# Patient Record
Sex: FEMALE | Race: WHITE | NOT HISPANIC OR LATINO | Employment: OTHER | URBAN - METROPOLITAN AREA
[De-identification: names, ages, dates, MRNs, and addresses within clinical notes are randomized per-mention and may not be internally consistent; named-entity substitution may affect disease eponyms.]

---

## 2017-01-19 ENCOUNTER — OFFICE VISIT (OUTPATIENT)
Dept: CARDIOLOGY | Facility: CLINIC | Age: 67
End: 2017-01-19

## 2017-01-19 VITALS
BODY MASS INDEX: 39.38 KG/M2 | WEIGHT: 214 LBS | DIASTOLIC BLOOD PRESSURE: 80 MMHG | SYSTOLIC BLOOD PRESSURE: 130 MMHG | HEART RATE: 75 BPM | HEIGHT: 62 IN

## 2017-01-19 DIAGNOSIS — I25.10 CORONARY ARTERY DISEASE INVOLVING NATIVE CORONARY ARTERY OF NATIVE HEART WITHOUT ANGINA PECTORIS: Primary | ICD-10-CM

## 2017-01-19 DIAGNOSIS — E78.2 MIXED HYPERLIPIDEMIA: ICD-10-CM

## 2017-01-19 DIAGNOSIS — I21.4 NON-ST ELEVATION (NSTEMI) MYOCARDIAL INFARCTION (HCC): ICD-10-CM

## 2017-01-19 DIAGNOSIS — I10 ESSENTIAL HYPERTENSION: ICD-10-CM

## 2017-01-19 DIAGNOSIS — Z95.1 S/P CABG (CORONARY ARTERY BYPASS GRAFT): Chronic | ICD-10-CM

## 2017-01-19 PROCEDURE — 93000 ELECTROCARDIOGRAM COMPLETE: CPT | Performed by: INTERNAL MEDICINE

## 2017-01-19 PROCEDURE — 99213 OFFICE O/P EST LOW 20 MIN: CPT | Performed by: INTERNAL MEDICINE

## 2017-01-19 NOTE — LETTER
January 19, 2017     Rambo Castillo MD  [address] [address]    Patient: Valentine Arora   YOB: 1950   Date of Visit: 1/19/2017       Dear Dr. Anna MD:    Thank you for referring Valentine Arora to me for evaluation. Below are the relevant portions of my assessment and plan of care.    If you have questions, please do not hesitate to call me. I look forward to following Valentine along with you.         Sincerely,        Kumar Frye III, MD        CC: No Recipients  Kumar Frye III, MD  1/19/2017 10:13 AM  Signed      Subjective:     Encounter Date:01/19/2017      Patient ID: Valentine Arora is a 67 y.o. female.    Chief Complaint:  History of Present Illness    Deear Dr. Castillo,    This patient comes in for routine follow-up of their cardiac status. She has a history of CAD and had a myocardial infarction approximately 20 years ago. At that point she had a failed attempted angioplasty and then underwent a two-vessel CABG in 1995. In August 2012 she had a stress Cardiolite test performed that showed an ejection fraction 75% and no ischemia. No wall motion abnormalities were seen. She was hospitalized at [facility] in September 2015. 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 This was felt to be due to trauma years ago. She had an echocardiogram that showed an ejection fraction is 60-65% with normal valvular structure and function. She also had a Holter monitor that showed no abnormality.    Since her last visit she has been doing well. This patient denies any cardiac complaints.  This patient denies any chest pain, pressure, tightness, squeezing, or heartburn.  This patient has not experienced any feeling of palpitations, tachycardia or heart racing and no presyncope or syncope.  There has not been any problems with dizziness or lightheadedness.  There has not been any  orthopnea or PND, and no problems with lower extremity edema.  This patient denies any shortness of breath at rest or with activity and has not had any wheezing.  This patient has not had any problems with unexplained nausea or vomiting. The patient has continued to perform daily activities of living without any specific problem or change in the level of activity.  This patient has not been recently hospitalized for any reason.    The following portions of the patient's history were reviewed and updated as appropriate: allergies, current medications, past family history, past medical history, past social history, past surgical history and problem list.    Past Medical History   Diagnosis Date   • Chronic back pain    • Hyperlipidemia    • Hypertension    • Myocardial infarction        Past Surgical History   Procedure Laterality Date   • Lumbar fusion       L4/L5   • Wrist fusion Left    • Coronary artery bypass graft  1995     x 2 vessels   • Inner ear surgery     • Tonsillectomy     • Leg surgery       x 2 s/p fall   • Skin graft       to left lower leg x 2       Social History     Social History   • Marital status:      Spouse name: N/A   • Number of children: N/A   • Years of education: N/A     Occupational History   • Not on file.     Social History Main Topics   • Smoking status: Former Smoker     Quit date: 1995   • Smokeless tobacco: Not on file   • Alcohol use Yes      Comment: occasional   • Drug use: No   • Sexual activity: Defer     Other Topics Concern   • Not on file     Social History Narrative       Review of Systems   Constitution: Negative for chills, decreased appetite, fever and night sweats.   HENT: Negative for ear discharge, ear pain, hearing loss, nosebleeds and sore throat.    Eyes: Negative for blurred vision, double vision and pain.   Cardiovascular: Negative for cyanosis.   Respiratory: Negative for hemoptysis and sputum production.    Endocrine: Negative for cold intolerance and  "heat intolerance.   Hematologic/Lymphatic: Negative for adenopathy.   Skin: Negative for dry skin, itching, nail changes, rash and suspicious lesions.   Musculoskeletal: Negative for arthritis, gout, muscle cramps, muscle weakness, myalgias and neck pain.   Gastrointestinal: Negative for anorexia, bowel incontinence, constipation, diarrhea, dysphagia, hematemesis and jaundice.   Genitourinary: Negative for bladder incontinence, dysuria, flank pain, frequency, hematuria and nocturia.   Neurological: Negative for focal weakness, numbness, paresthesias and seizures.   Psychiatric/Behavioral: Negative for altered mental status, hallucinations, hypervigilance, suicidal ideas and thoughts of violence.   Allergic/Immunologic: Negative for persistent infections.         ECG 12 Lead  Date/Time: 1/19/2017 10:11 AM  Performed by: CHICA ROBLERO III.  Authorized by: CHICA ROBLERO III   Comparison: compared with previous ECG   Similar to previous ECG  Rhythm: sinus rhythm  Rate: normal  Conduction: conduction normal  ST Segments: ST segments normal  T Waves: T waves normal  QRS axis: normal  Other: no other findings  Clinical impression: normal ECG               Objective:     Vitals:    01/19/17 0919   BP: 130/80   Pulse: 75   Weight: 214 lb (97.1 kg)   Height: 62\" (157.5 cm)         Physical Exam   Constitutional: She is oriented to person, place, and time. She appears well-developed and well-nourished. No distress.   HENT:   Head: Normocephalic and atraumatic.   Nose: Nose normal.   Mouth/Throat: Oropharynx is clear and moist.   Eyes: Conjunctivae and EOM are normal. Pupils are equal, round, and reactive to light. Right eye exhibits no discharge. Left eye exhibits no discharge.   Neck: Normal range of motion. Neck supple. No tracheal deviation present. No thyromegaly present.   Cardiovascular: Normal rate, regular rhythm, S1 normal, S2 normal, normal heart sounds and normal pulses.  Exam reveals no S3.    Pulmonary/Chest: " Effort normal and breath sounds normal. No stridor. No respiratory distress. She exhibits no tenderness.   Abdominal: Soft. Bowel sounds are normal. She exhibits no distension and no mass. There is no tenderness. There is no rebound and no guarding.   Musculoskeletal: Normal range of motion. She exhibits no tenderness or deformity.   Lymphadenopathy:     She has no cervical adenopathy.   Neurological: She is alert and oriented to person, place, and time. She has normal reflexes.   Skin: Skin is warm and dry. No rash noted. She is not diaphoretic. No erythema.   Psychiatric: She has a normal mood and affect. Thought content normal.       Lab Review:             Performed        Assessment:          Diagnosis Plan   1. Coronary artery disease involving native coronary artery of native heart without angina pectoris  ECG 12 Lead   2. S/P CABG (coronary artery bypass graft)  ECG 12 Lead   3. Non-ST elevation (NSTEMI) myocardial infarction  ECG 12 Lead   4. Essential hypertension     5. Mixed hyperlipidemia            Plan:       At this point the patient is doing well.    Thank you very much for allowing us to participate in the care of this pleasant patient.  Please don't hesitate to call if I can be of assistance in any way.      Current Outpatient Prescriptions:   •  aspirin 325 MG tablet, Take 325 mg by mouth Daily., Disp: , Rfl:   •  carvedilol CR (COREG CR) 10 MG 24 hr capsule, Take 10 mg by mouth Daily., Disp: , Rfl:   •  celecoxib (CELEBREX) 200 MG capsule, Take 200 mg by mouth Daily., Disp: , Rfl:   •  DULoxetine (CYMBALTA) 60 MG capsule, Take 60 mg by mouth Daily., Disp: , Rfl:   •  Olmesartan-Amlodipine-HCTZ (TRIBENZOR) 40-5-12.5 MG tablet, Take 1 tablet by mouth Daily., Disp: , Rfl:   •  rosuvastatin (CRESTOR) 20 MG tablet, Take 20 mg by mouth Daily., Disp: , Rfl:      Coronary Artery Disease  Assessment  • The patient has no angina    Plan  • Lifestyle modifications discussed include adhering to a heart  healthy diet, avoidance of tobacco products, maintenance of a healthy weight, medication compliance, regular exercise and regular monitoring of cholesterol and blood pressure    Subjective - Objective  • There is a history of past MI  • There is a history of previous coronary artery bypass graft  • Current antiplatelet therapy includes aspirin 81 mg           EMR Dragon/Transcription disclaimer:    Much of this encounter note is an electronic transcription/translation of spoken language to printed text. The electronic translation of spoken language may permit erroneous, or at times, nonsensical words or phrases to be inadvertently transcribed; Although I have reviewed the note for such errors, some may still exist.

## 2017-01-19 NOTE — PROGRESS NOTES
Subjective:     Encounter Date:01/19/2017      Patient ID: Valentine Arora is a 67 y.o. female.    Chief Complaint:  History of Present Illness    Degunnar Castillo,    This patient comes in for routine follow-up of their cardiac status. She has a history of CAD and had a myocardial infarction approximately 20 years ago. At that point she had a failed attempted angioplasty and then underwent a two-vessel CABG in 1995. In August 2012 she had a stress Cardiolite test performed that showed an ejection fraction 75% and no ischemia. No wall motion abnormalities were seen. She was hospitalized at [facility] in September 2015. 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 This was felt to be due to trauma years ago. She had an echocardiogram that showed an ejection fraction is 60-65% with normal valvular structure and function. She also had a Holter monitor that showed no abnormality.    Since her last visit she has been doing well. This patient denies any cardiac complaints.  This patient denies any chest pain, pressure, tightness, squeezing, or heartburn.  This patient has not experienced any feeling of palpitations, tachycardia or heart racing and no presyncope or syncope.  There has not been any problems with dizziness or lightheadedness.  There has not been any orthopnea or PND, and no problems with lower extremity edema.  This patient denies any shortness of breath at rest or with activity and has not had any wheezing.  This patient has not had any problems with unexplained nausea or vomiting. The patient has continued to perform daily activities of living without any specific problem or change in the level of activity.  This patient has not been recently hospitalized for any reason.    The following portions of the patient's history were reviewed and updated as appropriate: allergies, current medications, past family history, past medical  history, past social history, past surgical history and problem list.    Past Medical History   Diagnosis Date   • Chronic back pain    • Hyperlipidemia    • Hypertension    • Myocardial infarction        Past Surgical History   Procedure Laterality Date   • Lumbar fusion       L4/L5   • Wrist fusion Left    • Coronary artery bypass graft  1995     x 2 vessels   • Inner ear surgery     • Tonsillectomy     • Leg surgery       x 2 s/p fall   • Skin graft       to left lower leg x 2       Social History     Social History   • Marital status:      Spouse name: N/A   • Number of children: N/A   • Years of education: N/A     Occupational History   • Not on file.     Social History Main Topics   • Smoking status: Former Smoker     Quit date: 1995   • Smokeless tobacco: Not on file   • Alcohol use Yes      Comment: occasional   • Drug use: No   • Sexual activity: Defer     Other Topics Concern   • Not on file     Social History Narrative       Review of Systems   Constitution: Negative for chills, decreased appetite, fever and night sweats.   HENT: Negative for ear discharge, ear pain, hearing loss, nosebleeds and sore throat.    Eyes: Negative for blurred vision, double vision and pain.   Cardiovascular: Negative for cyanosis.   Respiratory: Negative for hemoptysis and sputum production.    Endocrine: Negative for cold intolerance and heat intolerance.   Hematologic/Lymphatic: Negative for adenopathy.   Skin: Negative for dry skin, itching, nail changes, rash and suspicious lesions.   Musculoskeletal: Negative for arthritis, gout, muscle cramps, muscle weakness, myalgias and neck pain.   Gastrointestinal: Negative for anorexia, bowel incontinence, constipation, diarrhea, dysphagia, hematemesis and jaundice.   Genitourinary: Negative for bladder incontinence, dysuria, flank pain, frequency, hematuria and nocturia.   Neurological: Negative for focal weakness, numbness, paresthesias and seizures.  "  Psychiatric/Behavioral: Negative for altered mental status, hallucinations, hypervigilance, suicidal ideas and thoughts of violence.   Allergic/Immunologic: Negative for persistent infections.         ECG 12 Lead  Date/Time: 1/19/2017 10:11 AM  Performed by: CHICA ROBLERO III.  Authorized by: CHICA ROBLERO III   Comparison: compared with previous ECG   Similar to previous ECG  Rhythm: sinus rhythm  Rate: normal  Conduction: conduction normal  ST Segments: ST segments normal  T Waves: T waves normal  QRS axis: normal  Other: no other findings  Clinical impression: normal ECG               Objective:     Vitals:    01/19/17 0919   BP: 130/80   Pulse: 75   Weight: 214 lb (97.1 kg)   Height: 62\" (157.5 cm)         Physical Exam   Constitutional: She is oriented to person, place, and time. She appears well-developed and well-nourished. No distress.   HENT:   Head: Normocephalic and atraumatic.   Nose: Nose normal.   Mouth/Throat: Oropharynx is clear and moist.   Eyes: Conjunctivae and EOM are normal. Pupils are equal, round, and reactive to light. Right eye exhibits no discharge. Left eye exhibits no discharge.   Neck: Normal range of motion. Neck supple. No tracheal deviation present. No thyromegaly present.   Cardiovascular: Normal rate, regular rhythm, S1 normal, S2 normal, normal heart sounds and normal pulses.  Exam reveals no S3.    Pulmonary/Chest: Effort normal and breath sounds normal. No stridor. No respiratory distress. She exhibits no tenderness.   Abdominal: Soft. Bowel sounds are normal. She exhibits no distension and no mass. There is no tenderness. There is no rebound and no guarding.   Musculoskeletal: Normal range of motion. She exhibits no tenderness or deformity.   Lymphadenopathy:     She has no cervical adenopathy.   Neurological: She is alert and oriented to person, place, and time. She has normal reflexes.   Skin: Skin is warm and dry. No rash noted. She is not diaphoretic. No erythema. "   Psychiatric: She has a normal mood and affect. Thought content normal.       Lab Review:             Performed        Assessment:          Diagnosis Plan   1. Coronary artery disease involving native coronary artery of native heart without angina pectoris  ECG 12 Lead   2. S/P CABG (coronary artery bypass graft)  ECG 12 Lead   3. Non-ST elevation (NSTEMI) myocardial infarction  ECG 12 Lead   4. Essential hypertension     5. Mixed hyperlipidemia            Plan:       At this point the patient is doing well.    Thank you very much for allowing us to participate in the care of this pleasant patient.  Please don't hesitate to call if I can be of assistance in any way.      Current Outpatient Prescriptions:   •  aspirin 325 MG tablet, Take 325 mg by mouth Daily., Disp: , Rfl:   •  carvedilol CR (COREG CR) 10 MG 24 hr capsule, Take 10 mg by mouth Daily., Disp: , Rfl:   •  celecoxib (CELEBREX) 200 MG capsule, Take 200 mg by mouth Daily., Disp: , Rfl:   •  DULoxetine (CYMBALTA) 60 MG capsule, Take 60 mg by mouth Daily., Disp: , Rfl:   •  Olmesartan-Amlodipine-HCTZ (TRIBENZOR) 40-5-12.5 MG tablet, Take 1 tablet by mouth Daily., Disp: , Rfl:   •  rosuvastatin (CRESTOR) 20 MG tablet, Take 20 mg by mouth Daily., Disp: , Rfl:      Coronary Artery Disease  Assessment  • The patient has no angina    Plan  • Lifestyle modifications discussed include adhering to a heart healthy diet, avoidance of tobacco products, maintenance of a healthy weight, medication compliance, regular exercise and regular monitoring of cholesterol and blood pressure    Subjective - Objective  • There is a history of past MI  • There is a history of previous coronary artery bypass graft  • Current antiplatelet therapy includes aspirin 81 mg           EMR Dragon/Transcription disclaimer:    Much of this encounter note is an electronic transcription/translation of spoken language to printed text. The electronic translation of spoken language may permit  erroneous, or at times, nonsensical words or phrases to be inadvertently transcribed; Although I have reviewed the note for such errors, some may still exist.

## 2017-01-19 NOTE — MR AVS SNAPSHOT
Valentine Arora   1/19/2017 9:00 AM   Office Visit    Dept Phone:  [phone]   Encounter #:  [id number]    Provider:  Kumar Frye III, MD   Department:  [facility]                Your Full Care Plan              Today's Medication Changes          These changes are accurate as of: 1/19/17  9:50 AM.  If you have any questions, ask your nurse or doctor.               Stop taking medication(s)listed here:     cephalexin 500 MG capsule   Commonly known as:  KEFLEX   Stopped by:  Kumar Frye III, MD                      Your Updated Medication List          This list is accurate as of: 1/19/17  9:50 AM.  Always use your most recent med list.                aspirin 325 MG tablet       CELEBREX 200 MG capsule   Generic drug:  celecoxib       COREG CR 10 MG 24 hr capsule   Generic drug:  carvedilol CR       CRESTOR 20 MG tablet   Generic drug:  rosuvastatin       CYMBALTA 60 MG capsule   Generic drug:  DULoxetine       TRIBENZOR 40-5-12.5 MG tablet   Generic drug:  Olmesartan-Amlodipine-HCTZ               Instructions    Heart Disease Prevention  Heart disease is a leading cause of death. There are many things you can do to help prevent heart disease.  BE PHYSICALLY ACTIVE  Physical activity is good for your heart. It helps control your blood pressure, cholesterol levels, and weight. Try to be physically active every day. Ask your health care provider what activities are best for you.   BE A HEALTHY WEIGHT  Extra weight can strain your heart and affect your blood pressure and cholesterol levels. Lose weight with diet and exercise if recommended by your health care provider.  EAT HEART-HEALTHY FOODS  Follow a healthy eating plan as recommended by your health care provider or dietitian. Heart-healthy foods include:   · High-fiber foods. These include oat bran, oatmeal, and whole-grain breads and cereals.  · Fruits and  vegetables.  Avoid:  · Alcohol.  · Fried foods.  · Foods high in saturated fat. These include meats, butter, whole dairy products, shortening, and coconut or palm oil.  · Salty foods. These include canned food, luncheon meat, salty snacks, and fast food.  KEEP YOUR CHOLESTEROL LEVELS UNDER CONTROL  Cholesterol is a substance that is used for many important functions. When your cholesterol levels are high, cholesterol can stick to the insides of your blood vessels, making them narrow or clog. This can lead to chest pain (angina) and a heart attack.   Keep your cholesterol levels under control as recommended by your health care provider. Have your cholesterol checked at least once a year. Target cholesterol levels (in mg/dL) for most people are:   · Total cholesterol below 200.  · LDL cholesterol below 100.  · HDL cholesterol above 40 in men and above 50 in women.  · Triglycerides below 150.  KEEP YOUR BLOOD PRESSURE UNDER CONTROL  Having high blood pressure (hypertension) puts you at risk for stroke and other forms of heart disease. Keep your blood pressure under control as recommended by your health care provider. Ask your health care provider if you need treatment to lower your blood pressure. If you are 18-39 years of age, have your blood pressure checked every 3-5 years. If you are 40 years of age or older, have your blood pressure checked every year.  DO NOT USE TOBACCO PRODUCTS  Tobacco smoke can damage your heart and blood vessels. Do not use any tobacco products including cigarettes, chewing tobacco, or electronic cigarettes. If you need help quitting, ask your health care provider.  TAKE MEDICINES AS DIRECTED  Take medicines only as directed by your health care provider. Ask your health care provider whether you should take an aspirin every day. Taking aspirin can help reduce your risk of heart disease and stroke.   FOR MORE INFORMATION   To find out more about heart disease, visit the American Heart  "Association's website at www.americanheart.org     This information is not intended to replace advice given to you by your health care provider. Make sure you discuss any questions you have with your health care provider.     Document Released: 2005 Document Revised: 2016 Document Reviewed: 2015  Elsevier Interactive Patient Education © [employer]       Patient Instructions History      Upcoming Appointments     Visit Type Date Time Department    FOLLOW UP 2017  9:00 AM CARMEN MULLER [facility]      [employer] Signup     [facility] allows you to send messages to your doctor, view your test results, renew your prescriptions, schedule appointments, and more. To sign up, go to [employer] and click on the Sign Up Now link in the New User? box. Enter your [facility] Activation Code exactly as it appears below along with the last four digits of your Social Security Number and your Date of Birth () to complete the sign-up process. If you do not sign up before the expiration date, you must request a new code.    [facility] Activation Code: [email or web address]  Expires: 2017  9:50 AM    If you have questions, you can email [email or web address] or call [phone] to talk to our [facility] staff. Remember, [facility] is NOT to be used for urgent needs. For medical emergencies, dial 911.               Other Info from Your Visit           Your Appointments     2018 10:30 AM EST   Follow Up with Kumar Frye III, MD   [facility] [place] [facility] (--)    [address] [place] [address] [phone]           Arrive 15 minutes prior to appointment.              Allergies     Codeine  Itching      Vital Signs     Blood Pressure Pulse Height Weight Body Mass Index Smoking Status    130/80 75 62\" (157.5 cm) 214 lb (97.1 kg) 39.14 kg/m2 Former Smoker        "

## 2017-05-19 ENCOUNTER — TELEPHONE (OUTPATIENT)
Dept: CARDIOLOGY | Facility: CLINIC | Age: 67
End: 2017-05-19

## 2017-06-23 ENCOUNTER — TELEPHONE (OUTPATIENT)
Dept: CARDIOLOGY | Facility: CLINIC | Age: 67
End: 2017-06-23

## 2017-06-23 NOTE — TELEPHONE ENCOUNTER
Pt was last seen on 1/19/17. PT is wanting surgery clearance for arthroscopic diagnosis exam right hip and burial reception with dr.Matt pedro on 7/5/17. You clear her to have wrist surgery in may. Please advise. PT #:[phone]      Thanks Josefina

## 2018-01-17 ENCOUNTER — TELEPHONE (OUTPATIENT)
Dept: CARDIOLOGY | Facility: CLINIC | Age: 68
End: 2018-01-17

## 2018-01-17 ENCOUNTER — OFFICE VISIT (OUTPATIENT)
Dept: CARDIOLOGY | Facility: CLINIC | Age: 68
End: 2018-01-17

## 2018-01-17 VITALS
HEART RATE: 66 BPM | WEIGHT: 215 LBS | HEIGHT: 62 IN | DIASTOLIC BLOOD PRESSURE: 78 MMHG | SYSTOLIC BLOOD PRESSURE: 148 MMHG | BODY MASS INDEX: 39.56 KG/M2

## 2018-01-17 DIAGNOSIS — I25.10 CORONARY ARTERY DISEASE INVOLVING NATIVE CORONARY ARTERY OF NATIVE HEART WITHOUT ANGINA PECTORIS: Primary | Chronic | ICD-10-CM

## 2018-01-17 DIAGNOSIS — I10 ESSENTIAL HYPERTENSION: Chronic | ICD-10-CM

## 2018-01-17 DIAGNOSIS — I21.4 NON-ST ELEVATION (NSTEMI) MYOCARDIAL INFARCTION (HCC): Chronic | ICD-10-CM

## 2018-01-17 DIAGNOSIS — Z95.1 S/P CABG (CORONARY ARTERY BYPASS GRAFT): Chronic | ICD-10-CM

## 2018-01-17 DIAGNOSIS — E78.2 MIXED HYPERLIPIDEMIA: Chronic | ICD-10-CM

## 2018-01-17 PROCEDURE — 93000 ELECTROCARDIOGRAM COMPLETE: CPT | Performed by: INTERNAL MEDICINE

## 2018-01-17 PROCEDURE — 99214 OFFICE O/P EST MOD 30 MIN: CPT | Performed by: INTERNAL MEDICINE

## 2018-01-17 RX ORDER — ASPIRIN 81 MG/1
81 TABLET ORAL DAILY
COMMUNITY
End: 2020-09-10

## 2018-01-17 NOTE — PATIENT INSTRUCTIONS
Heart Disease Prevention  Heart disease is a leading cause of death. There are many things you can do to help prevent heart disease.  Be physically active  Physical activity is good for your heart. It helps control your blood pressure, cholesterol levels, and weight. Try to be physically active every day. Ask your health care provider what activities are best for you.  Be a healthy weight  Extra weight can strain your heart and affect your blood pressure and cholesterol levels. Lose weight with diet and exercise if recommended by your health care provider.  Eat heart-healthy foods  Follow a healthy eating plan as recommended by your health care provider or dietitian. Heart-healthy foods include:  · High-fiber foods. These include oat bran, oatmeal, and whole-grain breads and cereals.  · Fruits and vegetables.  Avoid:  · Alcohol.  · Fried foods.  · Foods high in saturated fat. These include meats, butter, whole dairy products, shortening, and coconut or palm oil.  · Salty foods. These include canned food, luncheon meat, salty snacks, and fast food.  Keep your cholesterol levels under control  Cholesterol is a substance that is used for many important functions. When your cholesterol levels are high, cholesterol can stick to the insides of your blood vessels, making them narrow or clog. This can lead to chest pain (angina) and a heart attack.  Keep your cholesterol levels under control as recommended by your health care provider. Have your cholesterol checked at least once a year. Target cholesterol levels (in mg/dL) for most people are:  · Total cholesterol below 200.  · LDL cholesterol below 100.  · HDL cholesterol above 40 in men and above 50 in women.  · Triglycerides below 150.  Keep your blood pressure under control  Having high blood pressure (hypertension) puts you at risk for stroke and other forms of heart disease. Keep your blood pressure under control as recommended by your health care provider. Ask your  health care provider if you need treatment to lower your blood pressure. If you are 18-39 years of age, have your blood pressure checked every 3-5 years. If you are 40 years of age or older, have your blood pressure checked every year.  Do not use tobacco products  Tobacco smoke can damage your heart and blood vessels. Do not use any tobacco products including cigarettes, chewing tobacco, or electronic cigarettes. If you need help quitting, ask your health care provider.  Take medicines as directed  Take medicines only as directed by your health care provider. Ask your health care provider whether you should take an aspirin every day. Taking aspirin can help reduce your risk of heart disease and stroke.  Where to find more information:  To find out more about heart disease, visit the American Heart Association's website at www.americanheart.org  This information is not intended to replace advice given to you by your health care provider. Make sure you discuss any questions you have with your health care provider.  Document Released: 08/01/2005 Document Revised: 05/17/2017 Document Reviewed: 02/11/2015  Elsevier Interactive Patient Education © 2017 [employer]

## 2018-01-17 NOTE — PROGRESS NOTES
Subjective:     Encounter Date:01/17/2018      Patient ID: Valentine Arora is a 68 y.o. female.    Chief Complaint:  History of Present Illness    Degunnar Alejandro,    This patient comes in for routine follow-up of their cardiac status. It has been one year since her last visit.  She has a history of CAD and had a myocardial infarction approximately 20 years ago. At that point she had a failed attempted angioplasty and then underwent a two-vessel CABG in 1995. In August 2012 she had a stress Cardiolite test performed that showed an ejection fraction 75% and no ischemia. No wall motion abnormalities were seen. She was hospitalized at [facility] in September 2015. 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 This was felt to be due to trauma years ago. She had an echocardiogram that showed an ejection fraction is 60-65% with normal valvular structure and function. She also had a Holter monitor that showed no abnormality.    She states that she's been doing well since her visit a year ago.  Only complaint she had is related to arthritis.  She also had right hip surgery in July 2017 as well as surgery on her wrist April 2017.  She's had no cardiac complaints.This patient denies any chest pain, pressure, tightness, squeezing, or heartburn.  This patient has not experienced any feeling of palpitations, tachycardia or heart racing and no presyncope or syncope.  There has not been any problems with dizziness or lightheadedness.  There has not been any orthopnea or PND, and no problems with lower extremity edema.  This patient denies any shortness of breath at rest or with activity and has not had any wheezing.  This patient has not had any problems with unexplained nausea or vomiting. The patient has continued to perform daily activities of living without any specific problem or change in the level of activity.  This patient has not been  recently hospitalized for any reason.    The following portions of the patient's history were reviewed and updated as appropriate: allergies, current medications, past family history, past medical history, past social history, past surgical history and problem list.    Past Medical History:   Diagnosis Date   • CAD (coronary artery disease)    • Chronic back pain    • Hyperlipidemia    • Hypertension    • Myocardial infarction        Past Surgical History:   Procedure Laterality Date   • CORONARY ARTERY BYPASS GRAFT  1995    x 2 vessels   • INNER EAR SURGERY     • LEG SURGERY      x 2 s/p fall   • LUMBAR FUSION      L4/L5   • SKIN GRAFT      to left lower leg x 2   • TONSILLECTOMY     • WRIST FUSION Left        Social History     Social History   • Marital status:      Spouse name: N/A   • Number of children: N/A   • Years of education: N/A     Occupational History   • Not on file.     Social History Main Topics   • Smoking status: Former Smoker     Quit date: 1995   • Smokeless tobacco: Not on file   • Alcohol use Yes      Comment: occasional   • Drug use: No   • Sexual activity: Defer     Other Topics Concern   • Not on file     Social History Narrative       Review of Systems   Constitution: Negative for chills, decreased appetite, fever and night sweats.   HENT: Negative for ear discharge, ear pain, hearing loss, nosebleeds and sore throat.    Eyes: Negative for blurred vision, double vision and pain.   Cardiovascular: Negative for cyanosis.   Respiratory: Negative for hemoptysis and sputum production.    Endocrine: Negative for cold intolerance and heat intolerance.   Hematologic/Lymphatic: Negative for adenopathy.   Skin: Negative for dry skin, itching, nail changes, rash and suspicious lesions.   Musculoskeletal: Negative for arthritis, gout, muscle cramps, muscle weakness, myalgias and neck pain.   Gastrointestinal: Negative for anorexia, bowel incontinence, constipation, diarrhea, dysphagia,  "hematemesis and jaundice.   Genitourinary: Negative for bladder incontinence, dysuria, flank pain, frequency, hematuria and nocturia.   Neurological: Negative for focal weakness, numbness, paresthesias and seizures.   Psychiatric/Behavioral: Negative for altered mental status, hallucinations, hypervigilance, suicidal ideas and thoughts of violence.   Allergic/Immunologic: Negative for persistent infections.         ECG 12 Lead  Date/Time: 1/17/2018 11:47 AM  Performed by: CHICA ROBLERO III  Authorized by: CHICA ROBLERO III   Comparison: compared with previous ECG   Similar to previous ECG  Rhythm: sinus rhythm  Rate: normal  Conduction: conduction normal  ST Segments: ST segments normal  T Waves: T waves normal  QRS axis: normal  Other: no other findings  Clinical impression: normal ECG               Objective:     Vitals:    01/17/18 1019   BP: 148/78   Pulse: 66   Weight: 97.5 kg (215 lb)   Height: 157.5 cm (62\")         Physical Exam   Constitutional: She is oriented to person, place, and time. She appears well-developed and well-nourished. No distress.   HENT:   Head: Normocephalic and atraumatic.   Nose: Nose normal.   Mouth/Throat: Oropharynx is clear and moist.   Eyes: Conjunctivae and EOM are normal. Pupils are equal, round, and reactive to light. Right eye exhibits no discharge. Left eye exhibits no discharge.   Neck: Normal range of motion. Neck supple. No tracheal deviation present. No thyromegaly present.   Cardiovascular: Normal rate, regular rhythm, S1 normal, S2 normal, normal heart sounds and normal pulses.  Exam reveals no S3.    Pulmonary/Chest: Effort normal and breath sounds normal. No stridor. No respiratory distress. She exhibits no tenderness.   Abdominal: Soft. Bowel sounds are normal. She exhibits no distension and no mass. There is no tenderness. There is no rebound and no guarding.   Musculoskeletal: Normal range of motion. She exhibits no tenderness or deformity.   Lymphadenopathy:    "  She has no cervical adenopathy.   Neurological: She is alert and oriented to person, place, and time. She has normal reflexes.   Skin: Skin is warm and dry. No rash noted. She is not diaphoretic. No erythema.   Psychiatric: She has a normal mood and affect. Thought content normal.       Lab Review:             Performed        Assessment:          Diagnosis Plan   1. Coronary artery disease involving native coronary artery of native heart without angina pectoris  ECG 12 Lead   2. S/P CABG (coronary artery bypass graft)  ECG 12 Lead   3. Essential hypertension     4. Non-ST elevation (NSTEMI) myocardial infarction     5. Mixed hyperlipidemia            Plan:       1. Coronary Artery Disease  Assessment  • The patient has no angina    Plan  • Lifestyle modifications discussed include adhering to a heart healthy diet, avoidance of tobacco products, maintenance of a healthy weight, medication compliance, regular exercise and regular monitoring of cholesterol and blood pressure    Subjective - Objective  • There is a history of past MI  • There is a history of previous coronary artery bypass graft  • Current antiplatelet therapy includes aspirin 81 mg    2.  Prior CABG  3.  History of MI  4.  Hypertension-continue current medical regimen  5.  Hyperlipidemia-on lipid-lowering therapy, patient states that she had recent blood work in your office and it was well-controlled.      Current Outpatient Prescriptions:   •  aspirin 81 MG EC tablet, Take 81 mg by mouth Daily., Disp: , Rfl:   •  carvedilol CR (COREG CR) 10 MG 24 hr capsule, Take 10 mg by mouth Daily., Disp: , Rfl:   •  celecoxib (CELEBREX) 200 MG capsule, Take 200 mg by mouth Daily., Disp: , Rfl:   •  DULoxetine (CYMBALTA) 60 MG capsule, Take 60 mg by mouth Daily., Disp: , Rfl:   •  Olmesartan-Amlodipine-HCTZ (TRIBENZOR) 40-5-12.5 MG tablet, Take 1 tablet by mouth Daily., Disp: , Rfl:   •  rosuvastatin (CRESTOR) 20 MG tablet, Take 10 mg by mouth Daily., Disp: ,  Rfl:

## 2018-08-09 ENCOUNTER — OFFICE VISIT (OUTPATIENT)
Dept: CARDIOLOGY | Facility: CLINIC | Age: 68
End: 2018-08-09

## 2018-08-09 VITALS
HEIGHT: 62 IN | SYSTOLIC BLOOD PRESSURE: 136 MMHG | DIASTOLIC BLOOD PRESSURE: 60 MMHG | WEIGHT: 200 LBS | BODY MASS INDEX: 36.8 KG/M2 | HEART RATE: 72 BPM

## 2018-08-09 DIAGNOSIS — I10 ESSENTIAL HYPERTENSION: Primary | Chronic | ICD-10-CM

## 2018-08-09 DIAGNOSIS — Z95.1 S/P CABG (CORONARY ARTERY BYPASS GRAFT): Chronic | ICD-10-CM

## 2018-08-09 DIAGNOSIS — E78.2 MIXED HYPERLIPIDEMIA: Chronic | ICD-10-CM

## 2018-08-09 DIAGNOSIS — I25.10 CORONARY ARTERY DISEASE INVOLVING NATIVE CORONARY ARTERY OF NATIVE HEART WITHOUT ANGINA PECTORIS: Chronic | ICD-10-CM

## 2018-08-09 DIAGNOSIS — I21.4 NON-ST ELEVATION (NSTEMI) MYOCARDIAL INFARCTION (HCC): Chronic | ICD-10-CM

## 2018-08-09 PROCEDURE — 99214 OFFICE O/P EST MOD 30 MIN: CPT | Performed by: INTERNAL MEDICINE

## 2018-08-09 NOTE — PROGRESS NOTES
Subjective:     Encounter Date:08/9/2018      Patient ID: Valentine Arora is a 68 y.o. female.    Chief Complaint: HTN, CAD  History of Present Illness    Sindy Alejandro,    This patient comes in for routine follow-up of their cardiac status. It has been one year since her last visit.  She has a history of CAD and had a myocardial infarction approximately 20 years ago. At that point she had a failed attempted angioplasty and then underwent a two-vessel CABG in 1995. In August 2012 she had a stress Cardiolite test performed that showed an ejection fraction 75% and no ischemia. No wall motion abnormalities were seen. She was hospitalized at [facility] in September 2015. 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 This was felt to be due to trauma years ago. She had an echocardiogram that showed an ejection fraction is 60-65% with normal valvular structure and function. She also had a Holter monitor that showed no abnormality.    She has had problems with shingles and also had knee surgery.  Blood pressures been less consistently control.  She just had her carvedilol adjusted in your office to take it one twice daily in the last couple of days her blood pressures been doing better.  She's only been on the new regimen for 5-1/2 days.  She's not having chest pain or chest discomfort.  No palpitations.  No shortness of breath.  She feels like she's getting better.    The following portions of the patient's history were reviewed and updated as appropriate: allergies, current medications, past family history, past medical history, past social history, past surgical history and problem list.    Past Medical History:   Diagnosis Date   • CAD (coronary artery disease)    • Chronic back pain    • Hyperlipidemia    • Hypertension    • Myocardial infarction    • Shingles        Past Surgical History:   Procedure Laterality Date   • CORONARY  ARTERY BYPASS GRAFT  1995    x 2 vessels   • INNER EAR SURGERY     • LEG SURGERY      x 2 s/p fall   • LUMBAR FUSION      L4/L5   • SKIN GRAFT      to left lower leg x 2   • TONSILLECTOMY     • WRIST FUSION Left        Social History     Social History   • Marital status:      Spouse name: N/A   • Number of children: N/A   • Years of education: N/A     Occupational History   • Not on file.     Social History Main Topics   • Smoking status: Former Smoker     Quit date: 1995   • Smokeless tobacco: Not on file   • Alcohol use Yes      Comment: occasional   • Drug use: No   • Sexual activity: Defer     Other Topics Concern   • Not on file     Social History Narrative   • No narrative on file       Review of Systems   Constitution: Negative for chills, decreased appetite, fever and night sweats.   HENT: Negative for ear discharge, ear pain, hearing loss, nosebleeds and sore throat.    Eyes: Negative for blurred vision, double vision and pain.   Cardiovascular: Negative for cyanosis.   Respiratory: Negative for hemoptysis and sputum production.    Endocrine: Negative for cold intolerance and heat intolerance.   Hematologic/Lymphatic: Negative for adenopathy.   Skin: Negative for dry skin, itching, nail changes, rash and suspicious lesions.   Musculoskeletal: Negative for arthritis, gout, muscle cramps, muscle weakness, myalgias and neck pain.   Gastrointestinal: Negative for anorexia, bowel incontinence, constipation, diarrhea, dysphagia, hematemesis and jaundice.   Genitourinary: Negative for bladder incontinence, dysuria, flank pain, frequency, hematuria and nocturia.   Neurological: Negative for focal weakness, numbness, paresthesias and seizures.   Psychiatric/Behavioral: Negative for altered mental status, hallucinations, hypervigilance, suicidal ideas and thoughts of violence.   Allergic/Immunologic: Negative for persistent infections.       Procedures       Objective:     Vitals:    08/09/18 1353   BP:  "136/60   Pulse: 72   Weight: 90.7 kg (200 lb)   Height: 157.5 cm (62\")         Physical Exam   Constitutional: She is oriented to person, place, and time. She appears well-developed and well-nourished. No distress.   HENT:   Head: Normocephalic and atraumatic.   Nose: Nose normal.   Mouth/Throat: Oropharynx is clear and moist.   Eyes: Pupils are equal, round, and reactive to light. Conjunctivae and EOM are normal. Right eye exhibits no discharge. Left eye exhibits no discharge.   Neck: Normal range of motion. Neck supple. No tracheal deviation present. No thyromegaly present.   Cardiovascular: Normal rate, regular rhythm, S1 normal, S2 normal, normal heart sounds and normal pulses.  Exam reveals no S3.    Pulmonary/Chest: Effort normal and breath sounds normal. No stridor. No respiratory distress. She exhibits no tenderness.   Abdominal: Soft. Bowel sounds are normal. She exhibits no distension and no mass. There is no tenderness. There is no rebound and no guarding.   Musculoskeletal: Normal range of motion. She exhibits no tenderness or deformity.   Lymphadenopathy:     She has no cervical adenopathy.   Neurological: She is alert and oriented to person, place, and time. She has normal reflexes.   Skin: Skin is warm and dry. No rash noted. She is not diaphoretic. No erythema.   Psychiatric: She has a normal mood and affect. Thought content normal.       Lab Review:             Performed        Assessment:          Diagnosis Plan   1. Essential hypertension     2. Coronary artery disease involving native coronary artery of native heart without angina pectoris     3. Mixed hyperlipidemia     4. Non-ST elevation (NSTEMI) myocardial infarction (CMS/[facility])     5. S/P CABG (coronary artery bypass graft)            Plan:       1. Coronary Artery Disease  Assessment  • The patient has no angina    Plan  • Lifestyle modifications discussed include adhering to a heart healthy diet, avoidance of tobacco products, maintenance of " a healthy weight, medication compliance, regular exercise and regular monitoring of cholesterol and blood pressure    Subjective - Objective  • There is a history of past MI  • There is a history of previous coronary artery bypass graft  • Current antiplatelet therapy includes aspirin 81 mg    2.  Prior CABG  3.  History of MI  4.  Hypertension-continue current medical regimen; I told her that she needs to follow this for a longer period of time.  She is scheduled to be seen back in your office 4 weeks after her last visit.  5.  Hyperlipidemia-on lipid-lowering therapy, patient states that she had recent blood work in your office and it was well-controlled.      Current Outpatient Prescriptions:   •  aspirin 81 MG EC tablet, Take 81 mg by mouth Daily., Disp: , Rfl:   •  carvedilol CR (COREG CR) 10 MG 24 hr capsule, Take 10 mg by mouth 2 (Two) Times a Day., Disp: , Rfl:   •  DULoxetine (CYMBALTA) 60 MG capsule, Take 60 mg by mouth Daily., Disp: , Rfl:   •  Olmesartan-Amlodipine-HCTZ (TRIBENZOR) 40-5-12.5 MG tablet, Take 1 tablet by mouth Daily., Disp: , Rfl:   •  rosuvastatin (CRESTOR) 10 MG tablet, Take 10 mg by mouth Daily., Disp: , Rfl:   •  celecoxib (CELEBREX) 200 MG capsule, Take 200 mg by mouth Daily., Disp: , Rfl:

## 2018-09-13 ENCOUNTER — TELEPHONE (OUTPATIENT)
Dept: CARDIOLOGY | Facility: CLINIC | Age: 68
End: 2018-09-13

## 2018-09-13 NOTE — TELEPHONE ENCOUNTER
Pt called and said that she is needing surgery clearance on getting a wound cleaned out in her knee. Pt said that she will be under anesthesia similar to having a colonoscopy done.    Pt can be reached at [phone]

## 2018-09-13 NOTE — TELEPHONE ENCOUNTER
Received a form from Dr. Jorgensen's office in regards to this.  I have placed the form in your ion box for a sig.

## 2018-12-11 ENCOUNTER — TELEPHONE (OUTPATIENT)
Dept: CARDIOLOGY | Facility: CLINIC | Age: 68
End: 2018-12-11

## 2018-12-11 ENCOUNTER — LAB REQUISITION (OUTPATIENT)
Dept: LAB | Facility: HOSPITAL | Age: 68
End: 2018-12-11

## 2018-12-11 DIAGNOSIS — T84.53XA INFECTION AND INFLAMMATORY REACTION DUE TO INTERNAL RIGHT KNEE PROSTHESIS, INITIAL ENCOUNTER (HCC): ICD-10-CM

## 2018-12-11 LAB
ALBUMIN SERPL-MCNC: 4 G/DL (ref 3.5–5.2)
ALBUMIN/GLOB SERPL: 1.6 G/DL
ALP SERPL-CCNC: 83 U/L (ref 40–129)
ALT SERPL W P-5'-P-CCNC: 19 U/L (ref 5–33)
ANION GAP SERPL CALCULATED.3IONS-SCNC: 15.2 MMOL/L
AST SERPL-CCNC: 16 U/L (ref 5–32)
BASOPHILS # BLD AUTO: 0.06 10*3/MM3 (ref 0–0.2)
BASOPHILS NFR BLD AUTO: 0.5 % (ref 0–2)
BILIRUB SERPL-MCNC: 0.3 MG/DL (ref 0.2–1.2)
BUN BLD-MCNC: 9 MG/DL (ref 8–23)
BUN/CREAT SERPL: 13.4 (ref 7–25)
CALCIUM SPEC-SCNC: 9.1 MG/DL (ref 8.8–10.5)
CHLORIDE SERPL-SCNC: 95 MMOL/L (ref 98–107)
CO2 SERPL-SCNC: 24.8 MMOL/L (ref 22–29)
CREAT BLD-MCNC: 0.67 MG/DL (ref 0.57–1)
DEPRECATED RDW RBC AUTO: 46.1 FL (ref 37–54)
EOSINOPHIL # BLD AUTO: 0.27 10*3/MM3 (ref 0.1–0.3)
EOSINOPHIL NFR BLD AUTO: 2.3 % (ref 0–4)
ERYTHROCYTE [DISTWIDTH] IN BLOOD BY AUTOMATED COUNT: 15.4 % (ref 11.5–14.5)
ERYTHROCYTE [SEDIMENTATION RATE] IN BLOOD: 52 MM/HR (ref 0–20)
GFR SERPL CREATININE-BSD FRML MDRD: 88 ML/MIN/1.73
GLOBULIN UR ELPH-MCNC: 2.5 GM/DL
GLUCOSE BLD-MCNC: 107 MG/DL (ref 65–99)
HCT VFR BLD AUTO: 31.4 % (ref 37–47)
HGB BLD-MCNC: 9.6 G/DL (ref 12–16)
IMM GRANULOCYTES # BLD: 0.14 10*3/MM3 (ref 0–0.03)
IMM GRANULOCYTES NFR BLD: 1.2 % (ref 0–0.5)
LYMPHOCYTES # BLD AUTO: 2.01 10*3/MM3 (ref 0.6–4.8)
LYMPHOCYTES NFR BLD AUTO: 17.2 % (ref 20–45)
MCH RBC QN AUTO: 25.8 PG (ref 27–31)
MCHC RBC AUTO-ENTMCNC: 30.6 G/DL (ref 31–37)
MCV RBC AUTO: 84.4 FL (ref 81–99)
MONOCYTES # BLD AUTO: 0.76 10*3/MM3 (ref 0–1)
MONOCYTES NFR BLD AUTO: 6.5 % (ref 3–8)
NEUTROPHILS # BLD AUTO: 8.43 10*3/MM3 (ref 1.5–8.3)
NEUTROPHILS NFR BLD AUTO: 72.3 % (ref 45–70)
NRBC BLD MANUAL-RTO: 0.2 /100 WBC (ref 0–0)
PLATELET # BLD AUTO: 536 10*3/MM3 (ref 140–500)
PMV BLD AUTO: 8.7 FL (ref 7.4–10.4)
POTASSIUM BLD-SCNC: 4.2 MMOL/L (ref 3.5–5.2)
PROT SERPL-MCNC: 6.5 G/DL (ref 6–8.5)
RBC # BLD AUTO: 3.72 10*6/MM3 (ref 4.2–5.4)
SODIUM BLD-SCNC: 135 MMOL/L (ref 136–145)
VANCOMYCIN TROUGH SERPL-MCNC: 7.9 MCG/ML (ref 5–20)
WBC NRBC COR # BLD: 11.67 10*3/MM3 (ref 4.8–10.8)

## 2018-12-11 PROCEDURE — 80202 ASSAY OF VANCOMYCIN: CPT | Performed by: ORTHOPAEDIC SURGERY

## 2018-12-11 PROCEDURE — 86140 C-REACTIVE PROTEIN: CPT | Performed by: ORTHOPAEDIC SURGERY

## 2018-12-11 PROCEDURE — 85025 COMPLETE CBC W/AUTO DIFF WBC: CPT | Performed by: ORTHOPAEDIC SURGERY

## 2018-12-11 PROCEDURE — 80053 COMPREHEN METABOLIC PANEL: CPT | Performed by: ORTHOPAEDIC SURGERY

## 2018-12-11 PROCEDURE — 85652 RBC SED RATE AUTOMATED: CPT | Performed by: ORTHOPAEDIC SURGERY

## 2018-12-11 NOTE — TELEPHONE ENCOUNTER
Pt is schedule on 1/17/19 for a yearly appointment. Pt was last seen on 8/9/18.     Would you like to see her a year from 8/2018?    Thanks Josefina

## 2018-12-12 LAB — CRP SERPL-MCNC: 1.35 MG/DL (ref 0–0.5)

## 2019-03-25 ENCOUNTER — TRANSCRIBE ORDERS (OUTPATIENT)
Dept: ADMINISTRATIVE | Facility: HOSPITAL | Age: 69
End: 2019-03-25

## 2019-03-25 DIAGNOSIS — Z78.0 MENOPAUSE: Primary | ICD-10-CM

## 2019-03-29 ENCOUNTER — APPOINTMENT (OUTPATIENT)
Dept: BONE DENSITY | Facility: HOSPITAL | Age: 69
End: 2019-03-29

## 2019-03-29 DIAGNOSIS — Z78.0 MENOPAUSE: ICD-10-CM

## 2019-03-29 PROCEDURE — 77080 DXA BONE DENSITY AXIAL: CPT

## 2019-09-11 ENCOUNTER — OFFICE VISIT (OUTPATIENT)
Dept: CARDIOLOGY | Facility: CLINIC | Age: 69
End: 2019-09-11

## 2019-09-11 VITALS
DIASTOLIC BLOOD PRESSURE: 60 MMHG | HEIGHT: 62 IN | SYSTOLIC BLOOD PRESSURE: 124 MMHG | HEART RATE: 70 BPM | WEIGHT: 213 LBS | BODY MASS INDEX: 39.2 KG/M2

## 2019-09-11 DIAGNOSIS — E78.2 MIXED HYPERLIPIDEMIA: Chronic | ICD-10-CM

## 2019-09-11 DIAGNOSIS — R06.02 SOB (SHORTNESS OF BREATH): ICD-10-CM

## 2019-09-11 DIAGNOSIS — I10 ESSENTIAL HYPERTENSION: Chronic | ICD-10-CM

## 2019-09-11 DIAGNOSIS — Z95.1 S/P CABG (CORONARY ARTERY BYPASS GRAFT): Chronic | ICD-10-CM

## 2019-09-11 DIAGNOSIS — E66.01 MORBIDLY OBESE (HCC): ICD-10-CM

## 2019-09-11 DIAGNOSIS — I73.9 PAD (PERIPHERAL ARTERY DISEASE) (HCC): Chronic | ICD-10-CM

## 2019-09-11 DIAGNOSIS — I25.119 CORONARY ARTERY DISEASE INVOLVING NATIVE CORONARY ARTERY OF NATIVE HEART WITH ANGINA PECTORIS (HCC): Primary | ICD-10-CM

## 2019-09-11 DIAGNOSIS — I21.4 NON-ST ELEVATION (NSTEMI) MYOCARDIAL INFARCTION (HCC): Chronic | ICD-10-CM

## 2019-09-11 DIAGNOSIS — R26.2 INABILITY TO AMBULATE DUE TO KNEE: ICD-10-CM

## 2019-09-11 PROCEDURE — 99214 OFFICE O/P EST MOD 30 MIN: CPT | Performed by: INTERNAL MEDICINE

## 2019-09-11 PROCEDURE — 93000 ELECTROCARDIOGRAM COMPLETE: CPT | Performed by: INTERNAL MEDICINE

## 2019-09-11 RX ORDER — ALLOPURINOL 100 MG/1
100 TABLET ORAL 2 TIMES DAILY
Status: ON HOLD | COMMUNITY
End: 2021-06-15

## 2019-09-11 RX ORDER — CLONIDINE HYDROCHLORIDE 0.1 MG/1
0.1 TABLET ORAL 2 TIMES DAILY
Status: ON HOLD | COMMUNITY
End: 2020-06-24

## 2019-09-11 NOTE — PROGRESS NOTES
Subjective:     Encounter Date:09/11/2019      Patient ID: Valentine Arora is a 69 y.o. female.    Chief Complaint: HTN, CAD  History of Present Illness    Degunnar Alejandro,    This patient comes in for routine follow-up of their cardiac status. It has been one year since her last visit.  She has a history of CAD and had a myocardial infarction approximately 20 years ago. At that point she had a failed attempted angioplasty and then underwent a two-vessel CABG in 1995. In August 2012 she had a stress Cardiolite test performed that showed an ejection fraction 75% and no ischemia. No wall motion abnormalities were seen. She was hospitalized at [facility] in September 2015. 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 This was felt to be due to trauma years ago. She had an echocardiogram that showed an ejection fraction is 60-65% with normal valvular structure and function. She also had a Holter monitor that showed no abnormality.    She was noted to have a new carotid bruit and was found to have bilateral cerebrovascular disease.  She also had hip and leg pain and was found to have significant peripheral arterial disease.  She is being followed with Dr. Ace.    Fortunately she has not had any cardiac complaints.  She is very limited as to how much she can walk because of the claudication symptoms.  She does sometimes have a little bit of shortness of breath.  No chest pain or chest discomfort.  Is been about 3 and half years since her last stress test which at that time showed normal function and no ischemia.    The following portions of the patient's history were reviewed and updated as appropriate: allergies, current medications, past family history, past medical history, past social history, past surgical history and problem list.    Past Medical History:   Diagnosis Date   • CAD (coronary artery disease)    • Chronic back pain    •  Hyperlipidemia    • Hypertension    • Myocardial infarction (CMS/HCC)    • Shingles        Past Surgical History:   Procedure Laterality Date   • CORONARY ARTERY BYPASS GRAFT  1995    x 2 vessels   • INNER EAR SURGERY     • LEG SURGERY      x 2 s/p fall   • LUMBAR FUSION      L4/L5   • SKIN GRAFT      to left lower leg x 2   • TONSILLECTOMY     • WRIST FUSION Left        Social History     Socioeconomic History   • Marital status:      Spouse name: Not on file   • Number of children: Not on file   • Years of education: Not on file   • Highest education level: Not on file   Tobacco Use   • Smoking status: Former Smoker     Last attempt to quit:      Years since quittin.7   Substance and Sexual Activity   • Alcohol use: Yes     Comment: occasional   • Drug use: No   • Sexual activity: Defer       Review of Systems   Constitution: Negative for chills, decreased appetite, fever and night sweats.   HENT: Negative for ear discharge, ear pain, hearing loss, nosebleeds and sore throat.    Eyes: Negative for blurred vision, double vision and pain.   Cardiovascular: Negative for cyanosis.   Respiratory: Negative for hemoptysis and sputum production.    Endocrine: Negative for cold intolerance and heat intolerance.   Hematologic/Lymphatic: Negative for adenopathy.   Skin: Negative for dry skin, itching, nail changes, rash and suspicious lesions.   Musculoskeletal: Negative for arthritis, gout, muscle cramps, muscle weakness, myalgias and neck pain.   Gastrointestinal: Negative for anorexia, bowel incontinence, constipation, diarrhea, dysphagia, hematemesis and jaundice.   Genitourinary: Negative for bladder incontinence, dysuria, flank pain, frequency, hematuria and nocturia.   Neurological: Negative for focal weakness, numbness, paresthesias and seizures.   Psychiatric/Behavioral: Negative for altered mental status, hallucinations, hypervigilance, suicidal ideas and thoughts of violence.  "  Allergic/Immunologic: Negative for persistent infections.         ECG 12 Lead  Date/Time: 9/11/2019 3:59 PM  Performed by: Kumar Frye III, MD  Authorized by: Kumar Frye III, MD   Comparison: compared with previous ECG   Similar to previous ECG  Rhythm: sinus rhythm  Rate: normal  Conduction: conduction normal  ST Segments: ST segments normal  T Waves: T waves normal  QRS axis: normal  Other: no other findings    Clinical impression: normal ECG               Objective:     Vitals:    09/11/19 1358   BP: 124/60   Pulse: 70   Weight: 96.6 kg (213 lb)   Height: 157.5 cm (62\")         Physical Exam   Constitutional: She is oriented to person, place, and time. She appears well-developed and well-nourished. No distress.   HENT:   Head: Normocephalic and atraumatic.   Nose: Nose normal.   Mouth/Throat: Oropharynx is clear and moist.   Eyes: Conjunctivae and EOM are normal. Pupils are equal, round, and reactive to light. Right eye exhibits no discharge. Left eye exhibits no discharge.   Neck: Normal range of motion. Neck supple. No tracheal deviation present. No thyromegaly present.   Left carotid bruit present   Cardiovascular: Normal rate, regular rhythm, S1 normal, S2 normal, normal heart sounds and normal pulses. Exam reveals no S3.   Pulmonary/Chest: Effort normal and breath sounds normal. No stridor. No respiratory distress. She exhibits no tenderness.   Abdominal: Soft. Bowel sounds are normal. She exhibits no distension and no mass. There is no tenderness. There is no rebound and no guarding.   Musculoskeletal: Normal range of motion. She exhibits no tenderness or deformity.   Lymphadenopathy:     She has no cervical adenopathy.   Neurological: She is alert and oriented to person, place, and time. She has normal reflexes.   Skin: Skin is warm and dry. No rash noted. She is not diaphoretic. No erythema.   Psychiatric: She has a normal mood and affect. Thought content normal.       Lab Review:         "     Performed        Assessment:          Diagnosis Plan   1. Coronary artery disease involving native coronary artery of native heart with angina pectoris (CMS/[facility])  Stress Test With Myocardial Perfusion One Day    ECG 12 Lead   2. Non-ST elevation (NSTEMI) myocardial infarction (CMS/[facility])  Stress Test With Myocardial Perfusion One Day    ECG 12 Lead   3. Essential hypertension  Stress Test With Myocardial Perfusion One Day    ECG 12 Lead   4. Mixed hyperlipidemia  Stress Test With Myocardial Perfusion One Day    ECG 12 Lead   5. Morbidly obese (CMS/[facility])  Stress Test With Myocardial Perfusion One Day    ECG 12 Lead   6. S/P CABG (coronary artery bypass graft)  Stress Test With Myocardial Perfusion One Day    ECG 12 Lead   7. SOB (shortness of breath)  Stress Test With Myocardial Perfusion One Day    ECG 12 Lead   8. PAD (peripheral artery disease) (CMS/[facility])  Stress Test With Myocardial Perfusion One Day    ECG 12 Lead   9. Inability to ambulate due to knee  Stress Test With Myocardial Perfusion One Day    ECG 12 Lead          Plan:       1. Coronary Artery Disease  Plan  • Lifestyle modifications discussed include adhering to a heart healthy diet, avoidance of tobacco products, maintenance of a healthy weight, medication compliance, regular exercise and regular monitoring of cholesterol and blood pressure  • Patient has poor fatigability and inability to ambulate now because of her severe claudication.  Is been over 3-1/2 years since her prior evaluation, we will arrange for a Lexiscan Cardiolite    Subjective - Objective  • There is a history of past MI  • There is a history of previous coronary artery bypass graft  • Current antiplatelet therapy includes aspirin 81 mg    2.  Prior CABG  3.  History of MI  4.  Hypertension-  5.  Hyperlipidemia-on lipid-lowering therapy, patient states that she had recent blood work in your office and it was well-controlled.  6.  Peripheral arterial disease- patient has bruit on  today's exam on her left carotid that was not present a year ago.  Severe claudication with severe PAD.  Followed by Dr. Ace      Current Outpatient Medications:   •  allopurinol (ZYLOPRIM) 100 MG tablet, Take 100 mg by mouth Daily., Disp: , Rfl:   •  aspirin 81 MG EC tablet, Take 81 mg by mouth Daily., Disp: , Rfl:   •  celecoxib (CELEBREX) 200 MG capsule, Take 200 mg by mouth Daily., Disp: , Rfl:   •  CloNIDine (CATAPRES) 0.1 MG tablet, Take 0.1 mg by mouth 2 (Two) Times a Day., Disp: , Rfl:   •  DULoxetine (CYMBALTA) 60 MG capsule, Take 60 mg by mouth Daily., Disp: , Rfl:   •  metFORMIN ER (GLUCOPHAGE-XR) 750 MG 24 hr tablet, Take 750 mg by mouth Daily With Breakfast., Disp: , Rfl:   •  Olmesartan-Amlodipine-HCTZ (TRIBENZOR) 40-5-12.5 MG tablet, Take 1 tablet by mouth Daily., Disp: , Rfl:   •  rosuvastatin (CRESTOR) 10 MG tablet, Take 10 mg by mouth Daily., Disp: , Rfl:

## 2019-09-16 ENCOUNTER — HOSPITAL ENCOUNTER (OUTPATIENT)
Dept: NUCLEAR MEDICINE | Facility: HOSPITAL | Age: 69
Discharge: HOME | End: 2019-09-16

## 2019-09-16 ENCOUNTER — HOSPITAL ENCOUNTER (OUTPATIENT)
Dept: CARDIOLOGY | Facility: HOSPITAL | Age: 69
Discharge: HOME | End: 2019-09-16

## 2019-09-16 DIAGNOSIS — E66.01 MORBIDLY OBESE (HCC): ICD-10-CM

## 2019-09-16 DIAGNOSIS — I10 ESSENTIAL HYPERTENSION: ICD-10-CM

## 2019-09-16 DIAGNOSIS — R06.02 SOB (SHORTNESS OF BREATH): ICD-10-CM

## 2019-09-16 DIAGNOSIS — I73.9 PAD (PERIPHERAL ARTERY DISEASE) (HCC): ICD-10-CM

## 2019-09-16 DIAGNOSIS — Z95.1 S/P CABG (CORONARY ARTERY BYPASS GRAFT): ICD-10-CM

## 2019-09-16 DIAGNOSIS — I21.4 NON-ST ELEVATION (NSTEMI) MYOCARDIAL INFARCTION (HCC): ICD-10-CM

## 2019-09-16 DIAGNOSIS — I25.119 CORONARY ARTERY DISEASE INVOLVING NATIVE CORONARY ARTERY OF NATIVE HEART WITH ANGINA PECTORIS (HCC): ICD-10-CM

## 2019-09-16 DIAGNOSIS — E78.2 MIXED HYPERLIPIDEMIA: ICD-10-CM

## 2019-09-16 DIAGNOSIS — R26.2 INABILITY TO AMBULATE DUE TO KNEE: ICD-10-CM

## 2019-09-16 LAB
BH CV NUCLEAR PRIOR STUDY: 3
BH CV STRESS BP STAGE 1: NORMAL
BH CV STRESS COMMENTS STAGE 1: NORMAL
BH CV STRESS DOSE REGADENOSON STAGE 1: 0.4
BH CV STRESS DURATION MIN STAGE 1: 0
BH CV STRESS DURATION SEC STAGE 1: 30
BH CV STRESS HR STAGE 1: 68
BH CV STRESS O2 STAGE 1: 96
BH CV STRESS PROTOCOL 1: NORMAL
BH CV STRESS RECOVERY BP: NORMAL MMHG
BH CV STRESS RECOVERY HR: 79 BPM
BH CV STRESS RECOVERY O2: 97 %
BH CV STRESS STAGE 1: 1
LV EF NUC BP: 70 %
MAXIMAL PREDICTED HEART RATE: 152 BPM
PERCENT MAX PREDICTED HR: 63.16 %
STRESS BASELINE BP: NORMAL MMHG
STRESS BASELINE HR: 68 BPM
STRESS O2 SAT REST: 97 %
STRESS PERCENT HR: 74 %
STRESS POST ESTIMATED WORKLOAD: 1 METS
STRESS POST EXERCISE DUR SEC: 30 SEC
STRESS POST O2 SAT PEAK: 98 %
STRESS POST PEAK BP: NORMAL MMHG
STRESS POST PEAK HR: 96 BPM
STRESS TARGET HR: 129 BPM

## 2019-09-16 PROCEDURE — 78452 HT MUSCLE IMAGE SPECT MULT: CPT

## 2019-09-16 PROCEDURE — 25010000002 REGADENOSON 0.4 MG/5ML SOLUTION: Performed by: INTERNAL MEDICINE

## 2019-09-16 PROCEDURE — 93016 CV STRESS TEST SUPVJ ONLY: CPT | Performed by: INTERNAL MEDICINE

## 2019-09-16 PROCEDURE — 78452 HT MUSCLE IMAGE SPECT MULT: CPT | Performed by: INTERNAL MEDICINE

## 2019-09-16 PROCEDURE — 0 TECHNETIUM SESTAMIBI: Performed by: INTERNAL MEDICINE

## 2019-09-16 PROCEDURE — 93018 CV STRESS TEST I&R ONLY: CPT | Performed by: INTERNAL MEDICINE

## 2019-09-16 PROCEDURE — 93017 CV STRESS TEST TRACING ONLY: CPT

## 2019-09-16 PROCEDURE — A9500 TC99M SESTAMIBI: HCPCS | Performed by: INTERNAL MEDICINE

## 2019-09-16 RX ADMIN — REGADENOSON 0.4 MG: 0.08 INJECTION, SOLUTION INTRAVENOUS at 10:16

## 2019-09-16 RX ADMIN — TECHNETIUM TC 99M SESTAMIBI 1 DOSE: 1 INJECTION INTRAVENOUS at 08:35

## 2019-09-16 RX ADMIN — TECHNETIUM TC 99M SESTAMIBI 1 DOSE: 1 INJECTION INTRAVENOUS at 10:16

## 2020-03-16 ENCOUNTER — HOSPITAL ENCOUNTER (OUTPATIENT)
Dept: GENERAL RADIOLOGY | Facility: HOSPITAL | Age: 70
Discharge: HOME | End: 2020-03-16
Admitting: FAMILY MEDICINE

## 2020-03-16 ENCOUNTER — TRANSCRIBE ORDERS (OUTPATIENT)
Dept: ADMINISTRATIVE | Facility: HOSPITAL | Age: 70
End: 2020-03-16

## 2020-03-16 DIAGNOSIS — J40 BRONCHITIS: ICD-10-CM

## 2020-03-16 DIAGNOSIS — J40 BRONCHITIS: Primary | ICD-10-CM

## 2020-03-16 PROCEDURE — 71046 X-RAY EXAM CHEST 2 VIEWS: CPT

## 2020-05-19 ENCOUNTER — TELEMEDICINE (OUTPATIENT)
Dept: GASTROENTEROLOGY | Facility: CLINIC | Age: 70
End: 2020-05-19

## 2020-05-19 DIAGNOSIS — Z12.11 ENCOUNTER FOR SCREENING FOR MALIGNANT NEOPLASM OF COLON: ICD-10-CM

## 2020-05-19 DIAGNOSIS — K21.9 GASTROESOPHAGEAL REFLUX DISEASE, ESOPHAGITIS PRESENCE NOT SPECIFIED: ICD-10-CM

## 2020-05-19 DIAGNOSIS — R13.19 ESOPHAGEAL DYSPHAGIA: Primary | ICD-10-CM

## 2020-05-19 PROCEDURE — 99213 OFFICE O/P EST LOW 20 MIN: CPT | Performed by: INTERNAL MEDICINE

## 2020-05-19 RX ORDER — ATENOLOL 25 MG/1
25 TABLET ORAL
COMMUNITY
Start: 2020-05-08

## 2020-05-19 RX ORDER — OMEPRAZOLE 40 MG/1
40 CAPSULE, DELAYED RELEASE ORAL DAILY
Qty: 90 CAPSULE | Refills: 3 | Status: ON HOLD | OUTPATIENT
Start: 2020-05-19 | End: 2020-06-24 | Stop reason: SDUPTHER

## 2020-05-19 RX ORDER — HYDROCHLOROTHIAZIDE 12.5 MG/1
12.5 TABLET ORAL DAILY
COMMUNITY
Start: 2020-05-08

## 2020-05-19 RX ORDER — OLMESARTAN MEDOXOMIL 40 MG/1
TABLET ORAL
Status: ON HOLD | COMMUNITY
Start: 2020-03-16 | End: 2020-06-24

## 2020-05-19 RX ORDER — BENZONATATE 200 MG/1
CAPSULE ORAL
COMMUNITY
Start: 2020-03-12 | End: 2020-09-10

## 2020-05-19 RX ORDER — HYDRALAZINE HYDROCHLORIDE 50 MG/1
75 TABLET, FILM COATED ORAL 3 TIMES DAILY
COMMUNITY
Start: 2020-05-08

## 2020-05-19 RX ORDER — AMLODIPINE BESYLATE 10 MG/1
TABLET ORAL
COMMUNITY
Start: 2020-03-09 | End: 2020-09-10

## 2020-05-19 NOTE — PROGRESS NOTES
Video visit:  You have chosen to receive care through a telehealth visit.  Do you consent to use a video/audio connection for your medical care today? Yes    PATIENT INFORMATION  Valentine Arora       - 1950    CHIEF COMPLAINT  Chief Complaint   Patient presents with   • Difficulty Swallowing       HISTORY OF PRESENT ILLNESS  2-3 months has painful swallowing and was l;imke a heartattack  And painfull  Buiscut and sausage  [place] but went down after a banana and hurts to swallow a big gulp hurts and has not had to vomit and does reguirgitate and does take omeprazole OTC  And not even now taking it every day.    Did have Bronchitis in January and did have steroids and abx x 2 but that was prior to her symptoms    Has had 2 colonosocpy the last one was 8 years ago and the pt thought it was normal as was the first one but actually a HP was reomved and she has diverticulosis and internal hemorroids- that she does not recall.          REVIEW OF SYSTEMS  Review of Systems      ACTIVE PROBLEMS  Patient Active Problem List    Diagnosis   • Morbidly obese (CMS/[facility]) [E66.01]   • PAD (peripheral artery disease) (CMS/[facility]) [I73.9]   • S/P CABG (coronary artery bypass graft) [Z95.1]   • CAD (coronary artery disease) [I25.10]   • Myocardial infarction (CMS/[facility]) [I21.9]   • Hypertension [I10]   • Hyperlipidemia [E78.5]         PAST MEDICAL HISTORY  Past Medical History:   Diagnosis Date   • CAD (coronary artery disease)    • Chronic back pain    • Hyperlipidemia    • Hypertension    • Myocardial infarction (CMS/[facility])    • PAD (peripheral artery disease) (CMS/[facility]) 2019   • Shingles          SURGICAL HISTORY  Past Surgical History:   Procedure Laterality Date   • COLONOSCOPY     • CORONARY ARTERY BYPASS GRAFT  1995    x 2 vessels   • INNER EAR SURGERY     • LEG SURGERY      x 2 s/p fall   • LUMBAR FUSION      L4/L5   • SKIN GRAFT      to left lower leg x 2   • TONSILLECTOMY     • WRIST FUSION Left          FAMILY  HISTORY  Family History   Problem Relation Age of Onset   • Colon cancer Neg Hx    • Colon polyps Neg Hx          SOCIAL HISTORY  Social History     Occupational History   • Not on file   Tobacco Use   • Smoking status: Former Smoker     Last attempt to quit:      Years since quittin.3   Substance and Sexual Activity   • Alcohol use: Yes     Comment: occasional   • Drug use: No   • Sexual activity: Defer         CURRENT MEDICATIONS    Current Outpatient Medications:   •  allopurinol (ZYLOPRIM) 100 MG tablet, Take 100 mg by mouth Daily., Disp: , Rfl:   •  amLODIPine (NORVASC) 10 MG tablet, , Disp: , Rfl:   •  aspirin 81 MG EC tablet, Take 81 mg by mouth Daily., Disp: , Rfl:   •  atenolol (TENORMIN) 25 MG tablet, , Disp: , Rfl:   •  benzonatate (TESSALON) 200 MG capsule, , Disp: , Rfl:   •  celecoxib (CELEBREX) 200 MG capsule, Take 200 mg by mouth Daily., Disp: , Rfl:   •  CloNIDine (CATAPRES) 0.1 MG tablet, Take 0.1 mg by mouth 2 (Two) Times a Day., Disp: , Rfl:   •  DULoxetine (CYMBALTA) 60 MG capsule, Take 60 mg by mouth Daily., Disp: , Rfl:   •  hydrALAZINE (APRESOLINE) 50 MG tablet, , Disp: , Rfl:   •  hydroCHLOROthiazide (HYDRODIURIL) 12.5 MG tablet, , Disp: , Rfl:   •  metFORMIN ER (GLUCOPHAGE-XR) 750 MG 24 hr tablet, Take 750 mg by mouth Daily With Breakfast., Disp: , Rfl:   •  olmesartan (BENICAR) 40 MG tablet, , Disp: , Rfl:   •  Olmesartan-Amlodipine-HCTZ (TRIBENZOR) 40-5-12.5 MG tablet, Take 1 tablet by mouth Daily., Disp: , Rfl:   •  omeprazole (priLOSEC) 40 MG capsule, Take 1 capsule by mouth Daily., Disp: 90 capsule, Rfl: 3  •  rosuvastatin (CRESTOR) 10 MG tablet, Take 10 mg by mouth Daily., Disp: , Rfl:     ALLERGIES  Codeine and Other    VITALS  There were no vitals filed for this visit.    LAST RESULTS   Hospital Outpatient Visit on 2019   Component Date Value Ref Range Status   •  CV STRESS PROTOCOL 1 2019 Pharmacologic   Final   • Stage 1 2019 1   Final   • HR Stage 1  09/16/2019 68   Final   • BP Stage 1 09/16/2019 130/42   Final   • O2 Stage 1 09/16/2019 96   Final   • Duration Min Stage 1 09/16/2019 0   Final   • Duration Sec Stage 1 09/16/2019 30   Final   • Stress Dose Regadenoson Stage 1 09/16/2019 0.4   Final   • Stress Comments Stage 1 09/16/2019 30 sec bolus injection   Final   • Baseline HR 09/16/2019 68  bpm Final   • Baseline BP 09/16/2019 124/43  mmHg Final   • O2 sat rest 09/16/2019 97  % Final   • Peak HR 09/16/2019 96  bpm Final   • Percent Max Pred HR 09/16/2019 63.16  % Final   • Percent Target HR 09/16/2019 74  % Final   • Peak BP 09/16/2019 150/48  mmHg Final   • O2 sat peak 09/16/2019 98  % Final   • Recovery HR 09/16/2019 79  bpm Final   • Recovery BP 09/16/2019 146/48  mmHg Final   • Recovery O2 09/16/2019 97  % Final   • Target HR (85%) 09/16/2019 129  bpm Final   • Max. Pred. HR (100%) 09/16/2019 152  bpm Final   • Exercise duration (sec) 09/16/2019 30  sec Final   • Estimated workload 09/16/2019 1.0  METS Final   • Nuclear Prior Study 09/16/2019 3   Final   • Nuc Stress EF 09/16/2019 70  % Final     No results found.    PHYSICAL EXAM  Debilities/Disabilities Identified: None  Emotional Behavior: Appropriate  Physical Exam    ASSESSMENT  Diagnoses and all orders for this visit:    Esophageal dysphagia  -     Case Request; Standing  -     Obtain Informed Consent; Standing  -     Case Request    Gastroesophageal reflux disease, esophagitis presence not specified    Encounter for screening for malignant neoplasm of colon  -     Case Request; Standing  -     Obtain Informed Consent; Standing  -     Case Request    Other orders  -     amLODIPine (NORVASC) 10 MG tablet  -     atenolol (TENORMIN) 25 MG tablet  -     benzonatate (TESSALON) 200 MG capsule  -     hydrALAZINE (APRESOLINE) 50 MG tablet  -     hydroCHLOROthiazide (HYDRODIURIL) 12.5 MG tablet  -     olmesartan (BENICAR) 40 MG tablet  -     omeprazole (priLOSEC) 40 MG capsule; Take 1 capsule by mouth  Daily.          PLAN  Increase her OTC to RX PPI daily and schedule Double for her dysphagia and screening    Return if symptoms worsen or fail to improve.    I have discussed the above plan with the patient.  They verbalize understanding and are in agreement with the plan.  They have been advised to contact the office for any questions, concerns, or changes related to their health.

## 2020-05-22 PROBLEM — R13.19 ESOPHAGEAL DYSPHAGIA: Status: ACTIVE | Noted: 2020-05-22

## 2020-05-22 PROBLEM — Z12.11 ENCOUNTER FOR SCREENING FOR MALIGNANT NEOPLASM OF COLON: Status: ACTIVE | Noted: 2020-05-22

## 2020-06-16 ENCOUNTER — TRANSCRIBE ORDERS (OUTPATIENT)
Dept: ADMINISTRATIVE | Facility: HOSPITAL | Age: 70
End: 2020-06-16

## 2020-06-16 DIAGNOSIS — Z01.818 OTHER SPECIFIED PRE-OPERATIVE EXAMINATION: Primary | ICD-10-CM

## 2020-06-22 ENCOUNTER — TELEPHONE (OUTPATIENT)
Dept: GASTROENTEROLOGY | Facility: CLINIC | Age: 70
End: 2020-06-22

## 2020-06-22 ENCOUNTER — LAB (OUTPATIENT)
Dept: LAB | Facility: HOSPITAL | Age: 70
End: 2020-06-22

## 2020-06-22 ENCOUNTER — TRANSCRIBE ORDERS (OUTPATIENT)
Dept: ADMINISTRATIVE | Facility: HOSPITAL | Age: 70
End: 2020-06-22

## 2020-06-22 DIAGNOSIS — R01.1 SYSTOLIC MURMUR: Primary | ICD-10-CM

## 2020-06-22 DIAGNOSIS — Z01.818 OTHER SPECIFIED PRE-OPERATIVE EXAMINATION: ICD-10-CM

## 2020-06-22 PROCEDURE — U0004 COV-19 TEST NON-CDC HGH THRU: HCPCS

## 2020-06-22 PROCEDURE — U0002 COVID-19 LAB TEST NON-CDC: HCPCS

## 2020-06-22 NOTE — TELEPHONE ENCOUNTER
CALL FROM PATIENT STATING SHE DID NOT RECEIVED HER PREP INSTRUCTIONS.  ASKED HER ABOUT HER ASPIRIN & CELEBREX HAS SHE STOPPED THEM.  SHE DID TODAY.  SHE DOES NOT WANT TO RESCHEDULE BECAUSE SHE WAITED LONG ENOUGH.  E-MAIL INSTRUCTIONS TO HER [email or web address] [employer] TODAY.

## 2020-06-23 ENCOUNTER — ANESTHESIA EVENT (OUTPATIENT)
Dept: PERIOP | Facility: HOSPITAL | Age: 70
End: 2020-06-23

## 2020-06-23 LAB
REF LAB TEST METHOD: NORMAL
SARS-COV-2 RNA RESP QL NAA+PROBE: NOT DETECTED

## 2020-06-24 ENCOUNTER — HOSPITAL ENCOUNTER (OUTPATIENT)
Facility: HOSPITAL | Age: 70
Setting detail: HOSPITAL OUTPATIENT SURGERY
Discharge: HOME | End: 2020-06-24
Attending: INTERNAL MEDICINE | Admitting: INTERNAL MEDICINE

## 2020-06-24 ENCOUNTER — ANESTHESIA (OUTPATIENT)
Dept: PERIOP | Facility: HOSPITAL | Age: 70
End: 2020-06-24

## 2020-06-24 VITALS
DIASTOLIC BLOOD PRESSURE: 63 MMHG | BODY MASS INDEX: 39.07 KG/M2 | RESPIRATION RATE: 16 BRPM | TEMPERATURE: 98.1 F | HEART RATE: 56 BPM | SYSTOLIC BLOOD PRESSURE: 175 MMHG | OXYGEN SATURATION: 94 % | WEIGHT: 213.6 LBS

## 2020-06-24 DIAGNOSIS — Z12.11 ENCOUNTER FOR SCREENING FOR MALIGNANT NEOPLASM OF COLON: ICD-10-CM

## 2020-06-24 DIAGNOSIS — R13.19 ESOPHAGEAL DYSPHAGIA: ICD-10-CM

## 2020-06-24 PROCEDURE — 88312 SPECIAL STAINS GROUP 1: CPT | Performed by: INTERNAL MEDICINE

## 2020-06-24 PROCEDURE — 88342 IMHCHEM/IMCYTCHM 1ST ANTB: CPT | Performed by: INTERNAL MEDICINE

## 2020-06-24 PROCEDURE — 25010000002 PROPOFOL 10 MG/ML EMULSION: Performed by: NURSE ANESTHETIST, CERTIFIED REGISTERED

## 2020-06-24 PROCEDURE — 45380 COLONOSCOPY AND BIOPSY: CPT | Performed by: INTERNAL MEDICINE

## 2020-06-24 PROCEDURE — 88305 TISSUE EXAM BY PATHOLOGIST: CPT | Performed by: INTERNAL MEDICINE

## 2020-06-24 PROCEDURE — 43239 EGD BIOPSY SINGLE/MULTIPLE: CPT | Performed by: INTERNAL MEDICINE

## 2020-06-24 RX ORDER — SODIUM CHLORIDE 9 MG/ML
40 INJECTION, SOLUTION INTRAVENOUS AS NEEDED
Status: DISCONTINUED | OUTPATIENT
Start: 2020-06-24 | End: 2020-06-24 | Stop reason: HOSPADM

## 2020-06-24 RX ORDER — SODIUM CHLORIDE, SODIUM LACTATE, POTASSIUM CHLORIDE, CALCIUM CHLORIDE 600; 310; 30; 20 MG/100ML; MG/100ML; MG/100ML; MG/100ML
9 INJECTION, SOLUTION INTRAVENOUS CONTINUOUS PRN
Status: DISCONTINUED | OUTPATIENT
Start: 2020-06-24 | End: 2020-06-24 | Stop reason: HOSPADM

## 2020-06-24 RX ORDER — SODIUM CHLORIDE 0.9 % (FLUSH) 0.9 %
10 SYRINGE (ML) INJECTION AS NEEDED
Status: DISCONTINUED | OUTPATIENT
Start: 2020-06-24 | End: 2020-06-24 | Stop reason: HOSPADM

## 2020-06-24 RX ORDER — PROPOFOL 10 MG/ML
VIAL (ML) INTRAVENOUS AS NEEDED
Status: DISCONTINUED | OUTPATIENT
Start: 2020-06-24 | End: 2020-06-24 | Stop reason: SURG

## 2020-06-24 RX ORDER — LIDOCAINE HYDROCHLORIDE 20 MG/ML
INJECTION, SOLUTION INFILTRATION; PERINEURAL AS NEEDED
Status: DISCONTINUED | OUTPATIENT
Start: 2020-06-24 | End: 2020-06-24 | Stop reason: SURG

## 2020-06-24 RX ORDER — OMEPRAZOLE 40 MG/1
40 CAPSULE, DELAYED RELEASE ORAL
Qty: 180 CAPSULE | Refills: 3 | Status: SHIPPED | OUTPATIENT
Start: 2020-06-24 | End: 2020-11-30 | Stop reason: ALTCHOICE

## 2020-06-24 RX ORDER — SODIUM CHLORIDE, SODIUM LACTATE, POTASSIUM CHLORIDE, CALCIUM CHLORIDE 600; 310; 30; 20 MG/100ML; MG/100ML; MG/100ML; MG/100ML
100 INJECTION, SOLUTION INTRAVENOUS CONTINUOUS
Status: DISCONTINUED | OUTPATIENT
Start: 2020-06-24 | End: 2020-06-24 | Stop reason: HOSPADM

## 2020-06-24 RX ORDER — LIDOCAINE HYDROCHLORIDE 10 MG/ML
0.5 INJECTION, SOLUTION EPIDURAL; INFILTRATION; INTRACAUDAL; PERINEURAL ONCE AS NEEDED
Status: DISCONTINUED | OUTPATIENT
Start: 2020-06-24 | End: 2020-06-24 | Stop reason: HOSPADM

## 2020-06-24 RX ORDER — MAGNESIUM HYDROXIDE 1200 MG/15ML
LIQUID ORAL AS NEEDED
Status: DISCONTINUED | OUTPATIENT
Start: 2020-06-24 | End: 2020-06-24 | Stop reason: HOSPADM

## 2020-06-24 RX ORDER — SODIUM CHLORIDE 0.9 % (FLUSH) 0.9 %
10 SYRINGE (ML) INJECTION EVERY 12 HOURS SCHEDULED
Status: DISCONTINUED | OUTPATIENT
Start: 2020-06-24 | End: 2020-06-24 | Stop reason: HOSPADM

## 2020-06-24 RX ORDER — ONDANSETRON 2 MG/ML
4 INJECTION INTRAMUSCULAR; INTRAVENOUS ONCE AS NEEDED
Status: DISCONTINUED | OUTPATIENT
Start: 2020-06-24 | End: 2020-06-24 | Stop reason: HOSPADM

## 2020-06-24 RX ADMIN — PROPOFOL 50 MG: 10 INJECTION, EMULSION INTRAVENOUS at 14:24

## 2020-06-24 RX ADMIN — LIDOCAINE HYDROCHLORIDE 100 MG: 20 INJECTION, SOLUTION INFILTRATION; PERINEURAL at 13:59

## 2020-06-24 RX ADMIN — SODIUM CHLORIDE, POTASSIUM CHLORIDE, SODIUM LACTATE AND CALCIUM CHLORIDE 9 ML/HR: 600; 310; 30; 20 INJECTION, SOLUTION INTRAVENOUS at 13:25

## 2020-06-24 RX ADMIN — PROPOFOL 50 MG: 10 INJECTION, EMULSION INTRAVENOUS at 14:16

## 2020-06-24 RX ADMIN — LIDOCAINE HYDROCHLORIDE 20 MG: 20 INJECTION, SOLUTION INFILTRATION; PERINEURAL at 14:21

## 2020-06-24 RX ADMIN — PROPOFOL 50 MG: 10 INJECTION, EMULSION INTRAVENOUS at 14:07

## 2020-06-24 RX ADMIN — PROPOFOL 50 MG: 10 INJECTION, EMULSION INTRAVENOUS at 14:11

## 2020-06-24 RX ADMIN — PROPOFOL 50 MG: 10 INJECTION, EMULSION INTRAVENOUS at 14:27

## 2020-06-24 RX ADMIN — PROPOFOL 50 MG: 10 INJECTION, EMULSION INTRAVENOUS at 14:21

## 2020-06-24 RX ADMIN — PROPOFOL 25 MG: 10 INJECTION, EMULSION INTRAVENOUS at 14:29

## 2020-06-24 RX ADMIN — PROPOFOL 100 MG: 10 INJECTION, EMULSION INTRAVENOUS at 14:04

## 2020-06-24 NOTE — ANESTHESIA POSTPROCEDURE EVALUATION
Patient: Valentine Arora    Procedure Summary     Date:  06/24/20 Room / Location:  [facility] ENDOSCOPY 1 /  [facility]    Anesthesia Start:  1356 Anesthesia Stop:  1448    Procedures:       ESOPHAGOGASTRODUODENOSCOPY (N/A Esophagus)      COLONOSCOPY (N/A ) Diagnosis:       Esophageal dysphagia      Encounter for screening for malignant neoplasm of colon      Ulcerative esophagitis      Gastritis      Duodenitis      Colon polyp      (Esophageal dysphagia [R13.10])      (Encounter for screening for malignant neoplasm of colon [Z12.11])    Surgeon:  Mathew Roberts MD Provider:  Nubia Paris CRNA    Anesthesia Type:  MAC ASA Status:  3          Anesthesia Type: MAC    Vitals  Vitals Value Taken Time   /47 6/24/2020  2:48 PM   Temp 98.1 °F (36.7 °C) 6/24/2020  2:48 PM   Pulse 56 6/24/2020  2:48 PM   Resp 16 6/24/2020  2:48 PM   SpO2 95 % 6/24/2020  2:48 PM           Post Anesthesia Care and Evaluation    Patient location during evaluation: PHASE II  Patient participation: complete - patient participated  Level of consciousness: awake  Pain management: adequate  Airway patency: patent  Anesthetic complications: No anesthetic complications  PONV Status: none  Cardiovascular status: acceptable  Respiratory status: acceptable  Hydration status: acceptable

## 2020-06-24 NOTE — BRIEF OP NOTE
ESOPHAGOGASTRODUODENOSCOPY, COLONOSCOPY  Progress Note    Valentine Arora  6/24/2020    Pre-op Diagnosis:   Esophageal dysphagia [R13.10]  Encounter for screening for malignant neoplasm of colon [Z12.11]       Post-Op Diagnosis Codes:     * Esophageal dysphagia [R13.10]     * Encounter for screening for malignant neoplasm of colon [Z12.11]     * Ulcerative esophagitis [K22.10]     * Gastritis [K29.70]     * Duodenitis [535.6]     * Colon polyp [K63.5]    Procedure/CPT® Codes:      Procedure(s):  ESOPHAGOGASTRODUODENOSCOPY  COLONOSCOPY    Surgeon(s):  Mathew Roberts MD    Anesthesia: Monitored Anesthesia Care    Staff:   Circulator: Lakia Timmons RN; Melodie Silverman RN  Scrub Person: Juanita Rebolledo; Sofi Marlow  Assistant: Martha Aleman APRN    Estimated Blood Loss: none    Urine Voided: * No values recorded between 6/24/2020  1:56 PM and 6/24/2020  2:47 PM *    Specimens:                Specimens     ID Source Type Tests Collected By Collected At Frozen?      A Small Intestine, Duodenum Tissue · TISSUE PATHOLOGY EXAM   Mathew Roberts MD 6/24/20 1410      Description: Duodenal biopsy    This specimen was not marked as sent.    B Gastric, Body Tissue · TISSUE PATHOLOGY EXAM   Mathew Roberts MD 6/24/20 1412 No     Description: gastric biopsy    This specimen was not marked as sent.    C Esophagus, Distal Tissue · TISSUE PATHOLOGY EXAM   Mathew Roberts MD 6/24/20 1413 No     This specimen was not marked as sent.    D Large Intestine, Left / Descending Colon Polyp · TISSUE PATHOLOGY EXAM   Mathew Roberts MD 6/24/20 1424 No     Description: Descending colon polyp x 2    This specimen was not marked as sent.    E Large Intestine, Right / Ascending Colon Polyp · TISSUE PATHOLOGY EXAM   Mathew Roberts MD 6/24/20 1431 No     This specimen was not marked as sent.    F Large Intestine, Sigmoid Colon Polyp · TISSUE PATHOLOGY EXAM   Alejandra  Mathew Duffy MD 6/24/20 1437 No     This specimen was not marked as sent.    G Large Intestine, Rectum Polyp · TISSUE PATHOLOGY EXAM   Mathew Roberts MD 6/24/20 1442 No     This specimen was not marked as sent.                Drains: * No LDAs found *    Findings: Mild Duodenitis-Biopsy  Mild gastritis-Biopsy  Ulcerative Esophagitis(C) -Biopsy    Colon to TI good Prep  Polyps (5)Biopsy      Complications: None      Mathew Roberts MD     Date: 6/24/2020  Time: 14:48

## 2020-06-24 NOTE — ANESTHESIA PREPROCEDURE EVALUATION
Anesthesia Evaluation     Patient summary reviewed and Nursing notes reviewed   no history of anesthetic complications:  NPO Solid Status: > 8 hours  NPO Liquid Status: > 8 hours           Airway   Mallampati: III  TM distance: <3 FB  Neck ROM: full  No difficulty expected  Dental      Pulmonary - negative pulmonary ROS and normal exam    breath sounds clear to auscultation  Cardiovascular - normal exam  Exercise tolerance: good (4-7 METS)    ECG reviewed  Rhythm: regular  Rate: normal    (+) hypertension well controlled, past MI  >12 months, CAD, CABG >6 Months, PVD, hyperlipidemia,       Neuro/Psych- negative ROS  GI/Hepatic/Renal/Endo    (+) obesity, morbid obesity,  diabetes mellitus well controlled,     Musculoskeletal     (+) back pain (l4/5 fusion),   Abdominal   (+) obese,    Substance History      OB/GYN negative ob/gyn ROS         Other   arthritis (left wrist right knee),                      Anesthesia Plan    ASA 3     MAC     intravenous induction     Anesthetic plan, all risks, benefits, and alternatives have been provided, discussed and informed consent has been obtained with: patient.  Use of blood products discussed with patient  Consented to blood products.

## 2020-06-24 NOTE — OP NOTE
ESOPHAGOGASTRODUODENOSCOPY, COLONOSCOPY  Procedure Report    Patient Name:  Valentine Arora  YOB: 1950    Date of Surgery:  6/24/2020     Indications:  Esophageal dysphagia [R13.10]  Encounter for screening for malignant neoplasm of colon [Z12.11]      Pre-op Diagnosis:   Esophageal dysphagia [R13.10]  Encounter for screening for malignant neoplasm of colon [Z12.11]    Post-Op Diagnosis Codes:     * Esophageal dysphagia [R13.10]     * Encounter for screening for malignant neoplasm of colon [Z12.11]     * Ulcerative esophagitis [K22.10]     * Gastritis [K29.70]     * Duodenitis [535.6]     * Colon polyp [K63.5]         Procedure/CPT® Codes:      Procedure(s):  ESOPHAGOGASTRODUODENOSCOPY  COLONOSCOPY    Staff:  Surgeon(s):  Mathew Roberts MD    Assistant: Martha Aleman APRN    Anesthesia: Monitored Anesthesia Care    Estimated Blood Loss: none    Specimens:   ID Type Source Tests Collected by Time   A (Not marked as sent) : Duodenal biopsy Tissue Small Intestine, Duodenum TISSUE PATHOLOGY EXAM Mathew Roberts MD 6/24/2020 1410   B (Not marked as sent) : gastric biopsy Tissue Gastric, Body TISSUE PATHOLOGY EXAM Mathew Roberts MD 6/24/2020 1412   C (Not marked as sent) :  Tissue Esophagus, Distal TISSUE PATHOLOGY EXAM Mathew Roberts MD 6/24/2020 1413   D (Not marked as sent) : Descending colon polyp x 2 Polyp Large Intestine, Left / Descending Colon TISSUE PATHOLOGY EXAM Mathew Roberts MD 6/24/2020 1424   E (Not marked as sent) :  Polyp Large Intestine, Right / Ascending Colon TISSUE PATHOLOGY EXAM Mathew Roberts MD 6/24/2020 1431   F (Not marked as sent) :  Polyp Large Intestine, Sigmoid Colon TISSUE PATHOLOGY EXAM Mathew Roberts MD 6/24/2020 1437   G (Not marked as sent) :  Polyp Large Intestine, Rectum TISSUE PATHOLOGY EXAM Mathew Roberts MD 6/24/2020 1442       Implants:    Nothing was implanted  during the procedure      Description of Procedure: After having signed informed consent, she was brought to the endoscopy suite, placed in left lateral decubitus position and given her IV sedation. A bite block was placed between her incisors. The scope was introduced in the oropharynx; advanced under direct visualization in the esophagus, through the distal esophagus. There was evidence of 2-3 cm of ulcerative esophagitis, a grade C, just not confluent. The scope was advanced into a hiatal hernia, through to the stomach, retroflexed revealing an otherwise normal fundus and body. The scope was deretroflexed and advanced in the antrum. There was mild streaky erythema in the antrum leading to the pylorus, but no erosion or ulcer. The scope was advanced through the pylorus, to the duodenal bulb. There was patchy erythema of the duodenal bulb as well. As the scope was advanced around the angle up to the 2nd and 3rd portions of the duodenum, there were rare mild erythematous areas there as well and biopsies were taken of the duodenum. The scope was withdrawn back in the antrum and biopsies were taken there as well. The scope was withdrawn in the distal esophagus and biopsies were taken of the irregular Z line suggestive of short segment Sarabia esophagus as well as at least 1 of the esophageal ulcers. As the scope was withdrawn, the remainder of the mid and proximal esophagus was otherwise normal-appearing. The scope was taken from the patient. She tolerated the procedure well.     The patient was kept in left lateral decubitus position and repositioned in the room. Rectal exam revealed a prominent anal skin tag that was normal-appearing. The scope was advanced into the rectum and advanced under direct visualization through the rectum, sigmoid colon, descending colon, to and around the hepatic flexure; reduced; advanced through the transverse colon with some looping, to and around the hepatic flexure; reduced in the  ascending colon and then advanced into the cecum. The cecum was identified by the appendiceal orifice and the ileocecal valve. It was well prepped and normal-appearing. The ileocecal valve was intubated. The terminal ileum was normal-appearing. The scope was withdrawn back in the ascending colon; withdrawn slowly; examined the colon a circumferential fashion. In the ascending colon there was a sessile 5-6 mm polyp that was biopsied, felt to be completely removed. The scope was then withdrawn around the hepatic flexure, through the transverse colon which was otherwise normal-appearing, into the descending colon. Within the descending colon there were 2 sessile similar-sized 5-6 mm polyps that were both biopsied, sent together as descending colon polyps x2. The scope was then withdrawn back in the sigmoid colon. There were no significant diverticular openings noted. There was a 6 mm sessile hyperplastic-appearing sigmoid colon polyp that was biopsied, felt to be completely removed. The scope was then withdrawn back in the rectum and there was a single rectal polyp noted that was 4-5 mm in size, biopsied, sent separately as rectal polyp. The scope was then withdrawn to the distal rectum and retroflexed revealing intact dentate line and no further mucosal lesion. The scope was deretroflexed and withdrawn. The patient tolerated both procedures very well.             Findings: Mild Duodenitis-Biopsy  Mild gastritis-Biopsy  Ulcerative Esophagitis(C) -Biopsy    Colon to TI good Prep  Polyps (5)Biopsy     Complications: None    Recommendations: Results to be called. and Repeat in 3 years      Mathew Roberts MD     Date: 6/24/2020  Time: 14:52

## 2020-06-29 LAB
CYTO UR: NORMAL
LAB AP CASE REPORT: NORMAL
LAB AP DIAGNOSIS COMMENT: NORMAL
PATH REPORT.ADDENDUM SPEC: NORMAL
PATH REPORT.FINAL DX SPEC: NORMAL
PATH REPORT.GROSS SPEC: NORMAL

## 2020-07-06 ENCOUNTER — HOSPITAL ENCOUNTER (OUTPATIENT)
Dept: CARDIOLOGY | Facility: HOSPITAL | Age: 70
Discharge: HOME | End: 2020-07-06
Admitting: FAMILY MEDICINE

## 2020-07-06 DIAGNOSIS — R01.1 SYSTOLIC MURMUR: ICD-10-CM

## 2020-07-06 LAB
AORTIC DIMENSIONLESS INDEX: 0.8 (DI)
BH CV ECHO MEAS - AO MAX PG: 8 MMHG
BH CV ECHO MEAS - AO MEAN PG (FULL): 2 MMHG
BH CV ECHO MEAS - AO MEAN PG: 4 MMHG
BH CV ECHO MEAS - AO ROOT AREA (BSA CORRECTED): 1.3
BH CV ECHO MEAS - AO ROOT AREA: 5.3 CM^2
BH CV ECHO MEAS - AO ROOT DIAM: 2.6 CM
BH CV ECHO MEAS - AO V2 MAX: 138 CM/SEC
BH CV ECHO MEAS - AO V2 MEAN: 97.5 CM/SEC
BH CV ECHO MEAS - AO V2 VTI: 32.7 CM
BH CV ECHO MEAS - AVA(I,A): 2.2 CM^2
BH CV ECHO MEAS - AVA(I,D): 2.2 CM^2
BH CV ECHO MEAS - BSA(HAYCOCK): 2.1 M^2
BH CV ECHO MEAS - BSA: 2 M^2
BH CV ECHO MEAS - BZI_BMI: 39 KILOGRAMS/M^2
BH CV ECHO MEAS - BZI_METRIC_HEIGHT: 157.5 CM
BH CV ECHO MEAS - BZI_METRIC_WEIGHT: 96.6 KG
BH CV ECHO MEAS - EDV(CUBED): 38.6 ML
BH CV ECHO MEAS - EDV(MOD-SP2): 78.9 ML
BH CV ECHO MEAS - EDV(MOD-SP4): 130 ML
BH CV ECHO MEAS - EDV(TEICH): 46.8 ML
BH CV ECHO MEAS - EF(CUBED): 78.3 %
BH CV ECHO MEAS - EF(MOD-BP): 53.1 %
BH CV ECHO MEAS - EF(MOD-SP2): 50.2 %
BH CV ECHO MEAS - EF(MOD-SP4): 53.2 %
BH CV ECHO MEAS - EF(TEICH): 71.7 %
BH CV ECHO MEAS - ESV(CUBED): 8.4 ML
BH CV ECHO MEAS - ESV(MOD-SP2): 39.3 ML
BH CV ECHO MEAS - ESV(MOD-SP4): 60.8 ML
BH CV ECHO MEAS - ESV(TEICH): 13.2 ML
BH CV ECHO MEAS - FS: 39.9 %
BH CV ECHO MEAS - IVS/LVPW: 1
BH CV ECHO MEAS - IVSD: 0.9 CM
BH CV ECHO MEAS - LAT PEAK E' VEL: 12.7 CM/SEC
BH CV ECHO MEAS - LV DIASTOLIC VOL/BSA (35-75): 66.2 ML/M^2
BH CV ECHO MEAS - LV MASS(C)D: 81.9 GRAMS
BH CV ECHO MEAS - LV MASS(C)DI: 41.7 GRAMS/M^2
BH CV ECHO MEAS - LV MAX PG: 5 MMHG
BH CV ECHO MEAS - LV MEAN PG: 2 MMHG
BH CV ECHO MEAS - LV SYSTOLIC VOL/BSA (12-30): 31 ML/M^2
BH CV ECHO MEAS - LV V1 MAX: 108 CM/SEC
BH CV ECHO MEAS - LV V1 MEAN: 72.4 CM/SEC
BH CV ECHO MEAS - LV V1 VTI: 25.6 CM
BH CV ECHO MEAS - LVIDD: 3.4 CM
BH CV ECHO MEAS - LVIDS: 2 CM
BH CV ECHO MEAS - LVLD AP2: 7.5 CM
BH CV ECHO MEAS - LVLD AP4: 8 CM
BH CV ECHO MEAS - LVLS AP2: 6.6 CM
BH CV ECHO MEAS - LVLS AP4: 6.5 CM
BH CV ECHO MEAS - LVOT AREA (M): 2.8 CM^2
BH CV ECHO MEAS - LVOT AREA: 2.8 CM^2
BH CV ECHO MEAS - LVOT DIAM: 1.9 CM
BH CV ECHO MEAS - LVPWD: 0.86 CM
BH CV ECHO MEAS - MED PEAK E' VEL: 9.7 CM/SEC
BH CV ECHO MEAS - MV A MAX VEL: 82.9 CM/SEC
BH CV ECHO MEAS - MV DEC SLOPE: 420 CM/SEC^2
BH CV ECHO MEAS - MV DEC TIME: 180 SEC
BH CV ECHO MEAS - MV E MAX VEL: 92.6 CM/SEC
BH CV ECHO MEAS - MV E/A: 1.1
BH CV ECHO MEAS - MV MEAN PG: 2 MMHG
BH CV ECHO MEAS - MV P1/2T MAX VEL: 91.7 CM/SEC
BH CV ECHO MEAS - MV P1/2T: 63.9 MSEC
BH CV ECHO MEAS - MV V2 MEAN: 66.4 CM/SEC
BH CV ECHO MEAS - MV V2 VTI: 25.6 CM
BH CV ECHO MEAS - MVA P1/2T LCG: 2.4 CM^2
BH CV ECHO MEAS - MVA(P1/2T): 3.4 CM^2
BH CV ECHO MEAS - MVA(VTI): 2.8 CM^2
BH CV ECHO MEAS - RAP SYSTOLE: 3 MMHG
BH CV ECHO MEAS - RV BASE (<4.1) - OBSOLETE: 3.6 CM
BH CV ECHO MEAS - RVSP: 24 MMHG
BH CV ECHO MEAS - SI(AO): 88.4 ML/M^2
BH CV ECHO MEAS - SI(CUBED): 15.4 ML/M^2
BH CV ECHO MEAS - SI(LVOT): 37 ML/M^2
BH CV ECHO MEAS - SI(MOD-SP2): 20.2 ML/M^2
BH CV ECHO MEAS - SI(MOD-SP4): 35.2 ML/M^2
BH CV ECHO MEAS - SI(TEICH): 17.1 ML/M^2
BH CV ECHO MEAS - SV(AO): 173.6 ML
BH CV ECHO MEAS - SV(CUBED): 30.2 ML
BH CV ECHO MEAS - SV(LVOT): 72.6 ML
BH CV ECHO MEAS - SV(MOD-SP2): 39.6 ML
BH CV ECHO MEAS - SV(MOD-SP4): 69.2 ML
BH CV ECHO MEAS - SV(TEICH): 33.5 ML
BH CV ECHO MEAS - TAPSE (>1.6): 1.9 CM
BH CV ECHO MEAS - TR MAX VEL: 226 CM/SEC
BH CV ECHO MEASUREMENTS AVERAGE E/E' RATIO: 8.27
BH CV XLRA - RV BASE: 3.6 CM
BH CV XLRA - TDI S': 9.3 CM/SEC
LEFT ATRIUM VOLUME INDEX: 18 ML/M2

## 2020-07-06 PROCEDURE — 93306 TTE W/DOPPLER COMPLETE: CPT

## 2020-07-06 PROCEDURE — 93306 TTE W/DOPPLER COMPLETE: CPT | Performed by: INTERNAL MEDICINE

## 2020-07-07 ENCOUNTER — TELEPHONE (OUTPATIENT)
Dept: GASTROENTEROLOGY | Facility: CLINIC | Age: 70
End: 2020-07-07

## 2020-07-07 NOTE — TELEPHONE ENCOUNTER
Patient is returning your call.  She has dentist appointment at 1pm.  Please call before 12pm noon or after 3pm.

## 2020-07-30 ENCOUNTER — TELEMEDICINE (OUTPATIENT)
Dept: GASTROENTEROLOGY | Facility: CLINIC | Age: 70
End: 2020-07-30

## 2020-07-30 DIAGNOSIS — K22.10 ULCER OF ESOPHAGUS WITHOUT BLEEDING: ICD-10-CM

## 2020-07-30 DIAGNOSIS — K22.719 BARRETT'S ESOPHAGUS WITH DYSPLASIA: Primary | ICD-10-CM

## 2020-07-30 PROCEDURE — 99443 PR PHYS/QHP TELEPHONE EVALUATION 21-30 MIN: CPT | Performed by: NURSE PRACTITIONER

## 2020-07-30 NOTE — PROGRESS NOTES
Tele visit:  Unable to complete visit using a video connection to the patient. A phone visit was used to complete this visits. You have chosen to receive care through a telephone visit. Do you consent to use a telephone visit for your medical care today? Yes    PATIENT INFORMATION  Valentine Arora       - 1950    CHIEF COMPLAINT  Chief Complaint   Patient presents with   • Follow-up     FOLLOW UP ON EGD & C/S -- NEEDS REPEAT EGD TO CHECK ULCER       HISTORY OF PRESENT ILLNESS  EGD and  colonoscopy 21 was for Dysphagia,Screen, biopsy was Positive for Duodenitis, Reactive Gastritis,Ulcerative Esophagitis w Sarabia's  Negative for Celiac, HP,  Dysplasia    So avoid NSAIDS continue present meds and Follows up in 8 weeks, to schedule repeat EGD for Healing  The Pt had 3/5 adenomas so recall in 3 years    She reports her swallowing problems are resolved and she is feeling better.  Was not having heartburn so discussed that with the damage in her esophagus she will not be able to tell when there is damage and needs to stay on her omeprazole 40mg bid for life.  She verb understanding and we will get her scheduled for  A repeat EGD and then she will need a 3year recall for both EGD and colonoscopy.  She verb understanding.           REVIEW OF SYSTEMS  Review of Systems   All other systems reviewed and are negative.        ACTIVE PROBLEMS  Patient Active Problem List    Diagnosis   • Esophageal dysphagia [R13.10]   • Encounter for screening for malignant neoplasm of colon [Z12.11]   • Morbidly obese (CMS/[facility]) [E66.01]   • PAD (peripheral artery disease) (CMS/[facility]) [I73.9]   • S/P CABG (coronary artery bypass graft) [Z95.1]   • CAD (coronary artery disease) [I25.10]   • Myocardial infarction (CMS/[facility]) [I21.9]   • Hypertension [I10]   • Hyperlipidemia [E78.5]         PAST MEDICAL HISTORY  Past Medical History:   Diagnosis Date   • Arthritis    • Sarabia esophagus    • CAD (coronary artery disease)    • Chronic back pain     • Colon polyp    • Diabetes mellitus (CMS/HCC)    • GERD (gastroesophageal reflux disease)    • Hyperlipidemia    • Hypertension    • Myocardial infarction (CMS/HCC)        • PAD (peripheral artery disease) (CMS/HCC) 2019   • Shingles          SURGICAL HISTORY  Past Surgical History:   Procedure Laterality Date   • COLONOSCOPY     • COLONOSCOPY N/A 2020    Procedure: COLONOSCOPY;  Surgeon: Mathew Roberts MD;  Location: [facility] OR;  Service: Gastroenterology;  Laterality: N/A;  Descending colon polyp x 2, Ascending colon polyp, Sigmoid colon polyp, Rectal polyp   • CORONARY ARTERY BYPASS GRAFT  1995    x 2 vessels   • ENDOSCOPY N/A 2020    Procedure: ESOPHAGOGASTRODUODENOSCOPY;  Surgeon: Mathew Roberts MD;  Location: [facility] OR;  Service: Gastroenterology;  Laterality: N/A;  Ulcerative esophagitis, Gastritis, Duodenitis  Duodenal biopsy, gastric biopsy, distal esophageal biopsy   • INNER EAR SURGERY     • JOINT REPLACEMENT      right knee   • LEG SURGERY      x 2 s/p fall   • LUMBAR FUSION      L4/L5   • SKIN GRAFT      to left lower leg x 2   • TONSILLECTOMY     • WRIST FUSION Left          FAMILY HISTORY  Family History   Problem Relation Age of Onset   • Colon cancer Neg Hx    • Colon polyps Neg Hx          SOCIAL HISTORY  Social History     Occupational History   • Not on file   Tobacco Use   • Smoking status: Former Smoker     Last attempt to quit:      Years since quittin.5   • Smokeless tobacco: Never Used   Substance and Sexual Activity   • Alcohol use: Yes     Comment: occasional   • Drug use: No   • Sexual activity: Defer         CURRENT MEDICATIONS    Current Outpatient Medications:   •  allopurinol (ZYLOPRIM) 100 MG tablet, Take 100 mg by mouth Daily., Disp: , Rfl:   •  amLODIPine (NORVASC) 10 MG tablet, , Disp: , Rfl:   •  aspirin 81 MG EC tablet, Take 81 mg by mouth Daily., Disp: , Rfl:   •  atenolol (TENORMIN) 25 MG tablet, , Disp: , Rfl:   •   benzonatate (TESSALON) 200 MG capsule, , Disp: , Rfl:   •  celecoxib (CELEBREX) 200 MG capsule, Take 200 mg by mouth Daily., Disp: , Rfl:   •  DULoxetine (CYMBALTA) 60 MG capsule, Take 60 mg by mouth Daily., Disp: , Rfl:   •  hydrALAZINE (APRESOLINE) 50 MG tablet, , Disp: , Rfl:   •  hydroCHLOROthiazide (HYDRODIURIL) 12.5 MG tablet, , Disp: , Rfl:   •  metFORMIN ER (GLUCOPHAGE-XR) 750 MG 24 hr tablet, Take 750 mg by mouth Daily With Breakfast., Disp: , Rfl:   •  omeprazole (priLOSEC) 40 MG capsule, Take 1 capsule by mouth 2 (Two) Times a Day Before Meals., Disp: 180 capsule, Rfl: 3  •  rosuvastatin (CRESTOR) 10 MG tablet, Take 10 mg by mouth Daily., Disp: , Rfl:     ALLERGIES  Codeine and Other    VITALS  There were no vitals filed for this visit.    LAST RESULTS   Hospital Outpatient Visit on 07/06/2020   Component Date Value Ref Range Status   • BSA 07/06/2020 2.0  m^2 Final   • IVSd 07/06/2020 0.9  cm Final   • LVIDd 07/06/2020 3.4  cm Final   • LVIDs 07/06/2020 2.0  cm Final   • LVPWd 07/06/2020 0.86  cm Final   • IVS/LVPW 07/06/2020 1.0   Final   • FS 07/06/2020 39.9  % Final   • EDV(Teich) 07/06/2020 46.8  ml Final   • ESV(Teich) 07/06/2020 13.2  ml Final   • EF(Teich) 07/06/2020 71.7  % Final   • EDV(cubed) 07/06/2020 38.6  ml Final   • ESV(cubed) 07/06/2020 8.4  ml Final   • EF(cubed) 07/06/2020 78.3  % Final   • LV mass(C)d 07/06/2020 81.9  grams Final   • LV mass(C)dI 07/06/2020 41.7  grams/m^2 Final   • SV(Teich) 07/06/2020 33.5  ml Final   • SI(Teich) 07/06/2020 17.1  ml/m^2 Final   • SV(cubed) 07/06/2020 30.2  ml Final   • SI(cubed) 07/06/2020 15.4  ml/m^2 Final   • Ao root diam 07/06/2020 2.6  cm Final   • Ao root area 07/06/2020 5.3  cm^2 Final   • LVOT diam 07/06/2020 1.9  cm Final   • LVOT area 07/06/2020 2.8  cm^2 Final   • LVOT area(traced) 07/06/2020 2.8  cm^2 Final   • LVLd ap4 07/06/2020 8.0  cm Final   • EDV(MOD-sp4) 07/06/2020 130.0  ml Final   • LVLs ap4 07/06/2020 6.5  cm Final   •  ESV(MOD-sp4) 07/06/2020 60.8  ml Final   • EF(MOD-sp4) 07/06/2020 53.2  % Final   • LVLd ap2 07/06/2020 7.5  cm Final   • EDV(MOD-sp2) 07/06/2020 78.9  ml Final   • LVLs ap2 07/06/2020 6.6  cm Final   • ESV(MOD-sp2) 07/06/2020 39.3  ml Final   • EF(MOD-sp2) 07/06/2020 50.2  % Final   • SV(MOD-sp4) 07/06/2020 69.2  ml Final   • SI(MOD-sp4) 07/06/2020 35.2  ml/m^2 Final   • SV(MOD-sp2) 07/06/2020 39.6  ml Final   • SI(MOD-sp2) 07/06/2020 20.2  ml/m^2 Final   • Ao root area (BSA corrected) 07/06/2020 1.3   Final   • LV Bean Vol (BSA corrected) 07/06/2020 66.2  ml/m^2 Final   • LV Sys Vol (BSA corrected) 07/06/2020 31.0  ml/m^2 Final   • TAPSE (>1.6) 07/06/2020 1.9  cm Final   • EF(MOD-bp) 07/06/2020 53.1  % Final   • MV E max tucker 07/06/2020 92.6  cm/sec Final   • MV A max tucker 07/06/2020 82.9  cm/sec Final   • MV E/A 07/06/2020 1.1   Final   • MV V2 mean 07/06/2020 66.4  cm/sec Final   • MV mean PG 07/06/2020 2.0  mmHg Final   • MV V2 VTI 07/06/2020 25.6  cm Final   • MVA(VTI) 07/06/2020 2.8  cm^2 Final   • MV P1/2t max tucker 07/06/2020 91.7  cm/sec Final   • MV P1/2t 07/06/2020 63.9  msec Final   • MVA(P1/2t) 07/06/2020 3.4  cm^2 Final   • MV dec slope 07/06/2020 420.0  cm/sec^2 Final   • MV dec time 07/06/2020 180  sec Final   • Ao V2 mean 07/06/2020 97.5  cm/sec Final   • Ao mean PG 07/06/2020 4.0  mmHg Final   • Ao mean PG (full) 07/06/2020 2.0  mmHg Final   • Ao V2 VTI 07/06/2020 32.7  cm Final   • ANN(I,A) 07/06/2020 2.2  cm^2 Final   • ANN(I,D) 07/06/2020 2.2  cm^2 Final   • LV V1 mean PG 07/06/2020 2.0  mmHg Final   • LV V1 mean 07/06/2020 72.4  cm/sec Final   • LV V1 VTI 07/06/2020 25.6  cm Final   • SV(Ao) 07/06/2020 173.6  ml Final   • SI(Ao) 07/06/2020 88.4  ml/m^2 Final   • SV(LVOT) 07/06/2020 72.6  ml Final   • SI(LVOT) 07/06/2020 37.0  ml/m^2 Final   • TR max tucker 07/06/2020 226.0  cm/sec Final   • MVA P1/2T LCG 07/06/2020 2.4  cm^2 Final   • RV BASE (<4.1) - obsolete 07/06/2020 3.6  cm Final   • Lat Peak E'  "Jose Raul 07/06/2020 12.7  cm/sec Final   • Med Peak E' Jose Raul 07/06/2020 9.7  cm/sec Final   •  CV ECHO JASON - BZI_BMI 07/06/2020 39.0  kilograms/m^2 Final   •  CV ECHO JASON - BSA([place]) 07/06/2020 2.1  m^2 Final   •  CV ECHO JASON - BZI_METRIC_WEIGHT 07/06/2020 96.6  kg Final   •  CV ECHO JASON - BZI_METRIC_HEIGHT 07/06/2020 157.5  cm Final   • Avg E/e' ratio 07/06/2020 8.27   Final   • RV S' 07/06/2020 9.30  cm/sec Final   • RV Base 07/06/2020 3.60  cm Final   • Dimensionless Index 07/06/2020 0.8  (DI) Final   • LA Volume Index 07/06/2020 18.0  mL/m2 Final   • Ao pk jose raul 07/06/2020 138.0  cm/sec Final   • LV V1 max PG 07/06/2020 5.0  mmHg Final   • LV V1 max 07/06/2020 108.0  cm/sec Final   • RAP systole 07/06/2020 3  mmHg Final   • RVSP(TR) 07/06/2020 24  mmHg Final   • Ao max PG 07/06/2020 8.0  mmHg Final     No results found.    PHYSICAL EXAM  Debilities/Disabilities Identified: None  Emotional Behavior: Appropriate  Wt Readings from Last 3 Encounters:   06/24/20 96.9 kg (213 lb 9.6 oz)   09/11/19 96.6 kg (213 lb)   08/09/18 90.7 kg (200 lb)     Ht Readings from Last 1 Encounters:   09/11/19 157.5 cm (62\")     There is no height or weight on file to calculate BMI.  Physical Exam   Constitutional: She is oriented to person, place, and time. She appears well-developed and well-nourished.   HENT:   Head: Normocephalic and atraumatic.   Eyes: Pupils are equal, round, and reactive to light. Conjunctivae are normal.   Neck: Normal range of motion. Neck supple.   Cardiovascular: Normal rate and regular rhythm.   Pulmonary/Chest: Effort normal and breath sounds normal.   Abdominal: Soft. Bowel sounds are normal. She exhibits no distension.   Musculoskeletal: Normal range of motion.   Lymphadenopathy:     She has no cervical adenopathy.   Neurological: She is alert and oriented to person, place, and time.   Skin: Skin is warm and dry.   Psychiatric: She has a normal mood and affect. Her behavior is normal.   Nursing note " and vitals reviewed.    As per pt over phone  CLINICAL DATA REVIEWED   reviewed previous lab results and integrated with today's visit, reviewed notes from other physicians and/or last GI encounter, reviewed previous endoscopy results and available photos    ASSESSMENT  Diagnoses and all orders for this visit:    Sarabia's esophagus with dysplasia  -     Case Request; Standing  -     Obtain Informed Consent; Standing  -     Case Request    Ulcer of esophagus without bleeding  -     Case Request; Standing  -     Obtain Informed Consent; Standing  -     Case Request          PLAN      She reports her swallowing problems are resolved and she is feeling better.  Was not having heartburn so discussed that with the damage in her esophagus she will not be able to tell when there is damage and needs to stay on her omeprazole 40mg bid for life.  She verb understanding and we will get her scheduled for a     Repeat EGD and then she will need a 3year recall for both EGD and colonoscopy.  She verb understanding.     30 min spent with the patient today on the phone >50 % spent counseling.       I have discussed the above plan with the patient.  They verbalize understanding and are in agreement with the plan.  They have been advised to contact the office for any questions, concerns, or changes related to their health.

## 2020-08-05 PROBLEM — K22.10 ULCER OF ESOPHAGUS WITHOUT BLEEDING: Status: ACTIVE | Noted: 2020-08-05

## 2020-08-05 PROBLEM — K22.719 BARRETT'S ESOPHAGUS WITH DYSPLASIA: Status: ACTIVE | Noted: 2020-08-05

## 2020-09-10 ENCOUNTER — TELEMEDICINE (OUTPATIENT)
Dept: CARDIOLOGY | Facility: CLINIC | Age: 70
End: 2020-09-10

## 2020-09-10 VITALS — HEIGHT: 62 IN | BODY MASS INDEX: 34.96 KG/M2 | WEIGHT: 190 LBS

## 2020-09-10 DIAGNOSIS — E66.01 MORBIDLY OBESE (HCC): Chronic | ICD-10-CM

## 2020-09-10 DIAGNOSIS — I73.9 PAD (PERIPHERAL ARTERY DISEASE) (HCC): Chronic | ICD-10-CM

## 2020-09-10 DIAGNOSIS — I21.4 NON-ST ELEVATION MYOCARDIAL INFARCTION (NSTEMI) (HCC): Chronic | ICD-10-CM

## 2020-09-10 DIAGNOSIS — K22.10 ULCER OF ESOPHAGUS WITHOUT BLEEDING: ICD-10-CM

## 2020-09-10 DIAGNOSIS — I25.119 CORONARY ARTERY DISEASE INVOLVING NATIVE CORONARY ARTERY OF NATIVE HEART WITH ANGINA PECTORIS (HCC): Primary | Chronic | ICD-10-CM

## 2020-09-10 DIAGNOSIS — Z95.1 S/P CABG (CORONARY ARTERY BYPASS GRAFT): Chronic | ICD-10-CM

## 2020-09-10 DIAGNOSIS — I10 ESSENTIAL HYPERTENSION: Chronic | ICD-10-CM

## 2020-09-10 PROCEDURE — 99441 PR PHYS/QHP TELEPHONE EVALUATION 5-10 MIN: CPT | Performed by: INTERNAL MEDICINE

## 2020-09-10 RX ORDER — OLMESARTAN MEDOXOMIL 40 MG/1
40 TABLET ORAL DAILY
COMMUNITY

## 2020-09-10 RX ORDER — CLONIDINE HYDROCHLORIDE 0.1 MG/1
0.2 TABLET ORAL 3 TIMES DAILY
COMMUNITY

## 2020-09-10 RX ORDER — CLOPIDOGREL BISULFATE 75 MG/1
75 TABLET ORAL DAILY
Qty: 90 TABLET | Refills: 3 | Status: SHIPPED | OUTPATIENT
Start: 2020-09-10 | End: 2021-10-15

## 2020-09-10 NOTE — PROGRESS NOTES
Subjective:     Encounter Date: 09/10/20        Patient ID: Valentine Arora is a 70 y.o. female.    Chief Complaint: HTN, CAD  History of Present Illness    Sindy Alejandro,    This patient has consented to a telehealth visit via video/audio. The visit was scheduled as a audio/video visit to comply with patient safety concerns in accordance with [facility] recommendations.  All vitals recorded within this visit are reported by the patient.  I spent  15 minutes in total including but not limited to the 8 minutes spent in direct conversation with this patient.     Generally she has been doing well.  She informs me that she had an endoscopy by Dr. Roberts and was found to have esophageal ulcer and gastritis.  She has been on aspirin daily for history of CAD and PAD.  He is scheduled for repeat endoscopy soon.    She has been doing well with any cardiac complaints.  This patient denies any chest pain, pressure, tightness, squeezing, or heartburn.  This patient has not experienced any feeling of palpitations, tachycardia or heart racing and no presyncope or syncope.  There has not been any problems with dizziness or lightheadedness.  There has not been any orthopnea or PND, and no problems with lower extremity edema.  This patient denies any shortness of breath at rest or with activity and has not had any wheezing.  This patient has not had any problems with unexplained nausea or vomiting. The patient has continued to perform daily activities of living without any specific problem or change in the level of activity.  This patient has not been recently hospitalized for any reason.     It has been one year since her last visit.  She has a history of CAD and had a myocardial infarction approximately 20 years ago. At that point she had a failed attempted angioplasty and then underwent a two-vessel CABG in 1995. In August 2012 she had a stress Cardiolite test performed that showed an ejection fraction 75% and no ischemia.  No wall motion abnormalities were seen. She was hospitalized at [facility] in September 2015. 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 This was felt to be due to trauma years ago. She had an echocardiogram that showed an ejection fraction is 60-65% with normal valvular structure and function. She also had a Holter monitor that showed no abnormality.    She was noted to have a new carotid bruit and was found to have bilateral cerebrovascular disease.  She also had hip and leg pain and was found to have significant peripheral arterial disease.  She is being followed with Dr. Ace. She had an intervention last Sept- bilateral common iliac artery kissing stent placement on 9/23/2019    Stress test September 2019:  Interpretation Summary     · Diaphragmatic attenuation artifact is present.  · Left ventricular ejection fraction is normal (Calculated EF = 70%).  · Myocardial perfusion imaging indicates a normal myocardial perfusion study with no evidence of ischemia.     Echocardiogram July 2020:  Interpretation Summary     · Left ventricular systolic function is normal. Calculated EF = 53.1%. Estimated EF was in disagreement with the calculated EF. Estimated EF appears to be in the range of 56 - 60%. Normal left ventricular cavity size and wall thickness noted. All left ventricular wall segments contract normally. Left ventricular diastolic function is normal.         The following portions of the patient's history were reviewed and updated as appropriate: allergies, current medications, past family history, past medical history, past social history, past surgical history and problem list.    Past Medical History:   Diagnosis Date   • Arthritis    • Sarabia esophagus    • CAD (coronary artery disease)    • Chronic back pain    • Colon polyp    • Diabetes mellitus (CMS/HCC)    • GERD (gastroesophageal reflux disease)    • Hyperlipidemia    •  Hypertension    • Myocardial infarction (CMS/[facility])        • PAD (peripheral artery disease) (CMS/[facility]) 2019   • Shingles        Past Surgical History:   Procedure Laterality Date   • COLONOSCOPY     • COLONOSCOPY N/A 2020    Procedure: COLONOSCOPY;  Surgeon: Mathew Roberts MD;  Location: [facility];  Service: Gastroenterology;  Laterality: N/A;  Descending colon polyp x 2, Ascending colon polyp, Sigmoid colon polyp, Rectal polyp   • CORONARY ARTERY BYPASS GRAFT  1995    x 2 vessels   • ENDOSCOPY N/A 2020    Procedure: ESOPHAGOGASTRODUODENOSCOPY;  Surgeon: Mathew Roberts MD;  Location: [facility] OR;  Service: Gastroenterology;  Laterality: N/A;  Ulcerative esophagitis, Gastritis, Duodenitis  Duodenal biopsy, gastric biopsy, distal esophageal biopsy   • INNER EAR SURGERY     • JOINT REPLACEMENT      right knee   • LEG SURGERY      x 2 s/p fall   • LUMBAR FUSION      L4/L5   • SKIN GRAFT      to left lower leg x 2   • TONSILLECTOMY     • WRIST FUSION Left        Social History     Socioeconomic History   • Marital status:      Spouse name: Not on file   • Number of children: Not on file   • Years of education: Not on file   • Highest education level: Not on file   Tobacco Use   • Smoking status: Former Smoker     Last attempt to quit:      Years since quittin.7   • Smokeless tobacco: Never Used   Substance and Sexual Activity   • Alcohol use: Yes     Comment: occasional   • Drug use: No   • Sexual activity: Defer       Review of Systems   Constitution: Negative for chills, decreased appetite, fever and night sweats.   HENT: Negative for ear discharge, ear pain, hearing loss, nosebleeds and sore throat.    Eyes: Negative for blurred vision, double vision and pain.   Cardiovascular: Negative for cyanosis.   Respiratory: Negative for hemoptysis and sputum production.    Endocrine: Negative for cold intolerance and heat intolerance.   Hematologic/Lymphatic: Negative  "for adenopathy.   Skin: Negative for dry skin, itching, nail changes, rash and suspicious lesions.   Musculoskeletal: Negative for arthritis, gout, muscle cramps, muscle weakness, myalgias and neck pain.   Gastrointestinal: Negative for anorexia, bowel incontinence, constipation, diarrhea, dysphagia, hematemesis and jaundice.   Genitourinary: Negative for bladder incontinence, dysuria, flank pain, frequency, hematuria and nocturia.   Neurological: Negative for focal weakness, numbness, paresthesias and seizures.   Psychiatric/Behavioral: Negative for altered mental status, hallucinations, hypervigilance, suicidal ideas and thoughts of violence.   Allergic/Immunologic: Negative for persistent infections.       Procedures       Objective:     Vitals:    09/10/20 1001   Weight: 86.2 kg (190 lb)   Height: 157.5 cm (62\")        Alert and oriented x3, face, eyebrows, eyelids, and mouth are symmetric.  Sclerae is nonicteric.  Oral mucosa is pink and moist.  Neck is supple.  No rashes or lesions are seen.  Ears are normal to external exam.  Shoulders are symmetric.  Both upper extremities move nicely.  Patient is speaking in full sentences without wheezing or respiratory distress.  No stridor. Hearing is appropriate without difficulty.  Lab Review:             Performed        Assessment:          Diagnosis Plan   1. Coronary artery disease involving native coronary artery of native heart with angina pectoris (CMS/[facility])  clopidogrel (PLAVIX) 75 MG tablet   2. Non-ST elevation myocardial infarction (NSTEMI) (CMS/[facility])  clopidogrel (PLAVIX) 75 MG tablet   3. Essential hypertension  clopidogrel (PLAVIX) 75 MG tablet   4. PAD (peripheral artery disease) (CMS/[facility])  clopidogrel (PLAVIX) 75 MG tablet   5. Morbidly obese (CMS/[facility])  clopidogrel (PLAVIX) 75 MG tablet   6. S/P CABG (coronary artery bypass graft)  clopidogrel (PLAVIX) 75 MG tablet   7. Ulcer of esophagus without bleeding  clopidogrel (PLAVIX) 75 MG tablet          Plan: "       1. Coronary Artery Disease  Plan  • Lifestyle modifications discussed include adhering to a heart healthy diet, avoidance of tobacco products, maintenance of a healthy weight, medication compliance, regular exercise and regular monitoring of cholesterol and blood pressure    Subjective - Objective  • There is a history of past MI  • There is a history of previous coronary artery bypass graft  • Current antiplatelet therapy includes aspirin 81 mg    2.  Prior CABG  3.  History of MI  4.  Hypertension-  5.  Hyperlipidemia-on lipid-lowering therapy, patient states that she had recent blood work in your office and it was well-controlled.  6.  Peripheral arterial disease- patient has bruit on today's exam on her left carotid that was not present a year ago.  Bilateral common iliac artery kissing stent placement on 9/23/2019. Followed by Dr. Ace  7.  Patient states she was found by Dr. Roberts to have esophagitis and gastritis.  She has been on chronic aspirin therapy.  We will discontinue her aspirin, replaced with clopidogrel once daily.      Current Outpatient Medications:   •  allopurinol (ZYLOPRIM) 100 MG tablet, Take 100 mg by mouth 2 (Two) Times a Day., Disp: , Rfl:   •  aspirin 81 MG EC tablet, Take 81 mg by mouth Daily., Disp: , Rfl:   •  atenolol (TENORMIN) 25 MG tablet, , Disp: , Rfl:   •  celecoxib (CELEBREX) 200 MG capsule, Take 200 mg by mouth Daily., Disp: , Rfl:   •  cloNIDine (CATAPRES) 0.1 MG tablet, Take 0.2 mg by mouth 3 (Three) Times a Day., Disp: , Rfl:   •  DULoxetine (CYMBALTA) 60 MG capsule, Take 60 mg by mouth Daily., Disp: , Rfl:   •  hydrALAZINE (APRESOLINE) 50 MG tablet, Take 75 mg by mouth 3 (Three) Times a Day., Disp: , Rfl:   •  hydroCHLOROthiazide (HYDRODIURIL) 12.5 MG tablet, Take 12.5 mg by mouth Daily., Disp: , Rfl:   •  metFORMIN HCl  MG/5ML Suspension Reconstituted ER, Take 500 mg by mouth 2 (two) times a day., Disp: , Rfl:   •  olmesartan (BENICAR) 40 MG tablet, Take  40 mg by mouth Daily., Disp: , Rfl:   •  omeprazole (priLOSEC) 40 MG capsule, Take 1 capsule by mouth 2 (Two) Times a Day Before Meals., Disp: 180 capsule, Rfl: 3  •  rosuvastatin (CRESTOR) 10 MG tablet, Take 10 mg by mouth Daily., Disp: , Rfl:

## 2020-09-12 ENCOUNTER — TRANSCRIBE ORDERS (OUTPATIENT)
Dept: ADMINISTRATIVE | Facility: HOSPITAL | Age: 70
End: 2020-09-12

## 2020-09-12 DIAGNOSIS — Z01.818 PREOP EXAMINATION: Primary | ICD-10-CM

## 2020-09-15 ENCOUNTER — LAB (OUTPATIENT)
Dept: LAB | Facility: HOSPITAL | Age: 70
End: 2020-09-15

## 2020-09-15 DIAGNOSIS — Z01.818 PREOP EXAMINATION: ICD-10-CM

## 2020-09-15 PROCEDURE — C9803 HOPD COVID-19 SPEC COLLECT: HCPCS

## 2020-09-15 PROCEDURE — U0004 COV-19 TEST NON-CDC HGH THRU: HCPCS

## 2020-09-16 ENCOUNTER — ANESTHESIA EVENT (OUTPATIENT)
Dept: PERIOP | Facility: HOSPITAL | Age: 70
End: 2020-09-16

## 2020-09-16 LAB — SARS-COV-2 RNA NOSE QL NAA+PROBE: NOT DETECTED

## 2020-09-17 ENCOUNTER — HOSPITAL ENCOUNTER (OUTPATIENT)
Facility: HOSPITAL | Age: 70
Setting detail: HOSPITAL OUTPATIENT SURGERY
Discharge: HOME | End: 2020-09-17
Attending: INTERNAL MEDICINE | Admitting: INTERNAL MEDICINE

## 2020-09-17 ENCOUNTER — ANESTHESIA (OUTPATIENT)
Dept: PERIOP | Facility: HOSPITAL | Age: 70
End: 2020-09-17

## 2020-09-17 VITALS
BODY MASS INDEX: 39.82 KG/M2 | DIASTOLIC BLOOD PRESSURE: 64 MMHG | WEIGHT: 216.4 LBS | HEIGHT: 62 IN | RESPIRATION RATE: 15 BRPM | SYSTOLIC BLOOD PRESSURE: 184 MMHG | TEMPERATURE: 98 F | HEART RATE: 61 BPM | OXYGEN SATURATION: 98 %

## 2020-09-17 DIAGNOSIS — K22.10 ULCER OF ESOPHAGUS WITHOUT BLEEDING: ICD-10-CM

## 2020-09-17 DIAGNOSIS — K22.719 BARRETT'S ESOPHAGUS WITH DYSPLASIA: ICD-10-CM

## 2020-09-17 LAB
INR PPP: 0.84 (ref 0.9–1.1)
POTASSIUM SERPL-SCNC: 4.8 MMOL/L (ref 3.5–5.2)
PROTHROMBIN TIME: 11.3 SECONDS (ref 12.1–15)

## 2020-09-17 PROCEDURE — 25010000002 PROPOFOL 10 MG/ML EMULSION: Performed by: NURSE ANESTHETIST, CERTIFIED REGISTERED

## 2020-09-17 PROCEDURE — 93005 ELECTROCARDIOGRAM TRACING: CPT | Performed by: NURSE ANESTHETIST, CERTIFIED REGISTERED

## 2020-09-17 PROCEDURE — 88342 IMHCHEM/IMCYTCHM 1ST ANTB: CPT | Performed by: INTERNAL MEDICINE

## 2020-09-17 PROCEDURE — 93010 ELECTROCARDIOGRAM REPORT: CPT | Performed by: INTERNAL MEDICINE

## 2020-09-17 PROCEDURE — 88305 TISSUE EXAM BY PATHOLOGIST: CPT | Performed by: INTERNAL MEDICINE

## 2020-09-17 PROCEDURE — 88312 SPECIAL STAINS GROUP 1: CPT | Performed by: INTERNAL MEDICINE

## 2020-09-17 PROCEDURE — 43239 EGD BIOPSY SINGLE/MULTIPLE: CPT | Performed by: INTERNAL MEDICINE

## 2020-09-17 PROCEDURE — 85610 PROTHROMBIN TIME: CPT | Performed by: NURSE ANESTHETIST, CERTIFIED REGISTERED

## 2020-09-17 PROCEDURE — 84132 ASSAY OF SERUM POTASSIUM: CPT | Performed by: NURSE ANESTHETIST, CERTIFIED REGISTERED

## 2020-09-17 RX ORDER — SODIUM CHLORIDE 0.9 % (FLUSH) 0.9 %
10 SYRINGE (ML) INJECTION AS NEEDED
Status: DISCONTINUED | OUTPATIENT
Start: 2020-09-17 | End: 2020-09-17 | Stop reason: HOSPADM

## 2020-09-17 RX ORDER — GLYCOPYRROLATE 0.2 MG/ML
INJECTION INTRAMUSCULAR; INTRAVENOUS AS NEEDED
Status: DISCONTINUED | OUTPATIENT
Start: 2020-09-17 | End: 2020-09-17 | Stop reason: SURG

## 2020-09-17 RX ORDER — ASPIRIN 81 MG/1
81 TABLET, CHEWABLE ORAL DAILY
Status: ON HOLD | COMMUNITY
End: 2021-06-15

## 2020-09-17 RX ORDER — SODIUM CHLORIDE, SODIUM LACTATE, POTASSIUM CHLORIDE, CALCIUM CHLORIDE 600; 310; 30; 20 MG/100ML; MG/100ML; MG/100ML; MG/100ML
9 INJECTION, SOLUTION INTRAVENOUS CONTINUOUS
Status: DISCONTINUED | OUTPATIENT
Start: 2020-09-17 | End: 2020-09-17 | Stop reason: HOSPADM

## 2020-09-17 RX ORDER — SODIUM CHLORIDE 0.9 % (FLUSH) 0.9 %
10 SYRINGE (ML) INJECTION EVERY 12 HOURS SCHEDULED
Status: DISCONTINUED | OUTPATIENT
Start: 2020-09-17 | End: 2020-09-17 | Stop reason: HOSPADM

## 2020-09-17 RX ORDER — LIDOCAINE HYDROCHLORIDE 20 MG/ML
INJECTION, SOLUTION INFILTRATION; PERINEURAL AS NEEDED
Status: DISCONTINUED | OUTPATIENT
Start: 2020-09-17 | End: 2020-09-17 | Stop reason: SURG

## 2020-09-17 RX ORDER — SODIUM CHLORIDE 9 MG/ML
40 INJECTION, SOLUTION INTRAVENOUS AS NEEDED
Status: DISCONTINUED | OUTPATIENT
Start: 2020-09-17 | End: 2020-09-17 | Stop reason: HOSPADM

## 2020-09-17 RX ORDER — ONDANSETRON 2 MG/ML
4 INJECTION INTRAMUSCULAR; INTRAVENOUS ONCE AS NEEDED
Status: DISCONTINUED | OUTPATIENT
Start: 2020-09-17 | End: 2020-09-17 | Stop reason: HOSPADM

## 2020-09-17 RX ORDER — LIDOCAINE HYDROCHLORIDE 10 MG/ML
0.5 INJECTION, SOLUTION EPIDURAL; INFILTRATION; INTRACAUDAL; PERINEURAL ONCE AS NEEDED
Status: COMPLETED | OUTPATIENT
Start: 2020-09-17 | End: 2020-09-17

## 2020-09-17 RX ORDER — MAGNESIUM HYDROXIDE 1200 MG/15ML
LIQUID ORAL AS NEEDED
Status: DISCONTINUED | OUTPATIENT
Start: 2020-09-17 | End: 2020-09-17 | Stop reason: HOSPADM

## 2020-09-17 RX ORDER — PROPOFOL 10 MG/ML
VIAL (ML) INTRAVENOUS AS NEEDED
Status: DISCONTINUED | OUTPATIENT
Start: 2020-09-17 | End: 2020-09-17 | Stop reason: SURG

## 2020-09-17 RX ADMIN — GLYCOPYRROLATE 0.2 MG: 0.2 INJECTION INTRAMUSCULAR; INTRAVENOUS at 14:29

## 2020-09-17 RX ADMIN — PROPOFOL 50 MG: 10 INJECTION, EMULSION INTRAVENOUS at 14:31

## 2020-09-17 RX ADMIN — LIDOCAINE HYDROCHLORIDE 0.1 ML: 10 INJECTION, SOLUTION EPIDURAL; INFILTRATION; INTRACAUDAL; PERINEURAL at 13:57

## 2020-09-17 RX ADMIN — LIDOCAINE HYDROCHLORIDE 100 MG: 20 INJECTION, SOLUTION INFILTRATION; PERINEURAL at 14:29

## 2020-09-17 RX ADMIN — SODIUM CHLORIDE, POTASSIUM CHLORIDE, SODIUM LACTATE AND CALCIUM CHLORIDE 9 ML/HR: 600; 310; 30; 20 INJECTION, SOLUTION INTRAVENOUS at 13:57

## 2020-09-17 RX ADMIN — PROPOFOL 50 MG: 10 INJECTION, EMULSION INTRAVENOUS at 14:29

## 2020-09-17 RX ADMIN — PROPOFOL 50 MG: 10 INJECTION, EMULSION INTRAVENOUS at 14:35

## 2020-09-17 RX ADMIN — PROPOFOL 50 MG: 10 INJECTION, EMULSION INTRAVENOUS at 14:39

## 2020-09-17 RX ADMIN — PROPOFOL 50 MG: 10 INJECTION, EMULSION INTRAVENOUS at 14:37

## 2020-09-17 NOTE — ANESTHESIA POSTPROCEDURE EVALUATION
Patient: Valentine Arora    Procedure Summary     Date: 09/17/20 Room / Location: [facility] ENDOSCOPY 1 /  [facility] OR    Anesthesia Start: 1424 Anesthesia Stop: 1443    Procedure: ESOPHAGOGASTRODUODENOSCOPY with biopsies (N/A Esophagus) Diagnosis:       Sarabia's esophagus with dysplasia      Ulcer of esophagus without bleeding      Gastritis      Hiatal hernia      (Sarabia's esophagus with dysplasia [K22.719])      (Ulcer of esophagus without bleeding [K22.10])    Surgeon: Mathew Roberts MD Provider: Basil Vora CRNA    Anesthesia Type: MAC ASA Status: 3          Anesthesia Type: MAC    Vitals  Vitals Value Taken Time   /96 09/17/20 1455   Temp     Pulse 78 09/17/20 1455   Resp 16 09/17/20 1455   SpO2 98 % 09/17/20 1455           Post Anesthesia Care and Evaluation    Patient location during evaluation: PHASE II  Patient participation: complete - patient participated  Level of consciousness: awake  Pain score: 0  Pain management: adequate  Airway patency: patent  Anesthetic complications: No anesthetic complications  PONV Status: none  Cardiovascular status: acceptable  Respiratory status: acceptable  Hydration status: acceptable

## 2020-09-17 NOTE — BRIEF OP NOTE
ESOPHAGOGASTRODUODENOSCOPY  Progress Note    Valentine Arora  9/17/2020    Pre-op Diagnosis:   Sarabia's esophagus with dysplasia [K22.719]  Ulcer of esophagus without bleeding [K22.10]       Post-Op Diagnosis Codes:     * Sarabia's esophagus with dysplasia [K22.719]     * Ulcer of esophagus without bleeding [K22.10]     * Gastritis [K29.70]     * Hiatal hernia [K44.9]    Procedure/CPT® Codes:        Procedure(s):  ESOPHAGOGASTRODUODENOSCOPY with biopsies    Surgeon(s):  Mathew Roberts MD    Anesthesia: Monitored Anesthesia Care    Staff:   Circulator: Melodie Silverman RN  Scrub Person: Juanita Rebolledo         Estimated Blood Loss: none    Urine Voided: * No values recorded between 9/17/2020  2:23 PM and 9/17/2020  2:41 PM *    Specimens:                Specimens     ID Source Type Tests Collected By Collected At Frozen?      A Gastric, Body Tissue · TISSUE PATHOLOGY EXAM   Mathew Roberts MD 9/17/20 1437      This specimen was not marked as sent.    B Esophagus, Distal Tissue · TISSUE PATHOLOGY EXAM   Mathew Roberts MD 9/17/20 1439      This specimen was not marked as sent.                Drains: * No LDAs found *    Findings: Normal Duodenum  Erosive Gastritis-Biopsy  Hiatal Hernia 6cm  SSBE-Biopsy  Healed ulcerative Esopahgitis    Complications: none          Mathew Roberts MD     Date: 9/17/2020  Time: 14:44 EDT

## 2020-09-17 NOTE — ANESTHESIA PREPROCEDURE EVALUATION
Anesthesia Evaluation     Patient summary reviewed and Nursing notes reviewed   no history of anesthetic complications:  NPO Solid Status: > 8 hours  NPO Liquid Status: > 8 hours           Airway   Mallampati: III  TM distance: <3 FB  Neck ROM: full  No difficulty expected  Dental      Pulmonary - normal exam    breath sounds clear to auscultation  (+) sleep apnea ( possible),   Cardiovascular - normal exam  Exercise tolerance: good (4-7 METS)    ECG reviewed  PT is on anticoagulation therapy  Patient on routine beta blocker and Beta blocker given within 24 hours of surgery  Rhythm: regular  Rate: normal    (+) hypertension well controlled, past MI ( hx MI 1995, CABG)  >12 months, CAD, CABG ( no chest pain/sob since.) >6 Months, PVD, hyperlipidemia,       Neuro/Psych- negative ROS  GI/Hepatic/Renal/Endo    (+) obesity, morbid obesity, GERD poorly controlled,  diabetes mellitus well controlled,     Musculoskeletal     (+) back pain (l4/5 fusion),   Abdominal   (+) obese,    Substance History - negative use     OB/GYN negative ob/gyn ROS         Other   arthritis,      ROS/Med Hx Other: ECG 12 Lead  Order: [id number]  Status:  Preliminary result   Visible to patient:  No (not released) Next appt:  None    Narrative & Impression      HEART RATE= 61  bpm  RR Interval= 984  ms  MI Interval= 226  ms  P Horizontal Axis= -17  deg  P Front Axis= 51  deg  QRSD Interval= 90  ms  QT Interval= 412  ms  QRS Axis= 5  deg  T Wave Axis= 12  deg  - ABNORMAL ECG -  Sinus arrhythmia  Prolonged MI interval  LVH by voltage  Electronically Signed By:   Date and Time of Study: 2020-09-17 13:23:52    Specimen Collected: 09/17/20 13:23                            Anesthesia Plan    ASA 3     MAC     intravenous induction     Anesthetic plan, all risks, benefits, and alternatives have been provided, discussed and informed consent has been obtained with: patient.  Use of blood products discussed with patient  Consented to blood products.   Plan  discussed with CRNA.

## 2020-09-17 NOTE — OP NOTE
ESOPHAGOGASTRODUODENOSCOPY  Procedure Report    Patient Name:  Valentine Arora  YOB: 1950    Date of Surgery:  9/17/2020     Indications:  Sarabia's esophagus with dysplasia [K22.719]  Ulcer of esophagus without bleeding [K22.10]      Pre-op Diagnosis:   Sarabia's esophagus with dysplasia [K22.719]  Ulcer of esophagus without bleeding [K22.10]    Post-Op Diagnosis Codes:     * Sarabia's esophagus with dysplasia [K22.719]     * Ulcer of esophagus without bleeding [K22.10]     * Gastritis [K29.70]     * Hiatal hernia [K44.9]         Procedure/CPT® Codes:      Procedure(s):  ESOPHAGOGASTRODUODENOSCOPY with biopsies    Staff:  Surgeon(s):  Mathew Roberts MD         Anesthesia: Monitored Anesthesia Care    Estimated Blood Loss: none    Specimens:   ID Type Source Tests Collected by Time   A (Not marked as sent) :  Tissue Gastric, Body TISSUE PATHOLOGY EXAM Mathew Roberts MD 9/17/2020 1437   B (Not marked as sent) :  Tissue Esophagus, Distal TISSUE PATHOLOGY EXAM Mathew Roberts MD 9/17/2020 1439       Implants:    Nothing was implanted during the procedure      Description of Procedure: After having signed informed consent, she was brought to the endoscopy suite, placed in left lateral decubitus position and given her IV sedation. A bite block was placed between her incisors. The scope was introduced in the oropharynx, advanced under direct visualization in the esophagus, through the distal esophagus. The previous ulcerative esophagitis had healed but short segment Sarabia esophagus was still suggested visually as well as being documented on her last exam. There was a hiatal hernia that was sliding type that ranged from 3 to 6 cm. The scope was advanced in the stomach and retroflexed revealing an otherwise normal fundus and body. The scope was deretroflexed and advanced in the antrum. There were multiple small erosions noted in the prepyloric mucosa suggestive of an  erosive gastritis. The scope was advanced through the pylorus, through the duodenal bulb which was examined and normal, around the angle up to the 2nd and 3rd portions of the duodenum which were normal as well. The scope was withdrawn back in the antrum. Biopsies were taken to rule out H. pylori. The scope was withdrawn in the distal esophagus and biopsies were taken in a targeted fashion of the gastric tongues to rule out dysplasia within the Sarabia esophagus. As the scope was withdrawn, the remainder of the esophagus was normal-appearing. The scope was taken from the patient. She tolerated the procedure well.           Findings: Normal Duodenum  Erosive Gastritis-Biopsy  Hiatal Hernia 6cm  SSBE-Biopsy  Healed ulcerative Esopahgitis     Complications: None    Recommendations: Results to be called. Avoid NSAIDS, Recall her Sarabia's in 3 years      Mathew Roberts MD     Date: 9/17/2020  Time: 14:45 EDT

## 2020-09-17 NOTE — H&P
Patient Care Team:  Aliya Alejandro MD as PCP - General (Family Medicine)    CHIEF COMPLAINT: Ulcerative esophagitis    HISTORY OF PRESENT ILLNESS:  Last exam was >8 weeks and no resultant dysphagia    Past Medical History:   Diagnosis Date   • Arthritis    • Sarabia esophagus    • CAD (coronary artery disease)    • Chronic back pain    • Colon polyp    • Diabetes mellitus (CMS/[facility])    • GERD (gastroesophageal reflux disease)    • Hyperlipidemia    • Hypertension    • Myocardial infarction (CMS/[facility])        • PAD (peripheral artery disease) (CMS/[facility]) 2019   • Shingles      Past Surgical History:   Procedure Laterality Date   • COLONOSCOPY     • COLONOSCOPY N/A 2020    Procedure: COLONOSCOPY;  Surgeon: Mathew Roberts MD;  Location: [facility] OR;  Service: Gastroenterology;  Laterality: N/A;  Descending colon polyp x 2, Ascending colon polyp, Sigmoid colon polyp, Rectal polyp   • CORONARY ARTERY BYPASS GRAFT  1995    x 2 vessels   • ENDOSCOPY N/A 2020    Procedure: ESOPHAGOGASTRODUODENOSCOPY;  Surgeon: Mathew Roberts MD;  Location: [facility] OR;  Service: Gastroenterology;  Laterality: N/A;  Ulcerative esophagitis, Gastritis, Duodenitis  Duodenal biopsy, gastric biopsy, distal esophageal biopsy   • INNER EAR SURGERY     • JOINT REPLACEMENT      right knee   • LEG SURGERY      x 2 s/p fall   • LUMBAR FUSION      L4/L5   • SKIN GRAFT      to left lower leg x 2   • TONSILLECTOMY     • WRIST FUSION Left      Family History   Problem Relation Age of Onset   • Colon cancer Neg Hx    • Colon polyps Neg Hx      Social History     Tobacco Use   • Smoking status: Former Smoker     Quit date:      Years since quittin.7   • Smokeless tobacco: Never Used   • Tobacco comment: QUIT    Substance Use Topics   • Alcohol use: Yes     Comment: occasional   • Drug use: No     Medications Prior to Admission   Medication Sig Dispense Refill Last Dose   • allopurinol (ZYLOPRIM)  "100 MG tablet Take 100 mg by mouth 2 (Two) Times a Day.   9/17/2020 at Unknown time   • aspirin 81 MG chewable tablet Chew 81 mg Daily.   9/15/2020   • atenolol (TENORMIN) 25 MG tablet Daily.   9/17/2020 at Unknown time   • celecoxib (CELEBREX) 200 MG capsule Take 200 mg by mouth Daily.   9/17/2020 at Unknown time   • cloNIDine (CATAPRES) 0.1 MG tablet Take 0.2 mg by mouth 3 (Three) Times a Day.   9/17/2020 at Unknown time   • omeprazole (priLOSEC) 40 MG capsule Take 1 capsule by mouth 2 (Two) Times a Day Before Meals. 180 capsule 3 9/17/2020 at Unknown time   • clopidogrel (PLAVIX) 75 MG tablet Take 1 tablet by mouth Daily. 90 tablet 3    • DULoxetine (CYMBALTA) 60 MG capsule Take 60 mg by mouth Daily.      • hydrALAZINE (APRESOLINE) 50 MG tablet Take 75 mg by mouth 3 (Three) Times a Day.      • hydroCHLOROthiazide (HYDRODIURIL) 12.5 MG tablet Take 12.5 mg by mouth Daily.      • metFORMIN HCl  MG/5ML Suspension Reconstituted ER Take 500 mg by mouth 2 (two) times a day.      • olmesartan (BENICAR) 40 MG tablet Take 40 mg by mouth Daily.      • rosuvastatin (CRESTOR) 10 MG tablet Take 10 mg by mouth Daily.        Allergies:  Codeine and Other    REVIEW OF SYSTEMS:  Please see the above history of present illness for pertinent positives and negatives.  The remainder of the patient's systems have been reviewed and are negative.     Vital Signs  Temp:  [98.3 °F (36.8 °C)] 98.3 °F (36.8 °C)  Heart Rate:  [65] 65  Resp:  [20] 20  BP: (185)/(78) 185/78    Flowsheet Rows      First Filed Value   Admission Height  157.5 cm (62\") Documented at 09/16/2020 1423   Admission Weight  86.2 kg (190 lb) Documented at 09/16/2020 1423           Physical Exam:  Physical Exam   Constitutional: Patient appears well-developed and well-nourished and in no acute distress   HEENT:   Head: Normocephalic and atraumatic.   Eyes:  Pupils are equal, round, and reactive to light. EOM are intact. Sclerae are anicteric and non-injected.  Mouth " and Throat: Patient has moist mucous membranes. Oropharynx is clear of any erythema or exudate.     Neck: Neck supple. No JVD present. No thyromegaly present. No lymphadenopathy present.  Cardiovascular: Regular rate, regular rhythm, S1 normal and S2 normal.  Exam reveals no gallop and no friction rub.  No murmur heard.  Pulmonary/Chest: Lungs are clear to auscultation bilaterally. No respiratory distress. No wheezes. No rhonchi. No rales.   Abdominal: Soft. Bowel sounds are normal. No distension and no mass. There is no hepatosplenomegaly. There is no tenderness.   Musculoskeletal: Normal Muscle tone  Extremities: No edema. Pulses are palpable in all 4 extremities.  Neurological: Patient is alert and oriented to person, place, and time. Cranial nerves II-XII are grossly intact with no focal deficits.  Skin: Skin is warm. No rash noted. Nails show no clubbing.  No cyanosis or erythema.    Debilities/Disabilities Identified: None  Emotional Behavior: Appropriate     Results Review:    I reviewed the patient's new clinical results.  Lab Results (most recent)     None          Imaging Results (Most Recent)     None        reviewed    ECG/EMG Results (most recent)     None        reviewed    Assessment/Plan   Barretts Esophagus and Ulcerative esophagitis/  EGD      I discussed the patients findings and my recommendations with patient.     Mathew Roberts MD  09/17/20  13:19 EDT    Time: 10 min prior to procedure.

## 2020-09-23 LAB
CYTO UR: NORMAL
LAB AP CASE REPORT: NORMAL
PATH REPORT.ADDENDUM SPEC: NORMAL
PATH REPORT.FINAL DX SPEC: NORMAL
PATH REPORT.GROSS SPEC: NORMAL

## 2020-11-30 ENCOUNTER — OFFICE VISIT (OUTPATIENT)
Dept: GASTROENTEROLOGY | Facility: CLINIC | Age: 70
End: 2020-11-30

## 2020-11-30 VITALS — BODY MASS INDEX: 40.08 KG/M2 | TEMPERATURE: 98.1 F | HEIGHT: 62 IN | WEIGHT: 217.8 LBS

## 2020-11-30 DIAGNOSIS — K22.719 BARRETT'S ESOPHAGUS WITH DYSPLASIA: Primary | ICD-10-CM

## 2020-11-30 DIAGNOSIS — M15.8 OTHER OSTEOARTHRITIS INVOLVING MULTIPLE JOINTS: ICD-10-CM

## 2020-11-30 DIAGNOSIS — K22.10 ULCER OF ESOPHAGUS WITHOUT BLEEDING: ICD-10-CM

## 2020-11-30 DIAGNOSIS — Z86.010 PERSONAL HISTORY OF COLONIC POLYPS: ICD-10-CM

## 2020-11-30 DIAGNOSIS — R13.19 ESOPHAGEAL DYSPHAGIA: ICD-10-CM

## 2020-11-30 PROCEDURE — 99214 OFFICE O/P EST MOD 30 MIN: CPT | Performed by: NURSE PRACTITIONER

## 2020-11-30 RX ORDER — TRAMADOL HYDROCHLORIDE 50 MG/1
TABLET ORAL
Qty: 36 TABLET | Refills: 0 | Status: ON HOLD | OUTPATIENT
Start: 2020-11-30 | End: 2021-06-15

## 2020-11-30 RX ORDER — PANTOPRAZOLE SODIUM 40 MG/1
40 TABLET, DELAYED RELEASE ORAL 2 TIMES DAILY
Qty: 180 TABLET | Refills: 3 | Status: ON HOLD | OUTPATIENT
Start: 2020-11-30 | End: 2021-06-15

## 2020-11-30 NOTE — PATIENT INSTRUCTIONS
She does not like narcotic but will give some Tramadol for the really bad days to avoid celebrex. [facility] blue ice (menthol gel) every night for your knee.     Will switch from omeprazole to protonix since she is off her asa and taking plavix.  She is heading for a knee replacement but her wrists and feet are really sore also.  Recommend 8hr Tylenol three times a day on a regular schedule.     Don't eat late, don't eat fried and don't eat too much at one time. Avoid tomato based products like pizza, lasagna, spagetti.  If you want to have these take an over the counter Pepcid immediately before you eat them.  Do not eat within 3-4 hrs of bedtime. Limit caffeine and carbonated beverages, if you must have them do not have later in the day.  If reflux is severe elevate the head of the bed. You may also use TUMS, maalox, or mylanta for breakthrough heartburn.    Take a daily probiotic (something with a billion and more different strains is better like probiotic 10 or probiotic gummies) for your gut as well.  AVOID taking NSAIDS (like ibuprofen, Aleve, Motrin, naproxen, meloxicam, etc) as much as possible and use acetaminophen (Tylenol) instead.    Drink lots of water, eat a high fiber diet with 25-30gm a day(check the fiber content in the foods you eat.  Protein bars with 15gm of fiber in each bar are a great supplement daily), and get regular exercise. Use over the counter simethicone xtra strength gas x (125-180mg) 2 twice a day as needed for gas.     High Fiber Food suggestions:    Juan Protein bars from [employer] = 15gm of fiber in each bar (also gluten free)  Quest Protein bars from grocery stores= 15gm of fiber each (also gluten free)  Fiber One bars from grocery stores = 5-6gm of fiber each  Osiel Bran Buds cereal 1/2 cup = 17gm of fiber

## 2020-11-30 NOTE — PROGRESS NOTES
PATIENT INFORMATION  Valentine Arora       - 1950    CHIEF COMPLAINT  Chief Complaint   Patient presents with   • Follow-up     2 mo follow up on EGD W/Sarabia's       HISTORY OF PRESENT ILLNESS  She had EGD for dysphagia and EGD showed Barretts and repeat healed esoph ulcers but needed to go off the celebrex.  She is no longer having dysphagia but her arthritis pain is bad in her left hand bilat feet.  So discussed 8hr tylenol tid.      20  EGD was for Ulcerative esophagitis,second look, biopsy was Positive for Erosive gastritis,Reflux WITH Sarabia's   Negative for HP, Dysplasia   So avoid NSAIDS continue present meds and Follows up in 8 weeks, recall in two-2- years          REVIEWED PERTINENT RESULTS/ LABS  Lab Results   Component Value Date    CASEREPORT  2020     Surgical Pathology Report                         Case: [id number]                                  Authorizing Provider:  Mathew Roberts        Collected:           2020 02:37 PM                                 MD Clive                                                                   Ordering Location:     [facility]   Received:            2020 04:00 PM                                 OR                                                                           Pathologist:           Sathish Marlow MD                                                         Specimens:   1) - Gastric, Body                                                                                  2) - Esophagus, Distal                                                                     FINALDX  2020     1. Stomach, Biopsy: Benign gastric mucosa with    A. Mild chronic inflammation.   B. Reactive changes of the epithelium.  C. .No intestinal metaplasia.   D. No Helicobacter pylori.    E. Atypical repair.   F. Area suggestive of focal erosion.    2. Esophagus, Distal, Biopsy: Benign squamous and gastric mucosa with    A.  Features of esophagitis.   B. Extensive acute and chronic inflammation of the gastric mucosa.   C. Intestinal metaplasia consistent with Sarabia's esophagus with reactive changes.   D. No definitive viral organisms.   E. No definitive fungal organisms.   F. Acute inflammation.   G. Poorly formed granuloma.   H. Areas of extensive acute inflammation.               I. No H. Pylori.    Comment: There is rather extensive acute inflammation and chronic inflammation in the gastric type mucosa. There was Sarabia's esophagus but no dysplasia. The squamous mucosa has numerous lymphocytes and eosinophils and has extensive reactive changes. The features are those of esophagitis likely reflux. Given the granuloma that is present AFB and GMS stains will be performed and the results reported in an addendum. An immunostain for Helicobacter pylori was performed and the controls stain appropriately. No H. Pylori is identified.      kristi/pkm        Lab Results   Component Value Date    HGB 11.2 (L) 10/24/2019    MCV 86.0 10/24/2019     10/24/2019    ALT 23 10/24/2019    AST 37 10/24/2019    HGBA1C 6.7 (H) 11/29/2018    INR 0.84 (L) 09/17/2020    IRON 22 (L) 12/30/2018      No results found.    REVIEW OF SYSTEMS  Review of Systems   All other systems reviewed and are negative.        ACTIVE PROBLEMS  Patient Active Problem List    Diagnosis   • Sarabia's esophagus with dysplasia [K22.719]   • Ulcer of esophagus without bleeding [K22.10]   • Esophageal dysphagia [R13.10]   • Encounter for screening for malignant neoplasm of colon [Z12.11]   • Morbidly obese (CMS/HCC) [E66.01]   • PAD (peripheral artery disease) (CMS/HCC) [I73.9]   • S/P CABG (coronary artery bypass graft) [Z95.1]   • CAD (coronary artery disease) [I25.10]   • Myocardial infarction (CMS/[facility]) [I21.9]   • Hypertension [I10]   • Hyperlipidemia [E78.5]         PAST MEDICAL HISTORY  Past Medical History:   Diagnosis Date   • Arthritis    • Sarabia esophagus    • CAD  (coronary artery disease)    • Chronic back pain    • Colon polyp    • Diabetes mellitus (CMS/[facility])    • GERD (gastroesophageal reflux disease)    • Hyperlipidemia    • Hypertension    • Myocardial infarction (CMS/[facility])        • PAD (peripheral artery disease) (CMS/[facility]) 2019   • Shingles          SURGICAL HISTORY  Past Surgical History:   Procedure Laterality Date   • COLONOSCOPY     • COLONOSCOPY N/A 2020    Procedure: COLONOSCOPY;  Surgeon: Mathew Roberts MD;  Location: [facility];  Service: Gastroenterology;  Laterality: N/A;  Descending colon polyp x 2, Ascending colon polyp, Sigmoid colon polyp, Rectal polyp   • CORONARY ARTERY BYPASS GRAFT  1995    x 2 vessels   • ENDOSCOPY N/A 2020    Procedure: ESOPHAGOGASTRODUODENOSCOPY;  Surgeon: Mathew Roberts MD;  Location: [facility];  Service: Gastroenterology;  Laterality: N/A;  Ulcerative esophagitis, Gastritis, Duodenitis  Duodenal biopsy, gastric biopsy, distal esophageal biopsy   • ENDOSCOPY N/A 2020    Procedure: ESOPHAGOGASTRODUODENOSCOPY with biopsies;  Surgeon: Mathew Roberts MD;  Location: [facility] located [facility] OR;  Service: Gastroenterology;  Laterality: N/A;  Esophagitis  Sarabia's Esophagus  Hiatal hernia  Gastritis  Gastric biopsy  Distal esophagus biopsy   • INNER EAR SURGERY     • JOINT REPLACEMENT      right knee   • LEG SURGERY      x 2 s/p fall   • LUMBAR FUSION      L4/L5   • SKIN GRAFT      to left lower leg x 2   • TONSILLECTOMY     • WRIST FUSION Left          FAMILY HISTORY  Family History   Problem Relation Age of Onset   • Colon cancer Neg Hx    • Colon polyps Neg Hx          SOCIAL HISTORY  Social History     Occupational History   • Not on file   Tobacco Use   • Smoking status: Former Smoker     Quit date:      Years since quittin.9   • Smokeless tobacco: Never Used   • Tobacco comment: QUIT    Substance and Sexual Activity   • Alcohol use: Yes     Comment: occasional   • Drug use: No  "  • Sexual activity: Defer         CURRENT MEDICATIONS    Current Outpatient Medications:   •  allopurinol (ZYLOPRIM) 100 MG tablet, Take 100 mg by mouth 2 (Two) Times a Day., Disp: , Rfl:   •  aspirin 81 MG chewable tablet, Chew 81 mg Daily., Disp: , Rfl:   •  atenolol (TENORMIN) 25 MG tablet, Daily., Disp: , Rfl:   •  cloNIDine (CATAPRES) 0.1 MG tablet, Take 0.2 mg by mouth 3 (Three) Times a Day., Disp: , Rfl:   •  clopidogrel (PLAVIX) 75 MG tablet, Take 1 tablet by mouth Daily., Disp: 90 tablet, Rfl: 3  •  DULoxetine (CYMBALTA) 60 MG capsule, Take 60 mg by mouth Daily., Disp: , Rfl:   •  hydrALAZINE (APRESOLINE) 50 MG tablet, Take 75 mg by mouth 3 (Three) Times a Day., Disp: , Rfl:   •  hydroCHLOROthiazide (HYDRODIURIL) 12.5 MG tablet, Take 12.5 mg by mouth Daily., Disp: , Rfl:   •  metFORMIN HCl  MG/5ML Suspension Reconstituted ER, Take 500 mg by mouth 2 (two) times a day., Disp: , Rfl:   •  olmesartan (BENICAR) 40 MG tablet, Take 40 mg by mouth Daily., Disp: , Rfl:   •  pantoprazole (PROTONIX) 40 MG EC tablet, Take 1 tablet by mouth 2 (Two) Times a Day., Disp: 180 tablet, Rfl: 3  •  rosuvastatin (CRESTOR) 10 MG tablet, Take 10 mg by mouth Daily., Disp: , Rfl:   •  traMADol (ULTRAM) 50 MG tablet, 1-2 Oral Q4H PRN severe pain, Disp: 36 tablet, Rfl: 0    ALLERGIES  Codeine and Other    VITALS  Vitals:    11/30/20 0959   Temp: 98.1 °F (36.7 °C)   TempSrc: Temporal   Weight: 98.8 kg (217 lb 12.8 oz)   Height: 157.5 cm (62.01\")       PHYSICAL EXAM  Debilities/Disabilities Identified: None  Emotional Behavior: Appropriate  Wt Readings from Last 3 Encounters:   11/30/20 98.8 kg (217 lb 12.8 oz)   09/17/20 98.2 kg (216 lb 6.4 oz)   09/10/20 86.2 kg (190 lb)     Ht Readings from Last 1 Encounters:   11/30/20 157.5 cm (62.01\")     Body mass index is 39.83 kg/m².  Physical Exam  Vitals signs and nursing note reviewed.   Constitutional:       Appearance: She is well-developed.   HENT:      Head: Normocephalic and " atraumatic.   Eyes:      Conjunctiva/sclera: Conjunctivae normal.      Pupils: Pupils are equal, round, and reactive to light.   Neck:      Musculoskeletal: Normal range of motion and neck supple.   Cardiovascular:      Rate and Rhythm: Normal rate and regular rhythm.   Pulmonary:      Effort: Pulmonary effort is normal.      Breath sounds: Normal breath sounds.   Abdominal:      General: Bowel sounds are normal. There is no distension.      Palpations: Abdomen is soft.      Tenderness: There is no abdominal tenderness.   Musculoskeletal: Normal range of motion.   Lymphadenopathy:      Cervical: No cervical adenopathy.   Skin:     General: Skin is warm and dry.   Neurological:      Mental Status: She is alert and oriented to person, place, and time.   Psychiatric:         Behavior: Behavior normal.         CLINICAL DATA REVIEWED   reviewed previous lab results and integrated with today's visit, reviewed notes from other physicians and/or last GI encounter, reviewed previous endoscopy results and available photos, reviewed surgical pathology results from previous biopsies    ASSESSMENT  Diagnoses and all orders for this visit:    Sarabia's esophagus with dysplasia  -     pantoprazole (PROTONIX) 40 MG EC tablet; Take 1 tablet by mouth 2 (Two) Times a Day.    Other osteoarthritis involving multiple joints  -     traMADol (ULTRAM) 50 MG tablet; 1-2 Oral Q4H PRN severe pain    Ulcer of esophagus without bleeding    Esophageal dysphagia    Personal history of colonic polyps          PLAN  Return in about 3 months (around 2/28/2021).     She does not like narcotic but will give some Tramadol for the really bad days to avoid celebrex. [facility] blue ice (menthol gel) every night for your knee.     Will switch from omeprazole to protonix since she is off her asa and taking plavix.  She is heading for a knee replacement but her wrists and feet are really sore also.  Recommend 8hr Tylenol three times a day on a regular  schedule.     Don't eat late, don't eat fried and don't eat too much at one time. Avoid tomato based products like pizza, lasagna, spagetti.  If you want to have these take an over the counter Pepcid immediately before you eat them.  Do not eat within 3-4 hrs of bedtime. Limit caffeine and carbonated beverages, if you must have them do not have later in the day.  If reflux is severe elevate the head of the bed. You may also use TUMS, maalox, or mylanta for breakthrough heartburn.    Take a daily probiotic (something with a billion and more different strains is better like probiotic 10 or probiotic gummies) for your gut as well.  AVOID taking NSAIDS (like ibuprofen, Aleve, Motrin, naproxen, meloxicam, etc) as much as possible and use acetaminophen (Tylenol) instead.    Drink lots of water, eat a high fiber diet with 25-30gm a day(check the fiber content in the foods you eat.  Protein bars with 15gm of fiber in each bar are a great supplement daily), and get regular exercise. Use over the counter simethicone xtra strength gas x (125-180mg) 2 twice a day as needed for gas.     High Fiber Food suggestions:    Martinez Protein bars from [employer] = 15gm of fiber in each bar (also gluten free)  Quest Protein bars from grocery stores= 15gm of fiber each (also gluten free)  Fiber One bars from grocery stores = 5-6gm of fiber each  [employer] Bran Buds cereal 1/2 cup = 17gm of fiber    I have discussed the above plan with the patient.  They verbalize understanding and are in agreement with the plan.  They have been advised to contact the office for any questions, concerns, or changes related to their health.

## 2020-12-03 ENCOUNTER — DOCUMENTATION (OUTPATIENT)
Dept: GASTROENTEROLOGY | Facility: CLINIC | Age: 70
End: 2020-12-03

## 2021-03-03 ENCOUNTER — OFFICE VISIT (OUTPATIENT)
Dept: GASTROENTEROLOGY | Facility: CLINIC | Age: 71
End: 2021-03-03

## 2021-03-03 VITALS — BODY MASS INDEX: 39.56 KG/M2 | HEIGHT: 62 IN | WEIGHT: 215 LBS | TEMPERATURE: 98.3 F

## 2021-03-03 DIAGNOSIS — K59.04 CHRONIC IDIOPATHIC CONSTIPATION: ICD-10-CM

## 2021-03-03 DIAGNOSIS — K22.10 ULCER OF ESOPHAGUS WITHOUT BLEEDING: ICD-10-CM

## 2021-03-03 DIAGNOSIS — Z86.010 PERSONAL HISTORY OF COLONIC POLYPS: ICD-10-CM

## 2021-03-03 DIAGNOSIS — R13.19 ESOPHAGEAL DYSPHAGIA: ICD-10-CM

## 2021-03-03 DIAGNOSIS — K22.719 BARRETT'S ESOPHAGUS WITH DYSPLASIA: Primary | ICD-10-CM

## 2021-03-03 DIAGNOSIS — M15.8 OTHER OSTEOARTHRITIS INVOLVING MULTIPLE JOINTS: ICD-10-CM

## 2021-03-03 PROCEDURE — 99213 OFFICE O/P EST LOW 20 MIN: CPT | Performed by: NURSE PRACTITIONER

## 2021-03-03 NOTE — PATIENT INSTRUCTIONS
"Continue with Protonix 40mg twice a day.  Add a Quest protein bar for breakfast.     Don't eat late, don't eat fried and don't eat too much at one time. Avoid tomato based products like pizza, lasagna, spagetti.  If you want to have these take an over the counter Pepcid immediately before you eat them.  Do not eat within 3-4 hrs of bedtime. Limit caffeine and carbonated beverages, if you must have them do not have later in the day.  If reflux is severe elevate the head of the bed. You may also use TUMS, maalox, or mylanta for breakthrough heartburn.    Take a daily probiotic (something with a billion cultures and more different strains is better like \"Probiotic 10\" or probiotic gummies) for your gut as well.  AVOID taking NSAIDS (like ibuprofen, Aleve, Motrin, naproxen, meloxicam, etc) as much as possible and use acetaminophen (Tylenol) instead.    Drink lots of water, eat a high fiber diet with 25-30gm a day(check the fiber content in the foods you eat.  Protein bars with 15gm of fiber in each bar are a great supplement daily), and get regular exercise. Use over the counter simethicone xtra strength gas x (125-180mg) 2 twice a day as needed for gas.     High Fiber Food suggestions:    Beans, nuts, vegetables, whole grains, flax seed and sheri seeds (which can be stirred into other food) are high in fiber.    Martinez Protein bars from [employer] = 10-15gm of fiber in each bar (also gluten free)  Quest Protein bars from grocery stores [employer], [employer], [employer]= 15gm of fiber each (also gluten free)  Fiber One bars from grocery stores = 5-6gm of fiber each  Kelloggs Bran Buds cereal 1/2 cup = 17gm of fiber  [employer]ger brand low sugar Craisins = 13gm of fiber per serving    "

## 2021-03-03 NOTE — PROGRESS NOTES
PATIENT INFORMATION  Valentine Arora       - 1950    CHIEF COMPLAINT  Chief Complaint   Patient presents with   • Follow-up     3 mo follow up on Sarabia's       HISTORY OF PRESENT ILLNESS  She had EGD for dysphagia and EGD showed Barretts and repeat healed esoph ulcers but needed to go off the celebrex.  She is no longer having dysphagia but her arthritis pain is bad in her left hand bilat feet.  So discussed 8hr tylenol tid.       20  EGD was for Ulcerative esophagitis,second look, biopsy was Positive for Erosive gastritis,Reflux WITH Sarabia's   Negative for HP, Dysplasia   So avoid NSAIDS continue present meds and Follows up in 8 weeks, recall in two-2- years       Today is doing much better 2x in the last 3 months felt indigestion but didn't last.  No swallowing problems, has cut back on soft drinks, stopped celebrex and has gotten relief with hemp capsules and doesn't snack at night.      bms are about every 2 days and uses miralax in the morning every 1-2 days.          REVIEWED PERTINENT RESULTS/ LABS  Lab Results   Component Value Date    CASEREPORT  2020     Surgical Pathology Report                         Case: [id number]                                  Authorizing Provider:  Mathew Roberts        Collected:           2020 02:37 PM                                 MD Clive                                                                   Ordering Location:     [facility]   Received:            2020 04:00 PM                                 OR                                                                           Pathologist:           Sathish Marlow MD                                                         Specimens:   1) - Gastric, Body                                                                                  2) - Esophagus, Distal                                                                     FINALDX  2020     1. Stomach,  Biopsy: Benign gastric mucosa with    A. Mild chronic inflammation.   B. Reactive changes of the epithelium.  C. .No intestinal metaplasia.   D. No Helicobacter pylori.    E. Atypical repair.   F. Area suggestive of focal erosion.    2. Esophagus, Distal, Biopsy: Benign squamous and gastric mucosa with    A. Features of esophagitis.   B. Extensive acute and chronic inflammation of the gastric mucosa.   C. Intestinal metaplasia consistent with Sarabia's esophagus with reactive changes.   D. No definitive viral organisms.   E. No definitive fungal organisms.   F. Acute inflammation.   G. Poorly formed granuloma.   H. Areas of extensive acute inflammation.               I. No H. Pylori.    Comment: There is rather extensive acute inflammation and chronic inflammation in the gastric type mucosa. There was Sarabia's esophagus but no dysplasia. The squamous mucosa has numerous lymphocytes and eosinophils and has extensive reactive changes. The features are those of esophagitis likely reflux. Given the granuloma that is present AFB and GMS stains will be performed and the results reported in an addendum. An immunostain for Helicobacter pylori was performed and the controls stain appropriately. No H. Pylori is identified.      kristi/pkm        Lab Results   Component Value Date    HGB 11.2 (L) 10/24/2019    MCV 86.0 10/24/2019     10/24/2019    ALT 23 10/24/2019    AST 37 10/24/2019    HGBA1C 6.7 (H) 11/29/2018    INR 0.84 (L) 09/17/2020    IRON 22 (L) 12/30/2018      No results found.    REVIEW OF SYSTEMS  Review of Systems   All other systems reviewed and are negative.        ACTIVE PROBLEMS  Patient Active Problem List    Diagnosis   • Sarabia's esophagus with dysplasia [K22.719]   • Ulcer of esophagus without bleeding [K22.10]   • Esophageal dysphagia [R13.10]   • Encounter for screening for malignant neoplasm of colon [Z12.11]   • Morbidly obese (CMS/HCC) [E66.01]   • PAD (peripheral artery disease) (CMS/HCC)  [I73.9]   • S/P CABG (coronary artery bypass graft) [Z95.1]   • CAD (coronary artery disease) [I25.10]   • Myocardial infarction (CMS/HCC) [I21.9]   • Hypertension [I10]   • Hyperlipidemia [E78.5]         PAST MEDICAL HISTORY  Past Medical History:   Diagnosis Date   • Arthritis    • Sarabia esophagus    • CAD (coronary artery disease)    • Chronic back pain    • Colon polyp    • Diabetes mellitus (CMS/[facility])    • GERD (gastroesophageal reflux disease)    • Hyperlipidemia    • Hypertension    • Myocardial infarction (CMS/[facility])     1995   • PAD (peripheral artery disease) (CMS/[facility]) 9/11/2019   • Shingles          SURGICAL HISTORY  Past Surgical History:   Procedure Laterality Date   • COLONOSCOPY     • COLONOSCOPY N/A 6/24/2020    Procedure: COLONOSCOPY;  Surgeon: Mathew Roberts MD;  Location: [facility] OR;  Service: Gastroenterology;  Laterality: N/A;  Descending colon polyp x 2, Ascending colon polyp, Sigmoid colon polyp, Rectal polyp   • CORONARY ARTERY BYPASS GRAFT  1995    x 2 vessels   • ENDOSCOPY N/A 6/24/2020    Procedure: ESOPHAGOGASTRODUODENOSCOPY;  Surgeon: Mathew Roberts MD;  Location: [facility] OR;  Service: Gastroenterology;  Laterality: N/A;  Ulcerative esophagitis, Gastritis, Duodenitis  Duodenal biopsy, gastric biopsy, distal esophageal biopsy   • ENDOSCOPY N/A 9/17/2020    Procedure: ESOPHAGOGASTRODUODENOSCOPY with biopsies;  Surgeon: Mathew Roberts MD;  Location: [facility] OR;  Service: Gastroenterology;  Laterality: N/A;  Esophagitis  Sarabia's Esophagus  Hiatal hernia  Gastritis  Gastric biopsy  Distal esophagus biopsy   • INNER EAR SURGERY     • JOINT REPLACEMENT      right knee   • LEG SURGERY      x 2 s/p fall   • LUMBAR FUSION      L4/L5   • SKIN GRAFT      to left lower leg x 2   • TONSILLECTOMY     • WRIST FUSION Left          FAMILY HISTORY  Family History   Problem Relation Age of Onset   • Colon cancer Neg Hx    • Colon polyps Neg Hx          SOCIAL  "HISTORY  Social History     Occupational History   • Not on file   Tobacco Use   • Smoking status: Former Smoker     Quit date:      Years since quittin.1   • Smokeless tobacco: Never Used   • Tobacco comment: QUIT    Substance and Sexual Activity   • Alcohol use: Yes     Comment: occasional   • Drug use: No   • Sexual activity: Defer         CURRENT MEDICATIONS    Current Outpatient Medications:   •  allopurinol (ZYLOPRIM) 100 MG tablet, Take 100 mg by mouth 2 (Two) Times a Day., Disp: , Rfl:   •  aspirin 81 MG chewable tablet, Chew 81 mg Daily., Disp: , Rfl:   •  atenolol (TENORMIN) 25 MG tablet, Daily., Disp: , Rfl:   •  cloNIDine (CATAPRES) 0.1 MG tablet, Take 0.2 mg by mouth 3 (Three) Times a Day., Disp: , Rfl:   •  clopidogrel (PLAVIX) 75 MG tablet, Take 1 tablet by mouth Daily., Disp: 90 tablet, Rfl: 3  •  DULoxetine (CYMBALTA) 60 MG capsule, Take 60 mg by mouth Daily., Disp: , Rfl:   •  hydrALAZINE (APRESOLINE) 50 MG tablet, Take 75 mg by mouth 3 (Three) Times a Day., Disp: , Rfl:   •  hydroCHLOROthiazide (HYDRODIURIL) 12.5 MG tablet, Take 12.5 mg by mouth Daily., Disp: , Rfl:   •  metFORMIN HCl  MG/5ML Suspension Reconstituted ER, Take 500 mg by mouth 2 (two) times a day., Disp: , Rfl:   •  olmesartan (BENICAR) 40 MG tablet, Take 40 mg by mouth Daily., Disp: , Rfl:   •  pantoprazole (PROTONIX) 40 MG EC tablet, Take 1 tablet by mouth 2 (Two) Times a Day., Disp: 180 tablet, Rfl: 3  •  rosuvastatin (CRESTOR) 10 MG tablet, Take 10 mg by mouth Daily., Disp: , Rfl:   •  traMADol (ULTRAM) 50 MG tablet, 1-2 Oral Q4H PRN severe pain, Disp: 36 tablet, Rfl: 0    ALLERGIES  Codeine and Other    VITALS  Vitals:    21 1123   Temp: 98.3 °F (36.8 °C)   TempSrc: Temporal   Weight: 97.5 kg (215 lb)   Height: 157.5 cm (62.01\")       PHYSICAL EXAM  Debilities/Disabilities Identified: None  Emotional Behavior: Appropriate  Wt Readings from Last 3 Encounters:   21 97.5 kg (215 lb)   20 98.8 " "kg (217 lb 12.8 oz)   09/17/20 98.2 kg (216 lb 6.4 oz)     Ht Readings from Last 1 Encounters:   03/03/21 157.5 cm (62.01\")     Body mass index is 39.31 kg/m².  Physical Exam  Vitals signs and nursing note reviewed.   Constitutional:       Appearance: She is well-developed.   HENT:      Head: Normocephalic and atraumatic.   Eyes:      Conjunctiva/sclera: Conjunctivae normal.      Pupils: Pupils are equal, round, and reactive to light.   Neck:      Musculoskeletal: Normal range of motion and neck supple.   Cardiovascular:      Rate and Rhythm: Normal rate and regular rhythm.   Pulmonary:      Effort: Pulmonary effort is normal.      Breath sounds: Normal breath sounds.   Abdominal:      General: Bowel sounds are normal. There is no distension.      Palpations: Abdomen is soft.      Tenderness: There is abdominal tenderness in the right lower quadrant.   Musculoskeletal: Normal range of motion.   Lymphadenopathy:      Cervical: No cervical adenopathy.   Skin:     General: Skin is warm and dry.   Neurological:      Mental Status: She is alert and oriented to person, place, and time.   Psychiatric:         Behavior: Behavior normal.         CLINICAL DATA REVIEWED   reviewed previous lab results and integrated with today's visit, reviewed notes from other physicians and/or last GI encounter, reviewed previous endoscopy results and available photos, reviewed surgical pathology results from previous biopsies    ASSESSMENT  Diagnoses and all orders for this visit:    Sarabia's esophagus with dysplasia    Other osteoarthritis involving multiple joints    Ulcer of esophagus without bleeding    Esophageal dysphagia    Personal history of colonic polyps    Chronic idiopathic constipation          PLAN  Return in about 6 months (around 9/3/2021).     Continue with Protonix 40mg twice a day.  Add a Quest protein bar for breakfast.     Don't eat late, don't eat fried and don't eat too much at one time. Avoid tomato based products " "like pizza, lasagna, spagetti.  If you want to have these take an over the counter Pepcid immediately before you eat them.  Do not eat within 3-4 hrs of bedtime. Limit caffeine and carbonated beverages, if you must have them do not have later in the day.  If reflux is severe elevate the head of the bed. You may also use TUMS, maalox, or mylanta for breakthrough heartburn.    Take a daily probiotic (something with a billion cultures and more different strains is better like \"Probiotic 10\" or probiotic gummies) for your gut as well.  AVOID taking NSAIDS (like ibuprofen, Aleve, Motrin, naproxen, meloxicam, etc) as much as possible and use acetaminophen (Tylenol) instead.    Drink lots of water, eat a high fiber diet with 25-30gm a day(check the fiber content in the foods you eat.  Protein bars with 15gm of fiber in each bar are a great supplement daily), and get regular exercise. Use over the counter simethicone xtra strength gas x (125-180mg) 2 twice a day as needed for gas.     High Fiber Food suggestions:    Beans, nuts, vegetables, whole grains, flax seed and sheri seeds (which can be stirred into other food) are high in fiber.    Martinez Protein bars from [employer] = 10-15gm of fiber in each bar (also gluten free)  Quest Protein bars from grocery stores [employer], [employer], [employer]= 15gm of fiber each (also gluten free)  Fiber One bars from grocery stores = 5-6gm of fiber each  Kelloggs Bran Buds cereal 1/2 cup = 17gm of fiber  [employer] brand low sugar Craisins = 13gm of fiber per serving      I have discussed the above plan with the patient.  They verbalize understanding and are in agreement with the plan.  They have been advised to contact the office for any questions, concerns, or changes related to their health.                      "

## 2021-05-07 ENCOUNTER — TELEPHONE (OUTPATIENT)
Dept: CARDIOLOGY | Facility: CLINIC | Age: 71
End: 2021-05-07

## 2021-05-10 NOTE — TELEPHONE ENCOUNTER
Surgery clearance note sent to:     [facility]  Fax #[phone]    Will scan into media

## 2021-06-14 ENCOUNTER — HOSPITAL ENCOUNTER (OUTPATIENT)
Facility: HOSPITAL | Age: 71
Setting detail: OBSERVATION
Discharge: HOME | End: 2021-06-16
Attending: EMERGENCY MEDICINE | Admitting: FAMILY MEDICINE

## 2021-06-14 DIAGNOSIS — E86.0 DEHYDRATION WITH HYPONATREMIA: Primary | ICD-10-CM

## 2021-06-14 DIAGNOSIS — D64.9 ANEMIA, UNSPECIFIED TYPE: ICD-10-CM

## 2021-06-14 DIAGNOSIS — R11.2 NAUSEA VOMITING AND DIARRHEA: ICD-10-CM

## 2021-06-14 DIAGNOSIS — E87.1 DEHYDRATION WITH HYPONATREMIA: Primary | ICD-10-CM

## 2021-06-14 DIAGNOSIS — R19.7 NAUSEA VOMITING AND DIARRHEA: ICD-10-CM

## 2021-06-14 LAB
ANION GAP SERPL CALCULATED.3IONS-SCNC: 15.4 MMOL/L (ref 5–15)
BASOPHILS # BLD AUTO: 0.04 10*3/MM3 (ref 0–0.2)
BASOPHILS NFR BLD AUTO: 0.5 % (ref 0–1.5)
BILIRUB UR QL STRIP: NEGATIVE
BUN SERPL-MCNC: 21 MG/DL (ref 8–23)
BUN/CREAT SERPL: 24.1 (ref 7–25)
CALCIUM SPEC-SCNC: 9 MG/DL (ref 8.6–10.5)
CHLORIDE SERPL-SCNC: 89 MMOL/L (ref 98–107)
CLARITY UR: CLEAR
CO2 SERPL-SCNC: 16.6 MMOL/L (ref 22–29)
COLOR UR: ABNORMAL
CREAT SERPL-MCNC: 0.87 MG/DL (ref 0.57–1)
DEPRECATED RDW RBC AUTO: 43.6 FL (ref 37–54)
EOSINOPHIL # BLD AUTO: 0.26 10*3/MM3 (ref 0–0.4)
EOSINOPHIL NFR BLD AUTO: 3.4 % (ref 0.3–6.2)
ERYTHROCYTE [DISTWIDTH] IN BLOOD BY AUTOMATED COUNT: 13.9 % (ref 12.3–15.4)
FLUAV RNA RESP QL NAA+PROBE: NOT DETECTED
FLUBV RNA RESP QL NAA+PROBE: NOT DETECTED
GFR SERPL CREATININE-BSD FRML MDRD: 64 ML/MIN/1.73
GLUCOSE SERPL-MCNC: 97 MG/DL (ref 65–99)
GLUCOSE UR STRIP-MCNC: NEGATIVE MG/DL
HCT VFR BLD AUTO: 29 % (ref 34–46.6)
HGB BLD-MCNC: 9.3 G/DL (ref 12–15.9)
HGB UR QL STRIP.AUTO: NEGATIVE
IMM GRANULOCYTES # BLD AUTO: 0.04 10*3/MM3 (ref 0–0.05)
IMM GRANULOCYTES NFR BLD AUTO: 0.5 % (ref 0–0.5)
KETONES UR QL STRIP: ABNORMAL
LEUKOCYTE ESTERASE UR QL STRIP.AUTO: NEGATIVE
LYMPHOCYTES # BLD AUTO: 1.79 10*3/MM3 (ref 0.7–3.1)
LYMPHOCYTES NFR BLD AUTO: 23.3 % (ref 19.6–45.3)
MCH RBC QN AUTO: 27.8 PG (ref 26.6–33)
MCHC RBC AUTO-ENTMCNC: 32.1 G/DL (ref 31.5–35.7)
MCV RBC AUTO: 86.8 FL (ref 79–97)
MONOCYTES # BLD AUTO: 0.56 10*3/MM3 (ref 0.1–0.9)
MONOCYTES NFR BLD AUTO: 7.3 % (ref 5–12)
NEUTROPHILS NFR BLD AUTO: 4.98 10*3/MM3 (ref 1.7–7)
NEUTROPHILS NFR BLD AUTO: 65 % (ref 42.7–76)
NITRITE UR QL STRIP: NEGATIVE
NRBC BLD AUTO-RTO: 0 /100 WBC (ref 0–0.2)
PH UR STRIP.AUTO: <=5 [PH] (ref 4.5–8)
PLATELET # BLD AUTO: 365 10*3/MM3 (ref 140–450)
PMV BLD AUTO: 8.3 FL (ref 6–12)
POTASSIUM SERPL-SCNC: 4.2 MMOL/L (ref 3.5–5.2)
PROT UR QL STRIP: NEGATIVE
RBC # BLD AUTO: 3.34 10*6/MM3 (ref 3.77–5.28)
SARS-COV-2 RNA RESP QL NAA+PROBE: NOT DETECTED
SODIUM SERPL-SCNC: 121 MMOL/L (ref 136–145)
SP GR UR STRIP: <=1.005 (ref 1–1.03)
UROBILINOGEN UR QL STRIP: ABNORMAL
WBC # BLD AUTO: 7.67 10*3/MM3 (ref 3.4–10.8)

## 2021-06-14 PROCEDURE — 83550 IRON BINDING TEST: CPT | Performed by: FAMILY MEDICINE

## 2021-06-14 PROCEDURE — 83540 ASSAY OF IRON: CPT | Performed by: FAMILY MEDICINE

## 2021-06-14 PROCEDURE — 85025 COMPLETE CBC W/AUTO DIFF WBC: CPT | Performed by: EMERGENCY MEDICINE

## 2021-06-14 PROCEDURE — 25010000002 ONDANSETRON PER 1 MG: Performed by: EMERGENCY MEDICINE

## 2021-06-14 PROCEDURE — 80048 BASIC METABOLIC PNL TOTAL CA: CPT | Performed by: EMERGENCY MEDICINE

## 2021-06-14 PROCEDURE — 99283 EMERGENCY DEPT VISIT LOW MDM: CPT

## 2021-06-14 PROCEDURE — 96374 THER/PROPH/DIAG INJ IV PUSH: CPT

## 2021-06-14 PROCEDURE — C9803 HOPD COVID-19 SPEC COLLECT: HCPCS

## 2021-06-14 PROCEDURE — 99285 EMERGENCY DEPT VISIT HI MDM: CPT | Performed by: EMERGENCY MEDICINE

## 2021-06-14 PROCEDURE — 87636 SARSCOV2 & INF A&B AMP PRB: CPT | Performed by: EMERGENCY MEDICINE

## 2021-06-14 PROCEDURE — 81003 URINALYSIS AUTO W/O SCOPE: CPT | Performed by: EMERGENCY MEDICINE

## 2021-06-14 RX ORDER — ONDANSETRON 2 MG/ML
8 INJECTION INTRAMUSCULAR; INTRAVENOUS ONCE
Status: COMPLETED | OUTPATIENT
Start: 2021-06-14 | End: 2021-06-14

## 2021-06-14 RX ORDER — SODIUM CHLORIDE 0.9 % (FLUSH) 0.9 %
10 SYRINGE (ML) INJECTION AS NEEDED
Status: DISCONTINUED | OUTPATIENT
Start: 2021-06-14 | End: 2021-06-16 | Stop reason: HOSPADM

## 2021-06-14 RX ORDER — AMLODIPINE BESYLATE 10 MG/1
10 TABLET ORAL DAILY
COMMUNITY
End: 2022-07-06 | Stop reason: ALTCHOICE

## 2021-06-14 RX ORDER — SODIUM CHLORIDE 9 MG/ML
125 INJECTION, SOLUTION INTRAVENOUS CONTINUOUS
Status: DISCONTINUED | OUTPATIENT
Start: 2021-06-14 | End: 2021-06-15 | Stop reason: SDUPTHER

## 2021-06-14 RX ORDER — OMEPRAZOLE 40 MG/1
40 CAPSULE, DELAYED RELEASE ORAL 2 TIMES DAILY
COMMUNITY
End: 2021-12-13 | Stop reason: ALTCHOICE

## 2021-06-14 RX ADMIN — SODIUM CHLORIDE, POTASSIUM CHLORIDE, SODIUM LACTATE AND CALCIUM CHLORIDE 1000 ML: 600; 310; 30; 20 INJECTION, SOLUTION INTRAVENOUS at 21:34

## 2021-06-14 RX ADMIN — ONDANSETRON 8 MG: 2 INJECTION INTRAMUSCULAR; INTRAVENOUS at 21:34

## 2021-06-15 ENCOUNTER — APPOINTMENT (OUTPATIENT)
Dept: CT IMAGING | Facility: HOSPITAL | Age: 71
End: 2021-06-15

## 2021-06-15 PROBLEM — E87.1 DEHYDRATION WITH HYPONATREMIA: Status: ACTIVE | Noted: 2021-06-15

## 2021-06-15 PROBLEM — E86.0 DEHYDRATION WITH HYPONATREMIA: Status: ACTIVE | Noted: 2021-06-15

## 2021-06-15 LAB
IRON 24H UR-MRATE: 36 MCG/DL (ref 37–145)
IRON SATN MFR SERPL: 11 % (ref 20–50)
TIBC SERPL-MCNC: 321 MCG/DL (ref 298–536)
UIBC SERPL-MCNC: 285 MCG/DL (ref 112–346)

## 2021-06-15 PROCEDURE — 74177 CT ABD & PELVIS W/CONTRAST: CPT

## 2021-06-15 PROCEDURE — 25010000002 ONDANSETRON PER 1 MG: Performed by: FAMILY MEDICINE

## 2021-06-15 PROCEDURE — 25010000002 ENOXAPARIN PER 10 MG: Performed by: FAMILY MEDICINE

## 2021-06-15 PROCEDURE — 96372 THER/PROPH/DIAG INJ SC/IM: CPT

## 2021-06-15 PROCEDURE — 96375 TX/PRO/DX INJ NEW DRUG ADDON: CPT

## 2021-06-15 PROCEDURE — G0378 HOSPITAL OBSERVATION PER HR: HCPCS

## 2021-06-15 PROCEDURE — 96361 HYDRATE IV INFUSION ADD-ON: CPT

## 2021-06-15 PROCEDURE — 96376 TX/PRO/DX INJ SAME DRUG ADON: CPT

## 2021-06-15 PROCEDURE — 0 IOPAMIDOL PER 1 ML: Performed by: FAMILY MEDICINE

## 2021-06-15 RX ORDER — ACETAMINOPHEN 325 MG/1
650 TABLET ORAL EVERY 4 HOURS PRN
Status: DISCONTINUED | OUTPATIENT
Start: 2021-06-15 | End: 2021-06-16 | Stop reason: HOSPADM

## 2021-06-15 RX ORDER — ACETAMINOPHEN 500 MG
1000 TABLET ORAL EVERY 6 HOURS PRN
Status: DISCONTINUED | OUTPATIENT
Start: 2021-06-15 | End: 2021-06-16 | Stop reason: HOSPADM

## 2021-06-15 RX ORDER — CHOLECALCIFEROL (VITAMIN D3) 125 MCG
10 CAPSULE ORAL NIGHTLY
Status: DISCONTINUED | OUTPATIENT
Start: 2021-06-15 | End: 2021-06-16 | Stop reason: HOSPADM

## 2021-06-15 RX ORDER — CLOPIDOGREL BISULFATE 75 MG/1
75 TABLET ORAL NIGHTLY
Status: DISCONTINUED | OUTPATIENT
Start: 2021-06-15 | End: 2021-06-16 | Stop reason: HOSPADM

## 2021-06-15 RX ORDER — ONDANSETRON 2 MG/ML
4 INJECTION INTRAMUSCULAR; INTRAVENOUS EVERY 6 HOURS PRN
Status: DISCONTINUED | OUTPATIENT
Start: 2021-06-15 | End: 2021-06-16 | Stop reason: HOSPADM

## 2021-06-15 RX ORDER — ONDANSETRON 2 MG/ML
4 INJECTION INTRAMUSCULAR; INTRAVENOUS EVERY 4 HOURS PRN
Status: DISCONTINUED | OUTPATIENT
Start: 2021-06-15 | End: 2021-06-16 | Stop reason: HOSPADM

## 2021-06-15 RX ORDER — ALUMINA, MAGNESIA, AND SIMETHICONE 2400; 2400; 240 MG/30ML; MG/30ML; MG/30ML
15 SUSPENSION ORAL EVERY 6 HOURS PRN
Status: DISCONTINUED | OUTPATIENT
Start: 2021-06-15 | End: 2021-06-16 | Stop reason: HOSPADM

## 2021-06-15 RX ORDER — AMLODIPINE BESYLATE 5 MG/1
10 TABLET ORAL DAILY
Status: DISCONTINUED | OUTPATIENT
Start: 2021-06-15 | End: 2021-06-16 | Stop reason: HOSPADM

## 2021-06-15 RX ORDER — SODIUM CHLORIDE 0.9 % (FLUSH) 0.9 %
10 SYRINGE (ML) INJECTION EVERY 12 HOURS SCHEDULED
Status: DISCONTINUED | OUTPATIENT
Start: 2021-06-15 | End: 2021-06-16 | Stop reason: HOSPADM

## 2021-06-15 RX ORDER — ONDANSETRON 4 MG/1
4 TABLET, FILM COATED ORAL EVERY 6 HOURS PRN
Status: DISCONTINUED | OUTPATIENT
Start: 2021-06-15 | End: 2021-06-16 | Stop reason: HOSPADM

## 2021-06-15 RX ORDER — SODIUM CHLORIDE 9 MG/ML
40 INJECTION, SOLUTION INTRAVENOUS AS NEEDED
Status: DISCONTINUED | OUTPATIENT
Start: 2021-06-15 | End: 2021-06-16 | Stop reason: HOSPADM

## 2021-06-15 RX ORDER — SODIUM CHLORIDE 9 MG/ML
75 INJECTION, SOLUTION INTRAVENOUS CONTINUOUS
Status: DISCONTINUED | OUTPATIENT
Start: 2021-06-15 | End: 2021-06-16 | Stop reason: HOSPADM

## 2021-06-15 RX ORDER — SODIUM CHLORIDE 9 MG/ML
125 INJECTION, SOLUTION INTRAVENOUS CONTINUOUS
Status: DISCONTINUED | OUTPATIENT
Start: 2021-06-15 | End: 2021-06-15 | Stop reason: SDUPTHER

## 2021-06-15 RX ORDER — HYDRALAZINE HYDROCHLORIDE 25 MG/1
12.5 TABLET, FILM COATED ORAL DAILY
Status: DISCONTINUED | OUTPATIENT
Start: 2021-06-15 | End: 2021-06-16 | Stop reason: HOSPADM

## 2021-06-15 RX ORDER — CALCIUM CARBONATE 200(500)MG
1 TABLET,CHEWABLE ORAL 2 TIMES DAILY PRN
Status: DISCONTINUED | OUTPATIENT
Start: 2021-06-15 | End: 2021-06-16 | Stop reason: HOSPADM

## 2021-06-15 RX ORDER — ATENOLOL 25 MG/1
25 TABLET ORAL
Status: DISCONTINUED | OUTPATIENT
Start: 2021-06-15 | End: 2021-06-16 | Stop reason: HOSPADM

## 2021-06-15 RX ORDER — CLONIDINE HYDROCHLORIDE 0.2 MG/1
0.2 TABLET ORAL EVERY 8 HOURS SCHEDULED
Status: DISCONTINUED | OUTPATIENT
Start: 2021-06-15 | End: 2021-06-16 | Stop reason: HOSPADM

## 2021-06-15 RX ORDER — FAMOTIDINE 10 MG/ML
20 INJECTION, SOLUTION INTRAVENOUS EVERY 12 HOURS SCHEDULED
Status: DISCONTINUED | OUTPATIENT
Start: 2021-06-15 | End: 2021-06-16

## 2021-06-15 RX ORDER — SODIUM CHLORIDE 0.9 % (FLUSH) 0.9 %
1-10 SYRINGE (ML) INJECTION AS NEEDED
Status: DISCONTINUED | OUTPATIENT
Start: 2021-06-15 | End: 2021-06-16 | Stop reason: HOSPADM

## 2021-06-15 RX ORDER — ACETAMINOPHEN 650 MG/1
650 SUPPOSITORY RECTAL EVERY 4 HOURS PRN
Status: DISCONTINUED | OUTPATIENT
Start: 2021-06-15 | End: 2021-06-16 | Stop reason: HOSPADM

## 2021-06-15 RX ORDER — ACETAMINOPHEN 160 MG/5ML
650 SOLUTION ORAL EVERY 4 HOURS PRN
Status: DISCONTINUED | OUTPATIENT
Start: 2021-06-15 | End: 2021-06-16 | Stop reason: HOSPADM

## 2021-06-15 RX ADMIN — SODIUM CHLORIDE 125 ML/HR: 9 INJECTION, SOLUTION INTRAVENOUS at 15:24

## 2021-06-15 RX ADMIN — CLONIDINE HYDROCHLORIDE 0.2 MG: 0.2 TABLET ORAL at 16:46

## 2021-06-15 RX ADMIN — ENOXAPARIN SODIUM 40 MG: 40 INJECTION SUBCUTANEOUS at 10:27

## 2021-06-15 RX ADMIN — IOPAMIDOL 100 ML: 755 INJECTION, SOLUTION INTRAVENOUS at 09:00

## 2021-06-15 RX ADMIN — FAMOTIDINE 20 MG: 10 INJECTION, SOLUTION INTRAVENOUS at 21:00

## 2021-06-15 RX ADMIN — ONDANSETRON 4 MG: 2 INJECTION INTRAMUSCULAR; INTRAVENOUS at 01:26

## 2021-06-15 RX ADMIN — CLONIDINE HYDROCHLORIDE 0.2 MG: 0.2 TABLET ORAL at 21:00

## 2021-06-15 RX ADMIN — SODIUM CHLORIDE 125 ML/HR: 9 INJECTION, SOLUTION INTRAVENOUS at 00:00

## 2021-06-15 RX ADMIN — ACETAMINOPHEN 650 MG: 325 TABLET, FILM COATED ORAL at 20:12

## 2021-06-15 RX ADMIN — ACETAMINOPHEN 1000 MG: 500 TABLET, FILM COATED ORAL at 01:26

## 2021-06-15 RX ADMIN — AMLODIPINE BESYLATE 10 MG: 5 TABLET ORAL at 10:27

## 2021-06-15 RX ADMIN — FAMOTIDINE 20 MG: 10 INJECTION, SOLUTION INTRAVENOUS at 08:37

## 2021-06-15 RX ADMIN — SODIUM CHLORIDE 125 ML/HR: 9 INJECTION, SOLUTION INTRAVENOUS at 01:27

## 2021-06-15 RX ADMIN — ATENOLOL 25 MG: 25 TABLET ORAL at 10:26

## 2021-06-15 RX ADMIN — ACETAMINOPHEN 1000 MG: 500 TABLET, FILM COATED ORAL at 11:53

## 2021-06-15 RX ADMIN — ACETAMINOPHEN 1000 MG: 500 TABLET, FILM COATED ORAL at 07:35

## 2021-06-15 RX ADMIN — MELATONIN TAB 5 MG 10 MG: 5 TAB at 21:00

## 2021-06-15 RX ADMIN — HYDRALAZINE HYDROCHLORIDE 12.5 MG: 25 TABLET, FILM COATED ORAL at 16:46

## 2021-06-15 RX ADMIN — SODIUM CHLORIDE 125 ML/HR: 9 INJECTION, SOLUTION INTRAVENOUS at 07:37

## 2021-06-15 RX ADMIN — CLOPIDOGREL BISULFATE 75 MG: 75 TABLET ORAL at 20:11

## 2021-06-15 RX ADMIN — ONDANSETRON 4 MG: 2 INJECTION INTRAMUSCULAR; INTRAVENOUS at 06:01

## 2021-06-15 NOTE — PROGRESS NOTES
Adult Nutrition  Assessment/PES    Patient Name:  Valentine Arora  YOB: 1950  MRN: [id number]  Admit Date:  6/14/2021    Assessment Date:  6/15/2021    Comments:  Adv diet as indicated   Will cont to follow and monitor.     Reason for Assessment     Row Name 06/15/21 1414          Reason for Assessment    Reason For Assessment  identified at risk by screening criteria     Diagnosis  fluid status dehydration, hyponatremia, N/V/D ( from ER note)  hx CAD     Identified At Risk by Screening Criteria  MST SCORE 2+         Nutrition/Diet History     Row Name 06/15/21 1414          Nutrition/Diet History    Typical Food/Fluid Intake  Pt receiving care. Per rounds CT of abd today.         Anthropometrics     Row Name 06/15/21 1415          Anthropometrics    Weight  -- 202.3#        Usual Body Weight (UBW)    Weight Loss Time Frame  3/2021 215# no method noted in EMR , possible loss of 12.7# in past 3 months 5.9% BW        Body Mass Index (BMI)    BMI Assessment  BMI 35-39.9: obesity grade II         Labs/Tests/Procedures/Meds     Row Name 06/15/21 1416          Labs/Procedures/Meds    Lab Results Reviewed  reviewed     Lab Results Comments  Na 121 L        Diagnostic Tests/Procedures    Diagnostic Test/Procedure Reviewed  reviewed        Medications    Pertinent Medications Reviewed  reviewed           Estimated/Assessed Needs     Row Name 06/15/21 1418          Estimated/Assessed Needs    Additional Documentation  Calorie Requirements (Group);Fluid Requirements (Group);Protein Requirements (Group)        Calorie Requirements    Estimated Calorie Need Method  [place]     Estimated Calorie Requirement Comment  1667 ( mifflin 1.2 )        Protein Requirements    Est Protein Requirement Amount (gms/kg)  0.8 gm protein 73-92 gm pro ( .8-1 gm /kg pro )        Fluid Requirements    Estimated Fluid Requirement Method  RDA Method 1667 ml         Nutrition Prescription Ordered     Row Name 06/15/21 1426           Nutrition Prescription PO    Current PO Diet  Clear Liquid     Common Modifiers  Fluid Restriction     Fluid Restriction mL per Day  1500 mL         Evaluation of Received Nutrient/Fluid Intake     Row Name 06/15/21 1422          Fluid Intake Evaluation    IV Fluid (mL)  592 insufficient data        PO Evaluation    Number of Days PO Intake Evaluated  Insufficient Data               Problem/Interventions:  Problem 1     Row Name 06/15/21 1423          Nutrition Diagnoses Problem 1    Problem 1  Inadequate Intake/Infusion     Inadequate Intake Type  Oral     Etiology (related to)  Medical Diagnosis;Factors Affecting Nutrition     Signs/Symptoms (evidenced by)  Report/Observation               Intervention Goal     Row Name 06/15/21 1424          Intervention Goal    General  Meet nutritional needs for age/condition     PO  Establish PO;PO intake (%)     PO Intake %  50 % or greater         Nutrition Intervention     Row Name 06/15/21 1426          Nutrition Intervention    RD/Tech Action  Follow Tx progress           Education/Evaluation     Row Name 06/15/21 1426          Education    Education  Education not appropriate at this time        Monitor/Evaluation    Monitor  Per protocol;I&O;PO intake;Pertinent labs;Weight;Symptoms           Electronically signed by:  Della Moyer RD  06/15/21 14:27 EDT

## 2021-06-15 NOTE — PLAN OF CARE
Goal Outcome Evaluation:           Progress: improving  Outcome Summary: BP elevated meds restarted. CT scan abd negative. No diarrhea.Tolerating clear liquids

## 2021-06-15 NOTE — H&P
HISTORY AND PHYSICAL      Patient Care Team:  Aliya Alejandro MD as PCP - General (Family Medicine)    CHIEF COMPLAINT: Nausea, vomiting, diarrhea, weakness    HISTORY OF PRESENT ILLNESS:    71-year-old white female history of hypertension hyperlipidemia osteoarthritis underwent a left total knee replacement on 5/18/2021 at [facility].  Initial postop course was uneventful with after patient finished doxycycline patient started having episodes of diarrhea 5-6 times a day with nausea and vomiting.  Patient treated with clear liquids nausea vomiting improved but then recurred about 4 to 5 days later to been persistent gotten worse the last 48 hours.  Patient says she is able to keep any liquids down she has felt progressively weak and tired and states she was hyponatremic prior to her previous knee replacement and she feels the same way.  Patient denies any blood or mucus in the diarrhea she denies any fever she has had chills and rigors.      Past Medical History:   Diagnosis Date   • Arthritis    • Sarabia esophagus    • CAD (coronary artery disease)    • Chronic back pain    • Colon polyp    • Diabetes mellitus (CMS/Formerly [facility])    • GERD (gastroesophageal reflux disease)    • Hyperlipidemia    • Hypertension    • Myocardial infarction (CMS/Formerly [facility])     1995   • PAD (peripheral artery disease) (CMS/Formerly [facility]) 9/11/2019   • Shingles      Past Surgical History:   Procedure Laterality Date   • COLONOSCOPY     • COLONOSCOPY N/A 6/24/2020    Procedure: COLONOSCOPY;  Surgeon: Mathew Roberts MD;  Location: [facility];  Service: Gastroenterology;  Laterality: N/A;  Descending colon polyp x 2, Ascending colon polyp, Sigmoid colon polyp, Rectal polyp   • CORONARY ARTERY BYPASS GRAFT  1995    x 2 vessels   • ENDOSCOPY N/A 6/24/2020    Procedure: ESOPHAGOGASTRODUODENOSCOPY;  Surgeon: Mathew Roberts MD;  Location: [facility] OR;  Service: Gastroenterology;  Laterality: N/A;  Ulcerative esophagitis,  Gastritis, Duodenitis  Duodenal biopsy, gastric biopsy, distal esophageal biopsy   • ENDOSCOPY N/A 2020    Procedure: ESOPHAGOGASTRODUODENOSCOPY with biopsies;  Surgeon: Mathew Roberts MD;  Location: [facility];  Service: Gastroenterology;  Laterality: N/A;  Esophagitis  Sarabia's Esophagus  Hiatal hernia  Gastritis  Gastric biopsy  Distal esophagus biopsy   • INNER EAR SURGERY     • JOINT REPLACEMENT      right knee   • LEG SURGERY      x 2 s/p fall   • LUMBAR FUSION      L4/L5   • SKIN GRAFT      to left lower leg x 2   • TONSILLECTOMY     • WRIST FUSION Left      Family History   Problem Relation Age of Onset   • Colon cancer Neg Hx    • Colon polyps Neg Hx      Social History     Tobacco Use   • Smoking status: Former Smoker     Quit date:      Years since quittin.4   • Smokeless tobacco: Never Used   • Tobacco comment: QUIT    Vaping Use   • Vaping Use: Never used   Substance Use Topics   • Alcohol use: Not Currently     Comment: occasional   • Drug use: No     Medications Prior to Admission   Medication Sig Dispense Refill Last Dose   • amLODIPine (NORVASC) 10 MG tablet Take 10 mg by mouth Daily.   6/15/2021 at 0900   • atenolol (TENORMIN) 25 MG tablet Take 25 mg by mouth.   6/15/2021 at 0900   • cloNIDine (CATAPRES) 0.1 MG tablet Take 0.2 mg by mouth 3 (Three) Times a Day.   6/15/2021 at 1400   • clopidogrel (PLAVIX) 75 MG tablet Take 1 tablet by mouth Daily. 90 tablet 3 2021 at 2100   • hydrALAZINE (APRESOLINE) 50 MG tablet Take 75 mg by mouth 3 (Three) Times a Day.   6/15/2021 at 1400   • hydroCHLOROthiazide (HYDRODIURIL) 12.5 MG tablet Take 12.5 mg by mouth Daily.   6/15/2021 at 0900   • metFORMIN HCl  MG/5ML Suspension Reconstituted ER Take 500 mg by mouth 2 (two) times a day.   6/15/2021 at 0900   • olmesartan (BENICAR) 40 MG tablet Take 40 mg by mouth Daily.   2021 at 2100   • omeprazole (priLOSEC) 40 MG capsule Take 40 mg by mouth 2 (Two) Times a Day. 8 am  "and 8 pm   6/15/2021 at 0800   • rosuvastatin (CRESTOR) 10 MG tablet Take 10 mg by mouth Daily.   6/14/2021 at 2100     Allergies:  Codeine and Other     Review of Systems   Constitutional: Positive for activity change, appetite change, chills and fatigue. Negative for fever.   HENT: Negative for congestion.    Respiratory: Negative for cough, chest tightness, shortness of breath and wheezing.    Cardiovascular: Negative for chest pain.   Gastrointestinal: Positive for diarrhea, nausea and vomiting. Negative for abdominal distention and abdominal pain.   Endocrine: Negative for polyphagia and polyuria.   Genitourinary: Negative for frequency.   Musculoskeletal: Positive for arthralgias.   Skin: Negative for rash.   Neurological: Positive for weakness. Negative for light-headedness.   Hematological: Does not bruise/bleed easily.   Psychiatric/Behavioral: Negative for agitation and behavioral problems.       Vital Signs  Temp:  [97.4 °F (36.3 °C)-97.6 °F (36.4 °C)] 97.6 °F (36.4 °C)  Heart Rate:  [59-62] 59  Resp:  [18] 18  BP: (157-199)/(44-77) 182/77  Oxygen Therapy  SpO2: 98 %  Pulse Oximetry Type: Intermittent  Device (Oxygen Therapy): room air}  Body mass index is 37.13 kg/m².  Flowsheet Rows      First Filed Value   Admission Height  157.5 cm (62\") Documented at 06/14/2021 2101   Admission Weight  90.7 kg (200 lb) Documented at 06/14/2021 2101                 Physical Exam  Vitals and nursing note reviewed.   Constitutional:       Appearance: She is well-developed. She is morbidly obese. She is ill-appearing. She is not diaphoretic.   HENT:      Head: Normocephalic.      Mouth/Throat:      Mouth: Mucous membranes are dry.   Eyes:      Conjunctiva/sclera: Conjunctivae normal.   Neck:      Thyroid: No thyromegaly.      Vascular: No JVD.   Cardiovascular:      Rate and Rhythm: Normal rate and regular rhythm.      Heart sounds: Normal heart sounds. No murmur heard.     Pulmonary:      Effort: Pulmonary effort is " normal. No respiratory distress.      Breath sounds: Normal breath sounds. No wheezing or rales.   Abdominal:      General: Bowel sounds are normal. There is no distension.      Palpations: Abdomen is soft.      Tenderness: There is no abdominal tenderness. There is no guarding.   Musculoskeletal:      Cervical back: Normal range of motion.      Comments: Left knee incision without evidence of infection   Skin:     General: Skin is warm and dry.      Findings: No rash.   Neurological:      Mental Status: She is alert.          Debilities/Disabilities Identified: None    Emotional Behavior: Appropriate    Results Review:    I reviewed the patient's new clinical results.  Lab Results (most recent)     Procedure Component Value Units Date/Time    Urinalysis With Microscopic If Indicated (No Culture) - Urine, Clean Catch [[id number]]  (Abnormal) Collected: 06/14/21 2332    Specimen: Urine, Clean Catch Updated: 06/14/21 2337     Color, UA Straw     Appearance, UA Clear     pH, UA <=5.0     Specific Gravity, UA <=1.005     Glucose, UA Negative     Ketones, UA Trace     Bilirubin, UA Negative     Blood, UA Negative     Protein, UA Negative     Leuk Esterase, UA Negative     Nitrite, UA Negative     Urobilinogen, UA 0.2 E.U./dL    Narrative:      Urine microscopic not indicated.    Basic Metabolic Panel [[id number]]  (Abnormal) Collected: 06/14/21 2135    Specimen: Blood Updated: 06/14/21 2208     Glucose 97 mg/dL      BUN 21 mg/dL      Creatinine 0.87 mg/dL      Sodium 121 mmol/L      Potassium 4.2 mmol/L      Chloride 89 mmol/L      CO2 16.6 mmol/L      Calcium 9.0 mg/dL      eGFR Non African Amer 64 mL/min/1.73      BUN/Creatinine Ratio 24.1     Anion Gap 15.4 mmol/L     Narrative:      GFR Normal >60  Chronic Kidney Disease <60  Kidney Failure <15      COVID-19 and FLU A/B PCR - Swab, Nasopharynx [[id number]]  (Normal) Collected: 06/14/21 2135    Specimen: Swab from Nasopharynx Updated: 06/14/21 2204     COVID19 Not  Detected     Influenza A PCR Not Detected     Influenza B PCR Not Detected    Narrative:      Fact sheet for providers: https://www.fda.gov/media/393431/download    Fact sheet for patients: https://www.fda.gov/media/010609/download    Test performed by PCR.    CBC & Differential [[id number]]  (Abnormal) Collected: 06/14/21 2135    Specimen: Blood Updated: 06/14/21 2144    Narrative:      The following orders were created for panel order CBC & Differential.  Procedure                               Abnormality         Status                     ---------                               -----------         ------                     CBC Auto Differential[[id number]]        Abnormal            Final result                 Please view results for these tests on the individual orders.    CBC Auto Differential [[id number]]  (Abnormal) Collected: 06/14/21 2135    Specimen: Blood Updated: 06/14/21 2144     WBC 7.67 10*3/mm3      RBC 3.34 10*6/mm3      Hemoglobin 9.3 g/dL      Hematocrit 29.0 %      MCV 86.8 fL      MCH 27.8 pg      MCHC 32.1 g/dL      RDW 13.9 %      RDW-SD 43.6 fl      MPV 8.3 fL      Platelets 365 10*3/mm3      Neutrophil % 65.0 %      Lymphocyte % 23.3 %      Monocyte % 7.3 %      Eosinophil % 3.4 %      Basophil % 0.5 %      Immature Grans % 0.5 %      Neutrophils, Absolute 4.98 10*3/mm3      Lymphocytes, Absolute 1.79 10*3/mm3      Monocytes, Absolute 0.56 10*3/mm3      Eosinophils, Absolute 0.26 10*3/mm3      Basophils, Absolute 0.04 10*3/mm3      Immature Grans, Absolute 0.04 10*3/mm3      nRBC 0.0 /100 WBC           Imaging Results (Most Recent)     None        not reviewed    ECG/EMG Results (most recent)     None        not reviewed    Assessment/Plan       1.  Nausea vomiting and diarrhea likely a viral gastroenteritis versus infectious colitis patient will be started on clear liquid diet antiemetics cultures been ordered.  IV fluids supportive care stool for C. difficile and a GI panel be ordered, her  abdominal exam is benign but we will going order CT rule out colitis    2.  Hyponatremia secondary to nausea and vomiting supportive care fluid restriction IV normal saline    3.  Anemia patient was hemoglobin is 11.1 about a year ago and today 9.3 likely from recent surgery we will check iron studies    4.  Hypertension stable we will resume home medications      5.  Hyperlipidemia nothing acute continue home statin    6.  GERD with history esophagitis continue with PPI    7.  Coronary disease status post remote MI on Plavix we will continue    8.  Peripheral vascular disease nothing acute    9.  Morbid obesity complicating all aspects of care    10.  DVT prophylaxis Lovenox      I discussed the patients findings and my recommendations with patient and nursing    Aliya Alejandro MD  06/15/21  07:56 EDT    Much of this encounter note is an electronic transcription/translation of spoken language to printed text using Dragon Software

## 2021-06-15 NOTE — CASE MANAGEMENT/SOCIAL WORK
Discharge Planning Assessment  JULIO Resendez     Patient Name: Valentine Arora  MRN: [id number]  Today's Date: 6/15/2021    Admit Date: 6/14/2021    Discharge Needs Assessment     Row Name 06/15/21 1427       Living Environment    Lives With  spouse    Name(s) of Who Lives With Patient  Fran Arora,     Current Living Arrangements  home/apartment/condo three story house with two steps to gain entry    Duration at Residence  21 years    Potentially Unsafe Housing Conditions  -- none    Primary Care Provided by  self    Provides Primary Care For  no one    Caregiving Concerns  no care giving needs voiced by patient at this time.    Family Caregiver if Needed  spouse    Family Caregiver Names  Fran,     Quality of Family Relationships  unable to assess    Able to Return to Prior Arrangements  yes    Living Arrangement Comments  Pt states she lives with her  in a three story house with two steps to gain entry       Resource/Environmental Concerns    Resource/Environmental Concerns  none    Transportation Concerns  -- none       Transition Planning    Patient/Family Anticipates Transition to  home with family    Patient/Family Anticipated Services at Transition  none    Transportation Anticipated  family or friend will provide pt states that her  will be able to provide ride home at discharge       Discharge Needs Assessment    Readmission Within the Last 30 Days  no previous admission in last 30 days    Current Outpatient/Agency/Support Group  -- none    Equipment Currently Used at Home  cane, straight    Concerns to be Addressed  no discharge needs identified;denies needs/concerns at this time    Concerns Comments  no discharge needs voiced by patient at this time.    Anticipated Changes Related to Illness  none    Equipment Needed After Discharge  none    Outpatient/Agency/Support Group Needs  -- pt does not think she will need these services at discharge    Discharge Facility/Level of Care  Needs  -- pt states she does not think she will need these services at this time.    Provided Post Acute Provider List?  Refused    Refused Provider List Comment  Offered community resources but patient declines the need for them at this time.    Patient's Choice of Community Agency(s)  pt states she has just been discharged from [facility] but would use them again if needed    Current Discharge Risk  -- none    Discharge Coordination/Progress  Pt states she plans on returning home at discharge with her family to help as needed, no discharge needs voiced by patient at this time.        Discharge Plan     Row Name 06/15/21 8616       Plan    Plan  Home with     Patient/Family in Agreement with Plan  yes    Plan Comments  Into room and introduced self and role of CM. Discussed discharge disposition with patient at bedside. Patient is currently resting in bed with no complaints. Patient confirms that the info on her face sheet is correct and that she see's Dr. Alejandro at PCP. She states that she uses [facility] in [place] and has no problem picking up or paying for her meds. She also states that she does have a living will but it is not on file here and CM encouraged to bring in a copy at her convenience and she verbalized understanding. Patient states she lives with her  in a three story house with two steps to gain entry and states she has no problem maneuvering the steps or within the home. She states she is independent wit her ADL's and drives, however, her  will be able to provide ride home at discharge. She states that she currently uses a straight cane at home and does not anticipate needing any other equipment at discharge. Patient states she has been currently with [facility] from her recent knee surgery but and would use them again if needed. CM offered community resources but patient declines the need for them at this time. Patient states she plans on returning home at  discharge with her  to help if needed, no discharge needs voiced by patient at this time. Patient had no other questions or concerns regarding discharge plans. Name and number placed on white board in room. CM will continue to follow for needs.        Continued Care and Services - Admitted Since 6/14/2021    Coordination has not been started for this encounter.         Demographic Summary     Row Name 06/15/21 1283       General Information    Admission Type  observation    Arrived From  home    Referral Source  admission list    Reason for Consult  discharge planning    Preferred Language  English     Used During This Interaction  no       Contact Information    Permission Granted to Share Info With          Functional Status    No documentation.       Psychosocial    No documentation.       Abuse/Neglect    No documentation.       Legal    No documentation.       Substance Abuse    No documentation.       Patient Forms    No documentation.           Neena Cadet RN

## 2021-06-15 NOTE — ED PROVIDER NOTES
Subjective   Valentine Arora is a 71-year-old white female who presents secondary to nausea, vomiting and diarrhea.  Onset of diarrhea 2 weeks ago.  Nausea and vomiting began last night.  Patient is vomited 5 times today.  Ms. Arora underwent knee replacement surgery 3 weeks ago.  She was on antibiotics for 1 week.  Vomiting began after completion of antibiotics.  Patient is concerned that she is severely dehydrated.  She had a staph infection status post right knee arthroplasty with subsequent dehydration and hyponatremia.  Patient is concerned that the dehydration and hyponatremia have occurred once again.  No fever.  Patient has experienced chills since last night.  Patient presents for evaluation.          History provided by:  Patient      Review of Systems   Constitutional: Positive for chills. Negative for fever.   HENT: Negative.  Negative for rhinorrhea.    Eyes: Negative.  Negative for redness.   Respiratory: Negative for cough.    Cardiovascular: Negative for chest pain.   Gastrointestinal: Positive for diarrhea, nausea and vomiting. Negative for abdominal pain.   Endocrine: Negative.    Genitourinary: Negative.  Negative for difficulty urinating.   Musculoskeletal: Negative.  Negative for back pain.   Skin: Negative.  Negative for color change.   Neurological: Negative.  Negative for syncope.   Hematological: Negative.    Psychiatric/Behavioral: Negative.    All other systems reviewed and are negative.      Past Medical History:   Diagnosis Date   • Arthritis    • Sarabia esophagus    • CAD (coronary artery disease)    • Chronic back pain    • Colon polyp    • Diabetes mellitus (CMS/[facility])    • GERD (gastroesophageal reflux disease)    • Hyperlipidemia    • Hypertension    • Myocardial infarction (CMS/[facility])     1995   • PAD (peripheral artery disease) (CMS/[facility]) 9/11/2019   • Shingles        Allergies   Allergen Reactions   • Codeine Itching   • Other Unknown (See Comments)     Vicryl: doesn't heal       Past  Surgical History:   Procedure Laterality Date   • COLONOSCOPY     • COLONOSCOPY N/A 2020    Procedure: COLONOSCOPY;  Surgeon: Mathew Roberts MD;  Location: [facility] OR;  Service: Gastroenterology;  Laterality: N/A;  Descending colon polyp x 2, Ascending colon polyp, Sigmoid colon polyp, Rectal polyp   • CORONARY ARTERY BYPASS GRAFT  1995    x 2 vessels   • ENDOSCOPY N/A 2020    Procedure: ESOPHAGOGASTRODUODENOSCOPY;  Surgeon: Mathew Roberts MD;  Location: [facility] OR;  Service: Gastroenterology;  Laterality: N/A;  Ulcerative esophagitis, Gastritis, Duodenitis  Duodenal biopsy, gastric biopsy, distal esophageal biopsy   • ENDOSCOPY N/A 2020    Procedure: ESOPHAGOGASTRODUODENOSCOPY with biopsies;  Surgeon: Mathew Roberts MD;  Location: [facility] OR;  Service: Gastroenterology;  Laterality: N/A;  Esophagitis  Sarabia's Esophagus  Hiatal hernia  Gastritis  Gastric biopsy  Distal esophagus biopsy   • INNER EAR SURGERY     • JOINT REPLACEMENT      right knee   • LEG SURGERY      x 2 s/p fall   • LUMBAR FUSION      L4/L5   • SKIN GRAFT      to left lower leg x 2   • TONSILLECTOMY     • WRIST FUSION Left        Family History   Problem Relation Age of Onset   • Colon cancer Neg Hx    • Colon polyps Neg Hx        Social History     Socioeconomic History   • Marital status:      Spouse name: Not on file   • Number of children: Not on file   • Years of education: Not on file   • Highest education level: Not on file   Tobacco Use   • Smoking status: Former Smoker     Quit date:      Years since quittin.4   • Smokeless tobacco: Never Used   • Tobacco comment: QUIT    Vaping Use   • Vaping Use: Never used   Substance and Sexual Activity   • Alcohol use: Yes     Comment: occasional   • Drug use: No   • Sexual activity: Defer           Objective   Physical Exam  Vitals and nursing note reviewed.   Constitutional:       General: She is in acute distress (Due to  nausea).      Appearance: Normal appearance. She is well-developed. She is obese. She is not toxic-appearing or diaphoretic.      Comments: 71-year-old white female laying in bed.  Empty emesis bag in hand.  Patient appears in fair overall health.  Vital signs notable for BP of 199/69.  Otherwise unremarkable.   HENT:      Head: Normocephalic and atraumatic.      Right Ear: Tympanic membrane, ear canal and external ear normal.      Left Ear: Tympanic membrane, ear canal and external ear normal.      Nose: Nose normal.      Mouth/Throat:      Mouth: Mucous membranes are dry.      Pharynx: Oropharynx is clear.   Eyes:      Extraocular Movements: Extraocular movements intact.      Conjunctiva/sclera: Conjunctivae normal.      Pupils: Pupils are equal, round, and reactive to light.   Cardiovascular:      Rate and Rhythm: Normal rate and regular rhythm.      Pulses: Normal pulses.      Heart sounds: Normal heart sounds. No murmur heard.   No friction rub. No gallop.    Pulmonary:      Effort: Pulmonary effort is normal.      Breath sounds: Normal breath sounds.   Abdominal:      General: Bowel sounds are normal. There is no distension.      Palpations: Abdomen is soft.      Tenderness: There is no abdominal tenderness.   Musculoskeletal:         General: Normal range of motion.      Cervical back: Normal range of motion and neck supple.      Right knee: Normal.      Left knee: Swelling present.        Legs:    Skin:     General: Skin is warm and dry.      Capillary Refill: Capillary refill takes less than 2 seconds.   Neurological:      General: No focal deficit present.      Mental Status: She is alert and oriented to person, place, and time.      Deep Tendon Reflexes: Reflexes are normal and symmetric.   Psychiatric:         Mood and Affect: Mood normal.         Behavior: Behavior normal.         Procedures           ED Course  ED Course as of Jayjay 15 0008   Mon Jun 14, 2021   2112 Obtaining baseline labs, UA, flu and  Covid swab and C. difficile toxin.  Giving IV fluids and Zofran.    [SS]   2323 Flu and Covid swabs negative.  CBC shows anemia with H&H 9.3 and 29.0.  Patient's hemoglobin has decreased over the past year.  CMP shows hyponatremia with sodium of 121, hypochloremia with chloride 89 bicarb 16.6.  Awaiting UA and C. difficile toxin.    [SS]   2349 UA shows trace ketones.  Otherwise unremarkable.  Patient does not have a history of anemia.  History of of esophageal varices.  However last scope 4 months ago showed healing.  Normal color stool.  Hemoccult negative.    [SS]   Tue Jayjay 15, 2021   0003 Discussed with Dr. Alejandro.  He will place patient in observation.    [SS]      ED Course User Index  [SS] Walt Gastelum MD      Labs Reviewed   BASIC METABOLIC PANEL - Abnormal; Notable for the following components:       Result Value    Sodium 121 (*)     Chloride 89 (*)     CO2 16.6 (*)     Anion Gap 15.4 (*)     All other components within normal limits    Narrative:     GFR Normal >60  Chronic Kidney Disease <60  Kidney Failure <15     URINALYSIS W/ MICROSCOPIC IF INDICATED (NO CULTURE) - Abnormal; Notable for the following components:    Ketones, UA Trace (*)     All other components within normal limits    Narrative:     Urine microscopic not indicated.   CBC WITH AUTO DIFFERENTIAL - Abnormal; Notable for the following components:    RBC 3.34 (*)     Hemoglobin 9.3 (*)     Hematocrit 29.0 (*)     All other components within normal limits   COVID-19 AND FLU A/B, NP SWAB IN TRANSPORT MEDIA 8-12 HR TAT - Normal    Narrative:     Fact sheet for providers: https://www.fda.gov/media/115112/download    Fact sheet for patients: https://www.fda.gov/media/764195/download    Test performed by PCR.   CLOSTRIDIUM DIFFICILE TOXIN    Narrative:     The following orders were created for panel order Clostridium Difficile Toxin - Stool, Per Rectum.  Procedure                               Abnormality         Status                      ---------                               -----------         ------                     Clostridium Difficile EI...[[id number]]                                                   Please view results for these tests on the individual orders.   CLOSTRIDIUM DIFFICILE EIA   CBC AND DIFFERENTIAL    Narrative:     The following orders were created for panel order CBC & Differential.  Procedure                               Abnormality         Status                     ---------                               -----------         ------                     CBC Auto Differential[[id number]]        Abnormal            Final result                 Please view results for these tests on the individual orders.     No results found.       My differential diagnosis for vomiting includes but is not limited to viral illness, gastroenteritis, pregnancy related vomiting, cyclic vomiting, food poisoning, alcohol poisoning, medication side effects, overdose, chemical ingestion, chemical exposures, gallbladder disease, appendicitis, bowel obstruction, DKA, AKA and panic attacks.    My differential diagnosis for diarrhea includes but is not limited to viral gastroenteritis, bacterial gastroenteritis, food poisoning, C. Difficile, bacterial infections, viral infections, fungal infections, parasitic infections, toxins and laxative abuse    My differential diagnosis for anemia includes but is not limited to acute blood loss, chronic blood loss, occult GI bleed, iron deficient anemia, anemia of chronic disease, chronic renal disease, erythropoietin insufficiency, bone marrow disorders                                  MDM    Final diagnoses:   Dehydration with hyponatremia   Anemia, unspecified type   Nausea vomiting and diarrhea       ED Disposition  ED Disposition     ED Disposition Condition Comment    Decision to Admit  Level of Care: Med/Surg [1]   Admitting Physician: LAWANDA JAQUEZ [[id number]]   Attending Physician: LEONID  LAWANDA [[id number]]   Patient Class: Observation [104]            No follow-up provider specified.       Medication List      No changes were made to your prescriptions during this visit.          Walt Gastelum MD  06/15/21 0008

## 2021-06-15 NOTE — PLAN OF CARE
Goal Outcome Evaluation:  Plan of Care Reviewed With: patient        Progress: improving  Outcome Summary: VSS, PRN  zofran effective, Tylenol administered for headache, safety measures in place, no diarrhea this shift, awaiting stool for c-diff to send to lab

## 2021-06-16 VITALS
DIASTOLIC BLOOD PRESSURE: 70 MMHG | WEIGHT: 203.3 LBS | SYSTOLIC BLOOD PRESSURE: 158 MMHG | HEART RATE: 74 BPM | TEMPERATURE: 97.9 F | HEIGHT: 62 IN | OXYGEN SATURATION: 99 % | RESPIRATION RATE: 16 BRPM | BODY MASS INDEX: 37.41 KG/M2

## 2021-06-16 LAB
ADV 40+41 DNA STL QL NAA+NON-PROBE: NOT DETECTED
ALBUMIN SERPL-MCNC: 3.2 G/DL (ref 3.5–5.2)
ALBUMIN/GLOB SERPL: 1.5 G/DL
ALP SERPL-CCNC: 42 U/L (ref 39–117)
ALT SERPL W P-5'-P-CCNC: 11 U/L (ref 1–33)
ANION GAP SERPL CALCULATED.3IONS-SCNC: 11.4 MMOL/L (ref 5–15)
AST SERPL-CCNC: 19 U/L (ref 1–32)
ASTRO TYP 1-8 RNA STL QL NAA+NON-PROBE: NOT DETECTED
BILIRUB SERPL-MCNC: 0.2 MG/DL (ref 0–1.2)
BUN SERPL-MCNC: 7 MG/DL (ref 8–23)
BUN/CREAT SERPL: 10.8 (ref 7–25)
C CAYETANENSIS DNA STL QL NAA+NON-PROBE: NOT DETECTED
C COLI+JEJ+UPSA DNA STL QL NAA+NON-PROBE: NOT DETECTED
C DIFF GDH STL QL: NEGATIVE
CALCIUM SPEC-SCNC: 8.5 MG/DL (ref 8.6–10.5)
CHLORIDE SERPL-SCNC: 105 MMOL/L (ref 98–107)
CO2 SERPL-SCNC: 17.6 MMOL/L (ref 22–29)
CREAT SERPL-MCNC: 0.65 MG/DL (ref 0.57–1)
CRYPTOSP DNA STL QL NAA+NON-PROBE: NOT DETECTED
DEPRECATED RDW RBC AUTO: 48.9 FL (ref 37–54)
E HISTOLYT DNA STL QL NAA+NON-PROBE: NOT DETECTED
EAEC PAA PLAS AGGR+AATA ST NAA+NON-PRB: NOT DETECTED
EC STX1+STX2 GENES STL QL NAA+NON-PROBE: NOT DETECTED
EPEC EAE GENE STL QL NAA+NON-PROBE: NOT DETECTED
ERYTHROCYTE [DISTWIDTH] IN BLOOD BY AUTOMATED COUNT: 14.6 % (ref 12.3–15.4)
ETEC LTA+ST1A+ST1B TOX ST NAA+NON-PROBE: NOT DETECTED
G LAMBLIA DNA STL QL NAA+NON-PROBE: NOT DETECTED
GFR SERPL CREATININE-BSD FRML MDRD: 90 ML/MIN/1.73
GLOBULIN UR ELPH-MCNC: 2.1 GM/DL
GLUCOSE SERPL-MCNC: 85 MG/DL (ref 65–99)
HCT VFR BLD AUTO: 28.7 % (ref 34–46.6)
HGB BLD-MCNC: 8.6 G/DL (ref 12–15.9)
MCH RBC QN AUTO: 27.9 PG (ref 26.6–33)
MCHC RBC AUTO-ENTMCNC: 30 G/DL (ref 31.5–35.7)
MCV RBC AUTO: 93.2 FL (ref 79–97)
NOROVIRUS GI+II RNA STL QL NAA+NON-PROBE: NOT DETECTED
P SHIGELLOIDES DNA STL QL NAA+NON-PROBE: NOT DETECTED
PLATELET # BLD AUTO: 292 10*3/MM3 (ref 140–450)
PMV BLD AUTO: 8.4 FL (ref 6–12)
POTASSIUM SERPL-SCNC: 4.7 MMOL/L (ref 3.5–5.2)
PROT SERPL-MCNC: 5.3 G/DL (ref 6–8.5)
RBC # BLD AUTO: 3.08 10*6/MM3 (ref 3.77–5.28)
RVA RNA STL QL NAA+NON-PROBE: NOT DETECTED
S ENT+BONG DNA STL QL NAA+NON-PROBE: NOT DETECTED
SAPO I+II+IV+V RNA STL QL NAA+NON-PROBE: NOT DETECTED
SHIGELLA SP+EIEC IPAH ST NAA+NON-PROBE: NOT DETECTED
SODIUM SERPL-SCNC: 134 MMOL/L (ref 136–145)
V CHOL+PARA+VUL DNA STL QL NAA+NON-PROBE: NOT DETECTED
V CHOLERAE DNA STL QL NAA+NON-PROBE: NOT DETECTED
WBC # BLD AUTO: 5.03 10*3/MM3 (ref 3.4–10.8)
Y ENTEROCOL DNA STL QL NAA+NON-PROBE: NOT DETECTED

## 2021-06-16 PROCEDURE — 87324 CLOSTRIDIUM AG IA: CPT | Performed by: EMERGENCY MEDICINE

## 2021-06-16 PROCEDURE — 25010000002 ENOXAPARIN PER 10 MG: Performed by: FAMILY MEDICINE

## 2021-06-16 PROCEDURE — 96372 THER/PROPH/DIAG INJ SC/IM: CPT

## 2021-06-16 PROCEDURE — G0378 HOSPITAL OBSERVATION PER HR: HCPCS

## 2021-06-16 PROCEDURE — 0097U HC BIOFIRE FILMARRAY GI PANEL: CPT | Performed by: FAMILY MEDICINE

## 2021-06-16 PROCEDURE — 80053 COMPREHEN METABOLIC PANEL: CPT | Performed by: FAMILY MEDICINE

## 2021-06-16 PROCEDURE — 87449 NOS EACH ORGANISM AG IA: CPT | Performed by: EMERGENCY MEDICINE

## 2021-06-16 PROCEDURE — 85027 COMPLETE CBC AUTOMATED: CPT | Performed by: FAMILY MEDICINE

## 2021-06-16 RX ORDER — IRON POLYSACCHARIDE COMPLEX 150 MG
150 CAPSULE ORAL DAILY
Qty: 90 CAPSULE | Refills: 0 | Status: SHIPPED | OUTPATIENT
Start: 2021-06-16

## 2021-06-16 RX ORDER — PANTOPRAZOLE SODIUM 40 MG/1
40 TABLET, DELAYED RELEASE ORAL
Status: DISCONTINUED | OUTPATIENT
Start: 2021-06-16 | End: 2021-06-16 | Stop reason: HOSPADM

## 2021-06-16 RX ADMIN — ATENOLOL 25 MG: 25 TABLET ORAL at 09:33

## 2021-06-16 RX ADMIN — ENOXAPARIN SODIUM 40 MG: 40 INJECTION SUBCUTANEOUS at 09:32

## 2021-06-16 RX ADMIN — CLONIDINE HYDROCHLORIDE 0.2 MG: 0.2 TABLET ORAL at 06:05

## 2021-06-16 RX ADMIN — CLONIDINE HYDROCHLORIDE 0.2 MG: 0.2 TABLET ORAL at 15:43

## 2021-06-16 RX ADMIN — AMLODIPINE BESYLATE 10 MG: 5 TABLET ORAL at 09:33

## 2021-06-16 RX ADMIN — PANTOPRAZOLE SODIUM 40 MG: 40 TABLET, DELAYED RELEASE ORAL at 09:32

## 2021-06-16 RX ADMIN — HYDRALAZINE HYDROCHLORIDE 12.5 MG: 25 TABLET, FILM COATED ORAL at 09:33

## 2021-06-16 RX ADMIN — Medication 1 CAPSULE: at 09:33

## 2021-06-16 NOTE — PROGRESS NOTES
Adult Nutrition  Assessment/PES    Patient Name:  Valentine Arora  YOB: 1950  MRN: [id number]  Admit Date:  6/14/2021    Assessment Date:  6/16/2021    Comments:  Called MD office b/c demanding to have salt, RN got order from MD.  Good po intake.     Reason for Assessment     Row Name 06/16/21 1140          Reason for Assessment    Reason For Assessment  follow-up protocol     Diagnosis  gastrointestinal disease N/V/D resolved, hyponatremia resolving         Nutrition/Diet History     Row Name 06/16/21 1141          Nutrition/Diet History    Typical Food/Fluid Intake  Spoke w pt at bedside. Reports she couldn't eat eggs or potatoes with no salt. She has to have salt for lunch baked potato or can't go home. She states NA is low and she needs some.Spoke w Rn and called MD office. Pt didn't seem intersted in listneing to how Na eat doens't always affect leve in body         Anthropometrics     Row Name 06/16/21 1148 06/16/21 0500       Anthropometrics    Weight  -- 203.3#  92.2 kg (203 lb 4.8 oz)       Body Mass Index (BMI)    BMI Assessment  BMI 35-39.9: obesity grade II  --        Labs/Tests/Procedures/Meds     Row Name 06/16/21 1149          Labs/Procedures/Meds    Lab Results Reviewed  reviewed     Lab Results Comments  glu 121-134        Diagnostic Tests/Procedures    Diagnostic Test/Procedure Reviewed  reviewed        Medications    Pertinent Medications Reviewed  reviewed     Pertinent Medications Comments  melatonin             Nutrition Prescription Ordered     Row Name 06/16/21 1149          Nutrition Prescription PO    Current PO Diet  Regular     Fluid Restriction mL per Day  -- 1500 ml fluid restriction on door         Evaluation of Received Nutrient/Fluid Intake     Row Name 06/16/21 1150          Fluid Intake Evaluation    Oral Fluid (mL)  600 35% estimted fluid        PO Evaluation    Number of Meals  2     % PO Intake  100               Problem/Interventions:  Problem 1     Row Name  06/16/21 1151          Nutrition Diagnoses Problem 1    Problem 1  Inadequate Intake/Infusion     Etiology (related to)  Medical Diagnosis     Signs/Symptoms (evidenced by)  Report/Observation     Resolved?  Yes               Intervention Goal     Row Name 06/16/21 1151          Intervention Goal    General  Meet nutritional needs for age/condition     PO  PO intake (%);Maintain intake     PO Intake %  75 % or greater         Nutrition Intervention     Row Name 06/16/21 1151          Nutrition Intervention    RD/Tech Action  Follow Tx progress           Education/Evaluation     Row Name 06/16/21 1151          Education    Education  Education offered and refused;Other (comment) Edu pt that Na you eat doesn't translate directly to level in blood. Pt not interested in hearing, wants salt.        Monitor/Evaluation    Monitor  Per protocol;I&O;PO intake;Pertinent labs;Weight;Symptoms           Electronically signed by:  Della Moyer RD  06/16/21 11:52 EDT

## 2021-06-16 NOTE — NURSING NOTE
Patient tolerated regular diet.  She denied pain and nausea after eating. Stated she is ready and eager to go home.

## 2021-06-16 NOTE — PLAN OF CARE
Goal Outcome Evaluation:  Plan of Care Reviewed With: patient           Outcome Summary: Patient VSS. Tylenol administered for complaints of a headache. No diarrhea or vomiting this shift. Patient tolerating clear liquid diet. Resting in bed quietly at this time.

## 2021-06-16 NOTE — PROGRESS NOTES
".  Daily Progress Note:      Chief complaint: Nausea vomiting diarrhea hypertension coronary disease    Subjective: Nausea vomiting diarrhea resolved she has had no episodes since admission.  She feels much better generalized weakness improved already clear liquids     LOS: 0 days     Vital Signs  Temp:  [98.6 °F (37 °C)-98.8 °F (37.1 °C)] 98.6 °F (37 °C)  Heart Rate:  [62-75] 75  Resp:  [17-18] 18  BP: (157-185)/(50-73) 157/61  Oxygen Therapy  SpO2: 97 %  Pulse Oximetry Type: Intermittent  Device (Oxygen Therapy): room air}  Body mass index is 37.32 kg/m².  Flowsheet Rows      First Filed Value   Admission Height  157.5 cm (62\") Documented at 06/14/2021 2101   Admission Weight  90.7 kg (200 lb) Documented at 06/14/2021 2101                   Documented weights    06/14/21 2101 06/15/21 0036 06/15/21 0500 06/16/21 0500   Weight: 90.7 kg (200 lb) 92.4 kg (203 lb 12.8 oz) 91.8 kg (202 lb 4.8 oz) 92.2 kg (203 lb 4.8 oz)           Patient Vitals for the past 24 hrs:   BP Temp Temp src Pulse Resp SpO2 Weight   06/16/21 0500 157/61 98.6 °F (37 °C) Oral 75 18 97 % 92.2 kg (203 lb 4.8 oz)   06/15/21 2007 158/73 98.8 °F (37.1 °C) Oral 66 18 96 % --   06/15/21 1524 170/50 -- -- -- -- -- --   06/15/21 1458 (!) 185/67 98.8 °F (37.1 °C) Oral 64 17 96 % --   06/15/21 1059 177/63 98.8 °F (37.1 °C) Oral 62 17 97 % --   06/15/21 1025 162/50 -- -- -- -- -- --       92.2 kg (203 lb 4.8 oz)      Intake/Output Summary (Last 24 hours) at 6/16/2021 0649  Last data filed at 6/15/2021 2007  Gross per 24 hour   Intake 795.83 ml   Output 3350 ml   Net -2554.17 ml       Review of Systems   Constitutional: Negative for activity change, appetite change and fatigue.   HENT: Negative for congestion.    Respiratory: Negative for cough, chest tightness, shortness of breath and wheezing.    Cardiovascular: Negative for chest pain.   Gastrointestinal: Negative for abdominal distention, abdominal pain, diarrhea, nausea and vomiting.   Endocrine: " Negative for polyphagia and polyuria.   Genitourinary: Negative for frequency.   Skin: Negative for rash.   Neurological: Negative for light-headedness.   Hematological: Does not bruise/bleed easily.   Psychiatric/Behavioral: Negative for agitation and behavioral problems.       Physical Exam  Vitals and nursing note reviewed.   Constitutional:       Appearance: She is well-developed. She is morbidly obese.   HENT:      Head: Normocephalic.   Eyes:      Conjunctiva/sclera: Conjunctivae normal.   Neck:      Thyroid: No thyromegaly.      Vascular: No JVD.   Cardiovascular:      Rate and Rhythm: Normal rate and regular rhythm.      Heart sounds: Normal heart sounds. No murmur heard.     Pulmonary:      Effort: Pulmonary effort is normal. No respiratory distress.      Breath sounds: Normal breath sounds. No wheezing or rales.   Abdominal:      General: Bowel sounds are normal. There is no distension.      Palpations: Abdomen is soft.      Tenderness: There is no abdominal tenderness. There is no guarding.   Musculoskeletal:      Cervical back: Normal range of motion.   Skin:     General: Skin is warm and dry.      Findings: No rash.   Neurological:      Mental Status: She is alert.         Medication Review:   I have reviewed the patient's current medication list  Scheduled Meds:amLODIPine, 10 mg, Oral, Daily  atenolol, 25 mg, Oral, Q24H  cloNIDine, 0.2 mg, Oral, Q8H  clopidogrel, 75 mg, Oral, Nightly  enoxaparin, 40 mg, Subcutaneous, Q24H  famotidine, 20 mg, Intravenous, Q12H  hydrALAZINE, 12.5 mg, Oral, Daily  melatonin, 10 mg, Oral, Nightly  sodium chloride, 10 mL, Intravenous, Q12H      Continuous Infusions:sodium chloride, 125 mL/hr, Last Rate: 125 mL/hr (06/16/21 0548)      PRN Meds:.•  acetaminophen **OR** acetaminophen **OR** acetaminophen  •  acetaminophen  •  aluminum-magnesium hydroxide-simethicone  •  calcium carbonate  •  ondansetron  •  ondansetron **OR** ondansetron  •  sodium chloride  •  [COMPLETED]  Insert peripheral IV **AND** sodium chloride  •  sodium chloride      Labs:  Results from last 7 days   Lab Units 06/16/21  0417 06/14/21 2135   WBC 10*3/mm3 5.03 7.67   HEMOGLOBIN g/dL 8.6* 9.3*   HEMATOCRIT % 28.7* 29.0*   PLATELETS 10*3/mm3 292 365     Results from last 7 days   Lab Units 06/16/21 0417 06/14/21 2135   SODIUM mmol/L 134* 121*   POTASSIUM mmol/L 4.7 4.2   CHLORIDE mmol/L 105 89*   CO2 mmol/L 17.6* 16.6*   BUN mg/dL 7* 21   CREATININE mg/dL 0.65 0.87   CALCIUM mg/dL 8.5* 9.0   BILIRUBIN mg/dL 0.2  --    ALK PHOS U/L 42  --    ALT (SGPT) U/L 11  --    AST (SGOT) U/L 19  --    GLUCOSE mg/dL 85 97           Results from last 7 days   Lab Units 06/16/21 0417 06/14/21 2135   AST (SGOT) U/L 19  --    ALT (SGPT) U/L 11  --    PLATELETS 10*3/mm3 292 365         Lab Results (last 24 hours)     Procedure Component Value Units Date/Time    Comprehensive Metabolic Panel [[id number]]  (Abnormal) Collected: 06/16/21 0417    Specimen: Blood Updated: 06/16/21 0559     Glucose 85 mg/dL      BUN 7 mg/dL      Creatinine 0.65 mg/dL      Sodium 134 mmol/L      Potassium 4.7 mmol/L      Comment: Slight hemolysis detected by analyzer. Results may be affected.        Chloride 105 mmol/L      CO2 17.6 mmol/L      Calcium 8.5 mg/dL      Total Protein 5.3 g/dL      Albumin 3.20 g/dL      ALT (SGPT) 11 U/L      AST (SGOT) 19 U/L      Comment: Slight hemolysis detected by analyzer. Results may be affected.        Alkaline Phosphatase 42 U/L      Total Bilirubin 0.2 mg/dL      eGFR Non African Amer 90 mL/min/1.73      Globulin 2.1 gm/dL      A/G Ratio 1.5 g/dL      BUN/Creatinine Ratio 10.8     Anion Gap 11.4 mmol/L     Narrative:      GFR Normal >60  Chronic Kidney Disease <60  Kidney Failure <15      CBC (No Diff) [[id number]]  (Abnormal) Collected: 06/16/21 0417    Specimen: Blood Updated: 06/16/21 0517     WBC 5.03 10*3/mm3      RBC 3.08 10*6/mm3      Hemoglobin 8.6 g/dL      Hematocrit 28.7 %      MCV 93.2 fL      MCH  27.9 pg      MCHC 30.0 g/dL      RDW 14.6 %      RDW-SD 48.9 fl      MPV 8.4 fL      Platelets 292 10*3/mm3                                         No results found for: POCGLU          Results from last 7 days   Lab Units 06/14/21  2332   NITRITE UA  Negative     Results from last 7 days   Lab Units 06/14/21  2135   INFLUENZA B PCR  Not Detected   INFLUENZA A PCR  Not Detected         Radiology:  Imaging Results (Last 24 Hours)     ** No results found for the last 24 hours. **          Cardiology:  ECG/EMG Results (last 24 hours)     ** No results found for the last 24 hours. **          I have reviewed recent labs results and consult notes.  Parts of this note may have been copied and pasted but patient was examined and interviewed by me today    Assessment and Plan:    1.  Nausea vomiting and diarrhea resolved CT normal we will advance diet    2.  Hyponatremia improved sodium was 134 this morning decrease IV fluids advance diet     3.  Anemia done 8.6 likely due to hydration she is iron deficient we will start oral iron     4.  Hypertension stable we will resume home medications        5.  Hyperlipidemia nothing acute continue home statin     6.  GERD with history esophagitis continue with PPI     7.  Coronary disease status post remote MI on Plavix we will continue     8.  Peripheral vascular disease nothing acute     9.  Morbid obesity complicating all aspects of care     10.  DVT prophylaxis Lovenox  Much of this encounter note is an electronic transcription/translation of spoken language to printed text using Dragon Software

## 2021-06-16 NOTE — DISCHARGE INSTR - APPOINTMENTS
Follow up with Dr. Alejandro on Tuesday June 22nd- Office number is [phone] to make an appointment

## 2021-06-17 ENCOUNTER — READMISSION MANAGEMENT (OUTPATIENT)
Dept: CALL CENTER | Facility: HOSPITAL | Age: 71
End: 2021-06-17

## 2021-06-17 NOTE — OUTREACH NOTE
Prep Survey      Responses   [facility] facility patient discharged from?  [place]   Is LACE score < 7 ?  Yes   Emergency Room discharge w/ pulse ox?  No   Eligibility  Readm Mgmt   Discharge diagnosis  dehydration   Does the patient have one of the following disease processes/diagnoses(primary or secondary)?  Other   Does the patient have Home health ordered?  No   Is there a DME ordered?  No   Prep survey completed?  Yes          Rose Mary Lu RN

## 2021-06-17 NOTE — CASE MANAGEMENT/SOCIAL WORK
Case Management Discharge Note      Final Note: D/C home    Provided Post Acute Provider List?: Refused  Refused Provider List Comment: Offered community resources but patient declines the need for them at this time.    Selected Continued Care - Discharged on 6/16/2021 Admission date: 6/14/2021 - Discharge disposition: Home or Self Care    Destination    No services have been selected for the patient.              Durable Medical Equipment    No services have been selected for the patient.              Dialysis/Infusion    No services have been selected for the patient.              Home Medical Care    No services have been selected for the patient.              Therapy    No services have been selected for the patient.              Community Resources    No services have been selected for the patient.              Community & DME    No services have been selected for the patient.                       Final Discharge Disposition Code: 01 - home or self-care

## 2021-06-18 ENCOUNTER — READMISSION MANAGEMENT (OUTPATIENT)
Dept: CALL CENTER | Facility: HOSPITAL | Age: 71
End: 2021-06-18

## 2021-06-18 NOTE — OUTREACH NOTE
LAG < 7 Survey      Responses   [facility] facility patient discharged from?  [facility]   Does the patient have one of the following disease processes/diagnoses(primary or secondary)?  Other   BHLAG <7 Attempt successful?  No   Unsuccessful attempts  Attempt 1          Fabiola Abbott RN

## 2021-06-20 NOTE — DISCHARGE SUMMARY
Valentine Arora  1950  [id number]        Discharge Summary    Date of Admission: 6/14/2021  Date of Discharge:  6/16/2021    Primary Discharge Diagnoses:   Viral gastroenteritis with intractable nausea and vomiting  Hyponatremia secondary to nausea vomiting  Iron deficiency anemia      Secondary Discharge Diagnoses:     Hypertension  Hyperlipidemia  Peripheral vascular disease  GERD  Coronary disease  Obesity    PCP  Patient Care Team:  Aliya Alejandro MD as PCP - General (Family Medicine)    Consults:   Consults     No orders found from 5/16/2021 to 6/15/2021.            History of Present Illness:  71-year-old white female history of hypertension hyperlipidemia osteoarthritis underwent a left total knee replacement on 5/18/2021 at [facility].  Initial postop course was uneventful with after patient finished doxycycline patient started having episodes of diarrhea 5-6 times a day with nausea and vomiting.  Patient treated with clear liquids nausea vomiting improved but then recurred about 4 to 5 days later to been persistent gotten worse the last 48 hours.  Patient says she is able to keep any liquids down she has felt progressively weak and tired and states she was hyponatremic prior to her previous knee replacement and she feels the same way.  Patient denies any blood or mucus in the diarrhea she denies any fever she has had chills and rigors.    Hospital Course     Is admitted to hospital started on IV antiemetics IV fluids and clear liquid diet.  Stool sent for GI panel was positive which was negative.  Within 24 hours her patient's nausea vomiting and diarrhea resolved and he was feeling much better weakness also improved as well as her hyponatremia.  Her diet was advanced and she was tolerating regular diet at time of discharge.      Operations and Procedures Performed:       CT Abdomen Pelvis With Contrast    Result Date: 6/15/2021  Narrative: CT Abdomen Pelvis W INDICATION: 71-year-old  woman with history of diarrhea for 2 weeks, nausea and vomiting for 2 days. Evaluate for colitis. TECHNIQUE: CT of the abdomen and pelvis with Isovue-370-100 cc IV contrast. Coronal and sagittal reconstructions were obtained.  Radiation dose reduction techniques included automated exposure control or exposure modulation based on body size. Count of known CT and cardiac nuc med studies performed in previous 12 months: 0. COMPARISON: None available. FINDINGS: Abdomen: The included images of the lung bases are clear. There are no effusions. There is a small hiatal hernia with minimal wall thickening which could indicate mild esophagitis. This is incompletely assessed. The liver is normal in size and density with the exception of focal fatty change adjacent to the falciform ligament. Spleen is normal Gallbladder demonstrates minimal dependent density which could represent tiny stones versus sludge. No gallbladder wall thickening or bile duct dilatation. The pancreas and pancreatic duct appear normal. There is a densely calcified structure abutting the posterior inferior margin of the pancreas measuring up to 3 cm likely calcified node. This is benign. Adrenal glands are normal Kidneys demonstrate no stone or mass The abdominal aorta is normal in caliber but densely atherosclerotic. This extends into both common iliac arteries. Stomach and small bowel are normal. Appendix is normal There is no colonic distention. There is no definite colonic wall thickening. No evidence of constipation. No diverticula seen. Pelvis: The bladder is normal. Uterus and ovaries are normal. No pelvic free fluid.  Bone window images demonstrate previous L4-5 fusion with disc insert. Alignment is anatomic. No fractures.     Impression: No acute findings in the abdomen or pelvis. Small bowel, colon and appendix appear normal. No adenopathy or ascites. Signer Name: Ashly Palm MD  Signed: 6/15/2021 9:15 AM  Workstation Name: [id number]   [facility]      Labs:  Results from last 7 days   Lab Units 06/16/21  0417 06/14/21  2135   WBC 10*3/mm3 5.03 7.67   HEMOGLOBIN g/dL 8.6* 9.3*   HEMATOCRIT % 28.7* 29.0*   PLATELETS 10*3/mm3 292 365     Results from last 7 days   Lab Units 06/16/21  0417 06/14/21  2135   SODIUM mmol/L 134* 121*   POTASSIUM mmol/L 4.7 4.2   CHLORIDE mmol/L 105 89*   CO2 mmol/L 17.6* 16.6*   BUN mg/dL 7* 21   CREATININE mg/dL 0.65 0.87   CALCIUM mg/dL 8.5* 9.0   BILIRUBIN mg/dL 0.2  --    ALK PHOS U/L 42  --    ALT (SGPT) U/L 11  --    AST (SGOT) U/L 19  --    GLUCOSE mg/dL 85 97           Lab Results (last 24 hours)     Procedure Component Value Units Date/Time    Gastrointestinal Panel, PCR - Stool, Per Rectum [[id number]]  (Normal) Collected: 06/16/21 0932    Specimen: Stool from Per Rectum Updated: 06/16/21 1713     Campylobacter Not Detected     Plesiomonas shigelloides Not Detected     Salmonella Not Detected     Vibrio Not Detected     Vibrio cholerae Not Detected     Yersinia enterocolitica Not Detected     Enteroaggregative E. coli (EAEC) Not Detected     Enteropathogenic E. coli (EPEC) Not Detected     Enterotoxigenic E. coli (ETEC) lt/st Not Detected     Shiga-like toxin-producing E. coli (STEC) stx1/stx2 Not Detected     Shigella/Enteroinvasive E. coli (EIEC) Not Detected     Cryptosporidium Not Detected     Cyclospora cayetanensis Not Detected     Entamoeba histolytica Not Detected     Giardia lamblia Not Detected     Adenovirus F40/41 Not Detected     Astrovirus Not Detected     Norovirus GI/GII Not Detected     Rotavirus A Not Detected     Sapovirus (I, II, IV or V) Not Detected    Narrative:      If Aeromonas, Staphylococcus aureus or Bacillus cereus are suspected, please order [id number]: Stool Culture, Aeromonas, S aureus, B Cereus.    Clostridium Difficile Toxin - Stool, Per Rectum [[id number]]  (Normal) Collected: 06/16/21 0931    Specimen: Stool from Per Rectum Updated: 06/16/21 1051     Narrative:      The following orders were created for panel order Clostridium Difficile Toxin - Stool, Per Rectum.  Procedure                               Abnormality         Status                     ---------                               -----------         ------                     Clostridium Difficile EI...[[id number]]  Normal              Final result                 Please view results for these tests on the individual orders.    Clostridium Difficile EIA - Stool, Per Rectum [[id number]]  (Normal) Collected: 06/16/21 0931    Specimen: Stool from Per Rectum Updated: 06/16/21 1051     C Diff GDH / Toxin Negative    Comprehensive Metabolic Panel [[id number]]  (Abnormal) Collected: 06/16/21 0417    Specimen: Blood Updated: 06/16/21 0559     Glucose 85 mg/dL      BUN 7 mg/dL      Creatinine 0.65 mg/dL      Sodium 134 mmol/L      Potassium 4.7 mmol/L      Comment: Slight hemolysis detected by analyzer. Results may be affected.        Chloride 105 mmol/L      CO2 17.6 mmol/L      Calcium 8.5 mg/dL      Total Protein 5.3 g/dL      Albumin 3.20 g/dL      ALT (SGPT) 11 U/L      AST (SGOT) 19 U/L      Comment: Slight hemolysis detected by analyzer. Results may be affected.        Alkaline Phosphatase 42 U/L      Total Bilirubin 0.2 mg/dL      eGFR Non African Amer 90 mL/min/1.73      Globulin 2.1 gm/dL      A/G Ratio 1.5 g/dL      BUN/Creatinine Ratio 10.8     Anion Gap 11.4 mmol/L     Narrative:      GFR Normal >60  Chronic Kidney Disease <60  Kidney Failure <15      CBC (No Diff) [[id number]]  (Abnormal) Collected: 06/16/21 0417    Specimen: Blood Updated: 06/16/21 0517     WBC 5.03 10*3/mm3      RBC 3.08 10*6/mm3      Hemoglobin 8.6 g/dL      Hematocrit 28.7 %      MCV 93.2 fL      MCH 27.9 pg      MCHC 30.0 g/dL      RDW 14.6 %      RDW-SD 48.9 fl      MPV 8.4 fL      Platelets 292 10*3/mm3                                   Invalid input(s): LDLCALC          No results found for: POCGLU          Results from last 7  days   Lab Units 06/14/21  2332   NITRITE UA  Negative     Results from last 7 days   Lab Units 06/16/21  0932 06/14/21  2135   ADENOVIRUS  Not Detected  --    INFLUENZA B PCR   --  Not Detected   INFLUENZA A PCR   --  Not Detected         Radiology:  Imaging Results (Last 24 Hours)     ** No results found for the last 24 hours. **          PROCEDURES          Allergies:  is allergic to codeine and other.      Discharge Medications:     Your medication list      START taking these medications      Instructions Last Dose Given Next Dose Due   iron polysaccharides 150 MG capsule  Commonly known as: IFerex 150      Take 1 capsule by mouth Daily.          CONTINUE taking these medications      Instructions Last Dose Given Next Dose Due   amLODIPine 10 MG tablet  Commonly known as: NORVASC      Take 10 mg by mouth Daily.       atenolol 25 MG tablet  Commonly known as: TENORMIN      Take 25 mg by mouth.       cloNIDine 0.1 MG tablet  Commonly known as: CATAPRES      Take 0.2 mg by mouth 3 (Three) Times a Day.       clopidogrel 75 MG tablet  Commonly known as: PLAVIX      Take 1 tablet by mouth Daily.       Crestor 10 MG tablet  Generic drug: rosuvastatin      Take 10 mg by mouth Daily.       hydrALAZINE 50 MG tablet  Commonly known as: APRESOLINE      Take 75 mg by mouth 3 (Three) Times a Day.       hydroCHLOROthiazide 12.5 MG tablet  Commonly known as: HYDRODIURIL      Take 12.5 mg by mouth Daily.       metFORMIN HCl  MG/5ML Suspension Reconstituted ER      Take 500 mg by mouth 2 (two) times a day.       olmesartan 40 MG tablet  Commonly known as: BENICAR      Take 40 mg by mouth Daily.       omeprazole 40 MG capsule  Commonly known as: priLOSEC      Take 40 mg by mouth 2 (Two) Times a Day. 8 am and 8 pm             Where to Get Your Medications      These medications were sent to [facility] [address] [place], [place] [address]  [address] [facility] - [phone]  - [phone]   [address] [address]     Phone: [phone]   · iron polysaccharides 150 MG capsule         Home medications and discharge medications reconciled with patient      Condition on Discharge: Stable    Discharge Disposition  Home    Visiting Nurse:    No     Home PT/OT:  No     Home Safety Evaluation:  No     DME  None    Discharge Diet:        Activity at Discharge:  As tolerated      Follow-up Appointments:  Additional Instructions for the Follow-ups that You Need to Schedule     Call MD for problems / concerns.   As directed      Discharge Follow-up with PCP   As directed       Currently Documented PCP:    Aliya Alejandro MD    PCP Phone Number:    [phone]     Follow Up Details: tuesday next week           Follow-up Information     Aliya Alejandro MD .    Specialty: Family Medicine  Why: tuesday next week  Contact information:  [address]  [place] [address] [phone]             Aliya Alejandro MD .    Specialty: Family Medicine  Contact information:  [address]  [place] [address] [phone]                   Test Results Pending at Discharge       Aliya Alejandro MD  06/20/21  16:32 EDT    Much of this encounter note is an electronic transcription/translation of spoken language to printed text using Dragon Software

## 2021-06-22 ENCOUNTER — READMISSION MANAGEMENT (OUTPATIENT)
Dept: CALL CENTER | Facility: HOSPITAL | Age: 71
End: 2021-06-22

## 2021-06-22 NOTE — OUTREACH NOTE
LAG < 7 Survey      Responses   [facility] patient discharged from?  [place]   Does the patient have one of the following disease processes/diagnoses(primary or secondary)?  Other   BHLAG <7 Attempt successful?  Yes   Call start time  1502   Call end time  1504   Discharge diagnosis  dehydration   Does the patient have all medications ordered at discharge?  Yes   Is the patient taking all medications as directed (includes completed medication regime)?  Yes   Does the patient have a primary care provider?   Yes   Does the patient have an appointment with their PCP within 7 days of discharge?  Yes   Comments regarding PCP  going in right now to see PCP for f/u appt   Has the patient kept scheduled appointments due by today?  Yes   Has home health visited the patient within 72 hours of discharge?  N/A   Psychosocial issues?  No   Did the patient receive a copy of their discharge instructions?  Yes   Nursing interventions  Reviewed instructions with patient   What is the patient's perception of their health status since discharge?  Improving   Is the patient/caregiver able to teach back the hierarchy of who to call/visit for symptoms/problems? PCP, Specialist, Home health nurse, Urgent Care, ED, 911  Yes   Graduated  Yes   Is the patient interested in additional calls from an ambulatory ?  NOTE:  applies to high risk patients requiring additional follow-up.  No   Wrap up additional comments  States she is doing well, walking into her f/u appt with her PCP, no questions or concerns.          Justine Walton RN

## 2021-08-02 ENCOUNTER — TRANSCRIBE ORDERS (OUTPATIENT)
Dept: BONE DENSITY | Facility: HOSPITAL | Age: 71
End: 2021-08-02

## 2021-08-02 DIAGNOSIS — Z78.0 MENOPAUSE: Primary | ICD-10-CM

## 2021-08-06 ENCOUNTER — APPOINTMENT (OUTPATIENT)
Dept: BONE DENSITY | Facility: HOSPITAL | Age: 71
End: 2021-08-06

## 2021-08-06 DIAGNOSIS — Z78.0 MENOPAUSE: ICD-10-CM

## 2021-08-06 PROCEDURE — 77080 DXA BONE DENSITY AXIAL: CPT

## 2021-10-15 DIAGNOSIS — E66.01 MORBIDLY OBESE (HCC): Chronic | ICD-10-CM

## 2021-10-15 DIAGNOSIS — Z95.1 S/P CABG (CORONARY ARTERY BYPASS GRAFT): Chronic | ICD-10-CM

## 2021-10-15 DIAGNOSIS — I21.4 NON-ST ELEVATION MYOCARDIAL INFARCTION (NSTEMI) (HCC): Chronic | ICD-10-CM

## 2021-10-15 DIAGNOSIS — I73.9 PAD (PERIPHERAL ARTERY DISEASE) (HCC): Chronic | ICD-10-CM

## 2021-10-15 DIAGNOSIS — I25.119 CORONARY ARTERY DISEASE INVOLVING NATIVE CORONARY ARTERY OF NATIVE HEART WITH ANGINA PECTORIS (HCC): Chronic | ICD-10-CM

## 2021-10-15 DIAGNOSIS — K22.10 ULCER OF ESOPHAGUS WITHOUT BLEEDING: ICD-10-CM

## 2021-10-15 DIAGNOSIS — I10 ESSENTIAL HYPERTENSION: Chronic | ICD-10-CM

## 2021-10-15 RX ORDER — CLOPIDOGREL BISULFATE 75 MG/1
TABLET ORAL
Qty: 90 TABLET | Refills: 3 | Status: SHIPPED | OUTPATIENT
Start: 2021-10-15 | End: 2022-10-10

## 2021-12-03 DIAGNOSIS — K22.719 BARRETT'S ESOPHAGUS WITH DYSPLASIA: ICD-10-CM

## 2021-12-03 RX ORDER — PANTOPRAZOLE SODIUM 40 MG/1
TABLET, DELAYED RELEASE ORAL
Qty: 180 TABLET | Refills: 3 | OUTPATIENT
Start: 2021-12-03

## 2021-12-10 DIAGNOSIS — K22.719 BARRETT'S ESOPHAGUS WITH DYSPLASIA: ICD-10-CM

## 2021-12-13 ENCOUNTER — TELEPHONE (OUTPATIENT)
Dept: GASTROENTEROLOGY | Facility: CLINIC | Age: 71
End: 2021-12-13

## 2021-12-13 RX ORDER — PANTOPRAZOLE SODIUM 40 MG/1
TABLET, DELAYED RELEASE ORAL
Qty: 180 TABLET | Refills: 3 | Status: SHIPPED | OUTPATIENT
Start: 2021-12-13 | End: 2022-12-19 | Stop reason: SDUPTHER

## 2022-07-06 ENCOUNTER — OFFICE VISIT (OUTPATIENT)
Dept: CARDIOLOGY | Facility: CLINIC | Age: 72
End: 2022-07-06

## 2022-07-06 VITALS
HEART RATE: 63 BPM | HEIGHT: 62 IN | WEIGHT: 194.6 LBS | BODY MASS INDEX: 35.81 KG/M2 | DIASTOLIC BLOOD PRESSURE: 52 MMHG | SYSTOLIC BLOOD PRESSURE: 106 MMHG

## 2022-07-06 DIAGNOSIS — E78.2 MIXED HYPERLIPIDEMIA: Chronic | ICD-10-CM

## 2022-07-06 DIAGNOSIS — I10 PRIMARY HYPERTENSION: Chronic | ICD-10-CM

## 2022-07-06 DIAGNOSIS — I25.119 CORONARY ARTERY DISEASE INVOLVING NATIVE CORONARY ARTERY OF NATIVE HEART WITH ANGINA PECTORIS: Primary | Chronic | ICD-10-CM

## 2022-07-06 DIAGNOSIS — I73.9 PAD (PERIPHERAL ARTERY DISEASE): Chronic | ICD-10-CM

## 2022-07-06 DIAGNOSIS — Z95.1 S/P CABG (CORONARY ARTERY BYPASS GRAFT): Chronic | ICD-10-CM

## 2022-07-06 PROCEDURE — 93000 ELECTROCARDIOGRAM COMPLETE: CPT | Performed by: INTERNAL MEDICINE

## 2022-07-06 PROCEDURE — 99214 OFFICE O/P EST MOD 30 MIN: CPT | Performed by: INTERNAL MEDICINE

## 2022-07-06 RX ORDER — CLONIDINE HYDROCHLORIDE 0.2 MG/1
TABLET ORAL
COMMUNITY
Start: 2022-03-17 | End: 2022-07-06 | Stop reason: ALTCHOICE

## 2022-07-06 RX ORDER — ALLOPURINOL 100 MG/1
TABLET ORAL
COMMUNITY

## 2022-07-06 RX ORDER — PREDNISONE 1 MG/1
TABLET ORAL
COMMUNITY
End: 2022-07-06 | Stop reason: ALTCHOICE

## 2022-07-06 RX ORDER — BENZONATATE 100 MG/1
CAPSULE ORAL
COMMUNITY
End: 2022-07-06 | Stop reason: ALTCHOICE

## 2022-07-06 RX ORDER — TRIAMCINOLONE ACETONIDE 1 MG/G
CREAM TOPICAL
COMMUNITY

## 2022-07-06 NOTE — PROGRESS NOTES
Subjective:     Encounter Date: 07/06/22        Patient ID: Valentine Arora is a 72 y.o. female.    Chief Complaint: HTN, CAD  Coronary Artery Disease  Pertinent negatives include no muscle weakness. Her past medical history is significant for past myocardial infarction.       Degunnar Alejandro,    I had the pleasure of seeing this patient in the office today.  Is been 2 years since her last visit, and that was a telehealth visit. She has a history of CAD and had a myocardial infarction approximately 20 years ago. At that point she had a failed attempted angioplasty and then underwent a two-vessel CABG in 1995. In August 2012 she had a stress Cardiolite test performed that showed an ejection fraction 75% and no ischemia. No wall motion abnormalities were seen. She was hospitalized at [facility] in September 2015. 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 This was felt to be due to trauma years ago. She had an echocardiogram that showed an ejection fraction is 60-65% with normal valvular structure and function. She also had a Holter monitor that showed no abnormality.    She has been feeling great without any complaints.  No chest pain or chest discomfort.  No shortness of breath.  She denies any palpitations or tachycardia.    She was noted to have a new carotid bruit and was found to have bilateral cerebrovascular disease.  She also had hip and leg pain and was found to have significant peripheral arterial disease.  She is being followed with Dr. Ace. She had an intervention last Sept- bilateral common iliac artery kissing stent placement on 9/23/2019    Stress test September 2019:  Interpretation Summary     · Diaphragmatic attenuation artifact is present.  · Left ventricular ejection fraction is normal (Calculated EF = 70%).  · Myocardial perfusion imaging indicates a normal myocardial perfusion study with no evidence of  ischemia.     Echocardiogram July 2020:  Interpretation Summary     · Left ventricular systolic function is normal. Calculated EF = 53.1%. Estimated EF was in disagreement with the calculated EF. Estimated EF appears to be in the range of 56 - 60%. Normal left ventricular cavity size and wall thickness noted. All left ventricular wall segments contract normally. Left ventricular diastolic function is normal.       The following portions of the patient's history were reviewed and updated as appropriate: allergies, current medications, past family history, past medical history, past social history, past surgical history and problem list.    Past Medical History:   Diagnosis Date   • Arthritis    • Sarabia esophagus    • CAD (coronary artery disease)    • Chronic back pain    • Colon polyp    • Diabetes mellitus ([facility])    • GERD (gastroesophageal reflux disease)    • Hyperlipidemia    • Hypertension    • Myocardial infarction ([facility])     1995   • PAD (peripheral artery disease) ([facility]) 9/11/2019   • Shingles        Past Surgical History:   Procedure Laterality Date   • COLONOSCOPY     • COLONOSCOPY N/A 6/24/2020    Procedure: COLONOSCOPY;  Surgeon: Mathew Roberts MD;  Location: [facility] OR;  Service: Gastroenterology;  Laterality: N/A;  Descending colon polyp x 2, Ascending colon polyp, Sigmoid colon polyp, Rectal polyp   • CORONARY ARTERY BYPASS GRAFT  1995    x 2 vessels   • ENDOSCOPY N/A 6/24/2020    Procedure: ESOPHAGOGASTRODUODENOSCOPY;  Surgeon: Mathew Roberts MD;  Location: [facility] OR;  Service: Gastroenterology;  Laterality: N/A;  Ulcerative esophagitis, Gastritis, Duodenitis  Duodenal biopsy, gastric biopsy, distal esophageal biopsy   • ENDOSCOPY N/A 9/17/2020    Procedure: ESOPHAGOGASTRODUODENOSCOPY with biopsies;  Surgeon: Mathew Roberts MD;  Location: [facility] OR;  Service: Gastroenterology;  Laterality: N/A;  Esophagitis  Sarabia's Esophagus  Hiatal hernia  Gastritis  Gastric  "biopsy  Distal esophagus biopsy   • INNER EAR SURGERY     • JOINT REPLACEMENT      right knee   • LEG SURGERY      x 2 s/p fall   • LUMBAR FUSION      L4/L5   • SKIN GRAFT      to left lower leg x 2   • TONSILLECTOMY     • WRIST FUSION Left        Social History     Socioeconomic History   • Marital status:    Tobacco Use   • Smoking status: Former Smoker     Quit date:      Years since quittin.5   • Smokeless tobacco: Never Used   • Tobacco comment: QUIT    Vaping Use   • Vaping Use: Never used   Substance and Sexual Activity   • Alcohol use: Not Currently     Comment: occasional   • Drug use: No   • Sexual activity: Defer           ECG 12 Lead    Date/Time: 2022 11:31 AM  Performed by: Kumar Frye III, MD  Authorized by: Kumar Frye III, MD   Comparison: compared with previous ECG   Similar to previous ECG  Rhythm: sinus rhythm  Rate: normal  Conduction: conduction normal  ST Segments: ST segments normal  T Waves: T waves normal  QRS axis: normal  Other: no other findings    Clinical impression: normal ECG                Objective:     Vitals:    22 1108   BP: 106/52   Pulse: 63   Weight: 88.3 kg (194 lb 9.6 oz)   Height: 157.5 cm (62\")           General Appearance:    Alert, cooperative, in no acute distress   Head:    Normocephalic, without obvious abnormality   Eyes:            Lids and lashes normal, conjunctivae and sclerae normal, no icterus, no pallor, corneas clear   Ears:    Ears appear intact with no abnormalities noted   Throat:   No oral lesions, oral mucosa moist   Neck:   No adenopathy, supple, trachea midline, no thyromegaly, no carotid bruit, no JVD   Back:     No kyphosis present, no erythema or scars, no tenderness to palpation    Lungs:     Clear to auscultation,respirations regular, even and unlabored    Heart:    Regular rhythm and normal rate, normal S1 and S2, no murmur, no gallop, no rub, no click   Chest Wall:    No abnormalities observed   Abdomen:     " Normal bowel sounds, no masses, no organomegaly, soft        non-tender, non-distended, no guarding   Extremities:   Moves all extremities well, no edema, no cyanosis, no redness   Pulses:  Bilateral carotids brisk   Skin:  Psychiatric:   No bleeding or rash    Alert and oriented, normal mood and affect       Lab Review:             Lab Results   Component Value Date    GLUCOSE 85 06/16/2021    BUN 7 (L) 06/16/2021    CREATININE 0.65 06/16/2021    EGFRIFNONA 90 06/16/2021    BCR 10.8 06/16/2021    K 4.7 06/16/2021    CO2 17.6 (L) 06/16/2021    CALCIUM 8.5 (L) 06/16/2021    ALBUMIN 3.20 (L) 06/16/2021    LABIL2 1.4 10/24/2019    AST 19 06/16/2021    ALT 11 06/16/2021       Results for orders placed during the hospital encounter of 07/06/20    Adult Transthoracic Echo Complete W/ Cont if Necessary Per Protocol    Interpretation Summary  · Left ventricular systolic function is normal. Calculated EF = 53.1%. Estimated EF was in disagreement with the calculated EF. Estimated EF appears to be in the range of 56 - 60%. Normal left ventricular cavity size and wall thickness noted. All left ventricular wall segments contract normally. Left ventricular diastolic function is normal.            Assessment:          Diagnosis Plan   1. Coronary artery disease involving native coronary artery of native heart with angina pectoris ([facility])  ECG 12 Lead   2. S/P CABG (coronary artery bypass graft)  ECG 12 Lead   3. PAD (peripheral artery disease) ([facility])  ECG 12 Lead   4. Primary hypertension  ECG 12 Lead   5. Mixed hyperlipidemia  ECG 12 Lead          Plan:       1. Coronary Artery Disease  Plan  • Lifestyle modifications discussed include adhering to a heart healthy diet, avoidance of tobacco products, maintenance of a healthy weight, medication compliance, regular exercise and regular monitoring of cholesterol and blood pressure    Subjective - Objective  • There is a history of past MI  • There is a history of previous coronary  artery bypass graft  • Current antiplatelet therapy includes aspirin 81 mg    2.  Prior CABG  3.  History of MI  4.  Hypertension-  5.  Hyperlipidemia-on lipid-lowering therapy, patient states that she had recent blood work in your office and it was well-controlled.  6.  Peripheral arterial disease-  Bilateral common iliac artery kissing stent placement on 9/23/2019. Followed by Dr. Ace  7.  Prior esophagitis and gastritis.  Followed by Dr. Roberts, doing well now without any symptoms at all      Current Outpatient Medications:   •  allopurinol (ZYLOPRIM) 100 MG tablet, allopurinol 100 mg tablet, Disp: , Rfl:   •  atenolol (TENORMIN) 25 MG tablet, Take 25 mg by mouth., Disp: , Rfl:   •  cloNIDine (CATAPRES) 0.1 MG tablet, Take 0.2 mg by mouth 3 (Three) Times a Day., Disp: , Rfl:   •  clopidogrel (PLAVIX) 75 MG tablet, TAKE ONE TABLET BY MOUTH DAILY, Disp: 90 tablet, Rfl: 3  •  hydrALAZINE (APRESOLINE) 50 MG tablet, Take 75 mg by mouth 3 (Three) Times a Day., Disp: , Rfl:   •  hydroCHLOROthiazide (HYDRODIURIL) 12.5 MG tablet, Take 12.5 mg by mouth Daily., Disp: , Rfl:   •  iron polysaccharides (IFerex 150) 150 MG capsule, Take 1 capsule by mouth Daily., Disp: 90 capsule, Rfl: 0  •  metFORMIN (GLUCOPHAGE) 500 MG tablet, 2 (Two) Times a Day With Meals., Disp: , Rfl:   •  olmesartan (BENICAR) 40 MG tablet, Take 40 mg by mouth Daily., Disp: , Rfl:   •  pantoprazole (PROTONIX) 40 MG EC tablet, TAKE ONE TABLET BY MOUTH TWICE A DAY, Disp: 180 tablet, Rfl: 3  •  rosuvastatin (CRESTOR) 10 MG tablet, Take 10 mg by mouth Daily., Disp: , Rfl:   •  triamcinolone (KENALOG) 0.1 % cream, triamcinolone acetonide 0.1 % topical cream, Disp: , Rfl:

## 2022-10-09 DIAGNOSIS — Z95.1 S/P CABG (CORONARY ARTERY BYPASS GRAFT): Chronic | ICD-10-CM

## 2022-10-09 DIAGNOSIS — I25.119 CORONARY ARTERY DISEASE INVOLVING NATIVE CORONARY ARTERY OF NATIVE HEART WITH ANGINA PECTORIS: Chronic | ICD-10-CM

## 2022-10-09 DIAGNOSIS — I10 ESSENTIAL HYPERTENSION: Chronic | ICD-10-CM

## 2022-10-09 DIAGNOSIS — I21.4 NON-ST ELEVATION MYOCARDIAL INFARCTION (NSTEMI): Chronic | ICD-10-CM

## 2022-10-09 DIAGNOSIS — I73.9 PAD (PERIPHERAL ARTERY DISEASE): Chronic | ICD-10-CM

## 2022-10-09 DIAGNOSIS — K22.10 ULCER OF ESOPHAGUS WITHOUT BLEEDING: ICD-10-CM

## 2022-10-09 DIAGNOSIS — E66.01 MORBIDLY OBESE: Chronic | ICD-10-CM

## 2022-10-10 RX ORDER — CLOPIDOGREL BISULFATE 75 MG/1
TABLET ORAL
Qty: 90 TABLET | Refills: 1 | Status: SHIPPED | OUTPATIENT
Start: 2022-10-10

## 2022-10-20 ENCOUNTER — OFFICE VISIT (OUTPATIENT)
Dept: GASTROENTEROLOGY | Facility: CLINIC | Age: 72
End: 2022-10-20

## 2022-10-20 VITALS
WEIGHT: 207.8 LBS | DIASTOLIC BLOOD PRESSURE: 82 MMHG | SYSTOLIC BLOOD PRESSURE: 122 MMHG | HEIGHT: 62 IN | BODY MASS INDEX: 38.24 KG/M2

## 2022-10-20 DIAGNOSIS — K21.9 GASTROESOPHAGEAL REFLUX DISEASE WITHOUT ESOPHAGITIS: ICD-10-CM

## 2022-10-20 DIAGNOSIS — K22.719 BARRETT'S ESOPHAGUS WITH DYSPLASIA: Primary | ICD-10-CM

## 2022-10-20 PROCEDURE — 99213 OFFICE O/P EST LOW 20 MIN: CPT

## 2022-10-20 RX ORDER — CLONIDINE HYDROCHLORIDE 0.2 MG/1
TABLET ORAL
COMMUNITY
Start: 2022-09-20 | End: 2022-10-20

## 2022-10-20 RX ORDER — PANTOPRAZOLE SODIUM 40 MG/1
40 TABLET, DELAYED RELEASE ORAL
COMMUNITY
End: 2022-10-20

## 2022-10-20 RX ORDER — BENZONATATE 100 MG/1
CAPSULE ORAL
COMMUNITY
Start: 2022-07-20 | End: 2022-10-20

## 2022-10-20 NOTE — PROGRESS NOTES
PATIENT INFORMATION  Valentine Arora       - 1950    CHIEF COMPLAINT  Chief Complaint   Patient presents with   • Follow-up     Routine hx Barretts       HISTORY OF PRESENT ILLNESS  Here today for follow-up for GERD and Barretts. Managed on BID Pantoprazole. No breakthrough symptoms, dysphagia, odynophagia, nausea, vomiting, bloating.  Wants to start NSAID again for pain, started crying in office because of arthritis pain. Tylenol Arthritis every 8 hours with no relief. Educated heavily on risks of NSAIDs.  Ultram would be a much option. Does not seen pain management, encouraged to consider.    Last EGD completed to assess healing of ulcerative esophagitis, 20 gastritis and barretts with esophagitis.     Last colon  with 3/5 adenomas. Repeat in 1 year. Few weeks ago diarrhea and nausea, started on probiotic and everything resolved. Now with fiber capsules. Daily and regular bowel movements. No issues.       REVIEWED PERTINENT RESULTS/ LABS  Lab Results   Component Value Date    CASEREPORT  2020     Surgical Pathology Report                         Case: [id number]                                  Authorizing Provider:  Mathew Roberts        Collected:           2020 02:37 PM                                 MD Clive                                                                   Ordering Location:     [facility]   Received:            2020 04:00 PM                                 OR                                                                           Pathologist:           Sathish Marlow MD                                                         Specimens:   1) - Gastric, Body                                                                                  2) - Esophagus, Distal                                                                     FINALDX  2020     1. Stomach, Biopsy: Benign gastric mucosa with    A. Mild chronic  inflammation.   B. Reactive changes of the epithelium.  C. .No intestinal metaplasia.   D. No Helicobacter pylori.    E. Atypical repair.   F. Area suggestive of focal erosion.    2. Esophagus, Distal, Biopsy: Benign squamous and gastric mucosa with    A. Features of esophagitis.   B. Extensive acute and chronic inflammation of the gastric mucosa.   C. Intestinal metaplasia consistent with Sarabia's esophagus with reactive changes.   D. No definitive viral organisms.   E. No definitive fungal organisms.   F. Acute inflammation.   G. Poorly formed granuloma.   H. Areas of extensive acute inflammation.               I. No H. Pylori.    Comment: There is rather extensive acute inflammation and chronic inflammation in the gastric type mucosa. There was Sarabia's esophagus but no dysplasia. The squamous mucosa has numerous lymphocytes and eosinophils and has extensive reactive changes. The features are those of esophagitis likely reflux. Given the granuloma that is present AFB and GMS stains will be performed and the results reported in an addendum. An immunostain for Helicobacter pylori was performed and the controls stain appropriately. No H. Pylori is identified.      kristi/pkm        Lab Results   Component Value Date    HGB 8.6 (L) 2021    MCV 93.2 2021     2021    ALT 11 2021    AST 19 2021    HGBA1C 6.7 (H) 2018    INR 0.84 (L) 2020    IRON 36 (L) 2021    TIBC 321 2021      XR Foot Comp Min 3 Vws RT    Result Date: 2022  Narrative: REVIEWING YOUR TEST RESULTS IN [facility] IS NOT A SUBSTITUTE FOR DISCUSSING THOSE RESULTS WITH YOUR HEALTH CARE PROVIDER. PLEASE CONTACT YOUR PROVIDER VIA [facility] TO DISCUSS ANY QUESTIONS OR CONCERNS YOU MAY HAVE REGARDING THESE TEST RESULTS.  RADIOLOGY REPORT FACILITY:  [facility] UNIT/AGE/GENDER: P.NOSM  OP      AGE:72 Y          SEX:F PATIENT NAME/:  SONU AVINA BOYER    1950 UNIT  NUMBER:  [id number] ACCOUNT NUMBER:  [id number] ACCESSION NUMBER:  [id number] EXAM: XR FOOT COMP MIN 3 VWS RT 9/27/2022 1:22 PM HISTORY: right foot fx COMPARISON: August 12, 2022 TECHNIQUE:  3 views of the right foot FINDINGS: Nondisplaced transverse fifth metatarsal base fracture is incompletely united with no significant bridging callus. Slight increase in distraction of the fracture. Moderate to severe first MTP and midfoot osteoarthritis. As before. No new fracture. IMPRESSION: Fifth metatarsal base fracture remains incompletely united with slight increased distraction at the fracture site Dictated by: Anurag Carreon M.D. Images and Report reviewed and interpreted by: Anurag Carreon M.D. <PS><Electronically signed by: Anurag Carreon M.D.> 09/27/2022 1624 D: 09/27/2022 1623 T: 09/27/2022 1623      REVIEW OF SYSTEMS  Review of Systems   Constitutional: Negative.    HENT: Negative.    Eyes: Negative.    Respiratory: Negative.    Cardiovascular: Negative.    Gastrointestinal: Positive for abdominal distention, abdominal pain, constipation and diarrhea.        Started taking daily fiber and probiotic for last 5 weeks.   Endocrine: Negative.    Genitourinary: Negative.    Musculoskeletal: Positive for arthralgias, back pain, joint swelling and myalgias.   Skin: Negative.    Allergic/Immunologic: Negative.    Neurological: Negative.    Hematological: Bruises/bleeds easily.   Psychiatric/Behavioral: Negative.          ACTIVE PROBLEMS  Patient Active Problem List    Diagnosis    • Gastroesophageal reflux disease without esophagitis [K21.9]    • Dehydration with hyponatremia [E86.0, E87.1]    • Sarabia's esophagus with dysplasia [K22.719]    • Ulcer of esophagus without bleeding [K22.10]    • Esophageal dysphagia [R13.19]    • Encounter for screening for malignant neoplasm of colon [Z12.11]    • Morbidly obese (HCC) [E66.01]    • PAD (peripheral artery disease) ([facility]) [I73.9]    • S/P CABG (coronary artery bypass graft)  [Z95.1]    • CAD (coronary artery disease) [I25.10]    • Myocardial infarction (HCC) [I21.9]    • Hypertension [I10]    • Hyperlipidemia [E78.5]          PAST MEDICAL HISTORY  Past Medical History:   Diagnosis Date   • Arthritis    • Sarabia esophagus    • CAD (coronary artery disease)    • Chronic back pain    • Colon polyp    • Diabetes mellitus (HCC)    • GERD (gastroesophageal reflux disease)    • Hyperlipidemia    • Hypertension    • Myocardial infarction (HCC)     1995   • PAD (peripheral artery disease) (HCC) 9/11/2019   • Shingles          SURGICAL HISTORY  Past Surgical History:   Procedure Laterality Date   • COLONOSCOPY     • COLONOSCOPY N/A 6/24/2020    Procedure: COLONOSCOPY;  Surgeon: Mathew Roberts MD;  Location: [facility] OR;  Service: Gastroenterology;  Laterality: N/A;  Descending colon polyp x 2, Ascending colon polyp, Sigmoid colon polyp, Rectal polyp   • CORONARY ARTERY BYPASS GRAFT  1995    x 2 vessels   • ENDOSCOPY N/A 6/24/2020    Procedure: ESOPHAGOGASTRODUODENOSCOPY;  Surgeon: Mathew Roberts MD;  Location: [facility] OR;  Service: Gastroenterology;  Laterality: N/A;  Ulcerative esophagitis, Gastritis, Duodenitis  Duodenal biopsy, gastric biopsy, distal esophageal biopsy   • ENDOSCOPY N/A 9/17/2020    Procedure: ESOPHAGOGASTRODUODENOSCOPY with biopsies;  Surgeon: Mathew Roberts MD;  Location: [facility] OR;  Service: Gastroenterology;  Laterality: N/A;  Esophagitis  Sarabia's Esophagus  Hiatal hernia  Gastritis  Gastric biopsy  Distal esophagus biopsy   • INNER EAR SURGERY     • JOINT REPLACEMENT      right knee   • LEG SURGERY      x 2 s/p fall   • LUMBAR FUSION      L4/L5   • SKIN GRAFT      to left lower leg x 2   • TONSILLECTOMY     • WRIST FUSION Left          FAMILY HISTORY  Family History   Problem Relation Age of Onset   • Colon cancer Neg Hx    • Colon polyps Neg Hx          SOCIAL HISTORY  Social History     Occupational History   • Not on file   Tobacco  "Use   • Smoking status: Former     Types: Cigarettes     Quit date:      Years since quittin.8   • Smokeless tobacco: Never   • Tobacco comments:     QUIT    Vaping Use   • Vaping Use: Never used   Substance and Sexual Activity   • Alcohol use: Not Currently     Comment: occasional   • Drug use: No   • Sexual activity: Defer         CURRENT MEDICATIONS    Current Outpatient Medications:   •  allopurinol (ZYLOPRIM) 100 MG tablet, allopurinol 100 mg tablet, Disp: , Rfl:   •  atenolol (TENORMIN) 25 MG tablet, Take 25 mg by mouth., Disp: , Rfl:   •  cloNIDine (CATAPRES) 0.1 MG tablet, Take 0.2 mg by mouth 3 (Three) Times a Day., Disp: , Rfl:   •  clopidogrel (PLAVIX) 75 MG tablet, TAKE ONE TABLET BY MOUTH DAILY, Disp: 90 tablet, Rfl: 1  •  hydrALAZINE (APRESOLINE) 50 MG tablet, Take 75 mg by mouth 3 (Three) Times a Day., Disp: , Rfl:   •  hydroCHLOROthiazide (HYDRODIURIL) 12.5 MG tablet, Take 12.5 mg by mouth Daily., Disp: , Rfl:   •  iron polysaccharides (IFerex 150) 150 MG capsule, Take 1 capsule by mouth Daily., Disp: 90 capsule, Rfl: 0  •  metFORMIN (GLUCOPHAGE) 500 MG tablet, 2 (Two) Times a Day With Meals., Disp: , Rfl:   •  olmesartan (BENICAR) 40 MG tablet, Take 40 mg by mouth Daily., Disp: , Rfl:   •  pantoprazole (PROTONIX) 40 MG EC tablet, TAKE ONE TABLET BY MOUTH TWICE A DAY, Disp: 180 tablet, Rfl: 3  •  rosuvastatin (CRESTOR) 10 MG tablet, Take 10 mg by mouth Daily., Disp: , Rfl:   •  triamcinolone (KENALOG) 0.1 % cream, triamcinolone acetonide 0.1 % topical cream, Disp: , Rfl:     ALLERGIES  Codeine and Other    VITALS  Vitals:    10/20/22 1427   BP: 122/82   BP Location: Left arm   Patient Position: Sitting   Cuff Size: Adult   Weight: 94.3 kg (207 lb 12.8 oz)   Height: 157.5 cm (62.01\")       PHYSICAL EXAM  Debilities/Disabilities Identified: None  Emotional Behavior: Appropriate  Wt Readings from Last 3 Encounters:   10/20/22 94.3 kg (207 lb 12.8 oz)   22 88.3 kg (194 lb 9.6 oz) " "  06/16/21 92.2 kg (203 lb 4.8 oz)     Ht Readings from Last 1 Encounters:   10/20/22 157.5 cm (62.01\")     Body mass index is 38 kg/m².  Physical Exam  Constitutional:       General: She is not in acute distress.     Appearance: Normal appearance. She is not ill-appearing.   HENT:      Head: Normocephalic and atraumatic.      Mouth/Throat:      Mouth: Mucous membranes are moist.      Pharynx: No posterior oropharyngeal erythema.   Eyes:      General: No scleral icterus.  Cardiovascular:      Rate and Rhythm: Normal rate and regular rhythm.      Pulses: Normal pulses.      Heart sounds: Normal heart sounds.   Pulmonary:      Effort: Pulmonary effort is normal.      Breath sounds: Normal breath sounds.   Abdominal:      General: Abdomen is flat. Bowel sounds are normal. There is no distension.      Palpations: Abdomen is soft. There is no mass.      Tenderness: There is no abdominal tenderness. There is no guarding or rebound. Negative signs include Cardenas's sign.      Hernia: No hernia is present.   Skin:     General: Skin is warm.      Capillary Refill: Capillary refill takes less than 2 seconds.   Neurological:      General: No focal deficit present.      Mental Status: She is alert.   Psychiatric:         Mood and Affect: Mood normal.         Behavior: Behavior normal.         Thought Content: Thought content normal.         Judgment: Judgment normal.         CLINICAL DATA REVIEWED   reviewed previous lab results and integrated with today's visit, reviewed notes from other physicians and/or last GI encounter, reviewed previous endoscopy results and available photos, reviewed surgical pathology results from previous biopsies    ASSESSMENT  Diagnoses and all orders for this visit:    Sarabia's esophagus with dysplasia    Gastroesophageal reflux disease without esophagitis    Other orders  -     Discontinue: benzonatate (TESSALON) 100 MG capsule  -     Discontinue: cloNIDine (CATAPRES) 0.2 MG tablet  -     " Discontinue: Nirmatrelvir&Ritonavir 150/100 (Paxlovid) 10 x 150 MG & 10 x 100MG tablet therapy pack tablet (for renal adjustment)  -     Discontinue: pantoprazole (PROTONIX) 40 MG EC tablet; Take 1 tablet by mouth.          PLAN  Continue current management.    Return in about 1 year (around 10/20/2023).    I have discussed the above plan with the patient.  They verbalize understanding and are in agreement with the plan.  They have been advised to contact the office for any questions, concerns, or changes related to their health.

## 2022-11-04 ENCOUNTER — TRANSCRIBE ORDERS (OUTPATIENT)
Dept: ADMINISTRATIVE | Facility: HOSPITAL | Age: 72
End: 2022-11-04

## 2022-11-04 DIAGNOSIS — Z12.31 VISIT FOR SCREENING MAMMOGRAM: Primary | ICD-10-CM

## 2022-12-19 DIAGNOSIS — K22.719 BARRETT'S ESOPHAGUS WITH DYSPLASIA: ICD-10-CM

## 2022-12-19 RX ORDER — PANTOPRAZOLE SODIUM 40 MG/1
40 TABLET, DELAYED RELEASE ORAL 2 TIMES DAILY
Qty: 180 TABLET | Refills: 3 | Status: SHIPPED | OUTPATIENT
Start: 2022-12-19 | End: 2022-12-28 | Stop reason: SDUPTHER

## 2022-12-28 DIAGNOSIS — K22.719 BARRETT'S ESOPHAGUS WITH DYSPLASIA: ICD-10-CM

## 2022-12-28 RX ORDER — PANTOPRAZOLE SODIUM 40 MG/1
40 TABLET, DELAYED RELEASE ORAL 2 TIMES DAILY
Qty: 180 TABLET | Refills: 3 | Status: SHIPPED | OUTPATIENT
Start: 2022-12-28

## 2023-01-09 ENCOUNTER — HOSPITAL ENCOUNTER (OUTPATIENT)
Dept: MAMMOGRAPHY | Facility: HOSPITAL | Age: 73
Discharge: HOME | End: 2023-01-09
Admitting: FAMILY MEDICINE
Payer: MEDICARE

## 2023-01-09 DIAGNOSIS — Z12.31 VISIT FOR SCREENING MAMMOGRAM: ICD-10-CM

## 2023-01-09 PROCEDURE — 77067 SCR MAMMO BI INCL CAD: CPT

## 2023-01-09 PROCEDURE — 77063 BREAST TOMOSYNTHESIS BI: CPT

## 2023-03-20 ENCOUNTER — TELEPHONE (OUTPATIENT)
Dept: GASTROENTEROLOGY | Facility: CLINIC | Age: 73
End: 2023-03-20
Payer: MEDICARE

## 2023-03-20 NOTE — TELEPHONE ENCOUNTER
PT called asking about the scheduling of her and her husbands colonoscopy. PT stated they had sent in their paperwork few weeks ago and that they were wondering when it would be scheduled. I explained the process to PT    PT also wanted to let us know that she choked on chicken and it got stuck in her esophagus. She had to have it removed by the Gastro Dr at hospital. Dr told her to keep the ref to Dr Roberts and see when she could get in.    I informed PT I would send over the information and have someone call her back at [phone]

## 2023-04-06 ENCOUNTER — TELEPHONE (OUTPATIENT)
Dept: GASTROENTEROLOGY | Facility: CLINIC | Age: 73
End: 2023-04-06
Payer: MEDICARE

## 2023-04-06 DIAGNOSIS — I25.119 CORONARY ARTERY DISEASE INVOLVING NATIVE CORONARY ARTERY OF NATIVE HEART WITH ANGINA PECTORIS: Chronic | ICD-10-CM

## 2023-04-06 DIAGNOSIS — I10 ESSENTIAL HYPERTENSION: Chronic | ICD-10-CM

## 2023-04-06 DIAGNOSIS — I21.4 NON-ST ELEVATION MYOCARDIAL INFARCTION (NSTEMI): Chronic | ICD-10-CM

## 2023-04-06 DIAGNOSIS — I73.9 PAD (PERIPHERAL ARTERY DISEASE): Chronic | ICD-10-CM

## 2023-04-06 DIAGNOSIS — K22.10 ULCER OF ESOPHAGUS WITHOUT BLEEDING: ICD-10-CM

## 2023-04-06 DIAGNOSIS — Z95.1 S/P CABG (CORONARY ARTERY BYPASS GRAFT): Chronic | ICD-10-CM

## 2023-04-06 DIAGNOSIS — E66.01 MORBIDLY OBESE: Chronic | ICD-10-CM

## 2023-04-06 NOTE — TELEPHONE ENCOUNTER
FAST TRACK 3 YEAR RECALL 06/2023  LAST EGD 03/13/2023, FOOD IMPACTION  PERSONAL HISTORY POLYPS    Office appointment with Dr. Roberts on 05/08/2023.

## 2023-04-10 RX ORDER — CLOPIDOGREL BISULFATE 75 MG/1
TABLET ORAL
Qty: 90 TABLET | Refills: 1 | Status: SHIPPED | OUTPATIENT
Start: 2023-04-10

## 2023-05-08 ENCOUNTER — OFFICE VISIT (OUTPATIENT)
Dept: GASTROENTEROLOGY | Facility: CLINIC | Age: 73
End: 2023-05-08
Payer: MEDICARE

## 2023-05-08 VITALS
SYSTOLIC BLOOD PRESSURE: 146 MMHG | WEIGHT: 197.8 LBS | DIASTOLIC BLOOD PRESSURE: 62 MMHG | HEIGHT: 62 IN | BODY MASS INDEX: 36.4 KG/M2

## 2023-05-08 DIAGNOSIS — K22.719 BARRETT'S ESOPHAGUS WITH DYSPLASIA: Primary | ICD-10-CM

## 2023-05-08 DIAGNOSIS — K22.2 ESOPHAGEAL OBSTRUCTION: ICD-10-CM

## 2023-05-08 DIAGNOSIS — Z86.010 PERSONAL HISTORY OF COLONIC POLYPS: ICD-10-CM

## 2023-05-08 PROBLEM — Z86.0100 PERSONAL HISTORY OF COLONIC POLYPS: Status: ACTIVE | Noted: 2023-05-08

## 2023-05-08 PROCEDURE — 1160F RVW MEDS BY RX/DR IN RCRD: CPT | Performed by: INTERNAL MEDICINE

## 2023-05-08 PROCEDURE — 3078F DIAST BP <80 MM HG: CPT | Performed by: INTERNAL MEDICINE

## 2023-05-08 PROCEDURE — 3077F SYST BP >= 140 MM HG: CPT | Performed by: INTERNAL MEDICINE

## 2023-05-08 PROCEDURE — 1159F MED LIST DOCD IN RCRD: CPT | Performed by: INTERNAL MEDICINE

## 2023-05-08 PROCEDURE — 99214 OFFICE O/P EST MOD 30 MIN: CPT | Performed by: INTERNAL MEDICINE

## 2023-05-08 NOTE — PROGRESS NOTES
PATIENT INFORMATION  Valentine Arora       - 1950    CHIEF COMPLAINT  Chief Complaint   Patient presents with   • Heartburn   • Sarabia's esophagus       HISTORY OF PRESENT ILLNESS  Recent Meat impaction at [facility] but says she didn't chew the chicken.     IS due her coloon and was not dilated nor biopsied fro her Sarabia's so will repeat er EGD along with her colonosocpy      REVIEWED PERTINENT RESULTS/ LABS  Lab Results   Component Value Date    CASEREPORT  2020     Surgical Pathology Report                         Case: [id number]                                  Authorizing Provider:  Mathew Roberts        Collected:           2020 02:37 PM                                 MD Clive                                                                   Ordering Location:     [facility]   Received:            2020 04:00 PM                                 OR                                                                           Pathologist:           Sathish Marlow MD                                                         Specimens:   1) - Gastric, Body                                                                                  2) - Esophagus, Distal                                                                     FINALDX  2020     1. Stomach, Biopsy: Benign gastric mucosa with    A. Mild chronic inflammation.   B. Reactive changes of the epithelium.  C. .No intestinal metaplasia.   D. No Helicobacter pylori.    E. Atypical repair.   F. Area suggestive of focal erosion.    2. Esophagus, Distal, Biopsy: Benign squamous and gastric mucosa with    A. Features of esophagitis.   B. Extensive acute and chronic inflammation of the gastric mucosa.   C. Intestinal metaplasia consistent with Sarabia's esophagus with reactive changes.   D. No definitive viral organisms.   E. No definitive fungal organisms.   F. Acute inflammation.   G. Poorly formed granuloma.   H.  Areas of extensive acute inflammation.               I. No H. Pylori.    Comment: There is rather extensive acute inflammation and chronic inflammation in the gastric type mucosa. There was Sarabia's esophagus but no dysplasia. The squamous mucosa has numerous lymphocytes and eosinophils and has extensive reactive changes. The features are those of esophagitis likely reflux. Given the granuloma that is present AFB and GMS stains will be performed and the results reported in an addendum. An immunostain for Helicobacter pylori was performed and the controls stain appropriately. No H. Pylori is identified.      kristi/pkm        Lab Results   Component Value Date    HGB 8.6 (L) 06/16/2021    MCV 93.2 06/16/2021     06/16/2021    ALT 11 06/16/2021    AST 19 06/16/2021    HGBA1C 6.7 (H) 11/29/2018    INR 0.84 (L) 09/17/2020    IRON 36 (L) 06/14/2021    TIBC 321 06/14/2021      No results found.    REVIEW OF SYSTEMS  Review of Systems   Constitutional: Negative for activity change, chills, fever and unexpected weight change.   HENT: Positive for trouble swallowing. Negative for congestion.    Eyes: Negative for visual disturbance.   Respiratory: Positive for choking. Negative for shortness of breath.    Cardiovascular: Negative for chest pain and palpitations.   Gastrointestinal: Positive for abdominal distention, abdominal pain, constipation, diarrhea, nausea and vomiting. Negative for blood in stool.   Endocrine: Negative for cold intolerance and heat intolerance.   Genitourinary: Negative for hematuria.   Musculoskeletal: Negative for gait problem.   Skin: Negative for color change.   Allergic/Immunologic: Negative for immunocompromised state.   Neurological: Negative for weakness and light-headedness.   Hematological: Negative for adenopathy.   Psychiatric/Behavioral: Negative for sleep disturbance. The patient is not nervous/anxious.          ACTIVE PROBLEMS  Patient Active Problem List    Diagnosis    • Personal  history of colonic polyps [Z86.010]    • Esophageal obstruction [K22.2]    • Gastroesophageal reflux disease without esophagitis [K21.9]    • Dehydration with hyponatremia [E86.0, E87.1]    • Sarabia's esophagus with dysplasia [K22.719]    • Ulcer of esophagus without bleeding [K22.10]    • Esophageal dysphagia [R13.19]    • Encounter for screening for malignant neoplasm of colon [Z12.11]    • Morbidly obese [E66.01]    • PAD (peripheral artery disease) [I73.9]    • S/P CABG (coronary artery bypass graft) [Z95.1]    • CAD (coronary artery disease) [I25.10]    • Myocardial infarction [I21.9]    • Hypertension [I10]    • Hyperlipidemia [E78.5]          PAST MEDICAL HISTORY  Past Medical History:   Diagnosis Date   • Arthritis    • Sarabia esophagus    • CAD (coronary artery disease)    • Chronic back pain    • Colon polyp    • Diabetes mellitus    • GERD (gastroesophageal reflux disease)    • Hyperlipidemia    • Hypertension    • Myocardial infarction     1995   • PAD (peripheral artery disease) 9/11/2019   • Shingles          SURGICAL HISTORY  Past Surgical History:   Procedure Laterality Date   • COLONOSCOPY     • COLONOSCOPY N/A 6/24/2020    Procedure: COLONOSCOPY;  Surgeon: Mathew Roberts MD;  Location: [facility] OR;  Service: Gastroenterology;  Laterality: N/A;  Descending colon polyp x 2, Ascending colon polyp, Sigmoid colon polyp, Rectal polyp   • CORONARY ARTERY BYPASS GRAFT  1995    x 2 vessels   • ENDOSCOPY N/A 6/24/2020    Procedure: ESOPHAGOGASTRODUODENOSCOPY;  Surgeon: Mathew Roberts MD;  Location: [facility] OR;  Service: Gastroenterology;  Laterality: N/A;  Ulcerative esophagitis, Gastritis, Duodenitis  Duodenal biopsy, gastric biopsy, distal esophageal biopsy   • ENDOSCOPY N/A 9/17/2020    Procedure: ESOPHAGOGASTRODUODENOSCOPY with biopsies;  Surgeon: Mathew Roberts MD;  Location: [facility] OR;  Service: Gastroenterology;  Laterality: N/A;  Esophagitis  Sarabia's  Esophagus  Hiatal hernia  Gastritis  Gastric biopsy  Distal esophagus biopsy   • INNER EAR SURGERY     • JOINT REPLACEMENT      right knee   • LEG SURGERY      x 2 s/p fall   • LUMBAR FUSION      L4/L5   • SKIN GRAFT      to left lower leg x 2   • TONSILLECTOMY     • WRIST FUSION Left          FAMILY HISTORY  Family History   Problem Relation Age of Onset   • Heart disease Mother    • Heart disease Father    • Colon cancer Neg Hx    • Colon polyps Neg Hx    • Breast cancer Neg Hx          SOCIAL HISTORY  Social History     Occupational History   • Not on file   Tobacco Use   • Smoking status: Former     Types: Cigarettes     Quit date:      Years since quittin.3   • Smokeless tobacco: Never   • Tobacco comments:     QUIT    Vaping Use   • Vaping Use: Never used   Substance and Sexual Activity   • Alcohol use: Not Currently     Comment: occasional   • Drug use: No   • Sexual activity: Defer         CURRENT MEDICATIONS    Current Outpatient Medications:   •  allopurinol (ZYLOPRIM) 100 MG tablet, allopurinol 100 mg tablet, Disp: , Rfl:   •  atenolol (TENORMIN) 25 MG tablet, Take 1 tablet by mouth., Disp: , Rfl:   •  cloNIDine (CATAPRES) 0.1 MG tablet, Take 2 tablets by mouth 3 (Three) Times a Day., Disp: , Rfl:   •  clopidogrel (PLAVIX) 75 MG tablet, TAKE ONE TABLET BY MOUTH DAILY, Disp: 90 tablet, Rfl: 1  •  hydrALAZINE (APRESOLINE) 50 MG tablet, Take 1.5 tablets by mouth 3 (Three) Times a Day., Disp: , Rfl:   •  hydroCHLOROthiazide (HYDRODIURIL) 12.5 MG tablet, Take 1 tablet by mouth Daily., Disp: , Rfl:   •  metFORMIN (GLUCOPHAGE) 500 MG tablet, 2 (Two) Times a Day With Meals., Disp: , Rfl:   •  olmesartan (BENICAR) 40 MG tablet, Take 1 tablet by mouth Daily., Disp: , Rfl:   •  pantoprazole (PROTONIX) 40 MG EC tablet, Take 1 tablet by mouth 2 (Two) Times a Day., Disp: 180 tablet, Rfl: 3  •  rosuvastatin (CRESTOR) 10 MG tablet, Take 1 tablet by mouth Daily., Disp: , Rfl:   •  hyoscyamine (LEVSIN) 0.125  "MG SL tablet, Take 1 tablet by mouth 4 (Four) Times a Day With Meals & at Bedtime., Disp: 120 tablet, Rfl: 5    ALLERGIES  Codeine and Other    VITALS  Vitals:    05/08/23 1415   BP: 146/62   BP Location: Left arm   Patient Position: Sitting   Cuff Size: Large Adult   Weight: 89.7 kg (197 lb 12.8 oz)   Height: 157.5 cm (62\")       PHYSICAL EXAM  Debilities/Disabilities Identified: None  Emotional Behavior: Appropriate  Wt Readings from Last 3 Encounters:   05/08/23 89.7 kg (197 lb 12.8 oz)   10/20/22 94.3 kg (207 lb 12.8 oz)   07/06/22 88.3 kg (194 lb 9.6 oz)     Ht Readings from Last 1 Encounters:   05/08/23 157.5 cm (62\")     Body mass index is 36.18 kg/m².  Physical Exam  Constitutional:       Appearance: She is well-developed. She is not diaphoretic.   HENT:      Head: Normocephalic and atraumatic.   Eyes:      General: No scleral icterus.     Conjunctiva/sclera: Conjunctivae normal.      Pupils: Pupils are equal, round, and reactive to light.   Neck:      Thyroid: No thyromegaly.   Cardiovascular:      Rate and Rhythm: Normal rate and regular rhythm.      Heart sounds: Normal heart sounds. No murmur heard.    No gallop.   Pulmonary:      Effort: Pulmonary effort is normal.      Breath sounds: Normal breath sounds. No wheezing or rales.   Abdominal:      General: Bowel sounds are normal. There is no distension or abdominal bruit.      Palpations: Abdomen is soft. There is no shifting dullness, fluid wave or mass.      Tenderness: There is no abdominal tenderness. There is no guarding. Negative signs include Cardenas's sign.      Hernia: There is no hernia in the ventral area.   Musculoskeletal:         General: Normal range of motion.      Cervical back: Normal range of motion and neck supple.   Lymphadenopathy:      Cervical: No cervical adenopathy.   Skin:     General: Skin is warm and dry.      Findings: No erythema or rash.   Neurological:      Mental Status: She is alert and oriented to person, place, and " time.   Psychiatric:         Mood and Affect: Mood normal.         Behavior: Behavior normal.         CLINICAL DATA REVIEWED   reviewed previous lab results and integrated with today's visit, reviewed notes from other physicians and/or last GI encounter, reviewed previous endoscopy results and available photos, reviewed surgical pathology results from previous biopsies    ASSESSMENT  Diagnoses and all orders for this visit:    Sarabia's esophagus with dysplasia    Esophageal obstruction    Personal history of colonic polyps    Other orders  -     hyoscyamine (LEVSIN) 0.125 MG SL tablet; Take 1 tablet by mouth 4 (Four) Times a Day With Meals & at Bedtime.          PLAN  EGD w her Colon and attempt dilation    No follow-ups on file.    I have discussed the above plan with the patient.  They verbalize understanding and are in agreement with the plan.  They have been advised to contact the office for any questions, concerns, or changes related to their health.

## 2023-05-09 ENCOUNTER — TELEPHONE (OUTPATIENT)
Dept: GASTROENTEROLOGY | Facility: CLINIC | Age: 73
End: 2023-05-09
Payer: MEDICARE

## 2023-05-17 ENCOUNTER — HOSPITAL ENCOUNTER (OUTPATIENT)
Facility: HOSPITAL | Age: 73
Setting detail: OBSERVATION
Discharge: HOME | End: 2023-05-18
Attending: EMERGENCY MEDICINE | Admitting: FAMILY MEDICINE
Payer: MEDICARE

## 2023-05-17 ENCOUNTER — APPOINTMENT (OUTPATIENT)
Dept: GENERAL RADIOLOGY | Facility: HOSPITAL | Age: 73
End: 2023-05-17
Payer: MEDICARE

## 2023-05-17 ENCOUNTER — APPOINTMENT (OUTPATIENT)
Dept: CARDIOLOGY | Facility: HOSPITAL | Age: 73
End: 2023-05-17
Payer: MEDICARE

## 2023-05-17 DIAGNOSIS — R07.9 CHEST PAIN, UNSPECIFIED TYPE: ICD-10-CM

## 2023-05-17 DIAGNOSIS — I48.92 ATRIAL FLUTTER, UNSPECIFIED TYPE: Primary | ICD-10-CM

## 2023-05-17 LAB
ALBUMIN SERPL-MCNC: 4.1 G/DL (ref 3.5–5.2)
ALBUMIN/GLOB SERPL: 1.6 G/DL
ALP SERPL-CCNC: 73 U/L (ref 39–117)
ALT SERPL W P-5'-P-CCNC: 13 U/L (ref 1–33)
ANION GAP SERPL CALCULATED.3IONS-SCNC: 10.3 MMOL/L (ref 5–15)
AORTIC DIMENSIONLESS INDEX: 0.8 (DI)
APTT PPP: 29.8 SECONDS (ref 24.3–38.1)
ASCENDING AORTA: 2.9 CM
AST SERPL-CCNC: 14 U/L (ref 1–32)
BASOPHILS # BLD AUTO: 0.05 10*3/MM3 (ref 0–0.2)
BASOPHILS NFR BLD AUTO: 0.9 % (ref 0–1.5)
BH CV ECHO MEAS - ACS: 1.65 CM
BH CV ECHO MEAS - AO MAX PG: 8.5 MMHG
BH CV ECHO MEAS - AO MEAN PG: 4.4 MMHG
BH CV ECHO MEAS - AO V2 MAX: 145.5 CM/SEC
BH CV ECHO MEAS - AO V2 VTI: 32.8 CM
BH CV ECHO MEAS - AVA(I,D): 2.09 CM2
BH CV ECHO MEAS - EDV(CUBED): 91.9 ML
BH CV ECHO MEAS - EDV(MOD-SP2): 96 ML
BH CV ECHO MEAS - EDV(MOD-SP4): 91 ML
BH CV ECHO MEAS - EF(MOD-BP): 58.5 %
BH CV ECHO MEAS - EF(MOD-SP2): 64.6 %
BH CV ECHO MEAS - EF(MOD-SP4): 52.7 %
BH CV ECHO MEAS - ESV(CUBED): 32.9 ML
BH CV ECHO MEAS - ESV(MOD-SP2): 34 ML
BH CV ECHO MEAS - ESV(MOD-SP4): 43 ML
BH CV ECHO MEAS - FS: 29 %
BH CV ECHO MEAS - IVS/LVPW: 0.89 CM
BH CV ECHO MEAS - IVSD: 1.13 CM
BH CV ECHO MEAS - LAT PEAK E' VEL: 19.1 CM/SEC
BH CV ECHO MEAS - LV MASS(C)D: 200.4 GRAMS
BH CV ECHO MEAS - LV MAX PG: 5.6 MMHG
BH CV ECHO MEAS - LV MEAN PG: 2.8 MMHG
BH CV ECHO MEAS - LV V1 MAX: 118.2 CM/SEC
BH CV ECHO MEAS - LV V1 VTI: 26.5 CM
BH CV ECHO MEAS - LVIDD: 4.5 CM
BH CV ECHO MEAS - LVIDS: 3.2 CM
BH CV ECHO MEAS - LVOT AREA: 2.6 CM2
BH CV ECHO MEAS - LVOT DIAM: 1.81 CM
BH CV ECHO MEAS - LVPWD: 1.28 CM
BH CV ECHO MEAS - MED PEAK E' VEL: 15.7 CM/SEC
BH CV ECHO MEAS - MV A DUR: 0.13 SEC
BH CV ECHO MEAS - MV A MAX VEL: 52.1 CM/SEC
BH CV ECHO MEAS - MV DEC SLOPE: 760.5 CM/SEC2
BH CV ECHO MEAS - MV DEC TIME: 130 MSEC
BH CV ECHO MEAS - MV E MAX VEL: 118 CM/SEC
BH CV ECHO MEAS - MV E/A: 2.27
BH CV ECHO MEAS - MV MAX PG: 5.9 MMHG
BH CV ECHO MEAS - MV MEAN PG: 2.14 MMHG
BH CV ECHO MEAS - MV P1/2T: 47.2 MSEC
BH CV ECHO MEAS - MV V2 VTI: 23.7 CM
BH CV ECHO MEAS - MVA(P1/2T): 4.7 CM2
BH CV ECHO MEAS - MVA(VTI): 2.9 CM2
BH CV ECHO MEAS - PA ACC TIME: 0.1 SEC
BH CV ECHO MEAS - PA PR(ACCEL): 32.7 MMHG
BH CV ECHO MEAS - PA V2 MAX: 121.9 CM/SEC
BH CV ECHO MEAS - RAP SYSTOLE: 3 MMHG
BH CV ECHO MEAS - RV MAX PG: 2.31 MMHG
BH CV ECHO MEAS - RV V1 MAX: 76 CM/SEC
BH CV ECHO MEAS - RV V1 VTI: 17.5 CM
BH CV ECHO MEAS - RVSP: 26.2 MMHG
BH CV ECHO MEAS - SV(LVOT): 68.4 ML
BH CV ECHO MEAS - SV(MOD-SP2): 62 ML
BH CV ECHO MEAS - SV(MOD-SP4): 48 ML
BH CV ECHO MEAS - TAPSE (>1.6): 1.65 CM
BH CV ECHO MEAS - TR MAX PG: 23.2 MMHG
BH CV ECHO MEAS - TR MAX VEL: 240.6 CM/SEC
BH CV ECHO MEASUREMENTS AVERAGE E/E' RATIO: 6.78
BH CV XLRA - RV BASE: 3.3 CM
BH CV XLRA - RV LENGTH: 7.4 CM
BH CV XLRA - RV MID: 2.9 CM
BH CV XLRA - TDI S': 8.5 CM/SEC
BILIRUB SERPL-MCNC: <0.2 MG/DL (ref 0–1.2)
BUN SERPL-MCNC: 26 MG/DL (ref 8–23)
BUN/CREAT SERPL: 21.1 (ref 7–25)
CALCIUM SPEC-SCNC: 9.5 MG/DL (ref 8.6–10.5)
CHLORIDE SERPL-SCNC: 102 MMOL/L (ref 98–107)
CO2 SERPL-SCNC: 22.7 MMOL/L (ref 22–29)
CREAT SERPL-MCNC: 1.23 MG/DL (ref 0.57–1)
D DIMER PPP FEU-MCNC: 0.44 MCGFEU/ML (ref 0–0.72)
DEPRECATED RDW RBC AUTO: 45.8 FL (ref 37–54)
EGFRCR SERPLBLD CKD-EPI 2021: 46.8 ML/MIN/1.73
EOSINOPHIL # BLD AUTO: 0.18 10*3/MM3 (ref 0–0.4)
EOSINOPHIL NFR BLD AUTO: 3.1 % (ref 0.3–6.2)
ERYTHROCYTE [DISTWIDTH] IN BLOOD BY AUTOMATED COUNT: 13.4 % (ref 12.3–15.4)
GEN 5 2HR TROPONIN T REFLEX: 11 NG/L
GLOBULIN UR ELPH-MCNC: 2.5 GM/DL
GLUCOSE SERPL-MCNC: 110 MG/DL (ref 65–99)
HBA1C MFR BLD: 6 % (ref 4.8–5.6)
HCT VFR BLD AUTO: 32 % (ref 34–46.6)
HGB BLD-MCNC: 10.1 G/DL (ref 12–15.9)
IMM GRANULOCYTES # BLD AUTO: 0.01 10*3/MM3 (ref 0–0.05)
IMM GRANULOCYTES NFR BLD AUTO: 0.2 % (ref 0–0.5)
INR PPP: 0.87 (ref 0.9–1.1)
LEFT ATRIUM VOLUME INDEX: 27.9 ML/M2
LYMPHOCYTES # BLD AUTO: 1.61 10*3/MM3 (ref 0.7–3.1)
LYMPHOCYTES NFR BLD AUTO: 28.1 % (ref 19.6–45.3)
MAGNESIUM SERPL-MCNC: 1.9 MG/DL (ref 1.6–2.4)
MAXIMAL PREDICTED HEART RATE: 148 BPM
MCH RBC QN AUTO: 29.6 PG (ref 26.6–33)
MCHC RBC AUTO-ENTMCNC: 31.6 G/DL (ref 31.5–35.7)
MCV RBC AUTO: 93.8 FL (ref 79–97)
MONOCYTES # BLD AUTO: 0.49 10*3/MM3 (ref 0.1–0.9)
MONOCYTES NFR BLD AUTO: 8.6 % (ref 5–12)
NEUTROPHILS NFR BLD AUTO: 3.39 10*3/MM3 (ref 1.7–7)
NEUTROPHILS NFR BLD AUTO: 59.1 % (ref 42.7–76)
NRBC BLD AUTO-RTO: 0 /100 WBC (ref 0–0.2)
NT-PROBNP SERPL-MCNC: 1130 PG/ML (ref 0–900)
PLATELET # BLD AUTO: 294 10*3/MM3 (ref 140–450)
PMV BLD AUTO: 8.9 FL (ref 6–12)
POTASSIUM SERPL-SCNC: 4.7 MMOL/L (ref 3.5–5.2)
PROT SERPL-MCNC: 6.6 G/DL (ref 6–8.5)
PROTHROMBIN TIME: 11.9 SECONDS (ref 12.1–15)
QT INTERVAL: 394 MS
RBC # BLD AUTO: 3.41 10*6/MM3 (ref 3.77–5.28)
SINUS: 2.6 CM
SODIUM SERPL-SCNC: 135 MMOL/L (ref 136–145)
STRESS TARGET HR: 126 BPM
TROPONIN T DELTA: 0 NG/L
TROPONIN T SERPL HS-MCNC: 11 NG/L
TROPONIN T SERPL HS-MCNC: 8 NG/L
TSH SERPL DL<=0.05 MIU/L-ACNC: 1.32 UIU/ML (ref 0.27–4.2)
WBC NRBC COR # BLD: 5.73 10*3/MM3 (ref 3.4–10.8)

## 2023-05-17 PROCEDURE — 99214 OFFICE O/P EST MOD 30 MIN: CPT | Performed by: NURSE PRACTITIONER

## 2023-05-17 PROCEDURE — 84443 ASSAY THYROID STIM HORMONE: CPT | Performed by: HOSPITALIST

## 2023-05-17 PROCEDURE — 83880 ASSAY OF NATRIURETIC PEPTIDE: CPT | Performed by: EMERGENCY MEDICINE

## 2023-05-17 PROCEDURE — 83036 HEMOGLOBIN GLYCOSYLATED A1C: CPT | Performed by: HOSPITALIST

## 2023-05-17 PROCEDURE — 99285 EMERGENCY DEPT VISIT HI MDM: CPT

## 2023-05-17 PROCEDURE — 36415 COLL VENOUS BLD VENIPUNCTURE: CPT

## 2023-05-17 PROCEDURE — 93010 ELECTROCARDIOGRAM REPORT: CPT | Performed by: INTERNAL MEDICINE

## 2023-05-17 PROCEDURE — 99222 1ST HOSP IP/OBS MODERATE 55: CPT | Performed by: HOSPITALIST

## 2023-05-17 PROCEDURE — 96360 HYDRATION IV INFUSION INIT: CPT

## 2023-05-17 PROCEDURE — 85610 PROTHROMBIN TIME: CPT | Performed by: EMERGENCY MEDICINE

## 2023-05-17 PROCEDURE — G0378 HOSPITAL OBSERVATION PER HR: HCPCS

## 2023-05-17 PROCEDURE — 80053 COMPREHEN METABOLIC PANEL: CPT | Performed by: EMERGENCY MEDICINE

## 2023-05-17 PROCEDURE — 93005 ELECTROCARDIOGRAM TRACING: CPT | Performed by: EMERGENCY MEDICINE

## 2023-05-17 PROCEDURE — 83735 ASSAY OF MAGNESIUM: CPT | Performed by: HOSPITALIST

## 2023-05-17 PROCEDURE — 71046 X-RAY EXAM CHEST 2 VIEWS: CPT

## 2023-05-17 PROCEDURE — 93306 TTE W/DOPPLER COMPLETE: CPT | Performed by: INTERNAL MEDICINE

## 2023-05-17 PROCEDURE — 84484 ASSAY OF TROPONIN QUANT: CPT | Performed by: EMERGENCY MEDICINE

## 2023-05-17 PROCEDURE — 85730 THROMBOPLASTIN TIME PARTIAL: CPT | Performed by: EMERGENCY MEDICINE

## 2023-05-17 PROCEDURE — 96361 HYDRATE IV INFUSION ADD-ON: CPT

## 2023-05-17 PROCEDURE — 93306 TTE W/DOPPLER COMPLETE: CPT

## 2023-05-17 PROCEDURE — 84484 ASSAY OF TROPONIN QUANT: CPT | Performed by: FAMILY MEDICINE

## 2023-05-17 PROCEDURE — 93005 ELECTROCARDIOGRAM TRACING: CPT | Performed by: HOSPITALIST

## 2023-05-17 PROCEDURE — 85379 FIBRIN DEGRADATION QUANT: CPT | Performed by: EMERGENCY MEDICINE

## 2023-05-17 PROCEDURE — 85025 COMPLETE CBC W/AUTO DIFF WBC: CPT | Performed by: EMERGENCY MEDICINE

## 2023-05-17 RX ORDER — CLONIDINE HYDROCHLORIDE 0.2 MG/1
0.2 TABLET ORAL 3 TIMES DAILY
Status: DISCONTINUED | OUTPATIENT
Start: 2023-05-17 | End: 2023-05-18 | Stop reason: HOSPADM

## 2023-05-17 RX ORDER — SODIUM CHLORIDE 9 MG/ML
40 INJECTION, SOLUTION INTRAVENOUS AS NEEDED
Status: DISCONTINUED | OUTPATIENT
Start: 2023-05-17 | End: 2023-05-18 | Stop reason: HOSPADM

## 2023-05-17 RX ORDER — HYDRALAZINE HYDROCHLORIDE 25 MG/1
50 TABLET, FILM COATED ORAL ONCE
Status: COMPLETED | OUTPATIENT
Start: 2023-05-17 | End: 2023-05-17

## 2023-05-17 RX ORDER — SODIUM CHLORIDE 0.9 % (FLUSH) 0.9 %
10 SYRINGE (ML) INJECTION AS NEEDED
Status: DISCONTINUED | OUTPATIENT
Start: 2023-05-17 | End: 2023-05-18 | Stop reason: HOSPADM

## 2023-05-17 RX ORDER — ALUMINA, MAGNESIA, AND SIMETHICONE 2400; 2400; 240 MG/30ML; MG/30ML; MG/30ML
15 SUSPENSION ORAL EVERY 6 HOURS PRN
Status: DISCONTINUED | OUTPATIENT
Start: 2023-05-17 | End: 2023-05-18 | Stop reason: HOSPADM

## 2023-05-17 RX ORDER — BISACODYL 10 MG
10 SUPPOSITORY, RECTAL RECTAL DAILY PRN
Status: DISCONTINUED | OUTPATIENT
Start: 2023-05-17 | End: 2023-05-18 | Stop reason: HOSPADM

## 2023-05-17 RX ORDER — ATENOLOL 25 MG/1
25 TABLET ORAL DAILY
Status: DISCONTINUED | OUTPATIENT
Start: 2023-05-17 | End: 2023-05-18 | Stop reason: HOSPADM

## 2023-05-17 RX ORDER — POLYETHYLENE GLYCOL 3350 17 G/17G
17 POWDER, FOR SOLUTION ORAL DAILY PRN
Status: DISCONTINUED | OUTPATIENT
Start: 2023-05-17 | End: 2023-05-18 | Stop reason: HOSPADM

## 2023-05-17 RX ORDER — SODIUM CHLORIDE 0.9 % (FLUSH) 0.9 %
10 SYRINGE (ML) INJECTION EVERY 12 HOURS SCHEDULED
Status: DISCONTINUED | OUTPATIENT
Start: 2023-05-17 | End: 2023-05-18 | Stop reason: HOSPADM

## 2023-05-17 RX ORDER — ONDANSETRON 4 MG/1
4 TABLET, FILM COATED ORAL EVERY 6 HOURS PRN
Status: DISCONTINUED | OUTPATIENT
Start: 2023-05-17 | End: 2023-05-18 | Stop reason: HOSPADM

## 2023-05-17 RX ORDER — ACETAMINOPHEN 325 MG/1
650 TABLET ORAL EVERY 4 HOURS PRN
Status: DISCONTINUED | OUTPATIENT
Start: 2023-05-17 | End: 2023-05-18 | Stop reason: HOSPADM

## 2023-05-17 RX ORDER — AMOXICILLIN 250 MG
2 CAPSULE ORAL 2 TIMES DAILY
Status: DISCONTINUED | OUTPATIENT
Start: 2023-05-17 | End: 2023-05-18 | Stop reason: HOSPADM

## 2023-05-17 RX ORDER — BISACODYL 5 MG/1
5 TABLET, DELAYED RELEASE ORAL DAILY PRN
Status: DISCONTINUED | OUTPATIENT
Start: 2023-05-17 | End: 2023-05-18 | Stop reason: HOSPADM

## 2023-05-17 RX ORDER — ALLOPURINOL 100 MG/1
100 TABLET ORAL 2 TIMES DAILY
Status: DISCONTINUED | OUTPATIENT
Start: 2023-05-17 | End: 2023-05-18 | Stop reason: HOSPADM

## 2023-05-17 RX ORDER — ROSUVASTATIN CALCIUM 5 MG/1
10 TABLET, COATED ORAL DAILY
Status: DISCONTINUED | OUTPATIENT
Start: 2023-05-17 | End: 2023-05-18 | Stop reason: HOSPADM

## 2023-05-17 RX ORDER — ONDANSETRON 2 MG/ML
4 INJECTION INTRAMUSCULAR; INTRAVENOUS EVERY 6 HOURS PRN
Status: DISCONTINUED | OUTPATIENT
Start: 2023-05-17 | End: 2023-05-18 | Stop reason: HOSPADM

## 2023-05-17 RX ORDER — CLOPIDOGREL BISULFATE 75 MG/1
75 TABLET ORAL DAILY
Status: DISCONTINUED | OUTPATIENT
Start: 2023-05-17 | End: 2023-05-18 | Stop reason: HOSPADM

## 2023-05-17 RX ORDER — ACETAMINOPHEN 160 MG/5ML
650 SOLUTION ORAL EVERY 4 HOURS PRN
Status: DISCONTINUED | OUTPATIENT
Start: 2023-05-17 | End: 2023-05-18 | Stop reason: HOSPADM

## 2023-05-17 RX ORDER — CHOLECALCIFEROL (VITAMIN D3) 125 MCG
5 CAPSULE ORAL NIGHTLY PRN
Status: DISCONTINUED | OUTPATIENT
Start: 2023-05-17 | End: 2023-05-18 | Stop reason: HOSPADM

## 2023-05-17 RX ORDER — CLONIDINE HYDROCHLORIDE 0.1 MG/1
0.2 TABLET ORAL ONCE
Status: COMPLETED | OUTPATIENT
Start: 2023-05-17 | End: 2023-05-17

## 2023-05-17 RX ORDER — ACETAMINOPHEN 650 MG/1
650 SUPPOSITORY RECTAL EVERY 4 HOURS PRN
Status: DISCONTINUED | OUTPATIENT
Start: 2023-05-17 | End: 2023-05-18 | Stop reason: HOSPADM

## 2023-05-17 RX ORDER — HYDROCHLOROTHIAZIDE 12.5 MG/1
12.5 TABLET ORAL DAILY
Status: DISCONTINUED | OUTPATIENT
Start: 2023-05-17 | End: 2023-05-18 | Stop reason: HOSPADM

## 2023-05-17 RX ORDER — PANTOPRAZOLE SODIUM 40 MG/1
40 TABLET, DELAYED RELEASE ORAL 2 TIMES DAILY
Status: DISCONTINUED | OUTPATIENT
Start: 2023-05-17 | End: 2023-05-18 | Stop reason: HOSPADM

## 2023-05-17 RX ORDER — SODIUM CHLORIDE 9 MG/ML
75 INJECTION, SOLUTION INTRAVENOUS CONTINUOUS
Status: DISCONTINUED | OUTPATIENT
Start: 2023-05-17 | End: 2023-05-18

## 2023-05-17 RX ADMIN — CLONIDINE HYDROCHLORIDE 0.2 MG: 0.2 TABLET ORAL at 20:28

## 2023-05-17 RX ADMIN — ATENOLOL 25 MG: 25 TABLET ORAL at 13:37

## 2023-05-17 RX ADMIN — SODIUM CHLORIDE 75 ML/HR: 9 INJECTION, SOLUTION INTRAVENOUS at 13:31

## 2023-05-17 RX ADMIN — CLONIDINE HYDROCHLORIDE 0.2 MG: 0.2 TABLET ORAL at 17:11

## 2023-05-17 RX ADMIN — SODIUM CHLORIDE, POTASSIUM CHLORIDE, SODIUM LACTATE AND CALCIUM CHLORIDE 500 ML: 600; 310; 30; 20 INJECTION, SOLUTION INTRAVENOUS at 07:03

## 2023-05-17 RX ADMIN — CLONIDINE HYDROCHLORIDE 0.2 MG: 0.1 TABLET ORAL at 06:52

## 2023-05-17 RX ADMIN — SENNOSIDES AND DOCUSATE SODIUM 2 TABLET: 50; 8.6 TABLET ORAL at 20:28

## 2023-05-17 RX ADMIN — ALLOPURINOL 100 MG: 100 TABLET ORAL at 13:37

## 2023-05-17 RX ADMIN — Medication 10 ML: at 13:38

## 2023-05-17 RX ADMIN — CLOPIDOGREL BISULFATE 75 MG: 75 TABLET ORAL at 20:28

## 2023-05-17 RX ADMIN — METFORMIN HYDROCHLORIDE 500 MG: 500 TABLET, FILM COATED ORAL at 17:13

## 2023-05-17 RX ADMIN — PANTOPRAZOLE SODIUM 40 MG: 40 TABLET, DELAYED RELEASE ORAL at 20:28

## 2023-05-17 RX ADMIN — ALLOPURINOL 100 MG: 100 TABLET ORAL at 20:28

## 2023-05-17 RX ADMIN — APIXABAN 5 MG: 2.5 TABLET, FILM COATED ORAL at 10:13

## 2023-05-17 RX ADMIN — HYDRALAZINE HYDROCHLORIDE 75 MG: 50 TABLET, FILM COATED ORAL at 20:28

## 2023-05-17 RX ADMIN — Medication 10 ML: at 20:28

## 2023-05-17 RX ADMIN — HYDRALAZINE HYDROCHLORIDE 75 MG: 50 TABLET, FILM COATED ORAL at 17:11

## 2023-05-17 RX ADMIN — ROSUVASTATIN CALCIUM 10 MG: 5 TABLET, FILM COATED ORAL at 13:36

## 2023-05-17 RX ADMIN — PANTOPRAZOLE SODIUM 40 MG: 40 TABLET, DELAYED RELEASE ORAL at 13:37

## 2023-05-17 RX ADMIN — HYDRALAZINE HYDROCHLORIDE 50 MG: 25 TABLET, FILM COATED ORAL at 06:52

## 2023-05-17 RX ADMIN — HYDROCHLOROTHIAZIDE 12.5 MG: 12.5 TABLET ORAL at 13:36

## 2023-05-17 RX ADMIN — SENNOSIDES AND DOCUSATE SODIUM 2 TABLET: 50; 8.6 TABLET ORAL at 13:37

## 2023-05-17 NOTE — CONSULTS
Patient Name: Valentine Arora  :1950  73 y.o.    Date of Admission: 2023  Date of Consultation:  23  Encounter Provider:  TERRI Schmitz  Place of Service: [facility] CARDIOLOGY  Referring Provider: No ref. provider found  Patient Care Team:  Aliya Alejandro MD as PCP - General (Family Medicine)      Chief complaint:SOA, new onset Atrial fibrillation    History of Present Illness:    She has a history of CAD and had a myocardial infarction approximately 20 years ago. At that point she had a failed attempted angioplasty and then underwent a two-vessel CABG in . In 2012 she had a stress Cardiolite test performed that showed an ejection fraction 75% and no ischemia. No wall motion abnormalities were seen. [facility] 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 This was felt to be due to trauma years ago. She had an echocardiogram that showed an ejection fraction is 60-65% with normal valvular structure and function. She also had a Holter monitor that showed no abnormality.     She was noted to have a new carotid bruit and was found to have bilateral cerebrovascular disease.  She also had hip and leg pain and was found to have significant peripheral arterial disease.  She is being followed with Dr. Ace. She had an intervention last Sept- bilateral common iliac artery kissing stent placement on 2019     Stress test 2019:  Interpretation Summary   • Diaphragmatic attenuation artifact is present.  • Left ventricular ejection fraction is normal (Calculated EF = 70%).  • Myocardial perfusion imaging indicates a normal myocardial perfusion study with no evidence of ischemia.      Echocardiogram 2020: Interpretation Summary   • Left ventricular systolic function is normal. Calculated EF = 53.1%. Estimated EF was in  disagreement with the calculated EF. Estimated EF appears to be in the range of 56 - 60%. Normal left ventricular cavity size and wall thickness noted. All left ventricular wall segments contract normally. Left ventricular diastolic function is normal.      She presented to the ED early this morning with chest pain.  She states she woke from her sleep about 2 hours prior to coming with chest heaviness, numbness and tingling in her left axilla and left arm, mild nausea, mild shortness of breath.  She states that these feelings are similar to when she had a heart attack in 1995 but were more severe.  She states she feels a little bit sluggish yesterday.  She denies any palpitations, syncope or lightheadedness.    Her work-up in the ER showed an EKG with atrial flutter 4-1 conduction with a heart rate of 63.  Borderline LVH and no acute ST segment changes.  Chest x-ray showed no acute cardiopulmonary findings.  Her blood pressure was elevated on arrival 180s/59.  Her heart rate has been controlled in the 60s.  Her labs revealed an H&H of 10.1/32.0 with normal platelets at 294.  Troponin 11.  D-dimer negative at 0.44.  Her proBNP was elevated at 1130.  Repeat troponin was 11 with a troponin T delta of 0.  CMP showed a glucose of 110, BUN 26, creatinine 1.23 and sodium 135.  Her liver function testing was normal.  She was started on Eliquis.  She was continued on her home dose of atenolol.  Patient was admitted for further evaluation and treatment.  An echo has already been ordered.    She states that the shortness of breath and discomfort woke her up out of sleep.  She states through the night she wakes up multiple times and goes to the restroom.  She has known apneic periods and snores quite loudly.  She has never been worked up for sleep apnea.  She cannot feel her heart racing or skipping.  Past Medical History:   Diagnosis Date   • Arthritis    • Sarabia esophagus    • CAD (coronary artery disease)    • Chronic back  pain    • Colon polyp    • Diabetes mellitus    • GERD (gastroesophageal reflux disease)    • Hyperlipidemia    • Hypertension    • Myocardial infarction     1995   • PAD (peripheral artery disease) 9/11/2019   • Shingles        Past Surgical History:   Procedure Laterality Date   • COLONOSCOPY     • COLONOSCOPY N/A 6/24/2020    Procedure: COLONOSCOPY;  Surgeon: Mathew Roberts MD;  [facility];  Service: Gastroenterology;  Laterality: N/A;  Descending colon polyp x 2, Ascending colon polyp, Sigmoid colon polyp, Rectal polyp   • CORONARY ARTERY BYPASS GRAFT  1995    x 2 vessels   • ENDOSCOPY N/A 6/24/2020    Procedure: ESOPHAGOGASTRODUODENOSCOPY;  Surgeon: Mathew Roberts MD;  Location:  [facility] OR;  Service: Gastroenterology;  Laterality: N/A;  Ulcerative esophagitis, Gastritis, Duodenitis  Duodenal biopsy, gastric biopsy, distal esophageal biopsy   • ENDOSCOPY N/A 9/17/2020    Procedure: ESOPHAGOGASTRODUODENOSCOPY with biopsies;  Surgeon: Mathew Roberts MD;  Location:  [facility] OR;  Service: Gastroenterology;  Laterality: N/A;  Esophagitis  Sarabia's Esophagus  Hiatal hernia  Gastritis  Gastric biopsy  Distal esophagus biopsy   • INNER EAR SURGERY     • JOINT REPLACEMENT      right knee   • LEG SURGERY      x 2 s/p fall   • LUMBAR FUSION      L4/L5   • SKIN GRAFT      to left lower leg x 2   • TONSILLECTOMY     • WRIST FUSION Left          Prior to Admission medications    Medication Sig Start Date End Date Taking? Authorizing Provider   allopurinol (ZYLOPRIM) 100 MG tablet Take 1 tablet by mouth 2 (Two) Times a Day.   Yes ProviderYaw MD   atenolol (TENORMIN) 25 MG tablet Take 1 tablet by mouth Daily. 5/8/20  Yes Yaw Cedeño MD   cloNIDine (CATAPRES) 0.1 MG tablet Take 2 tablets by mouth 3 (Three) Times a Day.   Yes ProviderYaw MD   clopidogrel (PLAVIX) 75 MG tablet TAKE ONE TABLET BY MOUTH DAILY 4/10/23  Yes Kumar Frye III, MD   hydrALAZINE  (APRESOLINE) 50 MG tablet Take 1.5 tablets by mouth 3 (Three) Times a Day. 20  Yes Provider, MD Yaw   hydroCHLOROthiazide (HYDRODIURIL) 12.5 MG tablet Take 1 tablet by mouth Daily. 20  Yes Provider, MD Yaw   metFORMIN (GLUCOPHAGE) 500 MG tablet 2 (Two) Times a Day With Meals.   Yes Provider, Historical, MD   olmesartan (BENICAR) 40 MG tablet Take 1 tablet by mouth Every Night.   Yes Provider, Historical, MD   pantoprazole (PROTONIX) 40 MG EC tablet Take 1 tablet by mouth 2 (Two) Times a Day. 22  Yes Shayna Tran APRN   rosuvastatin (CRESTOR) 10 MG tablet Take 1 tablet by mouth Daily. 1/12/15  Yes Provider, MD Yaw   hyoscyamine (LEVSIN) 0.125 MG SL tablet Take 1 tablet by mouth 4 (Four) Times a Day With Meals & at Bedtime. 23   AlejandraMathew MD       Allergies   Allergen Reactions   • Codeine Itching   • Other Unknown (See Comments)     Vicryl: doesn't heal       Social History     Socioeconomic History   • Marital status:    Tobacco Use   • Smoking status: Former     Types: Cigarettes     Quit date:      Years since quittin.3   • Smokeless tobacco: Never   • Tobacco comments:     QUIT    Vaping Use   • Vaping Use: Never used   Substance and Sexual Activity   • Alcohol use: Not Currently     Comment: occasional   • Drug use: No   • Sexual activity: Defer       Family History   Problem Relation Age of Onset   • Heart disease Mother    • Heart disease Father    • Colon cancer Neg Hx    • Colon polyps Neg Hx    • Breast cancer Neg Hx        REVIEW OF SYSTEMS:   All systems reviewed.  Pertinent positives identified in HPI.  All other systems are negative.      Objective:     Vitals:    23 0800 23 0900 23 1042 23 1115   BP: 149/58 130/57 178/62 162/71   BP Location:   Right arm Right arm   Patient Position:   Lying Lying   Pulse: 54 55 64 65   Resp: 14 14 18 16   Temp:   97.6 °F (36.4 °C)    TempSrc:   Oral    SpO2:  98% 98% 98% 97%   Weight:       Height:         Body mass index is 35.48 kg/m².    General Appearance:    Alert, cooperative, in no acute distress   Head:    Normocephalic, without obvious abnormality, atraumatic   Eyes:            Lids and lashes normal, conjunctivae and sclerae normal   Ears:    Ears appear intact with no abnormalities noted   Throat:   oral mucosa moist   Neck:   No adenopathy, supple, no carotid bruit, no JVD   Back:      range of motion normal   Lungs:     Clear to auscultation, respirations regular, even and unlabored    Heart:    Irregular rhythm and normal rate, normal S1 and S2, no murmur, no gallop, no rub, no click   Chest Wall:    No abnormalities observed   Abdomen:     Normal bowel sounds, soft, nontender, nondistended, no guarding, no rebound  tenderness   Extremities:   Moves all extremities well, no edema, no cyanosis, no redness   Pulses:   Pulses palpable and equal bilaterally. Normal radial, carotid, dorsalis pedis and posterior tibial pulses bilaterally.    Skin:  Psychiatric:   No bleeding, bruising or rash    Alert and oriented x 3, normal mood and affect   Lab Review:     Results from last 7 days   Lab Units 05/17/23  0600   SODIUM mmol/L 135*   POTASSIUM mmol/L 4.7   CHLORIDE mmol/L 102   CO2 mmol/L 22.7   BUN mg/dL 26*   CREATININE mg/dL 1.23*   CALCIUM mg/dL 9.5   BILIRUBIN mg/dL <0.2   ALK PHOS U/L 73   ALT (SGPT) U/L 13   AST (SGOT) U/L 14   GLUCOSE mg/dL 110*     Results from last 7 days   Lab Units 05/17/23  0803 05/17/23  0600   HSTROP T ng/L 11* 11*     Results from last 7 days   Lab Units 05/17/23  0600   WBC 10*3/mm3 5.73   HEMOGLOBIN g/dL 10.1*   HEMATOCRIT % 32.0*   PLATELETS 10*3/mm3 294     Results from last 7 days   Lab Units 05/17/23  0600   INR  0.87*   APTT seconds 29.8                       I personally viewed and interpreted the patient's EKG/Telemetry data.        NSR        4:1 atrial flutter.  Assessment and Plan:           1.  Atrial flutter 4:1 -  new.  She is rate controlled already on BB with atenolol. Now on Eliquis. Will plan 4 weeks of uninterrupted OAC and then cardiovert if necessary  Echo unremarkable.  Her ChadsVasc score is 4.  Her stroke risk is 4.8 %/year and greater than 90,000 patients and 6.7% risk of stroke/TIA/systemic embolism.    2.  CAD  Denies angina  Plan  • Lifestyle modifications discussed include adhering to a heart healthy diet, avoidance of tobacco products, maintenance of a healthy weight, medication compliance, regular exercise and regular monitoring of cholesterol and blood pressure     Subjective - Objective  • There is a history of past MI  • There is a history of previous coronary artery bypass graft  • Current antiplatelet therapy includes aspirin 81 mg   3.  Prior CABG  4.  History of MI  5. Hypertension-fair control  6.  Hyperlipidemia- continue lipid-lowering therapy.  She is currently on rosuvastatin 10 mg daily.  7.  Peripheral arterial disease-  Bilateral common iliac artery kissing stent placement on 9/23/2019. Followed by Dr. Ace  8. Prior esophagitis and gastritis.  Followed by Dr. Roberts, no symptoms at present  9.  Suspected sleep apnea - referral to sleep medicine at [place]    Plan for 4 weeks of oral anticoagulation and then cardioversion if necessary.    We will sign off and see in the office in 1 week with APRN    Would recommend sleep study as outpatient.        TERRI Schmitz  05/17/23  12:59 EDT

## 2023-05-17 NOTE — PLAN OF CARE
Goal Outcome Evaluation:  Plan of Care Reviewed With: patient        Progress: no change  Outcome Evaluation: Patient is with stable vital signs. room air and denies any Cp or soa. Rythem is regular at this time with rate 60-70's. waitiong for follow up EKG to confirm rythem change. has NS at 75/h.  Eliquis administered as ordered.

## 2023-05-17 NOTE — ED PROVIDER NOTES
Subjective   History of Present Illness  73-year-old female presents with chest pain.  Patient states she woke from sleep about 2 hours prior to arrival with heaviness to her chest, numbness and tingling to left axilla and left arm, mild nausea, mild shortness of breath.  The symptoms have persisted to some degree since that time.  Patient reports she had similar symptoms when she had her heart attack in 95 but that they were much more severe and she had vomiting and diaphoresis at that time as well.  Patient states she felt a little bit sluggish yesterday but did do a bunch of housework which she tolerated without difficulty.  No fevers or chills.  No cough or congestion.  No leg pains or swelling.  No palpitations, syncope, lightheadedness.  Patient has history of MI in 1995 at which time she had two-vessel CABG.  Patient follows with Dr. Frye.  Patient last had stress test in 2019 which showed no evidence of ischemia.  Last echo was 2020 and showed relatively normal function.  Denies history of arrhythmia.        Review of Systems   All other systems reviewed and are negative.      Past Medical History:   Diagnosis Date   • Arthritis    • Sarabia esophagus    • CAD (coronary artery disease)    • Chronic back pain    • Colon polyp    • Diabetes mellitus    • GERD (gastroesophageal reflux disease)    • Hyperlipidemia    • Hypertension    • Myocardial infarction     1995   • PAD (peripheral artery disease) 9/11/2019   • Shingles        Allergies   Allergen Reactions   • Codeine Itching   • Other Unknown (See Comments)     Vicryl: doesn't heal       Past Surgical History:   Procedure Laterality Date   • COLONOSCOPY     • COLONOSCOPY N/A 6/24/2020    Procedure: COLONOSCOPY;  Surgeon: Mathew Roberts MD;  Location: [facility];  Service: Gastroenterology;  Laterality: N/A;  Descending colon polyp x 2, Ascending colon polyp, Sigmoid colon polyp, Rectal polyp   • CORONARY ARTERY BYPASS GRAFT  1995    x 2  vessels   • ENDOSCOPY N/A 2020    Procedure: ESOPHAGOGASTRODUODENOSCOPY;  Surgeon: Mathew Roberts MD;  Location:  [facility] OR;  Service: Gastroenterology;  Laterality: N/A;  Ulcerative esophagitis, Gastritis, Duodenitis  Duodenal biopsy, gastric biopsy, distal esophageal biopsy   • ENDOSCOPY N/A 2020    Procedure: ESOPHAGOGASTRODUODENOSCOPY with biopsies;  Surgeon: Mathew Roberts MD;  Location:  [facility] OR;  Service: Gastroenterology;  Laterality: N/A;  Esophagitis  Sarabia's Esophagus  Hiatal hernia  Gastritis  Gastric biopsy  Distal esophagus biopsy   • INNER EAR SURGERY     • JOINT REPLACEMENT      right knee   • LEG SURGERY      x 2 s/p fall   • LUMBAR FUSION      L4/L5   • SKIN GRAFT      to left lower leg x 2   • TONSILLECTOMY     • WRIST FUSION Left        Family History   Problem Relation Age of Onset   • Heart disease Mother    • Heart disease Father    • Colon cancer Neg Hx    • Colon polyps Neg Hx    • Breast cancer Neg Hx        Social History     Socioeconomic History   • Marital status:    Tobacco Use   • Smoking status: Former     Types: Cigarettes     Quit date:      Years since quittin.4   • Smokeless tobacco: Never   • Tobacco comments:     QUIT    Vaping Use   • Vaping Use: Never used   Substance and Sexual Activity   • Alcohol use: Not Currently     Comment: occasional   • Drug use: No   • Sexual activity: Defer           Objective   Physical Exam  Constitutional:       General: She is not in acute distress.     Appearance: She is not toxic-appearing.   HENT:      Head: Normocephalic and atraumatic.      Nose: Nose normal.      Mouth/Throat:      Mouth: Mucous membranes are moist.      Pharynx: Oropharynx is clear.   Eyes:      Extraocular Movements: Extraocular movements intact.      Pupils: Pupils are equal, round, and reactive to light.   Cardiovascular:      Rate and Rhythm: Normal rate and regular rhythm.      Pulses: Normal pulses.       Heart sounds: Normal heart sounds.   Pulmonary:      Effort: Pulmonary effort is normal. No respiratory distress.      Breath sounds: Normal breath sounds.   Abdominal:      General: There is no distension.      Palpations: Abdomen is soft.      Tenderness: There is no abdominal tenderness.   Musculoskeletal:         General: No swelling, tenderness, deformity or signs of injury. Normal range of motion.      Cervical back: Normal range of motion and neck supple. No tenderness.      Right lower leg: No edema.      Left lower leg: No edema.   Skin:     General: Skin is warm and dry.   Neurological:      General: No focal deficit present.      Mental Status: She is alert and oriented to person, place, and time. Mental status is at baseline.   Psychiatric:         Mood and Affect: Mood normal.         Behavior: Behavior normal.         Thought Content: Thought content normal.         Judgment: Judgment normal.         Procedures           ED Course  ED Course as of 05/20/23 0359   Wed May 17, 2023   0551 EKG obtained at 0543 shows atrial flutter with 4 1 conduction.  Rate of 63.  Borderline LVH.  No acute ST segment changes. [TD]   0957 Patient's cardiac enzymes are both level with a delta of 0.  Patient's D-dimer is normal however BNP is mildly elevated at 1100.  Patient CMP shows creatinine of 1.23 and a BUN of 26 however patient has history of mild renal insufficiency per Dr. Alejandro.  CBC shows mild anemia hemoglobin 10.1 hematocrit 32.  Patient's chest x-ray shows no acute abnormality. [BH]   0958 Spoke with Dr. Frye who is already seen patient's EKG and agrees with atrial flutter with 4 1 conduction [BH]   0962 .  He asked that patient be admitted to the hospital and they will consult. [BH]   0958 Spoke with Dr. Alejandro hospitalist who agreed to admission and asked that we give dose of Eliquis 5 mg in the ER. [BH]      ED Course User Index  [BH] Christian Alvarez MD  [TD] Herrera Epstein MD                                            MDM    Final diagnoses:   Atrial flutter, unspecified type   Chest pain, unspecified type       ED Disposition  ED Disposition     ED Disposition   Decision to Admit    Condition   --    Comment   Level of Care: Telemetry [5]   Diagnosis: Atrial flutter, unspecified type [8250023]   Admitting Physician: ALIYA ALEJANDRO [[id number]]   Attending Physician: ANNABELLA MCKEON [[id number]]               Aliya Alejandro MD  [address] [place] [address] [address]  [facility] [address] [phone]      2 weeks         Medication List      New Prescriptions    Eliquis 5 MG tablet tablet  Generic drug: apixaban  Take 1 tablet by mouth Every 12 (Twelve) Hours. Indications: Atrial Flutter           Where to Get Your Medications      These medications were sent to [facility] [address] [address]    Hours: 7:00AM TO 5:00PM MON TO FRI Phone: [phone]   · Eliquis 5 MG tablet tablet          Herrera Epstein MD  05/20/23 0359

## 2023-05-17 NOTE — ED NOTES
Nursing report ED to floor  Valentine Arora  73 y.o.  female    HPI :   Chief Complaint   Patient presents with    Chest Pain    Shortness of Breath    Nausea     Patient states had MI IN 1995. Awakened 2 hours ago with left chest pain rad to left arm       Admitting doctor:   Aliya Alejandro MD    Admitting diagnosis:   The primary encounter diagnosis was Atrial flutter, unspecified type. A diagnosis of Chest pain, unspecified type was also pertinent to this visit.    Code status:   Current Code Status       Date Active Code Status Order ID Comments User Context       Prior            Allergies:   Codeine and Other    Isolation:   No active isolations    Intake and Output  No intake or output data in the 24 hours ending 05/17/23 1005    Weight:       05/17/23  0544   Weight: 88 kg (194 lb)       Most recent vitals:   Vitals:    05/17/23 0700 05/17/23 0730 05/17/23 0800 05/17/23 0900   BP: 171/69 152/63 149/58 130/57   BP Location:       Patient Position:       Pulse: 61 59 54 55   Resp: 14 14 14 14   Temp:       TempSrc:       SpO2: 98% 98% 98% 98%   Weight:       Height:           Active LDAs/IV Access:   Lines, Drains & Airways       Active LDAs       Name Placement date Placement time Site Days    Peripheral IV 05/17/23 0620 Left Antecubital 05/17/23  0620  Antecubital  less than 1                    Labs (abnormal labs have a star):   Labs Reviewed   COMPREHENSIVE METABOLIC PANEL - Abnormal; Notable for the following components:       Result Value    Glucose 110 (*)     BUN 26 (*)     Creatinine 1.23 (*)     Sodium 135 (*)     eGFR 46.8 (*)     All other components within normal limits    Narrative:     GFR Normal >60  Chronic Kidney Disease <60  Kidney Failure <15    The GFR formula is only valid for adults with stable renal function between ages 18 and 70.   PROTIME-INR - Abnormal; Notable for the following components:    Protime 11.9 (*)     INR 0.87 (*)     All other components within normal limits     Narrative:     Therapeutic Ranges for INR: 2.0-3.0 (PT 20-30)                              2.5-3.5 (PT 25-34)   BNP (IN-HOUSE) - Abnormal; Notable for the following components:    proBNP 1,130.0 (*)     All other components within normal limits    Narrative:     Among patients with dyspnea, NT-proBNP is highly sensitive for the detection of acute congestive heart failure. In addition NT-proBNP of <300 pg/ml effectively rules out acute congestive heart failure with 99% negative predictive value.     TROPONIN - Abnormal; Notable for the following components:    HS Troponin T 11 (*)     All other components within normal limits    Narrative:     High Sensitive Troponin T Reference Range:  <10.0 ng/L- Negative Female for AMI  <15.0 ng/L- Negative Male for AMI  >=10 - Abnormal Female indicating possible myocardial injury.  >=15 - Abnormal Male indicating possible myocardial injury.   Clinicians would have to utilize clinical acumen, EKG, Troponin, and serial changes to determine if it is an Acute Myocardial Infarction or myocardial injury due to an underlying chronic condition.        CBC WITH AUTO DIFFERENTIAL - Abnormal; Notable for the following components:    RBC 3.41 (*)     Hemoglobin 10.1 (*)     Hematocrit 32.0 (*)     All other components within normal limits   HIGH SENSITIVITIY TROPONIN T 2HR - Abnormal; Notable for the following components:    HS Troponin T 11 (*)     All other components within normal limits    Narrative:     High Sensitive Troponin T Reference Range:  <10.0 ng/L- Negative Female for AMI  <15.0 ng/L- Negative Male for AMI  >=10 - Abnormal Female indicating possible myocardial injury.  >=15 - Abnormal Male indicating possible myocardial injury.   Clinicians would have to utilize clinical acumen, EKG, Troponin, and serial changes to determine if it is an Acute Myocardial Infarction or myocardial injury due to an underlying chronic condition.        APTT - Normal    Narrative:     PTT = The  "equivalent PTT values for the therapeutic range of heparin levels at 0.1 to 0.7 U/ml are 53 to 110 seconds.     D-DIMER, QUANTITATIVE - Normal    Narrative:     According to the assay 's published package insert, a normal (<0.50 MCGFEU/mL) D-dimer result in conjunction with a non-high clinical probability assessment, excludes deep vein thrombosis (DVT) and pulmonary embolism (PE) with high sensitivity.    D-dimer values increase with age and this can make VTE exclusion of an older population difficult. To address this, the American [employer], based on best available evidence and recent guidelines, recommends that clinicians use age-adjusted D-dimer thresholds in patients greater than 50 years of age with: a) a low probability of PE who do not meet all Pulmonary Embolism Rule Out Criteria, or b) in those with intermediate probability of PE.   The formula for an age-adjusted D-dimer cut-off is \"age/100\".  For example, a 60 year old patient would have an age-adjusted cut-off of 0.60 MCGFEU/mL and an 80 year old 0.80 MCGFEU/mL.   CBC AND DIFFERENTIAL    Narrative:     The following orders were created for panel order CBC & Differential.  Procedure                               Abnormality         Status                     ---------                               -----------         ------                     CBC Auto Differential[[id number]]        Abnormal            Final result                 Please view results for these tests on the individual orders.       EKG:   ECG 12 Lead Chest Pain   Final Result   HEART RATE= 63  bpm   RR Interval= 953  ms   ND Interval=   ms   P Horizontal Axis=   deg   P Front Axis=   deg   QRSD Interval= 82  ms   QT Interval= 394  ms   QRS Axis= 0  deg   T Wave Axis= -3  deg   - ABNORMAL ECG -   Atrial flutter with predominant 4:1 AV block - new   LVH by voltage   Inferior infarct, age indeterminate   Electronically Signed By: Kumar Frye ([facility]) 17-May-2023 08:58:51 "   Date and Time of Study: 2023 05:43:39          Meds given in ED:   Medications   apixaban (ELIQUIS) tablet 5 mg (has no administration in time range)   cloNIDine (CATAPRES) tablet 0.2 mg (0.2 mg Oral Given 23)   hydrALAZINE (APRESOLINE) tablet 50 mg (50 mg Oral Given 23)   lactated ringers bolus 500 mL (0 mL Intravenous Stopped 23)       Imaging results:  XR Chest 2 View    Result Date: 2023  No acute cardiopulmonary findings.  This report was finalized on 2023 6:34 AM by Dr. Sathish Cunha MD.       Ambulatory status:   - Ad venice    Social issues:   Social History     Socioeconomic History    Marital status:    Tobacco Use    Smoking status: Former     Types: Cigarettes     Quit date:      Years since quittin.3    Smokeless tobacco: Never    Tobacco comments:     QUIT    Vaping Use    Vaping Use: Never used   Substance and Sexual Activity    Alcohol use: Not Currently     Comment: occasional    Drug use: No    Sexual activity: Defer       NIH Stroke Scale:         Kylah Hankins RN  23 10:05 EDT

## 2023-05-17 NOTE — H&P
"[facility] HOSPITALIST     Aliya Alejandro MD    CHIEF COMPLAINT: \"It felt like a mild heart attack\"    HISTORY OF PRESENT ILLNESS:    Ms. Arora is a very pleasant, 74 y/o  female who presents after suffering an event around 03:00 this morning.  She reports being in her normal state of health up until this morning when she awoke w/ chest discomfort and an unusual feeling in her left arm.  She equates it to her previous heart attack although much, much milder.  Associated w/ this was some mild nausea but no diaphoresis, dyspnea, or vomiting.  She rested for an hour hoping it would subside, but when it didn't, she took ASA x3 and came to the ER where she was found to be in atrial flutter.  She remained HD stable w/ equivocal troponins x2.  She is now admitted for further care.  She does not check her blood glucose.      Past Medical History:   Diagnosis Date   • Arthritis    • Sarabia esophagus    • CAD (coronary artery disease)    • Chronic back pain    • Colon polyp    • Diabetes mellitus    • GERD (gastroesophageal reflux disease)    • Hyperlipidemia    • Hypertension    • Myocardial infarction     1995   • PAD (peripheral artery disease) 9/11/2019   • Shingles      Past Surgical History:   Procedure Laterality Date   • COLONOSCOPY     • COLONOSCOPY N/A 6/24/2020    Procedure: COLONOSCOPY;  Surgeon: Mathew Roberts MD;  Location: [facility] OR;  Service: Gastroenterology;  Laterality: N/A;  Descending colon polyp x 2, Ascending colon polyp, Sigmoid colon polyp, Rectal polyp   • CORONARY ARTERY BYPASS GRAFT  1995    x 2 vessels   • ENDOSCOPY N/A 6/24/2020    Procedure: ESOPHAGOGASTRODUODENOSCOPY;  Surgeon: Mathew Roberts MD;  Location: [facility] OR;  Service: Gastroenterology;  Laterality: N/A;  Ulcerative esophagitis, Gastritis, Duodenitis  Duodenal biopsy, gastric biopsy, distal esophageal biopsy   • ENDOSCOPY N/A 9/17/2020    Procedure: " ESOPHAGOGASTRODUODENOSCOPY with biopsies;  Surgeon: Mathew Roberts MD;  Location: [facility] OR;  Service: Gastroenterology;  Laterality: N/A;  Esophagitis  Sarabia's Esophagus  Hiatal hernia  Gastritis  Gastric biopsy  Distal esophagus biopsy   • INNER EAR SURGERY     • JOINT REPLACEMENT      right knee   • LEG SURGERY      x 2 s/p fall   • LUMBAR FUSION      L4/L5   • SKIN GRAFT      to left lower leg x 2   • TONSILLECTOMY     • WRIST FUSION Left      Family History   Problem Relation Age of Onset   • Heart disease Mother    • Heart disease Father    • Colon cancer Neg Hx    • Colon polyps Neg Hx    • Breast cancer Neg Hx      Social History     Tobacco Use   • Smoking status: Former     Types: Cigarettes     Quit date:      Years since quittin.3   • Smokeless tobacco: Never   • Tobacco comments:     QUIT    Vaping Use   • Vaping Use: Never used   Substance Use Topics   • Alcohol use: Not Currently     Comment: occasional   • Drug use: No     Medications Prior to Admission   Medication Sig Dispense Refill Last Dose   • allopurinol (ZYLOPRIM) 100 MG tablet Take 1 tablet by mouth 2 (Two) Times a Day.   2023   • atenolol (TENORMIN) 25 MG tablet Take 1 tablet by mouth Daily.   2023   • cloNIDine (CATAPRES) 0.1 MG tablet Take 2 tablets by mouth 3 (Three) Times a Day.   2023   • clopidogrel (PLAVIX) 75 MG tablet TAKE ONE TABLET BY MOUTH DAILY 90 tablet 1 2023   • hydrALAZINE (APRESOLINE) 50 MG tablet Take 1.5 tablets by mouth 3 (Three) Times a Day.   2023   • hydroCHLOROthiazide (HYDRODIURIL) 12.5 MG tablet Take 1 tablet by mouth Daily.   2023   • metFORMIN (GLUCOPHAGE) 500 MG tablet 2 (Two) Times a Day With Meals.   2023   • olmesartan (BENICAR) 40 MG tablet Take 1 tablet by mouth Every Night.   2023   • pantoprazole (PROTONIX) 40 MG EC tablet Take 1 tablet by mouth 2 (Two) Times a Day. 180 tablet 3 2023   • rosuvastatin (CRESTOR) 10 MG tablet Take 1  "tablet by mouth Daily.   5/16/2023   • hyoscyamine (LEVSIN) 0.125 MG SL tablet Take 1 tablet by mouth 4 (Four) Times a Day With Meals & at Bedtime. 120 tablet 5      Allergies:  Codeine and Other    REVIEW OF SYSTEMS:  Please see the above history of present illness for pertinent positives and negatives.  The remainder of the patient's systems have been reviewed and are negative.    Vital Signs  Temp:  [97.6 °F (36.4 °C)-98.3 °F (36.8 °C)] 97.6 °F (36.4 °C)  Heart Rate:  [52-65] 65  Resp:  [14-18] 16  BP: (130-182)/(57-74) 162/71  Oxygen Therapy  SpO2: 97 %  Pulse Oximetry Type: Intermittent  Device (Oxygen Therapy): room air}  Body mass index is 35.48 kg/m².     Flowsheet Rows    Flowsheet Row First Filed Value   Admission Height 157.5 cm (62\") Documented at 05/17/2023 0544   Admission Weight 88 kg (194 lb) Documented at 05/17/2023 0544           Physical Exam:  Physical Exam   Constitutional: Patient appears well developed and well nourished and in no acute distress.   HEENT:   Head: Normocephalic and atraumatic.   Eyes:  Pupils are equal, round, and reactive to light. EOM are intact. Sclerae are anicteric and noninjected.  Mouth and Throat: Patient has moist mucous membranes. Oropharynx is clear of any erythema or exudate.  Posterior pharynx difficult to visualize.     Neck: Neck supple. No JVD present. No thyromegaly present. No lymphadenopathy present.  Cardiovascular: Regular rate, regular rhythm, S1 normal and S2 normal.  Exam reveals no gallop and no friction rub.  No murmur heard.  Radial pulses are 2+ and symmetric.  Pulmonary/Chest: Lungs are clear to auscultation bilaterally. No respiratory distress. No wheezes. No rhonchi. No rales.   Abdominal: Obese. Soft. Bowel sounds are normal. There is no tenderness.   Musculoskeletal: Normal muscle tone  Extremities: No edema.   Neurological: Cranial nerves II-XII are grossly intact with no focal deficits.  Skin: Skin is warm. No rash noted. Nails show no " clubbing.  No cyanosis or erythema.    Emotional Behavior:    Judgment and Insight: Normal   Mental Status:  Alertness  Normal   Memory:  Appears intact   Mood and Affect:         Depression  None               Anxiety  None    Debilities:   Physical Weakness  None   Handicaps  None   Disabilities  None   Agitation  None     Results Review:    I reviewed the patient's new clinical results.  Lab Results (most recent)     Procedure Component Value Units Date/Time    High Sensitivity Troponin T [[id number]] Collected: 05/17/23 1247    Specimen: Blood Updated: 05/17/23 1251    High Sensitivity Troponin T 2Hr [[id number]]  (Abnormal) Collected: 05/17/23 0803    Specimen: Blood Updated: 05/17/23 0850     HS Troponin T 11 ng/L      Troponin T Delta 0 ng/L     Narrative:      High Sensitive Troponin T Reference Range:  <10.0 ng/L- Negative Female for AMI  <15.0 ng/L- Negative Male for AMI  >=10 - Abnormal Female indicating possible myocardial injury.  >=15 - Abnormal Male indicating possible myocardial injury.   Clinicians would have to utilize clinical acumen, EKG, Troponin, and serial changes to determine if it is an Acute Myocardial Infarction or myocardial injury due to an underlying chronic condition.         BNP [[id number]]  (Abnormal) Collected: 05/17/23 0600    Specimen: Blood Updated: 05/17/23 0634     proBNP 1,130.0 pg/mL     Narrative:      Among patients with dyspnea, NT-proBNP is highly sensitive for the detection of acute congestive heart failure. In addition NT-proBNP of <300 pg/ml effectively rules out acute congestive heart failure with 99% negative predictive value.      Comprehensive Metabolic Panel [[id number]]  (Abnormal) Collected: 05/17/23 0600    Specimen: Blood Updated: 05/17/23 0634     Glucose 110 mg/dL      BUN 26 mg/dL      Creatinine 1.23 mg/dL      Sodium 135 mmol/L      Potassium 4.7 mmol/L      Chloride 102 mmol/L      CO2 22.7 mmol/L      Calcium 9.5 mg/dL      Total Protein 6.6 g/dL       Albumin 4.1 g/dL      ALT (SGPT) 13 U/L      AST (SGOT) 14 U/L      Alkaline Phosphatase 73 U/L      Total Bilirubin <0.2 mg/dL      Globulin 2.5 gm/dL      A/G Ratio 1.6 g/dL      BUN/Creatinine Ratio 21.1     Anion Gap 10.3 mmol/L      eGFR 46.8 mL/min/1.73     Narrative:      GFR Normal >60  Chronic Kidney Disease <60  Kidney Failure <15    The GFR formula is only valid for adults with stable renal function between ages 18 and 70.    High Sensitivity Troponin T [[id number]]  (Abnormal) Collected: 05/17/23 0600    Specimen: Blood Updated: 05/17/23 0629     HS Troponin T 11 ng/L     Narrative:      High Sensitive Troponin T Reference Range:  <10.0 ng/L- Negative Female for AMI  <15.0 ng/L- Negative Male for AMI  >=10 - Abnormal Female indicating possible myocardial injury.  >=15 - Abnormal Male indicating possible myocardial injury.   Clinicians would have to utilize clinical acumen, EKG, Troponin, and serial changes to determine if it is an Acute Myocardial Infarction or myocardial injury due to an underlying chronic condition.         D-dimer, Quantitative [[id number]]  (Normal) Collected: 05/17/23 0600    Specimen: Blood Updated: 05/17/23 0622     D-Dimer, Quantitative 0.44 MCGFEU/mL     Narrative:      According to the assay 's published package insert, a normal (<0.50 MCGFEU/mL) D-dimer result in conjunction with a non-high clinical probability assessment, excludes deep vein thrombosis (DVT) and pulmonary embolism (PE) with high sensitivity.    D-dimer values increase with age and this can make VTE exclusion of an older population difficult. To address this, the American [employer], based on best available evidence and recent guidelines, recommends that clinicians use age-adjusted D-dimer thresholds in patients greater than 50 years of age with: a) a low probability of PE who do not meet all Pulmonary Embolism Rule Out Criteria, or b) in those with intermediate probability of PE.   The  "formula for an age-adjusted D-dimer cut-off is \"age/100\".  For example, a 60 year old patient would have an age-adjusted cut-off of 0.60 MCGFEU/mL and an 80 year old 0.80 MCGFEU/mL.    aPTT [[id number]]  (Normal) Collected: 05/17/23 0600    Specimen: Blood Updated: 05/17/23 0621     PTT 29.8 seconds     Narrative:      PTT = The equivalent PTT values for the therapeutic range of heparin levels at 0.1 to 0.7 U/ml are 53 to 110 seconds.      Protime-INR [[id number]]  (Abnormal) Collected: 05/17/23 0600    Specimen: Blood Updated: 05/17/23 0621     Protime 11.9 Seconds      INR 0.87    Narrative:      Therapeutic Ranges for INR: 2.0-3.0 (PT 20-30)                              2.5-3.5 (PT 25-34)    CBC & Differential [[id number]]  (Abnormal) Collected: 05/17/23 0600    Specimen: Blood Updated: 05/17/23 0607    Narrative:      The following orders were created for panel order CBC & Differential.  Procedure                               Abnormality         Status                     ---------                               -----------         ------                     CBC Auto Differential[[id number]]        Abnormal            Final result                 Please view results for these tests on the individual orders.    CBC Auto Differential [[id number]]  (Abnormal) Collected: 05/17/23 0600    Specimen: Blood Updated: 05/17/23 0607     WBC 5.73 10*3/mm3      RBC 3.41 10*6/mm3      Hemoglobin 10.1 g/dL      Hematocrit 32.0 %      MCV 93.8 fL      MCH 29.6 pg      MCHC 31.6 g/dL      RDW 13.4 %      RDW-SD 45.8 fl      MPV 8.9 fL      Platelets 294 10*3/mm3      Neutrophil % 59.1 %      Lymphocyte % 28.1 %      Monocyte % 8.6 %      Eosinophil % 3.1 %      Basophil % 0.9 %      Immature Grans % 0.2 %      Neutrophils, Absolute 3.39 10*3/mm3      Lymphocytes, Absolute 1.61 10*3/mm3      Monocytes, Absolute 0.49 10*3/mm3      Eosinophils, Absolute 0.18 10*3/mm3      Basophils, Absolute 0.05 10*3/mm3      Immature Grans, Absolute " 0.01 10*3/mm3      nRBC 0.0 /100 WBC           Imaging Results (Most Recent)     Procedure Component Value Units Date/Time    XR Chest 2 View [[id number]] Collected: 05/17/23 0633     Updated: 05/17/23 0636    Narrative:      PA AND LATERAL CHEST, 5/17/2023 6:32 AM     HISTORY:  CP, SOB     COMPARISON:  03/16/2020     TECHNIQUE:  PA and lateral upright chest series.     FINDINGS:  Heart size is top normal. Patient is status post CABG. There is  atherosclerotic disease in the aorta. Pulmonary vascularity is normal.  The lungs are clear.       Impression:      No acute cardiopulmonary findings.     This report was finalized on 5/17/2023 6:34 AM by Dr. Sathish Cunha MD.           reviewed    ECG/EMG Results (most recent)     Procedure Component Value Units Date/Time    ECG 12 Lead Chest Pain [[id number]] Collected: 05/17/23 0543     Updated: 05/17/23 0859     QT Interval 394 ms     Narrative:      HEART RATE= 63  bpm  RR Interval= 953  ms  IA Interval=   ms  P Horizontal Axis=   deg  P Front Axis=   deg  QRSD Interval= 82  ms  QT Interval= 394  ms  QRS Axis= 0  deg  T Wave Axis= -3  deg  - ABNORMAL ECG -  Atrial flutter with predominant 4:1 AV block - new  LVH by voltage  Inferior infarct, age indeterminate  Electronically Signed By: Kumar Frye ([facility]) 17-May-2023 08:58:51  Date and Time of Study: 2023-05-17 05:43:39        Reviewed and compared to tracing from 7/2022 and 9/2020.  Aflutter is new otherwise no dynamic change.    Assessment & Plan     1.  New-onset Aflutter: Etiology unclear.  Her echo is complete, and EF is preserved w/ normal atria.  I've added a mag and TSH to blood in lab.  Received a dose of Eliquis in the ER.  CHADS is 5.  Awaiting Cardiology input.    2.  Hypertension: Not at goal on exam, but she did not take her medications this morning.  These have been resumed.  Will continue to monitor trend.    3.  Diabetes Mellitus, Type 2 in Obese: A1c pending.  Resumed home Metformin.    4.  CAD: As in  #1.  EF is preserved.    I discussed the patient's findings and my recommendations with patient.     Fabrizio Brown MD  05/17/23  13:14 EDT

## 2023-05-17 NOTE — PLAN OF CARE
Goal Outcome Evaluation:  Plan of Care Reviewed With: patient        Progress: no change PT ARRIVED TO FLOOR. MED LIST COMPLETE. Vss, DENIES ANY SOA OR CHEST PAIN AT THIS TIME     No family at bedside

## 2023-05-18 ENCOUNTER — APPOINTMENT (OUTPATIENT)
Dept: CARDIOLOGY | Facility: HOSPITAL | Age: 73
End: 2023-05-18
Payer: MEDICARE

## 2023-05-18 VITALS
TEMPERATURE: 96.7 F | HEART RATE: 59 BPM | SYSTOLIC BLOOD PRESSURE: 142 MMHG | DIASTOLIC BLOOD PRESSURE: 66 MMHG | OXYGEN SATURATION: 96 % | BODY MASS INDEX: 35.7 KG/M2 | RESPIRATION RATE: 16 BRPM | HEIGHT: 62 IN | WEIGHT: 194 LBS

## 2023-05-18 LAB
ALBUMIN SERPL-MCNC: 3.5 G/DL (ref 3.5–5.2)
ANION GAP SERPL CALCULATED.3IONS-SCNC: 9.2 MMOL/L (ref 5–15)
BUN SERPL-MCNC: 14 MG/DL (ref 8–23)
BUN/CREAT SERPL: 14.6 (ref 7–25)
CALCIUM SPEC-SCNC: 8.8 MG/DL (ref 8.6–10.5)
CHLORIDE SERPL-SCNC: 107 MMOL/L (ref 98–107)
CO2 SERPL-SCNC: 20.8 MMOL/L (ref 22–29)
CREAT SERPL-MCNC: 0.96 MG/DL (ref 0.57–1)
EGFRCR SERPLBLD CKD-EPI 2021: 63 ML/MIN/1.73
GLUCOSE SERPL-MCNC: 115 MG/DL (ref 65–99)
MAXIMAL PREDICTED HEART RATE: 148 BPM
PHOSPHATE SERPL-MCNC: 3.1 MG/DL (ref 2.5–4.5)
POTASSIUM SERPL-SCNC: 4.3 MMOL/L (ref 3.5–5.2)
QT INTERVAL: 410 MS
SODIUM SERPL-SCNC: 137 MMOL/L (ref 136–145)
STRESS TARGET HR: 126 BPM

## 2023-05-18 PROCEDURE — 96361 HYDRATE IV INFUSION ADD-ON: CPT

## 2023-05-18 PROCEDURE — G0378 HOSPITAL OBSERVATION PER HR: HCPCS

## 2023-05-18 PROCEDURE — 80069 RENAL FUNCTION PANEL: CPT | Performed by: HOSPITALIST

## 2023-05-18 PROCEDURE — 93246 EXT ECG>7D<15D RECORDING: CPT

## 2023-05-18 RX ORDER — NITROGLYCERIN 0.4 MG/1
0.4 TABLET SUBLINGUAL
Status: DISCONTINUED | OUTPATIENT
Start: 2023-05-18 | End: 2023-05-18 | Stop reason: HOSPADM

## 2023-05-18 RX ADMIN — Medication 10 ML: at 08:37

## 2023-05-18 RX ADMIN — HYDRALAZINE HYDROCHLORIDE 75 MG: 50 TABLET, FILM COATED ORAL at 08:36

## 2023-05-18 RX ADMIN — ROSUVASTATIN CALCIUM 10 MG: 5 TABLET, FILM COATED ORAL at 08:36

## 2023-05-18 RX ADMIN — PANTOPRAZOLE SODIUM 40 MG: 40 TABLET, DELAYED RELEASE ORAL at 08:36

## 2023-05-18 RX ADMIN — ATENOLOL 25 MG: 25 TABLET ORAL at 08:36

## 2023-05-18 RX ADMIN — SENNOSIDES AND DOCUSATE SODIUM 2 TABLET: 50; 8.6 TABLET ORAL at 08:36

## 2023-05-18 RX ADMIN — METFORMIN HYDROCHLORIDE 500 MG: 500 TABLET, FILM COATED ORAL at 08:36

## 2023-05-18 RX ADMIN — HYDROCHLOROTHIAZIDE 12.5 MG: 12.5 TABLET ORAL at 08:39

## 2023-05-18 RX ADMIN — ALLOPURINOL 100 MG: 100 TABLET ORAL at 08:36

## 2023-05-18 RX ADMIN — CLONIDINE HYDROCHLORIDE 0.2 MG: 0.2 TABLET ORAL at 08:37

## 2023-05-18 RX ADMIN — APIXABAN 5 MG: 2.5 TABLET, FILM COATED ORAL at 08:37

## 2023-05-18 RX ADMIN — SODIUM CHLORIDE 75 ML/HR: 9 INJECTION, SOLUTION INTRAVENOUS at 02:31

## 2023-05-18 NOTE — CASE MANAGEMENT/SOCIAL WORK
Continued Stay Note  JULIO Resendez     Patient Name: Valentine Arora  MRN: [id number]  Today's Date: 5/18/2023    Admit Date: 5/17/2023    Plan: plan home with    Discharge Plan     Row Name 05/18/23 9257       Plan    Plan plan home with     Patient/Family in Agreement with Plan yes    Plan Comments Spoke with patient at bedside, Face sheet verified. Patient lives in a home with her  and is independent of ADLs including driving.  She denies use of DME or inpatient rehab. She has used HH following knee replacement in the past, she cannot recall the agency. She has a living will. She sees Dr Alejandro a PCP. She uses [facility] [place] and denies issues obtaining medications.There are no concerns r/t housing, food, utilities. She plans to return home with her  to assist at [place]. CM # placed on white board, will continue to follow.               Discharge Codes    No documentation.               Expected Discharge Date and Time     Expected Discharge Date Expected Discharge Time    May 18, 2023             Bony Munroe RN

## 2023-05-18 NOTE — DISCHARGE SUMMARY
Valentine Arora  1950  [id number]    Hospitalists Discharge Summary    Date of Admission: 5/17/2023  Date of Discharge:  5/18/2023    Primary Discharge Diagnoses:  1.  Paroxysmal Atrial Flutter    Secondary Discharge Diagnoses:  1.  CAD  2.  Essential Hypertension  3.  Diabetes Mellitus, Type 2 in Obese Body mass index is 35.48 kg/m².  A1c 6.0%     History of Present Illness (taken from my H&P):  Ms. Arora is a very pleasant, 74 y/o  female who presents after suffering an event around 03:00 this morning.  She reports being in her normal state of health up until this morning when she awoke w/ chest discomfort and an unusual feeling in her left arm.  She equates it to her previous heart attack although much, much milder.  Associated w/ this was some mild nausea but no diaphoresis, dyspnea, or vomiting.  She rested for an hour hoping it would subside, but when it didn't, she took ASA x3 and came to the ER where she was found to be in atrial flutter.  She remained HD stable w/ equivocal troponins x2.  She is now admitted for further care.  She does not check her blood glucose.    Hospital Course:  Ms. Arora was admitted to the Med/Surg unit on tele.  She was seen in consultation by Cardiology and had converted back to NSR.  Echo was performed and was found to be normal.  Her CHADS was 5 so she was continued on Eliquis.  A Zio patch was placed, and she will follow-up w/ Cardiology.    PCP  Patient Care Team:  Aliya Alejandro MD as PCP - General (Family Medicine)    Consults:   Consults     Date and Time Order Name Status Description    5/17/2023 12:36 PM Inpatient Cardiology Consult Completed           Operations and Procedures Performed:       Adult Transthoracic Echo Complete w/ Color, Spectral and Contrast if Necessary Per Protocol    Result Date: 5/17/2023  Narrative: •  Left ventricular systolic function is normal. Calculated left ventricular EF = 58.5% •  Left ventricular wall thickness is  consistent with mild concentric hypertrophy. •  Left ventricular diastolic function was normal. •  Sclerotic aortic valve noted without any significant stenosis or regurgitation. •  Normal biatrial size present. •  There is mild to moderate tricuspid valve regurgitation present.     XR Chest 2 View    Result Date: 5/17/2023  Narrative: PA AND LATERAL CHEST, 5/17/2023 6:32 AM  HISTORY: CP, SOB  COMPARISON: 03/16/2020  TECHNIQUE: PA and lateral upright chest series.  FINDINGS: Heart size is top normal. Patient is status post CABG. There is atherosclerotic disease in the aorta. Pulmonary vascularity is normal. The lungs are clear.      Impression: No acute cardiopulmonary findings.  This report was finalized on 5/17/2023 6:34 AM by Dr. Sathish Cunha MD.        Allergies:  is allergic to codeine and other.      Discharge Medications:     Discharge Medications      New Medications      Instructions Start Date   apixaban 5 MG tablet tablet  Commonly known as: ELIQUIS   5 mg, Oral, Every 12 Hours Scheduled         Continue These Medications      Instructions Start Date   allopurinol 100 MG tablet  Commonly known as: ZYLOPRIM   Take 1 tablet by mouth 2 (Two) Times a Day.      atenolol 25 MG tablet  Commonly known as: TENORMIN   25 mg, Oral, Daily      cloNIDine 0.1 MG tablet  Commonly known as: CATAPRES   0.2 mg, Oral, 3 Times Daily      clopidogrel 75 MG tablet  Commonly known as: PLAVIX   TAKE ONE TABLET BY MOUTH DAILY      hydrALAZINE 50 MG tablet  Commonly known as: APRESOLINE   75 mg, Oral, 3 Times Daily      hydroCHLOROthiazide 12.5 MG tablet  Commonly known as: HYDRODIURIL   12.5 mg, Oral, Daily      hyoscyamine 0.125 MG SL tablet  Commonly known as: LEVSIN   0.125 mg, Oral, 4 Times Daily With Meals & Nightly      metFORMIN 500 MG tablet  Commonly known as: GLUCOPHAGE   2 Times Daily With Meals      olmesartan 40 MG tablet  Commonly known as: BENICAR   40 mg, Oral, Nightly      pantoprazole 40 MG EC  tablet  Commonly known as: PROTONIX   40 mg, Oral, 2 Times Daily      rosuvastatin 10 MG tablet  Commonly known as: CRESTOR   10 mg, Oral, Daily             Last Lab Results:   Lab Results (most recent)     Procedure Component Value Units Date/Time    Renal Function Panel [[id number]]  (Abnormal) Collected: 05/18/23 0634    Specimen: Blood Updated: 05/18/23 0656     Glucose 115 mg/dL      BUN 14 mg/dL      Creatinine 0.96 mg/dL      Sodium 137 mmol/L      Potassium 4.3 mmol/L      Chloride 107 mmol/L      CO2 20.8 mmol/L      Calcium 8.8 mg/dL      Albumin 3.5 g/dL      Phosphorus 3.1 mg/dL      Anion Gap 9.2 mmol/L      BUN/Creatinine Ratio 14.6     eGFR 63.0 mL/min/1.73     Narrative:      GFR Normal >60  Chronic Kidney Disease <60  Kidney Failure <15    The GFR formula is only valid for adults with stable renal function between ages 18 and 70.    Hemoglobin A1c [[id number]]  (Abnormal) Collected: 05/17/23 0600    Specimen: Blood Updated: 05/17/23 1422     Hemoglobin A1C 6.00 %     Narrative:      Hemoglobin A1C Ranges:    Increased Risk for Diabetes  5.7% to 6.4%  Diabetes                     >= 6.5%  Diabetic Goal                < 7.0%    TSH [[id number]]  (Normal) Collected: 05/17/23 1247    Specimen: Blood Updated: 05/17/23 1339     TSH 1.320 uIU/mL     High Sensitivity Troponin T [[id number]]  (Normal) Collected: 05/17/23 1247    Specimen: Blood Updated: 05/17/23 1327     HS Troponin T 8 ng/L     Narrative:      High Sensitive Troponin T Reference Range:  <10.0 ng/L- Negative Female for AMI  <15.0 ng/L- Negative Male for AMI  >=10 - Abnormal Female indicating possible myocardial injury.  >=15 - Abnormal Male indicating possible myocardial injury.   Clinicians would have to utilize clinical acumen, EKG, Troponin, and serial changes to determine if it is an Acute Myocardial Infarction or myocardial injury due to an underlying chronic condition.         Magnesium [[id number]]  (Normal) Collected: 05/17/23 1247     Specimen: Blood Updated: 05/17/23 1327     Magnesium 1.9 mg/dL     High Sensitivity Troponin T 2Hr [[id number]]  (Abnormal) Collected: 05/17/23 0803    Specimen: Blood Updated: 05/17/23 0850     HS Troponin T 11 ng/L      Troponin T Delta 0 ng/L     Narrative:      High Sensitive Troponin T Reference Range:  <10.0 ng/L- Negative Female for AMI  <15.0 ng/L- Negative Male for AMI  >=10 - Abnormal Female indicating possible myocardial injury.  >=15 - Abnormal Male indicating possible myocardial injury.   Clinicians would have to utilize clinical acumen, EKG, Troponin, and serial changes to determine if it is an Acute Myocardial Infarction or myocardial injury due to an underlying chronic condition.         BNP [[id number]]  (Abnormal) Collected: 05/17/23 0600    Specimen: Blood Updated: 05/17/23 0634     proBNP 1,130.0 pg/mL     Narrative:      Among patients with dyspnea, NT-proBNP is highly sensitive for the detection of acute congestive heart failure. In addition NT-proBNP of <300 pg/ml effectively rules out acute congestive heart failure with 99% negative predictive value.      Comprehensive Metabolic Panel [[id number]]  (Abnormal) Collected: 05/17/23 0600    Specimen: Blood Updated: 05/17/23 0634     Glucose 110 mg/dL      BUN 26 mg/dL      Creatinine 1.23 mg/dL      Sodium 135 mmol/L      Potassium 4.7 mmol/L      Chloride 102 mmol/L      CO2 22.7 mmol/L      Calcium 9.5 mg/dL      Total Protein 6.6 g/dL      Albumin 4.1 g/dL      ALT (SGPT) 13 U/L      AST (SGOT) 14 U/L      Alkaline Phosphatase 73 U/L      Total Bilirubin <0.2 mg/dL      Globulin 2.5 gm/dL      A/G Ratio 1.6 g/dL      BUN/Creatinine Ratio 21.1     Anion Gap 10.3 mmol/L      eGFR 46.8 mL/min/1.73     Narrative:      GFR Normal >60  Chronic Kidney Disease <60  Kidney Failure <15    The GFR formula is only valid for adults with stable renal function between ages 18 and 70.    High Sensitivity Troponin T [[id number]]  (Abnormal) Collected: 05/17/23  "0600    Specimen: Blood Updated: 05/17/23 0629     HS Troponin T 11 ng/L     Narrative:      High Sensitive Troponin T Reference Range:  <10.0 ng/L- Negative Female for AMI  <15.0 ng/L- Negative Male for AMI  >=10 - Abnormal Female indicating possible myocardial injury.  >=15 - Abnormal Male indicating possible myocardial injury.   Clinicians would have to utilize clinical acumen, EKG, Troponin, and serial changes to determine if it is an Acute Myocardial Infarction or myocardial injury due to an underlying chronic condition.         D-dimer, Quantitative [[id number]]  (Normal) Collected: 05/17/23 0600    Specimen: Blood Updated: 05/17/23 0622     D-Dimer, Quantitative 0.44 MCGFEU/mL     Narrative:      According to the assay 's published package insert, a normal (<0.50 MCGFEU/mL) D-dimer result in conjunction with a non-high clinical probability assessment, excludes deep vein thrombosis (DVT) and pulmonary embolism (PE) with high sensitivity.    D-dimer values increase with age and this can make VTE exclusion of an older population difficult. To address this, the [employer], based on best available evidence and recent guidelines, recommends that clinicians use age-adjusted D-dimer thresholds in patients greater than 50 years of age with: a) a low probability of PE who do not meet all Pulmonary Embolism Rule Out Criteria, or b) in those with intermediate probability of PE.   The formula for an age-adjusted D-dimer cut-off is \"age/100\".  For example, a 60 year old patient would have an age-adjusted cut-off of 0.60 MCGFEU/mL and an 80 year old 0.80 MCGFEU/mL.    aPTT [[id number]]  (Normal) Collected: 05/17/23 0600    Specimen: Blood Updated: 05/17/23 0621     PTT 29.8 seconds     Narrative:      PTT = The equivalent PTT values for the therapeutic range of heparin levels at 0.1 to 0.7 U/ml are 53 to 110 seconds.      Protime-INR [[id number]]  (Abnormal) Collected: 05/17/23 0600    Specimen: " Blood Updated: 05/17/23 0621     Protime 11.9 Seconds      INR 0.87    Narrative:      Therapeutic Ranges for INR: 2.0-3.0 (PT 20-30)                              2.5-3.5 (PT 25-34)    CBC & Differential [[id number]]  (Abnormal) Collected: 05/17/23 0600    Specimen: Blood Updated: 05/17/23 0607    Narrative:      The following orders were created for panel order CBC & Differential.  Procedure                               Abnormality         Status                     ---------                               -----------         ------                     CBC Auto Differential[[id number]]        Abnormal            Final result                 Please view results for these tests on the individual orders.    CBC Auto Differential [[id number]]  (Abnormal) Collected: 05/17/23 0600    Specimen: Blood Updated: 05/17/23 0607     WBC 5.73 10*3/mm3      RBC 3.41 10*6/mm3      Hemoglobin 10.1 g/dL      Hematocrit 32.0 %      MCV 93.8 fL      MCH 29.6 pg      MCHC 31.6 g/dL      RDW 13.4 %      RDW-SD 45.8 fl      MPV 8.9 fL      Platelets 294 10*3/mm3      Neutrophil % 59.1 %      Lymphocyte % 28.1 %      Monocyte % 8.6 %      Eosinophil % 3.1 %      Basophil % 0.9 %      Immature Grans % 0.2 %      Neutrophils, Absolute 3.39 10*3/mm3      Lymphocytes, Absolute 1.61 10*3/mm3      Monocytes, Absolute 0.49 10*3/mm3      Eosinophils, Absolute 0.18 10*3/mm3      Basophils, Absolute 0.05 10*3/mm3      Immature Grans, Absolute 0.01 10*3/mm3      nRBC 0.0 /100 WBC         Imaging Results (Most Recent)     Procedure Component Value Units Date/Time    XR Chest 2 View [[id number]] Collected: 05/17/23 0633     Updated: 05/17/23 0636    Narrative:      PA AND LATERAL CHEST, 5/17/2023 6:32 AM     HISTORY:  CP, SOB     COMPARISON:  03/16/2020     TECHNIQUE:  PA and lateral upright chest series.     FINDINGS:  Heart size is top normal. Patient is status post CABG. There is  atherosclerotic disease in the aorta. Pulmonary vascularity is  normal.  The lungs are clear.       Impression:      No acute cardiopulmonary findings.     This report was finalized on 5/17/2023 6:34 AM by Dr. Sathish Cunha MD.             PROCEDURES      Condition on Discharge: Stable    Physical Exam at Discharge  Vital Signs  Temp:  [96.7 °F (35.9 °C)-97.6 °F (36.4 °C)] 96.7 °F (35.9 °C)  Heart Rate:  [58-71] 59  Resp:  [15-18] 16  BP: (122-178)/(59-71) 142/66    Physical Exam:  Physical Exam   Constitutional: Patient appears well-developed and well-nourished and in no acute distress   Cardiovascular: Regular rate, regular rhythm, S1 normal and S2 normal.  Exam reveals no gallop and no friction rub.  No murmur heard.  Pulmonary/Chest: Lungs are clear to auscultation bilaterally. No respiratory distress. No wheezes. No rhonchi. No rales.   Abdominal: Obese. Soft. Bowel sounds are normal. There is no tenderness.   Musculoskeletal: Normal Muscle tone  Extremities: No edema.   Neurological: Cranial nerves II-XII are grossly intact with no focal deficits.    Discharge Disposition:  Home    Visiting Nurse:    No     Home PT/OT:  No     Home Safety Evaluation:  No     DME  None    Discharge Diet:      Dietary Orders (From admission, onward)     Start     Ordered    05/17/23 1225  Diet: Cardiac Diets; Healthy Heart (2-3 Na+); Texture: Regular Texture (IDDSI 7); Fluid Consistency: Thin (IDDSI 0)  Diet Effective Now        References:    Diet Order [facility]   Question Answer Comment   Diets: Cardiac Diets    Cardiac Diet: Healthy Heart (2-3 Na+)    Texture: Regular Texture (IDDSI 7)    Fluid Consistency: Thin (IDDSI 0)        05/17/23 1225                Activity at Discharge:  As tolerated    Pre-discharge education  Signs/Symptoms of bleeding      Follow-up Appointments  Future Appointments   Date Time Provider Department Center   7/12/2023 10:45 AM Kumar Frye III, MD [facility]   10/20/2023 10:00 AM Shayna Tran APRN [facility]     Additional Instructions for  the Follow-ups that You Need to Schedule     Discharge Follow-up with PCP   As directed       Currently Documented PCP:    Aliya Alejandro MD    PCP Phone Number:    [phone]     Follow Up Details: 2 weeks         Discharge Follow-up with Specified Provider: Dr. Frye; 1 Week   As directed      To: Dr. Frye    Follow Up: 1 Week               Test Results Pending at Discharge  None     Fabrizio Brown MD  05/18/23  10:33 EDT    Time: <30 minutes

## 2023-05-18 NOTE — PLAN OF CARE
Goal Outcome Evaluation:  Plan of Care Reviewed With: patient         No c/o chest pain noted.

## 2023-05-18 NOTE — PROGRESS NOTES
"CC: Paroxysmal atrial flutter    Interval History: No acute events overnight      Vital Signs  Temp:  [96.7 °F (35.9 °C)-97.6 °F (36.4 °C)] 96.7 °F (35.9 °C)  Heart Rate:  [55-71] 59  Resp:  [14-18] 16  BP: (122-178)/(57-71) 142/66    Intake/Output Summary (Last 24 hours) at 5/18/2023 0835  Last data filed at 5/17/2023 1823  Gross per 24 hour   Intake 940 ml   Output --   Net 940 ml     Flowsheet Rows    Flowsheet Row First Filed Value   Admission Height 157.5 cm (62\") Documented at 05/17/2023 0544   Admission Weight 88 kg (194 lb) Documented at 05/17/2023 0544          PHYSICAL EXAM:  General: No acute distress  Resp:NL Rate, symmetric chest expansion,unlabored, clear  CV:NL rate and rhythm, NL PMI, NL S1 and S2, no Murmur, no gallop, no rub, No JVD.   ABD:Nl sounds, no masses or tenderness, nondistended, no guarding or rebound  Neuro: alert,cooperative and oriented  Extr:Normal pedal pulses, No edema or cyanosis, moves all extremities      Results Review:    Results from last 7 days   Lab Units 05/18/23  0634   SODIUM mmol/L 137   POTASSIUM mmol/L 4.3   CHLORIDE mmol/L 107   CO2 mmol/L 20.8*   BUN mg/dL 14   CREATININE mg/dL 0.96   GLUCOSE mg/dL 115*   CALCIUM mg/dL 8.8     Results from last 7 days   Lab Units 05/17/23  1247 05/17/23  0803 05/17/23  0600   HSTROP T ng/L 8 11* 11*     Results from last 7 days   Lab Units 05/17/23  0600   WBC 10*3/mm3 5.73   HEMOGLOBIN g/dL 10.1*   HEMATOCRIT % 32.0*   PLATELETS 10*3/mm3 294     Results from last 7 days   Lab Units 05/17/23  0600   INR  0.87*   APTT seconds 29.8         Results from last 7 days   Lab Units 05/17/23  1247   MAGNESIUM mg/dL 1.9         I reviewed the patient's new clinical results.  I personally viewed and interpreted the patient's EKG/Telemetry data        Medication Review:   Meds reviewed         Assessment/Plan    1. Paroxysmal atrial flutter  2. Coronary artery disease with history of CABG  3. Resistant hypertension  4. PAD with prior " interventions  5. Hyperlipidemia    No acute events overnight.  She is back to baseline symptoms.  In sinus rhythm  Continue Plavix and Eliquis with pantoprazole.  Discharge patient on Zio.  Follow-up in cardiology clinic in 1 month.      Karan Carter MD  05/18/23  08:35 EDT

## 2023-05-18 NOTE — CASE MANAGEMENT/SOCIAL WORK
Case Management Discharge Note      Final Note: dc home         Selected Continued Care - Discharged on 5/18/2023 Admission date: 5/17/2023 - Discharge disposition: Home or Self Care    Destination    No services have been selected for the patient.              Durable Medical Equipment    No services have been selected for the patient.              Dialysis/Infusion    No services have been selected for the patient.              Home Medical Care    No services have been selected for the patient.              Therapy    No services have been selected for the patient.              Community Resources    No services have been selected for the patient.              Community & DME    No services have been selected for the patient.                       Final Discharge Disposition Code: 01 - home or self-care

## 2023-05-19 ENCOUNTER — READMISSION MANAGEMENT (OUTPATIENT)
Dept: CALL CENTER | Facility: HOSPITAL | Age: 73
End: 2023-05-19
Payer: MEDICARE

## 2023-05-19 NOTE — OUTREACH NOTE
Prep Survey    Flowsheet Row Responses   [facility] facility patient discharged from? [facility]   Is LACE score < 7 ? Yes   Eligibility Readm Mgmt   Discharge diagnosis a-flutter   Does the patient have one of the following disease processes/diagnoses(primary or secondary)? Other   Does the patient have Home health ordered? No   Is there a DME ordered? No   Prep survey completed? Yes          SALOMON BARTHOLOMEW - Registered Nurse

## 2023-05-21 ENCOUNTER — HOSPITAL ENCOUNTER (OUTPATIENT)
Facility: HOSPITAL | Age: 73
Setting detail: OBSERVATION
Discharge: HOME | End: 2023-05-22
Attending: EMERGENCY MEDICINE | Admitting: EMERGENCY MEDICINE
Payer: MEDICARE

## 2023-05-21 ENCOUNTER — APPOINTMENT (OUTPATIENT)
Dept: GENERAL RADIOLOGY | Facility: HOSPITAL | Age: 73
End: 2023-05-21
Payer: MEDICARE

## 2023-05-21 DIAGNOSIS — I25.10 CORONARY ARTERY DISEASE INVOLVING NATIVE CORONARY ARTERY OF NATIVE HEART, UNSPECIFIED WHETHER ANGINA PRESENT: Chronic | ICD-10-CM

## 2023-05-21 DIAGNOSIS — E78.2 MIXED HYPERLIPIDEMIA: Chronic | ICD-10-CM

## 2023-05-21 DIAGNOSIS — I10 PRIMARY HYPERTENSION: Chronic | ICD-10-CM

## 2023-05-21 DIAGNOSIS — R07.9 CHEST PAIN, UNSPECIFIED TYPE: Primary | ICD-10-CM

## 2023-05-21 DIAGNOSIS — I73.9 PAD (PERIPHERAL ARTERY DISEASE): Chronic | ICD-10-CM

## 2023-05-21 PROBLEM — R07.89 CHEST DISCOMFORT: Status: ACTIVE | Noted: 2023-05-21

## 2023-05-21 LAB
ALBUMIN SERPL-MCNC: 4 G/DL (ref 3.5–5.2)
ALBUMIN/GLOB SERPL: 1.9 G/DL
ALP SERPL-CCNC: 69 U/L (ref 39–117)
ALT SERPL W P-5'-P-CCNC: 15 U/L (ref 1–33)
ANION GAP SERPL CALCULATED.3IONS-SCNC: 13.6 MMOL/L (ref 5–15)
AST SERPL-CCNC: 14 U/L (ref 1–32)
BASOPHILS # BLD AUTO: 0.04 10*3/MM3 (ref 0–0.2)
BASOPHILS NFR BLD AUTO: 0.5 % (ref 0–1.5)
BILIRUB SERPL-MCNC: 0.2 MG/DL (ref 0–1.2)
BUN SERPL-MCNC: 17 MG/DL (ref 8–23)
BUN/CREAT SERPL: 15.9 (ref 7–25)
CALCIUM SPEC-SCNC: 9.2 MG/DL (ref 8.6–10.5)
CHLORIDE SERPL-SCNC: 100 MMOL/L (ref 98–107)
CO2 SERPL-SCNC: 21.4 MMOL/L (ref 22–29)
CREAT SERPL-MCNC: 1.07 MG/DL (ref 0.57–1)
DEPRECATED RDW RBC AUTO: 42.3 FL (ref 37–54)
EGFRCR SERPLBLD CKD-EPI 2021: 55.3 ML/MIN/1.73
EOSINOPHIL # BLD AUTO: 0.12 10*3/MM3 (ref 0–0.4)
EOSINOPHIL NFR BLD AUTO: 1.4 % (ref 0.3–6.2)
ERYTHROCYTE [DISTWIDTH] IN BLOOD BY AUTOMATED COUNT: 12.9 % (ref 12.3–15.4)
GEN 5 2HR TROPONIN T REFLEX: 14 NG/L
GLOBULIN UR ELPH-MCNC: 2.1 GM/DL
GLUCOSE BLDC GLUCOMTR-MCNC: 108 MG/DL (ref 70–130)
GLUCOSE SERPL-MCNC: 111 MG/DL (ref 65–99)
HCT VFR BLD AUTO: 32 % (ref 34–46.6)
HGB BLD-MCNC: 10.3 G/DL (ref 12–15.9)
IMM GRANULOCYTES # BLD AUTO: 0.03 10*3/MM3 (ref 0–0.05)
IMM GRANULOCYTES NFR BLD AUTO: 0.3 % (ref 0–0.5)
LYMPHOCYTES # BLD AUTO: 1.28 10*3/MM3 (ref 0.7–3.1)
LYMPHOCYTES NFR BLD AUTO: 14.7 % (ref 19.6–45.3)
MAGNESIUM SERPL-MCNC: 1.8 MG/DL (ref 1.6–2.4)
MCH RBC QN AUTO: 28.8 PG (ref 26.6–33)
MCHC RBC AUTO-ENTMCNC: 32.2 G/DL (ref 31.5–35.7)
MCV RBC AUTO: 89.4 FL (ref 79–97)
MONOCYTES # BLD AUTO: 0.61 10*3/MM3 (ref 0.1–0.9)
MONOCYTES NFR BLD AUTO: 7 % (ref 5–12)
NEUTROPHILS NFR BLD AUTO: 6.65 10*3/MM3 (ref 1.7–7)
NEUTROPHILS NFR BLD AUTO: 76.1 % (ref 42.7–76)
NRBC BLD AUTO-RTO: 0 /100 WBC (ref 0–0.2)
NT-PROBNP SERPL-MCNC: 913 PG/ML (ref 0–900)
PLATELET # BLD AUTO: 306 10*3/MM3 (ref 140–450)
PMV BLD AUTO: 9 FL (ref 6–12)
POTASSIUM SERPL-SCNC: 4.3 MMOL/L (ref 3.5–5.2)
PROT SERPL-MCNC: 6.1 G/DL (ref 6–8.5)
QT INTERVAL: 393 MS
RBC # BLD AUTO: 3.58 10*6/MM3 (ref 3.77–5.28)
SODIUM SERPL-SCNC: 135 MMOL/L (ref 136–145)
TROPONIN T DELTA: -5 NG/L
TROPONIN T SERPL HS-MCNC: 19 NG/L
WBC NRBC COR # BLD: 8.73 10*3/MM3 (ref 3.4–10.8)

## 2023-05-21 PROCEDURE — 84484 ASSAY OF TROPONIN QUANT: CPT | Performed by: EMERGENCY MEDICINE

## 2023-05-21 PROCEDURE — G0378 HOSPITAL OBSERVATION PER HR: HCPCS

## 2023-05-21 PROCEDURE — 83735 ASSAY OF MAGNESIUM: CPT | Performed by: EMERGENCY MEDICINE

## 2023-05-21 PROCEDURE — 36415 COLL VENOUS BLD VENIPUNCTURE: CPT

## 2023-05-21 PROCEDURE — 71045 X-RAY EXAM CHEST 1 VIEW: CPT

## 2023-05-21 PROCEDURE — 83880 ASSAY OF NATRIURETIC PEPTIDE: CPT | Performed by: EMERGENCY MEDICINE

## 2023-05-21 PROCEDURE — 93010 ELECTROCARDIOGRAM REPORT: CPT | Performed by: INTERNAL MEDICINE

## 2023-05-21 PROCEDURE — 93005 ELECTROCARDIOGRAM TRACING: CPT

## 2023-05-21 PROCEDURE — 85025 COMPLETE CBC W/AUTO DIFF WBC: CPT | Performed by: EMERGENCY MEDICINE

## 2023-05-21 PROCEDURE — 99285 EMERGENCY DEPT VISIT HI MDM: CPT

## 2023-05-21 PROCEDURE — 82948 REAGENT STRIP/BLOOD GLUCOSE: CPT

## 2023-05-21 PROCEDURE — 80053 COMPREHEN METABOLIC PANEL: CPT | Performed by: EMERGENCY MEDICINE

## 2023-05-21 PROCEDURE — 93005 ELECTROCARDIOGRAM TRACING: CPT | Performed by: EMERGENCY MEDICINE

## 2023-05-21 RX ORDER — ONDANSETRON 2 MG/ML
4 INJECTION INTRAMUSCULAR; INTRAVENOUS EVERY 6 HOURS PRN
Status: DISCONTINUED | OUTPATIENT
Start: 2023-05-21 | End: 2023-05-22 | Stop reason: HOSPADM

## 2023-05-21 RX ORDER — SODIUM CHLORIDE 0.9 % (FLUSH) 0.9 %
10 SYRINGE (ML) INJECTION AS NEEDED
Status: DISCONTINUED | OUTPATIENT
Start: 2023-05-21 | End: 2023-05-22 | Stop reason: HOSPADM

## 2023-05-21 RX ORDER — ROSUVASTATIN CALCIUM 20 MG/1
10 TABLET, COATED ORAL NIGHTLY
Status: DISCONTINUED | OUTPATIENT
Start: 2023-05-21 | End: 2023-05-22 | Stop reason: HOSPADM

## 2023-05-21 RX ORDER — SODIUM CHLORIDE 9 MG/ML
40 INJECTION, SOLUTION INTRAVENOUS AS NEEDED
Status: DISCONTINUED | OUTPATIENT
Start: 2023-05-21 | End: 2023-05-22 | Stop reason: HOSPADM

## 2023-05-21 RX ORDER — NITROGLYCERIN 0.4 MG/1
0.4 TABLET SUBLINGUAL
Status: DISCONTINUED | OUTPATIENT
Start: 2023-05-21 | End: 2023-05-22 | Stop reason: HOSPADM

## 2023-05-21 RX ORDER — DEXTROSE MONOHYDRATE 25 G/50ML
25 INJECTION, SOLUTION INTRAVENOUS
Status: DISCONTINUED | OUTPATIENT
Start: 2023-05-21 | End: 2023-05-22 | Stop reason: HOSPADM

## 2023-05-21 RX ORDER — ATENOLOL 25 MG/1
25 TABLET ORAL DAILY
Status: DISCONTINUED | OUTPATIENT
Start: 2023-05-22 | End: 2023-05-22 | Stop reason: HOSPADM

## 2023-05-21 RX ORDER — SODIUM CHLORIDE 9 MG/ML
50 INJECTION, SOLUTION INTRAVENOUS CONTINUOUS
Status: DISCONTINUED | OUTPATIENT
Start: 2023-05-21 | End: 2023-05-22 | Stop reason: HOSPADM

## 2023-05-21 RX ORDER — NICOTINE POLACRILEX 4 MG
15 LOZENGE BUCCAL
Status: DISCONTINUED | OUTPATIENT
Start: 2023-05-21 | End: 2023-05-22 | Stop reason: HOSPADM

## 2023-05-21 RX ORDER — ACETAMINOPHEN 325 MG/1
650 TABLET ORAL EVERY 4 HOURS PRN
Status: DISCONTINUED | OUTPATIENT
Start: 2023-05-21 | End: 2023-05-22 | Stop reason: HOSPADM

## 2023-05-21 RX ORDER — HYDROCHLOROTHIAZIDE 12.5 MG/1
12.5 TABLET ORAL DAILY
Status: DISCONTINUED | OUTPATIENT
Start: 2023-05-22 | End: 2023-05-22 | Stop reason: HOSPADM

## 2023-05-21 RX ORDER — ALLOPURINOL 100 MG/1
100 TABLET ORAL 2 TIMES DAILY
Status: DISCONTINUED | OUTPATIENT
Start: 2023-05-21 | End: 2023-05-22 | Stop reason: HOSPADM

## 2023-05-21 RX ORDER — CLOPIDOGREL BISULFATE 75 MG/1
75 TABLET ORAL DAILY
Status: DISCONTINUED | OUTPATIENT
Start: 2023-05-22 | End: 2023-05-22 | Stop reason: HOSPADM

## 2023-05-21 RX ORDER — CLONIDINE HYDROCHLORIDE 0.1 MG/1
0.2 TABLET ORAL 3 TIMES DAILY
Status: DISCONTINUED | OUTPATIENT
Start: 2023-05-21 | End: 2023-05-22 | Stop reason: HOSPADM

## 2023-05-21 RX ORDER — PANTOPRAZOLE SODIUM 40 MG/1
40 TABLET, DELAYED RELEASE ORAL 2 TIMES DAILY
Status: DISCONTINUED | OUTPATIENT
Start: 2023-05-21 | End: 2023-05-22 | Stop reason: HOSPADM

## 2023-05-21 RX ORDER — ONDANSETRON 4 MG/1
4 TABLET, FILM COATED ORAL EVERY 6 HOURS PRN
Status: DISCONTINUED | OUTPATIENT
Start: 2023-05-21 | End: 2023-05-22 | Stop reason: HOSPADM

## 2023-05-21 RX ORDER — ASPIRIN 81 MG/1
324 TABLET, CHEWABLE ORAL ONCE
Status: COMPLETED | OUTPATIENT
Start: 2023-05-21 | End: 2023-05-21

## 2023-05-21 RX ORDER — CHOLECALCIFEROL (VITAMIN D3) 125 MCG
5 CAPSULE ORAL NIGHTLY PRN
Status: DISCONTINUED | OUTPATIENT
Start: 2023-05-21 | End: 2023-05-22 | Stop reason: HOSPADM

## 2023-05-21 RX ORDER — IBUPROFEN 600 MG/1
1 TABLET ORAL
Status: DISCONTINUED | OUTPATIENT
Start: 2023-05-21 | End: 2023-05-22 | Stop reason: HOSPADM

## 2023-05-21 RX ORDER — NITROGLYCERIN 0.4 MG/1
0.4 TABLET SUBLINGUAL
Status: DISCONTINUED | OUTPATIENT
Start: 2023-05-21 | End: 2023-05-21

## 2023-05-21 RX ORDER — INSULIN LISPRO 100 [IU]/ML
2-9 INJECTION, SOLUTION INTRAVENOUS; SUBCUTANEOUS
Status: DISCONTINUED | OUTPATIENT
Start: 2023-05-21 | End: 2023-05-22 | Stop reason: HOSPADM

## 2023-05-21 RX ORDER — SODIUM CHLORIDE 0.9 % (FLUSH) 0.9 %
10 SYRINGE (ML) INJECTION EVERY 12 HOURS SCHEDULED
Status: DISCONTINUED | OUTPATIENT
Start: 2023-05-21 | End: 2023-05-22 | Stop reason: HOSPADM

## 2023-05-21 RX ADMIN — ACETAMINOPHEN 650 MG: 325 TABLET, FILM COATED ORAL at 21:00

## 2023-05-21 RX ADMIN — ROSUVASTATIN CALCIUM 10 MG: 20 TABLET, FILM COATED ORAL at 21:00

## 2023-05-21 RX ADMIN — Medication 5 MG: at 21:00

## 2023-05-21 RX ADMIN — NITROGLYCERIN 1 INCH: 20 OINTMENT TOPICAL at 16:45

## 2023-05-21 RX ADMIN — SODIUM CHLORIDE 50 ML/HR: 9 INJECTION, SOLUTION INTRAVENOUS at 19:53

## 2023-05-21 RX ADMIN — NITROGLYCERIN 0.4 MG: 0.4 TABLET SUBLINGUAL at 15:07

## 2023-05-21 RX ADMIN — CLONIDINE HYDROCHLORIDE 0.2 MG: 0.1 TABLET ORAL at 21:01

## 2023-05-21 RX ADMIN — ALLOPURINOL 100 MG: 100 TABLET ORAL at 21:00

## 2023-05-21 RX ADMIN — HYDRALAZINE HYDROCHLORIDE 75 MG: 50 TABLET, FILM COATED ORAL at 21:06

## 2023-05-21 RX ADMIN — ASPIRIN 324 MG: 81 TABLET, CHEWABLE ORAL at 15:05

## 2023-05-21 RX ADMIN — PANTOPRAZOLE SODIUM 40 MG: 40 TABLET, DELAYED RELEASE ORAL at 21:01

## 2023-05-21 RX ADMIN — Medication 10 ML: at 21:05

## 2023-05-21 RX ADMIN — NITROGLYCERIN 0.4 MG: 0.4 TABLET SUBLINGUAL at 15:33

## 2023-05-21 NOTE — PLAN OF CARE
Goal Outcome Evaluation:  Plan of Care Reviewed With: patient        Progress: no change  Outcome Evaluation: Patient is alert and oriented times 4. Patient admitted to observation for chest discomfort. On room air. Bp elevated, provider aware. Cardiology consult placed. NPO after MN. Polly Costello PA-C spoke with patient and  at the bedside.

## 2023-05-21 NOTE — H&P
".   [facility]   HISTORY AND PHYSICAL    Patient Name: Valentine Arora  : 1950  MRN: [id number]  Primary Care Physician:  Aliya Alejandro MD  Date of admission: 2023    Subjective   Subjective     Chief Complaint: Chest discomfort    HPI:    Valentine Arora is a 73 y.o. female with past medical history including but limited to arthritis, CAD s/p CABG, hyperlipidemia, hypertension, and PAD presents to [facility] with left anterior chest discomfort since 11 AM this morning.  Patient describes her chest discomfort as\" flushing sensation\" that radiate into her left shoulder and arm.  Reports she was seen at [facility] on Wednesday for similar symptoms and was diagnosed with atrial and was discharged home on Zio patch and Eliquis on Thursday.  Reports she has had no symptoms since her discharge but today while she was at [facility] she experienced similar symptoms.  Symptoms improved by nothing, worsened by nothing.  Chest discomfort is nonexertional, nonpleuritic, and nonradiating.  Denies associated nausea, vomiting, back pain, diaphoresis, shortness of breath.  Patient reports that she is established with Dr. Frye with [facility].      Laboratory evaluation in the ED include high-sensitivity troponin 19, , creatinine 1.07, WBC 8.7, hemoglobin 10.3, and platelets 306.  Chest x-ray negative for acute infiltration or pleural effusion  EKG shows sinus rhythm without any evidence of ischemia    Review of Systems   All systems were reviewed and negative except for: Mentioned above in HPI    Personal History     Past Medical History:   Diagnosis Date   • Arthritis    • Sarabia esophagus    • CAD (coronary artery disease)    • Chronic back pain    • Colon polyp    • Diabetes mellitus    • GERD (gastroesophageal reflux disease)    • Hyperlipidemia    • Hypertension    • Myocardial infarction        • PAD (peripheral artery disease) 2019   • Shingles        Past Surgical History: "   Procedure Laterality Date   • COLONOSCOPY     • COLONOSCOPY N/A 6/24/2020    Procedure: COLONOSCOPY;  Surgeon: Mathew Roberts MD;  Location: [facility] OR;  Service: Gastroenterology;  Laterality: N/A;  Descending colon polyp x 2, Ascending colon polyp, Sigmoid colon polyp, Rectal polyp   • CORONARY ARTERY BYPASS GRAFT  1995    x 2 vessels   • ENDOSCOPY N/A 6/24/2020    Procedure: ESOPHAGOGASTRODUODENOSCOPY;  Surgeon: Mathew Roberts MD;  Location:  [facility] OR;  Service: Gastroenterology;  Laterality: N/A;  Ulcerative esophagitis, Gastritis, Duodenitis  Duodenal biopsy, gastric biopsy, distal esophageal biopsy   • ENDOSCOPY N/A 9/17/2020    Procedure: ESOPHAGOGASTRODUODENOSCOPY with biopsies;  Surgeon: Mathew Roberts MD;  Location: [facility] OR;  Service: Gastroenterology;  Laterality: N/A;  Esophagitis  Sarabia's Esophagus  Hiatal hernia  Gastritis  Gastric biopsy  Distal esophagus biopsy   • INNER EAR SURGERY     • JOINT REPLACEMENT      right knee   • LEG SURGERY      x 2 s/p fall   • LUMBAR FUSION      L4/L5   • SKIN GRAFT      to left lower leg x 2   • TONSILLECTOMY     • WRIST FUSION Left        Family History: family history includes Heart disease in her father and mother. Otherwise pertinent FHx was reviewed and not pertinent to current issue.    Social History:  reports that she quit smoking about 28 years ago. Her smoking use included cigarettes. She has never used smokeless tobacco. She reports that she does not currently use alcohol. She reports that she does not use drugs.    Home Medications:  allopurinol, apixaban, atenolol, cloNIDine, clopidogrel, hydrALAZINE, hydroCHLOROthiazide, hyoscyamine, metFORMIN, olmesartan, pantoprazole, and rosuvastatin    Allergies:  Allergies   Allergen Reactions   • Codeine Itching   • Other Unknown (See Comments)     Vicryl: doesn't heal       Objective   Objective     Vitals:   Temp:  [97.8 °F (36.6 °C)-98.3 °F (36.8 °C)] 97.8 °F (36.6  °C)  Heart Rate:  [57-90] 59  Resp:  [18] 18  BP: (143-173)/(51-62) 162/51  Physical Exam    Constitutional: Awake, alert, in no acute distress   Eyes: PERRLA, sclerae anicteric, no conjunctival injection   HENT: NCAT, mucous membranes moist   Neck: Supple, no thyromegaly, no lymphadenopathy, trachea midline   Respiratory: Clear to auscultation bilaterally, nonlabored respirations    Cardiovascular: RRR, no murmurs, rubs, or gallops, palpable pedal pulses bilaterally   Gastrointestinal: Positive bowel sounds, soft, nontender, nondistended   Musculoskeletal: No bilateral ankle edema, no clubbing or cyanosis to extremities   Psychiatric: Appropriate affect, cooperative   Neurologic: Oriented x 3, strength symmetric in all extremities, Cranial Nerves grossly intact to confrontation, speech clear   Skin: No rashes     Result Review    Result Review:  I have personally reviewed the results from the time of this admission to 5/21/2023 19:35 EDT and agree with these findings:  []  Laboratory list / accordion  []  Microbiology  []  Radiology  []  EKG/Telemetry   []  Cardiology/Vascular   []  Pathology  []  Old records  []  Other:        Assessment & Plan   Assessment / Plan     Brief Patient Summary:  Valentine Arora is a 73 y.o. female who was seen and evaluated the ED for left anterior chest discomfort.  Patient's been admitted to observation unit for further evaluation and cardiology consult.    Active Hospital Problems:  Active Hospital Problems    Diagnosis    • **Chest discomfort      Plan:   CAD  Chest discomfort  Atrial flutter  -Initial troponin 19, repeat  -EKG nonischemic  -Cardiology consult  -Cardiac monitoring  -N.p.o. after midnight  -Continue Plavix   -Currently sinus rhythm    Hypertension  -Continue atenolol, hydrochlorothiazide, clonidine and olmesartan  -Vital signs per nursing    Hyperlipidemia  -Continue Crestor    DM2  -Accu-Chek before meals and at bedtime  -Insulin sliding scale    DVT prophylaxis:  No  DVT prophylaxis order currently exists.    CODE STATUS:    Level Of Support Discussed With: Patient  Code Status (Patient has no pulse and is not breathing): CPR (Attempt to Resuscitate)  Medical Interventions (Patient has pulse or is breathing): Full Support    Admission Status:  I believe this patient meets observation status.    Total of 75 minutes have been spent on this H&P face-to-face encounter and reviewing labs and imaging    Electronically signed by TERRI Tavarez, 05/21/23, 7:35 PM EDT.

## 2023-05-21 NOTE — ED PROVIDER NOTES
EMERGENCY DEPARTMENT ENCOUNTER    Room Number:  33/33  Date seen:  5/21/2023  PCP: Aliya Alejandro MD  Historian: Patient, spouse      HPI:  Chief Complaint: Chest discomfort  A complete HPI/ROS/PMH/PSH/SH/FH are unobtainable due to: Nothing  Context: Valentine Arora is a 73 y.o. female, with a history of CAD and CABG, who presents to the ED c/o chest discomfort.  Patient was sitting in [facility] at around 11:15 AM today when she began having discomfort in her left upper chest.  She describes a feeling of heaviness and tingling that radiated into her left arm and left neck.  Denies nausea, vomiting, shortness of breath, or sweating.  Nothing makes her symptoms better or worse.  Symptoms are not related to exertion.  Chest discomfort is mild.  She was recently admitted to another facility for new onset atrial flutter.  She is currently taking Eliquis.  She has an appointment with her cardiologist later this week.  Denies cough, fever, abdominal pain, leg pain, leg swelling, dizziness, or syncope.            PAST MEDICAL HISTORY  Active Ambulatory Problems     Diagnosis Date Noted   • CAD (coronary artery disease)    • Myocardial infarction    • Hypertension    • Hyperlipidemia    • S/P CABG (coronary artery bypass graft) 01/19/2017   • Morbidly obese 09/11/2019   • PAD (peripheral artery disease) 09/11/2019   • Esophageal dysphagia 05/22/2020   • Encounter for screening for malignant neoplasm of colon 05/22/2020   • Sarabia's esophagus with dysplasia 08/05/2020   • Ulcer of esophagus without bleeding 08/05/2020   • Dehydration with hyponatremia 06/15/2021   • Gastroesophageal reflux disease without esophagitis 10/20/2022   • Personal history of colonic polyps 05/08/2023   • Esophageal obstruction 05/08/2023   • Atrial flutter, unspecified type 05/17/2023     Resolved Ambulatory Problems     Diagnosis Date Noted   • No Resolved Ambulatory Problems     Past Medical History:   Diagnosis Date   • Arthritis    •  Sarabia esophagus    • Chronic back pain    • Colon polyp    • Diabetes mellitus    • GERD (gastroesophageal reflux disease)    • Shingles          PAST SURGICAL HISTORY  Past Surgical History:   Procedure Laterality Date   • COLONOSCOPY     • COLONOSCOPY N/A 2020    Procedure: COLONOSCOPY;  Surgeon: Mathew Roberts MD;  Location:  [facility] OR;  Service: Gastroenterology;  Laterality: N/A;  Descending colon polyp x 2, Ascending colon polyp, Sigmoid colon polyp, Rectal polyp   • CORONARY ARTERY BYPASS GRAFT  1995    x 2 vessels   • ENDOSCOPY N/A 2020    Procedure: ESOPHAGOGASTRODUODENOSCOPY;  Surgeon: Mathew Roberts MD;  Location: [facility] OR;  Service: Gastroenterology;  Laterality: N/A;  Ulcerative esophagitis, Gastritis, Duodenitis  Duodenal biopsy, gastric biopsy, distal esophageal biopsy   • ENDOSCOPY N/A 2020    Procedure: ESOPHAGOGASTRODUODENOSCOPY with biopsies;  Surgeon: Mathew Roberts MD;  Location: [facility] OR;  Service: Gastroenterology;  Laterality: N/A;  Esophagitis  Sarabia's Esophagus  Hiatal hernia  Gastritis  Gastric biopsy  Distal esophagus biopsy   • INNER EAR SURGERY     • JOINT REPLACEMENT      right knee   • LEG SURGERY      x 2 s/p fall   • LUMBAR FUSION      L4/L5   • SKIN GRAFT      to left lower leg x 2   • TONSILLECTOMY     • WRIST FUSION Left          FAMILY HISTORY  Family History   Problem Relation Age of Onset   • Heart disease Mother    • Heart disease Father    • Colon cancer Neg Hx    • Colon polyps Neg Hx    • Breast cancer Neg Hx          SOCIAL HISTORY  Social History     Socioeconomic History   • Marital status:    Tobacco Use   • Smoking status: Former     Types: Cigarettes     Quit date:      Years since quittin.4   • Smokeless tobacco: Never   • Tobacco comments:     QUIT    Vaping Use   • Vaping Use: Never used   Substance and Sexual Activity   • Alcohol use: Not Currently     Comment: occasional   • Drug use:  No   • Sexual activity: Defer         ALLERGIES  Codeine and Other        REVIEW OF SYSTEMS  Review of Systems     All systems have been reviewed and are negative except as as discussed in the HPI    PHYSICAL EXAM  ED Triage Vitals [05/21/23 1417]   Temp Heart Rate Resp BP SpO2   98.3 °F (36.8 °C) 90 18 -- 98 %      Temp src Heart Rate Source Patient Position BP Location FiO2 (%)   Tympanic Monitor -- -- --       Physical Exam      GENERAL: Awake, alert, oriented x3.  Well-developed, well-nourished elderly female.  Resting comfortably in no acute distress  HENT: NCAT, nares patent  EYES: no scleral icterus  CV: regular rhythm, normal rate, equal radial pulses bilaterally  RESPIRATORY: normal effort, clear to auscultation bilaterally  ABDOMEN: soft, obese, nontender  MUSCULOSKELETAL: Extremities are nontender with full range of motion.  No calf tenderness.  No pedal edema  NEURO: Speech is normal.  No facial droop.  Follows commands  PSYCH:  calm, cooperative  SKIN: warm, dry, there is a Zio patch on the upper left chest wall    Vital signs and nursing notes reviewed.          LAB RESULTS  Recent Results (from the past 24 hour(s))   ECG 12 Lead Chest Pain    Collection Time: 05/21/23  2:20 PM   Result Value Ref Range    QT Interval 393 ms   BNP    Collection Time: 05/21/23  2:29 PM    Specimen: Blood   Result Value Ref Range    proBNP 913.0 (H) 0.0 - 900.0 pg/mL   CBC Auto Differential    Collection Time: 05/21/23  2:29 PM    Specimen: Blood   Result Value Ref Range    WBC 8.73 3.40 - 10.80 10*3/mm3    RBC 3.58 (L) 3.77 - 5.28 10*6/mm3    Hemoglobin 10.3 (L) 12.0 - 15.9 g/dL    Hematocrit 32.0 (L) 34.0 - 46.6 %    MCV 89.4 79.0 - 97.0 fL    MCH 28.8 26.6 - 33.0 pg    MCHC 32.2 31.5 - 35.7 g/dL    RDW 12.9 12.3 - 15.4 %    RDW-SD 42.3 37.0 - 54.0 fl    MPV 9.0 6.0 - 12.0 fL    Platelets 306 140 - 450 10*3/mm3    Neutrophil % 76.1 (H) 42.7 - 76.0 %    Lymphocyte % 14.7 (L) 19.6 - 45.3 %    Monocyte % 7.0 5.0 - 12.0 %     Eosinophil % 1.4 0.3 - 6.2 %    Basophil % 0.5 0.0 - 1.5 %    Immature Grans % 0.3 0.0 - 0.5 %    Neutrophils, Absolute 6.65 1.70 - 7.00 10*3/mm3    Lymphocytes, Absolute 1.28 0.70 - 3.10 10*3/mm3    Monocytes, Absolute 0.61 0.10 - 0.90 10*3/mm3    Eosinophils, Absolute 0.12 0.00 - 0.40 10*3/mm3    Basophils, Absolute 0.04 0.00 - 0.20 10*3/mm3    Immature Grans, Absolute 0.03 0.00 - 0.05 10*3/mm3    nRBC 0.0 0.0 - 0.2 /100 WBC   Comprehensive Metabolic Panel    Collection Time: 05/21/23  3:34 PM    Specimen: Blood   Result Value Ref Range    Glucose 111 (H) 65 - 99 mg/dL    BUN 17 8 - 23 mg/dL    Creatinine 1.07 (H) 0.57 - 1.00 mg/dL    Sodium 135 (L) 136 - 145 mmol/L    Potassium 4.3 3.5 - 5.2 mmol/L    Chloride 100 98 - 107 mmol/L    CO2 21.4 (L) 22.0 - 29.0 mmol/L    Calcium 9.2 8.6 - 10.5 mg/dL    Total Protein 6.1 6.0 - 8.5 g/dL    Albumin 4.0 3.5 - 5.2 g/dL    ALT (SGPT) 15 1 - 33 U/L    AST (SGOT) 14 1 - 32 U/L    Alkaline Phosphatase 69 39 - 117 U/L    Total Bilirubin 0.2 0.0 - 1.2 mg/dL    Globulin 2.1 gm/dL    A/G Ratio 1.9 g/dL    BUN/Creatinine Ratio 15.9 7.0 - 25.0    Anion Gap 13.6 5.0 - 15.0 mmol/L    eGFR 55.3 (L) >60.0 mL/min/1.73   High Sensitivity Troponin T    Collection Time: 05/21/23  3:34 PM    Specimen: Blood   Result Value Ref Range    HS Troponin T 19 (H) <10 ng/L   Magnesium    Collection Time: 05/21/23  3:34 PM    Specimen: Blood   Result Value Ref Range    Magnesium 1.8 1.6 - 2.4 mg/dL       Ordered the above labs and reviewed the results.        RADIOLOGY  XR Chest 1 View    Result Date: 5/21/2023  XR CHEST 1 VW-  HISTORY: Female who is 73 years-old,  chest pain  TECHNIQUE: Frontal view of the chest  COMPARISON: 05/17/2023  FINDINGS: The heart size is normal. Aorta is calcified. Pulmonary vasculature is unremarkable. Sternotomy wires are present. The left hemithorax is partly obscured by an overlying electronic device. No focal pulmonary consolidation, pleural effusion, or  pneumothorax. No acute osseous process.      No focal pulmonary consolidation. Follow-up as clinical indications persist.  This report was finalized on 5/21/2023 3:04 PM by Dr. Fran Salcedo M.D.        Ordered the above noted radiological studies. Reviewed by me in PACS.            PROCEDURES  Procedures              MEDICATIONS GIVEN IN ER  Medications   sodium chloride 0.9 % flush 10 mL (has no administration in time range)   nitroglycerin (NITROSTAT) SL tablet 0.4 mg (0.4 mg Sublingual Given 5/21/23 1533)   sodium chloride 0.9 % flush 10 mL (has no administration in time range)   sodium chloride 0.9 % flush 10 mL (has no administration in time range)   sodium chloride 0.9 % infusion 40 mL (has no administration in time range)   ondansetron (ZOFRAN) tablet 4 mg (has no administration in time range)     Or   ondansetron (ZOFRAN) injection 4 mg (has no administration in time range)   acetaminophen (TYLENOL) tablet 650 mg (has no administration in time range)   melatonin tablet 5 mg (has no administration in time range)   aspirin chewable tablet 324 mg (324 mg Oral Given 5/21/23 1505)   nitroglycerin (NITROSTAT) ointment 1 inch (1 inch Topical Given 5/21/23 1645)                   MEDICAL DECISION MAKING, PROGRESS, and CONSULTS    All labs have been independently reviewed by me.  All radiology studies have been reviewed by me and I have also reviewed the radiology report.   EKG's independently viewed and interpreted by me.  Discussion below represents my analysis of pertinent findings related to patient's condition, differential diagnosis, treatment plan and final disposition.      Additional sources:  - Discussed/ obtained information from independent historians:  at bedside    - External (non-ED) record review: Patient was admitted to [facility] 5/17 through 5/18/2023 for chest pain and new onset atrial flutter.  She was started on atenolol and Eliquis.  Plan was for follow-up  cardioversion if necessary.  Patient has a history of MI, CABG, hypertension, hyperlipidemia, and peripheral arterial disease.  Echocardiogram showed an EF of 59%.  There was mild concentric LVH.  Stress test done in September 2019 was normal.    - Chronic or social conditions impacting care: N/A          Orders placed during this visit:  Orders Placed This Encounter   Procedures   • XR Chest 1 View   • Comprehensive Metabolic Panel   • BNP   • High Sensitivity Troponin T   • Magnesium   • CBC Auto Differential   • High Sensitivity Troponin T 2Hr   • CBC (No Diff)   • Basic Metabolic Panel   • High Sensitivity Troponin T   • Diet: Cardiac Diets; Healthy Heart (2-3 Na+); Texture: Regular Texture (IDDSI 7); Fluid Consistency: Thin (IDDSI 0)   • NPO Diet NPO Type: Strict NPO   • Monitor Blood Pressure   • Pulse Oximetry, Continuous   • Intake & Output   • Weigh Patient   • Telemetry - Maintain IV Access   • Telemetry - Place Orders & Notify Provider of Results When Patient Experiences Acute Chest Pain, Dysrhythmia or Respiratory Distress   • May Be Off Telemetry for Tests   • Vital Signs   • Code Status and Medical Interventions:   • Inpatient Cardiology Consult   • ECG 12 Lead Chest Pain   • Insert Peripheral IV   • Insert Peripheral IV   • Initiate ED Observation Status   • CBC & Differential         Additional orders considered but not ordered:  N/A        Differential diagnosis:    DDx includes but is not limited to acute coronary syndrome, pulmonary embolism, thoracic aortic dissection, pneumonia, pneumothorax, musculoskeletal pain, GERD or esophageal spasm, anxiety, myocarditis/pericarditis, esophageal rupture, pancreatitis.               Independent interpretation of labs, radiology studies, and discussions with consultants:  ED Course as of 05/21/23 1657   Sun May 21, 2023   1429 EKG personally interpreted by me.  My personal interpretation is:          EKG time: 1420  Rhythm/Rate: Sinus rhythm, rate 69  P  waves and NC: First-degree AV block  QRS, axis: Normal  ST and T waves: Nonspecific ST/T wave changes in the inferior leads    Interpreted Contemporaneously by me at 1423, independently viewed  EKG is not significantly changed compared to prior EKG done on 5/17/2023   [WH]   1537 Chest x-ray personally interpreted by me.  My personal interpretation is: Heart size is normal.  Lungs are clear.  No pleural effusion. [WH]   1600 HEART SCORE:    History #1  (Highly suspicious 2, Moderately suspicious 1, Slightly or non-suspicious 0)    ECG #1  (Significant ST depression 2,  Nonspecific repol disturbance 1, Normal 0)    Age #2  (> or = 65 2, 46-65 1,  < or = 45 0)    Risk factors #2  (hypercholesterolemia, HTN, DM, smoking, pos fam hx, obesity)  (> or = to 3 RF 2, 1 or 2 1, No risk factors 0)    Troponin #0  (> or = 3x normal limit 2, 1-3x normal limit 1, < or = Normal limit 0)    HEART Score is 6 [WH]   1609 HS Troponin T(!): 19 [WH]   1610 proBNP(!): 913.0 [WH]   1610 WBC: 8.73 [WH]   1610 Hemoglobin(!): 10.3  Stable [WH]   1610 Creatinine(!): 1.07  Stable [WH]   1611 Test results discussed with the patient and her .  Her pain resolved after 2 doses of sublingual nitroglycerin.  Shared decision-making was discussed.  Patient is agreeable to being admitted. [WH]   1617 Case discussed with KARL Gallegos, she agrees to admit the patient to Dr. Keenan.  Pertinent history, exam findings, test results, ED course, and diagnoses were discussed with her [WH]   1656 Patient presented to ED complaining of some mild discomfort in her left upper chest that radiated into her left arm.  She denied associated symptoms.  EKG was unchanged.  Initial troponin was 19.  Heart score was 6.  Pain improved with nitroglycerin.  Her last stress test was about 4 years ago.  She was recently diagnosed with atrial flutter and is anticoagulated.  She was in sinus rhythm on the ED. [WH]      ED Course User Index  [WH] Brian Salinas MD                DIAGNOSIS  Final diagnoses:   Chest pain, unspecified type   Primary hypertension   Mixed hyperlipidemia   PAD (peripheral artery disease)   BMI 35.0-35.9,adult   Coronary artery disease involving native coronary artery of native heart, unspecified whether angina present         DISPOSITION  ADMISSION    Discussed treatment plan and reason for admission with pt/family and admitting physician.  Pt/family voiced understanding of the plan for admission for further testing/treatment as needed.                 Latest Documented Vital Signs:  As of 16:57 EDT  BP- 173/60 HR- 61 Temp- 98.3 °F (36.8 °C) (Tympanic) O2 sat- 98%              --    Please note that portions of this were completed with a voice recognition program.       Note Disclaimer: At [facility], we believe that sharing information builds trust and better relationships. You are receiving this note because you are receiving care at [facility] or recently visited. It is possible you will see health information before a provider has talked with you about it. This kind of information can be easy to misunderstand. To help you fully understand what it means for your health, we urge you to discuss this note with your provider.           Brian Salinas MD  05/21/23 6630

## 2023-05-21 NOTE — ED NOTES
"Pt arrived by PV reports since 11:15am today CP, seen at [facility] Wednesday for same symptoms states \"but its worse\"    "

## 2023-05-22 ENCOUNTER — READMISSION MANAGEMENT (OUTPATIENT)
Dept: CALL CENTER | Facility: HOSPITAL | Age: 73
End: 2023-05-22
Payer: MEDICARE

## 2023-05-22 VITALS
DIASTOLIC BLOOD PRESSURE: 52 MMHG | RESPIRATION RATE: 18 BRPM | HEIGHT: 62 IN | HEART RATE: 70 BPM | BODY MASS INDEX: 37.04 KG/M2 | WEIGHT: 201.3 LBS | TEMPERATURE: 97.7 F | OXYGEN SATURATION: 99 % | SYSTOLIC BLOOD PRESSURE: 161 MMHG

## 2023-05-22 LAB
ANION GAP SERPL CALCULATED.3IONS-SCNC: 11 MMOL/L (ref 5–15)
BUN SERPL-MCNC: 15 MG/DL (ref 8–23)
BUN/CREAT SERPL: 16.5 (ref 7–25)
CALCIUM SPEC-SCNC: 9.2 MG/DL (ref 8.6–10.5)
CHLORIDE SERPL-SCNC: 101 MMOL/L (ref 98–107)
CO2 SERPL-SCNC: 23 MMOL/L (ref 22–29)
CREAT SERPL-MCNC: 0.91 MG/DL (ref 0.57–1)
DEPRECATED RDW RBC AUTO: 42.9 FL (ref 37–54)
EGFRCR SERPLBLD CKD-EPI 2021: 67.2 ML/MIN/1.73
ERYTHROCYTE [DISTWIDTH] IN BLOOD BY AUTOMATED COUNT: 13.1 % (ref 12.3–15.4)
GLUCOSE BLDC GLUCOMTR-MCNC: 103 MG/DL (ref 70–130)
GLUCOSE SERPL-MCNC: 100 MG/DL (ref 65–99)
HCT VFR BLD AUTO: 28.7 % (ref 34–46.6)
HGB BLD-MCNC: 9.4 G/DL (ref 12–15.9)
MCH RBC QN AUTO: 29.7 PG (ref 26.6–33)
MCHC RBC AUTO-ENTMCNC: 32.8 G/DL (ref 31.5–35.7)
MCV RBC AUTO: 90.5 FL (ref 79–97)
PLATELET # BLD AUTO: 270 10*3/MM3 (ref 140–450)
PMV BLD AUTO: 8.9 FL (ref 6–12)
POTASSIUM SERPL-SCNC: 4.1 MMOL/L (ref 3.5–5.2)
QT INTERVAL: 441 MS
RBC # BLD AUTO: 3.17 10*6/MM3 (ref 3.77–5.28)
SODIUM SERPL-SCNC: 135 MMOL/L (ref 136–145)
TROPONIN T SERPL HS-MCNC: 15 NG/L
WBC NRBC COR # BLD: 5.15 10*3/MM3 (ref 3.4–10.8)

## 2023-05-22 PROCEDURE — 84484 ASSAY OF TROPONIN QUANT: CPT | Performed by: PHYSICIAN ASSISTANT

## 2023-05-22 PROCEDURE — 99221 1ST HOSP IP/OBS SF/LOW 40: CPT | Performed by: NURSE PRACTITIONER

## 2023-05-22 PROCEDURE — 82948 REAGENT STRIP/BLOOD GLUCOSE: CPT

## 2023-05-22 PROCEDURE — 93005 ELECTROCARDIOGRAM TRACING: CPT | Performed by: NURSE PRACTITIONER

## 2023-05-22 PROCEDURE — 80048 BASIC METABOLIC PNL TOTAL CA: CPT | Performed by: PHYSICIAN ASSISTANT

## 2023-05-22 PROCEDURE — G0378 HOSPITAL OBSERVATION PER HR: HCPCS

## 2023-05-22 PROCEDURE — 93010 ELECTROCARDIOGRAM REPORT: CPT | Performed by: INTERNAL MEDICINE

## 2023-05-22 PROCEDURE — 85027 COMPLETE CBC AUTOMATED: CPT | Performed by: PHYSICIAN ASSISTANT

## 2023-05-22 RX ADMIN — CLONIDINE HYDROCHLORIDE 0.2 MG: 0.1 TABLET ORAL at 08:32

## 2023-05-22 RX ADMIN — PANTOPRAZOLE SODIUM 40 MG: 40 TABLET, DELAYED RELEASE ORAL at 08:32

## 2023-05-22 RX ADMIN — HYDROCHLOROTHIAZIDE 12.5 MG: 12.5 TABLET ORAL at 08:33

## 2023-05-22 RX ADMIN — APIXABAN 5 MG: 5 TABLET, FILM COATED ORAL at 10:46

## 2023-05-22 RX ADMIN — HYDRALAZINE HYDROCHLORIDE 75 MG: 50 TABLET, FILM COATED ORAL at 08:33

## 2023-05-22 RX ADMIN — ALLOPURINOL 100 MG: 100 TABLET ORAL at 08:32

## 2023-05-22 RX ADMIN — ACETAMINOPHEN 650 MG: 325 TABLET, FILM COATED ORAL at 07:33

## 2023-05-22 NOTE — PLAN OF CARE
Goal Outcome Evaluation:  Plan of Care Reviewed With: patient           Outcome Evaluation: c/o tingling in L arm. improved with tylenol.

## 2023-05-22 NOTE — PROGRESS NOTES
ED OBSERVATION PROGRESS/DISCHARGE SUMMARY    Date of Admission: 5/21/2023   LOS: 0 days   PCP: Aliya Alejandro MD    Final Diagnosis chest discomfort      Subjective     Hospital Outcome:   Afebrile ambulatory 73-year-old  female admitted to the ED observation unit with chest discomfort.  Patient was admitted to the hospital at [facility] last week for new onset atrial flutter and was discharged on Thursday with portable Holter monitoring/Zio patch.  Her high-sensitivity troponin went from 19 to 14-15 this morning.  She denies any discomfort this AM.  Her EKG at 414 this morning shows she is in sinus rhythm and on my evaluation at 07 30 she still with sinus rhythm rate in the 50s and 60s.    She was seen and evaluated by APRN with cardiology team who recommends continuation of her Eliquis therapy and discharge home with outpatient follow-up.  Patient is agreeable to plan.    ROS:  General: no fevers, chills  Respiratory: no cough, dyspnea  Cardiovascular: no chest pain, palpitations  Abdomen: No abdominal pain, nausea, vomiting, or diarrhea  Neurologic: No focal weakness    Objective   Physical Exam:  I have reviewed the vital signs.  Temp:  [97.4 °F (36.3 °C)-98.3 °F (36.8 °C)] 97.7 °F (36.5 °C)  Heart Rate:  [57-90] 70  Resp:  [18] 18  BP: (136-173)/(46-70) 161/52  General Appearance:    Alert, cooperative, no distress  Head:    Normocephalic, atraumatic  Eyes:    Sclerae anicteric  Neck:   Supple, no mass  Lungs: Clear to auscultation bilaterally, respirations unlabored  Heart: Regular rate and rhythm, S1 and S2 normal, no murmur, rub or gallop  Abdomen:  Soft, non-tender, bowel sounds active, nondistended  Extremities: No clubbing, cyanosis, or edema to lower extremities  Pulses:  2+ and symmetric in distal lower extremities  Skin: No rashes   Neurologic: Oriented x3, Normal strength to extremities    Results Review:    I have reviewed the labs, radiology results and diagnostic  studies.    Results from last 7 days   Lab Units 05/22/23  0406   WBC 10*3/mm3 5.15   HEMOGLOBIN g/dL 9.4*   HEMATOCRIT % 28.7*   PLATELETS 10*3/mm3 270     Results from last 7 days   Lab Units 05/22/23  0406 05/21/23  1534 05/18/23  0634 05/17/23  0600   SODIUM mmol/L 135* 135* 137 135*   POTASSIUM mmol/L 4.1 4.3 4.3 4.7   CHLORIDE mmol/L 101 100 107 102   CO2 mmol/L 23.0 21.4* 20.8* 22.7   BUN mg/dL 15 17 14 26*   CREATININE mg/dL 0.91 1.07* 0.96 1.23*   CALCIUM mg/dL 9.2 9.2 8.8 9.5   BILIRUBIN mg/dL  --  0.2  --  <0.2   ALK PHOS U/L  --  69  --  73   ALT (SGPT) U/L  --  15  --  13   AST (SGOT) U/L  --  14  --  14   GLUCOSE mg/dL 100* 111* 115* 110*     Imaging Results (Last 24 Hours)     Procedure Component Value Units Date/Time    XR Chest 1 View [[id number]] Collected: 05/21/23 1503     Updated: 05/21/23 1507    Narrative:      XR CHEST 1 VW-     HISTORY: Female who is 73 years-old,  chest pain     TECHNIQUE: Frontal view of the chest     COMPARISON: 05/17/2023     FINDINGS: The heart size is normal. Aorta is calcified. Pulmonary  vasculature is unremarkable. Sternotomy wires are present. The left  hemithorax is partly obscured by an overlying electronic device. No  focal pulmonary consolidation, pleural effusion, or pneumothorax. No  acute osseous process.       Impression:      No focal pulmonary consolidation. Follow-up as clinical  indications persist.     This report was finalized on 5/21/2023 3:04 PM by Dr. Fran Salcedo M.D.             I have reviewed the medications.  ---------------------------------------------------------------------------------------------  Assessment & Plan   Assessment/Problem List    Chest discomfort      Plan:    CAD  Chest discomfort  Atrial flutter  -Initial troponin 19, 14 and 15  -EKG nonischemic  -Cardiology consult, cleared for discharge home with outpatient follow-up  -Continue Plavix   -Currently sinus rhythm     Hypertension  -Continue atenolol,  hydrochlorothiazide, clonidine and olmesartan     Hyperlipidemia  -Continue Crestor     DM2  Resume home medications at discharge    Disposition: Home    Follow-up after Discharge: PCP and cardiology    This note will serve as a discharge summary    TERRI Dumont 05/22/23 08:13 EDT    I have worn appropriate PPE during this patient encounter, sanitized my hands both with entering and exiting patient's room.      35 minutes has been spent by [facility] providers in the care of this patient while under observation status

## 2023-05-22 NOTE — PROGRESS NOTES
MD ATTESTATION NOTE    The PRABHU and I have discussed this patient's history, physical exam, and treatment plan.  I have reviewed the documentation and personally had a face to face interaction with the patient. I affirm the documentation and agree with the treatment and plan.  The attached note describes my personal findings.      I provided a substantive portion of the care of the patient.  I personally performed the physical exam in its entirety, and below are my findings.  For this patient encounter, the patient wore surgical mask, I wore full protective PPE including N95 and eye protection.      Brief HPI: Patient presents for evaluation of warm feeling in her chest.  Patient was admitted the observation unit.  Patient has had continued warm feeling in her chest throughout the night.  Patient has had no chest pressure or tightness.  States she had a good night otherwise          GENERAL: no acute distress  HENT: nares patent  EYES: no scleral icterus  CV: regular rhythm, normal rate  RESPIRATORY: normal effort  ABDOMEN: soft  MUSCULOSKELETAL: no deformity  NEURO: alert, moves all extremities, follows commands  PSYCH:  calm, cooperative  SKIN: warm, dry    Vital signs and nursing notes reviewed.        Plan: Patient has been seen by cardiology.  They will discharge her home to follow-up as an outpatient

## 2023-05-22 NOTE — OUTREACH NOTE
LAG < 7 Survey    Flowsheet Row Responses   [facility] facility patient discharged from? [place]   Does the patient have one of the following disease processes/diagnoses(primary or secondary)? Other   BHLAG <7 Attempt successful? No   Unsuccessful attempts Attempt 1   Revoke Readmitted          Anna TOLLIVER - Registered Nurse

## 2023-05-22 NOTE — CONSULTS
Cardiology Hospital Consult    Patient Name: Valentine Arora  Age/Sex: 73 y.o. female  : 1950  MRN: [id number]    Date of Admission: 2023  Date of Encounter Visit: 23  Encounter Provider: TERRI Koenig  Referring Provider: No ref. provider found  Place of Service: [facility]  Patient Care Team:  Aliya Alejandro MD as PCP - General (Family Medicine)    Subjective:     Consulted for: chest pain    Chief Complaint: chest pain    History of Present Illness:  Valentine Arora is a 73 y.o. female with a history of newly diagnosed atrial fibrillation, coronary artery disease, carotid disease and peripheral artery disease.  Patient follows with Dr. Frye.  She had a myocardial infarction approximately 20 years ago.  She underwent two-vessel CABG in .  In 2012, Cardiolite stress test showed no ischemia and ejection fraction of 75%.  An echocardiogram in 2015 showed normal left ventricular systolic function and no valvular disease.  A Holter monitor at that time showed no abnormalities.  An echocardiogram in 2020 showed normal left ventricular systolic function with an EF of 56 to 60%, no wall motion abnormalities.    She is followed by Dr. Ace with vascular.  She underwent bilateral common iliac artery kissing stent placement 2019.      She presented to [facility] on 23 with complaints of chest pain described as chest heaviness, numbness and tingling in her left axilla and left arm with some mild nausea and shortness of breath.  At that time, she stated that shortness of breath and discomfort woke her up from her sleep.  EKG showed atrial flutter 4-1 conduction with a heart rate of 63, borderline LVH and no acute ST segment changes.  She was already on atenolol and placed on Eliquis at that time.  Echocardiogram was unremarkable.  The plan was to continue anticoagulation for approximately 4 weeks and  cardiovert if necessary.  Patient was to follow-up in office in 1 week.    She presented to the emergency department at [facility] last night for similar complaints.  She does not have any nausea or diaphoresis.  EKG showed sinus rhythm with first-degree AV block.  Troponin slightly elevated yet flat and on downtrend.  CXR shows normal heart size and no abnormalities.  Hemoglobin stable.  All other labs unremarkable.  Patient states that she is not experiencing discomfort but and on warm sensation in the left side of her chest and left axillary area.  She states that when she focuses on something else such as watching one of her TV shows, she does not feel it.  There are no aggravating or relieving symptoms.  Patient is does stay active however she does not perform actual exercise activity due to limitation with arthritis.      Past Medical History:  Past Medical History:   Diagnosis Date   • Arthritis    • Sarabia esophagus    • CAD (coronary artery disease)    • Chronic back pain    • Colon polyp    • Diabetes mellitus    • GERD (gastroesophageal reflux disease)    • Hyperlipidemia    • Hypertension    • Myocardial infarction     1995   • PAD (peripheral artery disease) 9/11/2019   • Shingles        Past Surgical History:   Procedure Laterality Date   • COLONOSCOPY     • COLONOSCOPY N/A 6/24/2020    Procedure: COLONOSCOPY;  Surgeon: Mathew Roberts MD;  Location: [facility] ED OR;  Service: Gastroenterology;  Laterality: N/A;  Descending colon polyp x 2, Ascending colon polyp, Sigmoid colon polyp, Rectal polyp   • CORONARY ARTERY BYPASS GRAFT  1995    x 2 vessels   • ENDOSCOPY N/A 6/24/2020    Procedure: ESOPHAGOGASTRODUODENOSCOPY;  Surgeon: Mathew Roberts MD;  Location: [facility] ED OR;  Service: Gastroenterology;  Laterality: N/A;  Ulcerative esophagitis, Gastritis, Duodenitis  Duodenal biopsy, gastric biopsy, distal esophageal biopsy   • ENDOSCOPY N/A 9/17/2020    Procedure:  ESOPHAGOGASTRODUODENOSCOPY with biopsies;  Surgeon: Mathew Roberts MD;  Location: [facility];  Service: Gastroenterology;  Laterality: N/A;  Esophagitis  Sarabia's Esophagus  Hiatal hernia  Gastritis  Gastric biopsy  Distal esophagus biopsy   • INNER EAR SURGERY     • JOINT REPLACEMENT      right knee   • LEG SURGERY      x 2 s/p fall   • LUMBAR FUSION      L4/L5   • SKIN GRAFT      to left lower leg x 2   • TONSILLECTOMY     • WRIST FUSION Left        Home Medications:   Medications Prior to Admission   Medication Sig Dispense Refill Last Dose   • allopurinol (ZYLOPRIM) 100 MG tablet Take 1 tablet by mouth 2 (Two) Times a Day.   5/21/2023 at 1400   • apixaban (ELIQUIS) 5 MG tablet tablet Take 1 tablet by mouth Every 12 (Twelve) Hours. Indications: Atrial Flutter 60 tablet 0 5/21/2023 at 0800   • atenolol (TENORMIN) 25 MG tablet Take 1 tablet by mouth Daily.   5/21/2023 at 0800   • cloNIDine (CATAPRES) 0.1 MG tablet Take 2 tablets by mouth 3 (Three) Times a Day.   5/21/2023 at 1400   • clopidogrel (PLAVIX) 75 MG tablet TAKE ONE TABLET BY MOUTH DAILY 90 tablet 1 5/20/2023 at 2000   • hydrALAZINE (APRESOLINE) 50 MG tablet Take 1.5 tablets by mouth 3 (Three) Times a Day.   5/21/2023 at 1400   • hydroCHLOROthiazide (HYDRODIURIL) 12.5 MG tablet Take 1 tablet by mouth Daily.   5/21/2023 at 0800   • metFORMIN (GLUCOPHAGE) 500 MG tablet 2 (Two) Times a Day With Meals.   5/21/2023 at 0800   • pantoprazole (PROTONIX) 40 MG EC tablet Take 1 tablet by mouth 2 (Two) Times a Day. 180 tablet 3 5/21/2023 at 0800   • rosuvastatin (CRESTOR) 10 MG tablet Take 1 tablet by mouth Daily.   5/20/2023 at 2000   • hyoscyamine (LEVSIN) 0.125 MG SL tablet Take 1 tablet by mouth 4 (Four) Times a Day With Meals & at Bedtime. 120 tablet 5 More than a month       Allergies:  Allergies   Allergen Reactions   • Codeine Itching   • Other Unknown (See Comments)     Vicryl: doesn't heal       Past Social History:  Social History      Socioeconomic History   • Marital status:    Tobacco Use   • Smoking status: Former     Types: Cigarettes     Quit date:      Years since quittin.4   • Smokeless tobacco: Never   • Tobacco comments:     QUIT    Vaping Use   • Vaping Use: Never used   Substance and Sexual Activity   • Alcohol use: Not Currently     Comment: occasional   • Drug use: No   • Sexual activity: Defer       Past Family History:  Family History   Problem Relation Age of Onset   • Heart disease Mother    • Heart disease Father    • Colon cancer Neg Hx    • Colon polyps Neg Hx    • Breast cancer Neg Hx        Review of Systems:     CONSTITUTIONAL: No weight loss, fever, chills, weakness or fatigue.   HEENT: Eyes: No visual loss, blurred vision, double vision or yellow sclerae. Ears, Nose, Throat: No hearing loss, sneezing, congestion, runny nose or sore throat.   SKIN: No rash or itching.     RESPIRATORY: No shortness of breath, hemoptysis, cough or sputum.   GASTROINTESTINAL: No anorexia, nausea, vomiting or diarrhea. No abdominal pain, bright red blood per rectum or melena.  GENITOURINARY: No burning on urination, hematuria or increased frequency.  NEUROLOGICAL: No headache, dizziness, syncope, paralysis, ataxia, numbness or tingling in the extremities. No change in bowel or bladder control.   MUSCULOSKELETAL: No muscle, back pain, joint pain or stiffness.   HEMATOLOGIC: No anemia, bleeding or bruising.   LYMPHATICS: No enlarged nodes. No history of splenectomy.   PSYCHIATRIC: No history of depression, anxiety, hallucinations.   ENDOCRINOLOGIC: No reports of sweating, cold or heat intolerance. No polyuria or polydipsia.     Objective:   Temp:  [97.4 °F (36.3 °C)-98.3 °F (36.8 °C)] 97.7 °F (36.5 °C)  Heart Rate:  [57-90] 70  Resp:  [18] 18  BP: (136-173)/(46-70) 161/52   No intake or output data in the 24 hours ending 23 0851  Body mass index is 36.82 kg/m².      23  1431 23  1807   Weight: 89 kg (196  lb 4.8 oz) 91.3 kg (201 lb 4.8 oz)     Weight change:     Physical Exam:   General Appearance:    Alert, cooperative, in no acute distress   Head:    Normocephalic, without obvious abnormality, atraumatic   Eyes:            Conjunctivae and sclerae normal, no   icterus, no pallor, corneas clear, PERRLA   Neck:   No adenopathy, supple, trachea midline, no thyromegaly, no   carotid bruit, no JVD   Lungs:     Clear to auscultation,respirations regular, even and unlabored    Heart:    Regular rhythm and normal rate, normal S1 and S2, no murmur, no gallop, no rub, no click   Chest Wall:    No abnormalities observed   Abdomen:     Normal bowel sounds, no masses, no organomegaly, soft, nontender, nondistended, no guarding, no rebound  tenderness   Extremities:   Moves all extremities well, no edema, no cyanosis, no redness   Pulses:   Pulses palpable and equal bilaterally.      Lab Review:   Results from last 7 days   Lab Units 05/22/23  0406 05/21/23  1534 05/18/23  0634 05/17/23  0600   SODIUM mmol/L 135* 135* 137 135*   POTASSIUM mmol/L 4.1 4.3 4.3 4.7   CHLORIDE mmol/L 101 100 107 102   CO2 mmol/L 23.0 21.4* 20.8* 22.7   BUN mg/dL 15 17 14 26*   CREATININE mg/dL 0.91 1.07* 0.96 1.23*   GLUCOSE mg/dL 100* 111* 115* 110*   CALCIUM mg/dL 9.2 9.2 8.8 9.5   AST (SGOT) U/L  --  14  --  14   ALT (SGPT) U/L  --  15  --  13     Results from last 7 days   Lab Units 05/22/23  0406 05/21/23  1943 05/21/23  1534 05/17/23  1247 05/17/23  0803 05/17/23  0600   HSTROP T ng/L 15* 14* 19* 8 11* 11*     Results from last 7 days   Lab Units 05/22/23  0406 05/21/23  1429 05/17/23  0600   WBC 10*3/mm3 5.15 8.73 5.73   HEMOGLOBIN g/dL 9.4* 10.3* 10.1*   HEMATOCRIT % 28.7* 32.0* 32.0*   PLATELETS 10*3/mm3 270 306 294     Results from last 7 days   Lab Units 05/17/23  0600   INR  0.87*   APTT seconds 29.8     Results from last 7 days   Lab Units 05/21/23  1534 05/17/23  1247   MAGNESIUM mg/dL 1.8 1.9           Invalid input(s):  LDLCALC  Results from last 7 days   Lab Units 23  1429 23  0600   PROBNP pg/mL 913.0* 1,130.0*     Results from last 7 days   Lab Units 23  1247   TSH uIU/mL 1.320       2D Echocardiogram 2023:  •  Left ventricular wall thickness is consistent with mild concentric hypertrophy.  •  Left ventricular diastolic function was normal.  •  Sclerotic aortic valve noted without any significant stenosis or regurgitation.  •  Normal biatrial size present.  •  There is mild to moderate tricuspid valve regurgitation present.    Cardiolite Stress test 2019:  • Diaphragmatic attenuation artifact is present.  • Left ventricular ejection fraction is normal (Calculated EF = 70%).  • Myocardial perfusion imaging indicates a normal myocardial perfusion study with no evidence of ischemia.    EK2022:      2023:      2023:      Imaging:  Imaging Results (Most Recent)     Procedure Component Value Units Date/Time    XR Chest 1 View [[id number]] Collected: 23     Updated: 23    Narrative:      XR CHEST 1 VW-     HISTORY: Female who is 73 years-old,  chest pain     TECHNIQUE: Frontal view of the chest     COMPARISON: 2023     FINDINGS: The heart size is normal. Aorta is calcified. Pulmonary  vasculature is unremarkable. Sternotomy wires are present. The left  hemithorax is partly obscured by an overlying electronic device. No  focal pulmonary consolidation, pleural effusion, or pneumothorax. No  acute osseous process.       Impression:      No focal pulmonary consolidation. Follow-up as clinical  indications persist.     This report was finalized on 2023 3:04 PM by Dr. Fran Salcedo M.D.               Assessment:       Chest discomfort        Plan:     1.  Chest discomfort: Feels more like the warmth in the left side of chest and left axilla area.  Denies chest pain.  Alleviates when she focuses on something else.  EKG with no ischemic changes.   Troponin flat.  Normal echocardiogram with no wall motion abnormality recently.    2.  Newly diagnosed atrial flutter: Paroxysmal.  Patient was placed on ZIO patch and previous ER visit and continues to wear.  She is to turn it in in about a week.  3.  Hypertension: Controlled    I will contact scheduling and have her follow-up in the office pushed back to allow for monitoring with Zio patch.  She will likely follow-up in about a week and a half.  She will continue on all current medications which include anticoagulation with Eliquis.    Thank you for allowing me to participate in the care of Valentine Arora. Feel free to contact me directly with any further questions or concerns.    TERRI Koenig  [facility]  05/22/23  08:51 EDT    EMR Dragon/Transcription disclaimer:   Much of this encounter note is an electronic transcription/translation of spoken language to printed text. The electronic translation of spoken language may permit erroneous, or at times, nonsensical words or phrases to be inadvertently transcribed; Although I have reviewed the note for such errors, some may still exist.

## 2023-05-22 NOTE — PLAN OF CARE
Goal Outcome Evaluation:   Pt d/c via private vehicle. IV removed. Belongings returned. Follow up instructions given. No further questions/complaints at this time.

## 2023-06-06 LAB
MAXIMAL PREDICTED HEART RATE: 148 BPM
STRESS TARGET HR: 126 BPM

## 2023-06-15 ENCOUNTER — OFFICE VISIT (OUTPATIENT)
Dept: CARDIOLOGY | Facility: CLINIC | Age: 73
End: 2023-06-15
Payer: MEDICARE

## 2023-06-15 VITALS
WEIGHT: 191.2 LBS | DIASTOLIC BLOOD PRESSURE: 70 MMHG | HEIGHT: 62 IN | OXYGEN SATURATION: 97 % | BODY MASS INDEX: 35.19 KG/M2 | HEART RATE: 68 BPM | SYSTOLIC BLOOD PRESSURE: 116 MMHG

## 2023-06-15 DIAGNOSIS — I10 PRIMARY HYPERTENSION: Chronic | ICD-10-CM

## 2023-06-15 DIAGNOSIS — I25.10 CORONARY ARTERY DISEASE INVOLVING NATIVE CORONARY ARTERY OF NATIVE HEART, UNSPECIFIED WHETHER ANGINA PRESENT: Primary | Chronic | ICD-10-CM

## 2023-06-15 DIAGNOSIS — Z95.1 S/P CABG (CORONARY ARTERY BYPASS GRAFT): Chronic | ICD-10-CM

## 2023-06-15 DIAGNOSIS — E78.2 MIXED HYPERLIPIDEMIA: Chronic | ICD-10-CM

## 2023-06-15 DIAGNOSIS — I21.4 NON-ST ELEVATION MYOCARDIAL INFARCTION (NSTEMI): Chronic | ICD-10-CM

## 2023-06-15 PROCEDURE — 1159F MED LIST DOCD IN RCRD: CPT | Performed by: INTERNAL MEDICINE

## 2023-06-15 PROCEDURE — 3078F DIAST BP <80 MM HG: CPT | Performed by: INTERNAL MEDICINE

## 2023-06-15 PROCEDURE — 99214 OFFICE O/P EST MOD 30 MIN: CPT | Performed by: INTERNAL MEDICINE

## 2023-06-15 PROCEDURE — 1160F RVW MEDS BY RX/DR IN RCRD: CPT | Performed by: INTERNAL MEDICINE

## 2023-06-15 PROCEDURE — 3074F SYST BP LT 130 MM HG: CPT | Performed by: INTERNAL MEDICINE

## 2023-06-15 NOTE — PROGRESS NOTES
Subjective:     Encounter Date: 06/15/23        Patient ID: Valentine Arora is a 73 y.o. female.    Chief Complaint: HTN, CAD  Coronary Artery Disease  Pertinent negatives include no muscle weakness. Her past medical history is significant for past myocardial infarction.       Deear Dr. Roberts,    I had the pleasure of seeing this patient in the office today. She has a history of CAD and had a myocardial infarction approximately 20 years ago. At that point she had a failed attempted angioplasty and then underwent a two-vessel CABG in 1995. In August 2012 she had a stress Cardiolite test performed that showed an ejection fraction 75% and no ischemia. No wall motion abnormalities were seen. She was hospitalized at [facility] in September 2015. 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 This was felt to be due to trauma years ago. She had an echocardiogram that showed an ejection fraction is 60-65% with normal valvular structure and function.  She was hospitalized May 2023 with paroxysmal atrial flutter and is on chronic anticoagulation along with her clopidogrel.    She comes in today for assessment of cardiac risk prior to colonoscopy and endoscopy with yourself.    She just had a Holter monitor that showed sinus rhythm present throughout with no recurrent atrial flutter.  No chest pain or chest discomfort, no shortness of breath.  No palpitations or tachycardia, no presyncope or syncope.    She was noted to have a new carotid bruit and was found to have bilateral cerebrovascular disease.  She also had hip and leg pain and was found to have significant peripheral arterial disease.  She is being followed with Dr. Ace. She had an intervention- bilateral common iliac artery kissing stent placement on 9/23/2019    Stress test September 2019:  Interpretation Summary     · Diaphragmatic attenuation artifact is present.  · Left ventricular  ejection fraction is normal (Calculated EF = 70%).  · Myocardial perfusion imaging indicates a normal myocardial perfusion study with no evidence of ischemia.     Echocardiogram July 2020:  Interpretation Summary     · Left ventricular systolic function is normal. Calculated EF = 53.1%. Estimated EF was in disagreement with the calculated EF. Estimated EF appears to be in the range of 56 - 60%. Normal left ventricular cavity size and wall thickness noted. All left ventricular wall segments contract normally. Left ventricular diastolic function is normal.       The following portions of the patient's history were reviewed and updated as appropriate: allergies, current medications, past family history, past medical history, past social history, past surgical history and problem list.    Past Medical History:   Diagnosis Date   • Arthritis    • Sarabia esophagus    • CAD (coronary artery disease)    • Chronic back pain    • Colon polyp    • Diabetes mellitus    • GERD (gastroesophageal reflux disease)    • Hyperlipidemia    • Hypertension    • Myocardial infarction     1995   • PAD (peripheral artery disease) 9/11/2019   • Shingles        Past Surgical History:   Procedure Laterality Date   • COLONOSCOPY     • COLONOSCOPY N/A 6/24/2020    Procedure: COLONOSCOPY;  Surgeon: Mathew Roberts MD;  Location: [facility] OR;  Service: Gastroenterology;  Laterality: N/A;  Descending colon polyp x 2, Ascending colon polyp, Sigmoid colon polyp, Rectal polyp   • CORONARY ARTERY BYPASS GRAFT  1995    x 2 vessels   • ENDOSCOPY N/A 6/24/2020    Procedure: ESOPHAGOGASTRODUODENOSCOPY;  Surgeon: Mathew Roberts MD;  Location: [facility] OR;  Service: Gastroenterology;  Laterality: N/A;  Ulcerative esophagitis, Gastritis, Duodenitis  Duodenal biopsy, gastric biopsy, distal esophageal biopsy   • ENDOSCOPY N/A 9/17/2020    Procedure: ESOPHAGOGASTRODUODENOSCOPY with biopsies;  Surgeon: Matehw Roberts MD;   "Location: [facility] OR;  Service: Gastroenterology;  Laterality: N/A;  Esophagitis  Sarabia's Esophagus  Hiatal hernia  Gastritis  Gastric biopsy  Distal esophagus biopsy   • INNER EAR SURGERY     • JOINT REPLACEMENT      right knee   • LEG SURGERY      x 2 s/p fall   • LUMBAR FUSION      L4/L5   • SKIN GRAFT      to left lower leg x 2   • TONSILLECTOMY     • WRIST FUSION Left            Procedures        Objective:     Vitals:    06/15/23 1352   BP: 116/70   BP Location: Left arm   Patient Position: Sitting   Pulse: 68   SpO2: 97%   Weight: 86.7 kg (191 lb 3.2 oz)   Height: 157.5 cm (62\")           General Appearance:    Alert, cooperative, in no acute distress   Head:    Normocephalic, without obvious abnormality   Eyes:            Lids and lashes normal, conjunctivae and sclerae normal, no icterus, no pallor, corneas clear   Ears:    Ears appear intact with no abnormalities noted   Throat:   No oral lesions, oral mucosa moist   Neck:   No adenopathy, supple, trachea midline, no thyromegaly, no carotid bruit, no JVD   Back:     No kyphosis present, no erythema or scars, no tenderness to palpation    Lungs:     Clear to auscultation,respirations regular, even and unlabored    Heart:    Regular rhythm and normal rate, normal S1 and S2, no murmur, no gallop, no rub, no click   Chest Wall:    No abnormalities observed   Abdomen:     Normal bowel sounds, no masses, no organomegaly, soft        non-tender, non-distended, no guarding   Extremities:   Moves all extremities well, no edema, no cyanosis, no redness   Pulses:  Bilateral carotids brisk   Skin:  Psychiatric:   No bleeding or rash    Alert and oriented, normal mood and affect       Lab Review:             Lab Results   Component Value Date    GLUCOSE 100 (H) 05/22/2023    BUN 15 05/22/2023    CREATININE 0.91 05/22/2023    EGFRIFNONA 90 06/16/2021    BCR 16.5 05/22/2023    K 4.1 05/22/2023    CO2 23.0 05/22/2023    CALCIUM 9.2 05/22/2023    ALBUMIN 4.0 05/21/2023    " LABIL2 1.4 10/24/2019    AST 14 05/21/2023    ALT 15 05/21/2023       Results for orders placed during the hospital encounter of 05/17/23    Adult Transthoracic Echo Complete w/ Color, Spectral and Contrast if Necessary Per Protocol    Interpretation Summary  •  Left ventricular systolic function is normal. Calculated left ventricular EF = 58.5%  •  Left ventricular wall thickness is consistent with mild concentric hypertrophy.  •  Left ventricular diastolic function was normal.  •  Sclerotic aortic valve noted without any significant stenosis or regurgitation.  •  Normal biatrial size present.  •  There is mild to moderate tricuspid valve regurgitation present.            Assessment:          Diagnosis Plan   1. Coronary artery disease involving native coronary artery of native heart, unspecified whether angina present        2. S/P CABG (coronary artery bypass graft)        3. Mixed hyperlipidemia        4. Primary hypertension        5. Non-ST elevation myocardial infarction (NSTEMI)               Plan:       1. Coronary Artery Disease  Plan  • Lifestyle modifications discussed include adhering to a heart healthy diet, avoidance of tobacco products, maintenance of a healthy weight, medication compliance, regular exercise and regular monitoring of cholesterol and blood pressure    Subjective - Objective  • There is a history of past MI  • There is a history of previous coronary artery bypass graft  • Current antiplatelet therapy includes aspirin 81 mg    2.  Prior CABG  3.  History of MI  4.  Hypertension-  5.  Hyperlipidemia-on lipid-lowering therapy, patient states that she had recent blood work in your office and it was well-controlled.  6.  Peripheral arterial disease-  Bilateral common iliac artery kissing stent placement on 9/23/2019. Followed by Dr. Ace  7.  Prior esophagitis and gastritis.  Followed by yourself, scheduled for upper and lower endoscopy, low cardiac risk for the anticipated procedure,  patient can hold her clopidogrel as well as her Eliquis as required by yourself.      Current Outpatient Medications:   •  allopurinol (ZYLOPRIM) 100 MG tablet, Take 1 tablet by mouth 2 (Two) Times a Day., Disp: , Rfl:   •  apixaban (ELIQUIS) 5 MG tablet tablet, Take 1 tablet by mouth 2 (Two) Times a Day., Disp: 60 tablet, Rfl: 6  •  atenolol (TENORMIN) 25 MG tablet, Take 1 tablet by mouth Daily., Disp: , Rfl:   •  cloNIDine (CATAPRES) 0.1 MG tablet, Take 2 tablets by mouth 3 (Three) Times a Day., Disp: , Rfl:   •  clopidogrel (PLAVIX) 75 MG tablet, TAKE ONE TABLET BY MOUTH DAILY, Disp: 90 tablet, Rfl: 1  •  hydrALAZINE (APRESOLINE) 50 MG tablet, Take 1.5 tablets by mouth 3 (Three) Times a Day., Disp: , Rfl:   •  hydroCHLOROthiazide (HYDRODIURIL) 12.5 MG tablet, Take 1 tablet by mouth Daily., Disp: , Rfl:   •  hyoscyamine (LEVSIN) 0.125 MG SL tablet, Take 1 tablet by mouth 4 (Four) Times a Day With Meals & at Bedtime. (Patient taking differently: Take 1 tablet by mouth Every 4 (Four) Hours As Needed for Diarrhea.), Disp: 120 tablet, Rfl: 5  •  metFORMIN (GLUCOPHAGE) 500 MG tablet, 2 (Two) Times a Day With Meals., Disp: , Rfl:   •  pantoprazole (PROTONIX) 40 MG EC tablet, Take 1 tablet by mouth 2 (Two) Times a Day., Disp: 180 tablet, Rfl: 3  •  rosuvastatin (CRESTOR) 10 MG tablet, Take 1 tablet by mouth Daily., Disp: , Rfl:

## 2023-07-26 ENCOUNTER — PREP FOR SURGERY (OUTPATIENT)
Dept: SURGERY | Facility: SURGERY CENTER | Age: 73
End: 2023-07-26
Payer: MEDICARE

## 2023-07-26 DIAGNOSIS — Z86.010 PERSONAL HISTORY OF COLONIC POLYPS: ICD-10-CM

## 2023-07-26 DIAGNOSIS — K22.719 BARRETT'S ESOPHAGUS WITH DYSPLASIA: Primary | ICD-10-CM

## 2023-08-04 ENCOUNTER — ANESTHESIA EVENT (OUTPATIENT)
Dept: PERIOP | Facility: HOSPITAL | Age: 73
End: 2023-08-04
Payer: MEDICARE

## 2023-08-07 ENCOUNTER — HOSPITAL ENCOUNTER (OUTPATIENT)
Facility: HOSPITAL | Age: 73
Setting detail: HOSPITAL OUTPATIENT SURGERY
Discharge: HOME | End: 2023-08-07
Attending: INTERNAL MEDICINE | Admitting: INTERNAL MEDICINE
Payer: MEDICARE

## 2023-08-07 ENCOUNTER — ANESTHESIA (OUTPATIENT)
Dept: PERIOP | Facility: HOSPITAL | Age: 73
End: 2023-08-07
Payer: MEDICARE

## 2023-08-07 VITALS
BODY MASS INDEX: 34.86 KG/M2 | DIASTOLIC BLOOD PRESSURE: 61 MMHG | RESPIRATION RATE: 16 BRPM | HEART RATE: 62 BPM | OXYGEN SATURATION: 97 % | SYSTOLIC BLOOD PRESSURE: 179 MMHG | TEMPERATURE: 98.6 F | WEIGHT: 190.6 LBS

## 2023-08-07 DIAGNOSIS — K22.719 BARRETT'S ESOPHAGUS WITH DYSPLASIA: ICD-10-CM

## 2023-08-07 DIAGNOSIS — Z86.010 PERSONAL HISTORY OF COLONIC POLYPS: ICD-10-CM

## 2023-08-07 LAB — GLUCOSE BLDC GLUCOMTR-MCNC: 123 MG/DL (ref 70–130)

## 2023-08-07 PROCEDURE — 88305 TISSUE EXAM BY PATHOLOGIST: CPT | Performed by: INTERNAL MEDICINE

## 2023-08-07 PROCEDURE — G0105 COLORECTAL SCRN; HI RISK IND: HCPCS | Performed by: INTERNAL MEDICINE

## 2023-08-07 PROCEDURE — 82948 REAGENT STRIP/BLOOD GLUCOSE: CPT

## 2023-08-07 PROCEDURE — C1726 CATH, BAL DIL, NON-VASCULAR: HCPCS | Performed by: INTERNAL MEDICINE

## 2023-08-07 PROCEDURE — 43239 EGD BIOPSY SINGLE/MULTIPLE: CPT | Performed by: INTERNAL MEDICINE

## 2023-08-07 PROCEDURE — 25010000002 PROPOFOL 200 MG/20ML EMULSION: Performed by: NURSE ANESTHETIST, CERTIFIED REGISTERED

## 2023-08-07 PROCEDURE — 88342 IMHCHEM/IMCYTCHM 1ST ANTB: CPT | Performed by: INTERNAL MEDICINE

## 2023-08-07 PROCEDURE — 43249 ESOPH EGD DILATION <30 MM: CPT | Performed by: INTERNAL MEDICINE

## 2023-08-07 PROCEDURE — 88312 SPECIAL STAINS GROUP 1: CPT | Performed by: INTERNAL MEDICINE

## 2023-08-07 RX ORDER — MAGNESIUM HYDROXIDE 1200 MG/15ML
LIQUID ORAL AS NEEDED
Status: DISCONTINUED | OUTPATIENT
Start: 2023-08-07 | End: 2023-08-07 | Stop reason: HOSPADM

## 2023-08-07 RX ORDER — SODIUM CHLORIDE 0.9 % (FLUSH) 0.9 %
10 SYRINGE (ML) INJECTION EVERY 12 HOURS SCHEDULED
Status: DISCONTINUED | OUTPATIENT
Start: 2023-08-07 | End: 2023-08-07 | Stop reason: HOSPADM

## 2023-08-07 RX ORDER — PROPOFOL 10 MG/ML
INJECTION, EMULSION INTRAVENOUS AS NEEDED
Status: DISCONTINUED | OUTPATIENT
Start: 2023-08-07 | End: 2023-08-07 | Stop reason: SURG

## 2023-08-07 RX ORDER — SODIUM CHLORIDE 9 MG/ML
40 INJECTION, SOLUTION INTRAVENOUS AS NEEDED
Status: DISCONTINUED | OUTPATIENT
Start: 2023-08-07 | End: 2023-08-07 | Stop reason: HOSPADM

## 2023-08-07 RX ORDER — SODIUM CHLORIDE 0.9 % (FLUSH) 0.9 %
10 SYRINGE (ML) INJECTION AS NEEDED
Status: DISCONTINUED | OUTPATIENT
Start: 2023-08-07 | End: 2023-08-07 | Stop reason: HOSPADM

## 2023-08-07 RX ORDER — LIDOCAINE HYDROCHLORIDE 20 MG/ML
INJECTION, SOLUTION INFILTRATION; PERINEURAL AS NEEDED
Status: DISCONTINUED | OUTPATIENT
Start: 2023-08-07 | End: 2023-08-07 | Stop reason: SURG

## 2023-08-07 RX ORDER — SODIUM CHLORIDE, SODIUM LACTATE, POTASSIUM CHLORIDE, CALCIUM CHLORIDE 600; 310; 30; 20 MG/100ML; MG/100ML; MG/100ML; MG/100ML
9 INJECTION, SOLUTION INTRAVENOUS CONTINUOUS
Status: DISCONTINUED | OUTPATIENT
Start: 2023-08-07 | End: 2023-08-07 | Stop reason: HOSPADM

## 2023-08-07 RX ORDER — ONDANSETRON 2 MG/ML
4 INJECTION INTRAMUSCULAR; INTRAVENOUS ONCE AS NEEDED
Status: DISCONTINUED | OUTPATIENT
Start: 2023-08-07 | End: 2023-08-07 | Stop reason: HOSPADM

## 2023-08-07 RX ADMIN — LIDOCAINE HYDROCHLORIDE 100 MG: 20 INJECTION, SOLUTION INFILTRATION; PERINEURAL at 09:43

## 2023-08-07 RX ADMIN — PROPOFOL INJECTABLE EMULSION 500 MG: 10 INJECTION, EMULSION INTRAVENOUS at 09:43

## 2023-08-07 RX ADMIN — SODIUM CHLORIDE, POTASSIUM CHLORIDE, SODIUM LACTATE AND CALCIUM CHLORIDE 9 ML/HR: 600; 310; 30; 20 INJECTION, SOLUTION INTRAVENOUS at 09:18

## 2023-08-07 NOTE — BRIEF OP NOTE
COLONOSCOPY, ESOPHAGOGASTRODUODENOSCOPY WITH DILATATION  Progress Note    Valentine Arora  8/7/2023    Pre-op Diagnosis:   Sarabia's esophagus with dysplasia [K22.719]  Personal history of colonic polyps [Z86.010]       Post-Op Diagnosis Codes:     * Sarabia's esophagus with dysplasia [K22.719]     * Personal history of colonic polyps [Z86.010]     * Gastritis [K29.70]     * Sarabia's esophagus [K22.70]     * Esophageal stricture [K22.2]    Procedure/CPTr Codes:        Procedure(s):  COLONOSCOPY  Esophagogastroduedenoscopy              Surgeon(s):  Mathew Roberts MD    Anesthesia: Monitored Anesthesia Care    Staff:   Circulator: Karen Rodriguez RN  Scrub Person: Edgard Manriquez         Estimated Blood Loss: none    Urine Voided: * No values recorded between 8/7/2023  9:35 AM and 8/7/2023 10:18 AM *    Specimens:                Specimens       ID Source Type Tests Collected By Collected At Frozen?    A Gastric, Antrum Tissue TISSUE PATHOLOGY EXAM   Mathew Roberts MD 8/7/23 0958     Description: Biopsies    B Esophagus, Distal Tissue TISSUE PATHOLOGY EXAM   Mathew Roberts MD 8/7/23 1000     Description: Biopsies                  Drains: * No LDAs found *    Findings: Normal Duodenum  Mild gastritis-biopsy  SSBE-Biopsy  Esophageal stricture-Dilated    Colon to Cecum fair Prep  Normal Mucosa through-out        Complications: None          Mathew Roberts MD     Date: 8/7/2023  Time: 10:24 EDT

## 2023-08-07 NOTE — ANESTHESIA POSTPROCEDURE EVALUATION
Patient: Valentine Arora    Procedure Summary       Date: 08/07/23 Room / Location:  [facility] /  [facility]    Anesthesia Start: 0935 Anesthesia Stop: 1025    Procedures:       COLONOSCOPY      Esophagogastroduedenoscopy Diagnosis:       Sarabia's esophagus with dysplasia      Personal history of colonic polyps      Gastritis      Sarabia's esophagus      Esophageal stricture      (Sarabia's esophagus with dysplasia [K22.719])      (Personal history of colonic polyps [Z86.010])    Surgeons: Mathew Roberts MD Provider: Marcial Bourgeois CRNA    Anesthesia Type: MAC ASA Status: 3            Anesthesia Type: MAC    Vitals  Vitals Value Taken Time   /61 08/07/23 1125   Temp 98.6 øF (37 øC) 08/07/23 1026   Pulse 55 08/07/23 1117   Resp 16 08/07/23 1026   SpO2 97 % 08/07/23 1118   Vitals shown include unvalidated device data.        Post Anesthesia Care and Evaluation    Patient location during evaluation: PHASE II  Patient participation: complete - patient participated  Level of consciousness: awake and alert  Pain score: 0  Pain management: adequate    Airway patency: patent  Anesthetic complications: No anesthetic complications  PONV Status: none  Cardiovascular status: acceptable  Respiratory status: acceptable  Hydration status: acceptable

## 2023-08-07 NOTE — ANESTHESIA PREPROCEDURE EVALUATION
Anesthesia Evaluation     Patient summary reviewed and Nursing notes reviewed   history of anesthetic complications:  prolonged sedation  NPO Solid Status: > 8 hours  NPO Liquid Status: > 4 hours           Airway   Mallampati: I  TM distance: <3 FB  Neck ROM: full  No difficulty expected  Dental - normal exam     Pulmonary - normal exam    breath sounds clear to auscultation  (+) a smoker Former,  Cardiovascular - normal exam  Exercise tolerance: good (4-7 METS)    Rhythm: regular  Rate: normal    (+) hypertension 2 medications or greater, past MI  >12 months, CAD, CABGdysrhythmias Atrial Flutter, PVD, hyperlipidemia    ROS comment: Left ventricular systolic function is normal. Calculated left ventricular EF = 58.5%  ú  Left ventricular wall thickness is consistent with mild concentric hypertrophy.  Took plavix yesterday 8/6/23    Neuro/Psych- negative ROS  GI/Hepatic/Renal/Endo    (+) obesity, morbid obesity, hiatal hernia, GERD well controlled, GI bleeding , diabetes mellitus type 2 well controlled    Musculoskeletal     Abdominal   (+) obese   Substance History   (+) alcohol use      Comment: Twice a month   OB/GYN          Other   arthritis,                     Anesthesia Plan    ASA 3     MAC     intravenous induction     Anesthetic plan, risks, benefits, and alternatives have been provided, discussed and informed consent has been obtained with: patient.  Pre-procedure education provided  Use of blood products discussed with patient  Consented to blood products.      CODE STATUS:

## 2023-08-07 NOTE — NURSING NOTE
Alejandra aware of pt taking plavix last night and held eliquis since 8/4. MD okay to proceed with procedure.

## 2023-08-07 NOTE — H&P
Patient Care Team:  Aliya Alejandro MD as PCP - General (Family Medicine)    CHIEF COMPLAINT: Personal hx colon polyps and dysphagia    HISTORY OF PRESENT ILLNESS:  Last Colon was 6/2020 and had a meat impaction earlier this Year    Past Medical History:   Diagnosis Date    Arthritis     Sarabia esophagus     CAD (coronary artery disease)     Chronic back pain     Colon polyp     Diabetes mellitus     GERD (gastroesophageal reflux disease)     Hyperlipidemia     Hypertension     Myocardial infarction     1995    PAD (peripheral artery disease) 9/11/2019    Shingles      Past Surgical History:   Procedure Laterality Date    COLONOSCOPY      COLONOSCOPY N/A 6/24/2020    Procedure: COLONOSCOPY;  Surgeon: Mathew Roberts MD;  Location: [facility] OR;  Service: Gastroenterology;  Laterality: N/A;  Descending colon polyp x 2, Ascending colon polyp, Sigmoid colon polyp, Rectal polyp    CORONARY ARTERY BYPASS GRAFT  1995    x 2 vessels    ENDOSCOPY N/A 6/24/2020    Procedure: ESOPHAGOGASTRODUODENOSCOPY;  Surgeon: Mathew Roberts MD;  Location: [facility] OR;  Service: Gastroenterology;  Laterality: N/A;  Ulcerative esophagitis, Gastritis, Duodenitis  Duodenal biopsy, gastric biopsy, distal esophageal biopsy    ENDOSCOPY N/A 9/17/2020    Procedure: ESOPHAGOGASTRODUODENOSCOPY with biopsies;  Surgeon: Mathew Roberts MD;  Location: [facility] OR;  Service: Gastroenterology;  Laterality: N/A;  Esophagitis  Sarabia's Esophagus  Hiatal hernia  Gastritis  Gastric biopsy  Distal esophagus biopsy    INNER EAR SURGERY      JOINT REPLACEMENT      right knee    LEG SURGERY      x 2 s/p fall    LUMBAR FUSION      L4/L5    SKIN GRAFT      to left lower leg x 2    TONSILLECTOMY      WRIST FUSION Left      Family History   Problem Relation Age of Onset    Heart disease Mother     Heart disease Father     Colon cancer Neg Hx     Colon polyps Neg Hx     Breast cancer Neg Hx      Social History     Tobacco  Use    Smoking status: Former     Types: Cigarettes     Quit date:      Years since quittin.6    Smokeless tobacco: Never    Tobacco comments:     QUIT    Vaping Use    Vaping Use: Never used   Substance Use Topics    Alcohol use: Not Currently     Comment: occasional    Drug use: No     Medications Prior to Admission   Medication Sig Dispense Refill Last Dose    allopurinol (ZYLOPRIM) 100 MG tablet Take 1 tablet by mouth 2 (Two) Times a Day.   2023    atenolol (TENORMIN) 25 MG tablet Take 1 tablet by mouth Daily.   2023 at     cloNIDine (CATAPRES) 0.1 MG tablet Take 2 tablets by mouth 3 (Three) Times a Day.   2023    clopidogrel (PLAVIX) 75 MG tablet TAKE ONE TABLET BY MOUTH DAILY 90 tablet 1 2023 at 0800    hydrALAZINE (APRESOLINE) 50 MG tablet Take 1.5 tablets by mouth 3 (Three) Times a Day.   2023    pantoprazole (PROTONIX) 40 MG EC tablet Take 1 tablet by mouth 2 (Two) Times a Day. 180 tablet 3 2023    rosuvastatin (CRESTOR) 10 MG tablet Take 1 tablet by mouth Daily.   2023    apixaban (ELIQUIS) 5 MG tablet tablet Take 1 tablet by mouth 2 (Two) Times a Day. 60 tablet 6 2023 at     hydroCHLOROthiazide (HYDRODIURIL) 12.5 MG tablet Take 1 tablet by mouth Daily.   2023 at     hyoscyamine (LEVSIN) 0.125 MG SL tablet Take 1 tablet by mouth 4 (Four) Times a Day With Meals & at Bedtime. (Patient taking differently: Take 1 tablet by mouth Every 4 (Four) Hours As Needed for Diarrhea.) 120 tablet 5 More than a month    metFORMIN (GLUCOPHAGE) 500 MG tablet 2 (Two) Times a Day With Meals.   2023 at 2000     Allergies:  Codeine and Other    REVIEW OF SYSTEMS:  Please see the above history of present illness for pertinent positives and negatives.  The remainder of the patient's systems have been reviewed and are negative.     Vital Signs  Temp:  [98.1 øF (36.7 øC)] 98.1 øF (36.7 øC)  Heart Rate:  [61] 61  Resp:  [16] 16  BP: (134)/(91) 134/91    Flowsheet Rows       Flowsheet Row First Filed Value   Admission Height --   Admission Weight 86.5 kg (190 lb 9.6 oz) Documented at 08/07/2023 0800             Physical Exam:  Physical Exam   Constitutional: Patient appears well-developed and well-nourished and in no acute distress   HEENT:   Head: Normocephalic and atraumatic.   Eyes:  Pupils are equal, round, and reactive to light. EOM are intact. Sclerae are anicteric and non-injected.  Mouth and Throat: Patient has moist mucous membranes. Oropharynx is clear of any erythema or exudate.     Neck: Neck supple. No JVD present. No thyromegaly present. No lymphadenopathy present.  Cardiovascular: Regular rate, regular rhythm, S1 normal and S2 normal.  Exam reveals no gallop and no friction rub.  No murmur heard.  Pulmonary/Chest: Lungs are clear to auscultation bilaterally. No respiratory distress. No wheezes. No rhonchi. No rales.   Abdominal: Soft. Bowel sounds are normal. No distension and no mass. There is no hepatosplenomegaly. There is no tenderness.   Musculoskeletal: Normal Muscle tone  Extremities: No edema. Pulses are palpable in all 4 extremities.  Neurological: Patient is alert and oriented to person, place, and time. Cranial nerves II-XII are grossly intact with no focal deficits.  Skin: Skin is warm. No rash noted. Nails show no clubbing.  No cyanosis or erythema.    Debilities/Disabilities Identified: None  Emotional Behavior: Appropriate     Results Review:   I reviewed the patient's new clinical results.    Lab Results (most recent)       Procedure Component Value Units Date/Time    POC Glucose Once [[id number]]  (Normal) Collected: 08/07/23 0911    Specimen: Blood Updated: 08/07/23 0919     Glucose 123 mg/dL      Comment: Meter: [id number] : [id number] Barrington Conti RN               Imaging Results (Most Recent)       None          reviewed    ECG/EMG Results (most recent)       None          reviewed    Assessment & Plan   Personal hx colon polyps and  dysphagia/  EGD and colonoscopy      I discussed the patient's findings and my recommendations with patient.     Mathew Roberts MD  08/07/23  10:22 EDT    Time: 10 min prior to procedure.

## 2023-08-09 ENCOUNTER — OFFICE VISIT (OUTPATIENT)
Dept: SLEEP MEDICINE | Facility: HOSPITAL | Age: 73
End: 2023-08-09
Payer: MEDICARE

## 2023-08-09 VITALS
SYSTOLIC BLOOD PRESSURE: 141 MMHG | DIASTOLIC BLOOD PRESSURE: 79 MMHG | HEART RATE: 58 BPM | WEIGHT: 193 LBS | BODY MASS INDEX: 35.51 KG/M2 | OXYGEN SATURATION: 96 % | HEIGHT: 62 IN

## 2023-08-09 DIAGNOSIS — I48.92 ATRIAL FLUTTER, UNSPECIFIED TYPE: ICD-10-CM

## 2023-08-09 DIAGNOSIS — R06.83 LOUD SNORING: ICD-10-CM

## 2023-08-09 DIAGNOSIS — G47.10 HYPERSOMNIA: Primary | ICD-10-CM

## 2023-08-09 DIAGNOSIS — R06.81 WITNESSED EPISODE OF APNEA: ICD-10-CM

## 2023-08-09 LAB
LAB AP CASE REPORT: NORMAL
PATH REPORT.FINAL DX SPEC: NORMAL
PATH REPORT.GROSS SPEC: NORMAL

## 2023-08-09 PROCEDURE — G0463 HOSPITAL OUTPT CLINIC VISIT: HCPCS

## 2023-08-09 NOTE — PROGRESS NOTES
[facility]  Telephone: [phone] / Fax: [phone] [place]  Telephone: [phone] / Fax: [phone] Joanie Simon    Referring Physician: Aliya Alejandro MD  PCP: Aliya Alejandro MD    Reason for consult:  sleep apnea    Valentine Aorra is a 73 y.o.female  was seen in the [facility] today for evaluation of sleep apnea. She was diagnosed with aflutter few months ago. Her cardiologist is Dr Frye.  BELLA evaluation was recommended as she also feels tired/sleepy and snores loudly.  She was observed to have apneas during recent knee replacement. She has HTN and it took awhile to get it under control.  She reports at least 2 nocturnal arousals to use the RR. She takes a short nap in the afternoon. She has history of MI at age 44.    SH- former smoker age [age], no alcohol, 1 caffeine    ROS- +irregular HR      Valentine Arora  has a past medical history of Arthritis, Sarabia esophagus, CAD (coronary artery disease), Chronic back pain, Colon polyp, Diabetes mellitus, GERD (gastroesophageal reflux disease), Hyperlipidemia, Hypertension, Myocardial infarction, PAD (peripheral artery disease) (9/11/2019), and Shingles.    Current Medications:    Current Outpatient Medications:     allopurinol (ZYLOPRIM) 100 MG tablet, Take 1 tablet by mouth 2 (Two) Times a Day., Disp: , Rfl:     atenolol (TENORMIN) 25 MG tablet, Take 1 tablet by mouth Daily., Disp: , Rfl:     cloNIDine (CATAPRES) 0.1 MG tablet, Take 2 tablets by mouth 3 (Three) Times a Day., Disp: , Rfl:     hydrALAZINE (APRESOLINE) 50 MG tablet, Take 1.5 tablets by mouth 3 (Three) Times a Day., Disp: , Rfl:     hydroCHLOROthiazide (HYDRODIURIL) 12.5 MG tablet, Take 1 tablet by mouth Daily., Disp: , Rfl:     hyoscyamine (LEVSIN) 0.125 MG SL tablet, Take 1 tablet by mouth 4 (Four) Times a Day With Meals & at Bedtime., Disp: 120 tablet, Rfl: 5    metFORMIN (GLUCOPHAGE) 500 MG tablet, 2 (Two) Times a Day With Meals.,  "Disp: , Rfl:     pantoprazole (PROTONIX) 40 MG EC tablet, Take 1 tablet by mouth 2 (Two) Times a Day., Disp: 180 tablet, Rfl: 3    rosuvastatin (CRESTOR) 10 MG tablet, Take 1 tablet by mouth Daily., Disp: , Rfl:     I have reviewed Past Medical History, Past Surgical History, Medication List, Social History and Family History as entered in Sleep Questionnaire and EPIC.    ESS  3   Vital Signs /79   Pulse 58   Ht 157.5 cm (62\")   Wt 87.5 kg (193 lb)   SpO2 96%   BMI 35.30 kg/mý  Body mass index is 35.3 kg/mý.    General Alert and oriented. No acute distress noted   Pharynx/Throat Class IV   Mallampati airway, large tongue, no evidence of redundant lateral pharyngeal tissue. No oral lesions. No thrush. Moist mucous membranes.   Head Normocephalic. Symmetrical. Atraumatic.    Nose No septal deviation. No drainage   Chest Wall Normal shape. Symmetric expansion with respiration. No tenderness.   Neck Trachea midline, no thyromegaly or adenopathy    Lungs Clear to auscultation bilaterally. No wheezes. No rhonchi. No rales. Respirations regular, even and unlabored.   Heart Regular rhythm and normal rate. Normal S1 and S2. No murmur   Abdomen Soft, non-tender and non-distended. Normal bowel sounds. No masses.   Extremities Moves all extremities well. No edema   Psychiatric Normal mood and affect.        Impression:  1. Hypersomnia    2. Witnessed episode of apnea    3. Loud snoring    4. Atrial flutter, unspecified type          Plan:  I discussed the pathophysiology of obstructive sleep apnea with the patient.  We discussed the adverse outcomes associated with untreated sleep-disordered breathing.  We discussed treatment modalities of obstructive sleep apnea including CPAP device. Sleep study will be scheduled to establish a definitive diagnosis of sleep disorder breathing.  Weight loss will be strongly beneficial in order to reduce the severity of sleep-disordered breathing.  Patient has narrow oropharyngeal " structure.  Caution during activities that require prolonged concentration is strongly advised.  After sleep study results are available, patient will be notified, and appointment will be scheduled to discuss sleep study results and treatment recommendations.    HST was ordered    I appreciate the opportunity to participate in this patient's care.      TERRI Camacho  [facility]  Phone: [phone]      Part of this note may be an electronic transcription/translation of spoken language to printed text using the Dragon Dictation System. Some errors may exist even though the document was edited.

## 2023-08-14 ENCOUNTER — HOSPITAL ENCOUNTER (OUTPATIENT)
Dept: SLEEP MEDICINE | Facility: HOSPITAL | Age: 73
Discharge: HOME | End: 2023-08-14
Admitting: NURSE PRACTITIONER
Payer: MEDICARE

## 2023-08-14 DIAGNOSIS — I48.92 ATRIAL FLUTTER, UNSPECIFIED TYPE: ICD-10-CM

## 2023-08-14 DIAGNOSIS — G47.10 HYPERSOMNIA: ICD-10-CM

## 2023-08-14 DIAGNOSIS — R06.81 WITNESSED EPISODE OF APNEA: ICD-10-CM

## 2023-08-14 DIAGNOSIS — R06.83 LOUD SNORING: ICD-10-CM

## 2023-08-14 PROCEDURE — 95806 SLEEP STUDY UNATT&RESP EFFT: CPT

## 2023-08-16 DIAGNOSIS — G47.33 OBSTRUCTIVE SLEEP APNEA: Primary | ICD-10-CM

## 2023-08-18 ENCOUNTER — TELEPHONE (OUTPATIENT)
Dept: SLEEP MEDICINE | Facility: HOSPITAL | Age: 73
End: 2023-08-18
Payer: MEDICARE

## 2023-08-23 ENCOUNTER — TELEPHONE (OUTPATIENT)
Dept: SLEEP MEDICINE | Facility: HOSPITAL | Age: 73
End: 2023-08-23
Payer: MEDICARE

## 2023-08-23 NOTE — TELEPHONE ENCOUNTER
Patient called upset that she was not provided sleep study results before DME called patient for set up of CPAP machine. Advised patient per verbal medical release signed, a message was left on 8/18/2023 with results, where order was sent and compliance visit scheduled. Patient understood.

## 2023-09-01 ENCOUNTER — APPOINTMENT (OUTPATIENT)
Dept: GENERAL RADIOLOGY | Facility: HOSPITAL | Age: 73
End: 2023-09-01
Payer: MEDICARE

## 2023-09-01 ENCOUNTER — HOSPITAL ENCOUNTER (EMERGENCY)
Facility: HOSPITAL | Age: 73
Discharge: HOME | End: 2023-09-01
Attending: EMERGENCY MEDICINE
Payer: MEDICARE

## 2023-09-01 VITALS
RESPIRATION RATE: 16 BRPM | HEART RATE: 58 BPM | BODY MASS INDEX: 34.96 KG/M2 | OXYGEN SATURATION: 99 % | TEMPERATURE: 98.1 F | DIASTOLIC BLOOD PRESSURE: 59 MMHG | HEIGHT: 62 IN | WEIGHT: 190 LBS | SYSTOLIC BLOOD PRESSURE: 174 MMHG

## 2023-09-01 DIAGNOSIS — R07.9 CHEST PAIN, UNSPECIFIED TYPE: ICD-10-CM

## 2023-09-01 DIAGNOSIS — I10 HYPERTENSION, UNCONTROLLED: Primary | ICD-10-CM

## 2023-09-01 LAB
ALBUMIN SERPL-MCNC: 4.3 G/DL (ref 3.5–5.2)
ALBUMIN/GLOB SERPL: 1.7 G/DL
ALP SERPL-CCNC: 73 U/L (ref 39–117)
ALT SERPL W P-5'-P-CCNC: 12 U/L (ref 1–33)
ANION GAP SERPL CALCULATED.3IONS-SCNC: 15.5 MMOL/L (ref 5–15)
AST SERPL-CCNC: 18 U/L (ref 1–32)
BASOPHILS # BLD AUTO: 0.08 10*3/MM3 (ref 0–0.2)
BASOPHILS NFR BLD AUTO: 1.3 % (ref 0–1.5)
BILIRUB SERPL-MCNC: 0.3 MG/DL (ref 0–1.2)
BUN SERPL-MCNC: 20 MG/DL (ref 8–23)
BUN/CREAT SERPL: 16.8 (ref 7–25)
CALCIUM SPEC-SCNC: 9.7 MG/DL (ref 8.6–10.5)
CHLORIDE SERPL-SCNC: 96 MMOL/L (ref 98–107)
CO2 SERPL-SCNC: 20.5 MMOL/L (ref 22–29)
CREAT SERPL-MCNC: 1.19 MG/DL (ref 0.57–1)
DEPRECATED RDW RBC AUTO: 44 FL (ref 37–54)
EGFRCR SERPLBLD CKD-EPI 2021: 48.7 ML/MIN/1.73
EOSINOPHIL # BLD AUTO: 0.27 10*3/MM3 (ref 0–0.4)
EOSINOPHIL NFR BLD AUTO: 4.3 % (ref 0.3–6.2)
ERYTHROCYTE [DISTWIDTH] IN BLOOD BY AUTOMATED COUNT: 13.2 % (ref 12.3–15.4)
GEN 5 2HR TROPONIN T REFLEX: 12 NG/L
GLOBULIN UR ELPH-MCNC: 2.6 GM/DL
GLUCOSE SERPL-MCNC: 119 MG/DL (ref 65–99)
HCT VFR BLD AUTO: 33.1 % (ref 34–46.6)
HGB BLD-MCNC: 10.7 G/DL (ref 12–15.9)
HOLD SPECIMEN: NORMAL
HOLD SPECIMEN: NORMAL
IMM GRANULOCYTES # BLD AUTO: 0.01 10*3/MM3 (ref 0–0.05)
IMM GRANULOCYTES NFR BLD AUTO: 0.2 % (ref 0–0.5)
LYMPHOCYTES # BLD AUTO: 1.86 10*3/MM3 (ref 0.7–3.1)
LYMPHOCYTES NFR BLD AUTO: 30 % (ref 19.6–45.3)
MCH RBC QN AUTO: 29.2 PG (ref 26.6–33)
MCHC RBC AUTO-ENTMCNC: 32.3 G/DL (ref 31.5–35.7)
MCV RBC AUTO: 90.2 FL (ref 79–97)
MONOCYTES # BLD AUTO: 0.66 10*3/MM3 (ref 0.1–0.9)
MONOCYTES NFR BLD AUTO: 10.6 % (ref 5–12)
NEUTROPHILS NFR BLD AUTO: 3.33 10*3/MM3 (ref 1.7–7)
NEUTROPHILS NFR BLD AUTO: 53.6 % (ref 42.7–76)
NRBC BLD AUTO-RTO: 0 /100 WBC (ref 0–0.2)
PLATELET # BLD AUTO: 413 10*3/MM3 (ref 140–450)
PMV BLD AUTO: 8.9 FL (ref 6–12)
POTASSIUM SERPL-SCNC: 3.8 MMOL/L (ref 3.5–5.2)
PROT SERPL-MCNC: 6.9 G/DL (ref 6–8.5)
QT INTERVAL: 393 MS
QTC INTERVAL: 408 MS
RBC # BLD AUTO: 3.67 10*6/MM3 (ref 3.77–5.28)
SODIUM SERPL-SCNC: 132 MMOL/L (ref 136–145)
TROPONIN T DELTA: 1 NG/L
TROPONIN T SERPL HS-MCNC: 11 NG/L
WBC NRBC COR # BLD: 6.21 10*3/MM3 (ref 3.4–10.8)
WHOLE BLOOD HOLD COAG: NORMAL
WHOLE BLOOD HOLD SPECIMEN: NORMAL

## 2023-09-01 PROCEDURE — 99284 EMERGENCY DEPT VISIT MOD MDM: CPT

## 2023-09-01 PROCEDURE — 96374 THER/PROPH/DIAG INJ IV PUSH: CPT

## 2023-09-01 PROCEDURE — 93005 ELECTROCARDIOGRAM TRACING: CPT | Performed by: EMERGENCY MEDICINE

## 2023-09-01 PROCEDURE — 36415 COLL VENOUS BLD VENIPUNCTURE: CPT

## 2023-09-01 PROCEDURE — 84484 ASSAY OF TROPONIN QUANT: CPT | Performed by: EMERGENCY MEDICINE

## 2023-09-01 PROCEDURE — 80053 COMPREHEN METABOLIC PANEL: CPT | Performed by: EMERGENCY MEDICINE

## 2023-09-01 PROCEDURE — 71045 X-RAY EXAM CHEST 1 VIEW: CPT

## 2023-09-01 PROCEDURE — 25010000002 ONDANSETRON PER 1 MG: Performed by: EMERGENCY MEDICINE

## 2023-09-01 PROCEDURE — 85025 COMPLETE CBC W/AUTO DIFF WBC: CPT | Performed by: EMERGENCY MEDICINE

## 2023-09-01 RX ORDER — SODIUM CHLORIDE 0.9 % (FLUSH) 0.9 %
10 SYRINGE (ML) INJECTION AS NEEDED
Status: DISCONTINUED | OUTPATIENT
Start: 2023-09-01 | End: 2023-09-01 | Stop reason: HOSPADM

## 2023-09-01 RX ORDER — ONDANSETRON 2 MG/ML
4 INJECTION INTRAMUSCULAR; INTRAVENOUS ONCE
Status: COMPLETED | OUTPATIENT
Start: 2023-09-01 | End: 2023-09-01

## 2023-09-01 RX ORDER — CLONIDINE HYDROCHLORIDE 0.1 MG/1
0.1 TABLET ORAL ONCE
Status: COMPLETED | OUTPATIENT
Start: 2023-09-01 | End: 2023-09-01

## 2023-09-01 RX ORDER — ONDANSETRON 4 MG/1
4 TABLET, ORALLY DISINTEGRATING ORAL ONCE
Status: DISCONTINUED | OUTPATIENT
Start: 2023-09-01 | End: 2023-09-01 | Stop reason: HOSPADM

## 2023-09-01 RX ADMIN — CLONIDINE HYDROCHLORIDE 0.1 MG: 0.1 TABLET ORAL at 08:42

## 2023-09-01 RX ADMIN — ONDANSETRON 4 MG: 2 INJECTION INTRAMUSCULAR; INTRAVENOUS at 08:42

## 2023-09-01 NOTE — ED PROVIDER NOTES
Subjective   History of Present Illness  Patient presents complaining of an acute episode of hot flashes, nausea, and chest pain.  Patient says she got up from bed this morning and she was feeling it right in her epigastric substernal area.  Patient said yesterday she felt fine and has been feeling fine over the past week.  Patient got the bed and went to the bathroom and had a few dry heaves with the nausea but no vomiting.  Patient denies any radiation of the pain.  Nothing seems to make it better or worse.  By the time patient arrived here she said she was feeling much better and symptoms had almost gone away.  Patient was concerned because she has a history of a flutter and did know if this was associated with it.  Patient denies any recent exertional chest pain, no travel, no trauma, no bad food exposure, and no sick contacts.  No therapy taken prior to arrival.    Review of Systems   All other systems reviewed and are negative.    Past Medical History:   Diagnosis Date    Arthritis     Sarabia esophagus     CAD (coronary artery disease)     Chronic back pain     Colon polyp     Diabetes mellitus     GERD (gastroesophageal reflux disease)     Hyperlipidemia     Hypertension     Myocardial infarction     1995    PAD (peripheral artery disease) 9/11/2019    Shingles        Allergies   Allergen Reactions    Codeine Itching    Other Unknown (See Comments)     Vicryl: doesn't heal       Past Surgical History:   Procedure Laterality Date    COLONOSCOPY      COLONOSCOPY N/A 6/24/2020    Procedure: COLONOSCOPY;  Surgeon: Mathew Roberts MD;  Location: [facility];  Service: Gastroenterology;  Laterality: N/A;  Descending colon polyp x 2, Ascending colon polyp, Sigmoid colon polyp, Rectal polyp    COLONOSCOPY N/A 8/7/2023    Procedure: COLONOSCOPY;  Surgeon: Mathew Roberts MD;  Location: [facility] OR;  Service: Gastroenterology;  Laterality: N/A;  Normal    CORONARY ARTERY BYPASS GRAFT  1995    x 2  vessels    ENDOSCOPY N/A 2020    Procedure: ESOPHAGOGASTRODUODENOSCOPY;  Surgeon: Mathew Roberts MD;  Location:  [facility] OR;  Service: Gastroenterology;  Laterality: N/A;  Ulcerative esophagitis, Gastritis, Duodenitis  Duodenal biopsy, gastric biopsy, distal esophageal biopsy    ENDOSCOPY N/A 2020    Procedure: ESOPHAGOGASTRODUODENOSCOPY with biopsies;  Surgeon: Mathew Roberts MD;  Location:  [facility] OR;  Service: Gastroenterology;  Laterality: N/A;  Esophagitis  Sarabia's Esophagus  Hiatal hernia  Gastritis  Gastric biopsy  Distal esophagus biopsy    ENDOSCOPY N/A 2023    Procedure: Esophagogastroduedenoscopy;  Surgeon: Mathew Roberts MD;  Location:  [facility] OR;  Service: Gastroenterology;  Laterality: N/A;  Gastritis; short segment Sarabia's; Esophageal stricture- dilatation; Biopsies- gastric, distal esophagus    INNER EAR SURGERY      JOINT REPLACEMENT      right knee    LEG SURGERY      x 2 s/p fall    LUMBAR FUSION      L4/L5    SKIN GRAFT      to left lower leg x 2    TONSILLECTOMY      WRIST FUSION Left        Family History   Problem Relation Age of Onset    Heart disease Mother     Heart disease Father     Colon cancer Neg Hx     Colon polyps Neg Hx     Breast cancer Neg Hx        Social History     Socioeconomic History    Marital status:    Tobacco Use    Smoking status: Former     Types: Cigarettes     Quit date:      Years since quittin.6    Smokeless tobacco: Never    Tobacco comments:     QUIT    Vaping Use    Vaping Use: Never used   Substance and Sexual Activity    Alcohol use: Not Currently     Comment: occasional    Drug use: No    Sexual activity: Defer           Objective   Physical Exam  Vitals and nursing note reviewed.   Constitutional:       Comments: Patient sitting in bed comfortably, pleasant, talkative.   HENT:      Head: Normocephalic.      Nose: Nose normal.      Mouth/Throat:      Mouth: Mucous membranes are moist.       Pharynx: Oropharynx is clear.   Eyes:      Conjunctiva/sclera: Conjunctivae normal.   Cardiovascular:      Rate and Rhythm: Normal rate and regular rhythm.      Pulses:           Radial pulses are 2+ on the right side and 2+ on the left side.      Heart sounds: Normal heart sounds.   Pulmonary:      Effort: Pulmonary effort is normal.      Breath sounds: Normal breath sounds.   Abdominal:      General: Abdomen is flat. Bowel sounds are normal. There is no distension.      Palpations: Abdomen is soft.      Tenderness: There is no abdominal tenderness. There is no right CVA tenderness, left CVA tenderness or guarding.   Musculoskeletal:         General: No swelling.      Cervical back: Neck supple.   Skin:     General: Skin is warm and dry.      Capillary Refill: Capillary refill takes 2 to 3 seconds.   Neurological:      Mental Status: She is alert and oriented to person, place, and time.   Psychiatric:         Mood and Affect: Mood normal.         Behavior: Behavior normal.       Procedures           ED Course                      HEART Score: 5                      Medical Decision Making  Ddx dysrhythmia, dehydration, electrolyte abnormality, ACS, viral syndrome    XR Chest 1 View    Result Date: 9/1/2023  No acute findings.  This report was finalized on 9/1/2023 9:04 AM by Dr. ISI Trevizo MD.      Labs Reviewed  COMPREHENSIVE METABOLIC PANEL - Abnormal; Notable for the following components:     Glucose                       119 (*)                Creatinine                    1.19 (*)               Sodium                        132 (*)                Chloride                      96 (*)                 CO2                           20.5 (*)               Anion Gap                     15.5 (*)               eGFR                          48.7 (*)            All other components within normal limits         Narrative: GFR Normal >60                  Chronic Kidney Disease <60                  Kidney Failure  <15                                    The GFR formula is only valid for adults with stable renal function between ages 18 and 70.  TROPONIN - Abnormal; Notable for the following components:     HS Troponin T                 11 (*)              All other components within normal limits         Narrative: High Sensitive Troponin T Reference Range:                  <10.0 ng/L- Negative Female for AMI                  <15.0 ng/L- Negative Male for AMI                  >=10 - Abnormal Female indicating possible myocardial injury.                  >=15 - Abnormal Male indicating possible myocardial injury.                   Clinicians would have to utilize clinical acumen, EKG, Troponin, and serial changes to determine if it is an Acute Myocardial Infarction or myocardial injury due to an underlying chronic condition.                                       CBC WITH AUTO DIFFERENTIAL - Abnormal; Notable for the following components:     RBC                           3.67 (*)               Hemoglobin                    10.7 (*)               Hematocrit                    33.1 (*)            All other components within normal limits  HIGH SENSITIVITIY TROPONIN T 2HR - Abnormal; Notable for the following components:     HS Troponin T                 12 (*)              All other components within normal limits         Narrative: High Sensitive Troponin T Reference Range:                  <10.0 ng/L- Negative Female for AMI                  <15.0 ng/L- Negative Male for AMI                  >=10 - Abnormal Female indicating possible myocardial injury.                  >=15 - Abnormal Male indicating possible myocardial injury.                   Clinicians would have to utilize clinical acumen, EKG, Troponin, and serial changes to determine if it is an Acute Myocardial Infarction or myocardial injury due to an underlying chronic condition.                                       RAINBOW DRAW         Narrative: The following  orders were created for panel order [place] Draw.                  Procedure                               Abnormality         Status                                     ---------                               -----------         ------                                     Green Top (Gel)[[id number]]                                  Final result                               Lavender Top[[id number]]                                     Final result                               Gold Top - SST[[id number]]                                   Final result                               Light Blue Top[[id number]]                                   Final result                                                 Please view results for these tests on the individual orders.  CBC AND DIFFERENTIAL         Narrative: The following orders were created for panel order CBC & Differential.                  Procedure                               Abnormality         Status                                     ---------                               -----------         ------                                     CBC Auto Differential[[id number]]        Abnormal            Final result                                                 Please view results for these tests on the individual orders.  GREEN TOP  LAVENDER TOP  GOLD TOP - SST  LIGHT BLUE TOP    1117 Pt seen again prior to d/c.  Labs/Imaging reviewed and are unremarkable.  Symptoms improved and pt feels better; vitals stable and pt. in NAD. Non-toxic. Comfortable. Ambulating without difficulty.  Tolerating po.  Relaxed breathing.  All questions personally answered at the bedside and all d/c instructions personally reviewed with pt.  Discussed the importance of close outpt. f/u and pt. understands this and agrees to do so.  Pt agrees to return to ED immediately for any new, persistent, or worsening symptoms.    EMR Dragon/Transcription disclaimer:  Much of this encounter note is an electronic  transcription/translation of spoken language to printed text, aka voice recognition.  The electronic translation of spoken language may permit erroneous or at times nonsensical words or phrases to be inadvertently transcribed; although I have reviewed the note for such errors, some may still exist so please interpret based on surrounding text content.      Amount and/or Complexity of Data Reviewed  Labs: ordered.  Radiology: ordered.  ECG/medicine tests: ordered.    Risk  Prescription drug management.        Final diagnoses:   Hypertension, uncontrolled   Chest pain, unspecified type       ED Disposition  ED Disposition       ED Disposition   Discharge    Condition   Stable    Comment   --               Aliya Alejandro MD  [address]  [facility] [id number] [phone]    In 2 days           Medication List      No changes were made to your prescriptions during this visit.            Ramón Núñez MD  09/01/23 1122

## 2023-09-10 ENCOUNTER — APPOINTMENT (OUTPATIENT)
Dept: GENERAL RADIOLOGY | Facility: HOSPITAL | Age: 73
End: 2023-09-10
Payer: MEDICARE

## 2023-09-10 ENCOUNTER — APPOINTMENT (OUTPATIENT)
Dept: NUCLEAR MEDICINE | Facility: HOSPITAL | Age: 73
End: 2023-09-10
Payer: MEDICARE

## 2023-09-10 ENCOUNTER — HOSPITAL ENCOUNTER (EMERGENCY)
Facility: HOSPITAL | Age: 73
Discharge: HOME | End: 2023-09-10
Attending: EMERGENCY MEDICINE | Admitting: EMERGENCY MEDICINE
Payer: MEDICARE

## 2023-09-10 VITALS
DIASTOLIC BLOOD PRESSURE: 84 MMHG | HEART RATE: 58 BPM | SYSTOLIC BLOOD PRESSURE: 181 MMHG | RESPIRATION RATE: 20 BRPM | OXYGEN SATURATION: 97 % | TEMPERATURE: 97.6 F

## 2023-09-10 DIAGNOSIS — Z86.79 HISTORY OF CORONARY ARTERY DISEASE: ICD-10-CM

## 2023-09-10 DIAGNOSIS — R07.9 CHEST PAIN, UNSPECIFIED TYPE: Primary | ICD-10-CM

## 2023-09-10 DIAGNOSIS — Z86.79 HISTORY OF ATRIAL FLUTTER: ICD-10-CM

## 2023-09-10 PROBLEM — R00.2 PALPITATIONS: Status: ACTIVE | Noted: 2023-09-10

## 2023-09-10 LAB
ALBUMIN SERPL-MCNC: 4.5 G/DL (ref 3.5–5.2)
ALBUMIN/GLOB SERPL: 2.4 G/DL
ALP SERPL-CCNC: 71 U/L (ref 39–117)
ALT SERPL W P-5'-P-CCNC: 13 U/L (ref 1–33)
ANION GAP SERPL CALCULATED.3IONS-SCNC: 10.7 MMOL/L (ref 5–15)
AST SERPL-CCNC: 12 U/L (ref 1–32)
BASOPHILS # BLD AUTO: 0.04 10*3/MM3 (ref 0–0.2)
BASOPHILS NFR BLD AUTO: 0.5 % (ref 0–1.5)
BH CV NUCLEAR PRIOR STUDY: 3
BH CV REST NUCLEAR ISOTOPE DOSE: 12 MCI
BH CV STRESS BP STAGE 1: NORMAL
BH CV STRESS BP STAGE 2: NORMAL
BH CV STRESS COMMENTS STAGE 1: NORMAL
BH CV STRESS COMMENTS STAGE 2: NORMAL
BH CV STRESS DOSE REGADENOSON STAGE 1: 0.4
BH CV STRESS DURATION MIN STAGE 1: 0
BH CV STRESS DURATION MIN STAGE 2: 4
BH CV STRESS DURATION SEC STAGE 1: 10
BH CV STRESS DURATION SEC STAGE 2: 0
BH CV STRESS HR STAGE 1: 71
BH CV STRESS HR STAGE 2: 79
BH CV STRESS NUCLEAR ISOTOPE DOSE: 34 MCI
BH CV STRESS PROTOCOL 1: NORMAL
BH CV STRESS RECOVERY BP: NORMAL MMHG
BH CV STRESS RECOVERY HR: 72 BPM
BH CV STRESS STAGE 1: 1
BH CV STRESS STAGE 2: 2
BILIRUB SERPL-MCNC: 0.3 MG/DL (ref 0–1.2)
BUN SERPL-MCNC: 18 MG/DL (ref 8–23)
BUN/CREAT SERPL: 16.5 (ref 7–25)
CALCIUM SPEC-SCNC: 9.6 MG/DL (ref 8.6–10.5)
CHLORIDE SERPL-SCNC: 101 MMOL/L (ref 98–107)
CHOLEST SERPL-MCNC: 160 MG/DL (ref 0–200)
CO2 SERPL-SCNC: 22.3 MMOL/L (ref 22–29)
CREAT SERPL-MCNC: 1.09 MG/DL (ref 0.57–1)
DEPRECATED RDW RBC AUTO: 43.1 FL (ref 37–54)
EGFRCR SERPLBLD CKD-EPI 2021: 54.1 ML/MIN/1.73
EOSINOPHIL # BLD AUTO: 0.15 10*3/MM3 (ref 0–0.4)
EOSINOPHIL NFR BLD AUTO: 1.8 % (ref 0.3–6.2)
ERYTHROCYTE [DISTWIDTH] IN BLOOD BY AUTOMATED COUNT: 13.1 % (ref 12.3–15.4)
GLOBULIN UR ELPH-MCNC: 1.9 GM/DL
GLUCOSE SERPL-MCNC: 128 MG/DL (ref 65–99)
HCT VFR BLD AUTO: 31.5 % (ref 34–46.6)
HDLC SERPL-MCNC: 60 MG/DL (ref 40–60)
HGB BLD-MCNC: 10.1 G/DL (ref 12–15.9)
IMM GRANULOCYTES # BLD AUTO: 0.04 10*3/MM3 (ref 0–0.05)
IMM GRANULOCYTES NFR BLD AUTO: 0.5 % (ref 0–0.5)
INR PPP: 1.08 (ref 0.9–1.1)
LDLC SERPL CALC-MCNC: 70 MG/DL (ref 0–100)
LDLC/HDLC SERPL: 1.07 {RATIO}
LV EF NUC BP: 82 %
LYMPHOCYTES # BLD AUTO: 1.21 10*3/MM3 (ref 0.7–3.1)
LYMPHOCYTES NFR BLD AUTO: 14.3 % (ref 19.6–45.3)
MAGNESIUM SERPL-MCNC: 1.7 MG/DL (ref 1.6–2.4)
MAXIMAL PREDICTED HEART RATE: 148 BPM
MCH RBC QN AUTO: 28.4 PG (ref 26.6–33)
MCHC RBC AUTO-ENTMCNC: 32.1 G/DL (ref 31.5–35.7)
MCV RBC AUTO: 88.5 FL (ref 79–97)
MONOCYTES # BLD AUTO: 0.52 10*3/MM3 (ref 0.1–0.9)
MONOCYTES NFR BLD AUTO: 6.1 % (ref 5–12)
NEUTROPHILS NFR BLD AUTO: 6.51 10*3/MM3 (ref 1.7–7)
NEUTROPHILS NFR BLD AUTO: 76.8 % (ref 42.7–76)
NRBC BLD AUTO-RTO: 0 /100 WBC (ref 0–0.2)
PERCENT MAX PREDICTED HR: 54.73 %
PLATELET # BLD AUTO: 354 10*3/MM3 (ref 140–450)
PMV BLD AUTO: 8.9 FL (ref 6–12)
POTASSIUM SERPL-SCNC: 4 MMOL/L (ref 3.5–5.2)
PROT SERPL-MCNC: 6.4 G/DL (ref 6–8.5)
PROTHROMBIN TIME: 14.1 SECONDS (ref 11.7–14.2)
QT INTERVAL: 393 MS
QTC INTERVAL: 406 MS
RBC # BLD AUTO: 3.56 10*6/MM3 (ref 3.77–5.28)
SODIUM SERPL-SCNC: 134 MMOL/L (ref 136–145)
STRESS BASELINE BP: NORMAL MMHG
STRESS BASELINE HR: 65 BPM
STRESS PERCENT HR: 64 %
STRESS POST ESTIMATED WORKLOAD: 1 METS
STRESS POST EXERCISE DUR MIN: 4 MIN
STRESS POST EXERCISE DUR SEC: 0 SEC
STRESS POST PEAK BP: NORMAL MMHG
STRESS POST PEAK HR: 81 BPM
STRESS TARGET HR: 126 BPM
T4 FREE SERPL-MCNC: 1.61 NG/DL (ref 0.93–1.7)
TRIGL SERPL-MCNC: 179 MG/DL (ref 0–150)
TROPONIN T SERPL HS-MCNC: 19 NG/L
TSH SERPL DL<=0.05 MIU/L-ACNC: 1.62 UIU/ML (ref 0.27–4.2)
VLDLC SERPL-MCNC: 30 MG/DL (ref 5–40)
WBC NRBC COR # BLD: 8.47 10*3/MM3 (ref 3.4–10.8)

## 2023-09-10 PROCEDURE — 96374 THER/PROPH/DIAG INJ IV PUSH: CPT

## 2023-09-10 PROCEDURE — 80053 COMPREHEN METABOLIC PANEL: CPT | Performed by: EMERGENCY MEDICINE

## 2023-09-10 PROCEDURE — 93017 CV STRESS TEST TRACING ONLY: CPT

## 2023-09-10 PROCEDURE — G0378 HOSPITAL OBSERVATION PER HR: HCPCS

## 2023-09-10 PROCEDURE — 93005 ELECTROCARDIOGRAM TRACING: CPT

## 2023-09-10 PROCEDURE — 25010000002 REGADENOSON 0.4 MG/5ML SOLUTION: Performed by: EMERGENCY MEDICINE

## 2023-09-10 PROCEDURE — 85025 COMPLETE CBC W/AUTO DIFF WBC: CPT | Performed by: EMERGENCY MEDICINE

## 2023-09-10 PROCEDURE — 99284 EMERGENCY DEPT VISIT MOD MDM: CPT

## 2023-09-10 PROCEDURE — 93016 CV STRESS TEST SUPVJ ONLY: CPT | Performed by: INTERNAL MEDICINE

## 2023-09-10 PROCEDURE — 93018 CV STRESS TEST I&R ONLY: CPT | Performed by: INTERNAL MEDICINE

## 2023-09-10 PROCEDURE — 78452 HT MUSCLE IMAGE SPECT MULT: CPT

## 2023-09-10 PROCEDURE — 93010 ELECTROCARDIOGRAM REPORT: CPT | Performed by: INTERNAL MEDICINE

## 2023-09-10 PROCEDURE — 99283 EMERGENCY DEPT VISIT LOW MDM: CPT | Performed by: STUDENT IN AN ORGANIZED HEALTH CARE EDUCATION/TRAINING PROGRAM

## 2023-09-10 PROCEDURE — 84484 ASSAY OF TROPONIN QUANT: CPT | Performed by: EMERGENCY MEDICINE

## 2023-09-10 PROCEDURE — 25010000002 ONDANSETRON PER 1 MG: Performed by: EMERGENCY MEDICINE

## 2023-09-10 PROCEDURE — 84439 ASSAY OF FREE THYROXINE: CPT | Performed by: EMERGENCY MEDICINE

## 2023-09-10 PROCEDURE — 84443 ASSAY THYROID STIM HORMONE: CPT | Performed by: EMERGENCY MEDICINE

## 2023-09-10 PROCEDURE — 0 TECHNETIUM SESTAMIBI: Performed by: EMERGENCY MEDICINE

## 2023-09-10 PROCEDURE — 85610 PROTHROMBIN TIME: CPT | Performed by: EMERGENCY MEDICINE

## 2023-09-10 PROCEDURE — 80061 LIPID PANEL: CPT | Performed by: NURSE PRACTITIONER

## 2023-09-10 PROCEDURE — 78452 HT MUSCLE IMAGE SPECT MULT: CPT | Performed by: INTERNAL MEDICINE

## 2023-09-10 PROCEDURE — A9500 TC99M SESTAMIBI: HCPCS | Performed by: EMERGENCY MEDICINE

## 2023-09-10 PROCEDURE — 83735 ASSAY OF MAGNESIUM: CPT | Performed by: EMERGENCY MEDICINE

## 2023-09-10 PROCEDURE — 71045 X-RAY EXAM CHEST 1 VIEW: CPT

## 2023-09-10 RX ORDER — SODIUM CHLORIDE 0.9 % (FLUSH) 0.9 %
10 SYRINGE (ML) INJECTION AS NEEDED
Status: DISCONTINUED | OUTPATIENT
Start: 2023-09-10 | End: 2023-09-10 | Stop reason: HOSPADM

## 2023-09-10 RX ORDER — AMIODARONE HYDROCHLORIDE 400 MG/1
TABLET ORAL
Qty: 48 TABLET | Refills: 0 | Status: SHIPPED | OUTPATIENT
Start: 2023-09-10

## 2023-09-10 RX ORDER — NITROGLYCERIN 0.4 MG/1
0.4 TABLET SUBLINGUAL
Status: DISCONTINUED | OUTPATIENT
Start: 2023-09-10 | End: 2023-09-10 | Stop reason: HOSPADM

## 2023-09-10 RX ORDER — OLMESARTAN MEDOXOMIL 40 MG/1
40 TABLET ORAL DAILY
COMMUNITY

## 2023-09-10 RX ORDER — ASPIRIN 325 MG
325 TABLET ORAL ONCE
Status: COMPLETED | OUTPATIENT
Start: 2023-09-10 | End: 2023-09-10

## 2023-09-10 RX ORDER — ONDANSETRON 2 MG/ML
4 INJECTION INTRAMUSCULAR; INTRAVENOUS ONCE
Status: COMPLETED | OUTPATIENT
Start: 2023-09-10 | End: 2023-09-10

## 2023-09-10 RX ORDER — POLYETHYLENE GLYCOL 3350 17 G/17G
17 POWDER, FOR SOLUTION ORAL DAILY PRN
Status: DISCONTINUED | OUTPATIENT
Start: 2023-09-10 | End: 2023-09-10 | Stop reason: HOSPADM

## 2023-09-10 RX ORDER — BISACODYL 5 MG/1
5 TABLET, DELAYED RELEASE ORAL DAILY PRN
Status: DISCONTINUED | OUTPATIENT
Start: 2023-09-10 | End: 2023-09-10 | Stop reason: HOSPADM

## 2023-09-10 RX ORDER — APIXABAN 5 MG/1
5 TABLET, FILM COATED ORAL ONCE
COMMUNITY
Start: 2023-09-06

## 2023-09-10 RX ORDER — BISACODYL 10 MG
10 SUPPOSITORY, RECTAL RECTAL DAILY PRN
Status: DISCONTINUED | OUTPATIENT
Start: 2023-09-10 | End: 2023-09-10 | Stop reason: HOSPADM

## 2023-09-10 RX ORDER — AMOXICILLIN 250 MG
2 CAPSULE ORAL 2 TIMES DAILY
Status: DISCONTINUED | OUTPATIENT
Start: 2023-09-10 | End: 2023-09-10 | Stop reason: HOSPADM

## 2023-09-10 RX ORDER — REGADENOSON 0.08 MG/ML
0.4 INJECTION, SOLUTION INTRAVENOUS
Status: COMPLETED | OUTPATIENT
Start: 2023-09-10 | End: 2023-09-10

## 2023-09-10 RX ORDER — CLOPIDOGREL BISULFATE 75 MG/1
75 TABLET ORAL DAILY
COMMUNITY

## 2023-09-10 RX ORDER — ASPIRIN 81 MG/1
324 TABLET, CHEWABLE ORAL ONCE
Status: DISCONTINUED | OUTPATIENT
Start: 2023-09-10 | End: 2023-09-10 | Stop reason: HOSPADM

## 2023-09-10 RX ORDER — TRAMADOL HYDROCHLORIDE 50 MG/1
50 TABLET ORAL EVERY 6 HOURS PRN
COMMUNITY

## 2023-09-10 RX ORDER — ASPIRIN 81 MG/1
81 TABLET ORAL DAILY
Status: DISCONTINUED | OUTPATIENT
Start: 2023-09-11 | End: 2023-09-10 | Stop reason: HOSPADM

## 2023-09-10 RX ORDER — SODIUM CHLORIDE 0.9 % (FLUSH) 0.9 %
10 SYRINGE (ML) INJECTION EVERY 12 HOURS SCHEDULED
Status: DISCONTINUED | OUTPATIENT
Start: 2023-09-10 | End: 2023-09-10 | Stop reason: HOSPADM

## 2023-09-10 RX ORDER — SODIUM CHLORIDE 9 MG/ML
40 INJECTION, SOLUTION INTRAVENOUS AS NEEDED
Status: DISCONTINUED | OUTPATIENT
Start: 2023-09-10 | End: 2023-09-10 | Stop reason: HOSPADM

## 2023-09-10 RX ADMIN — TECHNETIUM TC 99M SESTAMIBI 1 DOSE: 1 INJECTION INTRAVENOUS at 12:15

## 2023-09-10 RX ADMIN — ASPIRIN 325 MG: 325 TABLET ORAL at 11:01

## 2023-09-10 RX ADMIN — TECHNETIUM TC 99M SESTAMIBI 1 DOSE: 1 INJECTION INTRAVENOUS at 13:49

## 2023-09-10 RX ADMIN — REGADENOSON 0.4 MG: 0.08 INJECTION, SOLUTION INTRAVENOUS at 13:49

## 2023-09-10 RX ADMIN — ONDANSETRON 4 MG: 2 INJECTION INTRAMUSCULAR; INTRAVENOUS at 11:00

## 2023-09-10 NOTE — H&P
[facility]   HISTORY AND PHYSICAL    Patient Name: Valentine Arora  : 1950  MRN: [id number]  Primary Care Physician:  Aliya Alejandro MD  Date of admission: 9/10/2023    Subjective   Subjective     Chief Complaint:   Chief Complaint   Patient presents with    Palpitations         HPI:    Valentine Arora is a very pleasant afebrile 73 y.o.  female with a past medical history of coronary artery disease, hypertension, hyperlipidemia, peripheral artery disease, atrial flutter on Eliquis therapy, and GERD.    She presents to the emergency department [facility] with complaint of chest discomfort.  She has been admitted to the ED observation unit for further testing and evaluation.    Patient reports of a history of paroxysmal atrial flutter.  She states that she developed palpitations yesterday that resolved and returned again at 3 AM.  States it felt consistent with prior episodes of her heart going out of rhythm.  She describes chest discomfort to the left side of her chest that needs down her left arm.  States it is associated with nausea, dizziness and shortness of breath.    She denies any diaphoresis.  EKG here unremarkable.Her last dose of eliquis was 20:00 last evening.     She was seen in the ED by Dr. Gardner and went for stress testing.  This was found to be negative and cardiology has cleared her for discharge home with amiodarone 400 twice daily for 1 week then 400 daily afterwards and follow-up with the electrophysiology team in the next 2 weeks.  Patient and family are agreeable to plan.    Review of Systems   All systems were reviewed and negative except for: chest pain, palpitations    Personal History     Past Medical History:   Diagnosis Date    Arthritis     Sarabia esophagus     CAD (coronary artery disease)     Chronic back pain     Colon polyp     Diabetes mellitus     GERD (gastroesophageal reflux disease)     Hyperlipidemia     Hypertension      Myocardial infarction     1995    PAD (peripheral artery disease) 9/11/2019    Shingles        Past Surgical History:   Procedure Laterality Date    COLONOSCOPY      COLONOSCOPY N/A 6/24/2020    Procedure: COLONOSCOPY;  Surgeon: Mathew Roberts MD;  Location: [facility] OR;  Service: Gastroenterology;  Laterality: N/A;  Descending colon polyp x 2, Ascending colon polyp, Sigmoid colon polyp, Rectal polyp    COLONOSCOPY N/A 8/7/2023    Procedure: COLONOSCOPY;  Surgeon: Mathew Roberts MD;  Location: [facility] OR;  Service: Gastroenterology;  Laterality: N/A;  Normal    CORONARY ARTERY BYPASS GRAFT  1995    x 2 vessels    ENDOSCOPY N/A 6/24/2020    Procedure: ESOPHAGOGASTRODUODENOSCOPY;  Surgeon: Mathew Roberts MD;  Location: [facility] OR;  Service: Gastroenterology;  Laterality: N/A;  Ulcerative esophagitis, Gastritis, Duodenitis  Duodenal biopsy, gastric biopsy, distal esophageal biopsy    ENDOSCOPY N/A 9/17/2020    Procedure: ESOPHAGOGASTRODUODENOSCOPY with biopsies;  Surgeon: Mathew Roberts MD;  Location: [facility] OR;  Service: Gastroenterology;  Laterality: N/A;  Esophagitis  Sarabia's Esophagus  Hiatal hernia  Gastritis  Gastric biopsy  Distal esophagus biopsy    ENDOSCOPY N/A 8/7/2023    Procedure: Esophagogastroduedenoscopy;  Surgeon: Mathew Roberts MD;  Location: [facility] OR;  Service: Gastroenterology;  Laterality: N/A;  Gastritis; short segment Sarabia's; Esophageal stricture- dilatation; Biopsies- gastric, distal esophagus    INNER EAR SURGERY      JOINT REPLACEMENT      right knee    LEG SURGERY      x 2 s/p fall    LUMBAR FUSION      L4/L5    SKIN GRAFT      to left lower leg x 2    TONSILLECTOMY      WRIST FUSION Left        Family History: family history includes Heart disease in her father and mother. Otherwise pertinent FHx was reviewed and not pertinent to current issue.    Social History:  reports that she quit smoking about 28 years ago. Her smoking  use included cigarettes. She has never used smokeless tobacco. She reports that she does not currently use alcohol. She reports that she does not use drugs.    Home Medications:  allopurinol, apixaban, atenolol, cloNIDine, clopidogrel, hydrALAZINE, hydroCHLOROthiazide, hyoscyamine, metFORMIN, olmesartan, pantoprazole, rosuvastatin, and traMADol    Allergies:  Allergies   Allergen Reactions    Codeine Itching    Other Unknown (See Comments)     Vicryl: doesn't heal       Objective   Objective     Vitals:   Temp:  [97.6 °F (36.4 °C)] 97.6 °F (36.4 °C)  Heart Rate:  [57-64] 62  Resp:  [20] 20  BP: (167-185)/(60-84) 167/60  Physical Exam    Constitutional: Awake, alert   Eyes: PERRLA, sclerae anicteric, no conjunctival injection   HENT: NCAT, mucous membranes moist   Neck: Supple, no thyromegaly, no lymphadenopathy, trachea midline   Respiratory: Clear to auscultation bilaterally, nonlabored respirations    Cardiovascular: RRR, no murmurs, rubs, or gallops, palpable pedal pulses bilaterally   Gastrointestinal: Positive bowel sounds, soft, nontender, nondistended   Musculoskeletal: No bilateral ankle edema, no clubbing or cyanosis to extremities   Psychiatric: Appropriate affect, cooperative   Neurologic: Oriented x 3, strength symmetric in all extremities, Cranial Nerves grossly intact to confrontation, speech clear   Skin: No rashes     Result Review    Result Review:  I have personally reviewed the results from the time of this admission to 9/10/2023 12:07 EDT and agree with these findings:  [x]  Laboratory list / accordion  []  Microbiology  [x]  Radiology  [x]  EKG/Telemetry   []  Cardiology/Vascular   []  Pathology  []  Old records  []  Other:  Most notable findings include: High-sensitivity troponin 19, triglycerides 179, hemoglobin 10.1      Assessment & Plan   Assessment / Plan     Brief Patient Summary:  Valentine Arora is a 73 y.o. female who is being evaluated for chest pain    Active Hospital Problems:  Active  Hospital Problems    Diagnosis     **Palpitations      Plan:     Chest pain  Palpitations  Telemetry  Consult to cardiology  Hs troponin 19, trend  EKG shows sinus rhythm without evidence of ischemia  Thyroid studies negative  Stress testing negative        DVT prophylaxis:  Medical DVT prophylaxis orders are present.    CODE STATUS:    Level Of Support Discussed With: Patient  Code Status (Patient has no pulse and is not breathing): CPR (Attempt to Resuscitate)  Medical Interventions (Patient has pulse or is breathing): Full Support    Admission Status:  I believe this patient meets observation status.    Electronically signed by TERRI Dumont, 09/10/23, 12:07 PM EDT.    This note also serves as a discharge summary.    75 minutes has been spent by [facility] providers in the care of this patient while under observation status      I have worn appropriate PPE during this patient encounter, sanitized my hands both with entering and exiting patient's room.    I have discussed plan of care with patient including advance care plan and/or surrogate decision maker.  Patient advises that their  Fran will be their primary surrogate decision maker     \

## 2023-09-10 NOTE — ED NOTES
"Pt  was diagnosed three months ago with a -flutter three months ago and placed on eliquis. Pt stated yest. She had palpitations that lasted from 4527-6846. Pt stated this morning around 0330 she felt palpations again and become SOB, pt had nausea and vomiting this morning. Pt still has nausea at this time.     Pt states the sensation is not painful but she feels \"flushed without the hotness\".   "

## 2023-09-10 NOTE — CONSULTS
Date of Hospital Visit: 09/10/23  Encounter Provider: Rashad Gardner MD  Place of Service: [facility]  Patient Name: Valentine Arora  :1950  Referral Provider: Walt Perkins MD    Chief complaint  Chest pain, arm pain, SOB    History of Present Illness  73 year old woman, followed by Dr Frye, with atrial flutter, CAD s/p 2v CABG in , HTN, HLD and DM who presents for further evaluation of chest pain. Patient states that yesterday she had an episode of chest pain that radiated down her left arm. Features of the discomfort remind her of her prior MI though are not as severe. She states she has had 5 episodes in the past couple of months. The initial episode occurred in May at which time she was diagnosed with rate controlled atrial flutter. She subsequently had a Holter monitor which was interpreted as sinus rhythm with intermittent Mobitz I and rare symptomatic PACs and PVCs. She has been maintained on Eliquis and atenolol. She has grown very concerned regarding her discomfort.    On arrival to the ED, she was afebrile, P 60, /77, O2 sat 97 on RA, RR 20.  HS trop indeterminate at 19. EKG with NSR and LVH.     Past Medical History:   Diagnosis Date    Arthritis     Sarabia esophagus     CAD (coronary artery disease)     Chronic back pain     Colon polyp     Diabetes mellitus     GERD (gastroesophageal reflux disease)     Hyperlipidemia     Hypertension     Myocardial infarction         PAD (peripheral artery disease) 2019    Shingles        Past Surgical History:   Procedure Laterality Date    COLONOSCOPY      COLONOSCOPY N/A 2020    Procedure: COLONOSCOPY;  Surgeon: Mathew Roberts MD;  Location: [facility];  Service: Gastroenterology;  Laterality: N/A;  Descending colon polyp x 2, Ascending colon polyp, Sigmoid colon polyp, Rectal polyp    COLONOSCOPY N/A 2023    Procedure: COLONOSCOPY;  Surgeon: Mathew Roberts MD;   Location:  [facility] OR;  Service: Gastroenterology;  Laterality: N/A;  Normal    CORONARY ARTERY BYPASS GRAFT  1995    x 2 vessels    ENDOSCOPY N/A 6/24/2020    Procedure: ESOPHAGOGASTRODUODENOSCOPY;  Surgeon: Mathew Roberts MD;  Location:  [facility] OR;  Service: Gastroenterology;  Laterality: N/A;  Ulcerative esophagitis, Gastritis, Duodenitis  Duodenal biopsy, gastric biopsy, distal esophageal biopsy    ENDOSCOPY N/A 9/17/2020    Procedure: ESOPHAGOGASTRODUODENOSCOPY with biopsies;  Surgeon: Mathew Roberts MD;  Location:  [facility];  Service: Gastroenterology;  Laterality: N/A;  Esophagitis  Sarabia's Esophagus  Hiatal hernia  Gastritis  Gastric biopsy  Distal esophagus biopsy    ENDOSCOPY N/A 8/7/2023    Procedure: Esophagogastroduedenoscopy;  Surgeon: Mathew Roberts MD;  Location:  [facility];  Service: Gastroenterology;  Laterality: N/A;  Gastritis; short segment Sarabia's; Esophageal stricture- dilatation; Biopsies- gastric, distal esophagus    INNER EAR SURGERY      JOINT REPLACEMENT      right knee    LEG SURGERY      x 2 s/p fall    LUMBAR FUSION      L4/L5    SKIN GRAFT      to left lower leg x 2    TONSILLECTOMY      WRIST FUSION Left        (Not in a hospital admission)      Current Meds  Scheduled Meds:aspirin, 324 mg, Oral, Once   And  [START ON 9/11/2023] aspirin, 81 mg, Oral, Daily  senna-docusate sodium, 2 tablet, Oral, BID  sodium chloride, 10 mL, Intravenous, Q12H      Continuous Infusions:   PRN Meds:.  senna-docusate sodium **AND** polyethylene glycol **AND** bisacodyl **AND** bisacodyl    nitroglycerin    [COMPLETED] Insert Peripheral IV **AND** sodium chloride    sodium chloride    sodium chloride    Allergies as of 09/10/2023 - Reviewed 09/10/2023   Allergen Reaction Noted    Codeine Itching 12/13/2016    Other Unknown (See Comments) 12/13/2016       Social History     Socioeconomic History    Marital status:    Tobacco Use    Smoking status: Former      Types: Cigarettes     Quit date:      Years since quittin.7    Smokeless tobacco: Never    Tobacco comments:     QUIT    Vaping Use    Vaping Use: Never used   Substance and Sexual Activity    Alcohol use: Not Currently     Comment: occasional    Drug use: No    Sexual activity: Defer       Family History   Problem Relation Age of Onset    Heart disease Mother     Heart disease Father     Colon cancer Neg Hx     Colon polyps Neg Hx     Breast cancer Neg Hx        REVIEW OF SYSTEMS:   12 point ROS was performed and is negative except as outlined in HPI          Objective:   Temp:  [97.6 °F (36.4 °C)] 97.6 °F (36.4 °C)  Heart Rate:  [57-64] 62  Resp:  [20] 20  BP: (167-185)/(60-84) 167/60  There is no height or weight on file to calculate BMI.    Vitals:    09/10/23 1116   BP:    Pulse: 62   Resp:    Temp:    SpO2: 95%       GEN: no distress, alert and oriented  HEENT: NACT, EOMI, moist mucous membranes  Lungs: CTAB, no wheezes, rales or rhonchi  CV: normal rate, regular rhythm, normal S1, S2, no murmurs, +2 radial pulses b/l, no carotid bruit  Abdomen: soft, nontender, nondistended, NABS  Extremities: no edema  Skin: no rash, warm, dry  Heme/Lymph: no bruising  Psych: organized thought, normal behavior and affect      Lab Review:   Results from last 7 days   Lab Units 09/10/23  1017   SODIUM mmol/L 134*   POTASSIUM mmol/L 4.0   CHLORIDE mmol/L 101   CO2 mmol/L 22.3   BUN mg/dL 18   CREATININE mg/dL 1.09*   CALCIUM mg/dL 9.6   BILIRUBIN mg/dL 0.3   ALK PHOS U/L 71   ALT (SGPT) U/L 13   AST (SGOT) U/L 12   GLUCOSE mg/dL 128*     Results from last 7 days   Lab Units 09/10/23  1017   HSTROP T ng/L 19*     Results from last 7 days   Lab Units 09/10/23  1017   WBC 10*3/mm3 8.47   HEMOGLOBIN g/dL 10.1*   HEMATOCRIT % 31.5*   PLATELETS 10*3/mm3 354     Results from last 7 days   Lab Units 09/10/23  1017   INR  1.08     Results from last 7 days   Lab Units 09/10/23  1017   MAGNESIUM mg/dL 1.7         I personally  viewed and interpreted the patient's EKG/Telemetry data)      Palpitations    Assessment and Plan:  #Chest pain  #Atrial flutter  #CAD s/p CABG  #HTN  #HLD  #DM    73 year old woman, followed by Dr Frye, with atrial flutter, CAD s/p 2v CABG in 1995, HTN, HLD and DM who presents for further evaluation of chest pain. Her chest pain could very well be due to her atrial flutter as these episodes have all been similar to her initial presentation for flutter. She does have history of remote CABG and has not had any recent ischemic evaluation. In addition to this, her symptoms on her Holter monitor did not correlate with atrial flutter(some did correlate with ectopy.)    - nuclear stress test for further evaluation and if positive will pursue [facility] tomorrow  - if stress is normal, would start amiodarone for her flutter and refer to EP to consider ablation    Patient agreeable to plan.    Rashad Gardner MD  09/10/23  13:34 EDT.

## 2023-09-10 NOTE — ED PROVIDER NOTES
EMERGENCY DEPARTMENT ENCOUNTER    Room Number:  11/11  Date seen:  9/10/2023  PCP: Aliya Alejandro MD  Historian: Patient      HPI:  Chief Complaint: Palpitations  Context: Valentine Arora is a 73 y.o. female who presents to the ED c/o chest discomfort with radiation to her left arm and nausea.  She states this began early this morning.  She states she was diagnosed with atrial flutter back in May and had the symptoms at that time.  She states she sat down on the toilet and bear down and seems like she has felt better since that time but still describes some vague chest discomfort, nausea and discomfort in her left medial arm.  The patient states she does have history of heart disease and is status post CABG.  She is followed by Dr. Frye.  She states she is on Eliquis for atrial flutter but has not had her medicine this morning.      PAST MEDICAL HISTORY  Active Ambulatory Problems     Diagnosis Date Noted    CAD (coronary artery disease)     Myocardial infarction     Hypertension     Hyperlipidemia     S/P CABG (coronary artery bypass graft) 01/19/2017    Morbidly obese 09/11/2019    PAD (peripheral artery disease) 09/11/2019    Esophageal dysphagia 05/22/2020    Encounter for screening for malignant neoplasm of colon 05/22/2020    Sarabia's esophagus with dysplasia 08/05/2020    Ulcer of esophagus without bleeding 08/05/2020    Dehydration with hyponatremia 06/15/2021    Gastroesophageal reflux disease without esophagitis 10/20/2022    Personal history of colonic polyps 05/08/2023    Esophageal obstruction 05/08/2023    Atrial flutter, unspecified type 05/17/2023    Chest discomfort 05/21/2023     Resolved Ambulatory Problems     Diagnosis Date Noted    No Resolved Ambulatory Problems     Past Medical History:   Diagnosis Date    Arthritis     Sarabia esophagus     Chronic back pain     Colon polyp     Diabetes mellitus     GERD (gastroesophageal reflux disease)     Shingles          REVIEW OF  SYSTEMS  All systems reviewed and negative except for those discussed in HPI.       PAST SURGICAL HISTORY  Past Surgical History:   Procedure Laterality Date    COLONOSCOPY      COLONOSCOPY N/A 6/24/2020    Procedure: COLONOSCOPY;  Surgeon: Mathew Roberts MD;  Location: [facility] OR;  Service: Gastroenterology;  Laterality: N/A;  Descending colon polyp x 2, Ascending colon polyp, Sigmoid colon polyp, Rectal polyp    COLONOSCOPY N/A 8/7/2023    Procedure: COLONOSCOPY;  Surgeon: Mathew Roberts MD;  Location: [facility] OR;  Service: Gastroenterology;  Laterality: N/A;  Normal    CORONARY ARTERY BYPASS GRAFT  1995    x 2 vessels    ENDOSCOPY N/A 6/24/2020    Procedure: ESOPHAGOGASTRODUODENOSCOPY;  Surgeon: Mathew Roberts MD;  Location: [facility] OR;  Service: Gastroenterology;  Laterality: N/A;  Ulcerative esophagitis, Gastritis, Duodenitis  Duodenal biopsy, gastric biopsy, distal esophageal biopsy    ENDOSCOPY N/A 9/17/2020    Procedure: ESOPHAGOGASTRODUODENOSCOPY with biopsies;  Surgeon: Mathew Roberts MD;  Location: [facility] OR;  Service: Gastroenterology;  Laterality: N/A;  Esophagitis  Sarabia's Esophagus  Hiatal hernia  Gastritis  Gastric biopsy  Distal esophagus biopsy    ENDOSCOPY N/A 8/7/2023    Procedure: Esophagogastroduedenoscopy;  Surgeon: Mathew Roberts MD;  Location: [facility];  Service: Gastroenterology;  Laterality: N/A;  Gastritis; short segment Sarabia's; Esophageal stricture- dilatation; Biopsies- gastric, distal esophagus    INNER EAR SURGERY      JOINT REPLACEMENT      right knee    LEG SURGERY      x 2 s/p fall    LUMBAR FUSION      L4/L5    SKIN GRAFT      to left lower leg x 2    TONSILLECTOMY      WRIST FUSION Left          FAMILY HISTORY  Family History   Problem Relation Age of Onset    Heart disease Mother     Heart disease Father     Colon cancer Neg Hx     Colon polyps Neg Hx     Breast cancer Neg Hx          SOCIAL HISTORY  Social  History     Socioeconomic History    Marital status:    Tobacco Use    Smoking status: Former     Types: Cigarettes     Quit date:      Years since quittin.7    Smokeless tobacco: Never    Tobacco comments:     QUIT    Vaping Use    Vaping Use: Never used   Substance and Sexual Activity    Alcohol use: Not Currently     Comment: occasional    Drug use: No    Sexual activity: Defer         ALLERGIES  Codeine and Other      PHYSICAL EXAM  ED Triage Vitals [09/10/23 0953]   Temp Heart Rate Resp BP SpO2   97.6 °F (36.4 °C) 60 20 -- 97 %      Temp src Heart Rate Source Patient Position BP Location FiO2 (%)   Tympanic Monitor -- -- --       Physical Exam      GENERAL: 73-year-old female in no acute distress  HENT: NCAT: nares patent: Neck supple  EYES: no scleral icterus  CV: regular rhythm, normal rate with 2/6 murmur  RESPIRATORY: normal effort  ABDOMEN: soft, NTND: Bowel sounds positive  MUSCULOSKELETAL: no deformity  NEURO: alert with nonfocal neuro exam  PSYCH:  calm, cooperative  SKIN: warm, dry    Vital signs and nursing notes reviewed.      LAB RESULTS  Recent Results (from the past 24 hour(s))   ECG 12 Lead Chest Pain    Collection Time: 09/10/23  9:57 AM   Result Value Ref Range    QT Interval 393 ms    QTC Interval 406 ms   Comprehensive Metabolic Panel    Collection Time: 09/10/23 10:17 AM    Specimen: Blood   Result Value Ref Range    Glucose 128 (H) 65 - 99 mg/dL    BUN 18 8 - 23 mg/dL    Creatinine 1.09 (H) 0.57 - 1.00 mg/dL    Sodium 134 (L) 136 - 145 mmol/L    Potassium 4.0 3.5 - 5.2 mmol/L    Chloride 101 98 - 107 mmol/L    CO2 22.3 22.0 - 29.0 mmol/L    Calcium 9.6 8.6 - 10.5 mg/dL    Total Protein 6.4 6.0 - 8.5 g/dL    Albumin 4.5 3.5 - 5.2 g/dL    ALT (SGPT) 13 1 - 33 U/L    AST (SGOT) 12 1 - 32 U/L    Alkaline Phosphatase 71 39 - 117 U/L    Total Bilirubin 0.3 0.0 - 1.2 mg/dL    Globulin 1.9 gm/dL    A/G Ratio 2.4 g/dL    BUN/Creatinine Ratio 16.5 7.0 - 25.0    Anion Gap 10.7 5.0  - 15.0 mmol/L    eGFR 54.1 (L) >60.0 mL/min/1.73   Protime-INR    Collection Time: 09/10/23 10:17 AM    Specimen: Blood   Result Value Ref Range    Protime 14.1 11.7 - 14.2 Seconds    INR 1.08 0.90 - 1.10   High Sensitivity Troponin T    Collection Time: 09/10/23 10:17 AM    Specimen: Blood   Result Value Ref Range    HS Troponin T 19 (H) <10 ng/L   Magnesium    Collection Time: 09/10/23 10:17 AM    Specimen: Blood   Result Value Ref Range    Magnesium 1.7 1.6 - 2.4 mg/dL   TSH    Collection Time: 09/10/23 10:17 AM    Specimen: Blood   Result Value Ref Range    TSH 1.620 0.270 - 4.200 uIU/mL   T4, Free    Collection Time: 09/10/23 10:17 AM    Specimen: Blood   Result Value Ref Range    Free T4 1.61 0.93 - 1.70 ng/dL   CBC Auto Differential    Collection Time: 09/10/23 10:17 AM    Specimen: Blood   Result Value Ref Range    WBC 8.47 3.40 - 10.80 10*3/mm3    RBC 3.56 (L) 3.77 - 5.28 10*6/mm3    Hemoglobin 10.1 (L) 12.0 - 15.9 g/dL    Hematocrit 31.5 (L) 34.0 - 46.6 %    MCV 88.5 79.0 - 97.0 fL    MCH 28.4 26.6 - 33.0 pg    MCHC 32.1 31.5 - 35.7 g/dL    RDW 13.1 12.3 - 15.4 %    RDW-SD 43.1 37.0 - 54.0 fl    MPV 8.9 6.0 - 12.0 fL    Platelets 354 140 - 450 10*3/mm3    Neutrophil % 76.8 (H) 42.7 - 76.0 %    Lymphocyte % 14.3 (L) 19.6 - 45.3 %    Monocyte % 6.1 5.0 - 12.0 %    Eosinophil % 1.8 0.3 - 6.2 %    Basophil % 0.5 0.0 - 1.5 %    Immature Grans % 0.5 0.0 - 0.5 %    Neutrophils, Absolute 6.51 1.70 - 7.00 10*3/mm3    Lymphocytes, Absolute 1.21 0.70 - 3.10 10*3/mm3    Monocytes, Absolute 0.52 0.10 - 0.90 10*3/mm3    Eosinophils, Absolute 0.15 0.00 - 0.40 10*3/mm3    Basophils, Absolute 0.04 0.00 - 0.20 10*3/mm3    Immature Grans, Absolute 0.04 0.00 - 0.05 10*3/mm3    nRBC 0.0 0.0 - 0.2 /100 WBC   Lipid Panel    Collection Time: 09/10/23 10:17 AM    Specimen: Blood   Result Value Ref Range    Total Cholesterol 160 0 - 200 mg/dL    Triglycerides 179 (H) 0 - 150 mg/dL    HDL Cholesterol 60 40 - 60 mg/dL    LDL  Cholesterol  70 0 - 100 mg/dL    VLDL Cholesterol 30 5 - 40 mg/dL    LDL/HDL Ratio 1.07    Stress Test With Myocardial Perfusion One Day    Collection Time: 09/10/23  2:21 PM   Result Value Ref Range    BH CV STRESS PROTOCOL 1 Pharmacologic     Stage 1 1.0     HR Stage 1 71     BP Stage 1 166/51     Duration Min Stage 1 0     Duration Sec Stage 1 10     Stress Dose Regadenoson Stage 1 0.40     Stress Comments Stage 1 10 sec bolus injection     Stage 2 2.0     HR Stage 2 79     BP Stage 2 166/51     Duration Min Stage 2 4     Duration Sec Stage 2 0     Stress Comments Stage 2 recovery     Baseline HR 65 bpm    Baseline /67 mmHg    Peak HR 81 bpm    Peak /54 mmHg    Recovery HR 72 bpm    Recovery /54 mmHg    Target HR (85%) 126 bpm    Max. Pred. HR (100%) 148 bpm    Percent Max Pred HR 54.73 %    Exercise duration (min) 4 min    Exercise duration (sec) 0 sec    Estimated workload 1.0 METS    Percent Target HR 64 %    Nuclear Prior Study 3.0     BH CV REST NUCLEAR ISOTOPE DOSE 12.0 mCi    BH CV STRESS NUCLEAR ISOTOPE DOSE 34.0 mCi    Nuc Stress EF 82 %       Ordered the above labs and reviewed the results.        RADIOLOGY  XR Chest 1 View    Result Date: 9/10/2023  XR CHEST 1 VW-  HISTORY: Female who is 73 years-old, short of breath  TECHNIQUE: Frontal view of the chest  COMPARISON: 9/1/2023  FINDINGS: The heart size is borderline. Sternotomy wires are present. Pulmonary vasculature is unremarkable. No focal pulmonary consolidation, pleural effusion, or pneumothorax. No acute osseous process.      No focal pulmonary consolidation. Borderline heart size. Follow-up as clinical indications persist.  This report was finalized on 9/10/2023 10:30 AM by Dr. Fran Salcedo M.D.      Stress Test With Myocardial Perfusion One Day    Result Date: 9/10/2023    Diaphragmatic attenuation artifact is present.   Myocardial perfusion imaging indicates a normal myocardial perfusion study with no evidence of  ischemia.   Left ventricular ejection fraction is hyperdynamic (Calculated EF > 70%).   Impressions are consistent with a low risk study.      Ordered the above noted radiological studies. Reviewed by me in PACS.            PROCEDURES  Procedures          MEDICATIONS GIVEN IN ER  Medications   sodium chloride 0.9 % flush 10 mL (has no administration in time range)   nitroglycerin (NITROSTAT) SL tablet 0.4 mg (has no administration in time range)   sodium chloride 0.9 % flush 10 mL (has no administration in time range)   sodium chloride 0.9 % flush 10 mL (has no administration in time range)   sodium chloride 0.9 % infusion 40 mL (has no administration in time range)   aspirin chewable tablet 324 mg (324 mg Oral Not Given 9/10/23 1159)     And   aspirin EC tablet 81 mg (has no administration in time range)   sennosides-docusate (PERICOLACE) 8.6-50 MG per tablet 2 tablet (has no administration in time range)     And   polyethylene glycol (MIRALAX) packet 17 g (has no administration in time range)     And   bisacodyl (DULCOLAX) EC tablet 5 mg (has no administration in time range)     And   bisacodyl (DULCOLAX) suppository 10 mg (has no administration in time range)   aspirin tablet 325 mg (325 mg Oral Given 9/10/23 1101)   ondansetron (ZOFRAN) injection 4 mg (4 mg Intravenous Given 9/10/23 1100)   technetium sestamibi (CARDIOLITE) injection 1 dose (1 dose Intravenous Given 9/10/23 1215)   technetium sestamibi (CARDIOLITE) injection 1 dose (1 dose Intravenous Given 9/10/23 1349)   regadenoson (LEXISCAN) injection 0.4 mg (0.4 mg Intravenous Given 9/10/23 1349)             MEDICAL DECISION MAKING, PROGRESS, and CONSULTS    All labs have been independently reviewed by me.  All radiology studies have been reviewed by me and I have also reviewed the radiology report.   EKG's independently viewed and interpreted by me.  Discussion below represents my analysis of pertinent findings related to patient's condition, differential  diagnosis, treatment plan and final disposition.      Additional sources:  - Discussed/ obtained information from independent historians: The patient's  is here who states the patient has been having pain most of the morning    - External (non-ED) record review: I reviewed the patient's admission to [facility] in May of this year where she was noted to have new onset atrial flutter.    - Chronic or social conditions impacting care: Patient is at home with her     - Shared decision making: After shared decision-making discussion we agree she needs to be admitted to the hospital for further evaluation and care      Orders placed during this visit:  Orders Placed This Encounter   Procedures    XR Chest 1 View    Comprehensive Metabolic Panel    Protime-INR    High Sensitivity Troponin T    Magnesium    TSH    T4, Free    CBC Auto Differential    High Sensitivity Troponin T 2Hr    Basic Metabolic Panel    CBC (No Diff)    High Sensitivity Troponin T    Lipid Panel    Magnesium    NPO Diet NPO Type: Sips with Meds    Monitor Blood Pressure    Vital Signs Every 15 Minutes Until Stable, Then Every 4 Hours    Telemetry - Place Orders & Notify Provider of Results When Patient Experiences Acute Chest Pain, Dysrhythmia or Respiratory Distress    May Be Off Telemetry for Tests    Pulse Oximetry, Continuous    Notify Physician For Unrelieved Chest Pain    Intake & Output    Weigh Patient    Nurse to Order Troponin As Needed For Unrelieved or New Chest Pain    Saline Lock & Maintain IV Access    Code Status and Medical Interventions:    Inpatient Cardiology Consult    Stress Test With Myocardial Perfusion One Day    ECG 12 Lead Chest Pain    ECG 12 Lead Chest Pain    ECG 12 Lead Chest Pain    ECG 12 Lead Chest Pain    Insert Peripheral IV    Insert Peripheral IV    Initiate ED Observation Status    Discharge patient    CBC & Differential         Differential diagnosis:  My differential diagnosis includes but  is not limited to myocardial infarction, acute coronary syndrome, pericarditis, chest wall pain, pneumonia, pulmonary embolism, pneumothorax, or esophageal spasm.      Independent interpretation of labs, radiology studies, and discussions with consultants:  ED Course as of 09/10/23 1550   Sun Sep 10, 2023   1034 EKG    EKG time: 957  Rhythm/Rate: Normal sinus rhythm at 64  No Acute Ischemia  Non-Specific ST-T changes    Similar compared to prior on 9/1/2023    Interpreted Contemporaneously by me.  Independently viewed by me     [GP]   1041 My independent interpretation of the patient's chest x-ray is no pneumonia no CHF. [GP]   1049 The patient's EKG shows a normal sinus rhythm and no acute ischemia.  However with ongoing chest discomfort I will treat her with aspirin and several nitroglycerin while obtaining labs, chest x-ray and serial enzymes.  I will consult her cardiologist. [GP]   1135 The patient's initial troponin is 19.  Dr. Gardner from cardiology is currently seeing the patient in the ER. [GP]   1150 Dr. Gardner is asked to admit the patient to the observation unit and he will have a stress test performed today. [GP]   1151 I discussed the case with Diamond Medley.  She was aware of my consultation with cardiology.  She will admit the patient to the observation unit for further evaluation and care. [GP]      ED Course User Index  [GP] Norris Bean MD               DIAGNOSIS  Final diagnoses:   Chest pain, unspecified type   History of coronary artery disease   History of atrial flutter         DISPOSITION  ADMISSION    Discussed treatment plan and reason for admission with pt/family and admitting physician.  Pt/family voiced understanding of the plan for admission for further testing/treatment as needed.            Latest Documented Vital Signs:  As of 15:50 EDT  BP- 164/55 HR- 60 Temp- 97.6 °F (36.4 °C) (Tympanic) O2 sat- 95%--      --------------------  Please note that portions of this were completed with a  voice recognition program.       Note Disclaimer: At [facility], we believe that sharing information builds trust and better relationships. You are receiving this note because you are receiving care at [facility] or recently visited. It is possible you will see health information before a provider has talked with you about it. This kind of information can be easy to misunderstand. To help you fully understand what it means for your health, we urge you to discuss this note with your provider.             Norris Bean MD  09/10/23 9313

## 2023-09-10 NOTE — PROGRESS NOTES
MD ATTESTATION NOTE    The PRABHU and I have discussed this patient's history, physical exam, and treatment plan.  I have reviewed the documentation and personally had a face to face interaction with the patient. I affirm the documentation and agree with the treatment and plan.  The attached note describes my personal findings.      I provided a substantive portion of the care of the patient.  I personally performed the physical exam in its entirety, and below are my findings.      Brief HPI: Patient presents for evaluation of chest discomfort.  Patient states she has had a history of atrial flutter as well as coronary disease in the past.  Patient states she had discomfort in her chest.  States it felt like tingling in her left arm.  States the symptoms have been similar to her prior atrial flutter.  Patient was seen in the emergency department and has been seen by cardiology.  Admitted to observation unit for further evaluation management    PHYSICAL EXAM  ED Triage Vitals   Temp Heart Rate Resp BP SpO2   09/10/23 0953 09/10/23 0953 09/10/23 0953 09/10/23 1001 09/10/23 0953   97.6 °F (36.4 °C) 60 20 (!) 185/77 97 %      Temp src Heart Rate Source Patient Position BP Location FiO2 (%)   09/10/23 0953 09/10/23 0953 -- -- --   Tympanic Monitor            GENERAL: no acute distress  HENT: nares patent  EYES: no scleral icterus  CV: regular rhythm, normal rate  RESPIRATORY: normal effort  ABDOMEN: soft  MUSCULOSKELETAL: no deformity  NEURO: alert, moves all extremities, follows commands  PSYCH:  calm, cooperative  SKIN: warm, dry    Vital signs and nursing notes reviewed.        Plan: Stress test.  Admit observation unit

## 2023-09-10 NOTE — ED NOTES
Pt arrives ambulatory to triage for chest palpitations and dizziness that started yesterday. Pt has new history of atrial flutter. Pt reports she feels pain in her left arm as well.

## 2023-09-14 ENCOUNTER — TRANSCRIBE ORDERS (OUTPATIENT)
Dept: ADMINISTRATIVE | Facility: HOSPITAL | Age: 73
End: 2023-09-14
Payer: MEDICARE

## 2023-09-14 ENCOUNTER — HOSPITAL ENCOUNTER (OUTPATIENT)
Dept: CARDIOLOGY | Facility: HOSPITAL | Age: 73
Discharge: HOME | End: 2023-09-14
Admitting: FAMILY MEDICINE
Payer: MEDICARE

## 2023-09-14 DIAGNOSIS — I48.91 ATRIAL FIBRILLATION, UNSPECIFIED TYPE: Primary | ICD-10-CM

## 2023-09-14 DIAGNOSIS — I48.91 ATRIAL FIBRILLATION, UNSPECIFIED TYPE: ICD-10-CM

## 2023-09-14 PROCEDURE — 93246 EXT ECG>7D<15D RECORDING: CPT

## 2023-09-27 ENCOUNTER — TRANSCRIBE ORDERS (OUTPATIENT)
Dept: BONE DENSITY | Facility: HOSPITAL | Age: 73
End: 2023-09-27
Payer: MEDICARE

## 2023-09-27 DIAGNOSIS — Z78.0 MENOPAUSE: Primary | ICD-10-CM

## 2023-10-04 RX ORDER — CLOPIDOGREL BISULFATE 75 MG/1
TABLET ORAL
Qty: 90 TABLET | Refills: 3 | Status: SHIPPED | OUTPATIENT
Start: 2023-10-04

## 2023-10-05 ENCOUNTER — TELEPHONE (OUTPATIENT)
Dept: CARDIOLOGY | Facility: CLINIC | Age: 73
End: 2023-10-05
Payer: MEDICARE

## 2023-10-05 DIAGNOSIS — I48.91 ATRIAL FIBRILLATION, UNSPECIFIED TYPE: Primary | ICD-10-CM

## 2023-10-05 RX ORDER — AMIODARONE HYDROCHLORIDE 200 MG/1
200 TABLET ORAL 2 TIMES DAILY
Qty: 60 TABLET | Refills: 1 | Status: SHIPPED | OUTPATIENT
Start: 2023-10-05

## 2023-10-05 NOTE — TELEPHONE ENCOUNTER
It seems she has been having quite a few issues. I saw her recent normal stress test and when she wore holter monitor for 10 days everything had looked ok. She likely did go back in to atrial fibrillation. The best course would have her continue to keep taking the amiodarone and atenolol to help keep heart in rhythm. If she has another episode like this she could take an additional dose of her amiodarone until seen in office next week.     If she has repeat episode of chest pain we could try her on an anti-anginal medication as well.     Thank you!    Yeyo

## 2023-10-05 NOTE — TELEPHONE ENCOUNTER
Ok that would help us out. I am going to send in amiodarone 200 mg tablets. She will need to take them twice per day in the morning and night until seen in office by me next week.     Thank you!    Yeyo     I sent it to [facility]

## 2023-10-05 NOTE — TELEPHONE ENCOUNTER
Reviewed recommendations with patient, verbalized understanding, will call with any further questions or complaints.  Pt states that she will start amiodarone today.    Stephy Palacios RN  Triage Nurse  10/05/23 09:47 EDT

## 2023-10-05 NOTE — TELEPHONE ENCOUNTER
Reviewed recommendations with patient, verbalized understanding, will call with any further questions or complaints.    Kortney-  pt states that she has never taken amiodarone (she went through all of her pill bottles just to be sure).  She still uses the same [facility] pharmacy, but they have never had this prescription for her.  She is happy to start it, if you could resend it.  Thanks so much!    Stephy Palacios, RN  Triage Nurse  10/05/23 09:37 EDT

## 2023-10-05 NOTE — TELEPHONE ENCOUNTER
"Pt called, states that she has had episodes of feeling \"shakey and legs feel rubbery\", c/o left upper chest and left arm \"burning/flushing sensations\" and some lightheadedness, but denies CP/SOB or other associated symptoms.  She states that the mine on her phone tells her that she's in A Fib with rate 56-70, did not check BP at the time.  The episode this morning went away after about 4 hours.  She continues to take both Plavix and Eliquis.  I made her an appointment next Wednesday with Kortney at [facility], and recommended that if symptoms get worse, she should go to ED.  Any further recommendations?  "

## 2023-10-11 ENCOUNTER — OFFICE VISIT (OUTPATIENT)
Dept: CARDIOLOGY | Facility: CLINIC | Age: 73
End: 2023-10-11
Payer: MEDICARE

## 2023-10-11 VITALS
HEIGHT: 62 IN | OXYGEN SATURATION: 99 % | SYSTOLIC BLOOD PRESSURE: 130 MMHG | HEART RATE: 60 BPM | BODY MASS INDEX: 34.23 KG/M2 | WEIGHT: 186 LBS | DIASTOLIC BLOOD PRESSURE: 76 MMHG

## 2023-10-11 DIAGNOSIS — I48.92 ATRIAL FLUTTER, UNSPECIFIED TYPE: ICD-10-CM

## 2023-10-11 DIAGNOSIS — I25.810 CORONARY ARTERY DISEASE INVOLVING CORONARY BYPASS GRAFT OF NATIVE HEART WITHOUT ANGINA PECTORIS: Primary | ICD-10-CM

## 2023-10-11 DIAGNOSIS — E78.2 MIXED HYPERLIPIDEMIA: Chronic | ICD-10-CM

## 2023-10-11 DIAGNOSIS — I73.9 PAD (PERIPHERAL ARTERY DISEASE): Chronic | ICD-10-CM

## 2023-10-11 DIAGNOSIS — I10 PRIMARY HYPERTENSION: Chronic | ICD-10-CM

## 2023-10-11 PROBLEM — R07.89 CHEST DISCOMFORT: Status: RESOLVED | Noted: 2023-05-21 | Resolved: 2023-10-11

## 2023-10-11 PROBLEM — Z95.1 S/P CABG (CORONARY ARTERY BYPASS GRAFT): Chronic | Status: RESOLVED | Noted: 2017-01-19 | Resolved: 2023-10-11

## 2023-10-11 RX ORDER — ESCITALOPRAM OXALATE 5 MG/1
5 TABLET ORAL 4 TIMES DAILY
COMMUNITY
Start: 2023-10-10

## 2023-10-11 NOTE — PROGRESS NOTES
CARDIOLOGY    Date of Office Visit: 10/11/2023  Patient Name: Valentine Arora  : 1950  Encounter Provider: Yeyo Gamboa PA-C  Primary Cardiologist: Kumar Frye MD    CHIEF COMPLAINT / REASON FOR OFFICE VISIT     Hypertension, tachycardia.      HISTORY OF PRESENT ILLNESS     This is a 73 y.o. year old female who presents to [facility] CARDIOLOGY for a  follow-up in regards to new symptoms over the past few weeks.    She has history of coronary artery disease and had a previous MI 20 years ago.  She had a subsequent two-vessel CABG in .  Her most recent stress test earlier this year did not show any new signs of ischemia.    Earlier this year, she was diagnosed with atrial flutter in May 2023.  She had been started on anticoagulation at that time.  Her follow-up Holter monitor after initial diagnosis had showed overall sinus rhythm with evidence of sinus bradycardia and second-degree type I AV block.    She went to the hospital on 9/10/2023 with complaint of chest discomfort with radiation to her left arm or nausea.  That time her troponin was 19 and her LDL cholesterol was 70.  Chest x-ray was normal and she completed a stress test that showed an EF of 70% with no evidence of myocardial ischemia.    She had called our office on 10/5/2023 with symptoms of feeling shaky and her legs feeling rubbery.  She had associated lightheadedness and left upper chest and left arm burning/flushing sensations.  When she taken files at home she was seen to be in atrial fibrillation with heart rate in the 50s to 70s.  She had this episode lasting for about 4 hours.  She was instructed to start amiodarone 200 mg twice daily and follow-up in our office for further evaluation.      Today the patient reports that she continues to have intermittent episodes of chest discomfort that occur only at night when she is lying in bed.  She notes that she can think of the chest pain and then suddenly will  "experience.  Her PCP did recently start her on Lexapro.  Symptoms are associated with left arm numbness and tingling as well as tightness across her chest.  She denies any episodes of dizziness, headedness or near syncope.  She is not having any episodes of chest discomfort while she is up doing activities and maintains an active lifestyle taking care of her  full-time as well as doing all the household chores.  She is going up and down stairs without any significant shortness of breath or chest discomfort.    She does have a cardia mobile device which she is now monitoring heart rates at home.  We had a long discussion of how to read these readings to help give her some peace of mind when her symptoms are happening.  I reviewed over 50 cardia mobile readings and only 2 episodes appeared to be atrial fibrillation in my opinion.  Episodes are rate controlled with heart rate in the 60s.    PMHx: Paroxysmal atrial flutter, CAD/a CABG times 2 x 25 with prior MI 20 years ago, bilateral cerebrovascular disease, peripheral artery disease with bilateral common iliac artery stent placement 9/23/2019      REVIEW OF SYSTEMS   Review of Systems   Constitutional: Positive for malaise/fatigue. Negative for weight gain and weight loss.   Cardiovascular:  Positive for irregular heartbeat and palpitations. Negative for chest pain, claudication, cyanosis, near-syncope and syncope.   Neurological:  Negative for dizziness, light-headedness and weakness.       PHYSICAL EXAMINATION     Vital Signs:  /76   Pulse 60   Ht 157.5 cm (62\")   Wt 84.4 kg (186 lb)   SpO2 99%   BMI 34.02 kg/mý   Estimated body mass index is 34.02 kg/mý as calculated from the following:    Height as of this encounter: 157.5 cm (62\").    Weight as of this encounter: 84.4 kg (186 lb).             Physical Exam  Constitutional:       Appearance: Normal appearance.   HENT:      Head: Normocephalic and atraumatic.   Cardiovascular:      Rate and " Rhythm: Normal rate and regular rhythm.      Pulses: Normal pulses.      Heart sounds: Normal heart sounds.   Pulmonary:      Effort: Pulmonary effort is normal.      Breath sounds: Normal breath sounds.   Musculoskeletal:      Right lower leg: No edema.      Left lower leg: No edema.   Skin:     General: Skin is warm and dry.   Neurological:      General: No focal deficit present.      Mental Status: She is alert and oriented to person, place, and time.          Cardiac Testing/Results     Cardiac Testing:   - Echo 5/17/2023: EF 58.5%, mild LVH.  Sclerotic aortic valve.  Normal biatrial size.  Mild to moderate TR.    -Lexiscan stress test 9/10/2023: Normal myocardial perfusion.  EF 70%    -Holter monitor 9/14/2023: 970 with average heart rate of 59 bpm with a range of 27 to 92 bpm.  First-degree type heart block noted with a second-degree Mobitz type I AV block bloated intermittently.  This happened at 9:29 AM with no symptoms.  Her episodes of palpitations and dizziness had no correlation to arrhythmia.    Result Review :  The following data was reviewed by: Yeyo Gamboa PA-C on 10/11/2023:    Lipid Panel          9/10/2023    10:17   Lipid Panel   Total Cholesterol 160    Triglycerides 179    HDL Cholesterol 60    VLDL Cholesterol 30    LDL Cholesterol  70    LDL/HDL Ratio 1.07       Lab Results   Component Value Date     (L) 09/10/2023     (L) 09/01/2023    K 4.0 09/10/2023    K 3.8 09/01/2023     09/10/2023    CL 96 (L) 09/01/2023    CO2 22.3 09/10/2023    CO2 20.5 (L) 09/01/2023    BUN 18 09/10/2023    BUN 20 09/01/2023    CREATININE 1.09 (H) 09/10/2023    CREATININE 1.19 (H) 09/01/2023    EGFRIFNONA 90 06/16/2021    EGFRIFNONA 64 06/14/2021    GLUCOSE 128 (H) 09/10/2023    GLUCOSE 119 (H) 09/01/2023    CALCIUM 9.6 09/10/2023    CALCIUM 9.7 09/01/2023    ALBUMIN 4.5 09/10/2023    ALBUMIN 4.3 09/01/2023    AST 12 09/10/2023    AST 18 09/01/2023    ALT 13 09/10/2023    ALT 12  09/01/2023     Lab Results   Component Value Date    WBC 8.47 09/10/2023    WBC 6.21 09/01/2023    HGB 10.1 (L) 09/10/2023    HGB 10.7 (L) 09/01/2023    HCT 31.5 (L) 09/10/2023    HCT 33.1 (L) 09/01/2023    MCV 88.5 09/10/2023    MCV 90.2 09/01/2023     09/10/2023     09/01/2023     Lab Results   Component Value Date    PROBNP 913.0 (H) 05/21/2023    PROBNP 1,130.0 (H) 05/17/2023     Lab Results   Component Value Date    CKTOTAL 32 12/29/2018    TROPONINI 0.039 (H) 12/31/2018    TROPONINT 19 (H) 09/10/2023     Lab Results   Component Value Date    TSH 1.620 09/10/2023    TSH 1.320 05/17/2023                          ASSESSMENT & PLAN       Diagnoses and all orders for this visit:    1. Coronary artery disease involving coronary bypass graft of native heart without angina pectoris (Primary)  S/p CABG x2 in 1995.  Most recent stress test 9/2023 showed normal EF with no evidence of myocardial ischemia  Chest discomfort symptoms are atypical and only occurring at rest.  I do suspect they are more likely related to her arrhythmia versus symptoms of anxiety  Continue Plavix 75 mg daily, atenolol 25 mg daily and Crestor 10 mg daily.  Would recommend considering decreasing Plavix to aspirin 81 mg daily at her next office visit she is now on Eliquis 5 mg as well.  2. Atrial flutter, unspecified type  Newly diagnosed in May 2023.  Most recent echocardiogram shows normal biatrial size.  [id number] Score: 5  Continue Eliquis 5 mg twice daily, denies bleeding complications  She is monitoring rhythms at home a [employer] mobile device.  Continue atenolol 25 mg daily.  Able to take additional dose of atenolol if worsening palpitations.  Per Dr. Woodson's last note continue amiodarone 200 mg twice daily for 1 month and then decrease to 200 mg daily.  We did discuss the risk and benefits of short-term amiodarone therapy and she was agreeable to proceed  Would recommend continuing to decrease this dose in the future.  If she  has recurrence of atrial flutter despite use of antiarrhythmic drug therapy I do think it be beneficial for her to follow-up with the electrophysiology clinic  3. Mixed hyperlipidemia  Continue Crestor  4. Primary hypertension  Intermittent elevated blood pressures at home when having symptoms of anxiety.  Recently started on Lexapro.  QTc removed on cardia mobile  5. PAD (peripheral artery disease)  Follows with vascular yearly.  Continue Plavix and statin.      Follow Up:  Return in about 3 months (around 1/11/2024) for Frye as planned.  Patient was given instructions and counseling regarding her condition or for health maintenance advice. Please contact office if worsening symptoms or proceed to ER when appropriate.      Yeyo Gamboa PA-C  10/11/23  09:41 EDT    MEDICATIONS         Discharge Medications            Accurate as of October 11, 2023  9:41 AM. If you have any questions, ask your nurse or doctor.                Continue These Medications        Instructions Start Date   allopurinol 100 MG tablet  Commonly known as: ZYLOPRIM   Take 1 tablet by mouth 2 (Two) Times a Day.      amiodarone 200 MG tablet  Commonly known as: PACERONE   200 mg, Oral, 2 Times Daily      atenolol 25 MG tablet  Commonly known as: TENORMIN   25 mg, Oral, Daily      cloNIDine 0.1 MG tablet  Commonly known as: CATAPRES   0.2 mg, Oral, 3 Times Daily      clopidogrel 75 MG tablet  Commonly known as: PLAVIX   TAKE ONE TABLET BY MOUTH DAILY      Eliquis 5 MG tablet tablet  Generic drug: apixaban   5 mg, Oral, Once      escitalopram 5 MG tablet  Commonly known as: LEXAPRO   5 mg, Oral, 4 Times Daily      hydrALAZINE 50 MG tablet  Commonly known as: APRESOLINE   75 mg, Oral, 3 Times Daily      hydroCHLOROthiazide 12.5 MG tablet  Commonly known as: HYDRODIURIL   12.5 mg, Oral, Daily      hyoscyamine 0.125 MG SL tablet  Commonly known as: LEVSIN   0.125 mg, Oral, 4 Times Daily With Meals & Nightly      metFORMIN 500 MG  tablet  Commonly known as: GLUCOPHAGE   2 Times Daily With Meals      olmesartan 40 MG tablet  Commonly known as: BENICAR   40 mg, Oral, Daily      pantoprazole 40 MG EC tablet  Commonly known as: PROTONIX   40 mg, Oral, 2 Times Daily      rosuvastatin 10 MG tablet  Commonly known as: CRESTOR   10 mg, Oral, Daily      traMADol 50 MG tablet  Commonly known as: ULTRAM   50 mg, Oral, Every 6 Hours PRN                   **Dragon Disclaimer: This note was dictated using an electronic transcription. The electronic translation of spoken language may permit erroneous, or at times, nonsensical words or phrases to be inadvertently transcribed. Although I have reviewed the note for such errors, some may still exist.

## 2023-10-16 ENCOUNTER — TELEPHONE (OUTPATIENT)
Dept: CARDIOLOGY | Facility: CLINIC | Age: 73
End: 2023-10-16
Payer: MEDICARE

## 2023-10-16 NOTE — TELEPHONE ENCOUNTER
Pt called this morning.  She was started on Amiodarone on 10/5/23 by you.  She states that about 5 days ago, she started to feel like she couldn't get a deep breath.  Also complains of some PRATER, denies CP/dizziness/lightheadedness or any other associated symptoms.  She states that she knows Amiodarone has a lot of bad side effects, and asks if this could be related to it.  Denies any other recent changes to meds.     She didn't have recent BP's, but states that her HR is running 60-65 in a normal rhythm.    Any recommendations?    Thanks so much,  Stephy Palacios, RN  Triage RN  10/16/23 08:24 EDT

## 2023-10-16 NOTE — TELEPHONE ENCOUNTER
Please have the patient decrease her amiodarone to once daily at night.  See if this helps with her symptoms.  Please have her continue to take readings with her cardia mobile device and if she sees any episodes of atrial fibrillation she consented to our office for review.  The symptoms sound similar to what she was having when I saw her in the office and she had a recent normal stress test and echocardiogram.

## 2023-10-16 NOTE — TELEPHONE ENCOUNTER
Reviewed recommendations with patient, verbalized understanding, will call with any further questions or complaints.    Stephy Palacios RN  Triage Nurse  10/16/23 10:15 EDT

## 2023-10-20 ENCOUNTER — OFFICE VISIT (OUTPATIENT)
Dept: GASTROENTEROLOGY | Facility: CLINIC | Age: 73
End: 2023-10-20
Payer: MEDICARE

## 2023-10-20 VITALS
SYSTOLIC BLOOD PRESSURE: 142 MMHG | BODY MASS INDEX: 34.85 KG/M2 | WEIGHT: 189.4 LBS | DIASTOLIC BLOOD PRESSURE: 70 MMHG | HEIGHT: 62 IN

## 2023-10-20 DIAGNOSIS — K21.9 GASTROESOPHAGEAL REFLUX DISEASE WITHOUT ESOPHAGITIS: ICD-10-CM

## 2023-10-20 DIAGNOSIS — K22.719 BARRETT'S ESOPHAGUS WITH DYSPLASIA: Primary | ICD-10-CM

## 2023-10-20 PROBLEM — K58.1 IRRITABLE BOWEL SYNDROME WITH CONSTIPATION: Status: ACTIVE | Noted: 2023-10-20

## 2023-10-20 RX ORDER — ALPRAZOLAM 0.25 MG/1
TABLET ORAL
COMMUNITY
Start: 2023-10-19

## 2023-10-20 NOTE — PROGRESS NOTES
PATIENT INFORMATION  Valentine Arora       - 1950    CHIEF COMPLAINT  Chief Complaint   Patient presents with    Sarabia's esophagus    Gastritis    Esophageal stricture       HISTORY OF PRESENT ILLNESS    Here today for EGD and Colon follow-up    2023 Last EGD and Colon for dysphagia/barretts, positive for chemical gastritis, esophageal stricture-dilated, SSBE negative for HP, dysplasia. Colon with 0 adenomas, 3 yrs out from last, so 5 yr recall. Reviewed path and images with patient.    Managed on BID pantoprazole. Was having difficulty swallowing but since dilatation is doing much better. No breakthrough symptoms.    Bowels can alternate, but fiber and probiotics help and also trying to get more dietary fiber as well. Puts tspn miralax in coffee every morning. BM most days, will increase miralax if skips a day or 2. Not having any cramping, but taking levsin regularly. Mouth is dry.    3/13/23 EGD for meat impaction at [facility]      REVIEWED PERTINENT RESULTS/ LABS  Lab Results   Component Value Date    CASEREPORT  2023     Surgical Pathology Report                         Case: [id number]                                  Authorizing Provider:  Mathew Roberts        Collected:           2023 09:58 AM                                 MD Clive                                                                   Ordering Location:     [facility]   Received:            2023 11:34 AM                                 OR                                                                           Pathologist:           Sathish Marlow MD                                                         Specimens:   1) - Gastric, Antrum, Biopsies                                                                      2) - Esophagus, Distal, Biopsies                                                           FINALDX  2023     1. Stomach, Antrum, Biopsy: Benign gastric mucosa with    A.  Focal erosion and ulceration.   B. Reactive changes of the epithelium.   C. Features suggesting chemical gastritis.    D. Mild chronic inflammation.   E. No intestinal metaplasia.   F. No Helicobacter pylori     Comment: An immunostain for Helicobacter pylori was performed. Controls stain appropriately.  No definitive Helicobacter pylori is identified.      2. Esophagus, Distal, Biopsy: Benign squamous and gastric mucosa with    A. Inflammation within the squamous mucosa.   B. No definitive fungal organisms seen.   C. Acute and chronic inflammation in the gastric mucosa.   D. No viral changes or fungal organisms identified.   E. Intestinal metaplasia consistent with damon's esophagus with no dysplasia and reactive changes    Comment:  A GMS stain is performed.  Controls stain appropriately.  No definitive fungal organisms are identified.      kristi/jse        Lab Results   Component Value Date    HGB 10.1 (L) 09/10/2023    MCV 88.5 09/10/2023     09/10/2023    ALT 13 09/10/2023    AST 12 09/10/2023    HGBA1C 6.00 (H) 2023    INR 1.08 09/10/2023    TRIG 179 (H) 09/10/2023    IRON 36 (L) 2021    TIBC 321 2021      NPC III US LTd Ao Ivc Iliacs or Bypss Grft    Result Date: 2023  Narrative: REVIEWING YOUR TEST RESULTS IN [facility] IS NOT A SUBSTITUTE FOR DISCUSSING THOSE RESULTS WITH YOUR HEALTH CARE PROVIDER. PLEASE CONTACT YOUR PROVIDER VIA [facility] TO DISCUSS ANY QUESTIONS OR CONCERNS YOU MAY HAVE REGARDING THESE TEST RESULTS.  RADIOLOGY REPORT FACILITY: P AGE/GENDER:  AGE: 73 Y            GENDER: F PATIENT NAME/: SONU AVINA BOYER    : 1950 UNIT NUMBER: [id number] ACCESSION NUMBER: [id number] ACCOUNT: PROCEDURE PERFORMED: DUPLEX US LTD AORTA, IVC,ILIACS, OR BYPASS GRAFTS Conclusions: Aorta, right common iliac artery stent, and bilateral external iliac arteries are patent with no evidence of hemodynamically significant stenosis. Left distal common iliac artery  stent with evidence of >50% stenosis.     Left common femoral artery with elevated velocities of 214 cm/s. Ankle-brachial indices were performed and reported separately. Compared to previous exam on 10/12/2021, there is now evidence of >50% stenosis in the left distal common iliac artery stent. THIS DOCUMENT HAS BEEN ELECTRONICALLY SIGNED BY: Tate Najera MD    Ankle Brachial Index    Result Date: 2023  Narrative: REVIEWING YOUR [facility] RESULTS [facility] IS NOT A SUBSTITUTE FOR DISCUSSING THOSE RESULTS WITH YOUR HEALTH CARE PROVIDER. PLEASE CONTACT YOUR PROVIDER VIA [employer] TO DISCUSS ANY QUESTIONS OR CONCERNS YOU MAY HAVE REGARDING THESE TEST RESULTS.  RADIOLOGY REPORT FACILITY: P AGE/GENDER:  AGE: 73 Y            GENDER: F PATIENT NAME/: SONU AVINA BOYER    : 1950 UNIT NUMBER: [id number] ACCESSION NUMBER: [id number] ACCOUNT: PROCEDURE PERFORMED: NVL ANKLE BRACHIAL INDEX Conclusions: Right lower extremity demonstrates normal arterial circulation at rest with ankle-brachial index of 0.94 and digit pressure of 89 mmHg. Digit pressure is adequate for healing.   Left lower extremity demonstrates normal arterial circulation at rest with ankle-brachial index of 0.93 and digit pressure of 53 mmHg. Digit pressure is adequate for healing. Compared to previous exam done on 10/12/21, there has been no significant change. THIS DOCUMENT HAS BEEN ELECTRONICALLY SIGNED BY: Tate Najera MD    Duplex US Carotid, Bilateral    Result Date: 2023  Narrative: REVIEWING YOUR TEST RESULTS IN [facility] IS NOT A SUBSTITUTE FOR DISCUSSING THOSE RESULTS WITH YOUR HEALTH CARE PROVIDER. PLEASE CONTACT YOUR PROVIDER VIA [employer] TO DISCUSS ANY QUESTIONS OR CONCERNS YOU MAY HAVE REGARDING THESE TEST RESULTS.  RADIOLOGY REPORT FACILITY: P AGE/GENDER:  AGE: 73 Y            GENDER: F PATIENT NAME/: SONU AVINA BOYER    : 1950 UNIT NUMBER: [id number] ACCESSION NUMBER:  [id number] ACCOUNT: PROCEDURE PERFORMED: DUPLEX US CAROTID, BILATERAL Conclusions: Right internal carotid artery with plaque, but <50% stenosis. Left internal carotid artery with stenosis in the range of 50-69%.   Bilateral vertebral arteries with antegrade flow. Compared to previous examination done on 10/12/2021, there has been no significant change. THIS DOCUMENT HAS BEEN ELECTRONICALLY SIGNED BY: Tate Najera MD     REVIEW OF SYSTEMS  Review of Systems   Constitutional:  Negative for activity change, chills, fever and unexpected weight change.   HENT:  Negative for congestion.    Eyes:  Negative for visual disturbance.   Respiratory:  Negative for shortness of breath.    Cardiovascular:  Negative for chest pain and palpitations.   Gastrointestinal:  Positive for constipation, diarrhea and nausea. Negative for abdominal pain and blood in stool.   Endocrine: Negative for cold intolerance and heat intolerance.   Genitourinary:  Negative for hematuria.   Musculoskeletal:  Negative for gait problem.   Skin:  Negative for color change.   Allergic/Immunologic: Negative for immunocompromised state.   Neurological:  Negative for weakness and light-headedness.   Hematological:  Negative for adenopathy.   Psychiatric/Behavioral:  Negative for sleep disturbance. The patient is not nervous/anxious.          ACTIVE PROBLEMS  Patient Active Problem List    Diagnosis     Irritable bowel syndrome with constipation [K58.1]     Palpitations [R00.2]     Atrial flutter, unspecified type [I48.92]     Personal history of colonic polyps [Z86.010]     Esophageal obstruction [K22.2]     Gastroesophageal reflux disease without esophagitis [K21.9]     Dehydration with hyponatremia [E86.0, E87.1]     Sarabia's esophagus with dysplasia [K22.719]     Ulcer of esophagus without bleeding [K22.10]     Esophageal dysphagia [R13.19]     Encounter for screening for malignant neoplasm of colon [Z12.11]     Morbidly obese [E66.01]     PAD  (peripheral artery disease) [I73.9]     Coronary artery disease involving coronary bypass graft of native heart without angina pectoris [I25.810]     Myocardial infarction [I21.9]     Hypertension [I10]     Hyperlipidemia [E78.5]          PAST MEDICAL HISTORY  Past Medical History:   Diagnosis Date    Arthritis     Sarabia esophagus     CAD (coronary artery disease)     Chronic back pain     Colon polyp     Diabetes mellitus     GERD (gastroesophageal reflux disease)     Hyperlipidemia     Hypertension     Myocardial infarction     1995    PAD (peripheral artery disease) 9/11/2019    Shingles          SURGICAL HISTORY  Past Surgical History:   Procedure Laterality Date    COLONOSCOPY      COLONOSCOPY N/A 6/24/2020    Procedure: COLONOSCOPY;  Surgeon: Mathew Roberts MD;  Location: [facility] OR;  Service: Gastroenterology;  Laterality: N/A;  Descending colon polyp x 2, Ascending colon polyp, Sigmoid colon polyp, Rectal polyp    COLONOSCOPY N/A 8/7/2023    Procedure: COLONOSCOPY;  Surgeon: Mathew Roberts MD;  Location: [facility] OR;  Service: Gastroenterology;  Laterality: N/A;  Normal    CORONARY ARTERY BYPASS GRAFT  1995    x 2 vessels    ENDOSCOPY N/A 6/24/2020    Procedure: ESOPHAGOGASTRODUODENOSCOPY;  Surgeon: Mathew Roberts MD;  Location: [facility] OR;  Service: Gastroenterology;  Laterality: N/A;  Ulcerative esophagitis, Gastritis, Duodenitis  Duodenal biopsy, gastric biopsy, distal esophageal biopsy    ENDOSCOPY N/A 9/17/2020    Procedure: ESOPHAGOGASTRODUODENOSCOPY with biopsies;  Surgeon: Mathew Roberts MD;  Location: [facility] OR;  Service: Gastroenterology;  Laterality: N/A;  Esophagitis  Sarabia's Esophagus  Hiatal hernia  Gastritis  Gastric biopsy  Distal esophagus biopsy    ENDOSCOPY N/A 8/7/2023    Procedure: Esophagogastroduedenoscopy;  Surgeon: Mathew Roberts MD;  Location:  [facility] OR;  Service: Gastroenterology;  Laterality: N/A;  Gastritis; short  segment Sarabia's; Esophageal stricture- dilatation; Biopsies- gastric, distal esophagus    INNER EAR SURGERY      JOINT REPLACEMENT      right knee    LEG SURGERY      x 2 s/p fall    LUMBAR FUSION      L4/L5    SKIN GRAFT      to left lower leg x 2    TONSILLECTOMY      WRIST FUSION Left          FAMILY HISTORY  Family History   Problem Relation Age of Onset    Heart disease Mother     Heart disease Father     Colon cancer Neg Hx     Colon polyps Neg Hx     Breast cancer Neg Hx          SOCIAL HISTORY  Social History     Occupational History    Not on file   Tobacco Use    Smoking status: Former     Types: Cigarettes     Quit date:      Years since quittin.8    Smokeless tobacco: Never    Tobacco comments:     QUIT    Vaping Use    Vaping Use: Never used   Substance and Sexual Activity    Alcohol use: Not Currently     Comment: occasional    Drug use: No    Sexual activity: Defer         CURRENT MEDICATIONS    Current Outpatient Medications:     allopurinol (ZYLOPRIM) 100 MG tablet, Take 1 tablet by mouth 2 (Two) Times a Day., Disp: , Rfl:     ALPRAZolam (XANAX) 0.25 MG tablet, , Disp: , Rfl:     amiodarone (PACERONE) 200 MG tablet, Take 1 tablet by mouth 2 (Two) Times a Day. (Patient taking differently: Take 1 tablet by mouth Daily.), Disp: 60 tablet, Rfl: 1    atenolol (TENORMIN) 25 MG tablet, Take 1 tablet by mouth Daily., Disp: , Rfl:     cloNIDine (CATAPRES) 0.1 MG tablet, Take 2 tablets by mouth 3 (Three) Times a Day., Disp: , Rfl:     clopidogrel (PLAVIX) 75 MG tablet, TAKE ONE TABLET BY MOUTH DAILY, Disp: 90 tablet, Rfl: 3    Eliquis 5 MG tablet tablet, Take 1 tablet by mouth 1 (One) Time., Disp: , Rfl:     escitalopram (LEXAPRO) 5 MG tablet, Take 1 tablet by mouth 4 (Four) Times a Day., Disp: , Rfl:     hydrALAZINE (APRESOLINE) 50 MG tablet, Take 1.5 tablets by mouth 3 (Three) Times a Day., Disp: , Rfl:     hydroCHLOROthiazide (HYDRODIURIL) 12.5 MG tablet, Take 1 tablet by mouth Daily., Disp:  ", Rfl:     hyoscyamine (LEVSIN) 0.125 MG SL tablet, Take 1 tablet by mouth 4 (Four) Times a Day With Meals & at Bedtime., Disp: 120 tablet, Rfl: 5    metFORMIN (GLUCOPHAGE) 500 MG tablet, 2 (Two) Times a Day With Meals., Disp: , Rfl:     olmesartan (BENICAR) 40 MG tablet, Take 1 tablet by mouth Daily., Disp: , Rfl:     pantoprazole (PROTONIX) 40 MG EC tablet, Take 1 tablet by mouth 2 (Two) Times a Day., Disp: 180 tablet, Rfl: 3    rosuvastatin (CRESTOR) 10 MG tablet, Take 1 tablet by mouth Daily., Disp: , Rfl:     traMADol (ULTRAM) 50 MG tablet, Take 1 tablet by mouth Every 6 (Six) Hours As Needed for Moderate Pain., Disp: , Rfl:     ALLERGIES  Codeine and Other    VITALS  Vitals:    10/20/23 0831   BP: 142/70   BP Location: Left arm   Patient Position: Sitting   Cuff Size: Large Adult   Weight: 85.9 kg (189 lb 6.4 oz)   Height: 157.5 cm (62\")       PHYSICAL EXAM  Debilities/Disabilities Identified: None  Emotional Behavior: Appropriate  Wt Readings from Last 3 Encounters:   10/20/23 85.9 kg (189 lb 6.4 oz)   10/11/23 84.4 kg (186 lb)   09/01/23 86.2 kg (190 lb)     Ht Readings from Last 1 Encounters:   10/20/23 157.5 cm (62\")     Body mass index is 34.64 kg/m².  Physical Exam  Constitutional:       General: She is not in acute distress.     Appearance: Normal appearance. She is not ill-appearing.   HENT:      Head: Normocephalic and atraumatic.      Mouth/Throat:      Mouth: Mucous membranes are moist.      Pharynx: No posterior oropharyngeal erythema.   Eyes:      General: No scleral icterus.  Cardiovascular:      Rate and Rhythm: Normal rate and regular rhythm.      Heart sounds: Normal heart sounds.   Pulmonary:      Effort: Pulmonary effort is normal.      Breath sounds: Normal breath sounds.   Abdominal:      General: Abdomen is flat. Bowel sounds are normal. There is no distension.      Palpations: Abdomen is soft. There is no mass.      Tenderness: There is no abdominal tenderness. There is no guarding or " rebound. Negative signs include Cardenas's sign.      Hernia: No hernia is present.   Musculoskeletal:      Cervical back: Neck supple.   Skin:     General: Skin is warm.      Capillary Refill: Capillary refill takes less than 2 seconds.   Neurological:      General: No focal deficit present.      Mental Status: She is alert and oriented to person, place, and time.   Psychiatric:         Mood and Affect: Mood normal.         Behavior: Behavior normal.         Thought Content: Thought content normal.         Judgment: Judgment normal.       CLINICAL DATA REVIEWED   reviewed previous lab results and integrated with today's visit, reviewed notes from other physicians and/or last GI encounter, reviewed previous endoscopy results and available photos, reviewed surgical pathology results from previous biopsies    ASSESSMENT  Diagnoses and all orders for this visit:    Sarabia's esophagus with dysplasia    Gastroesophageal reflux disease without esophagitis    Other orders  -     ALPRAZolam (XANAX) 0.25 MG tablet          PLAN    Continue BID PPI  Next EGD and Colon 8/2026    Return in about 6 months (around 4/20/2024).    I have discussed the above plan with the patient.  They verbalize understanding and are in agreement with the plan.  They have been advised to contact the office for any questions, concerns, or changes related to their health.

## 2023-11-06 ENCOUNTER — TELEPHONE (OUTPATIENT)
Dept: CARDIOLOGY | Facility: CLINIC | Age: 73
End: 2023-11-06
Payer: MEDICARE

## 2023-11-06 NOTE — TELEPHONE ENCOUNTER
Pt LVM stating that she was DX with Atrial flutter and started a medication for this called Amiodarone 200 mg, since then has had SOB episodes and Dizzy spells.     Pt states since the SOB episodes she has cut the Amiodarone in half and the episodes are not as bad, but she also feels that her HR is too low and it has been running 47 - 55.     Pt has been taking this medication for about 6-7 weeks.     Please advise?

## 2023-11-07 ENCOUNTER — APPOINTMENT (OUTPATIENT)
Dept: BONE DENSITY | Facility: HOSPITAL | Age: 73
End: 2023-11-07
Payer: MEDICARE

## 2023-11-07 DIAGNOSIS — Z78.0 MENOPAUSE: ICD-10-CM

## 2023-11-07 PROCEDURE — 77080 DXA BONE DENSITY AXIAL: CPT

## 2023-11-07 NOTE — TELEPHONE ENCOUNTER
Pt states, that she cut in half her Amiodarone and has had no issues today while still taking the atenolol 25 mg.    Do you still want her to come off the atenolol completely ?

## 2023-11-10 NOTE — TELEPHONE ENCOUNTER
Pt advised, Pt verbalized understanding.     Pt is also going to call in a week with BP readings as well as HR.

## 2023-11-15 ENCOUNTER — TELEPHONE (OUTPATIENT)
Dept: CARDIOLOGY | Facility: CLINIC | Age: 73
End: 2023-11-15
Payer: MEDICARE

## 2023-11-15 NOTE — TELEPHONE ENCOUNTER
Pt has called BP/HR readings.     BP readings:  11/10/2023  /54 HR: 42    Pt stated Saturday her BP machine stopped working.  11/11/2023   HR 48    11/12/2023  HR: 62    @ 9pm -  BP: 124/56 HR: 67      11/13/2023  9am - BP: 139/63 HR:70  9pm- BP : 140/55  HR: 61    11/14/2023  9am -129/58 HR 60  9pm - 161/68 HR  61    11/15/2023  9am - BP : 147/64 HR 71      Pt states on Friday 11/10 her HR dropped to 42 so she just stopped taking the Amiodarone 200mg. Still taking Antenolol 25mg PO QD.       Pt states that today as of 11/15 she started to have AFIb again so took Amiodarone 200mg today.       Please advise?

## 2023-11-15 NOTE — TELEPHONE ENCOUNTER
Reviewed recommendations with Valentine Arora and the patient verbalized understanding of the recommendations, with repeat back of medication instructions.  Patient stated she will keep BP and HR log as requested and will provide update in 1 week.  Thank you,  Shayna CONLEY RN  Triage Nurse [facility] [place]   15:19 EST

## 2023-11-15 NOTE — TELEPHONE ENCOUNTER
Caller: Valentine Arora    Relationship: Self    Best call back number: [phone] (home) [phone] (work)    What is the best time to reach you: ANYTIME    Who are you requesting to speak with (clinical staff, provider,  specific staff member): CLINICAL         What was the call regarding: PT WANTS TO KNOW WHAT MEDICATION TO TAKE FOR THESE SYMPTOMS.PLEASE CALL PT ASAP.     Is it okay if the provider responds through MyChart: NO

## 2023-11-21 ENCOUNTER — TELEPHONE (OUTPATIENT)
Dept: CARDIOLOGY | Facility: CLINIC | Age: 73
End: 2023-11-21
Payer: MEDICARE

## 2023-11-21 NOTE — TELEPHONE ENCOUNTER
Caller: Valentine Arora    Relationship: Self    Best call back number: [phone]    What is the best time to reach you: ANYTIME    Who are you requesting to speak with (clinical staff, provider,  specific staff member): CLINICAL    What was the call regarding: PT IS RETURNING CALL TO SPEAK WITH TRIAGE, SHE SAID HER BLOOD PRESSURE /90 AND SHE DOESN'T WANT TO GO TO THE ER. I TOLD HER WE HAVENT GOTTEN AN UPDATE YET FROM DR. ROBLERO.    Is it okay if the provider responds through [employer]: NO

## 2023-11-21 NOTE — TELEPHONE ENCOUNTER
I have spoke with the Pt, pt states she went to see her PCP Dr. Alejandro for her Hypertension. He has changed one of her medications.    Hydralazine 50mg   From 1.5 tablets PO TID to 2 tablets PO TID    Pt wants to know if this is okay and still not take the Atenolol? Please advise?

## 2023-11-21 NOTE — TELEPHONE ENCOUNTER
Pt called the office to report her BP log.  Only symptom she's been experiencing Is some dizziness.  The atenolol was stopped several days ago.  She takes her medications at 08:00, 14:00, and 20:00.  She checks her BP at 09:00 and between 21:00-22:00.    She's currently taking:    - Amiodarone 200 mg QPM  - Hydralazine 75 mg TID  - HCTZ 12.5 mg QD  - Clonidine 0.2 mg TID  - Apixaban 5 mg BID  - Clopidogrel 75 mg QD    Date AM BP AM HR PM BP PM HR   16-Nov 126/48 68 164/60 72   17-Nov 149/60 69 185/65 72   18-Nov 117/56 67 175/67 66   19-Nov 163/62 69 . .   20-Nov 188/83 96 206/98 88   21-Nov 198/82 .       Do you have any recommendations for this patient?    Thank you,    Micheline AGUAYO RN  [facility] [place]  11/21/23  08:19 EST

## 2023-11-21 NOTE — TELEPHONE ENCOUNTER
I spoke with pt and gave her provider's recommendations.  She verbalized understanding.    Thank you,    Micheline AGUAYO , RN  Triage [facility]  11/21/23 16:17 EST

## 2023-11-24 ENCOUNTER — APPOINTMENT (OUTPATIENT)
Dept: GENERAL RADIOLOGY | Facility: HOSPITAL | Age: 73
End: 2023-11-24
Payer: MEDICARE

## 2023-11-24 ENCOUNTER — APPOINTMENT (OUTPATIENT)
Dept: CT IMAGING | Facility: HOSPITAL | Age: 73
End: 2023-11-24
Payer: MEDICARE

## 2023-11-24 ENCOUNTER — HOSPITAL ENCOUNTER (EMERGENCY)
Facility: HOSPITAL | Age: 73
Discharge: HOME | End: 2023-11-25
Attending: EMERGENCY MEDICINE
Payer: MEDICARE

## 2023-11-24 DIAGNOSIS — G44.209 ACUTE NON INTRACTABLE TENSION-TYPE HEADACHE: ICD-10-CM

## 2023-11-24 DIAGNOSIS — I16.0 HYPERTENSIVE URGENCY: Primary | ICD-10-CM

## 2023-11-24 DIAGNOSIS — R11.2 NAUSEA AND VOMITING, UNSPECIFIED VOMITING TYPE: ICD-10-CM

## 2023-11-24 DIAGNOSIS — E86.0 DEHYDRATION, MILD: ICD-10-CM

## 2023-11-24 LAB
ALBUMIN SERPL-MCNC: 4.3 G/DL (ref 3.5–5.2)
ALBUMIN/GLOB SERPL: 2 G/DL
ALP SERPL-CCNC: 68 U/L (ref 39–117)
ALT SERPL W P-5'-P-CCNC: 14 U/L (ref 1–33)
ANION GAP SERPL CALCULATED.3IONS-SCNC: 13.3 MMOL/L (ref 5–15)
AST SERPL-CCNC: 16 U/L (ref 1–32)
BASOPHILS # BLD AUTO: 0.1 10*3/MM3 (ref 0–0.2)
BASOPHILS NFR BLD AUTO: 1.3 % (ref 0–1.5)
BILIRUB SERPL-MCNC: 0.2 MG/DL (ref 0–1.2)
BUN SERPL-MCNC: 21 MG/DL (ref 8–23)
BUN/CREAT SERPL: 16.3 (ref 7–25)
CALCIUM SPEC-SCNC: 9.4 MG/DL (ref 8.6–10.5)
CHLORIDE SERPL-SCNC: 98 MMOL/L (ref 98–107)
CO2 SERPL-SCNC: 20.7 MMOL/L (ref 22–29)
CREAT SERPL-MCNC: 1.29 MG/DL (ref 0.57–1)
DEPRECATED RDW RBC AUTO: 46 FL (ref 37–54)
EGFRCR SERPLBLD CKD-EPI 2021: 44.2 ML/MIN/1.73
EOSINOPHIL # BLD AUTO: 0.28 10*3/MM3 (ref 0–0.4)
EOSINOPHIL NFR BLD AUTO: 3.6 % (ref 0.3–6.2)
ERYTHROCYTE [DISTWIDTH] IN BLOOD BY AUTOMATED COUNT: 14.1 % (ref 12.3–15.4)
GLOBULIN UR ELPH-MCNC: 2.2 GM/DL
GLUCOSE SERPL-MCNC: 103 MG/DL (ref 65–99)
HCT VFR BLD AUTO: 29 % (ref 34–46.6)
HGB BLD-MCNC: 9.2 G/DL (ref 12–15.9)
IMM GRANULOCYTES # BLD AUTO: 0.02 10*3/MM3 (ref 0–0.05)
IMM GRANULOCYTES NFR BLD AUTO: 0.3 % (ref 0–0.5)
LYMPHOCYTES # BLD AUTO: 1.96 10*3/MM3 (ref 0.7–3.1)
LYMPHOCYTES NFR BLD AUTO: 25.3 % (ref 19.6–45.3)
MCH RBC QN AUTO: 28.3 PG (ref 26.6–33)
MCHC RBC AUTO-ENTMCNC: 31.7 G/DL (ref 31.5–35.7)
MCV RBC AUTO: 89.2 FL (ref 79–97)
MONOCYTES # BLD AUTO: 0.8 10*3/MM3 (ref 0.1–0.9)
MONOCYTES NFR BLD AUTO: 10.3 % (ref 5–12)
NEUTROPHILS NFR BLD AUTO: 4.6 10*3/MM3 (ref 1.7–7)
NEUTROPHILS NFR BLD AUTO: 59.2 % (ref 42.7–76)
NRBC BLD AUTO-RTO: 0 /100 WBC (ref 0–0.2)
PLATELET # BLD AUTO: 391 10*3/MM3 (ref 140–450)
PMV BLD AUTO: 9.1 FL (ref 6–12)
POTASSIUM SERPL-SCNC: 4.2 MMOL/L (ref 3.5–5.2)
PROT SERPL-MCNC: 6.5 G/DL (ref 6–8.5)
RBC # BLD AUTO: 3.25 10*6/MM3 (ref 3.77–5.28)
SODIUM SERPL-SCNC: 132 MMOL/L (ref 136–145)
WBC NRBC COR # BLD AUTO: 7.76 10*3/MM3 (ref 3.4–10.8)

## 2023-11-24 PROCEDURE — 85730 THROMBOPLASTIN TIME PARTIAL: CPT | Performed by: EMERGENCY MEDICINE

## 2023-11-24 PROCEDURE — 25010000002 ONDANSETRON PER 1 MG: Performed by: EMERGENCY MEDICINE

## 2023-11-24 PROCEDURE — 96375 TX/PRO/DX INJ NEW DRUG ADDON: CPT

## 2023-11-24 PROCEDURE — 25010000002 LABETALOL 5 MG/ML SOLUTION: Performed by: EMERGENCY MEDICINE

## 2023-11-24 PROCEDURE — 80053 COMPREHEN METABOLIC PANEL: CPT | Performed by: EMERGENCY MEDICINE

## 2023-11-24 PROCEDURE — 85025 COMPLETE CBC W/AUTO DIFF WBC: CPT | Performed by: EMERGENCY MEDICINE

## 2023-11-24 PROCEDURE — 99284 EMERGENCY DEPT VISIT MOD MDM: CPT

## 2023-11-24 PROCEDURE — 70450 CT HEAD/BRAIN W/O DYE: CPT

## 2023-11-24 PROCEDURE — 71046 X-RAY EXAM CHEST 2 VIEWS: CPT

## 2023-11-24 PROCEDURE — 93010 ELECTROCARDIOGRAM REPORT: CPT | Performed by: INTERNAL MEDICINE

## 2023-11-24 PROCEDURE — 85610 PROTHROMBIN TIME: CPT | Performed by: EMERGENCY MEDICINE

## 2023-11-24 PROCEDURE — 36415 COLL VENOUS BLD VENIPUNCTURE: CPT

## 2023-11-24 PROCEDURE — 93005 ELECTROCARDIOGRAM TRACING: CPT | Performed by: EMERGENCY MEDICINE

## 2023-11-24 PROCEDURE — 84484 ASSAY OF TROPONIN QUANT: CPT | Performed by: EMERGENCY MEDICINE

## 2023-11-24 PROCEDURE — 96374 THER/PROPH/DIAG INJ IV PUSH: CPT

## 2023-11-24 RX ORDER — LABETALOL HYDROCHLORIDE 5 MG/ML
10 INJECTION, SOLUTION INTRAVENOUS ONCE
Status: COMPLETED | OUTPATIENT
Start: 2023-11-24 | End: 2023-11-24

## 2023-11-24 RX ORDER — ONDANSETRON 2 MG/ML
4 INJECTION INTRAMUSCULAR; INTRAVENOUS ONCE
Status: COMPLETED | OUTPATIENT
Start: 2023-11-24 | End: 2023-11-24

## 2023-11-24 RX ORDER — SODIUM CHLORIDE 0.9 % (FLUSH) 0.9 %
10 SYRINGE (ML) INJECTION AS NEEDED
Status: DISCONTINUED | OUTPATIENT
Start: 2023-11-24 | End: 2023-11-25 | Stop reason: HOSPADM

## 2023-11-24 RX ADMIN — LABETALOL HYDROCHLORIDE 10 MG: 5 INJECTION, SOLUTION INTRAVENOUS at 23:31

## 2023-11-24 RX ADMIN — ONDANSETRON 4 MG: 2 INJECTION INTRAMUSCULAR; INTRAVENOUS at 23:31

## 2023-11-25 ENCOUNTER — HOSPITAL ENCOUNTER (OUTPATIENT)
Facility: HOSPITAL | Age: 73
Discharge: HOME | End: 2023-11-26
Attending: FAMILY MEDICINE | Admitting: FAMILY MEDICINE
Payer: MEDICARE

## 2023-11-25 VITALS
DIASTOLIC BLOOD PRESSURE: 70 MMHG | BODY MASS INDEX: 34.96 KG/M2 | SYSTOLIC BLOOD PRESSURE: 165 MMHG | TEMPERATURE: 98.4 F | HEIGHT: 62 IN | WEIGHT: 190 LBS | HEART RATE: 60 BPM | OXYGEN SATURATION: 97 % | RESPIRATION RATE: 15 BRPM

## 2023-11-25 DIAGNOSIS — I48.91 ATRIAL FIBRILLATION, UNSPECIFIED TYPE: ICD-10-CM

## 2023-11-25 LAB
APTT PPP: 39.1 SECONDS (ref 24.3–38.1)
INR PPP: 1.04 (ref 0.9–1.1)
PROTHROMBIN TIME: 13.6 SECONDS (ref 12.1–15)
TROPONIN T SERPL HS-MCNC: 12 NG/L

## 2023-11-25 PROCEDURE — A9270 NON-COVERED ITEM OR SERVICE: HCPCS | Performed by: FAMILY MEDICINE

## 2023-11-25 PROCEDURE — 63710000001 OLMESARTAN 20 MG TABLET: Performed by: FAMILY MEDICINE

## 2023-11-25 PROCEDURE — 63710000001 AMIODARONE 200 MG TABLET: Performed by: FAMILY MEDICINE

## 2023-11-25 PROCEDURE — 63710000001 ATENOLOL 25 MG TABLET: Performed by: FAMILY MEDICINE

## 2023-11-25 PROCEDURE — 93005 ELECTROCARDIOGRAM TRACING: CPT | Performed by: FAMILY MEDICINE

## 2023-11-25 PROCEDURE — 63710000001 ROSUVASTATIN 5 MG TABLET: Performed by: FAMILY MEDICINE

## 2023-11-25 PROCEDURE — 63710000001 MELATONIN 5 MG TABLET: Performed by: FAMILY MEDICINE

## 2023-11-25 PROCEDURE — 25010000002 HYDRALAZINE PER 20 MG: Performed by: FAMILY MEDICINE

## 2023-11-25 PROCEDURE — 96374 THER/PROPH/DIAG INJ IV PUSH: CPT

## 2023-11-25 PROCEDURE — 93010 ELECTROCARDIOGRAM REPORT: CPT | Performed by: INTERNAL MEDICINE

## 2023-11-25 PROCEDURE — 63710000001 APIXABAN 2.5 MG TABLET: Performed by: FAMILY MEDICINE

## 2023-11-25 PROCEDURE — 63710000001 PANTOPRAZOLE 40 MG TABLET DELAYED-RELEASE: Performed by: FAMILY MEDICINE

## 2023-11-25 PROCEDURE — 63710000001 SENNOSIDES-DOCUSATE 8.6-50 MG TABLET: Performed by: FAMILY MEDICINE

## 2023-11-25 PROCEDURE — 63710000001 METFORMIN 500 MG TABLET: Performed by: FAMILY MEDICINE

## 2023-11-25 PROCEDURE — 63710000001 CLONIDINE 0.2 MG TABLET: Performed by: FAMILY MEDICINE

## 2023-11-25 PROCEDURE — G0378 HOSPITAL OBSERVATION PER HR: HCPCS

## 2023-11-25 PROCEDURE — 25810000003 SODIUM CHLORIDE 0.9 % SOLUTION: Performed by: EMERGENCY MEDICINE

## 2023-11-25 PROCEDURE — 63710000001 ALLOPURINOL 100 MG TABLET: Performed by: FAMILY MEDICINE

## 2023-11-25 PROCEDURE — 63710000001 ACETAMINOPHEN 325 MG TABLET: Performed by: FAMILY MEDICINE

## 2023-11-25 PROCEDURE — 63710000001 HYDRALAZINE 50 MG TABLET: Performed by: FAMILY MEDICINE

## 2023-11-25 RX ORDER — ROSUVASTATIN CALCIUM 5 MG/1
10 TABLET, COATED ORAL NIGHTLY
Status: DISCONTINUED | OUTPATIENT
Start: 2023-11-25 | End: 2023-11-26 | Stop reason: HOSPADM

## 2023-11-25 RX ORDER — OLMESARTAN MEDOXOMIL 20 MG/1
40 TABLET ORAL NIGHTLY
Status: DISCONTINUED | OUTPATIENT
Start: 2023-11-25 | End: 2023-11-26 | Stop reason: HOSPADM

## 2023-11-25 RX ORDER — ACETAMINOPHEN 325 MG/1
650 TABLET ORAL EVERY 4 HOURS PRN
Status: DISCONTINUED | OUTPATIENT
Start: 2023-11-25 | End: 2023-11-26 | Stop reason: HOSPADM

## 2023-11-25 RX ORDER — CHOLECALCIFEROL (VITAMIN D3) 125 MCG
5 CAPSULE ORAL NIGHTLY PRN
Status: DISCONTINUED | OUTPATIENT
Start: 2023-11-25 | End: 2023-11-26 | Stop reason: HOSPADM

## 2023-11-25 RX ORDER — HYDRALAZINE HYDROCHLORIDE 20 MG/ML
10 INJECTION INTRAMUSCULAR; INTRAVENOUS EVERY 4 HOURS PRN
Status: DISCONTINUED | OUTPATIENT
Start: 2023-11-25 | End: 2023-11-26 | Stop reason: HOSPADM

## 2023-11-25 RX ORDER — AMOXICILLIN 250 MG
2 CAPSULE ORAL 2 TIMES DAILY
Status: DISCONTINUED | OUTPATIENT
Start: 2023-11-25 | End: 2023-11-26 | Stop reason: HOSPADM

## 2023-11-25 RX ORDER — ESCITALOPRAM OXALATE 10 MG/1
5 TABLET ORAL DAILY
Status: DISCONTINUED | OUTPATIENT
Start: 2023-11-26 | End: 2023-11-26 | Stop reason: HOSPADM

## 2023-11-25 RX ORDER — ONDANSETRON 4 MG/1
4 TABLET, FILM COATED ORAL EVERY 6 HOURS PRN
Status: DISCONTINUED | OUTPATIENT
Start: 2023-11-25 | End: 2023-11-26 | Stop reason: HOSPADM

## 2023-11-25 RX ORDER — ALPRAZOLAM 0.25 MG/1
0.25 TABLET ORAL EVERY 4 HOURS PRN
Status: DISCONTINUED | OUTPATIENT
Start: 2023-11-25 | End: 2023-11-26 | Stop reason: HOSPADM

## 2023-11-25 RX ORDER — BISACODYL 10 MG
10 SUPPOSITORY, RECTAL RECTAL DAILY PRN
Status: DISCONTINUED | OUTPATIENT
Start: 2023-11-25 | End: 2023-11-26 | Stop reason: HOSPADM

## 2023-11-25 RX ORDER — SODIUM CHLORIDE 0.9 % (FLUSH) 0.9 %
10 SYRINGE (ML) INJECTION AS NEEDED
Status: DISCONTINUED | OUTPATIENT
Start: 2023-11-25 | End: 2023-11-26 | Stop reason: HOSPADM

## 2023-11-25 RX ORDER — ONDANSETRON 2 MG/ML
4 INJECTION INTRAMUSCULAR; INTRAVENOUS EVERY 6 HOURS PRN
Status: DISCONTINUED | OUTPATIENT
Start: 2023-11-25 | End: 2023-11-26 | Stop reason: HOSPADM

## 2023-11-25 RX ORDER — ACETAMINOPHEN 160 MG/5ML
650 SOLUTION ORAL EVERY 4 HOURS PRN
Status: DISCONTINUED | OUTPATIENT
Start: 2023-11-25 | End: 2023-11-26 | Stop reason: HOSPADM

## 2023-11-25 RX ORDER — ACETAMINOPHEN 650 MG/1
650 SUPPOSITORY RECTAL EVERY 4 HOURS PRN
Status: DISCONTINUED | OUTPATIENT
Start: 2023-11-25 | End: 2023-11-26 | Stop reason: HOSPADM

## 2023-11-25 RX ORDER — BISACODYL 5 MG/1
5 TABLET, DELAYED RELEASE ORAL DAILY PRN
Status: DISCONTINUED | OUTPATIENT
Start: 2023-11-25 | End: 2023-11-26 | Stop reason: HOSPADM

## 2023-11-25 RX ORDER — ONDANSETRON 4 MG/1
4 TABLET, ORALLY DISINTEGRATING ORAL EVERY 6 HOURS PRN
Qty: 20 TABLET | Refills: 0 | Status: SHIPPED | OUTPATIENT
Start: 2023-11-25 | End: 2023-11-26 | Stop reason: HOSPADM

## 2023-11-25 RX ORDER — ATENOLOL 25 MG/1
25 TABLET ORAL DAILY
Status: DISCONTINUED | OUTPATIENT
Start: 2023-11-25 | End: 2023-11-26 | Stop reason: HOSPADM

## 2023-11-25 RX ORDER — ALLOPURINOL 100 MG/1
100 TABLET ORAL 2 TIMES DAILY
Status: DISCONTINUED | OUTPATIENT
Start: 2023-11-25 | End: 2023-11-26 | Stop reason: HOSPADM

## 2023-11-25 RX ORDER — HYDROCHLOROTHIAZIDE 12.5 MG/1
12.5 TABLET ORAL DAILY
Status: DISCONTINUED | OUTPATIENT
Start: 2023-11-26 | End: 2023-11-26 | Stop reason: HOSPADM

## 2023-11-25 RX ORDER — SODIUM CHLORIDE 0.9 % (FLUSH) 0.9 %
10 SYRINGE (ML) INJECTION EVERY 12 HOURS SCHEDULED
Status: DISCONTINUED | OUTPATIENT
Start: 2023-11-25 | End: 2023-11-26 | Stop reason: HOSPADM

## 2023-11-25 RX ORDER — PANTOPRAZOLE SODIUM 40 MG/1
40 TABLET, DELAYED RELEASE ORAL 2 TIMES DAILY
Status: DISCONTINUED | OUTPATIENT
Start: 2023-11-25 | End: 2023-11-26 | Stop reason: HOSPADM

## 2023-11-25 RX ORDER — CLOPIDOGREL BISULFATE 75 MG/1
75 TABLET ORAL DAILY
Status: DISCONTINUED | OUTPATIENT
Start: 2023-11-26 | End: 2023-11-26 | Stop reason: HOSPADM

## 2023-11-25 RX ORDER — SODIUM CHLORIDE 9 MG/ML
40 INJECTION, SOLUTION INTRAVENOUS AS NEEDED
Status: DISCONTINUED | OUTPATIENT
Start: 2023-11-25 | End: 2023-11-26 | Stop reason: HOSPADM

## 2023-11-25 RX ORDER — NITROGLYCERIN 0.4 MG/1
0.4 TABLET SUBLINGUAL
Status: DISCONTINUED | OUTPATIENT
Start: 2023-11-25 | End: 2023-11-26 | Stop reason: HOSPADM

## 2023-11-25 RX ORDER — POLYETHYLENE GLYCOL 3350 17 G/17G
17 POWDER, FOR SOLUTION ORAL DAILY PRN
Status: DISCONTINUED | OUTPATIENT
Start: 2023-11-25 | End: 2023-11-26 | Stop reason: HOSPADM

## 2023-11-25 RX ORDER — AMIODARONE HYDROCHLORIDE 200 MG/1
200 TABLET ORAL DAILY
Status: DISCONTINUED | OUTPATIENT
Start: 2023-11-25 | End: 2023-11-26 | Stop reason: HOSPADM

## 2023-11-25 RX ORDER — AMIODARONE HYDROCHLORIDE 200 MG/1
200 TABLET ORAL DAILY
Status: DISCONTINUED | OUTPATIENT
Start: 2023-11-26 | End: 2023-11-25

## 2023-11-25 RX ORDER — TRAMADOL HYDROCHLORIDE 50 MG/1
50 TABLET ORAL EVERY 6 HOURS PRN
Status: DISCONTINUED | OUTPATIENT
Start: 2023-11-25 | End: 2023-11-26 | Stop reason: HOSPADM

## 2023-11-25 RX ORDER — HYDRALAZINE HYDROCHLORIDE 50 MG/1
100 TABLET, FILM COATED ORAL 3 TIMES DAILY
Status: DISCONTINUED | OUTPATIENT
Start: 2023-11-25 | End: 2023-11-26 | Stop reason: HOSPADM

## 2023-11-25 RX ORDER — CLONIDINE HYDROCHLORIDE 0.2 MG/1
0.2 TABLET ORAL 3 TIMES DAILY
Status: DISCONTINUED | OUTPATIENT
Start: 2023-11-25 | End: 2023-11-26 | Stop reason: HOSPADM

## 2023-11-25 RX ADMIN — Medication 10 ML: at 13:56

## 2023-11-25 RX ADMIN — Medication 10 ML: at 20:16

## 2023-11-25 RX ADMIN — HYDRALAZINE HYDROCHLORIDE 100 MG: 50 TABLET, FILM COATED ORAL at 16:04

## 2023-11-25 RX ADMIN — AMIODARONE HYDROCHLORIDE 200 MG: 200 TABLET ORAL at 16:00

## 2023-11-25 RX ADMIN — SODIUM CHLORIDE 1000 ML: 9 INJECTION, SOLUTION INTRAVENOUS at 00:09

## 2023-11-25 RX ADMIN — APIXABAN 5 MG: 2.5 TABLET, FILM COATED ORAL at 20:15

## 2023-11-25 RX ADMIN — DOCUSATE SODIUM 50 MG AND SENNOSIDES 8.6 MG 2 TABLET: 8.6; 5 TABLET, FILM COATED ORAL at 13:55

## 2023-11-25 RX ADMIN — CLONIDINE HYDROCHLORIDE 0.2 MG: 0.2 TABLET ORAL at 16:04

## 2023-11-25 RX ADMIN — Medication 5 MG: at 20:23

## 2023-11-25 RX ADMIN — ACETAMINOPHEN 650 MG: 325 TABLET ORAL at 20:23

## 2023-11-25 RX ADMIN — DOCUSATE SODIUM 50 MG AND SENNOSIDES 8.6 MG 2 TABLET: 8.6; 5 TABLET, FILM COATED ORAL at 20:15

## 2023-11-25 RX ADMIN — PANTOPRAZOLE SODIUM 40 MG: 40 TABLET, DELAYED RELEASE ORAL at 20:15

## 2023-11-25 RX ADMIN — ALLOPURINOL 100 MG: 100 TABLET ORAL at 20:15

## 2023-11-25 RX ADMIN — CLONIDINE HYDROCHLORIDE 0.2 MG: 0.2 TABLET ORAL at 20:14

## 2023-11-25 RX ADMIN — HYDRALAZINE HYDROCHLORIDE 10 MG: 20 INJECTION INTRAMUSCULAR; INTRAVENOUS at 16:53

## 2023-11-25 RX ADMIN — ATENOLOL 25 MG: 25 TABLET ORAL at 13:55

## 2023-11-25 RX ADMIN — ROSUVASTATIN CALCIUM 10 MG: 5 TABLET, FILM COATED ORAL at 20:15

## 2023-11-25 RX ADMIN — METFORMIN HYDROCHLORIDE 500 MG: 500 TABLET, FILM COATED ORAL at 18:00

## 2023-11-25 RX ADMIN — OLMESARTAN MEDOXOMIL 40 MG: 20 TABLET, FILM COATED ORAL at 20:14

## 2023-11-25 RX ADMIN — HYDRALAZINE HYDROCHLORIDE 100 MG: 50 TABLET, FILM COATED ORAL at 20:15

## 2023-11-25 NOTE — ED NOTES
Patient reports having no nausea or headache. Patient was found resting with eyes closed on stretcher.

## 2023-11-25 NOTE — ED PROVIDER NOTES
Subjective   History of Present Illness  Valentine Arora is a 73-year-old white female who presents secondary to elevated blood pressure, headache, nausea and vomiting.  Patient reports since she was diagnosed with atrial fibrillation her blood pressure has been hard to control.  Patient saw her cardiologist to Dr. Albert 2 days ago.  Her blood pressure medication was modified on that date.  Patient has been checking her blood pressure multiple times a day.  She reports it trends upward in the evenings.  Today patient systolic blood pressure got over 200.  Patient called Dr. Alejandro.  He recommended she take atenolol in combination with hydralazine and amiodarone to help bring patient's blood pressure back under control.  However patient vomited shortly after taking the medications.  She has vomited twice once at home and once in the ED.  She also reports a headache in the top of her head.  Patient reports the elevated blood pressure came prior to headache or vomiting.  No chest pain.  No shortness of breath.  No diaphoresis.  No syncope.  No recent illness or injury.  Patient presents for evaluation.    History provided by:  Patient      Review of Systems   Constitutional: Negative.  Negative for fever.   HENT:  Negative for rhinorrhea.    Eyes: Negative.  Negative for redness.   Respiratory:  Negative for cough.    Cardiovascular:  Negative for chest pain.   Gastrointestinal:  Positive for nausea and vomiting. Negative for abdominal pain.   Endocrine: Negative.    Genitourinary: Negative.  Negative for difficulty urinating.   Musculoskeletal: Negative.  Negative for back pain.   Skin: Negative.  Negative for color change.   Neurological:  Positive for headaches. Negative for syncope.   Hematological: Negative.    Psychiatric/Behavioral: Negative.     All other systems reviewed and are negative.      Past Medical History:   Diagnosis Date    Arthritis     Sarabia esophagus     CAD (coronary artery disease)      Chronic back pain     Colon polyp     Diabetes mellitus     GERD (gastroesophageal reflux disease)     Hyperlipidemia     Hypertension     Myocardial infarction     1995    PAD (peripheral artery disease) 9/11/2019    Shingles        Allergies   Allergen Reactions    Codeine Itching    Other Unknown (See Comments)     Vicryl: doesn't heal       Past Surgical History:   Procedure Laterality Date    COLONOSCOPY      COLONOSCOPY N/A 6/24/2020    Procedure: COLONOSCOPY;  Surgeon: Mathew Roberts MD;  Location: [facility] OR;  Service: Gastroenterology;  Laterality: N/A;  Descending colon polyp x 2, Ascending colon polyp, Sigmoid colon polyp, Rectal polyp    COLONOSCOPY N/A 8/7/2023    Procedure: COLONOSCOPY;  Surgeon: Mathew Roberts MD;  Location: [facility] OR;  Service: Gastroenterology;  Laterality: N/A;  Normal    CORONARY ARTERY BYPASS GRAFT  1995    x 2 vessels    ENDOSCOPY N/A 6/24/2020    Procedure: ESOPHAGOGASTRODUODENOSCOPY;  Surgeon: Mathew Roberts MD;  Location: [facility] OR;  Service: Gastroenterology;  Laterality: N/A;  Ulcerative esophagitis, Gastritis, Duodenitis  Duodenal biopsy, gastric biopsy, distal esophageal biopsy    ENDOSCOPY N/A 9/17/2020    Procedure: ESOPHAGOGASTRODUODENOSCOPY with biopsies;  Surgeon: Mathew Roberts MD;  Location: [facility] OR;  Service: Gastroenterology;  Laterality: N/A;  Esophagitis  Sarabia's Esophagus  Hiatal hernia  Gastritis  Gastric biopsy  Distal esophagus biopsy    ENDOSCOPY N/A 8/7/2023    Procedure: Esophagogastroduedenoscopy;  Surgeon: Mathew Roberts MD;  Location: [facility] OR;  Service: Gastroenterology;  Laterality: N/A;  Gastritis; short segment Sarabia's; Esophageal stricture- dilatation; Biopsies- gastric, distal esophagus    INNER EAR SURGERY      JOINT REPLACEMENT      right knee    LEG SURGERY      x 2 s/p fall    LUMBAR FUSION      L4/L5    SKIN GRAFT      to left lower leg x 2    TONSILLECTOMY      WRIST  FUSION Left        Family History   Problem Relation Age of Onset    Heart disease Mother     Heart disease Father     Colon cancer Neg Hx     Colon polyps Neg Hx     Breast cancer Neg Hx        Social History     Socioeconomic History    Marital status:    Tobacco Use    Smoking status: Former     Types: Cigarettes     Quit date:      Years since quittin.9    Smokeless tobacco: Never    Tobacco comments:     QUIT    Vaping Use    Vaping Use: Never used   Substance and Sexual Activity    Alcohol use: Not Currently     Comment: occasional    Drug use: No    Sexual activity: Defer           Objective   Physical Exam  Vitals and nursing note reviewed.   Constitutional:       General: She is not in acute distress.     Appearance: Normal appearance. She is well-developed. She is not ill-appearing, toxic-appearing or diaphoretic.      Comments: 73-year-old white female lying in bed.  Patient is overweight.  She otherwise appears in good health for age.  Vital signs notable for BP of 217/86.  Repeat /90.  Vital signs otherwise unremarkable.  Patient friendly and cooperative.  Spouse is at bedside.   HENT:      Head: Normocephalic and atraumatic.      Right Ear: Tympanic membrane, ear canal and external ear normal.      Left Ear: Tympanic membrane, ear canal and external ear normal.      Nose: Nose normal.      Mouth/Throat:      Mouth: Mucous membranes are moist.      Pharynx: Oropharynx is clear.   Eyes:      Extraocular Movements: Extraocular movements intact.      Conjunctiva/sclera: Conjunctivae normal.      Pupils: Pupils are equal, round, and reactive to light.   Cardiovascular:      Rate and Rhythm: Normal rate and regular rhythm.      Pulses: Normal pulses.      Heart sounds: Normal heart sounds. No murmur heard.     No friction rub. No gallop.   Pulmonary:      Effort: Pulmonary effort is normal.      Breath sounds: Normal breath sounds.   Abdominal:      General: Bowel sounds are normal.  There is no distension.      Palpations: Abdomen is soft. There is no mass.      Tenderness: There is no abdominal tenderness. There is no guarding or rebound.      Hernia: No hernia is present.   Musculoskeletal:         General: Normal range of motion.      Cervical back: Normal range of motion and neck supple.   Skin:     General: Skin is warm and dry.      Capillary Refill: Capillary refill takes less than 2 seconds.   Neurological:      General: No focal deficit present.      Mental Status: She is alert and oriented to person, place, and time.      Deep Tendon Reflexes: Reflexes are normal and symmetric.   Psychiatric:         Mood and Affect: Mood normal.         Behavior: Behavior normal.         Procedures       EKG 12-lead  Date 11/20/2023  Time 23: 33  Normal sinus rhythm with rate 56 beats a minute  Leftward axis  Prolonged MS interval  LVH by voltage  No ST elevation or depression  Nonspecific T wave abnormalities in leads III, aVF and V3  Abnormal EKG    Unchanged from EKG dated 9/10/2023    ED Course  ED Course as of 11/25/23 0102   Fri Nov 24, 2023   2311 Giving labetalol and Zofran.  Obtaining CT head, chest x-ray, EKG and full set of labs.  Giving labetalol and Zofran. [SS]   2338 Patient's blood pressure improving.  Now 171/66.  EKG shows a normal sinus rhythm.  Patient states she has paroxysmal atrial fibrillation.  She is in sinus rhythm the vast majority of the time.    Patient has recorded her blood pressure 6 or more times daily the past 3 days.  We discussed that focusing on her blood pressure and worrying about it will only increase her blood pressure.  Patient also states she found out this evening that her sister who is hospitalized he has not doing well.  This added additional stressors to her current situation.  Patient a bit frustrated over difficulty finding the right combination of medicines to control her blood pressure and her heart rate.  Patient was bradycardic after being placed  on amiodarone 200 mg.  Currently taking 100 mg. [SS]   Sat Nov 25, 2023   0000 Hemoglobin(!): 9.2 [SS]   0001 Hematocrit(!): 29.0 [SS]   0001 Sodium(!): 132 [SS]   0001 CO2(!): 20.7 [SS]   0001 BUN: 21 [SS]   0001 Creatinine(!): 1.29 [SS]   0002 Anemia is chronic and at baseline.  CMP shows evidence of mild dehydration with sodium 132, CO2 20.7, BUN 21 and creatinine 1.29.  Giving a liter of normal saline.  Troponin is unremarkable. [SS]   0013 CT head shows age-appropriate changes.  Otherwise unremarkable.  Chest x-ray unremarkable.  BP much improved.  Currently 155/54. [SS]   0021 Discussed with patient all results.  I feel patient's headache and vomiting secondary to her hypertension.  This obviously exacerbated by her sister's health situation.  Anticipate discharging the patient home after IV fluid infusion. [SS]      ED Course User Index  [SS] Walt Gastelum MD      Labs Reviewed   COMPREHENSIVE METABOLIC PANEL - Abnormal; Notable for the following components:       Result Value    Glucose 103 (*)     Creatinine 1.29 (*)     Sodium 132 (*)     CO2 20.7 (*)     eGFR 44.2 (*)     All other components within normal limits    Narrative:     GFR Normal >60  Chronic Kidney Disease <60  Kidney Failure <15    The GFR formula is only valid for adults with stable renal function between ages 18 and 70.   APTT - Abnormal; Notable for the following components:    PTT 39.1 (*)     All other components within normal limits    Narrative:     PTT = The equivalent PTT values for the therapeutic range of heparin levels at 0.1 to 0.7 U/ml are 53 to 110 seconds.     CBC WITH AUTO DIFFERENTIAL - Abnormal; Notable for the following components:    RBC 3.25 (*)     Hemoglobin 9.2 (*)     Hematocrit 29.0 (*)     All other components within normal limits   PROTIME-INR - Normal    Narrative:     Therapeutic Ranges for INR: 2.0-3.0 (PT 20-30)                              2.5-3.5 (PT 25-34)   SINGLE HSTROPONIN T - Normal    Narrative:      High Sensitive Troponin T Reference Range:  <14.0 ng/L- Negative Female for AMI  <22.0 ng/L- Negative Male for AMI  >=14 - Abnormal Female indicating possible myocardial injury.  >=22 - Abnormal Male indicating possible myocardial injury.   Clinicians would have to utilize clinical acumen, EKG, Troponin, and serial changes to determine if it is an Acute Myocardial Infarction or myocardial injury due to an underlying chronic condition.        CBC AND DIFFERENTIAL    Narrative:     The following orders were created for panel order CBC & Differential.  Procedure                               Abnormality         Status                     ---------                               -----------         ------                     CBC Auto Differential[[id number]]        Abnormal            Final result                 Please view results for these tests on the individual orders.       CT Head Without Contrast    Result Date: 11/25/2023  Narrative: CT HEAD WO CONTRAST-  HISTORY: Hypertension and nausea. Headache.  TECHNIQUE: Axial unenhanced head CT with multiplanar reformats. Radiation dose reduction techniques included automated exposure control or exposure modulation based on body size. Count of known CT and cardiac nuc med studies performed in previous 12 months: 1.  COMPARISON: None.  FINDINGS: Ventricular size and configuration are normal. No acute infarct or hemorrhage is identified. There are no masses. There is no skull fracture. There is age-appropriate atrophy. Atherosclerotic calcifications are noted in the carotid siphons.      Impression: Senescent changes without acute abnormality.    This report was finalized on 11/25/2023 12:04 AM by Dr. Sathish Cunha MD.      XR Chest 2 View    Result Date: 11/25/2023  Narrative: PA AND LATERAL CHEST, 11/25/2023 12:47 AM  HISTORY: Hypertension and vomiting  COMPARISON: 9/10/2023  TECHNIQUE: PA and lateral upright chest series.  FINDINGS: Cardiomegaly stable status post  CABG. Pulmonary vascularity is normal. The lungs are clear. No pneumothorax is seen. There is mild elevation of the right hemidiaphragm. There is atherosclerotic disease in the aorta. Dense calcification in the epigastric region is unchanged.      Impression: No acute cardiopulmonary findings.  This report was finalized on 11/25/2023 12:01 AM by Dr. Sathish Cunha MD.      DEXA Bone Density Axial    Result Date: 11/8/2023  Narrative: DXA BONE MINERAL DENSITY MEASUREMENT, 11/7/2023  INDICATION: 73-year old postmenopausal female referred for routine DXA bone mineral density assessment.  TECHNIQUE: DXA bone mineral density measurements were obtained at the right and left hips. Lumbar measurement was precluded by prior surgery with instrumentation..  COMPARISON: 8/6/2021.  HIP: *  At the left femoral neck, lowest T score measures -0.7. *  BMD: 0.770 g/cm2. *  WHO category: Normal. *  BMD has decreased 5.6% since the prior exam.  HIP: *  At the right femoral neck, lowest T score measures -1.3. *  BMD: 0.701 g/cm2. *  WHO category: Osteopenia. *  No comparison exams.      Impression: 1.  Osteopenia. 2.  Right femoral neck T score -1.3.   FRAX: *  10 year fracture risk: Major osteoporotic fracture, 9.5%; hip fracture, 1.4%. *  Fracture probability calculated for an untreated patient.   This report was finalized on 11/8/2023 9:10 AM by Dr. Isma Urbina MD.       My differential diagnosis for hypertension includes but is not limited to essential hypertension, kidney disease, obstructive sleep apnea, thyroid disease, adrenal gland tumors, medications including over-the-counter cold medications and decongestants, antihistamines, cocaine, amphetamines, methamphetamines, other street drugs.    My differential diagnosis for headache includes but is not limited to:  Migraine, cluster, ischemic stroke, subarachnoid hemorrhage, intracranial hemorrhage, vascular malformation, cerebral aneurysm, vascular dissection, vasculitis,  temporal arteritis, malignant hypertension, pheochromocytoma, cerebral venous thrombosis, preeclampsia; bacterial meningitis, viral meningitis, fungal meningitis, encephalitis, brain abscess, pleural empyema, sinusitis, dental infection, influenza, viral syndrome; carbon monoxide exposure, analgesic abuse, hypoglycemia; trigeminal neuralgia, postherpetic neuralgia, occipital neuralgia; subdural hematoma, concussion, musculoskeletal tension, cervical osteoarthritis; glaucoma, TMJ disease, pseudotumor cerebri, post LP headache, intracranial neoplasm, sleep apnea    My differential diagnosis for vomiting includes but is not limited to viral illness including COVID-19, gastroenteritis, esophageal food impaction, pregnancy related vomiting, cyclic vomiting, food poisoning, alcohol poisoning, alcohol withdrawal, opioid withdrawal, benzo withdrawal, medication side effects, overdose, chemical ingestion, chemical exposures, gallbladder disease, appendicitis, bowel obstruction, DKA, AKA and panic attacks.                                       Medical Decision Making  Amount and/or Complexity of Data Reviewed  Labs: ordered.  Radiology: ordered.  ECG/medicine tests: ordered.    Risk  Prescription drug management.        Final diagnoses:   Hypertensive urgency   Acute non intractable tension-type headache   Nausea and vomiting, unspecified vomiting type   Dehydration, mild       ED Disposition  ED Disposition       ED Disposition   Discharge    Condition   Stable    Comment   --               Aliya Alejandro  [address]  [facility] [address]  [phone]    Call   Monday morning to arrange follow-up this coming week.  Sooner if needed.         Medication List        New Prescriptions      ondansetron ODT 4 MG disintegrating tablet  Commonly known as: ZOFRAN-ODT  Place 1 tablet on the tongue Every 6 (Six) Hours As Needed for Nausea or Vomiting for up to 20 doses.            Changed      amiodarone 200  MG tablet  Commonly known as: PACERONE  Take 1 tablet by mouth 2 (Two) Times a Day.  What changed: when to take this               Where to Get Your Medications        These medications were sent to [facility] [id number] - [place] - [address] [address] - [phone]  - [phone] [address] [address]      Phone: [phone]   ondansetron ODT 4 MG disintegrating tablet            Walt Gastelum MD  11/25/23 0102

## 2023-11-25 NOTE — DISCHARGE INSTRUCTIONS
Medication as directed.  Plenty of fluids.  Limit caffeine intake to 100 mg daily.  Take blood pressure twice daily and record.  Take this record to follow-up with your PCP.  Follow-up with your PCP as above.  Return to ED for uncontrolled hypertension, medical emergencies.

## 2023-11-25 NOTE — PLAN OF CARE
Goal Outcome Evaluation:              Outcome Evaluation: Direct admit from with HTN, placed on monitor, SR with prolonged MT intervals rate in 60's. Pt alert and oriented x4, up independenly, denies pain/discomfort.

## 2023-11-26 VITALS
DIASTOLIC BLOOD PRESSURE: 72 MMHG | TEMPERATURE: 97.5 F | OXYGEN SATURATION: 96 % | WEIGHT: 184.6 LBS | BODY MASS INDEX: 33.97 KG/M2 | HEIGHT: 62 IN | RESPIRATION RATE: 16 BRPM | SYSTOLIC BLOOD PRESSURE: 136 MMHG | HEART RATE: 70 BPM

## 2023-11-26 LAB
QT INTERVAL: 447 MS
QT INTERVAL: 451 MS
QTC INTERVAL: 436 MS
QTC INTERVAL: 443 MS

## 2023-11-26 PROCEDURE — A9270 NON-COVERED ITEM OR SERVICE: HCPCS | Performed by: FAMILY MEDICINE

## 2023-11-26 PROCEDURE — 63710000001 HYDRALAZINE 50 MG TABLET: Performed by: FAMILY MEDICINE

## 2023-11-26 PROCEDURE — 63710000001 HYDROCHLOROTHIAZIDE 12.5 MG TABLET: Performed by: FAMILY MEDICINE

## 2023-11-26 PROCEDURE — 81050 URINALYSIS VOLUME MEASURE: CPT | Performed by: FAMILY MEDICINE

## 2023-11-26 PROCEDURE — 63710000001 CLONIDINE 0.2 MG TABLET: Performed by: FAMILY MEDICINE

## 2023-11-26 PROCEDURE — 63710000001 AMIODARONE 200 MG TABLET: Performed by: FAMILY MEDICINE

## 2023-11-26 PROCEDURE — 63710000001 ESCITALOPRAM 10 MG TABLET: Performed by: FAMILY MEDICINE

## 2023-11-26 PROCEDURE — 63710000001 PANTOPRAZOLE 40 MG TABLET DELAYED-RELEASE: Performed by: FAMILY MEDICINE

## 2023-11-26 PROCEDURE — 63710000001 CLOPIDOGREL 75 MG TABLET: Performed by: FAMILY MEDICINE

## 2023-11-26 PROCEDURE — 63710000001 SENNOSIDES-DOCUSATE 8.6-50 MG TABLET: Performed by: FAMILY MEDICINE

## 2023-11-26 PROCEDURE — 63710000001 ATENOLOL 25 MG TABLET: Performed by: FAMILY MEDICINE

## 2023-11-26 PROCEDURE — 63710000001 METFORMIN 500 MG TABLET: Performed by: FAMILY MEDICINE

## 2023-11-26 PROCEDURE — G0378 HOSPITAL OBSERVATION PER HR: HCPCS

## 2023-11-26 PROCEDURE — 83835 ASSAY OF METANEPHRINES: CPT | Performed by: FAMILY MEDICINE

## 2023-11-26 PROCEDURE — 63710000001 ALLOPURINOL 100 MG TABLET: Performed by: FAMILY MEDICINE

## 2023-11-26 RX ORDER — AMLODIPINE BESYLATE 5 MG/1
5 TABLET ORAL DAILY
Qty: 30 TABLET | Refills: 0 | Status: SHIPPED | OUTPATIENT
Start: 2023-11-26

## 2023-11-26 RX ORDER — AMIODARONE HYDROCHLORIDE 200 MG/1
200 TABLET ORAL DAILY
Start: 2023-11-26

## 2023-11-26 RX ADMIN — DOCUSATE SODIUM 50 MG AND SENNOSIDES 8.6 MG 2 TABLET: 8.6; 5 TABLET, FILM COATED ORAL at 08:23

## 2023-11-26 RX ADMIN — HYDRALAZINE HYDROCHLORIDE 100 MG: 50 TABLET, FILM COATED ORAL at 08:23

## 2023-11-26 RX ADMIN — ESCITALOPRAM OXALATE 5 MG: 10 TABLET, FILM COATED ORAL at 08:23

## 2023-11-26 RX ADMIN — HYDRALAZINE HYDROCHLORIDE 100 MG: 50 TABLET, FILM COATED ORAL at 15:06

## 2023-11-26 RX ADMIN — AMIODARONE HYDROCHLORIDE 200 MG: 200 TABLET ORAL at 08:23

## 2023-11-26 RX ADMIN — CLONIDINE HYDROCHLORIDE 0.2 MG: 0.2 TABLET ORAL at 08:23

## 2023-11-26 RX ADMIN — ALLOPURINOL 100 MG: 100 TABLET ORAL at 08:23

## 2023-11-26 RX ADMIN — CLONIDINE HYDROCHLORIDE 0.2 MG: 0.2 TABLET ORAL at 15:06

## 2023-11-26 RX ADMIN — METFORMIN HYDROCHLORIDE 500 MG: 500 TABLET, FILM COATED ORAL at 08:23

## 2023-11-26 RX ADMIN — HYDROCHLOROTHIAZIDE 12.5 MG: 12.5 TABLET ORAL at 08:23

## 2023-11-26 RX ADMIN — CLOPIDOGREL BISULFATE 75 MG: 75 TABLET ORAL at 08:23

## 2023-11-26 RX ADMIN — ATENOLOL 25 MG: 25 TABLET ORAL at 08:23

## 2023-11-26 RX ADMIN — PANTOPRAZOLE SODIUM 40 MG: 40 TABLET, DELAYED RELEASE ORAL at 08:23

## 2023-11-26 NOTE — CASE MANAGEMENT/SOCIAL WORK
Case Management Discharge Note      Final Note: DC Home         Selected Continued Care - Discharged on 11/26/2023 Admission date: 11/25/2023 - Discharge disposition: Home or Self Care      Destination    No services have been selected for the patient.                Durable Medical Equipment    No services have been selected for the patient.                Dialysis/Infusion    No services have been selected for the patient.                Home Medical Care    No services have been selected for the patient.                Therapy    No services have been selected for the patient.                Community Resources    No services have been selected for the patient.                Community & DME    No services have been selected for the patient.                         Final Discharge Disposition Code: 01 - home or self-care

## 2023-11-26 NOTE — PLAN OF CARE
Goal Outcome Evaluation:              Outcome Evaluation: VSS, srwth 1st degree willie on monitor, B/P better and no prn hydralazine needed this shift, pt up ad venice sitting up n recliner and ambulating in [place]. 24 hr urine collected and sent to lab.

## 2023-11-26 NOTE — H&P
HISTORY AND PHYSICAL      Patient Care Team:  Aliya Alejandro MD as PCP - General (Family Medicine)    CHIEF COMPLAINT: Elevated blood pressure    HISTORY OF PRESENT ILLNESS:    71-year-old white female be admitted to the hospital because of persistent elevated blood pressure at home.  Patient was noticed for the past 3 to 4 days her blood pressure at times been over 200 at times.  He has been checking it more frequently than normal because of recent elevation of blood pressure and changes in blood pressure medicines.  He called me today for yesterday because her pressure was 221 time she was instructed to take an extra dose of her hydralazine and came back down .  Yesterday her blood pressure went very high after she had taken her evening medications and she threw up all her medicine was seen in the emergency room and stabilized and sent home.  She called again saying her blood pressure once again it was high and she is feeling not like herself.  She be admitted for observation monitor blood pressure and treat accordingly and make changes as needed.  Headache shortness of breath or chest pain.  Denied excessive salt intake or stress or anxiety at home.    She was recently started on amiodarone for recurrence of atrial fibrillation.  Was on twice daily initially but that has been decreased to 200 mg daily.    Past Medical History:   Diagnosis Date    Arthritis     Sarabia esophagus     CAD (coronary artery disease)     Chronic back pain     Colon polyp     Diabetes mellitus     GERD (gastroesophageal reflux disease)     Hyperlipidemia     Hypertension     Myocardial infarction     1995    PAD (peripheral artery disease) 9/11/2019    Shingles      Past Surgical History:   Procedure Laterality Date    COLONOSCOPY      COLONOSCOPY N/A 6/24/2020    Procedure: COLONOSCOPY;  Surgeon: Mathew Roberts MD;  Location: [facility];  Service: Gastroenterology;  Laterality: N/A;  Descending colon polyp x  2, Ascending colon polyp, Sigmoid colon polyp, Rectal polyp    COLONOSCOPY N/A 2023    Procedure: COLONOSCOPY;  Surgeon: Mathew Roberts MD;  Location: [facility] OR;  Service: Gastroenterology;  Laterality: N/A;  Normal    CORONARY ARTERY BYPASS GRAFT  1995    x 2 vessels    ENDOSCOPY N/A 2020    Procedure: ESOPHAGOGASTRODUODENOSCOPY;  Surgeon: Mathew Roberts MD;  Location: [facility] OR;  Service: Gastroenterology;  Laterality: N/A;  Ulcerative esophagitis, Gastritis, Duodenitis  Duodenal biopsy, gastric biopsy, distal esophageal biopsy    ENDOSCOPY N/A 2020    Procedure: ESOPHAGOGASTRODUODENOSCOPY with biopsies;  Surgeon: Mathew Roberts MD;  Location: [facility] OR;  Service: Gastroenterology;  Laterality: N/A;  Esophagitis  Sarabia's Esophagus  Hiatal hernia  Gastritis  Gastric biopsy  Distal esophagus biopsy    ENDOSCOPY N/A 2023    Procedure: Esophagogastroduedenoscopy;  Surgeon: Mathew Roberts MD;  Location: [facility] OR;  Service: Gastroenterology;  Laterality: N/A;  Gastritis; short segment Sarabia's; Esophageal stricture- dilatation; Biopsies- gastric, distal esophagus    INNER EAR SURGERY      JOINT REPLACEMENT      right knee    LEG SURGERY      x 2 s/p fall    LUMBAR FUSION      L4/L5    SKIN GRAFT      to left lower leg x 2    TONSILLECTOMY      WRIST FUSION Left      Family History   Problem Relation Age of Onset    Heart disease Mother     Heart disease Father     Colon cancer Neg Hx     Colon polyps Neg Hx     Breast cancer Neg Hx      Social History     Tobacco Use    Smoking status: Former     Types: Cigarettes     Quit date:      Years since quittin.9    Smokeless tobacco: Never    Tobacco comments:     QUIT    Vaping Use    Vaping Use: Never used   Substance Use Topics    Alcohol use: Not Currently     Comment: occasional    Drug use: No     Medications Prior to Admission   Medication Sig Dispense Refill Last Dose    allopurinol  (ZYLOPRIM) 100 MG tablet Take 1 tablet by mouth 2 (Two) Times a Day.   11/25/2023 at 0800    ALPRAZolam (XANAX) 0.25 MG tablet    11/25/2023 at 0730    amiodarone (PACERONE) 200 MG tablet Take 1 tablet by mouth 2 (Two) Times a Day. (Patient taking differently: Take 1 tablet by mouth Daily.) 60 tablet 1 11/24/2023 at 2000    atenolol (TENORMIN) 25 MG tablet Take 1 tablet by mouth Daily.   11/25/2023 at 0800    cloNIDine (CATAPRES) 0.1 MG tablet Take 2 tablets by mouth 3 (Three) Times a Day.   11/25/2023 at 0800    clopidogrel (PLAVIX) 75 MG tablet TAKE ONE TABLET BY MOUTH DAILY 90 tablet 3 11/25/2023    Eliquis 5 MG tablet tablet Take 1 tablet by mouth 1 (One) Time.   11/25/2023 at 0800    escitalopram (LEXAPRO) 5 MG tablet Take 1 tablet by mouth Daily.   11/25/2023 at 0800    hydrALAZINE (APRESOLINE) 50 MG tablet Take 2 tablets by mouth 3 (Three) Times a Day.   11/25/2023 at 0800    hydroCHLOROthiazide (HYDRODIURIL) 12.5 MG tablet Take 1 tablet by mouth Daily.   11/25/2023 at 0800    metFORMIN (GLUCOPHAGE) 500 MG tablet 2 (Two) Times a Day With Meals.   11/25/2023 at 0800    olmesartan (BENICAR) 40 MG tablet Take 1 tablet by mouth Daily.   11/24/2023 at 2000    ondansetron ODT (ZOFRAN-ODT) 4 MG disintegrating tablet Place 1 tablet on the tongue Every 6 (Six) Hours As Needed for Nausea or Vomiting for up to 20 doses. 20 tablet 0 11/24/2023    pantoprazole (PROTONIX) 40 MG EC tablet Take 1 tablet by mouth 2 (Two) Times a Day. 180 tablet 3 11/25/2023 at 0800    rosuvastatin (CRESTOR) 10 MG tablet Take 1 tablet by mouth Daily.   11/24/2023 at 2000    traMADol (ULTRAM) 50 MG tablet Take 1 tablet by mouth Every 6 (Six) Hours As Needed for Moderate Pain.   Past Month    hyoscyamine (LEVSIN) 0.125 MG SL tablet Take 1 tablet by mouth 4 (Four) Times a Day With Meals & at Bedtime. 120 tablet 5 Unknown at 0800     Allergies:  Codeine and Other     Review of Systems   Constitutional:  Negative for activity change, appetite  "change and fatigue.   HENT:  Negative for congestion.    Respiratory:  Negative for cough, chest tightness, shortness of breath and wheezing.    Cardiovascular:  Negative for chest pain.   Gastrointestinal:  Negative for abdominal distention, abdominal pain, diarrhea, nausea and vomiting.   Endocrine: Negative for polyphagia and polyuria.   Genitourinary:  Negative for frequency.   Skin:  Negative for rash.   Neurological:  Negative for light-headedness.   Hematological:  Does not bruise/bleed easily.   Psychiatric/Behavioral:  Negative for agitation and behavioral problems.        Vital Signs  Temp:  [96.7 °F (35.9 °C)-98.1 °F (36.7 °C)] 97.5 °F (36.4 °C)  Heart Rate:  [56-73] 59  Resp:  [16-18] 16  BP: (116-188)/(46-83) 163/72  Oxygen Therapy  SpO2: 96 %  Pulse Oximetry Type: Intermittent  Device (Oxygen Therapy): room air}  Body mass index is 33.76 kg/m².  Flowsheet Rows      Flowsheet Row First Filed Value   Admission Height 157.5 cm (62.01\") Documented at 11/25/2023 1202   Admission Weight 84.5 kg (186 lb 3.2 oz) Documented at 11/25/2023 1202                   Physical Exam  Vitals and nursing note reviewed.   Constitutional:       Appearance: She is well-developed. She is morbidly obese.   HENT:      Head: Normocephalic.   Eyes:      Conjunctiva/sclera: Conjunctivae normal.   Neck:      Thyroid: No thyromegaly.      Vascular: No JVD.   Cardiovascular:      Rate and Rhythm: Normal rate and regular rhythm.      Heart sounds: Normal heart sounds. No murmur heard.  Pulmonary:      Effort: Pulmonary effort is normal. No respiratory distress.      Breath sounds: Normal breath sounds. No wheezing or rales.   Abdominal:      General: Bowel sounds are normal. There is no distension.      Palpations: Abdomen is soft.      Tenderness: There is no abdominal tenderness. There is no guarding.   Skin:     General: Skin is warm and dry.      Findings: No rash.   Neurological:      General: No focal deficit present.      " Mental Status: She is alert and oriented to person, place, and time.          Debilities/Disabilities Identified: None    Emotional Behavior: Appropriate    Result Review        I have personally reviewed the results from the time of this admission to 11/26/2023 12:31 EST and agree with these findings:  [x]  Laboratory  []  Microbiology  []  Radiology  [x]  EKG/Telemetry   []  Cardiology/Vascular   []  Pathology  [x]  Old records  []  Other:          Results Review:    I reviewed the patient's new clinical results.  Lab Results (most recent)       None            Imaging Results (Most Recent)       None          reviewed recent x-rays    ECG/EMG Results (most recent)       Procedure Component Value Units Date/Time    ECG 12 Lead Other; Baseline EKG on telelmetry [[id number]] Collected: 11/25/23 1236     Updated: 11/25/23 1238     QT Interval 447 ms      QTC Interval 443 ms     Narrative:      HEART RATE= 59  bpm  RR Interval= 1020  ms  MI Interval= 276  ms  P Horizontal Axis=   deg  P Front Axis= 35  deg  QRSD Interval= 86  ms  QT Interval= 447  ms  QTcB= 443  ms  QRS Axis= 5  deg  T Wave Axis= 7  deg  - ABNORMAL ECG -  Sinus rhythm  Prolonged MI interval  LVH by voltage  Electronically Signed By:   Date and Time of Study: 2023-11-25 12:36:34          reviewed ER visit EKG    Assessment & Plan       .  Labile hypertension patient will be placed on telemetry and blood pressure will be monitored as well as heart rate.  We will continue her home medications give as needed IV hydralazine as needed blood pressure is more than 160 systolic.  Her blood pressure has been elevated at times but nothing over 160 we will add low-dose amlodipine    .  Proximal atrial flutter/fibrillation on chronic anticoagulation rate controlled.  She had a Holter monitor in 9/2022 which did not show any significant atrial flutter or fibrillation however because of her repeated episodes of chest discomfort she was started on amiodarone she has  some kind of event monitor at home that has recorded episodes of A-fib but not sure why amiodarone was recently ordered we will discuss with cardiology    .  Coronary artery disease status post CABG times 2/19/1995 stable    .  Hyperlipidemia nothing acute continue present medications      .  Peripheral vascular disease continue Plavix and statin sees vascular    .  General anxiety disorder stable with controlled on current SSRI    .  Sarabia's esophagus with GERD continue with PPI    .      I discussed the patients findings and my recommendations with patient      Aliya Alejandro MD  11/26/23  12:31 EST        Much of this encounter note is an electronic transcription/translation of spoken language to printed text. The electronic translation of spoken language may permit erroneous, or at times, nonsensical words or phrases to be inadvertently transcribed; Although I have reviewed the note for such errors, some may still exist.    Note Disclaimer: At [facility], we believe that sharing information builds trust and better relationships. You are receiving this note because you recently visited [facility]. It is possible you will see health information before a provider has talked with you about it. This kind of information can be easy to misunderstand. To help you fully understand what it means for your health, we urge you to discuss this note with your provider.

## 2023-11-26 NOTE — PLAN OF CARE
Goal Outcome Evaluation:  Plan of Care Reviewed With: patient        Progress: improving  Outcome Evaluation: r/a. SB/SR with First Degree AV Block. No prn hydralazine required this shift thus far tonight. Currently collecting 24 hour urine, 1630 yest - 1630 today.  Up independently to the restroom. BM 11/25 per pt report.

## 2023-11-27 ENCOUNTER — READMISSION MANAGEMENT (OUTPATIENT)
Dept: CALL CENTER | Facility: HOSPITAL | Age: 73
End: 2023-11-27
Payer: MEDICARE

## 2023-11-27 NOTE — OUTREACH NOTE
Prep Survey      Flowsheet Row Responses   [facility] facility patient discharged from? [place]   Is LACE score < 7 ? Yes   Eligibility Readm Mgmt   Discharge diagnosis Labile hypertension   Does the patient have one of the following disease processes/diagnoses(primary or secondary)? Other   Does the patient have Home health ordered? No   Is there a DME ordered? No   Prep survey completed? Yes            Treva CABRERA - Registered Nurse

## 2023-11-30 ENCOUNTER — READMISSION MANAGEMENT (OUTPATIENT)
Dept: CALL CENTER | Facility: HOSPITAL | Age: 73
End: 2023-11-30
Payer: MEDICARE

## 2023-11-30 NOTE — OUTREACH NOTE
LAG < 7 Survey      Flowsheet Row Responses   [facility] facility patient discharged from? [place]   Does the patient have one of the following disease processes/diagnoses(primary or secondary)? Other   BHLAG <7 Attempt successful? No   Unsuccessful attempts Attempt 1  [UTR 2 contact numbers]            Pati CABRERA - Registered Nurse

## 2023-12-01 DIAGNOSIS — I48.91 ATRIAL FIBRILLATION, UNSPECIFIED TYPE: ICD-10-CM

## 2023-12-01 LAB
METANEPH 24H UR-MCNC: 86 UG/L
METANEPH 24H UR-MRATE: 146 UG/24 HR (ref 36–209)
NORMETANEPHRINE 24H UR-MCNC: 303 UG/L
NORMETANEPHRINE 24H UR-MRATE: 515 UG/24 HR (ref 131–612)

## 2023-12-01 RX ORDER — AMIODARONE HYDROCHLORIDE 200 MG/1
200 TABLET ORAL 2 TIMES DAILY
Qty: 60 TABLET | OUTPATIENT
Start: 2023-12-01

## 2023-12-02 NOTE — DISCHARGE SUMMARY
Valentine Arora  1950  [id number]        Discharge Summary    Date of Admission: 11/25/2023  Date of Discharge:  11/26/2023      Primary Discharge Diagnoses:   Accelerated hypertension    Secondary Discharge Diagnoses:     Paroxysmal atrial fibrillation  Coronary artery disease  Hyperlipidemia  Peripheral vascular disease  General anxiety disorder  Sarabia's esophagus    PCP  Patient Care Team:  Aliya Alejandro MD as PCP - General (Family Medicine)    Consults:   Consults       No orders found from 10/27/2023 to 11/26/2023.              History of Present Illness:    71-year-old white female be admitted to the hospital because of persistent elevated blood pressure at home.  Patient was noticed for the past 3 to 4 days her blood pressure at times been over 200 at times.  He has been checking it more frequently than normal because of recent elevation of blood pressure and changes in blood pressure medicines.  He called me today for yesterday because her pressure was 221 time she was instructed to take an extra dose of her hydralazine and came back down .  Yesterday her blood pressure went very high after she had taken her evening medications and she threw up all her medicine was seen in the emergency room and stabilized and sent home.  She called again saying her blood pressure once again it was high and she is feeling not like herself.  She be admitted for observation monitor blood pressure and treat accordingly and make changes as needed.  Headache shortness of breath or chest pain.  Denied excessive salt intake or stress or anxiety at home.     She was recently started on amiodarone for recurrence of atrial fibrillation.  Was on twice daily initially but that has been decreased to 200 mg daily.    Hospital Course    Patient admitted to telemetry bed and blood pressure monitor.  She had IV hydralazine ordered for as needed blood pressure over 160s systolic which she only required once.  Blood pressures  were elevated but not significantly so.  Not sure why her blood pressure still elevated at home.  I instructed the patient not to check her blood pressure more than twice a day at home.  We will add amlodipine to her antihypertensive regimen jennifer has an appointment with me on Monday we followed up      Operations and Procedures Performed:       CT Head Without Contrast    Result Date: 11/25/2023  Narrative: CT HEAD WO CONTRAST-  HISTORY: Hypertension and nausea. Headache.  TECHNIQUE: Axial unenhanced head CT with multiplanar reformats. Radiation dose reduction techniques included automated exposure control or exposure modulation based on body size. Count of known CT and cardiac nuc med studies performed in previous 12 months: 1.  COMPARISON: None.  FINDINGS: Ventricular size and configuration are normal. No acute infarct or hemorrhage is identified. There are no masses. There is no skull fracture. There is age-appropriate atrophy. Atherosclerotic calcifications are noted in the carotid siphons.      Impression: Senescent changes without acute abnormality.    This report was finalized on 11/25/2023 12:04 AM by Dr. Sathish Cunha MD.      XR Chest 2 View    Result Date: 11/25/2023  Narrative: PA AND LATERAL CHEST, 11/25/2023 12:47 AM  HISTORY: Hypertension and vomiting  COMPARISON: 9/10/2023  TECHNIQUE: PA and lateral upright chest series.  FINDINGS: Cardiomegaly stable status post CABG. Pulmonary vascularity is normal. The lungs are clear. No pneumothorax is seen. There is mild elevation of the right hemidiaphragm. There is atherosclerotic disease in the aorta. Dense calcification in the epigastric region is unchanged.      Impression: No acute cardiopulmonary findings.  This report was finalized on 11/25/2023 12:01 AM by Dr. Sathish Cunha MD.      DEXA Bone Density Axial    Result Date: 11/8/2023  Narrative: DXA BONE MINERAL DENSITY MEASUREMENT, 11/7/2023  INDICATION: 73-year old postmenopausal female referred  "for routine DXA bone mineral density assessment.  TECHNIQUE: DXA bone mineral density measurements were obtained at the right and left hips. Lumbar measurement was precluded by prior surgery with instrumentation..  COMPARISON: 8/6/2021.  HIP: *  At the left femoral neck, lowest T score measures -0.7. *  BMD: 0.770 g/cm2. *  WHO category: Normal. *  BMD has decreased 5.6% since the prior exam.  HIP: *  At the right femoral neck, lowest T score measures -1.3. *  BMD: 0.701 g/cm2. *  WHO category: Osteopenia. *  No comparison exams.      Impression: 1.  Osteopenia. 2.  Right femoral neck T score -1.3.   FRAX: *  10 year fracture risk: Major osteoporotic fracture, 9.5%; hip fracture, 1.4%. *  Fracture probability calculated for an untreated patient.   This report was finalized on 11/8/2023 9:10 AM by Dr. Isma Urbina MD.       Labs:        Invalid input(s): \"NEUTOPHILPCT\"        Lab Results (last 24 hours)       ** No results found for the last 24 hours. **                                    Invalid input(s): \"LDLCALC\"          No results found for: \"POCGLU\"                      Radiology:  Imaging Results (Last 24 Hours)       ** No results found for the last 24 hours. **            PROCEDURES          Allergies:  is allergic to codeine and other.      Discharge Medications:     Your medication list        START taking these medications        Instructions Last Dose Given Next Dose Due   amLODIPine 5 MG tablet  Commonly known as: NORVASC      Take 1 tablet by mouth Daily.              CONTINUE taking these medications        Instructions Last Dose Given Next Dose Due   allopurinol 100 MG tablet  Commonly known as: ZYLOPRIM      Take 1 tablet by mouth 2 (Two) Times a Day.       ALPRAZolam 0.25 MG tablet  Commonly known as: XANAX           amiodarone 200 MG tablet  Commonly known as: PACERONE      Take 1 tablet by mouth Daily.       atenolol 25 MG tablet  Commonly known as: TENORMIN      Take 1 tablet by mouth Daily.    "    cloNIDine 0.1 MG tablet  Commonly known as: CATAPRES      Take 2 tablets by mouth 3 (Three) Times a Day.       clopidogrel 75 MG tablet  Commonly known as: PLAVIX      TAKE ONE TABLET BY MOUTH DAILY       Eliquis 5 MG tablet tablet  Generic drug: apixaban      Take 1 tablet by mouth 1 (One) Time.       escitalopram 5 MG tablet  Commonly known as: LEXAPRO      Take 1 tablet by mouth Daily.       hydrALAZINE 50 MG tablet  Commonly known as: APRESOLINE      Take 2 tablets by mouth 3 (Three) Times a Day.       hydroCHLOROthiazide 12.5 MG tablet  Commonly known as: HYDRODIURIL      Take 1 tablet by mouth Daily.       metFORMIN 500 MG tablet  Commonly known as: GLUCOPHAGE      2 (Two) Times a Day With Meals.       olmesartan 40 MG tablet  Commonly known as: BENICAR      Take 1 tablet by mouth Daily.       pantoprazole 40 MG EC tablet  Commonly known as: PROTONIX      Take 1 tablet by mouth 2 (Two) Times a Day.       rosuvastatin 10 MG tablet  Commonly known as: CRESTOR      Take 1 tablet by mouth Daily.       traMADol 50 MG tablet  Commonly known as: ULTRAM      Take 1 tablet by mouth Every 6 (Six) Hours As Needed for Moderate Pain.              STOP taking these medications      hyoscyamine 0.125 MG SL tablet  Commonly known as: LEVSIN        ondansetron ODT 4 MG disintegrating tablet  Commonly known as: ZOFRAN-ODT                  Where to Get Your Medications        These medications were sent to [facility] [id number] - [place] - [address] - [phone]  - [phone] FX  [address] [facility] [facility] [place] [address]      Phone: [phone]   amLODIPine 5 MG tablet       Information about where to get these medications is not yet available    Ask your nurse or doctor about these medications  amiodarone 200 MG tablet         Home medications and discharge medications reconciled with patient      Condition on Discharge: Stable    Discharge Disposition  Home    Visiting Nurse:    No     Home PT/OT:  No     Home  Safety Evaluation:  No     DME  None    Discharge Diet: Cardiac       Activity at Discharge:  As tolerated      Follow-up Appointments:   Follow-up Information       Aliya Alejandro MD .    Specialty: Family Medicine  Contact information:  [place] [id number] [phone]                             Test Results Pending at Discharge       Aliya Alejandro MD  12/02/23  12:13 EST          Much of this encounter note is an electronic transcription/translation of spoken language to printed text. The electronic translation of spoken language may permit erroneous, or at times, nonsensical words or phrases to be inadvertently transcribed; Although I have reviewed the note for such errors, some may still exist.    Note Disclaimer: At [facility], we believe that sharing information builds trust and better relationships. You are receiving this note because you recently visited [facility]. It is possible you will see health information before a provider has talked with you about it. This kind of information can be easy to misunderstand. To help you fully understand what it means for your health, we urge you to discuss this note with your provider.

## 2023-12-05 ENCOUNTER — READMISSION MANAGEMENT (OUTPATIENT)
Dept: CALL CENTER | Facility: HOSPITAL | Age: 73
End: 2023-12-05
Payer: MEDICARE

## 2023-12-05 NOTE — OUTREACH NOTE
LAG < 7 Survey      Flowsheet Row Responses   [facility] patient discharged from? [place]   Does the patient have one of the following disease processes/diagnoses(primary or secondary)? Other   BHLAG <7 Attempt successful? Yes   Call start time 1225   Call end time 1227   Discharge diagnosis Labile hypertension   Person spoke with today (if not patient) and relationship patient   Does the patient have a primary care provider?  Yes   Comments regarding PCP PCP next week   Has home health visited the patient within 72 hours of discharge? N/A   Psychosocial issues? No   Did the patient receive a copy of their discharge instructions? Yes   Nursing interventions Reviewed instructions with patient   What is the patient's perception of their health status since discharge? Improving   Is the patient/caregiver able to teach back signs and symptoms related to disease process for when to call PCP? Yes   Is the patient/caregiver able to teach back signs and symptoms related to disease process for when to call 911? Yes   Is the patient/caregiver able to teach back the hierarchy of who to call/visit for symptoms/problems? PCP, Specialist, Home health nurse, Urgent Care, ED, 911 Yes   Additional teach back comments BP 140s/60s   Graduated Yes   Wrap up additional comments Patient reports she is doing well. She has been seeing her sister in the hospital. BP was high however after medication 140-60s patient sees pcp next week. No other concerns o rquestions noted. patient aware when to go to the ER.            Lucero BARTHOLOMEW - Registered Nurse

## 2023-12-15 ENCOUNTER — TRANSCRIBE ORDERS (OUTPATIENT)
Dept: ADMINISTRATIVE | Facility: HOSPITAL | Age: 73
End: 2023-12-15
Payer: MEDICARE

## 2023-12-15 DIAGNOSIS — R09.89 RENAL BRUIT: Primary | ICD-10-CM

## 2023-12-21 ENCOUNTER — HOSPITAL ENCOUNTER (OUTPATIENT)
Dept: CT IMAGING | Facility: HOSPITAL | Age: 73
Discharge: HOME | End: 2023-12-21
Admitting: FAMILY MEDICINE
Payer: MEDICARE

## 2023-12-21 ENCOUNTER — OFFICE VISIT (OUTPATIENT)
Dept: CARDIOLOGY | Facility: CLINIC | Age: 73
End: 2023-12-21
Payer: MEDICARE

## 2023-12-21 VITALS
HEIGHT: 62 IN | WEIGHT: 194 LBS | DIASTOLIC BLOOD PRESSURE: 58 MMHG | HEART RATE: 70 BPM | SYSTOLIC BLOOD PRESSURE: 158 MMHG | OXYGEN SATURATION: 97 % | BODY MASS INDEX: 35.7 KG/M2

## 2023-12-21 DIAGNOSIS — I25.810 CORONARY ARTERY DISEASE INVOLVING CORONARY BYPASS GRAFT OF NATIVE HEART WITHOUT ANGINA PECTORIS: ICD-10-CM

## 2023-12-21 DIAGNOSIS — E78.2 MIXED HYPERLIPIDEMIA: Chronic | ICD-10-CM

## 2023-12-21 DIAGNOSIS — R09.89 RENAL BRUIT: ICD-10-CM

## 2023-12-21 DIAGNOSIS — I73.9 PAD (PERIPHERAL ARTERY DISEASE): Chronic | ICD-10-CM

## 2023-12-21 DIAGNOSIS — I10 PRIMARY HYPERTENSION: Primary | ICD-10-CM

## 2023-12-21 PROCEDURE — 25510000001 IOPAMIDOL PER 1 ML: Performed by: FAMILY MEDICINE

## 2023-12-21 PROCEDURE — 3078F DIAST BP <80 MM HG: CPT | Performed by: INTERNAL MEDICINE

## 2023-12-21 PROCEDURE — 74175 CTA ABDOMEN W/CONTRAST: CPT

## 2023-12-21 PROCEDURE — 99214 OFFICE O/P EST MOD 30 MIN: CPT | Performed by: INTERNAL MEDICINE

## 2023-12-21 PROCEDURE — 3077F SYST BP >= 140 MM HG: CPT | Performed by: INTERNAL MEDICINE

## 2023-12-21 RX ORDER — SPIRONOLACTONE 25 MG/1
25 TABLET ORAL DAILY
Qty: 30 TABLET | Refills: 11 | Status: SHIPPED | OUTPATIENT
Start: 2023-12-21

## 2023-12-21 RX ADMIN — IOPAMIDOL 100 ML: 755 INJECTION, SOLUTION INTRAVENOUS at 16:14

## 2023-12-21 NOTE — PROGRESS NOTES
Subjective:     Encounter Date: 12/21/23        Patient ID: Valentine Arora is a 73 y.o. female.    Chief Complaint: HTN, CAD    Deear Dr. Roberts,    I had the pleasure of seeing this patient in the office today. She has a history of CAD and had a myocardial infarction approximately 20 years ago. At that point she had a failed attempted angioplasty and then underwent a two-vessel CABG in 1995. In August 2012 she had a stress Cardiolite test performed that showed an ejection fraction 75% and no ischemia. No wall motion abnormalities were seen. She was hospitalized at [facility] in September 2015. 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 This was felt to be due to trauma years ago. She had an echocardiogram that showed an ejection fraction is 60-65% with normal valvular structure and function.  She was hospitalized May 2023 with paroxysmal atrial flutter and is on chronic anticoagulation along with her clopidogrel.    She comes in because blood pressures been quite labile.  She is scheduled to have an abdominal CT scan performed later this afternoon.  She recently had amlodipine increased from 5 to 10 mg daily, some improvement in blood pressure but sudden increase in lower extremity edema.    She just had a Holter monitor that showed sinus rhythm present throughout with no recurrent atrial flutter.  No chest pain or chest discomfort, no shortness of breath.  No palpitations or tachycardia, no presyncope or syncope.    She has bilateral cerebrovascular disease and is followed with vascular.  She is being followed with Dr. Ace. She had an intervention- bilateral common iliac artery kissing stent placement on 9/23/2019    Stress test September 2019:  Interpretation Summary     Diaphragmatic attenuation artifact is present.  Left ventricular ejection fraction is normal (Calculated EF = 70%).  Myocardial perfusion imaging indicates a  normal myocardial perfusion study with no evidence of ischemia.     Echocardiogram July 2020:  Interpretation Summary     Left ventricular systolic function is normal. Calculated EF = 53.1%. Estimated EF was in disagreement with the calculated EF. Estimated EF appears to be in the range of 56 - 60%. Normal left ventricular cavity size and wall thickness noted. All left ventricular wall segments contract normally. Left ventricular diastolic function is normal.       The following portions of the patient's history were reviewed and updated as appropriate: allergies, current medications, past family history, past medical history, past social history, past surgical history and problem list.    Past Medical History:   Diagnosis Date    Arthritis     Sarabia esophagus     CAD (coronary artery disease)     Chronic back pain     Colon polyp     Diabetes mellitus     GERD (gastroesophageal reflux disease)     Hyperlipidemia     Hypertension     Myocardial infarction     1995    PAD (peripheral artery disease) 9/11/2019    Shingles        Past Surgical History:   Procedure Laterality Date    COLONOSCOPY      COLONOSCOPY N/A 6/24/2020    Procedure: COLONOSCOPY;  Surgeon: Mathew Roberts MD;  Location: [facility] [place];  Service: Gastroenterology;  Laterality: N/A;  Descending colon polyp x 2, Ascending colon polyp, Sigmoid colon polyp, Rectal polyp    COLONOSCOPY N/A 8/7/2023    Procedure: COLONOSCOPY;  Surgeon: Mathew Roberts MD;  Location: [facility] OR;  Service: Gastroenterology;  Laterality: N/A;  Normal    CORONARY ARTERY BYPASS GRAFT  1995    x 2 vessels    ENDOSCOPY N/A 6/24/2020    Procedure: ESOPHAGOGASTRODUODENOSCOPY;  Surgeon: Mathew Roberts MD;  Location: [facility] OR;  Service: Gastroenterology;  Laterality: N/A;  Ulcerative esophagitis, Gastritis, Duodenitis  Duodenal biopsy, gastric biopsy, distal esophageal biopsy    ENDOSCOPY N/A 9/17/2020    Procedure: ESOPHAGOGASTRODUODENOSCOPY with  "biopsies;  Surgeon: Mathew Roberts MD;  Location:  [facility] OR;  Service: Gastroenterology;  Laterality: N/A;  Esophagitis  Sarabia's Esophagus  Hiatal hernia  Gastritis  Gastric biopsy  Distal esophagus biopsy    ENDOSCOPY N/A 8/7/2023    Procedure: Esophagogastroduedenoscopy;  Surgeon: Mathew Roberts MD;  Location:  [facility] OR;  Service: Gastroenterology;  Laterality: N/A;  Gastritis; short segment Sarabia's; Esophageal stricture- dilatation; Biopsies- gastric, distal esophagus    INNER EAR SURGERY      JOINT REPLACEMENT      right knee    LEG SURGERY      x 2 s/p fall    LUMBAR FUSION      L4/L5    SKIN GRAFT      to left lower leg x 2    TONSILLECTOMY      WRIST FUSION Left            Procedures        Objective:     Vitals:    12/21/23 1354   BP: 158/58   BP Location: Left arm   Patient Position: Sitting   Cuff Size: Adult   Pulse: 70   SpO2: 97%   Weight: 88 kg (194 lb)   Height: 157.5 cm (62\")             General Appearance:    Alert, cooperative, in no acute distress   Head:    Normocephalic, without obvious abnormality   Eyes:            Lids and lashes normal, conjunctivae and sclerae normal, no icterus, no pallor, corneas clear   Ears:    Ears appear intact with no abnormalities noted   Throat:   No oral lesions, oral mucosa moist   Neck:   No adenopathy, supple, trachea midline, no thyromegaly, no carotid bruit, no JVD   Back:     No kyphosis present, no erythema or scars, no tenderness to palpation    Lungs:     Clear to auscultation,respirations regular, even and unlabored    Heart:    Regular rhythm and normal rate, normal S1 and S2, no murmur, no gallop, no rub, no click   Chest Wall:    No abnormalities observed   Abdomen:     Normal bowel sounds, no masses, no organomegaly, soft        non-tender, non-distended, no guarding   Extremities:   Moves all extremities well, no edema, no cyanosis, no redness   Pulses:  Bilateral carotids brisk   Skin:  Psychiatric:   No bleeding or " rash    Alert and oriented, normal mood and affect       Lab Review:             Lab Results   Component Value Date    GLUCOSE 103 (H) 11/24/2023    BUN 21 11/24/2023    CREATININE 1.29 (H) 11/24/2023    EGFRIFNONA 90 06/16/2021    EGFRIFAFRI >60 05/11/2021    BCR 16.3 11/24/2023    K 4.2 11/24/2023    CO2 20.7 (L) 11/24/2023    CALCIUM 9.4 11/24/2023    ALBUMIN 4.3 11/24/2023    LABIL2 1.6 05/11/2021    AST 16 11/24/2023    ALT 14 11/24/2023       Results for orders placed during the hospital encounter of 05/17/23    Adult Transthoracic Echo Complete w/ Color, Spectral and Contrast if Necessary Per Protocol    Interpretation Summary    Left ventricular systolic function is normal. Calculated left ventricular EF = 58.5%    Left ventricular wall thickness is consistent with mild concentric hypertrophy.    Left ventricular diastolic function was normal.    Sclerotic aortic valve noted without any significant stenosis or regurgitation.    Normal biatrial size present.    There is mild to moderate tricuspid valve regurgitation present.            Assessment:          Diagnosis Plan   1. Primary hypertension  spironolactone (ALDACTONE) 25 MG tablet      2. Coronary artery disease involving coronary bypass graft of native heart without angina pectoris        3. Mixed hyperlipidemia        4. PAD (peripheral artery disease)                 Plan:       1. Coronary Artery Disease  Plan   Lifestyle modifications discussed include adhering to a heart healthy diet, avoidance of tobacco products, maintenance of a healthy weight, medication compliance, regular exercise and regular monitoring of cholesterol and blood pressure    Subjective - Objective   There is a history of past MI    There is a history of previous coronary artery bypass graft    Current antiplatelet therapy includes aspirin 81 mg    2.  Prior CABG  3.  History of MI  4.  Hypertension-incompletely controlled, has developed some lower extremity edema on the 10 mg  amlodipine, I will decrease that to 5 mg daily, add spironolactone 25 mg daily.  Will await the results on the CT scan that you have ordered that will be done later this afternoon.  5.  Hyperlipidemia-on lipid-lowering therapy, continue to follow  6.  Peripheral arterial disease-  Bilateral common iliac artery kissing stent placement on 9/23/2019. Followed by Dr. Ace  7.  Prior esophagitis and gastritis.      Current Outpatient Medications:     allopurinol (ZYLOPRIM) 100 MG tablet, Take 1 tablet by mouth 2 (Two) Times a Day., Disp: , Rfl:     ALPRAZolam (XANAX) 0.25 MG tablet, , Disp: , Rfl:     amiodarone (PACERONE) 200 MG tablet, Take 1 tablet by mouth Daily., Disp: , Rfl:     amLODIPine (NORVASC) 5 MG tablet, Take 1 tablet by mouth Daily. (Patient taking differently: Take 1 tablet by mouth 2 (Two) Times a Day.), Disp: 30 tablet, Rfl: 0    atenolol (TENORMIN) 25 MG tablet, Take 1 tablet by mouth Daily., Disp: , Rfl:     cloNIDine (CATAPRES) 0.1 MG tablet, Take 2 tablets by mouth 3 (Three) Times a Day., Disp: , Rfl:     clopidogrel (PLAVIX) 75 MG tablet, TAKE ONE TABLET BY MOUTH DAILY, Disp: 90 tablet, Rfl: 3    Eliquis 5 MG tablet tablet, Take 1 tablet by mouth 1 (One) Time., Disp: , Rfl:     escitalopram (LEXAPRO) 5 MG tablet, Take 1 tablet by mouth Daily., Disp: , Rfl:     hydrALAZINE (APRESOLINE) 50 MG tablet, Take 2 tablets by mouth 3 (Three) Times a Day., Disp: , Rfl:     hydroCHLOROthiazide (HYDRODIURIL) 12.5 MG tablet, Take 1 tablet by mouth Daily., Disp: , Rfl:     metFORMIN (GLUCOPHAGE) 500 MG tablet, 2 (Two) Times a Day With Meals., Disp: , Rfl:     olmesartan (BENICAR) 40 MG tablet, Take 1 tablet by mouth Daily., Disp: , Rfl:     pantoprazole (PROTONIX) 40 MG EC tablet, Take 1 tablet by mouth 2 (Two) Times a Day., Disp: 180 tablet, Rfl: 3    rosuvastatin (CRESTOR) 10 MG tablet, Take 1 tablet by mouth Daily., Disp: , Rfl:     traMADol (ULTRAM) 50 MG tablet, Take 1 tablet by mouth Every 6 (Six) Hours As  Needed for Moderate Pain., Disp: , Rfl:     spironolactone (ALDACTONE) 25 MG tablet, Take 1 tablet by mouth Daily., Disp: 30 tablet, Rfl: 11

## 2023-12-23 ENCOUNTER — HOSPITAL ENCOUNTER (OUTPATIENT)
Dept: GENERAL RADIOLOGY | Facility: HOSPITAL | Age: 73
Discharge: HOME | End: 2023-12-23
Admitting: NURSE PRACTITIONER
Payer: MEDICARE

## 2023-12-23 DIAGNOSIS — R05.1 ACUTE COUGH: ICD-10-CM

## 2023-12-23 PROCEDURE — 71046 X-RAY EXAM CHEST 2 VIEWS: CPT

## 2023-12-25 ENCOUNTER — HOSPITAL ENCOUNTER (EMERGENCY)
Facility: HOSPITAL | Age: 73
Discharge: HOME | End: 2023-12-25
Attending: EMERGENCY MEDICINE | Admitting: EMERGENCY MEDICINE
Payer: MEDICARE

## 2023-12-25 ENCOUNTER — APPOINTMENT (OUTPATIENT)
Dept: GENERAL RADIOLOGY | Facility: HOSPITAL | Age: 73
End: 2023-12-25
Payer: MEDICARE

## 2023-12-25 VITALS
DIASTOLIC BLOOD PRESSURE: 51 MMHG | SYSTOLIC BLOOD PRESSURE: 166 MMHG | RESPIRATION RATE: 18 BRPM | TEMPERATURE: 98 F | WEIGHT: 185 LBS | HEIGHT: 62 IN | BODY MASS INDEX: 34.04 KG/M2 | OXYGEN SATURATION: 96 % | HEART RATE: 58 BPM

## 2023-12-25 DIAGNOSIS — J40 BRONCHITIS: ICD-10-CM

## 2023-12-25 DIAGNOSIS — N17.9 AKI (ACUTE KIDNEY INJURY): Primary | ICD-10-CM

## 2023-12-25 DIAGNOSIS — D64.9 ANEMIA, UNSPECIFIED TYPE: ICD-10-CM

## 2023-12-25 DIAGNOSIS — I50.9 ACUTE CONGESTIVE HEART FAILURE, UNSPECIFIED HEART FAILURE TYPE: ICD-10-CM

## 2023-12-25 LAB
ALBUMIN SERPL-MCNC: 4 G/DL (ref 3.5–5.2)
ALBUMIN/GLOB SERPL: 1.8 G/DL
ALP SERPL-CCNC: 77 U/L (ref 39–117)
ALT SERPL W P-5'-P-CCNC: 48 U/L (ref 1–33)
ANION GAP SERPL CALCULATED.3IONS-SCNC: 8.7 MMOL/L (ref 5–15)
AST SERPL-CCNC: 47 U/L (ref 1–32)
BASOPHILS # BLD AUTO: 0.05 10*3/MM3 (ref 0–0.2)
BASOPHILS NFR BLD AUTO: 0.4 % (ref 0–1.5)
BILIRUB SERPL-MCNC: 0.2 MG/DL (ref 0–1.2)
BUN SERPL-MCNC: 33 MG/DL (ref 8–23)
BUN/CREAT SERPL: 19.4 (ref 7–25)
CALCIUM SPEC-SCNC: 9.2 MG/DL (ref 8.6–10.5)
CHLORIDE SERPL-SCNC: 101 MMOL/L (ref 98–107)
CO2 SERPL-SCNC: 22.3 MMOL/L (ref 22–29)
CREAT SERPL-MCNC: 1.7 MG/DL (ref 0.57–1)
DEPRECATED RDW RBC AUTO: 47.6 FL (ref 37–54)
EGFRCR SERPLBLD CKD-EPI 2021: 31.7 ML/MIN/1.73
EOSINOPHIL # BLD AUTO: 0.32 10*3/MM3 (ref 0–0.4)
EOSINOPHIL NFR BLD AUTO: 2.9 % (ref 0.3–6.2)
ERYTHROCYTE [DISTWIDTH] IN BLOOD BY AUTOMATED COUNT: 14.8 % (ref 12.3–15.4)
FLUAV SUBTYP SPEC NAA+PROBE: NOT DETECTED
FLUBV RNA ISLT QL NAA+PROBE: NOT DETECTED
GLOBULIN UR ELPH-MCNC: 2.2 GM/DL
GLUCOSE SERPL-MCNC: 105 MG/DL (ref 65–99)
HCT VFR BLD AUTO: 26.6 % (ref 34–46.6)
HGB BLD-MCNC: 8.4 G/DL (ref 12–15.9)
HOLD SPECIMEN: NORMAL
HOLD SPECIMEN: NORMAL
IMM GRANULOCYTES # BLD AUTO: 0.12 10*3/MM3 (ref 0–0.05)
IMM GRANULOCYTES NFR BLD AUTO: 1.1 % (ref 0–0.5)
LYMPHOCYTES # BLD AUTO: 1.71 10*3/MM3 (ref 0.7–3.1)
LYMPHOCYTES NFR BLD AUTO: 15.2 % (ref 19.6–45.3)
MCH RBC QN AUTO: 27.5 PG (ref 26.6–33)
MCHC RBC AUTO-ENTMCNC: 31.6 G/DL (ref 31.5–35.7)
MCV RBC AUTO: 86.9 FL (ref 79–97)
MONOCYTES # BLD AUTO: 0.82 10*3/MM3 (ref 0.1–0.9)
MONOCYTES NFR BLD AUTO: 7.3 % (ref 5–12)
NEUTROPHILS NFR BLD AUTO: 73.1 % (ref 42.7–76)
NEUTROPHILS NFR BLD AUTO: 8.2 10*3/MM3 (ref 1.7–7)
NRBC BLD AUTO-RTO: 0 /100 WBC (ref 0–0.2)
NT-PROBNP SERPL-MCNC: 5231 PG/ML (ref 0–900)
PLATELET # BLD AUTO: 344 10*3/MM3 (ref 140–450)
PMV BLD AUTO: 9 FL (ref 6–12)
POTASSIUM SERPL-SCNC: 4.2 MMOL/L (ref 3.5–5.2)
PROT SERPL-MCNC: 6.2 G/DL (ref 6–8.5)
RBC # BLD AUTO: 3.06 10*6/MM3 (ref 3.77–5.28)
SARS-COV-2 RNA RESP QL NAA+PROBE: NOT DETECTED
SODIUM SERPL-SCNC: 132 MMOL/L (ref 136–145)
TROPONIN T SERPL HS-MCNC: 16 NG/L
TROPONIN T SERPL HS-MCNC: 18 NG/L
WBC NRBC COR # BLD AUTO: 11.22 10*3/MM3 (ref 3.4–10.8)
WHOLE BLOOD HOLD COAG: NORMAL
WHOLE BLOOD HOLD SPECIMEN: NORMAL

## 2023-12-25 PROCEDURE — 85025 COMPLETE CBC W/AUTO DIFF WBC: CPT | Performed by: EMERGENCY MEDICINE

## 2023-12-25 PROCEDURE — 93005 ELECTROCARDIOGRAM TRACING: CPT | Performed by: EMERGENCY MEDICINE

## 2023-12-25 PROCEDURE — 94799 UNLISTED PULMONARY SVC/PX: CPT

## 2023-12-25 PROCEDURE — 96374 THER/PROPH/DIAG INJ IV PUSH: CPT

## 2023-12-25 PROCEDURE — 87636 SARSCOV2 & INF A&B AMP PRB: CPT | Performed by: EMERGENCY MEDICINE

## 2023-12-25 PROCEDURE — 80053 COMPREHEN METABOLIC PANEL: CPT | Performed by: EMERGENCY MEDICINE

## 2023-12-25 PROCEDURE — 25010000002 METHYLPREDNISOLONE PER 125 MG: Performed by: EMERGENCY MEDICINE

## 2023-12-25 PROCEDURE — 94640 AIRWAY INHALATION TREATMENT: CPT

## 2023-12-25 PROCEDURE — 84484 ASSAY OF TROPONIN QUANT: CPT | Performed by: EMERGENCY MEDICINE

## 2023-12-25 PROCEDURE — 99284 EMERGENCY DEPT VISIT MOD MDM: CPT

## 2023-12-25 PROCEDURE — 71046 X-RAY EXAM CHEST 2 VIEWS: CPT

## 2023-12-25 PROCEDURE — 83880 ASSAY OF NATRIURETIC PEPTIDE: CPT | Performed by: EMERGENCY MEDICINE

## 2023-12-25 PROCEDURE — 36415 COLL VENOUS BLD VENIPUNCTURE: CPT

## 2023-12-25 RX ORDER — IPRATROPIUM BROMIDE AND ALBUTEROL SULFATE 2.5; .5 MG/3ML; MG/3ML
3 SOLUTION RESPIRATORY (INHALATION) ONCE
Status: COMPLETED | OUTPATIENT
Start: 2023-12-25 | End: 2023-12-25

## 2023-12-25 RX ORDER — SODIUM CHLORIDE 0.9 % (FLUSH) 0.9 %
10 SYRINGE (ML) INJECTION AS NEEDED
Status: DISCONTINUED | OUTPATIENT
Start: 2023-12-25 | End: 2023-12-25 | Stop reason: HOSPADM

## 2023-12-25 RX ORDER — METHYLPREDNISOLONE SODIUM SUCCINATE 125 MG/2ML
125 INJECTION, POWDER, LYOPHILIZED, FOR SOLUTION INTRAMUSCULAR; INTRAVENOUS ONCE
Status: COMPLETED | OUTPATIENT
Start: 2023-12-25 | End: 2023-12-25

## 2023-12-25 RX ADMIN — IPRATROPIUM BROMIDE AND ALBUTEROL SULFATE 3 ML: 2.5; .5 SOLUTION RESPIRATORY (INHALATION) at 12:46

## 2023-12-25 RX ADMIN — METHYLPREDNISOLONE SODIUM SUCCINATE 125 MG: 125 INJECTION, POWDER, FOR SOLUTION INTRAMUSCULAR; INTRAVENOUS at 12:26

## 2023-12-25 NOTE — ED PROVIDER NOTES
Subjective   History of Present Illness  Patient presents complaining of a 2-day history of shortness of breath and cough.  Patient was seen at urgent care and had chest x-ray and COVID flu swabs that were all unremarkable.  She was sent home on doxycycline and given a cough syrup.  Patient says she is progressively gotten worse and her shortness of air got much worse last night.  Patient denies any recent travel, sick contact, bad food exposure, or trauma.  Nothing seems to make it better except rest.  Any type of activity makes it worse.  Patient is able to tolerate p.o. food and fluid.  No therapy taken prior to arrival.  Patient denies any blood in her cough and says sometimes she will get up a little bit of phlegm but it is not discolored.      Review of Systems   All other systems reviewed and are negative.      Past Medical History:   Diagnosis Date    Arthritis     Sarabia esophagus     CAD (coronary artery disease)     Chronic back pain     Colon polyp     Diabetes mellitus     GERD (gastroesophageal reflux disease)     Hyperlipidemia     Hypertension     Myocardial infarction     1995    PAD (peripheral artery disease) 9/11/2019    Shingles        Allergies   Allergen Reactions    Codeine Itching    Other Unknown (See Comments)     Vicryl: doesn't heal       Past Surgical History:   Procedure Laterality Date    COLONOSCOPY      COLONOSCOPY N/A 6/24/2020    Procedure: COLONOSCOPY;  Surgeon: Mathew Roberts MD;  Location: [facility] OR;  Service: Gastroenterology;  Laterality: N/A;  Descending colon polyp x 2, Ascending colon polyp, Sigmoid colon polyp, Rectal polyp    COLONOSCOPY N/A 8/7/2023    Procedure: COLONOSCOPY;  Surgeon: Mathew Roberts MD;  Location: [facility] OR;  Service: Gastroenterology;  Laterality: N/A;  Normal    CORONARY ARTERY BYPASS GRAFT  1995    x 2 vessels    ENDOSCOPY N/A 6/24/2020    Procedure: ESOPHAGOGASTRODUODENOSCOPY;  Surgeon: Mathew Roberts MD;   Location:  [facility] OR;  Service: Gastroenterology;  Laterality: N/A;  Ulcerative esophagitis, Gastritis, Duodenitis  Duodenal biopsy, gastric biopsy, distal esophageal biopsy    ENDOSCOPY N/A 2020    Procedure: ESOPHAGOGASTRODUODENOSCOPY with biopsies;  Surgeon: Mathew Roberts MD;  Location:  [facility] OR;  Service: Gastroenterology;  Laterality: N/A;  Esophagitis  Sarabia's Esophagus  Hiatal hernia  Gastritis  Gastric biopsy  Distal esophagus biopsy    ENDOSCOPY N/A 2023    Procedure: Esophagogastroduedenoscopy;  Surgeon: Mathew Roberts MD;  Location:  [facility] OR;  Service: Gastroenterology;  Laterality: N/A;  Gastritis; short segment Sarabia's; Esophageal stricture- dilatation; Biopsies- gastric, distal esophagus    INNER EAR SURGERY      JOINT REPLACEMENT      right knee    LEG SURGERY      x 2 s/p fall    LUMBAR FUSION      L4/L5    SKIN GRAFT      to left lower leg x 2    TONSILLECTOMY      WRIST FUSION Left        Family History   Problem Relation Age of Onset    Heart disease Mother     Heart disease Father     Colon cancer Neg Hx     Colon polyps Neg Hx     Breast cancer Neg Hx        Social History     Socioeconomic History    Marital status:    Tobacco Use    Smoking status: Former     Types: Cigarettes     Quit date:      Years since quittin.0    Smokeless tobacco: Never    Tobacco comments:     QUIT    Vaping Use    Vaping Use: Never used   Substance and Sexual Activity    Alcohol use: Not Currently     Comment: occasional    Drug use: No    Sexual activity: Defer           Objective   Physical Exam  Vitals and nursing note reviewed.   Constitutional:       Appearance: She is well-developed.      Comments: Patient sitting in bed comfortably, talkative, friendly.   HENT:      Head: Normocephalic.      Mouth/Throat:      Mouth: Mucous membranes are moist.   Eyes:      Conjunctiva/sclera: Conjunctivae normal.   Neck:      Vascular: No JVD.   Cardiovascular:       Rate and Rhythm: Normal rate and regular rhythm.      Pulses:           Radial pulses are 2+ on the right side and 2+ on the left side.   Pulmonary:      Effort: Pulmonary effort is normal.      Breath sounds: Examination of the right-lower field reveals decreased breath sounds. Examination of the left-lower field reveals decreased breath sounds. No wheezing, rhonchi or rales.      Comments: Coarse bronchial breath sounds that clear with cough.  Chest:      Chest wall: No tenderness or crepitus.   Abdominal:      Palpations: Abdomen is soft.   Musculoskeletal:      Cervical back: Neck supple.      Right lower leg: No edema.      Left lower leg: No edema.   Lymphadenopathy:      Cervical: No cervical adenopathy.   Skin:     General: Skin is warm and dry.      Capillary Refill: Capillary refill takes 2 to 3 seconds.   Neurological:      Mental Status: She is alert and oriented to person, place, and time.   Psychiatric:         Mood and Affect: Mood normal.         Behavior: Behavior normal.         ECG 12 Lead      Date/Time: 12/25/2023 11:37 AM    Performed by: Ramón Núñez MD  Authorized by: Ramón Núñez MD  Interpreted by physician  Comparison: compared with previous ECG from 11/25/2023  Similar to previous ECG  Comments: Rate of 57, sinus bradycardia, normal axis, left ventricular hypertrophy, no acute ST elevation or depression.               ED Course                                             Medical Decision Making  Ddx upper respiratory infection, bronchitis, pneumonia, pleural effusion, pulmonary edema, heart failure, viral syndrome    XR Chest 2 View    Result Date: 12/25/2023  Cardiomegaly and pulmonary vascular congestion without overt signs of volume overload or superimposed acute process. Signer Name: Mac Walton MD Signed: 12/25/2023 12:43 PM EST [facility]    Labs Reviewed  COMPREHENSIVE METABOLIC PANEL - Abnormal; Notable for the following components:      Glucose                       105 (*)                BUN                           33 (*)                 Creatinine                    1.70 (*)               Sodium                        132 (*)                ALT (SGPT)                    48 (*)                 AST (SGOT)                    47 (*)                 eGFR                          31.7 (*)            All other components within normal limits         Narrative: GFR Normal >60                  Chronic Kidney Disease <60                  Kidney Failure <15                                    The GFR formula is only valid for adults with stable renal function between ages 18 and 70.  BNP (IN-HOUSE) - Abnormal; Notable for the following components:     proBNP                        5,231.0 (*)            All other components within normal limits         Narrative: This assay is used as an aid in the diagnosis of individuals suspected of having heart failure. It can be used as an aid in the diagnosis of acute decompensated heart failure (ADHF) in patients presenting with signs and symptoms of ADHF to the emergency department (ED). In addition, NT-proBNP of <300 pg/mL indicates ADHF is not likely.                                    Age Range Result Interpretation  NT-proBNP Concentration (pg/mL:                                                      <50             Positive            >450                                   Gray                 300-450                                    Negative             <300                                    50-75           Positive            >900                                  Bruce                300-900                                  Negative            <300                                                      >75             Positive            >1800                                  Bruce                300-1800                                  Negative            <300  SINGLE HSTROPONIN T - Abnormal; Notable for the  following components:     HS Troponin T                 18 (*)              All other components within normal limits         Narrative: High Sensitive Troponin T Reference Range:                  <14.0 ng/L- Negative Female for AMI                  <22.0 ng/L- Negative Male for AMI                  >=14 - Abnormal Female indicating possible myocardial injury.                  >=22 - Abnormal Male indicating possible myocardial injury.                   Clinicians would have to utilize clinical acumen, EKG, Troponin, and serial changes to determine if it is an Acute Myocardial Infarction or myocardial injury due to an underlying chronic condition.                                       CBC WITH AUTO DIFFERENTIAL - Abnormal; Notable for the following components:     WBC                           11.22 (*)               RBC                           3.06 (*)               Hemoglobin                    8.4 (*)                Hematocrit                    26.6 (*)               Lymphocyte %                  15.2 (*)               Immature Grans %              1.1 (*)                Neutrophils, Absolute         8.20 (*)               Immature Grans, Absolute      0.12 (*)            All other components within normal limits  SINGLE HSTROPONIN T - Abnormal; Notable for the following components:     HS Troponin T                 16 (*)              All other components within normal limits         Narrative: High Sensitive Troponin T Reference Range:                  <14.0 ng/L- Negative Female for AMI                  <22.0 ng/L- Negative Male for AMI                  >=14 - Abnormal Female indicating possible myocardial injury.                  >=22 - Abnormal Male indicating possible myocardial injury.                   Clinicians would have to utilize clinical acumen, EKG, Troponin, and serial changes to determine if it is an Acute Myocardial Infarction or myocardial injury due to an underlying chronic condition.                                        COVID-19 AND FLU A/B, NP SWAB IN TRANSPORT MEDIA 1 HR TAT - Normal         Narrative: Fact sheet for providers: https://www.fda.gov/media/431318/download                                    Fact sheet for patients: https://www.fda.gov/media/451718/download                                    Test performed by PCR.  RAINBOW DRAW         Narrative: The following orders were created for panel order [place] Draw.                  Procedure                               Abnormality         Status                                     ---------                               -----------         ------                                     Green Top (Gel)[[id number]]                                  Final result                               Lavender Top[[id number]]                                     Final result                               Gold Top - SST[[id number]]                                   Final result                               Light Blue Top[[id number]]                                   Final result                                                 Please view results for these tests on the individual orders.  CBC AND DIFFERENTIAL         Narrative: The following orders were created for panel order CBC & Differential.                  Procedure                               Abnormality         Status                                     ---------                               -----------         ------                                     CBC Auto Differential[[id number]]        Abnormal            Final result                                                 Please view results for these tests on the individual orders.  GREEN TOP  LAVENDER TOP  GOLD TOP - SST  LIGHT BLUE TOP      1435 Pt seen again prior to d/c.  Labs/Imaging reviewed and are remarkable for elevated BNP but no signs of pulmonary edema-low risk vascular congestion on chest x-ray.  Patient does have what appears to be an  SVEN but patient can tolerate fluids and says she will drink more at home.  I did not give any IV fluids for concern of the worsening CHF.  Patient was also informed about her progressive anemia that seems to be slowly going down.  She says she is going to follow-up with her doctor to get rechecked.  We did discuss that patients on blood thinners can slowly lose blood through the GI tract and she should monitor her stools.  Symptoms improved and pt feels better, vitals stable and pt. in NAD. Non-toxic. Comfortable. Ambulating without difficulty.  Tolerating po.  Relaxed breathing.  All questions personally answered at the bedside and all d/c instructions personally reviewed with pt.  Discussed the importance of close outpt. f/u and pt. understands this and agrees to do so.  Pt agrees to return to ED immediately for any new, persistent, or worsening symptoms.    EMR Dragon/Transcription disclaimer:  Much of this encounter note is an electronic transcription/translation of spoken language to printed text, aka voice recognition.  The electronic translation of spoken language may permit erroneous or at times nonsensical words or phrases to be inadvertently transcribed; although I have reviewed the note for such errors, some may still exist so please interpret based on surrounding text content.      Problems Addressed:  Acute congestive heart failure, unspecified heart failure type: complicated acute illness or injury  SVEN (acute kidney injury): complicated acute illness or injury  Anemia, unspecified type: complicated acute illness or injury  Bronchitis: complicated acute illness or injury    Amount and/or Complexity of Data Reviewed  Labs: ordered.  Radiology: ordered.  ECG/medicine tests: ordered.    Risk  Prescription drug management.        Final diagnoses:   SVEN (acute kidney injury)   Acute congestive heart failure, unspecified heart failure type   Anemia, unspecified type   Bronchitis       ED Disposition  ED  Disposition       ED Disposition   Discharge    Condition   Stable    Comment   --               Aliya Alejandro MD  [address] [facility]  [address]  [place] [address] [phone]    In 2 days           Medication List        Changed      amLODIPine 5 MG tablet  Commonly known as: NORVASC  Take 1 tablet by mouth Daily.  What changed: when to take this                 Ramón Núñez MD  12/26/23 [id number]

## 2023-12-26 LAB
QT INTERVAL: 444 MS
QTC INTERVAL: 434 MS

## 2024-01-02 ENCOUNTER — TRANSCRIBE ORDERS (OUTPATIENT)
Dept: ADMINISTRATIVE | Facility: HOSPITAL | Age: 74
End: 2024-01-02
Payer: MEDICARE

## 2024-01-02 ENCOUNTER — HOSPITAL ENCOUNTER (OUTPATIENT)
Dept: GENERAL RADIOLOGY | Facility: HOSPITAL | Age: 74
Discharge: HOME | End: 2024-01-02
Admitting: FAMILY MEDICINE
Payer: MEDICARE

## 2024-01-02 DIAGNOSIS — I50.43 CHF (CONGESTIVE HEART FAILURE), NYHA CLASS I, ACUTE ON CHRONIC, COMBINED: Primary | ICD-10-CM

## 2024-01-02 DIAGNOSIS — I50.43 CHF (CONGESTIVE HEART FAILURE), NYHA CLASS I, ACUTE ON CHRONIC, COMBINED: ICD-10-CM

## 2024-01-02 PROCEDURE — 71046 X-RAY EXAM CHEST 2 VIEWS: CPT

## 2024-01-09 ENCOUNTER — LAB (OUTPATIENT)
Dept: LAB | Facility: HOSPITAL | Age: 74
End: 2024-01-09
Payer: MEDICARE

## 2024-01-09 ENCOUNTER — TRANSCRIBE ORDERS (OUTPATIENT)
Dept: ADMINISTRATIVE | Facility: HOSPITAL | Age: 74
End: 2024-01-09
Payer: MEDICARE

## 2024-01-09 ENCOUNTER — TRANSCRIBE ORDERS (OUTPATIENT)
Dept: HOSPITALIST | Facility: HOSPITAL | Age: 74
End: 2024-01-09
Payer: MEDICARE

## 2024-01-09 DIAGNOSIS — I50.43 CHF (CONGESTIVE HEART FAILURE), NYHA CLASS I, ACUTE ON CHRONIC, COMBINED: ICD-10-CM

## 2024-01-09 DIAGNOSIS — I50.43 CHF (CONGESTIVE HEART FAILURE), NYHA CLASS I, ACUTE ON CHRONIC, COMBINED: Primary | ICD-10-CM

## 2024-01-09 DIAGNOSIS — I48.92 PAROXYSMAL ATRIAL FLUTTER: Primary | ICD-10-CM

## 2024-01-09 LAB
ANION GAP SERPL CALCULATED.3IONS-SCNC: 7.7 MMOL/L (ref 5–15)
BUN SERPL-MCNC: 19 MG/DL (ref 8–23)
BUN/CREAT SERPL: 13.1 (ref 7–25)
CALCIUM SPEC-SCNC: 9.3 MG/DL (ref 8.6–10.5)
CHLORIDE SERPL-SCNC: 100 MMOL/L (ref 98–107)
CO2 SERPL-SCNC: 24.3 MMOL/L (ref 22–29)
CREAT SERPL-MCNC: 1.45 MG/DL (ref 0.57–1)
DEPRECATED RDW RBC AUTO: 48.7 FL (ref 37–54)
EGFRCR SERPLBLD CKD-EPI 2021: 38.2 ML/MIN/1.73
ERYTHROCYTE [DISTWIDTH] IN BLOOD BY AUTOMATED COUNT: 15.8 % (ref 12.3–15.4)
GLUCOSE SERPL-MCNC: 95 MG/DL (ref 65–99)
HCT VFR BLD AUTO: 26.8 % (ref 34–46.6)
HGB BLD-MCNC: 8.4 G/DL (ref 12–15.9)
MCH RBC QN AUTO: 27.5 PG (ref 26.6–33)
MCHC RBC AUTO-ENTMCNC: 31.3 G/DL (ref 31.5–35.7)
MCV RBC AUTO: 87.6 FL (ref 79–97)
NT-PROBNP SERPL-MCNC: 3229 PG/ML (ref 0–900)
PLATELET # BLD AUTO: 372 10*3/MM3 (ref 140–450)
PMV BLD AUTO: 8.8 FL (ref 6–12)
POTASSIUM SERPL-SCNC: 4.9 MMOL/L (ref 3.5–5.2)
RBC # BLD AUTO: 3.06 10*6/MM3 (ref 3.77–5.28)
SODIUM SERPL-SCNC: 132 MMOL/L (ref 136–145)
WBC NRBC COR # BLD AUTO: 7.61 10*3/MM3 (ref 3.4–10.8)

## 2024-01-09 PROCEDURE — 36415 COLL VENOUS BLD VENIPUNCTURE: CPT

## 2024-01-09 PROCEDURE — 83880 ASSAY OF NATRIURETIC PEPTIDE: CPT

## 2024-01-09 PROCEDURE — 85027 COMPLETE CBC AUTOMATED: CPT

## 2024-01-09 PROCEDURE — 80048 BASIC METABOLIC PNL TOTAL CA: CPT

## 2024-01-11 ENCOUNTER — TRANSCRIBE ORDERS (OUTPATIENT)
Dept: ADMINISTRATIVE | Facility: HOSPITAL | Age: 74
End: 2024-01-11
Payer: MEDICARE

## 2024-01-11 DIAGNOSIS — I48.92 PAROXYSMAL ATRIAL FLUTTER: Primary | ICD-10-CM

## 2024-01-12 DIAGNOSIS — I48.91 ATRIAL FIBRILLATION, UNSPECIFIED TYPE: ICD-10-CM

## 2024-01-12 RX ORDER — AMIODARONE HYDROCHLORIDE 200 MG/1
200 TABLET ORAL DAILY
Qty: 90 TABLET | Refills: 1 | Status: SHIPPED | OUTPATIENT
Start: 2024-01-12

## 2024-01-15 ENCOUNTER — HOSPITAL ENCOUNTER (OUTPATIENT)
Dept: CARDIOLOGY | Facility: HOSPITAL | Age: 74
Discharge: HOME | End: 2024-01-15
Payer: MEDICARE

## 2024-01-15 VITALS
HEIGHT: 62 IN | WEIGHT: 185 LBS | SYSTOLIC BLOOD PRESSURE: 169 MMHG | HEART RATE: 55 BPM | DIASTOLIC BLOOD PRESSURE: 67 MMHG | BODY MASS INDEX: 34.04 KG/M2

## 2024-01-15 DIAGNOSIS — I48.92 PAROXYSMAL ATRIAL FLUTTER: ICD-10-CM

## 2024-01-15 LAB
AORTIC ARCH: 2 CM
AORTIC DIMENSIONLESS INDEX: 0.7 (DI)
ASCENDING AORTA: 2.4 CM
BH CV ECHO MEAS - ACS: 1.93 CM
BH CV ECHO MEAS - AO MAX PG: 8 MMHG
BH CV ECHO MEAS - AO MEAN PG: 5.3 MMHG
BH CV ECHO MEAS - AO ROOT DIAM: 2.25 CM
BH CV ECHO MEAS - AO V2 MAX: 141.2 CM/SEC
BH CV ECHO MEAS - AO V2 VTI: 41.4 CM
BH CV ECHO MEAS - AVA(I,D): 1.72 CM2
BH CV ECHO MEAS - EDV(CUBED): 131 ML
BH CV ECHO MEAS - EDV(MOD-SP2): 44 ML
BH CV ECHO MEAS - EDV(MOD-SP4): 79 ML
BH CV ECHO MEAS - EF(MOD-BP): 65.1 %
BH CV ECHO MEAS - EF(MOD-SP2): 65.9 %
BH CV ECHO MEAS - EF(MOD-SP4): 65.8 %
BH CV ECHO MEAS - ESV(CUBED): 35.2 ML
BH CV ECHO MEAS - ESV(MOD-SP2): 15 ML
BH CV ECHO MEAS - ESV(MOD-SP4): 27 ML
BH CV ECHO MEAS - FS: 35.5 %
BH CV ECHO MEAS - IVS/LVPW: 0.68 CM
BH CV ECHO MEAS - IVSD: 0.94 CM
BH CV ECHO MEAS - LA DIMENSION: 3.9 CM
BH CV ECHO MEAS - LAT PEAK E' VEL: 14.9 CM/SEC
BH CV ECHO MEAS - LV DIASTOLIC VOL/BSA (35-75): 42.7 CM2
BH CV ECHO MEAS - LV MASS(C)D: 230.6 GRAMS
BH CV ECHO MEAS - LV MAX PG: 4.4 MMHG
BH CV ECHO MEAS - LV MEAN PG: 2.43 MMHG
BH CV ECHO MEAS - LV SYSTOLIC VOL/BSA (12-30): 14.6 CM2
BH CV ECHO MEAS - LV V1 MAX: 104.8 CM/SEC
BH CV ECHO MEAS - LV V1 VTI: 28.4 CM
BH CV ECHO MEAS - LVIDD: 5.1 CM
BH CV ECHO MEAS - LVIDS: 3.3 CM
BH CV ECHO MEAS - LVOT AREA: 2.5 CM2
BH CV ECHO MEAS - LVOT DIAM: 1.78 CM
BH CV ECHO MEAS - LVPWD: 1.39 CM
BH CV ECHO MEAS - MED PEAK E' VEL: 8.2 CM/SEC
BH CV ECHO MEAS - MV A DUR: 0.16 SEC
BH CV ECHO MEAS - MV A MAX VEL: 82.3 CM/SEC
BH CV ECHO MEAS - MV DEC SLOPE: 607.7 CM/SEC2
BH CV ECHO MEAS - MV DEC TIME: 165 SEC
BH CV ECHO MEAS - MV E MAX VEL: 138 CM/SEC
BH CV ECHO MEAS - MV E/A: 1.68
BH CV ECHO MEAS - MV MAX PG: 5.5 MMHG
BH CV ECHO MEAS - MV MEAN PG: 1.98 MMHG
BH CV ECHO MEAS - MV P1/2T: 56.9 MSEC
BH CV ECHO MEAS - MV V2 VTI: 38.1 CM
BH CV ECHO MEAS - MVA(P1/2T): 3.9 CM2
BH CV ECHO MEAS - MVA(VTI): 1.86 CM2
BH CV ECHO MEAS - PA ACC TIME: 0.13 SEC
BH CV ECHO MEAS - PA V2 MAX: 146 CM/SEC
BH CV ECHO MEAS - PI END-D VEL: 78.6 CM/SEC
BH CV ECHO MEAS - PULM A REVS DUR: 0.21 SEC
BH CV ECHO MEAS - PULM A REVS VEL: 30.3 CM/SEC
BH CV ECHO MEAS - PULM DIAS VEL: 55.4 CM/SEC
BH CV ECHO MEAS - PULM S/D: 1.15
BH CV ECHO MEAS - PULM SYS VEL: 63.5 CM/SEC
BH CV ECHO MEAS - QP/QS: 1.46
BH CV ECHO MEAS - RAP SYSTOLE: 3 MMHG
BH CV ECHO MEAS - RV MAX PG: 3.3 MMHG
BH CV ECHO MEAS - RV V1 MAX: 90.7 CM/SEC
BH CV ECHO MEAS - RV V1 VTI: 23.7 CM
BH CV ECHO MEAS - RVDD: 2.9 CM
BH CV ECHO MEAS - RVOT DIAM: 2.36 CM
BH CV ECHO MEAS - RVSP: 33.5 MMHG
BH CV ECHO MEAS - SI(MOD-SP2): 15.7 ML/M2
BH CV ECHO MEAS - SI(MOD-SP4): 28.1 ML/M2
BH CV ECHO MEAS - SV(LVOT): 71 ML
BH CV ECHO MEAS - SV(MOD-SP2): 29 ML
BH CV ECHO MEAS - SV(MOD-SP4): 52 ML
BH CV ECHO MEAS - SV(RVOT): 103.6 ML
BH CV ECHO MEAS - TAPSE (>1.6): 1.78 CM
BH CV ECHO MEAS - TR MAX PG: 30.5 MMHG
BH CV ECHO MEAS - TR MAX VEL: 275.9 CM/SEC
BH CV ECHO MEASUREMENTS AVERAGE E/E' RATIO: 11.95
BH CV XLRA - RV BASE: 3.8 CM
BH CV XLRA - RV LENGTH: 7.1 CM
BH CV XLRA - RV MID: 3.4 CM
BH CV XLRA - TDI S': 9.6 CM/SEC
LEFT ATRIUM VOLUME INDEX: 34.2 ML/M2
SINUS: 2.13 CM
STJ: 1.77 CM

## 2024-01-15 PROCEDURE — 93225 XTRNL ECG REC<48 HRS REC: CPT

## 2024-01-15 PROCEDURE — 93226 XTRNL ECG REC<48 HR SCAN A/R: CPT

## 2024-01-15 PROCEDURE — 93306 TTE W/DOPPLER COMPLETE: CPT

## 2024-02-25 ENCOUNTER — APPOINTMENT (OUTPATIENT)
Dept: GENERAL RADIOLOGY | Facility: HOSPITAL | Age: 74
End: 2024-02-25
Payer: MEDICARE

## 2024-02-25 ENCOUNTER — HOSPITAL ENCOUNTER (OUTPATIENT)
Facility: HOSPITAL | Age: 74
Setting detail: OBSERVATION
Discharge: HOME | End: 2024-02-28
Attending: EMERGENCY MEDICINE | Admitting: INTERNAL MEDICINE
Payer: MEDICARE

## 2024-02-25 DIAGNOSIS — I44.0 FIRST DEGREE AV BLOCK: ICD-10-CM

## 2024-02-25 DIAGNOSIS — R00.1 SINUS BRADYCARDIA: ICD-10-CM

## 2024-02-25 DIAGNOSIS — R79.89 ELEVATED TROPONIN: ICD-10-CM

## 2024-02-25 DIAGNOSIS — R07.9 CHEST PAIN, UNSPECIFIED TYPE: Primary | ICD-10-CM

## 2024-02-25 LAB
ALBUMIN SERPL-MCNC: 3.9 G/DL (ref 3.5–5.2)
ALBUMIN/GLOB SERPL: 1.7 G/DL
ALP SERPL-CCNC: 71 U/L (ref 39–117)
ALT SERPL W P-5'-P-CCNC: 57 U/L (ref 1–33)
ANION GAP SERPL CALCULATED.3IONS-SCNC: 11.9 MMOL/L (ref 5–15)
AST SERPL-CCNC: 41 U/L (ref 1–32)
BASOPHILS # BLD AUTO: 0.06 10*3/MM3 (ref 0–0.2)
BASOPHILS NFR BLD AUTO: 0.7 % (ref 0–1.5)
BILIRUB SERPL-MCNC: <0.2 MG/DL (ref 0–1.2)
BUN SERPL-MCNC: 32 MG/DL (ref 8–23)
BUN/CREAT SERPL: 22.4 (ref 7–25)
CALCIUM SPEC-SCNC: 8.9 MG/DL (ref 8.6–10.5)
CHLORIDE SERPL-SCNC: 101 MMOL/L (ref 98–107)
CO2 SERPL-SCNC: 20.1 MMOL/L (ref 22–29)
CREAT SERPL-MCNC: 1.43 MG/DL (ref 0.57–1)
D DIMER PPP FEU-MCNC: 0.58 MCGFEU/ML (ref 0–0.73)
DEPRECATED RDW RBC AUTO: 54.6 FL (ref 37–54)
EGFRCR SERPLBLD CKD-EPI 2021: 38.8 ML/MIN/1.73
EOSINOPHIL # BLD AUTO: 0.27 10*3/MM3 (ref 0–0.4)
EOSINOPHIL NFR BLD AUTO: 3.2 % (ref 0.3–6.2)
ERYTHROCYTE [DISTWIDTH] IN BLOOD BY AUTOMATED COUNT: 16.5 % (ref 12.3–15.4)
GEN 5 2HR TROPONIN T REFLEX: 19 NG/L
GLOBULIN UR ELPH-MCNC: 2.3 GM/DL
GLUCOSE BLDC GLUCOMTR-MCNC: 102 MG/DL (ref 70–130)
GLUCOSE BLDC GLUCOMTR-MCNC: 146 MG/DL (ref 70–130)
GLUCOSE SERPL-MCNC: 109 MG/DL (ref 65–99)
HCT VFR BLD AUTO: 28.8 % (ref 34–46.6)
HGB BLD-MCNC: 9 G/DL (ref 12–15.9)
HOLD SPECIMEN: NORMAL
HOLD SPECIMEN: NORMAL
IMM GRANULOCYTES # BLD AUTO: 0.05 10*3/MM3 (ref 0–0.05)
IMM GRANULOCYTES NFR BLD AUTO: 0.6 % (ref 0–0.5)
LYMPHOCYTES # BLD AUTO: 1.33 10*3/MM3 (ref 0.7–3.1)
LYMPHOCYTES NFR BLD AUTO: 15.9 % (ref 19.6–45.3)
MCH RBC QN AUTO: 28.5 PG (ref 26.6–33)
MCHC RBC AUTO-ENTMCNC: 31.3 G/DL (ref 31.5–35.7)
MCV RBC AUTO: 91.1 FL (ref 79–97)
MONOCYTES # BLD AUTO: 0.66 10*3/MM3 (ref 0.1–0.9)
MONOCYTES NFR BLD AUTO: 7.9 % (ref 5–12)
NEUTROPHILS NFR BLD AUTO: 6 10*3/MM3 (ref 1.7–7)
NEUTROPHILS NFR BLD AUTO: 71.7 % (ref 42.7–76)
NRBC BLD AUTO-RTO: 0 /100 WBC (ref 0–0.2)
NT-PROBNP SERPL-MCNC: 3280 PG/ML (ref 0–900)
PLATELET # BLD AUTO: 406 10*3/MM3 (ref 140–450)
PMV BLD AUTO: 9.7 FL (ref 6–12)
POTASSIUM SERPL-SCNC: 5 MMOL/L (ref 3.5–5.2)
PROT SERPL-MCNC: 6.2 G/DL (ref 6–8.5)
QT INTERVAL: 450 MS
QT INTERVAL: 465 MS
QT INTERVAL: 502 MS
QTC INTERVAL: 425 MS
QTC INTERVAL: 443 MS
QTC INTERVAL: 481 MS
RBC # BLD AUTO: 3.16 10*6/MM3 (ref 3.77–5.28)
SODIUM SERPL-SCNC: 133 MMOL/L (ref 136–145)
TROPONIN T DELTA: 4 NG/L
TROPONIN T SERPL HS-MCNC: 15 NG/L
TROPONIN T SERPL HS-MCNC: 22 NG/L
WBC NRBC COR # BLD AUTO: 8.37 10*3/MM3 (ref 3.4–10.8)
WHOLE BLOOD HOLD COAG: NORMAL
WHOLE BLOOD HOLD SPECIMEN: NORMAL

## 2024-02-25 PROCEDURE — 93005 ELECTROCARDIOGRAM TRACING: CPT | Performed by: EMERGENCY MEDICINE

## 2024-02-25 PROCEDURE — 25010000002 FUROSEMIDE PER 20 MG: Performed by: EMERGENCY MEDICINE

## 2024-02-25 PROCEDURE — 99223 1ST HOSP IP/OBS HIGH 75: CPT | Performed by: INTERNAL MEDICINE

## 2024-02-25 PROCEDURE — 84484 ASSAY OF TROPONIN QUANT: CPT | Performed by: INTERNAL MEDICINE

## 2024-02-25 PROCEDURE — 96375 TX/PRO/DX INJ NEW DRUG ADDON: CPT

## 2024-02-25 PROCEDURE — 93005 ELECTROCARDIOGRAM TRACING: CPT | Performed by: INTERNAL MEDICINE

## 2024-02-25 PROCEDURE — 96376 TX/PRO/DX INJ SAME DRUG ADON: CPT

## 2024-02-25 PROCEDURE — 93005 ELECTROCARDIOGRAM TRACING: CPT

## 2024-02-25 PROCEDURE — 83880 ASSAY OF NATRIURETIC PEPTIDE: CPT | Performed by: EMERGENCY MEDICINE

## 2024-02-25 PROCEDURE — 93010 ELECTROCARDIOGRAM REPORT: CPT | Performed by: INTERNAL MEDICINE

## 2024-02-25 PROCEDURE — G0378 HOSPITAL OBSERVATION PER HR: HCPCS

## 2024-02-25 PROCEDURE — 36415 COLL VENOUS BLD VENIPUNCTURE: CPT

## 2024-02-25 PROCEDURE — 25010000002 GLUCAGON (RDNA) PER 1 MG: Performed by: INTERNAL MEDICINE

## 2024-02-25 PROCEDURE — 63710000001 INSULIN DETEMIR PER 5 UNITS: Performed by: INTERNAL MEDICINE

## 2024-02-25 PROCEDURE — 83036 HEMOGLOBIN GLYCOSYLATED A1C: CPT | Performed by: HOSPITALIST

## 2024-02-25 PROCEDURE — 85025 COMPLETE CBC W/AUTO DIFF WBC: CPT | Performed by: EMERGENCY MEDICINE

## 2024-02-25 PROCEDURE — 82948 REAGENT STRIP/BLOOD GLUCOSE: CPT

## 2024-02-25 PROCEDURE — 85379 FIBRIN DEGRADATION QUANT: CPT | Performed by: EMERGENCY MEDICINE

## 2024-02-25 PROCEDURE — 80053 COMPREHEN METABOLIC PANEL: CPT | Performed by: EMERGENCY MEDICINE

## 2024-02-25 PROCEDURE — 96374 THER/PROPH/DIAG INJ IV PUSH: CPT

## 2024-02-25 PROCEDURE — 71046 X-RAY EXAM CHEST 2 VIEWS: CPT

## 2024-02-25 PROCEDURE — 99285 EMERGENCY DEPT VISIT HI MDM: CPT

## 2024-02-25 PROCEDURE — 84484 ASSAY OF TROPONIN QUANT: CPT | Performed by: EMERGENCY MEDICINE

## 2024-02-25 RX ORDER — DEXTROSE MONOHYDRATE 25 G/50ML
10-50 INJECTION, SOLUTION INTRAVENOUS
Status: DISCONTINUED | OUTPATIENT
Start: 2024-02-25 | End: 2024-02-28 | Stop reason: HOSPADM

## 2024-02-25 RX ORDER — HYDRALAZINE HYDROCHLORIDE 50 MG/1
100 TABLET, FILM COATED ORAL 3 TIMES DAILY
Status: DISCONTINUED | OUTPATIENT
Start: 2024-02-25 | End: 2024-02-28 | Stop reason: HOSPADM

## 2024-02-25 RX ORDER — FUROSEMIDE 40 MG/1
40 TABLET ORAL DAILY PRN
COMMUNITY
End: 2024-02-28 | Stop reason: HOSPADM

## 2024-02-25 RX ORDER — IBUPROFEN 600 MG/1
1 TABLET ORAL
Status: DISCONTINUED | OUTPATIENT
Start: 2024-02-25 | End: 2024-02-28 | Stop reason: HOSPADM

## 2024-02-25 RX ORDER — SPIRONOLACTONE 25 MG/1
25 TABLET ORAL DAILY
Status: DISCONTINUED | OUTPATIENT
Start: 2024-02-25 | End: 2024-02-25

## 2024-02-25 RX ORDER — PANTOPRAZOLE SODIUM 40 MG/1
40 TABLET, DELAYED RELEASE ORAL 2 TIMES DAILY
Status: DISCONTINUED | OUTPATIENT
Start: 2024-02-25 | End: 2024-02-28 | Stop reason: HOSPADM

## 2024-02-25 RX ORDER — INSULIN ASPART 100 [IU]/ML
1-200 INJECTION, SOLUTION INTRAVENOUS; SUBCUTANEOUS EVERY 6 HOURS SCHEDULED
Status: DISCONTINUED | OUTPATIENT
Start: 2024-02-25 | End: 2024-02-28 | Stop reason: HOSPADM

## 2024-02-25 RX ORDER — NICOTINE POLACRILEX 4 MG
15 LOZENGE BUCCAL
Status: DISCONTINUED | OUTPATIENT
Start: 2024-02-25 | End: 2024-02-28 | Stop reason: HOSPADM

## 2024-02-25 RX ORDER — SODIUM CHLORIDE 0.9 % (FLUSH) 0.9 %
10 SYRINGE (ML) INJECTION AS NEEDED
Status: DISCONTINUED | OUTPATIENT
Start: 2024-02-25 | End: 2024-02-28 | Stop reason: HOSPADM

## 2024-02-25 RX ORDER — AMIODARONE HYDROCHLORIDE 200 MG/1
200 TABLET ORAL DAILY
Status: DISCONTINUED | OUTPATIENT
Start: 2024-02-25 | End: 2024-02-28 | Stop reason: HOSPADM

## 2024-02-25 RX ORDER — SODIUM CHLORIDE 9 MG/ML
40 INJECTION, SOLUTION INTRAVENOUS AS NEEDED
Status: DISCONTINUED | OUTPATIENT
Start: 2024-02-25 | End: 2024-02-28 | Stop reason: HOSPADM

## 2024-02-25 RX ORDER — SPIRONOLACTONE 25 MG/1
25 TABLET ORAL DAILY
Status: DISCONTINUED | OUTPATIENT
Start: 2024-02-25 | End: 2024-02-26

## 2024-02-25 RX ORDER — BISACODYL 5 MG/1
5 TABLET, DELAYED RELEASE ORAL DAILY PRN
Status: DISCONTINUED | OUTPATIENT
Start: 2024-02-25 | End: 2024-02-28 | Stop reason: HOSPADM

## 2024-02-25 RX ORDER — ALLOPURINOL 100 MG/1
100 TABLET ORAL 2 TIMES DAILY
Status: DISCONTINUED | OUTPATIENT
Start: 2024-02-25 | End: 2024-02-28 | Stop reason: HOSPADM

## 2024-02-25 RX ORDER — AMLODIPINE BESYLATE 5 MG/1
10 TABLET ORAL
Status: DISCONTINUED | OUTPATIENT
Start: 2024-02-25 | End: 2024-02-25

## 2024-02-25 RX ORDER — ASPIRIN 81 MG/1
324 TABLET, CHEWABLE ORAL ONCE
Status: COMPLETED | OUTPATIENT
Start: 2024-02-25 | End: 2024-02-25

## 2024-02-25 RX ORDER — IBUPROFEN 600 MG/1
4 TABLET ORAL ONCE
Status: COMPLETED | OUTPATIENT
Start: 2024-02-25 | End: 2024-02-25

## 2024-02-25 RX ORDER — FAMOTIDINE 10 MG/ML
20 INJECTION, SOLUTION INTRAVENOUS ONCE
Status: COMPLETED | OUTPATIENT
Start: 2024-02-25 | End: 2024-02-25

## 2024-02-25 RX ORDER — HYDROCHLOROTHIAZIDE 12.5 MG/1
12.5 TABLET ORAL DAILY
Status: DISCONTINUED | OUTPATIENT
Start: 2024-02-25 | End: 2024-02-26

## 2024-02-25 RX ORDER — CLONIDINE HYDROCHLORIDE 0.2 MG/1
0.2 TABLET ORAL 3 TIMES DAILY
Status: DISCONTINUED | OUTPATIENT
Start: 2024-02-25 | End: 2024-02-26

## 2024-02-25 RX ORDER — ATENOLOL 25 MG/1
25 TABLET ORAL DAILY
Status: DISCONTINUED | OUTPATIENT
Start: 2024-02-25 | End: 2024-02-25

## 2024-02-25 RX ORDER — BISACODYL 10 MG
10 SUPPOSITORY, RECTAL RECTAL DAILY PRN
Status: DISCONTINUED | OUTPATIENT
Start: 2024-02-25 | End: 2024-02-28 | Stop reason: HOSPADM

## 2024-02-25 RX ORDER — POLYETHYLENE GLYCOL 3350 17 G/17G
17 POWDER, FOR SOLUTION ORAL DAILY PRN
Status: DISCONTINUED | OUTPATIENT
Start: 2024-02-25 | End: 2024-02-28 | Stop reason: HOSPADM

## 2024-02-25 RX ORDER — AMOXICILLIN 250 MG
2 CAPSULE ORAL 2 TIMES DAILY
Status: DISCONTINUED | OUTPATIENT
Start: 2024-02-25 | End: 2024-02-28 | Stop reason: HOSPADM

## 2024-02-25 RX ORDER — FUROSEMIDE 10 MG/ML
40 INJECTION INTRAMUSCULAR; INTRAVENOUS ONCE
Status: COMPLETED | OUTPATIENT
Start: 2024-02-25 | End: 2024-02-25

## 2024-02-25 RX ORDER — LOSARTAN POTASSIUM 50 MG/1
100 TABLET ORAL
Status: DISCONTINUED | OUTPATIENT
Start: 2024-02-25 | End: 2024-02-26

## 2024-02-25 RX ORDER — ROSUVASTATIN CALCIUM 5 MG/1
10 TABLET, COATED ORAL DAILY
Status: DISCONTINUED | OUTPATIENT
Start: 2024-02-25 | End: 2024-02-28 | Stop reason: HOSPADM

## 2024-02-25 RX ORDER — INSULIN ASPART 100 [IU]/ML
1-200 INJECTION, SOLUTION INTRAVENOUS; SUBCUTANEOUS AS NEEDED
Status: DISCONTINUED | OUTPATIENT
Start: 2024-02-25 | End: 2024-02-28 | Stop reason: HOSPADM

## 2024-02-25 RX ORDER — ESCITALOPRAM OXALATE 10 MG/1
5 TABLET ORAL DAILY
Status: DISCONTINUED | OUTPATIENT
Start: 2024-02-25 | End: 2024-02-28 | Stop reason: HOSPADM

## 2024-02-25 RX ORDER — NITROGLYCERIN 0.4 MG/1
0.4 TABLET SUBLINGUAL
Status: DISCONTINUED | OUTPATIENT
Start: 2024-02-25 | End: 2024-02-28 | Stop reason: HOSPADM

## 2024-02-25 RX ORDER — CLOPIDOGREL BISULFATE 75 MG/1
75 TABLET ORAL DAILY
Status: DISCONTINUED | OUTPATIENT
Start: 2024-02-25 | End: 2024-02-28 | Stop reason: HOSPADM

## 2024-02-25 RX ORDER — SODIUM CHLORIDE 0.9 % (FLUSH) 0.9 %
10 SYRINGE (ML) INJECTION EVERY 12 HOURS SCHEDULED
Status: DISCONTINUED | OUTPATIENT
Start: 2024-02-25 | End: 2024-02-28 | Stop reason: HOSPADM

## 2024-02-25 RX ORDER — TRAMADOL HYDROCHLORIDE 50 MG/1
50 TABLET ORAL EVERY 6 HOURS PRN
Status: DISCONTINUED | OUTPATIENT
Start: 2024-02-25 | End: 2024-02-28 | Stop reason: HOSPADM

## 2024-02-25 RX ORDER — FERROUS SULFATE 325(65) MG
325 TABLET ORAL
Status: ON HOLD | COMMUNITY

## 2024-02-25 RX ADMIN — HYDRALAZINE HYDROCHLORIDE 100 MG: 50 TABLET, FILM COATED ORAL at 21:52

## 2024-02-25 RX ADMIN — LOSARTAN POTASSIUM 100 MG: 50 TABLET, FILM COATED ORAL at 22:12

## 2024-02-25 RX ADMIN — DOCUSATE SODIUM 50 MG AND SENNOSIDES 8.6 MG 2 TABLET: 8.6; 5 TABLET, FILM COATED ORAL at 22:13

## 2024-02-25 RX ADMIN — NITROGLYCERIN 1 INCH: 20 OINTMENT TOPICAL at 10:04

## 2024-02-25 RX ADMIN — SPIRONOLACTONE 25 MG: 25 TABLET ORAL at 22:18

## 2024-02-25 RX ADMIN — ESCITALOPRAM OXALATE 5 MG: 10 TABLET, FILM COATED ORAL at 21:53

## 2024-02-25 RX ADMIN — FUROSEMIDE 40 MG: 10 INJECTION, SOLUTION INTRAMUSCULAR; INTRAVENOUS at 12:21

## 2024-02-25 RX ADMIN — PANTOPRAZOLE SODIUM 40 MG: 40 TABLET, DELAYED RELEASE ORAL at 22:12

## 2024-02-25 RX ADMIN — APIXABAN 5 MG: 2.5 TABLET, FILM COATED ORAL at 21:52

## 2024-02-25 RX ADMIN — ASPIRIN 81 MG CHEWABLE TABLET 324 MG: 81 TABLET CHEWABLE at 10:04

## 2024-02-25 RX ADMIN — GLUCAGON HYDROCHLORIDE 4 MG: KIT at 16:36

## 2024-02-25 RX ADMIN — CLOPIDOGREL BISULFATE 75 MG: 75 TABLET ORAL at 21:53

## 2024-02-25 RX ADMIN — CLONIDINE HYDROCHLORIDE 0.2 MG: 0.2 TABLET ORAL at 16:43

## 2024-02-25 RX ADMIN — Medication 10 ML: at 15:37

## 2024-02-25 RX ADMIN — FAMOTIDINE 20 MG: 10 INJECTION, SOLUTION INTRAVENOUS at 10:04

## 2024-02-25 RX ADMIN — ALLOPURINOL 100 MG: 100 TABLET ORAL at 21:53

## 2024-02-25 NOTE — ED NOTES
Patient got out of bed to put her slippers on and sat on the provider stool in the room that has wheels. Staff heard a noise and went into room and patient was sitting on the ground on her bottom . Patient states when she went to sit on the stool she did not realize the stool had wheels and slid out from underneath her. Patient was assisted to the chair with legs for patients and visitors in the room. Patient adamant  that she is okay and wants to walk to the bathroom. Patient was assisted X 1 to the restroom and ambulated with a steady gait. Once again she is adamant that she did not hurt anything, hit her head, or have any pain anywhere. Patient was educated that if she wants to sit in a chair that she needs to sit in the chair that has legs and not wheels as that the one with wheels is reserved for staff. Charge RN Timothy quinetros. Safe repot filed.

## 2024-02-25 NOTE — PLAN OF CARE
Goal Outcome Evaluation:  Plan of Care Reviewed With: patient        Progress: improving  Outcome Evaluation: vss. pt new admit from ER. a&o x4. tele, running sinus lilian HR 50's, will drop to high 40's, MD aware. glucagon ordered. diet in place for today; NPO at midnight. [facility] consult in place. afternoon hydralazine and clonidine both held per BP parameters- MD aware. pt up with stand by assist to bathroom. pt denies pain/n/v/d at this time. no other complaints at this time.

## 2024-02-25 NOTE — ED PROVIDER NOTES
Subjective     History provided by:  Patient and medical records    History of Present Illness    Chief complaint: Chest pain    Location: Just to the left of the sternum that on occasion is radiating down the left arm.    Quality/Severity: Described as heaviness and hot.  Initial episode lasted about an hour.  Pain comes and goes fleetingly since.  She is currently pain-free.    Timing/Onset: Started this morning.    Modifying Factors: None known.    Associated symptoms: Associated shortness of breath.  Associated nausea and vomiting.  Denies diaphoresis.    Narrative: The patient is a 74-year-old white female who had chest pain this morning that lasted about an hour.  She described as heaviness to the left of her sternum with a brief episode where it radiated down the left arm.  She states the pain feels similar to the heart attack she had in 1995 at which time she saw me in the ER.  She was administered thrombolytics and ended up having a two-vessel CABG.  She states her Apple Watch told her that her heart rate was low this morning with the chest pain, but did not say she was in atrial flutter.  She does have a history of paroxysmal atrial flutter.  She states her Apple Watch has been telling her recently that her heart rates been dropping.  Reviewing her records in epic show that she had a marked cardial perfusion stress test 9/10/2023 that showed no evidence of coronary ischemia and a normal EF of 70%.  Dr. Frye is her cardiologist.    Review of Systems  Past Medical History:   Diagnosis Date    Arthritis     Sarabia esophagus     CAD (coronary artery disease)     Chronic back pain     Colon polyp     Diabetes mellitus     GERD (gastroesophageal reflux disease)     Hyperlipidemia     Hypertension     Myocardial infarction     1995    PAD (peripheral artery disease) 9/11/2019    Shingles      /51 (BP Location: Right arm, Patient Position: Lying)   Pulse 55   Temp 97.5 °F (36.4 °C) (Oral)   Resp 18    "Ht 157.5 cm (62.01\")   Wt 87.2 kg (192 lb 3.2 oz)   SpO2 95%   BMI 35.14 kg/m²     Past Medical History:   Diagnosis Date    Arthritis     Sarabia esophagus     CAD (coronary artery disease)     Chronic back pain     Colon polyp     Diabetes mellitus     GERD (gastroesophageal reflux disease)     Hyperlipidemia     Hypertension     Myocardial infarction     1995    PAD (peripheral artery disease) 9/11/2019    Shingles        Allergies   Allergen Reactions    Codeine Itching    Other Unknown (See Comments)     Vicryl: doesn't heal       Past Surgical History:   Procedure Laterality Date    COLONOSCOPY      COLONOSCOPY N/A 6/24/2020    Procedure: COLONOSCOPY;  Surgeon: Mathew Roberts MD;  Location: [facility] OR;  Service: Gastroenterology;  Laterality: N/A;  Descending colon polyp x 2, Ascending colon polyp, Sigmoid colon polyp, Rectal polyp    COLONOSCOPY N/A 8/7/2023    Procedure: COLONOSCOPY;  Surgeon: Mathew Roberts MD;  Location: [facility] OR;  Service: Gastroenterology;  Laterality: N/A;  Normal    CORONARY ARTERY BYPASS GRAFT  1995    x 2 vessels    ENDOSCOPY N/A 6/24/2020    Procedure: ESOPHAGOGASTRODUODENOSCOPY;  Surgeon: Mathew Roberts MD;  Location: [facility] OR;  Service: Gastroenterology;  Laterality: N/A;  Ulcerative esophagitis, Gastritis, Duodenitis  Duodenal biopsy, gastric biopsy, distal esophageal biopsy    ENDOSCOPY N/A 9/17/2020    Procedure: ESOPHAGOGASTRODUODENOSCOPY with biopsies;  Surgeon: Mathew Roberts MD;  Location: [facility] OR;  Service: Gastroenterology;  Laterality: N/A;  Esophagitis  Sarabia's Esophagus  Hiatal hernia  Gastritis  Gastric biopsy  Distal esophagus biopsy    ENDOSCOPY N/A 8/7/2023    Procedure: Esophagogastroduedenoscopy;  Surgeon: Mathew Roberts MD;  Location: [facility] OR;  Service: Gastroenterology;  Laterality: N/A;  Gastritis; short segment Sarabia's; Esophageal stricture- dilatation; Biopsies- gastric, distal " esophagus    INNER EAR SURGERY      JOINT REPLACEMENT      right knee    LEG SURGERY      x 2 s/p fall    LUMBAR FUSION      L4/L5    SKIN GRAFT      to left lower leg x 2    TONSILLECTOMY      WRIST FUSION Left        Family History   Problem Relation Age of Onset    Heart disease Mother     Heart disease Father     Colon cancer Neg Hx     Colon polyps Neg Hx     Breast cancer Neg Hx        Social History     Socioeconomic History    Marital status:    Tobacco Use    Smoking status: Former     Types: Cigarettes     Quit date:      Years since quittin.1    Smokeless tobacco: Never    Tobacco comments:     QUIT    Vaping Use    Vaping Use: Never used   Substance and Sexual Activity    Alcohol use: Not Currently     Comment: occasional    Drug use: No    Sexual activity: Defer           Objective   Physical Exam  Vitals and nursing note reviewed.   Constitutional:       General: She is not in acute distress.     Appearance: Normal appearance. She is well-developed. She is obese. She is not ill-appearing, toxic-appearing or diaphoretic.      Comments: Patient appears healthy in no acute distress.  Review of her vital signs: She is afebrile with a temperature of 97.8, she is bradycardic with a heart rate of 50, respirations normal 18 with a normal room air oxygen saturation of 98%, blood pressure slightly elevated 144/56.   HENT:      Head: Normocephalic and atraumatic.      Nose: Nose normal.      Mouth/Throat:      Mouth: Mucous membranes are moist.      Pharynx: Oropharynx is clear. No oropharyngeal exudate.   Eyes:      General: No scleral icterus.        Right eye: No discharge.         Left eye: No discharge.      Conjunctiva/sclera: Conjunctivae normal.      Pupils: Pupils are equal, round, and reactive to light.   Neck:      Thyroid: No thyromegaly.      Vascular: No carotid bruit or JVD.   Cardiovascular:      Rate and Rhythm: Normal rate and regular rhythm.      Heart sounds: Normal heart  sounds. No murmur heard.  Pulmonary:      Effort: Pulmonary effort is normal.      Breath sounds: Normal breath sounds. No wheezing, rhonchi or rales.   Chest:      Chest wall: No tenderness.   Abdominal:      General: Bowel sounds are normal. There is no distension.      Palpations: Abdomen is soft.      Tenderness: There is no abdominal tenderness.   Musculoskeletal:         General: No tenderness or deformity. Normal range of motion.      Cervical back: Normal range of motion and neck supple. No tenderness.      Right lower leg: No edema.      Left lower leg: No edema.   Lymphadenopathy:      Cervical: No cervical adenopathy.   Skin:     General: Skin is warm and dry.      Capillary Refill: Capillary refill takes less than 2 seconds.      Coloration: Skin is not pale.      Findings: No erythema or rash.   Neurological:      General: No focal deficit present.      Mental Status: She is alert and oriented to person, place, and time.      Cranial Nerves: No cranial nerve deficit.      Coordination: Coordination normal.      Comments: No focal motor sensory deficit   Psychiatric:         Behavior: Behavior normal.         Thought Content: Thought content normal.         Judgment: Judgment normal.         Procedures           ED Course  ED Course as of 02/25/24 1433   Sun Feb 25, 2024   0938 My interpretation of the patient's EKG tracing performed 09: 07 is sinus bradycardia with a rate of 54, first-degree AV block, normal axis, no acute ST segment elevation or depression consistent with ischemia, LVH by voltage, isolated inverted T wave in lead III that is unchanged in comparison to tracing 12/25/2023. [TP]   1044 Review the patient's laboratory studies: The patient's high sensitive troponin was elevated slightly at 15 which is about baseline for the patient.  proBNP elevated at 3280 which is about baseline for the patient.  D-dimer is negative.  CMP has an elevated creatinine of 1.43 with a low GFR 38.8 which is  about baseline for the patient consistent with chronic kidney disease.  BUN is a little higher than usual at 32.  Sodium slightly low at 133 with a normal potassium of 5.  Slight elevation of the transaminases with an ALT of 57 and an AST of 41 with a normal total bilirubin.  CBC has a normal white count at 8.37 with a normal differential.  The patient is anemic with a hemoglobin of 9 and hematocrit 28.8 which is baseline for the patient. [TP]   1046 The patient's chest x-ray shows mild pulmonary vascular congestion, otherwise no acute cardiopulmonary abnormality. [TP]   1131 The patient's delta high sensitive troponin is slightly higher at 19 still in the indeterminate range. [TP]   1155 1:52 patient discussed with Dr. Parikh, cardiology on-call, he recommends admitting the patient here for further evaluation of her chest pain.  She recommended Lasix for the patient's congestion on chest x-ray. [TP]   1155 Lasix 40 mg IV. [TP]   1201 11: 58 patient discussed with Dr. Ballard, hospitalist covering for Dr. Alejandro, who agreed to admit the patient. [TP]   1224 My interpretation of the patient's repeat EKG tracing performed 12/19 is sinus bradycardia with a first-degree AV block, normal axis, normal conduction, no acute ST segment elevation or depression consistent with ischemia, no ectopy, persistent inverted T wave in lead III only that is unchanged from prior. [TP]   1431 The patient was administered aspirin 324 mg p.o.  An inch Nitropaste was applied to the chest wall. [TP]      ED Course User Index  [TP] Herrera Madrigal MD                HEART Score: 5                              Medical Decision Making  My differential diagnosis for chest pain includes but is not limited to:  Muscle strain, costochondritis, myositis, pleurisy, rib fracture, intercostal neuritis, herpes zoster, tumor, myocardial infarction, coronary syndrome, unstable angina, angina, aortic dissection, mitral valve prolapse, pericarditis,  palpitations, pulmonary embolus, pneumonia, pneumothorax, lung cancer, GERD, esophagitis, esophageal spasm     Problems Addressed:  Chest pain, unspecified type: complicated acute illness or injury  Elevated troponin: complicated acute illness or injury  First degree AV block: complicated acute illness or injury  Sinus bradycardia: complicated acute illness or injury    Amount and/or Complexity of Data Reviewed  Labs: ordered. Decision-making details documented in ED Course.  Radiology: ordered. Decision-making details documented in ED Course.  ECG/medicine tests: ordered and independent interpretation performed. Decision-making details documented in ED Course.  Discussion of management or test interpretation with external provider(s): Details documented in the ED course.    Risk  OTC drugs.  Prescription drug management.  Decision regarding hospitalization.        Final diagnoses:   Chest pain, unspecified type   Sinus bradycardia   First degree AV block   Elevated troponin       ED Disposition  ED Disposition       ED Disposition   Decision to Admit    Condition   --    Comment   Level of Care: Telemetry [5]   Diagnosis: Chest pain [114521]   Admitting Physician: KARIME KRISHNAMURTHY [[id number]]   Bed Request Comments: Chest pain                 No follow-up provider specified.       Medication List      No changes were made to your prescriptions during this visit.           Labs Reviewed   COMPREHENSIVE METABOLIC PANEL - Abnormal; Notable for the following components:       Result Value    Glucose 109 (*)     BUN 32 (*)     Creatinine 1.43 (*)     Sodium 133 (*)     CO2 20.1 (*)     ALT (SGPT) 57 (*)     AST (SGOT) 41 (*)     eGFR 38.8 (*)     All other components within normal limits    Narrative:     GFR Normal >60  Chronic Kidney Disease <60  Kidney Failure <15    The GFR formula is only valid for adults with stable renal function between ages 18 and 70.   TROPONIN - Abnormal; Notable for the following components:     HS Troponin T 15 (*)     All other components within normal limits    Narrative:     High Sensitive Troponin T Reference Range:  <14.0 ng/L- Negative Female for AMI  <22.0 ng/L- Negative Male for AMI  >=14 - Abnormal Female indicating possible myocardial injury.  >=22 - Abnormal Male indicating possible myocardial injury.   Clinicians would have to utilize clinical acumen, EKG, Troponin, and serial changes to determine if it is an Acute Myocardial Infarction or myocardial injury due to an underlying chronic condition.        BNP (IN-HOUSE) - Abnormal; Notable for the following components:    proBNP 3,280.0 (*)     All other components within normal limits    Narrative:     This assay is used as an aid in the diagnosis of individuals suspected of having heart failure. It can be used as an aid in the diagnosis of acute decompensated heart failure (ADHF) in patients presenting with signs and symptoms of ADHF to the emergency department (ED). In addition, NT-proBNP of <300 pg/mL indicates ADHF is not likely.    Age Range Result Interpretation  NT-proBNP Concentration (pg/mL:      <50             Positive            >450                   Gray                 300-450                    Negative             <300    50-75           Positive            >900                  Gray                300-900                  Negative            <300      >75             Positive            >1800                  Gray                300-1800                  Negative            <300   CBC WITH AUTO DIFFERENTIAL - Abnormal; Notable for the following components:    RBC 3.16 (*)     Hemoglobin 9.0 (*)     Hematocrit 28.8 (*)     MCHC 31.3 (*)     RDW 16.5 (*)     RDW-SD 54.6 (*)     Lymphocyte % 15.9 (*)     Immature Grans % 0.6 (*)     All other components within normal limits   HIGH SENSITIVITIY TROPONIN T 2HR - Abnormal; Notable for the following components:    HS Troponin T 19 (*)     Troponin T Delta 4 (*)     All other  "components within normal limits    Narrative:     High Sensitive Troponin T Reference Range:  <14.0 ng/L- Negative Female for AMI  <22.0 ng/L- Negative Male for AMI  >=14 - Abnormal Female indicating possible myocardial injury.  >=22 - Abnormal Male indicating possible myocardial injury.   Clinicians would have to utilize clinical acumen, EKG, Troponin, and serial changes to determine if it is an Acute Myocardial Infarction or myocardial injury due to an underlying chronic condition.        TROPONIN - Abnormal; Notable for the following components:    HS Troponin T 22 (*)     All other components within normal limits    Narrative:     High Sensitive Troponin T Reference Range:  <14.0 ng/L- Negative Female for AMI  <22.0 ng/L- Negative Male for AMI  >=14 - Abnormal Female indicating possible myocardial injury.  >=22 - Abnormal Male indicating possible myocardial injury.   Clinicians would have to utilize clinical acumen, EKG, Troponin, and serial changes to determine if it is an Acute Myocardial Infarction or myocardial injury due to an underlying chronic condition.        D-DIMER, QUANTITATIVE - Normal    Narrative:     According to the assay 's published package insert, a normal (<0.50 MCGFEU/mL) D-dimer result in conjunction with a non-high clinical probability assessment, excludes deep vein thrombosis (DVT) and pulmonary embolism (PE) with high sensitivity.    D-dimer values increase with age and this can make VTE exclusion of an older population difficult. To address this, the [employer], based on best available evidence and recent guidelines, recommends that clinicians use age-adjusted D-dimer thresholds in patients greater than 50 years of age with: a) a low probability of PE who do not meet all Pulmonary Embolism Rule Out Criteria, or b) in those with intermediate probability of PE.   The formula for an age-adjusted D-dimer cut-off is \"age/100\".  For example, a 60 year old " patient would have an age-adjusted cut-off of 0.60 MCGFEU/mL and an 80 year old 0.80 MCGFEU/mL.   RAINBOW DRAW    Narrative:     The following orders were created for panel order [place] Draw.  Procedure                               Abnormality         Status                     ---------                               -----------         ------                     Green Top (Gel)[[id number]]                                  Final result               Lavender Top[[id number]]                                     Final result               Gold Top - SST[[id number]]                                   Final result               Light Blue Top[[id number]]                                   Final result                 Please view results for these tests on the individual orders.   POCT GLUCOSE FINGERSTICK   POCT GLUCOSE FINGERSTICK   POCT GLUCOSE FINGERSTICK   POCT GLUCOSE FINGERSTICK   GREEN TOP   LAVENDER TOP   GOLD TOP - SST   LIGHT BLUE TOP   CBC AND DIFFERENTIAL    Narrative:     The following orders were created for panel order CBC & Differential.  Procedure                               Abnormality         Status                     ---------                               -----------         ------                     CBC Auto Differential[[id number]]        Abnormal            Final result                 Please view results for these tests on the individual orders.     XR Chest 2 View   Final Result   Impression:      1. Pulmonary vascular congestion.   2. Otherwise no active pulmonary disease.      Electronically Signed: Timothy Michelle MD     2/25/2024 10:39 AM EST     Workstation ID: [id number]             Medication List      No changes were made to your prescriptions during this visit.              Herrera Madrigal MD  02/25/24 [id number]

## 2024-02-25 NOTE — H&P
[facility] HOSPITALIST     PCP: Aliya Alejandro MD    CODE status: Full    CHIEF COMPLAINT: Chest pain    HISTORY OF PRESENT ILLNESS:    Patient is a 74-year-old female with past medical history of CAD/MI, paroxysmal atrial flutter, hyperlipidemia, diabetes mellitus type 2 non-insulin-requiring, obesity, Sarabia's esophagus, GERD, hypertension.  She presents to [facility] ED complaining of left-sided chest pain, very similar in nature to her previous heart attack.  She reports her chest pain episode occurred this morning and lasted about an hour.  She also had concurrent heaviness to the left of her chest which radiated down the left arm.  She also reports that her Apple Watch was alarming her to the fact that she is headed low heart rate.  She reports feeling very hot during the episode.  The pain is intermittent.  At this time patient is chest pain-free.  Nothing seems to make better or worse.  She did have shortness of breath as well during the episode.     Workup in the ED revealed elevated troponin of 15 and subsequent rising to 19, no acute ischemic changes, case discussed with Dr. Parikh of cardiology who asked patient be admitted for observation.       Past Medical History:   Diagnosis Date    Arthritis     Sarabia esophagus     CAD (coronary artery disease)     Chronic back pain     Colon polyp     Diabetes mellitus     GERD (gastroesophageal reflux disease)     Hyperlipidemia     Hypertension     Myocardial infarction     1995    PAD (peripheral artery disease) 9/11/2019    Shingles      Past Surgical History:   Procedure Laterality Date    COLONOSCOPY      COLONOSCOPY N/A 6/24/2020    Procedure: COLONOSCOPY;  Surgeon: Mathew Roberts MD;  Location: [facility];  Service: Gastroenterology;  Laterality: N/A;  Descending colon polyp x 2, Ascending colon polyp, Sigmoid colon polyp, Rectal polyp    COLONOSCOPY N/A 8/7/2023    Procedure: COLONOSCOPY;  Surgeon:  Mathew Roberts MD;  Location: [facility] OR;  Service: Gastroenterology;  Laterality: N/A;  Normal    CORONARY ARTERY BYPASS GRAFT  1995    x 2 vessels    ENDOSCOPY N/A 2020    Procedure: ESOPHAGOGASTRODUODENOSCOPY;  Surgeon: Mathew Roberts MD;  Location: [facility] OR;  Service: Gastroenterology;  Laterality: N/A;  Ulcerative esophagitis, Gastritis, Duodenitis  Duodenal biopsy, gastric biopsy, distal esophageal biopsy    ENDOSCOPY N/A 2020    Procedure: ESOPHAGOGASTRODUODENOSCOPY with biopsies;  Surgeon: Mathew Roberts MD;  Location: [facility] OR;  Service: Gastroenterology;  Laterality: N/A;  Esophagitis  Sarabia's Esophagus  Hiatal hernia  Gastritis  Gastric biopsy  Distal esophagus biopsy    ENDOSCOPY N/A 2023    Procedure: Esophagogastroduedenoscopy;  Surgeon: Mathew Roberts MD;  Location: [facility] OR;  Service: Gastroenterology;  Laterality: N/A;  Gastritis; short segment Sarabia's; Esophageal stricture- dilatation; Biopsies- gastric, distal esophagus    INNER EAR SURGERY      JOINT REPLACEMENT      right knee    LEG SURGERY      x 2 s/p fall    LUMBAR FUSION      L4/L5    SKIN GRAFT      to left lower leg x 2    TONSILLECTOMY      WRIST FUSION Left      Family History   Problem Relation Age of Onset    Heart disease Mother     Heart disease Father     Colon cancer Neg Hx     Colon polyps Neg Hx     Breast cancer Neg Hx      Social History     Tobacco Use    Smoking status: Former     Types: Cigarettes     Quit date:      Years since quittin.1    Smokeless tobacco: Never    Tobacco comments:     QUIT    Vaping Use    Vaping Use: Never used   Substance Use Topics    Alcohol use: Not Currently     Comment: occasional    Drug use: No     (Not in a hospital admission)    Allergies:  Codeine and Other    Immunization History   Administered Date(s) Administered    COVID-19 (PFIZER) Purple Cap Monovalent 2021, 2021, 10/06/2021       REVIEW  "OF SYSTEMS:  Please see the above history of present illness for pertinent positives and negatives.  The remainder of the patient's systems have been reviewed and are negative.     Vital Signs  Temp:  [97.8 °F (36.6 °C)] 97.8 °F (36.6 °C)  Heart Rate:  [47-55] 50  Resp:  [18] 18  BP: (144-185)/(56-74) 160/74  Flowsheet Rows      Flowsheet Row First Filed Value   Admission Height 157.5 cm (62\") Documented at 02/25/2024 0905   Admission Weight 81.6 kg (180 lb) Documented at 02/25/2024 0905               Physical Exam:  General: Patient is awake and alert.  Elderly female. No acute distress noted.   HENT: Head is atraumatic, normocephalic. Hearing is grossly intact. Nose is without obvious congestion and appears patent. Neck is supple and trachea is midline.   Eyes: Vision is grossly intact. Pupils appear equal and round.   Cardiovascular: Heart has regular rate and rhythm with no murmurs, rubs or gallops noted.   Respiratory: Lungs are clear to ausculation without wheezes, rhonchi or rales.   Abdominal/GI: Soft, nontender, bowel sounds present. No rebound or guarding present.   Extremities: No peripheral edema noted.   Musculoskeletal: Spontaneous movement of bilateral upper and lower extremities against gravity noted. No signs of injury or deformity noted.   Skin: Warm and dry.   Psych: Mood and affect are appropriate. Cooperative with exam.   Neuro: No facial asymmetry noted. No focal deficits noted, hearing and vision are grossly intact.    Emotional Behavior: all of the following were found to be normal   Judgment and Insight   Mental Status   Memory   Mood and Affect: neither of the following found acutely        Depression                 Anxiety     Debilities: no signs of the following found    Physical Weakness     Handicaps     Disabilities     Agitation         Results Review:    I reviewed the patient's new clinical results.  Lab Results (most recent)       Procedure Component Value Units Date/Time    High " Sensitivity Troponin T 2Hr [[id number]]  (Abnormal) Collected: 02/25/24 1102    Specimen: Blood Updated: 02/25/24 1130     HS Troponin T 19 ng/L      Troponin T Delta 4 ng/L     Narrative:      High Sensitive Troponin T Reference Range:  <14.0 ng/L- Negative Female for AMI  <22.0 ng/L- Negative Male for AMI  >=14 - Abnormal Female indicating possible myocardial injury.  >=22 - Abnormal Male indicating possible myocardial injury.   Clinicians would have to utilize clinical acumen, EKG, Troponin, and serial changes to determine if it is an Acute Myocardial Infarction or myocardial injury due to an underlying chronic condition.         BNP [[id number]]  (Abnormal) Collected: 02/25/24 0908    Specimen: Blood Updated: 02/25/24 1025     proBNP 3,280.0 pg/mL     Narrative:      This assay is used as an aid in the diagnosis of individuals suspected of having heart failure. It can be used as an aid in the diagnosis of acute decompensated heart failure (ADHF) in patients presenting with signs and symptoms of ADHF to the emergency department (ED). In addition, NT-proBNP of <300 pg/mL indicates ADHF is not likely.    Age Range Result Interpretation  NT-proBNP Concentration (pg/mL:      <50             Positive            >450                   Gray                 300-450                    Negative             <300    50-75           Positive            >900                  Gray                300-900                  Negative            <300      >75             Positive            >1800                  Gray                300-1800                  Negative            <300    High Sensitivity Troponin T [[id number]]  (Abnormal) Collected: 02/25/24 0908    Specimen: Blood Updated: 02/25/24 1016     HS Troponin T 15 ng/L     Narrative:      High Sensitive Troponin T Reference Range:  <14.0 ng/L- Negative Female for AMI  <22.0 ng/L- Negative Male for AMI  >=14 - Abnormal Female indicating possible myocardial injury.  >=22 -  Abnormal Male indicating possible myocardial injury.   Clinicians would have to utilize clinical acumen, EKG, Troponin, and serial changes to determine if it is an Acute Myocardial Infarction or myocardial injury due to an underlying chronic condition.         [place] Draw [[id number]] Collected: 02/25/24 0908    Specimen: Blood Updated: 02/25/24 1015    Narrative:      The following orders were created for panel order [place] Draw.  Procedure                               Abnormality         Status                     ---------                               -----------         ------                     Green Top (Gel)[[id number]]                                  Final result               Lavender Top[[id number]]                                     Final result               Gold Top - SST[[id number]]                                   Final result               Light Blue Top[[id number]]                                   Final result                 Please view results for these tests on the individual orders.    Green Top (Gel) [[id number]] Collected: 02/25/24 0908    Specimen: Blood Updated: 02/25/24 1015     Extra Tube Hold for add-ons.     Comment: Auto resulted.       Lavender Top [[id number]] Collected: 02/25/24 0908    Specimen: Blood Updated: 02/25/24 1015     Extra Tube hold for add-on     Comment: Auto resulted       Gold Top - SST [[id number]] Collected: 02/25/24 0908    Specimen: Blood Updated: 02/25/24 1015     Extra Tube Hold for add-ons.     Comment: Auto resulted.       Light Blue Top [[id number]] Collected: 02/25/24 0908    Specimen: Blood Updated: 02/25/24 1015     Extra Tube Hold for add-ons.     Comment: Auto resulted       Comprehensive Metabolic Panel [[id number]]  (Abnormal) Collected: 02/25/24 0908    Specimen: Blood Updated: 02/25/24 1014     Glucose 109 mg/dL      BUN 32 mg/dL      Creatinine 1.43 mg/dL      Sodium 133 mmol/L      Potassium 5.0 mmol/L      Chloride 101 mmol/L      CO2 20.1 mmol/L   "    Calcium 8.9 mg/dL      Total Protein 6.2 g/dL      Albumin 3.9 g/dL      ALT (SGPT) 57 U/L      AST (SGOT) 41 U/L      Alkaline Phosphatase 71 U/L      Total Bilirubin <0.2 mg/dL      Globulin 2.3 gm/dL      A/G Ratio 1.7 g/dL      BUN/Creatinine Ratio 22.4     Anion Gap 11.9 mmol/L      eGFR 38.8 mL/min/1.73     Narrative:      GFR Normal >60  Chronic Kidney Disease <60  Kidney Failure <15    The GFR formula is only valid for adults with stable renal function between ages 18 and 70.    D-dimer, Quantitative [[id number]]  (Normal) Collected: 02/25/24 0908    Specimen: Blood Updated: 02/25/24 1011     D-Dimer, Quantitative 0.58 MCGFEU/mL     Narrative:      According to the assay 's published package insert, a normal (<0.50 MCGFEU/mL) D-dimer result in conjunction with a non-high clinical probability assessment, excludes deep vein thrombosis (DVT) and pulmonary embolism (PE) with high sensitivity.    D-dimer values increase with age and this can make VTE exclusion of an older population difficult. To address this, the [employer], based on best available evidence and recent guidelines, recommends that clinicians use age-adjusted D-dimer thresholds in patients greater than 50 years of age with: a) a low probability of PE who do not meet all Pulmonary Embolism Rule Out Criteria, or b) in those with intermediate probability of PE.   The formula for an age-adjusted D-dimer cut-off is \"age/100\".  For example, a 60 year old patient would have an age-adjusted cut-off of 0.60 MCGFEU/mL and an 80 year old 0.80 MCGFEU/mL.    CBC & Differential [[id number]]  (Abnormal) Collected: 02/25/24 0908    Specimen: Blood Updated: 02/25/24 1004    Narrative:      The following orders were created for panel order CBC & Differential.  Procedure                               Abnormality         Status                     ---------                               -----------         ------                   "   CBC Auto Differential[[id number]]        Abnormal            Final result                 Please view results for these tests on the individual orders.    CBC Auto Differential [[id number]]  (Abnormal) Collected: 02/25/24 0908    Specimen: Blood Updated: 02/25/24 1004     WBC 8.37 10*3/mm3      RBC 3.16 10*6/mm3      Hemoglobin 9.0 g/dL      Hematocrit 28.8 %      MCV 91.1 fL      MCH 28.5 pg      MCHC 31.3 g/dL      RDW 16.5 %      RDW-SD 54.6 fl      MPV 9.7 fL      Platelets 406 10*3/mm3      Neutrophil % 71.7 %      Lymphocyte % 15.9 %      Monocyte % 7.9 %      Eosinophil % 3.2 %      Basophil % 0.7 %      Immature Grans % 0.6 %      Neutrophils, Absolute 6.00 10*3/mm3      Lymphocytes, Absolute 1.33 10*3/mm3      Monocytes, Absolute 0.66 10*3/mm3      Eosinophils, Absolute 0.27 10*3/mm3      Basophils, Absolute 0.06 10*3/mm3      Immature Grans, Absolute 0.05 10*3/mm3      nRBC 0.0 /100 WBC             Imaging Results (Most Recent)       Procedure Component Value Units Date/Time    XR Chest 2 View [[id number]] Collected: 02/25/24 1038     Updated: 02/25/24 1043    Narrative:      XR CHEST 2 VW    Date of Exam: 2/25/2024 10:11 AM EST    Indication: Chest pain    Comparison: 1/2/2024.    Findings:  The heart size is normal postsurgical changes apparent. The pulmonary vascular markings are increased felt to represent vascular congestion. There is no airspace disease, pleural effusion, or pneumothorax. There are chronic age-related changes involving   the bony thorax and thoracic aorta.      Impression:      Impression:    1. Pulmonary vascular congestion.  2. Otherwise no active pulmonary disease.    Electronically Signed: Timothy Michelle MD    2/25/2024 10:39 AM EST    Workstation ID: [id number]          Reviewed    ECG/EMG Results (most recent)       Procedure Component Value Units Date/Time    ECG 12 Lead Chest Pain [[id number]] Collected: 02/25/24 0907     Updated: 02/25/24 0908     QT Interval 450 ms      QTC  Interval 425 ms     Narrative:      HEART RATE= 54  bpm  RR Interval= 1120  ms  AZ Interval= 340  ms  P Horizontal Axis= 256  deg  P Front Axis= 40  deg  QRSD Interval= 92  ms  QT Interval= 450  ms  QTcB= 425  ms  QRS Axis= 2  deg  T Wave Axis= 13  deg  - ABNORMAL ECG -  Sinus rhythm  Prolonged AZ interval  LVH by voltage  Electronically Signed By:   Date and Time of Study: 2024-02-25 09:07:25          Reviewed    Assessment & Plan     typical chest pain, unstable angina  -Patient reports similar symptoms to previous MI, recent stress test in September 2023 showed normal stress test low risk study as well as even more recent echocardiogram from January 2024 showing normal EF normal diastolic function, mildly increased atrial volume on the left and right.  Anticipate patient will need cath for further evaluation given other normal recent studies and personal history of MI/CAD.   -Troponin elevated at 15 further rising to 19 on subsequent testing.   -Patient's proBNP elevated appears around level that patient is chronically at, one-time dose of Lasix given in ED, monitor for effect.  Patient is stable on room air at this time.   -Patient given dose of aspirin in the ED. continue home ASCVD/CAD/paroxysmal atrial flutter medications including Plavix, Eliquis, amiodarone.  Continue rosuvastatin.     Symptomatic bradycardia  -hold home atenolol. Continue amiodarone for now, consider dosing change if still bradycardic.   -monitor telemetry       Chronic stable conditions to be managed with home medications include the following conditions listed below. Also please note: Every effort was made to accurately obtain patient's home medications. This was done utilizing extensive chart review, ED documentation, recent pharmacy records, and where possible thorough discussion with the patient if medications were known by the patient. I have reviewed and edited the patient's home medications as per my efforts above and current med  list can be found within home medications portion of this electronic medical record and is accurate as possible as of 02/25/24     Essential hypertension  -Continue hydralazine, olmesartan, Aldactone, hydrochlorothiazide, clonidine, amlodipine    Diabetes mellitus type 2 non-insulin-requiring obese female-placed on Glucomander with sliding scale insulin and Accu-Cheks    GERD-continue home Protonix    Depression-continue escitalopram    Gout-continue allopurinol    Chronic pain-continue tramadol      DVT PPX: Home Eliquis        I discussed the patient's findings and my recommendations with patient     Ben ANSARI NellieDO  02/25/24  12:05 EST    Time: 39 mins     At [facility], we believe that sharing information builds trust and better relationships. You are receiving this note because you recently visited [facility]. It is possible you will see health information before a provider has talked with you about it. This kind of information can be easy to misunderstand. To help you fully understand what it means for your health, we urge you to discuss this note with your provider.

## 2024-02-25 NOTE — ED NOTES
Nursing report ED to floor  Valentine Arora  74 y.o.  female    HPI :   Chief Complaint   Patient presents with    Chest Pain       Admitting doctor:   Ben Ballard DO    Admitting diagnosis:   The primary encounter diagnosis was Chest pain, unspecified type. Diagnoses of Sinus bradycardia, First degree AV block, and Elevated troponin were also pertinent to this visit.    Code status:   Current Code Status       Date Active Code Status Order ID Comments User Context       2/25/2024 1205 CPR (Attempt to Resuscitate) [id number]  Ben Ballard DO ED        Question Answer    Code Status (Patient has no pulse and is not breathing) CPR (Attempt to Resuscitate)    Medical Interventions (Patient has pulse or is breathing) Full Support                    Allergies:   Codeine and Other    Isolation:   No active isolations    Intake and Output  No intake or output data in the 24 hours ending 02/25/24 1218    Weight:       02/25/24  0905   Weight: 81.6 kg (180 lb)       Most recent vitals:   Vitals:    02/25/24 0912 02/25/24 1000 02/25/24 1030 02/25/24 1100   BP: 144/56 175/62 167/56 160/74   Pulse: 50 51 (!) 47 50   Resp: 18 18 18 18   Temp:       SpO2: 98% 96% 97% 96%   Weight:       Height:           Active LDAs/IV Access:   Lines, Drains & Airways       Active LDAs       Name Placement date Placement time Site Days    Peripheral IV 02/25/24 0909 Left Antecubital 02/25/24  0909  Antecubital  less than 1                    Labs (abnormal labs have a star):   Labs Reviewed   COMPREHENSIVE METABOLIC PANEL - Abnormal; Notable for the following components:       Result Value    Glucose 109 (*)     BUN 32 (*)     Creatinine 1.43 (*)     Sodium 133 (*)     CO2 20.1 (*)     ALT (SGPT) 57 (*)     AST (SGOT) 41 (*)     eGFR 38.8 (*)     All other components within normal limits    Narrative:     GFR Normal >60  Chronic Kidney Disease <60  Kidney Failure <15    The GFR formula is only valid for adults with stable renal  function between ages 18 and 70.   TROPONIN - Abnormal; Notable for the following components:    HS Troponin T 15 (*)     All other components within normal limits    Narrative:     High Sensitive Troponin T Reference Range:  <14.0 ng/L- Negative Female for AMI  <22.0 ng/L- Negative Male for AMI  >=14 - Abnormal Female indicating possible myocardial injury.  >=22 - Abnormal Male indicating possible myocardial injury.   Clinicians would have to utilize clinical acumen, EKG, Troponin, and serial changes to determine if it is an Acute Myocardial Infarction or myocardial injury due to an underlying chronic condition.        BNP (IN-HOUSE) - Abnormal; Notable for the following components:    proBNP 3,280.0 (*)     All other components within normal limits    Narrative:     This assay is used as an aid in the diagnosis of individuals suspected of having heart failure. It can be used as an aid in the diagnosis of acute decompensated heart failure (ADHF) in patients presenting with signs and symptoms of ADHF to the emergency department (ED). In addition, NT-proBNP of <300 pg/mL indicates ADHF is not likely.    Age Range Result Interpretation  NT-proBNP Concentration (pg/mL:      <50             Positive            >450                   Gray                 300-450                    Negative             <300    50-75           Positive            >900                  Gray                300-900                  Negative            <300      >75             Positive            >1800                  Gray                300-1800                  Negative            <300   CBC WITH AUTO DIFFERENTIAL - Abnormal; Notable for the following components:    RBC 3.16 (*)     Hemoglobin 9.0 (*)     Hematocrit 28.8 (*)     MCHC 31.3 (*)     RDW 16.5 (*)     RDW-SD 54.6 (*)     Lymphocyte % 15.9 (*)     Immature Grans % 0.6 (*)     All other components within normal limits   HIGH SENSITIVITIY TROPONIN T 2HR - Abnormal; Notable for the  "following components:    HS Troponin T 19 (*)     Troponin T Delta 4 (*)     All other components within normal limits    Narrative:     High Sensitive Troponin T Reference Range:  <14.0 ng/L- Negative Female for AMI  <22.0 ng/L- Negative Male for AMI  >=14 - Abnormal Female indicating possible myocardial injury.  >=22 - Abnormal Male indicating possible myocardial injury.   Clinicians would have to utilize clinical acumen, EKG, Troponin, and serial changes to determine if it is an Acute Myocardial Infarction or myocardial injury due to an underlying chronic condition.        D-DIMER, QUANTITATIVE - Normal    Narrative:     According to the assay 's published package insert, a normal (<0.50 MCGFEU/mL) D-dimer result in conjunction with a non-high clinical probability assessment, excludes deep vein thrombosis (DVT) and pulmonary embolism (PE) with high sensitivity.    D-dimer values increase with age and this can make VTE exclusion of an older population difficult. To address this, the [employer], based on best available evidence and recent guidelines, recommends that clinicians use age-adjusted D-dimer thresholds in patients greater than 50 years of age with: a) a low probability of PE who do not meet all Pulmonary Embolism Rule Out Criteria, or b) in those with intermediate probability of PE.   The formula for an age-adjusted D-dimer cut-off is \"age/100\".  For example, a 60 year old patient would have an age-adjusted cut-off of 0.60 MCGFEU/mL and an 80 year old 0.80 MCGFEU/mL.   RAINBOW DRAW    Narrative:     The following orders were created for panel order [facility] Draw.  Procedure                               Abnormality         Status                     ---------                               -----------         ------                     Green Top (Gel)[[id number]]                                  Final result               Lavender Top[[id number]]                                 "     Final result               Gold Top - SST[[id number]]                                   Final result               Light Blue Top[[id number]]                                   Final result                 Please view results for these tests on the individual orders.   GREEN TOP   LAVENDER TOP   GOLD TOP - SST   LIGHT BLUE TOP   CBC AND DIFFERENTIAL    Narrative:     The following orders were created for panel order CBC & Differential.  Procedure                               Abnormality         Status                     ---------                               -----------         ------                     CBC Auto Differential[[id number]]        Abnormal            Final result                 Please view results for these tests on the individual orders.       Results       Procedure Component Value Ref Range Date/Time    High Sensitivity Troponin T 2Hr [[id number]]  (Abnormal) Collected: 02/25/24 1102    Order Status: Completed Specimen: Blood Updated: 02/25/24 1130     HS Troponin T 19 <14 ng/L      Troponin T Delta 4 >=-4 - <+4 ng/L     Narrative:      High Sensitive Troponin T Reference Range:  <14.0 ng/L- Negative Female for AMI  <22.0 ng/L- Negative Male for AMI  >=14 - Abnormal Female indicating possible myocardial injury.  >=22 - Abnormal Male indicating possible myocardial injury.   Clinicians would have to utilize clinical acumen, EKG, Troponin, and serial changes to determine if it is an Acute Myocardial Infarction or myocardial injury due to an underlying chronic condition.         BNP [[id number]]  (Abnormal) Collected: 02/25/24 0908    Order Status: Completed Specimen: Blood Updated: 02/25/24 1025     proBNP 3,280.0 0.0 - 900.0 pg/mL     Narrative:      This assay is used as an aid in the diagnosis of individuals suspected of having heart failure. It can be used as an aid in the diagnosis of acute decompensated heart failure (ADHF) in patients presenting with signs and symptoms of ADHF to the emergency  department (ED). In addition, NT-proBNP of <300 pg/mL indicates ADHF is not likely.    Age Range Result Interpretation  NT-proBNP Concentration (pg/mL:      <50             Positive            >450                   Gray                 300-450                    Negative             <300    50-75           Positive            >900                  Gray                300-900                  Negative            <300      >75             Positive            >1800                  Gray                300-1800                  Negative            <300    High Sensitivity Troponin T [[id number]]  (Abnormal) Collected: 02/25/24 0908    Order Status: Completed Specimen: Blood Updated: 02/25/24 1016     HS Troponin T 15 <14 ng/L     Narrative:      High Sensitive Troponin T Reference Range:  <14.0 ng/L- Negative Female for AMI  <22.0 ng/L- Negative Male for AMI  >=14 - Abnormal Female indicating possible myocardial injury.  >=22 - Abnormal Male indicating possible myocardial injury.   Clinicians would have to utilize clinical acumen, EKG, Troponin, and serial changes to determine if it is an Acute Myocardial Infarction or myocardial injury due to an underlying chronic condition.         [facility] Draw [[id number]] Collected: 02/25/24 0908    Order Status: Completed Specimen: Blood Updated: 02/25/24 1015    Narrative:      The following orders were created for panel order [facility] Draw.  Procedure                               Abnormality         Status                     ---------                               -----------         ------                     Green Top (Gel)[[id number]]                                  Final result               Lavender Top[[id number]]                                     Final result               Gold Top - SST[[id number]]                                   Final result               Light Blue Top[[id number]]                                   Final result                 Please view results for  these tests on the individual orders.    Comprehensive Metabolic Panel [[id number]]  (Abnormal) Collected: 02/25/24 0908    Order Status: Completed Specimen: Blood Updated: 02/25/24 1014     Glucose 109 65 - 99 mg/dL      BUN 32 8 - 23 mg/dL      Creatinine 1.43 0.57 - 1.00 mg/dL      Sodium 133 136 - 145 mmol/L      Potassium 5.0 3.5 - 5.2 mmol/L      Chloride 101 98 - 107 mmol/L      CO2 20.1 22.0 - 29.0 mmol/L      Calcium 8.9 8.6 - 10.5 mg/dL      Total Protein 6.2 6.0 - 8.5 g/dL      Albumin 3.9 3.5 - 5.2 g/dL      ALT (SGPT) 57 1 - 33 U/L      AST (SGOT) 41 1 - 32 U/L      Alkaline Phosphatase 71 39 - 117 U/L      Total Bilirubin <0.2 0.0 - 1.2 mg/dL      Globulin 2.3 gm/dL      A/G Ratio 1.7 g/dL      BUN/Creatinine Ratio 22.4 7.0 - 25.0      Anion Gap 11.9 5.0 - 15.0 mmol/L      eGFR 38.8 >60.0 mL/min/1.73     Narrative:      GFR Normal >60  Chronic Kidney Disease <60  Kidney Failure <15    The GFR formula is only valid for adults with stable renal function between ages 18 and 70.    D-dimer, Quantitative [[id number]]  (Normal) Collected: 02/25/24 0908    Order Status: Completed Specimen: Blood Updated: 02/25/24 1011     D-Dimer, Quantitative 0.58 0.00 - 0.73 MCGFEU/mL     Narrative:      According to the assay 's published package insert, a normal (<0.50 MCGFEU/mL) D-dimer result in conjunction with a non-high clinical probability assessment, excludes deep vein thrombosis (DVT) and pulmonary embolism (PE) with high sensitivity.    D-dimer values increase with age and this can make VTE exclusion of an older population difficult. To address this, the [employer], based on best available evidence and recent guidelines, recommends that clinicians use age-adjusted D-dimer thresholds in patients greater than 50 years of age with: a) a low probability of PE who do not meet all Pulmonary Embolism Rule Out Criteria, or b) in those with intermediate probability of PE.   The formula for an  "age-adjusted D-dimer cut-off is \"age/100\".  For example, a 60 year old patient would have an age-adjusted cut-off of 0.60 MCGFEU/mL and an 80 year old 0.80 MCGFEU/mL.    CBC Auto Differential [[id number]]  (Abnormal) Collected: 02/25/24 0908    Order Status: Completed Specimen: Blood Updated: 02/25/24 1004     WBC 8.37 3.40 - 10.80 10*3/mm3      RBC 3.16 3.77 - 5.28 10*6/mm3      Hemoglobin 9.0 12.0 - 15.9 g/dL      Hematocrit 28.8 34.0 - 46.6 %      MCV 91.1 79.0 - 97.0 fL      MCH 28.5 26.6 - 33.0 pg      MCHC 31.3 31.5 - 35.7 g/dL      RDW 16.5 12.3 - 15.4 %      RDW-SD 54.6 37.0 - 54.0 fl      MPV 9.7 6.0 - 12.0 fL      Platelets 406 140 - 450 10*3/mm3      Neutrophil % 71.7 42.7 - 76.0 %      Lymphocyte % 15.9 19.6 - 45.3 %      Monocyte % 7.9 5.0 - 12.0 %      Eosinophil % 3.2 0.3 - 6.2 %      Basophil % 0.7 0.0 - 1.5 %      Immature Grans % 0.6 0.0 - 0.5 %      Neutrophils, Absolute 6.00 1.70 - 7.00 10*3/mm3      Lymphocytes, Absolute 1.33 0.70 - 3.10 10*3/mm3      Monocytes, Absolute 0.66 0.10 - 0.90 10*3/mm3      Eosinophils, Absolute 0.27 0.00 - 0.40 10*3/mm3      Basophils, Absolute 0.06 0.00 - 0.20 10*3/mm3      Immature Grans, Absolute 0.05 0.00 - 0.05 10*3/mm3      nRBC 0.0 0.0 - 0.2 /100 WBC              EKG:   ECG 12 Lead Chest Pain   Preliminary Result   HEART RATE= 54  bpm   RR Interval= 1120  ms   SC Interval= 340  ms   P Horizontal Axis= 256  deg   P Front Axis= 40  deg   QRSD Interval= 92  ms   QT Interval= 450  ms   QTcB= 425  ms   QRS Axis= 2  deg   T Wave Axis= 13  deg   - ABNORMAL ECG -   Sinus rhythm   Prolonged SC interval   LVH by voltage   Electronically Signed By:    Date and Time of Study: 2024-02-25 09:07:25      ECG 12 Lead Chest Pain    (Results Pending)       Meds given in ED:   Medications   sodium chloride 0.9 % flush 10 mL (has no administration in time range)   sodium chloride 0.9 % flush 10 mL (has no administration in time range)   furosemide (LASIX) injection 40 mg (has no " administration in time range)   famotidine (PEPCID) injection 20 mg (20 mg Intravenous Given 24 100)   aspirin chewable tablet 324 mg (324 mg Oral Given 24)   nitroglycerin (NITROSTAT) ointment 1 inch (1 inch Topical Given 24)       Imaging results:  XR Chest 2 View    Result Date: 2024  Impression: 1. Pulmonary vascular congestion. 2. Otherwise no active pulmonary disease. Electronically Signed: Timothy Michelle MD  2024 10:39 AM EST  Workstation ID: [id number]     Ambulatory status:   - up with assist X 1    Social issues:   Social History     Socioeconomic History    Marital status:    Tobacco Use    Smoking status: Former     Types: Cigarettes     Quit date:      Years since quittin.    Smokeless tobacco: Never    Tobacco comments:     QUIT    Vaping Use    Vaping Use: Never used   Substance and Sexual Activity    Alcohol use: Not Currently     Comment: occasional    Drug use: No    Sexual activity: Defer       NIH Stroke Scale:         Ml Smith RN  24 12:18 EST

## 2024-02-26 LAB
GLUCOSE BLDC GLUCOMTR-MCNC: 104 MG/DL (ref 70–130)
GLUCOSE BLDC GLUCOMTR-MCNC: 106 MG/DL (ref 70–130)
GLUCOSE BLDC GLUCOMTR-MCNC: 115 MG/DL (ref 70–130)
GLUCOSE BLDC GLUCOMTR-MCNC: 138 MG/DL (ref 70–130)
HBA1C MFR BLD: 5.9 % (ref 4.8–5.6)

## 2024-02-26 PROCEDURE — 99232 SBSQ HOSP IP/OBS MODERATE 35: CPT | Performed by: HOSPITALIST

## 2024-02-26 PROCEDURE — 99214 OFFICE O/P EST MOD 30 MIN: CPT | Performed by: INTERNAL MEDICINE

## 2024-02-26 PROCEDURE — 25010000002 HYDRALAZINE PER 20 MG: Performed by: HOSPITALIST

## 2024-02-26 PROCEDURE — 25010000002 PROCHLORPERAZINE 10 MG/2ML SOLUTION: Performed by: HOSPITALIST

## 2024-02-26 PROCEDURE — 82948 REAGENT STRIP/BLOOD GLUCOSE: CPT

## 2024-02-26 PROCEDURE — 96375 TX/PRO/DX INJ NEW DRUG ADDON: CPT

## 2024-02-26 PROCEDURE — G0378 HOSPITAL OBSERVATION PER HR: HCPCS

## 2024-02-26 RX ORDER — ACETAMINOPHEN 325 MG/1
650 TABLET ORAL EVERY 6 HOURS PRN
Status: DISCONTINUED | OUTPATIENT
Start: 2024-02-26 | End: 2024-02-28 | Stop reason: HOSPADM

## 2024-02-26 RX ORDER — BUMETANIDE 1 MG/1
2 TABLET ORAL DAILY
Status: DISCONTINUED | OUTPATIENT
Start: 2024-02-26 | End: 2024-02-28

## 2024-02-26 RX ORDER — SPIRONOLACTONE 25 MG/1
50 TABLET ORAL DAILY
Status: DISCONTINUED | OUTPATIENT
Start: 2024-02-26 | End: 2024-02-28 | Stop reason: HOSPADM

## 2024-02-26 RX ORDER — BUMETANIDE 1 MG/1
1 TABLET ORAL DAILY
Status: DISCONTINUED | OUTPATIENT
Start: 2024-02-26 | End: 2024-02-26

## 2024-02-26 RX ORDER — HYDRALAZINE HYDROCHLORIDE 20 MG/ML
10 INJECTION INTRAMUSCULAR; INTRAVENOUS ONCE
Status: COMPLETED | OUTPATIENT
Start: 2024-02-26 | End: 2024-02-26

## 2024-02-26 RX ORDER — PROCHLORPERAZINE EDISYLATE 5 MG/ML
5 INJECTION INTRAMUSCULAR; INTRAVENOUS ONCE
Status: COMPLETED | OUTPATIENT
Start: 2024-02-26 | End: 2024-02-26

## 2024-02-26 RX ADMIN — BUMETANIDE 2 MG: 1 TABLET ORAL at 08:35

## 2024-02-26 RX ADMIN — HYDRALAZINE HYDROCHLORIDE 10 MG: 20 INJECTION INTRAMUSCULAR; INTRAVENOUS at 02:53

## 2024-02-26 RX ADMIN — ALLOPURINOL 100 MG: 100 TABLET ORAL at 21:22

## 2024-02-26 RX ADMIN — APIXABAN 5 MG: 2.5 TABLET, FILM COATED ORAL at 08:35

## 2024-02-26 RX ADMIN — ROSUVASTATIN CALCIUM 10 MG: 5 TABLET, FILM COATED ORAL at 08:35

## 2024-02-26 RX ADMIN — PANTOPRAZOLE SODIUM 40 MG: 40 TABLET, DELAYED RELEASE ORAL at 08:35

## 2024-02-26 RX ADMIN — SACUBITRIL AND VALSARTAN 2 TABLET: 24; 26 TABLET, FILM COATED ORAL at 08:36

## 2024-02-26 RX ADMIN — CLOPIDOGREL BISULFATE 75 MG: 75 TABLET ORAL at 21:22

## 2024-02-26 RX ADMIN — Medication 10 ML: at 21:26

## 2024-02-26 RX ADMIN — Medication 10 ML: at 08:34

## 2024-02-26 RX ADMIN — SPIRONOLACTONE 50 MG: 25 TABLET ORAL at 21:22

## 2024-02-26 RX ADMIN — ACETAMINOPHEN 650 MG: 325 TABLET ORAL at 16:37

## 2024-02-26 RX ADMIN — HYDRALAZINE HYDROCHLORIDE 100 MG: 50 TABLET, FILM COATED ORAL at 21:22

## 2024-02-26 RX ADMIN — Medication 10 ML: at 02:57

## 2024-02-26 RX ADMIN — ACETAMINOPHEN 650 MG: 325 TABLET ORAL at 09:49

## 2024-02-26 RX ADMIN — HYDRALAZINE HYDROCHLORIDE 100 MG: 50 TABLET, FILM COATED ORAL at 16:36

## 2024-02-26 RX ADMIN — HYDRALAZINE HYDROCHLORIDE 100 MG: 50 TABLET, FILM COATED ORAL at 08:36

## 2024-02-26 RX ADMIN — ACETAMINOPHEN 650 MG: 325 TABLET ORAL at 03:59

## 2024-02-26 RX ADMIN — DOCUSATE SODIUM 50 MG AND SENNOSIDES 8.6 MG 2 TABLET: 8.6; 5 TABLET, FILM COATED ORAL at 08:35

## 2024-02-26 RX ADMIN — ALLOPURINOL 100 MG: 100 TABLET ORAL at 08:35

## 2024-02-26 RX ADMIN — ESCITALOPRAM OXALATE 5 MG: 10 TABLET, FILM COATED ORAL at 21:21

## 2024-02-26 RX ADMIN — SACUBITRIL AND VALSARTAN 2 TABLET: 24; 26 TABLET, FILM COATED ORAL at 21:22

## 2024-02-26 RX ADMIN — PANTOPRAZOLE SODIUM 40 MG: 40 TABLET, DELAYED RELEASE ORAL at 21:22

## 2024-02-26 RX ADMIN — AMIODARONE HYDROCHLORIDE 200 MG: 200 TABLET ORAL at 21:22

## 2024-02-26 RX ADMIN — PROCHLORPERAZINE EDISYLATE 5 MG: 5 INJECTION INTRAMUSCULAR; INTRAVENOUS at 17:27

## 2024-02-26 RX ADMIN — TRAMADOL HYDROCHLORIDE 50 MG: 50 TABLET ORAL at 05:52

## 2024-02-26 RX ADMIN — APIXABAN 5 MG: 2.5 TABLET, FILM COATED ORAL at 21:22

## 2024-02-26 NOTE — NURSING NOTE
Notified Hospitalist that patients B/P remains elevated, see orders    3:36- B/P somewhat improved, c/o headache, note order for Tylenol

## 2024-02-26 NOTE — NURSING NOTE
Spoke with Hospitalist, notified that HR from 40's to 50's, hypertensive, went over medications to be given, to hold Pacerone and Clonidine tonight

## 2024-02-26 NOTE — PROGRESS NOTES
"Hospitalist Team      Patient Care Team:  Aliya Alejandro MD as PCP - General (Family Medicine)        Chief Complaint:  Follow-up Hypertensive Urgency    Subjective    Ms. Arora complains of a headache this morning, but she denies chest pain and dyspnea.  She hasn't received her breakfast tray yet this morning, but denies nausea and abdominal pain.    Objective    Vital Signs  Temp:  [96.9 °F (36.1 °C)-98 °F (36.7 °C)] 98 °F (36.7 °C)  Heart Rate:  [47-57] 57  Resp:  [16-20] 16  BP: (144-202)/(51-89) 202/61  Oxygen Therapy  SpO2: 97 %  Pulse Oximetry Type: Intermittent  Device (Oxygen Therapy): room air}    Flowsheet Rows      Flowsheet Row First Filed Value   Admission Height 157.5 cm (62\") Documented at 02/25/2024 0905   Admission Weight 81.6 kg (180 lb) Documented at 02/25/2024 0905          Physical Exam:    General: Appears stated age in no acute distress.  HEENT: Pupils are equal and EOMI.  Lungs: Breath sounds are clear.  Respirations are non-labored.  CV: Regular rate and rhythm.  No murmur appreciated.  Radial pulses are 2+ and symmetric.  Abdomen: Obese, soft, and non-tender w/ active bowel sounds.  MSK: No C/C/E.  Neuro: CN II-XII grossly intact.  Psych: Normal affect.  Ox3.    Results Review:     I reviewed the patient's new clinical results.    Lab Results (last 24 hours)       Procedure Component Value Units Date/Time    POC Glucose Once [[id number]]  (Normal) Collected: 02/26/24 0659    Specimen: Blood Updated: 02/26/24 0704     Glucose 104 mg/dL     POC Glucose Once [[id number]]  (Normal) Collected: 02/25/24 2127    Specimen: Blood Updated: 02/25/24 2133     Glucose 102 mg/dL     POC Glucose Once [[id number]]  (Abnormal) Collected: 02/25/24 1704    Specimen: Blood Updated: 02/25/24 1710     Glucose 146 mg/dL     High Sensitivity Troponin T [[id number]]  (Abnormal) Collected: 02/25/24 1337    Specimen: Blood Updated: 02/25/24 1403     HS Troponin T 22 ng/L     Narrative:      High Sensitive " Troponin T Reference Range:  <14.0 ng/L- Negative Female for AMI  <22.0 ng/L- Negative Male for AMI  >=14 - Abnormal Female indicating possible myocardial injury.  >=22 - Abnormal Male indicating possible myocardial injury.   Clinicians would have to utilize clinical acumen, EKG, Troponin, and serial changes to determine if it is an Acute Myocardial Infarction or myocardial injury due to an underlying chronic condition.         High Sensitivity Troponin T 2Hr [[id number]]  (Abnormal) Collected: 02/25/24 1102    Specimen: Blood Updated: 02/25/24 1130     HS Troponin T 19 ng/L      Troponin T Delta 4 ng/L     Narrative:      High Sensitive Troponin T Reference Range:  <14.0 ng/L- Negative Female for AMI  <22.0 ng/L- Negative Male for AMI  >=14 - Abnormal Female indicating possible myocardial injury.  >=22 - Abnormal Male indicating possible myocardial injury.   Clinicians would have to utilize clinical acumen, EKG, Troponin, and serial changes to determine if it is an Acute Myocardial Infarction or myocardial injury due to an underlying chronic condition.         BNP [[id number]]  (Abnormal) Collected: 02/25/24 0908    Specimen: Blood Updated: 02/25/24 1025     proBNP 3,280.0 pg/mL     Narrative:      This assay is used as an aid in the diagnosis of individuals suspected of having heart failure. It can be used as an aid in the diagnosis of acute decompensated heart failure (ADHF) in patients presenting with signs and symptoms of ADHF to the emergency department (ED). In addition, NT-proBNP of <300 pg/mL indicates ADHF is not likely.    Age Range Result Interpretation  NT-proBNP Concentration (pg/mL:      <50             Positive            >450                   Gray                 300-450                    Negative             <300    50-75           Positive            >900                  Gray                300-900                  Negative            <300      >75             Positive            >1800                   Bruce                300-1800                  Negative            <300    High Sensitivity Troponin T [[id number]]  (Abnormal) Collected: 02/25/24 0908    Specimen: Blood Updated: 02/25/24 1016     HS Troponin T 15 ng/L     Narrative:      High Sensitive Troponin T Reference Range:  <14.0 ng/L- Negative Female for AMI  <22.0 ng/L- Negative Male for AMI  >=14 - Abnormal Female indicating possible myocardial injury.  >=22 - Abnormal Male indicating possible myocardial injury.   Clinicians would have to utilize clinical acumen, EKG, Troponin, and serial changes to determine if it is an Acute Myocardial Infarction or myocardial injury due to an underlying chronic condition.         [place] Draw [[id number]] Collected: 02/25/24 0908    Specimen: Blood Updated: 02/25/24 1015    Narrative:      The following orders were created for panel order [place] Draw.  Procedure                               Abnormality         Status                     ---------                               -----------         ------                     Green Top (Gel)[[id number]]                                  Final result               Lavender Top[[id number]]                                     Final result               Gold Top - SST[[id number]]                                   Final result               Light Blue Top[[id number]]                                   Final result                 Please view results for these tests on the individual orders.    Green Top (Gel) [[id number]] Collected: 02/25/24 0908    Specimen: Blood Updated: 02/25/24 1015     Extra Tube Hold for add-ons.     Comment: Auto resulted.       Lavender Top [[id number]] Collected: 02/25/24 0908    Specimen: Blood Updated: 02/25/24 1015     Extra Tube hold for add-on     Comment: Auto resulted       Gold Top - SST [[id number]] Collected: 02/25/24 0908    Specimen: Blood Updated: 02/25/24 1015     Extra Tube Hold for add-ons.     Comment: Auto resulted.       Light Blue  "Top [[id number]] Collected: 02/25/24 0908    Specimen: Blood Updated: 02/25/24 1015     Extra Tube Hold for add-ons.     Comment: Auto resulted       Comprehensive Metabolic Panel [[id number]]  (Abnormal) Collected: 02/25/24 0908    Specimen: Blood Updated: 02/25/24 1014     Glucose 109 mg/dL      BUN 32 mg/dL      Creatinine 1.43 mg/dL      Sodium 133 mmol/L      Potassium 5.0 mmol/L      Chloride 101 mmol/L      CO2 20.1 mmol/L      Calcium 8.9 mg/dL      Total Protein 6.2 g/dL      Albumin 3.9 g/dL      ALT (SGPT) 57 U/L      AST (SGOT) 41 U/L      Alkaline Phosphatase 71 U/L      Total Bilirubin <0.2 mg/dL      Globulin 2.3 gm/dL      A/G Ratio 1.7 g/dL      BUN/Creatinine Ratio 22.4     Anion Gap 11.9 mmol/L      eGFR 38.8 mL/min/1.73     Narrative:      GFR Normal >60  Chronic Kidney Disease <60  Kidney Failure <15    The GFR formula is only valid for adults with stable renal function between ages 18 and 70.    D-dimer, Quantitative [[id number]]  (Normal) Collected: 02/25/24 0908    Specimen: Blood Updated: 02/25/24 1011     D-Dimer, Quantitative 0.58 MCGFEU/mL     Narrative:      According to the assay 's published package insert, a normal (<0.50 MCGFEU/mL) D-dimer result in conjunction with a non-high clinical probability assessment, excludes deep vein thrombosis (DVT) and pulmonary embolism (PE) with high sensitivity.    D-dimer values increase with age and this can make VTE exclusion of an older population difficult. To address this, the [employer], based on best available evidence and recent guidelines, recommends that clinicians use age-adjusted D-dimer thresholds in patients greater than 50 years of age with: a) a low probability of PE who do not meet all Pulmonary Embolism Rule Out Criteria, or b) in those with intermediate probability of PE.   The formula for an age-adjusted D-dimer cut-off is \"age/100\".  For example, a 60 year old patient would have an age-adjusted " cut-off of 0.60 MCGFEU/mL and an 80 year old 0.80 MCGFEU/mL.    CBC & Differential [[id number]]  (Abnormal) Collected: 02/25/24 0908    Specimen: Blood Updated: 02/25/24 1004    Narrative:      The following orders were created for panel order CBC & Differential.  Procedure                               Abnormality         Status                     ---------                               -----------         ------                     CBC Auto Differential[[id number]]        Abnormal            Final result                 Please view results for these tests on the individual orders.    CBC Auto Differential [[id number]]  (Abnormal) Collected: 02/25/24 0908    Specimen: Blood Updated: 02/25/24 1004     WBC 8.37 10*3/mm3      RBC 3.16 10*6/mm3      Hemoglobin 9.0 g/dL      Hematocrit 28.8 %      MCV 91.1 fL      MCH 28.5 pg      MCHC 31.3 g/dL      RDW 16.5 %      RDW-SD 54.6 fl      MPV 9.7 fL      Platelets 406 10*3/mm3      Neutrophil % 71.7 %      Lymphocyte % 15.9 %      Monocyte % 7.9 %      Eosinophil % 3.2 %      Basophil % 0.7 %      Immature Grans % 0.6 %      Neutrophils, Absolute 6.00 10*3/mm3      Lymphocytes, Absolute 1.33 10*3/mm3      Monocytes, Absolute 0.66 10*3/mm3      Eosinophils, Absolute 0.27 10*3/mm3      Basophils, Absolute 0.06 10*3/mm3      Immature Grans, Absolute 0.05 10*3/mm3      nRBC 0.0 /100 WBC             Imaging Results (Last 24 Hours)       Procedure Component Value Units Date/Time    XR Chest 2 View [[id number]] Collected: 02/25/24 1038     Updated: 02/25/24 1043    Narrative:      XR CHEST 2 VW    Date of Exam: 2/25/2024 10:11 AM EST    Indication: Chest pain    Comparison: 1/2/2024.    Findings:  The heart size is normal postsurgical changes apparent. The pulmonary vascular markings are increased felt to represent vascular congestion. There is no airspace disease, pleural effusion, or pneumothorax. There are chronic age-related changes involving   the bony thorax and thoracic  aorta.      Impression:      Impression:    1. Pulmonary vascular congestion.  2. Otherwise no active pulmonary disease.    Electronically Signed: Timothy Michelle MD    2/25/2024 10:39 AM EST    Workstation ID: [id number]            X-ray reviewed personally by physician.        Medication Review:   I have reviewed the patient's current medication list    Current Facility-Administered Medications:     acetaminophen (TYLENOL) tablet 650 mg, 650 mg, Oral, Q6H PRN, Dat Busch MD, 650 mg at 02/26/24 0359    allopurinol (ZYLOPRIM) tablet 100 mg, 100 mg, Oral, BID, Ben Ballard DO, 100 mg at 02/26/24 0835    amiodarone (PACERONE) tablet 200 mg, 200 mg, Oral, Daily, Ben Ballard DO    apixaban (ELIQUIS) tablet 5 mg, 5 mg, Oral, Q12H, Ben Ballard DO, 5 mg at 02/26/24 0835    sennosides-docusate (PERICOLACE) 8.6-50 MG per tablet 2 tablet, 2 tablet, Oral, BID, 2 tablet at 02/26/24 0835 **AND** polyethylene glycol (MIRALAX) packet 17 g, 17 g, Oral, Daily PRN **AND** bisacodyl (DULCOLAX) EC tablet 5 mg, 5 mg, Oral, Daily PRN **AND** bisacodyl (DULCOLAX) suppository 10 mg, 10 mg, Rectal, Daily PRN, Ben Ballard DO    bumetanide (BUMEX) tablet 2 mg, 2 mg, Oral, Daily, Karan Carter MD, 2 mg at 02/26/24 0835    clopidogrel (PLAVIX) tablet 75 mg, 75 mg, Oral, Daily, Ben Ballard DO, 75 mg at 02/25/24 2153    dextrose (D50W) (25 g/50 mL) IV injection 10-50 mL, 10-50 mL, Intravenous, Q15 Min PRN, Ben Ballard DO    dextrose (GLUTOSE) oral gel 15 g, 15 g, Oral, Q15 Min PRN, Ben Ballard DO    escitalopram (LEXAPRO) tablet 5 mg, 5 mg, Oral, Daily, Ben Ballard DO, 5 mg at 02/25/24 2153    glucagon (GLUCAGEN) injection 1 mg, 1 mg, Intramuscular, Q15 Min PRN, Ben Ballard DO    hydrALAZINE (APRESOLINE) tablet 100 mg, 100 mg, Oral, TID, Ben Ballard DO, 100 mg at 02/26/24 0836    insulin aspart (novoLOG) injection 1-200 Units, 1-200 Units,  Subcutaneous, Q6H, Ben Ballard DO    insulin aspart (novoLOG) injection 1-200 Units, 1-200 Units, Subcutaneous, PRN, Ben Ballard DO    insulin detemir (LEVEMIR) injection 1-200 Units, 1-200 Units, Subcutaneous, Nightly - Glucommander, Ben Ballard DO    nitroglycerin (NITROSTAT) SL tablet 0.4 mg, 0.4 mg, Sublingual, Q5 Min PRN, Ben Ballard DO    pantoprazole (PROTONIX) EC tablet 40 mg, 40 mg, Oral, BID, Ben Ballard DO, 40 mg at 02/26/24 0835    rosuvastatin (CRESTOR) tablet 10 mg, 10 mg, Oral, Daily, Ben Ballard DO, 10 mg at 02/26/24 0835    sacubitril-valsartan (ENTRESTO) 24-26 MG tablet 2 tablet, 2 tablet, Oral, Q12H, Karan Carter MD, 2 tablet at 02/26/24 0836    sodium chloride 0.9 % flush 10 mL, 10 mL, Intravenous, PRN, Ben Ballard DO    [COMPLETED] Insert Peripheral IV, , , Once **AND** sodium chloride 0.9 % flush 10 mL, 10 mL, Intravenous, PRN, Ben Ballard DO    sodium chloride 0.9 % flush 10 mL, 10 mL, Intravenous, Q12H, Ben Ballard DO, 10 mL at 02/26/24 0834    sodium chloride 0.9 % flush 10 mL, 10 mL, Intravenous, PRNNellie Michael R, DO    sodium chloride 0.9 % infusion 40 mL, 40 mL, Intravenous, PRN, Ben Ballard DO    spironolactone (ALDACTONE) tablet 50 mg, 50 mg, Oral, Daily, Karan Carter MD    traMADol (ULTRAM) tablet 50 mg, 50 mg, Oral, Q6H PRN, Ben Ballard DO, 50 mg at 02/26/24 0552      Assessment & Plan     Hypertensive Urgency: Discussed w/ Cardiology and antihypertensive regimen changed.  Likely the cause of her headache as well.  Low threshold to transfer to ICU for drip. But will monitor effect of new regimen.  Watch potassium.  Chest Pain: Could have been due to #1.  No further pain.  Cardiology has evaluated.  Symptomatic Bradycardia: Discussed w/ cardiology.  Stopping Tenormin.  Diabetes Mellitus, Type 2 in Obese: Last A1c at goal.  Bedsides at goal.  No change to current  regimen.  CKDIIIb: Creatinine appears to be about baseline.  Watch potassium and renal function w/ change in regimen.  GERD: No acute issues.  Continue PPI therapy.  Depression: Exam normal.  No change to current regimen.  Gout: No acute issues.    Plan for disposition:Home when able.    Fabrizio Brown MD  02/26/24  09:10 EST

## 2024-02-26 NOTE — CONSULTS
Date of Hospital Visit: 2024  Date of consult: 24  Encounter Provider: Karan Carter MD  Place of Service: [facility] CARDIOLOGY  Patient Name: Valentine Arora  :1950  Referral Provider: No ref. provider found    Chief complaint:chest pain     Reason for consult: chest pain     History of Present Illness Valentine Arora is a 74 years old pt  of Dr. Frye with a history of bilateral cerebrovascular disease and followed by vascular Dr. Ace, s/p bilateral common iliac artery kissing stent placement on 2019   CAD, MI about 20 years ago, pt failed attempted angioplasty and underwent CABG in .        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 It was felt to be due to trauma years before. An ECHO then showed Ef of 60-65% with normal valvular structure and function. In 2023, showed presented with PAF and is on chronic Ac and is on clopidogrel.     Pt was last seen on 23 in the office by Dr. Frye for follow up of her BP. Pt reported her BP labile. Pt was scheduled to have an abdominal CT. Her amlodipine had been recently increased to 10 mg daily, with some improvement in her BP but now increase in lower extremity edema. A Holter showed SR, no recurrent atrial flutter. She denied any chest pain, shortness of breath, palpitations, tachycardia, presyncope or syncope.Her amlodipine was decreased to 5 mg daily and spironolactone 25 mg daily.           Pt presented to ER on 24 with chest pain that lasted for about hour. Pt described it  as heaviness to the left of her sternum with radiation to the left arm. Pt reported the symptoms are similar to her MI in . Pt had a CABG x2. Pt reported her Apple watch showed her she had a low heart rate this am with her chest pain but it did not say Aflutter. In ER, EKG showed SB with HR 54, first degree AVB,  troponin 15, proBNP 3280, D dimer negative, CR 1.43, , ALT/AST 57/41, WBC 8.7, HGB 9, CXR showed mild pulmonary vascular congestion, otherwise no acute cardiopulmonary abnormality . Pt was given IV lasix, ASA and nitropaste.           ECHO 1/15/24      Left ventricular systolic function is normal. Calculated left ventricular EF = 65.1%    Left ventricular diastolic function was normal.    Left atrial volume is mildly increased.    The right atrial cavity is mildly  dilated.    Moderate tricuspid valve regurgitation is present.    Estimated right ventricular systolic pressure from tricuspid regurgitation is normal (<35 mmHg). Calculated right ventricular systolic pressure from tricuspid regurgitation is 34 mmHg.        Holter monitor 1/15/24      A normal monitor study.    Average HR: 57. Min HR: 35. Max HR: 81.           Stress test 9/10/23    Diaphragmatic attenuation artifact is present.    Myocardial perfusion imaging indicates a normal myocardial perfusion study with no evidence of ischemia.    Left ventricular ejection fraction is hyperdynamic (Calculated EF > 70%).    Impressions are consistent with a low risk study.     Past Medical History:   Diagnosis Date    Arthritis     Sarabia esophagus     CAD (coronary artery disease)     Chronic back pain     Colon polyp     Diabetes mellitus     GERD (gastroesophageal reflux disease)     Hyperlipidemia     Hypertension     Myocardial infarction     1995    PAD (peripheral artery disease) 9/11/2019    Shingles        Past Surgical History:   Procedure Laterality Date    COLONOSCOPY      COLONOSCOPY N/A 6/24/2020    Procedure: COLONOSCOPY;  Surgeon: Mathew Roberts MD;  Location: [facility];  Service: Gastroenterology;  Laterality: N/A;  Descending colon polyp x 2, Ascending colon polyp, Sigmoid colon polyp, Rectal polyp    COLONOSCOPY N/A 8/7/2023    Procedure: COLONOSCOPY;  Surgeon: Mathew Roberts MD;  Location: [facility] OR;  Service:  Gastroenterology;  Laterality: N/A;  Normal    CORONARY ARTERY BYPASS GRAFT  1995    x 2 vessels    ENDOSCOPY N/A 6/24/2020    Procedure: ESOPHAGOGASTRODUODENOSCOPY;  Surgeon: Mathew Roberts MD;  Location: [facility] OR;  Service: Gastroenterology;  Laterality: N/A;  Ulcerative esophagitis, Gastritis, Duodenitis  Duodenal biopsy, gastric biopsy, distal esophageal biopsy    ENDOSCOPY N/A 9/17/2020    Procedure: ESOPHAGOGASTRODUODENOSCOPY with biopsies;  Surgeon: Mathew Roberts MD;  Location: [facility] OR;  Service: Gastroenterology;  Laterality: N/A;  Esophagitis  Sarabia's Esophagus  Hiatal hernia  Gastritis  Gastric biopsy  Distal esophagus biopsy    ENDOSCOPY N/A 8/7/2023    Procedure: Esophagogastroduedenoscopy;  Surgeon: Mathew Roberts MD;  Location: [facility] OR;  Service: Gastroenterology;  Laterality: N/A;  Gastritis; short segment Sarabia's; Esophageal stricture- dilatation; Biopsies- gastric, distal esophagus    INNER EAR SURGERY      JOINT REPLACEMENT      right knee    LEG SURGERY      x 2 s/p fall    LUMBAR FUSION      L4/L5    SKIN GRAFT      to left lower leg x 2    TONSILLECTOMY      WRIST FUSION Left        Medications Prior to Admission   Medication Sig Dispense Refill Last Dose    allopurinol (ZYLOPRIM) 100 MG tablet Take 1 tablet by mouth 2 (Two) Times a Day.   2/25/2024    amiodarone (PACERONE) 200 MG tablet Take 1 tablet by mouth Daily. (Patient taking differently: Take 1 tablet by mouth Every Night.) 90 tablet 1 2/24/2024    atenolol (TENORMIN) 25 MG tablet Take 1 tablet by mouth Every Night.   2/24/2024    cloNIDine (CATAPRES) 0.1 MG tablet Take 2 tablets by mouth 3 (Three) Times a Day.   2/25/2024 at 0800    clopidogrel (PLAVIX) 75 MG tablet TAKE ONE TABLET BY MOUTH DAILY (Patient taking differently: Take 1 tablet by mouth Every Night.) 90 tablet 3 2/24/2024    Eliquis 5 MG tablet tablet Take 1 tablet by mouth 2 (Two) Times a Day.   2/25/2024 at 0800     escitalopram (LEXAPRO) 5 MG tablet Take 1 tablet by mouth Every Night.   2/24/2024    ferrous sulfate 325 (65 FE) MG tablet Take 1 tablet by mouth Daily With Breakfast.   2/25/2024 at 0800    hydrALAZINE (APRESOLINE) 50 MG tablet Take 2 tablets by mouth 3 (Three) Times a Day.   2/25/2024 at 0800    hydroCHLOROthiazide (HYDRODIURIL) 12.5 MG tablet Take 1 tablet by mouth Daily.   2/25/2024 at 0800    metFORMIN (GLUCOPHAGE) 500 MG tablet Take 1 tablet by mouth 2 (Two) Times a Day With Meals.   2/25/2024 at 0800    olmesartan (BENICAR) 40 MG tablet Take 1 tablet by mouth Every Night.   2/24/2024    pantoprazole (PROTONIX) 40 MG EC tablet Take 1 tablet by mouth 2 (Two) Times a Day. 180 tablet 3 2/25/2024 at 0800    rosuvastatin (CRESTOR) 10 MG tablet Take 1 tablet by mouth Daily.   2/25/2024 at 0800    spironolactone (ALDACTONE) 25 MG tablet Take 1 tablet by mouth Daily. (Patient taking differently: Take 1 tablet by mouth Every Night.) 30 tablet 11 2/24/2024    ALPRAZolam (XANAX) 0.25 MG tablet Take 1 tablet by mouth As Needed for Anxiety.   More than a month    furosemide (LASIX) 40 MG tablet Take 1 tablet by mouth Daily As Needed (excess fluid).   More than a month    traMADol (ULTRAM) 50 MG tablet Take 1 tablet by mouth Every 6 (Six) Hours As Needed for Moderate Pain.   More than a month       Current Meds  Scheduled Meds:allopurinol, 100 mg, Oral, BID  amiodarone, 200 mg, Oral, Daily  apixaban, 5 mg, Oral, Q12H  cloNIDine, 0.2 mg, Oral, TID  clopidogrel, 75 mg, Oral, Daily  escitalopram, 5 mg, Oral, Daily  hydrALAZINE, 100 mg, Oral, TID  hydroCHLOROthiazide, 12.5 mg, Oral, Daily  insulin aspart, 1-200 Units, Subcutaneous, Q6H  insulin detemir, 1-200 Units, Subcutaneous, Nightly - Glucommander  losartan, 100 mg, Oral, Q24H  pantoprazole, 40 mg, Oral, BID  rosuvastatin, 10 mg, Oral, Daily  senna-docusate sodium, 2 tablet, Oral, BID  sodium chloride, 10 mL, Intravenous, Q12H  spironolactone, 25 mg, Oral,  "Daily      Continuous Infusions:   PRN Meds:.  acetaminophen    senna-docusate sodium **AND** polyethylene glycol **AND** bisacodyl **AND** bisacodyl    dextrose    dextrose    glucagon (human recombinant)    insulin aspart    nitroglycerin    sodium chloride    [COMPLETED] Insert Peripheral IV **AND** sodium chloride    sodium chloride    sodium chloride    traMADol    Allergies as of 2024 - Reviewed 2024   Allergen Reaction Noted    Codeine Itching 2016    Other Unknown (See Comments) 2016       Social History     Socioeconomic History    Marital status:    Tobacco Use    Smoking status: Former     Types: Cigarettes     Quit date:      Years since quittin.1    Smokeless tobacco: Never    Tobacco comments:     QUIT    Vaping Use    Vaping Use: Never used   Substance and Sexual Activity    Alcohol use: Not Currently     Comment: occasional    Drug use: No    Sexual activity: Defer       Family History   Problem Relation Age of Onset    Heart disease Mother     Heart disease Father     Colon cancer Neg Hx     Colon polyps Neg Hx     Breast cancer Neg Hx        REVIEW OF SYSTEMS:   All systems reviewed and pertinent positives include in HPI otherwise negative review of systems.       Objective:   Temp:  [96.9 °F (36.1 °C)-98 °F (36.7 °C)] 98 °F (36.7 °C)  Heart Rate:  [47-57] 52  Resp:  [16-20] 16  BP: (144-190)/(51-89) 154/74  Body mass index is 35.14 kg/m².  Flowsheet Rows      Flowsheet Row First Filed Value   Admission Height 157.5 cm (62\") Documented at 2024 0905   Admission Weight 81.6 kg (180 lb) Documented at 2024 0905          Vitals:    24 0533   BP: 154/74   Pulse: 52   Resp: 16   Temp: 98 °F (36.7 °C)   SpO2: 97%       General Appearance:    Alert, cooperative, in no acute distress   Head:    Normocephalic, without obvious abnormality, atraumatic   Eyes:            Lids and lashes normal, conjunctivae and sclerae normal, no   icterus, no pallor, " corneas clear, PERRLA   Ears:    Ears appear intact with no abnormalities noted   Throat:   No oral lesions, no thrush, oral mucosa moist   Neck:   No adenopathy, supple, trachea midline, no thyromegaly, no   carotid bruit, no JVD   Back:     No kyphosis present, no scoliosis present, no skin lesions, erythema or scars, no tenderness to percussion or palpation, range of motion normal   Lungs:     Clear to auscultation,respirations regular, even and unlabored    Heart:    Regular rhythm and normal rate, normal S1 and S2, no murmur, no gallop, no rub, no click   Chest Wall:    No abnormalities observed   Abdomen:     Normal bowel sounds, no masses, no organomegaly, soft nontender, nondistended, no guarding, no rebound  tenderness   Extremities:   Moves all extremities well, no edema, no cyanosis, no redness   Pulses:   Pulses palpable and equal bilaterally. Normal radial, carotid, femoral, dorsalis pedis and posterior tibial pulses bilaterally. Normal abdominal aorta   Skin:  Neurology:   Psychiatric:   No bleeding, bruising or rash   Normal speech and cranial nerve exam, no focal deficit   Alert and oriented x 3, normal mood and affect             Review of Data:      Results from last 7 days   Lab Units 02/25/24  0908   SODIUM mmol/L 133*   POTASSIUM mmol/L 5.0   CHLORIDE mmol/L 101   CO2 mmol/L 20.1*   BUN mg/dL 32*   CREATININE mg/dL 1.43*   CALCIUM mg/dL 8.9   BILIRUBIN mg/dL <0.2   ALK PHOS U/L 71   ALT (SGPT) U/L 57*   AST (SGOT) U/L 41*   GLUCOSE mg/dL 109*     Results from last 7 days   Lab Units 02/25/24  1337 02/25/24  1102 02/25/24  0908   HSTROP T ng/L 22* 19* 15*     @LABRCNTbnp@  Results from last 7 days   Lab Units 02/25/24  0908   WBC 10*3/mm3 8.37   HEMOGLOBIN g/dL 9.0*   HEMATOCRIT % 28.8*   PLATELETS 10*3/mm3 406                                     @LABRCNTIP(chol,trig,hdl,ldl)    I personally viewed and interpreted the patient's EKG/Telemetry data  )   Chest pain        Assessment and  Plan:    Mrs. Arora is  a 73 yo with past medical history of coronary disease and a CABG x 2 in 1995, oxygen atrial fibrillation, cerebrovascular disease, peripheral arterial disease, essential hypertension,CKD,  hyperlipidemia admitted for further evaluation treatment of chest pain.  Home cardiac medications include olmesartan, amiodarone, atenolol, clonidine, Plavix, Eliquis, hydralazine, hydrochlorothiazide, spironolactone, rosuvastatin.  She was hypertensive with blood pressure reading of 190/144 on arrival, she has prior history of resistant hypertension and [place] for secondary hypertension including renal artery Doppler.  Chest x-ray and proBNP suggestive of CHF.  She was given Lasix 40 mg IV x 1, aspirin, nitroglycerin ointment.  Troponin and EKG not suggestive of ACS  TTE in 01/2024 with LVH and diastolic dysfunction, moderate TR, mild MR.  Myocardial perfusion study in September 2023 suggestive of inferior infarct with mild justine-infarct ischemia.    Overnight noted to be hypertensive and bradycardic with HR as low as 30's     Acute diastolic congestive heart failure precipitated by hypertension  Resistant hypertension  Coronary artery disease-stable  Paroxysmal atrial fibrillation-she was very symptomatic when in A-fib/flutter-continue amiodarone and Eliquis  Hypercholesterolemia  PAD  CKD    Chest pain-free and breathing back to normal.  I will add Entresto and loop diuretic to her current regimen and increase dose of spironolactone.  Likely chronic intravascular volume overload may be contributing to elevated blood pressure.  Holding atenolol/clonidine because of bradycardia.  May use clonidine as needed.    I have discussed patient with her nurse.    Karan Carter MD  02/26/24  07:20 EST.      Time spent in reviewing chart, discussion and examination:

## 2024-02-26 NOTE — PLAN OF CARE
Goal Outcome Evaluation:  Plan of Care Reviewed With: patient        Progress: no change  Outcome Evaluation: Cont to have Bradycardia with Hypertension, see previous notes, Tylenol given for headache, NPO this AM awaiting Cardiology to see

## 2024-02-26 NOTE — PLAN OF CARE
Goal Outcome Evaluation:  Plan of Care Reviewed With: patient        Progress: improving  Outcome Evaluation: vss. tele, nsr with HR 60 noted. pt up ad venice in room, ambulated in hallway. po bumex in place. pt complaint of headache, medicated with prn tylenol. pt has no other complaints at this time. family at bedside.

## 2024-02-27 PROBLEM — K22.10 ULCER OF ESOPHAGUS WITHOUT BLEEDING: Status: RESOLVED | Noted: 2020-08-05 | Resolved: 2024-02-27

## 2024-02-27 PROBLEM — R00.2 PALPITATIONS: Status: RESOLVED | Noted: 2023-09-10 | Resolved: 2024-02-27

## 2024-02-27 PROBLEM — K22.2 ESOPHAGEAL OBSTRUCTION: Status: RESOLVED | Noted: 2023-05-08 | Resolved: 2024-02-27

## 2024-02-27 PROBLEM — E87.1 DEHYDRATION WITH HYPONATREMIA: Status: RESOLVED | Noted: 2021-06-15 | Resolved: 2024-02-27

## 2024-02-27 PROBLEM — E86.0 DEHYDRATION WITH HYPONATREMIA: Status: RESOLVED | Noted: 2021-06-15 | Resolved: 2024-02-27

## 2024-02-27 LAB
ANION GAP SERPL CALCULATED.3IONS-SCNC: 13.8 MMOL/L (ref 5–15)
BUN SERPL-MCNC: 23 MG/DL (ref 8–23)
BUN/CREAT SERPL: 18.3 (ref 7–25)
CALCIUM SPEC-SCNC: 9.1 MG/DL (ref 8.6–10.5)
CHLORIDE SERPL-SCNC: 97 MMOL/L (ref 98–107)
CO2 SERPL-SCNC: 24.2 MMOL/L (ref 22–29)
CREAT SERPL-MCNC: 1.26 MG/DL (ref 0.57–1)
EGFRCR SERPLBLD CKD-EPI 2021: 45.2 ML/MIN/1.73
GLUCOSE BLDC GLUCOMTR-MCNC: 129 MG/DL (ref 70–130)
GLUCOSE BLDC GLUCOMTR-MCNC: 130 MG/DL (ref 70–130)
GLUCOSE BLDC GLUCOMTR-MCNC: 217 MG/DL (ref 70–130)
GLUCOSE BLDC GLUCOMTR-MCNC: 95 MG/DL (ref 70–130)
GLUCOSE SERPL-MCNC: 114 MG/DL (ref 65–99)
POTASSIUM SERPL-SCNC: 3.7 MMOL/L (ref 3.5–5.2)
SODIUM SERPL-SCNC: 135 MMOL/L (ref 136–145)

## 2024-02-27 PROCEDURE — 99232 SBSQ HOSP IP/OBS MODERATE 35: CPT | Performed by: HOSPITALIST

## 2024-02-27 PROCEDURE — G0378 HOSPITAL OBSERVATION PER HR: HCPCS

## 2024-02-27 PROCEDURE — 63710000001 INSULIN ASPART PER 5 UNITS: Performed by: INTERNAL MEDICINE

## 2024-02-27 PROCEDURE — 99214 OFFICE O/P EST MOD 30 MIN: CPT | Performed by: INTERNAL MEDICINE

## 2024-02-27 PROCEDURE — 80048 BASIC METABOLIC PNL TOTAL CA: CPT | Performed by: HOSPITALIST

## 2024-02-27 PROCEDURE — 63710000001 INSULIN DETEMIR PER 5 UNITS: Performed by: INTERNAL MEDICINE

## 2024-02-27 PROCEDURE — 82948 REAGENT STRIP/BLOOD GLUCOSE: CPT

## 2024-02-27 RX ORDER — CLONIDINE HYDROCHLORIDE 0.2 MG/1
0.1 TABLET ORAL EVERY 8 HOURS SCHEDULED
Status: DISCONTINUED | OUTPATIENT
Start: 2024-02-27 | End: 2024-02-28 | Stop reason: HOSPADM

## 2024-02-27 RX ADMIN — HYDRALAZINE HYDROCHLORIDE 100 MG: 50 TABLET, FILM COATED ORAL at 21:34

## 2024-02-27 RX ADMIN — Medication 10 ML: at 09:49

## 2024-02-27 RX ADMIN — CLOPIDOGREL BISULFATE 75 MG: 75 TABLET ORAL at 21:34

## 2024-02-27 RX ADMIN — BUMETANIDE 2 MG: 1 TABLET ORAL at 09:46

## 2024-02-27 RX ADMIN — PANTOPRAZOLE SODIUM 40 MG: 40 TABLET, DELAYED RELEASE ORAL at 21:34

## 2024-02-27 RX ADMIN — CLONIDINE HYDROCHLORIDE 0.1 MG: 0.2 TABLET ORAL at 09:46

## 2024-02-27 RX ADMIN — INSULIN ASPART 5 UNITS: 100 INJECTION, SOLUTION INTRAVENOUS; SUBCUTANEOUS at 17:37

## 2024-02-27 RX ADMIN — HYDRALAZINE HYDROCHLORIDE 100 MG: 50 TABLET, FILM COATED ORAL at 09:46

## 2024-02-27 RX ADMIN — AMIODARONE HYDROCHLORIDE 200 MG: 200 TABLET ORAL at 21:34

## 2024-02-27 RX ADMIN — HYDRALAZINE HYDROCHLORIDE 100 MG: 50 TABLET, FILM COATED ORAL at 17:36

## 2024-02-27 RX ADMIN — INSULIN ASPART 5 UNITS: 100 INJECTION, SOLUTION INTRAVENOUS; SUBCUTANEOUS at 13:39

## 2024-02-27 RX ADMIN — INSULIN ASPART 7 UNITS: 100 INJECTION, SOLUTION INTRAVENOUS; SUBCUTANEOUS at 09:49

## 2024-02-27 RX ADMIN — ESCITALOPRAM OXALATE 5 MG: 10 TABLET, FILM COATED ORAL at 21:34

## 2024-02-27 RX ADMIN — PANTOPRAZOLE SODIUM 40 MG: 40 TABLET, DELAYED RELEASE ORAL at 09:46

## 2024-02-27 RX ADMIN — ROSUVASTATIN CALCIUM 10 MG: 5 TABLET, FILM COATED ORAL at 09:46

## 2024-02-27 RX ADMIN — APIXABAN 5 MG: 2.5 TABLET, FILM COATED ORAL at 09:46

## 2024-02-27 RX ADMIN — INSULIN DETEMIR 10 UNITS: 100 INJECTION, SOLUTION SUBCUTANEOUS at 21:47

## 2024-02-27 RX ADMIN — ALLOPURINOL 100 MG: 100 TABLET ORAL at 09:46

## 2024-02-27 RX ADMIN — SPIRONOLACTONE 50 MG: 25 TABLET ORAL at 21:34

## 2024-02-27 RX ADMIN — Medication 10 ML: at 21:40

## 2024-02-27 RX ADMIN — APIXABAN 5 MG: 2.5 TABLET, FILM COATED ORAL at 21:34

## 2024-02-27 RX ADMIN — ALLOPURINOL 100 MG: 100 TABLET ORAL at 21:34

## 2024-02-27 RX ADMIN — SACUBITRIL AND VALSARTAN 2 TABLET: 24; 26 TABLET, FILM COATED ORAL at 09:46

## 2024-02-27 RX ADMIN — CLONIDINE HYDROCHLORIDE 0.1 MG: 0.2 TABLET ORAL at 21:34

## 2024-02-27 RX ADMIN — SACUBITRIL AND VALSARTAN 2 TABLET: 24; 26 TABLET, FILM COATED ORAL at 21:33

## 2024-02-27 NOTE — PLAN OF CARE
Goal Outcome Evaluation:  Plan of Care Reviewed With: patient        Progress: improving  Outcome Evaluation: B/P improved, VSS, medication adjusted per Cardiology yesterday, states feeling better, headache improved, tolerating diet, anticipates going home soon

## 2024-02-27 NOTE — PROGRESS NOTES
"   LOS: 0 days   Patient Care Team:  Aliya Alejandro MD as PCP - General (Family Medicine)    Chief Complaint: CP, HTN     Interval History: Feels well, no CP since admission. Excellent diuresis.       Objective   Vital Signs  Temp:  [97.3 °F (36.3 °C)-98.9 °F (37.2 °C)] 98.6 °F (37 °C)  Heart Rate:  [66-85] 85  Resp:  [20] 20  BP: (157-187)/(62-83) 169/83    Intake/Output Summary (Last 24 hours) at 2/27/2024 1045  Last data filed at 2/27/2024 0937  Gross per 24 hour   Intake 720 ml   Output 3700 ml   Net -2980 ml       Last Weight and Admission Weight        02/25/24  1313   Weight: 87.2 kg (192 lb 3.2 oz)     Flowsheet Rows      Flowsheet Row First Filed Value   Admission Height 157.5 cm (62\") Documented at 02/25/2024 0905   Admission Weight 81.6 kg (180 lb) Documented at 02/25/2024 0905                    Physical Exam  Vitals reviewed.   Constitutional:       Appearance: She is well-developed.   HENT:      Head: Normocephalic.      Nose: Nose normal.      Mouth/Throat:      Pharynx: Oropharynx is clear.   Eyes:      Conjunctiva/sclera: Conjunctivae normal.   Neck:      Vascular: No JVD.   Cardiovascular:      Rate and Rhythm: Normal rate and regular rhythm.      Pulses: Normal pulses.      Heart sounds: Normal heart sounds.   Pulmonary:      Effort: Pulmonary effort is normal.      Breath sounds: Normal breath sounds.   Abdominal:      Palpations: Abdomen is soft.      Tenderness: There is no abdominal tenderness.   Musculoskeletal:         General: No swelling. Normal range of motion.      Cervical back: Normal range of motion.   Skin:     General: Skin is warm and dry.      Findings: No erythema.   Neurological:      General: No focal deficit present.      Mental Status: She is alert.      Cranial Nerves: No cranial nerve deficit.   Psychiatric:         Mood and Affect: Mood normal.         Results Review:      Results from last 7 days   Lab Units 02/27/24  0340 02/25/24  0908   SODIUM mmol/L 135* " 133*   POTASSIUM mmol/L 3.7 5.0   CHLORIDE mmol/L 97* 101   CO2 mmol/L 24.2 20.1*   BUN mg/dL 23 32*   CREATININE mg/dL 1.26* 1.43*   GLUCOSE mg/dL 114* 109*   CALCIUM mg/dL 9.1 8.9     Results from last 7 days   Lab Units 02/25/24  1337 02/25/24  1102 02/25/24  0908   HSTROP T ng/L 22* 19* 15*     Results from last 7 days   Lab Units 02/25/24  0908   WBC 10*3/mm3 8.37   HEMOGLOBIN g/dL 9.0*   HEMATOCRIT % 28.8*   PLATELETS 10*3/mm3 406                       I reviewed the patient's new clinical results.  I personally viewed and interpreted the patient's EKG/Telemetry data        Medication Review:   allopurinol, 100 mg, Oral, BID  amiodarone, 200 mg, Oral, Daily  apixaban, 5 mg, Oral, Q12H  bumetanide, 2 mg, Oral, Daily  cloNIDine, 0.1 mg, Oral, Q8H  clopidogrel, 75 mg, Oral, Daily  escitalopram, 5 mg, Oral, Daily  hydrALAZINE, 100 mg, Oral, TID  insulin aspart, 1-200 Units, Subcutaneous, Q6H  insulin detemir, 1-200 Units, Subcutaneous, Nightly - Glucommander  pantoprazole, 40 mg, Oral, BID  rosuvastatin, 10 mg, Oral, Daily  sacubitril-valsartan, 2 tablet, Oral, Q12H  senna-docusate sodium, 2 tablet, Oral, BID  sodium chloride, 10 mL, Intravenous, Q12H  spironolactone, 50 mg, Oral, Daily             Assessment & Plan     1. Chest pain  2. Acute on chronic HFpEF  3. Uncontrolled HTN  4. Sinus bradycardia  5. CAD  6. Hyperlipidemia  7. PAF    Her chest pain was felt to be potentially due to volume overload and hypertension. Her Tn is minimally above reference range without significant delta. She feels better after diuresis.     Her home atenolol was stopped due to low HR (she's on amiodarone as well). Olmesartan was changed to sacubitril-valsartan. HCTZ was stopped, and spironolactone was increased. Hydralazine was increased. She was on prn furosemide at home but wasn't using it; she's on bumetanide here. Creatinine is stable.    She was on clonidine 0.2mg TID at home; I think she'll experience rebound if it's  abruptly stopped. I'll restart 0.1mg TID with plans to wean as able. Goal -140s.    If no further chest pain, I think we can hold off on stress testing.     Likely home in AM, d/w Dr Brown.     Dwayne Parikh MD  02/27/24  10:45 EST

## 2024-02-27 NOTE — PROGRESS NOTES
"Hospitalist Team      Patient Care Team:  Aliya Alejandro MD as PCP - General (Family Medicine)        Chief Complaint:  Follow-up Hypertensive Urgency    Subjective    Ms. Arora reports she feels much better this morning.  Her headache has resolved as well as her nausea.  She denies chest pain and dyspnea.  She is tolerating PO.  Ambulating w/o difficulty.    Objective    Vital Signs  Temp:  [97.3 °F (36.3 °C)-98.9 °F (37.2 °C)] 98.6 °F (37 °C)  Heart Rate:  [56-85] 85  Resp:  [20] 20  BP: (156-187)/(53-87) 169/83  Oxygen Therapy  SpO2: 97 %  Pulse Oximetry Type: Intermittent  Device (Oxygen Therapy): room air}    Flowsheet Rows      Flowsheet Row First Filed Value   Admission Height 157.5 cm (62\") Documented at 02/25/2024 0905   Admission Weight 81.6 kg (180 lb) Documented at 02/25/2024 0905            Physical Exam:    General: Appears stated age in no acute distress.  Lungs: Breath sounds are clear throughout all fields.  Respirations are non-labored.  CV: Regular rate and rhythm.  No murmurs appreciated.  Radial pulses are 2+ and symmetric.  Abdomen: Soft and non-tender w/ active bowel sounds.  MSK: No C/C/E.  Neuro: CN II-XII grossly intact.  Psych: Pleasant affect.  Ox3.    Results Review:     I reviewed the patient's new clinical results.    Lab Results (last 24 hours)       Procedure Component Value Units Date/Time    POC Glucose Once [[id number]]  (Abnormal) Collected: 02/27/24 0836    Specimen: Blood Updated: 02/27/24 0842     Glucose 217 mg/dL     Basic Metabolic Panel [[id number]]  (Abnormal) Collected: 02/27/24 0340    Specimen: Blood Updated: 02/27/24 0451     Glucose 114 mg/dL      BUN 23 mg/dL      Creatinine 1.26 mg/dL      Sodium 135 mmol/L      Potassium 3.7 mmol/L      Chloride 97 mmol/L      CO2 24.2 mmol/L      Calcium 9.1 mg/dL      BUN/Creatinine Ratio 18.3     Anion Gap 13.8 mmol/L      eGFR 45.2 mL/min/1.73     Narrative:      GFR Normal >60  Chronic Kidney Disease <60  Kidney " Failure <15    The GFR formula is only valid for adults with stable renal function between ages 18 and 70.    POC Glucose Once [[id number]]  (Abnormal) Collected: 02/26/24 2130    Specimen: Blood Updated: 02/26/24 2136     Glucose 138 mg/dL     POC Glucose Once [[id number]]  (Normal) Collected: 02/26/24 1707    Specimen: Blood Updated: 02/26/24 1713     Glucose 115 mg/dL     Hemoglobin A1c [[id number]]  (Abnormal) Collected: 02/25/24 0908    Specimen: Blood Updated: 02/26/24 1458     Hemoglobin A1C 5.90 %     Narrative:      Hemoglobin A1C Ranges:    Increased Risk for Diabetes  5.7% to 6.4%  Diabetes                     >= 6.5%  Diabetic Goal                < 7.0%    POC Glucose Once [[id number]]  (Normal) Collected: 02/26/24 1233    Specimen: Blood Updated: 02/26/24 1239     Glucose 106 mg/dL             Imaging Results (Last 24 Hours)       ** No results found for the last 24 hours. **              Medication Review:   I have reviewed the patient's current medication list    Current Facility-Administered Medications:     acetaminophen (TYLENOL) tablet 650 mg, 650 mg, Oral, Q6H PRN, Dat Busch MD, 650 mg at 02/26/24 1637    allopurinol (ZYLOPRIM) tablet 100 mg, 100 mg, Oral, BID, Ben Ballard DO, 100 mg at 02/26/24 2122    amiodarone (PACERONE) tablet 200 mg, 200 mg, Oral, Daily, Ben Ballard DO, 200 mg at 02/26/24 2122    apixaban (ELIQUIS) tablet 5 mg, 5 mg, Oral, Q12H, Ben Ballard DO, 5 mg at 02/26/24 2122    sennosides-docusate (PERICOLACE) 8.6-50 MG per tablet 2 tablet, 2 tablet, Oral, BID, 2 tablet at 02/26/24 0835 **AND** polyethylene glycol (MIRALAX) packet 17 g, 17 g, Oral, Daily PRN **AND** bisacodyl (DULCOLAX) EC tablet 5 mg, 5 mg, Oral, Daily PRN **AND** bisacodyl (DULCOLAX) suppository 10 mg, 10 mg, Rectal, Daily PRN, Ben Ballard DO    bumetanide (BUMEX) tablet 2 mg, 2 mg, Oral, Daily, Karan Carter MD, 2 mg at 02/26/24 0835    clopidogrel (PLAVIX)  tablet 75 mg, 75 mg, Oral, Daily, Ben Ballard DO, 75 mg at 02/26/24 2122    dextrose (D50W) (25 g/50 mL) IV injection 10-50 mL, 10-50 mL, Intravenous, Q15 Min PRN, Ben aBllard DO    dextrose (GLUTOSE) oral gel 15 g, 15 g, Oral, Q15 Min PRN, Ben Ballard DO    escitalopram (LEXAPRO) tablet 5 mg, 5 mg, Oral, Daily, Ben Ballard DO, 5 mg at 02/26/24 2121    glucagon (GLUCAGEN) injection 1 mg, 1 mg, Intramuscular, Q15 Min PRN, Ben Ballard DO    hydrALAZINE (APRESOLINE) tablet 100 mg, 100 mg, Oral, TID, Ben Ballard DO, 100 mg at 02/26/24 2122    insulin aspart (novoLOG) injection 1-200 Units, 1-200 Units, Subcutaneous, Q6H, Ben Ballard DO    insulin aspart (novoLOG) injection 1-200 Units, 1-200 Units, Subcutaneous, PRN, Ben Ballard DO    insulin detemir (LEVEMIR) injection 1-200 Units, 1-200 Units, Subcutaneous, Nightly - Glucommander, Ben Ballard DO    nitroglycerin (NITROSTAT) SL tablet 0.4 mg, 0.4 mg, Sublingual, Q5 Min PRN, Ben Ballard DO    pantoprazole (PROTONIX) EC tablet 40 mg, 40 mg, Oral, BID, Ben Ballard DO, 40 mg at 02/26/24 2122    rosuvastatin (CRESTOR) tablet 10 mg, 10 mg, Oral, Daily, Ben Ballard DO, 10 mg at 02/26/24 0835    sacubitril-valsartan (ENTRESTO) 24-26 MG tablet 2 tablet, 2 tablet, Oral, Q12H, Karan Carter MD, 2 tablet at 02/26/24 2122    sodium chloride 0.9 % flush 10 mL, 10 mL, Intravenous, PRN, Ben Ballard DO    [COMPLETED] Insert Peripheral IV, , , Once **AND** sodium chloride 0.9 % flush 10 mL, 10 mL, Intravenous, PRN, Ben Ballard R, DO    sodium chloride 0.9 % flush 10 mL, 10 mL, Intravenous, Q12H, Ben Ballard, , 10 mL at 02/26/24 2126    sodium chloride 0.9 % flush 10 mL, 10 mL, Intravenous, PRN, Ben Ballard R, DO    sodium chloride 0.9 % infusion 40 mL, 40 mL, Intravenous, PRN, Ben Ballard R, DO    spironolactone (ALDACTONE) tablet 50  mg, 50 mg, Oral, Daily, Karan Carter MD, 50 mg at 02/26/24 2122    traMADol (ULTRAM) tablet 50 mg, 50 mg, Oral, Q6H PRN, Ben Ballard DO, 50 mg at 02/26/24 0552      Assessment & Plan     Hypertensive Urgency: Trend improved over yesterday afternoon and this morning.  Discussed w/ Dr. Parikh.  Discussed target BP goal w/ Ms. Arora.  Chest Pain: No further pain.  Cardiology following.  Symptomatic Bradycardia: Trend improving w/ cessation of Atenolol.  PAF: Continues on Amiodarone and Eliquis.  As in #3.  Diabetes Mellitus, Type 2 in Obese: A1c at goal.  Slight dietary indiscretion this morning.  Continue to follow glucose trend.  CKDIIIb: Creatinine remains about her baseline.  Continue to monitor given BP regimen change.  GERD: Nothing acute.  Continue PPI therapy.  Depression: Pleasant on exam.  No change to current regimen.  Gout: Nothing acute.    Plan for disposition: A.DAydin in A..    Fabrizio Brown MD  02/27/24  09:03 EST

## 2024-02-27 NOTE — PLAN OF CARE
Problem: Adult Inpatient Plan of Care  Goal: Plan of Care Review  Outcome: Ongoing, Progressing  Flowsheets (Taken 2/27/2024 8610)  Progress: improving  Plan of Care Reviewed With: patient  Outcome Evaluation: B/P is a little improved, Seen by cards today. Has been up getting cleaned up today independently. Labs in the AM. Denies chest pain/pressure. WIll monitor.   Goal Outcome Evaluation:  Plan of Care Reviewed With: patient        Progress: improving  Outcome Evaluation: B/P is a little improved, Seen by cards today. Has been up getting cleaned up today independently. Labs in the AM. Denies chest pain/pressure. WIll monitor.

## 2024-02-28 ENCOUNTER — APPOINTMENT (OUTPATIENT)
Dept: GENERAL RADIOLOGY | Facility: HOSPITAL | Age: 74
End: 2024-02-28
Payer: MEDICARE

## 2024-02-28 VITALS
SYSTOLIC BLOOD PRESSURE: 127 MMHG | BODY MASS INDEX: 33.71 KG/M2 | DIASTOLIC BLOOD PRESSURE: 72 MMHG | RESPIRATION RATE: 18 BRPM | HEIGHT: 62 IN | TEMPERATURE: 98.4 F | WEIGHT: 183.2 LBS | OXYGEN SATURATION: 95 % | HEART RATE: 94 BPM

## 2024-02-28 LAB
ANION GAP SERPL CALCULATED.3IONS-SCNC: 12.6 MMOL/L (ref 5–15)
BUN SERPL-MCNC: 29 MG/DL (ref 8–23)
BUN/CREAT SERPL: 17.7 (ref 7–25)
CALCIUM SPEC-SCNC: 8.6 MG/DL (ref 8.6–10.5)
CHLORIDE SERPL-SCNC: 94 MMOL/L (ref 98–107)
CO2 SERPL-SCNC: 24.4 MMOL/L (ref 22–29)
CREAT SERPL-MCNC: 1.64 MG/DL (ref 0.57–1)
EGFRCR SERPLBLD CKD-EPI 2021: 32.9 ML/MIN/1.73
GLUCOSE BLDC GLUCOMTR-MCNC: 109 MG/DL (ref 70–130)
GLUCOSE SERPL-MCNC: 127 MG/DL (ref 65–99)
POTASSIUM SERPL-SCNC: 3.6 MMOL/L (ref 3.5–5.2)
SODIUM SERPL-SCNC: 131 MMOL/L (ref 136–145)

## 2024-02-28 PROCEDURE — 71046 X-RAY EXAM CHEST 2 VIEWS: CPT

## 2024-02-28 PROCEDURE — 63710000001 INSULIN ASPART PER 5 UNITS: Performed by: INTERNAL MEDICINE

## 2024-02-28 PROCEDURE — 99214 OFFICE O/P EST MOD 30 MIN: CPT | Performed by: PHYSICIAN ASSISTANT

## 2024-02-28 PROCEDURE — 82948 REAGENT STRIP/BLOOD GLUCOSE: CPT

## 2024-02-28 PROCEDURE — 80048 BASIC METABOLIC PNL TOTAL CA: CPT | Performed by: HOSPITALIST

## 2024-02-28 PROCEDURE — G0378 HOSPITAL OBSERVATION PER HR: HCPCS

## 2024-02-28 RX ORDER — HYDRALAZINE HYDROCHLORIDE 100 MG/1
100 TABLET, FILM COATED ORAL 3 TIMES DAILY
Qty: 90 TABLET | Refills: 0 | Status: SHIPPED | OUTPATIENT
Start: 2024-02-28 | End: 2024-02-28 | Stop reason: SDUPTHER

## 2024-02-28 RX ORDER — HYDRALAZINE HYDROCHLORIDE 100 MG/1
100 TABLET, FILM COATED ORAL 3 TIMES DAILY
Qty: 90 TABLET | Refills: 0 | Status: ON HOLD | OUTPATIENT
Start: 2024-02-28 | End: 2024-03-29

## 2024-02-28 RX ORDER — BUMETANIDE 1 MG/1
1 TABLET ORAL DAILY
Qty: 30 TABLET | Refills: 0 | Status: SHIPPED | OUTPATIENT
Start: 2024-02-28 | End: 2024-02-28 | Stop reason: SDUPTHER

## 2024-02-28 RX ORDER — BUMETANIDE 1 MG/1
1 TABLET ORAL DAILY
Qty: 30 TABLET | Refills: 0 | Status: ON HOLD | OUTPATIENT
Start: 2024-02-28

## 2024-02-28 RX ORDER — SPIRONOLACTONE 50 MG/1
50 TABLET, FILM COATED ORAL DAILY
Qty: 30 TABLET | Refills: 0 | Status: ON HOLD | OUTPATIENT
Start: 2024-02-28

## 2024-02-28 RX ADMIN — SACUBITRIL AND VALSARTAN 2 TABLET: 24; 26 TABLET, FILM COATED ORAL at 08:58

## 2024-02-28 RX ADMIN — APIXABAN 5 MG: 2.5 TABLET, FILM COATED ORAL at 08:58

## 2024-02-28 RX ADMIN — HYDRALAZINE HYDROCHLORIDE 100 MG: 50 TABLET, FILM COATED ORAL at 08:58

## 2024-02-28 RX ADMIN — PANTOPRAZOLE SODIUM 40 MG: 40 TABLET, DELAYED RELEASE ORAL at 08:58

## 2024-02-28 RX ADMIN — DOCUSATE SODIUM 50 MG AND SENNOSIDES 8.6 MG 2 TABLET: 8.6; 5 TABLET, FILM COATED ORAL at 08:58

## 2024-02-28 RX ADMIN — INSULIN ASPART 5 UNITS: 100 INJECTION, SOLUTION INTRAVENOUS; SUBCUTANEOUS at 08:58

## 2024-02-28 RX ADMIN — Medication 10 ML: at 09:00

## 2024-02-28 RX ADMIN — CLONIDINE HYDROCHLORIDE 0.1 MG: 0.2 TABLET ORAL at 06:38

## 2024-02-28 RX ADMIN — ALLOPURINOL 100 MG: 100 TABLET ORAL at 08:58

## 2024-02-28 RX ADMIN — ROSUVASTATIN CALCIUM 10 MG: 5 TABLET, FILM COATED ORAL at 08:58

## 2024-02-28 NOTE — CASE MANAGEMENT/SOCIAL WORK
Case Management Discharge Note      Final Note: Discharged home.    Provided Post Acute Provider List?: Refused  Refused Provider List Comment: pt declined    Selected Continued Care - Discharged on 2/28/2024 Admission date: 2/25/2024 - Discharge disposition: Home or Self Care      Destination    No services have been selected for the patient.                Durable Medical Equipment    No services have been selected for the patient.                Dialysis/Infusion    No services have been selected for the patient.                Home Medical Care    No services have been selected for the patient.                Therapy    No services have been selected for the patient.                Community Resources    No services have been selected for the patient.                Community & DME    No services have been selected for the patient.                         Final Discharge Disposition Code: 01 - home or self-care

## 2024-02-28 NOTE — PLAN OF CARE
Goal Outcome Evaluation:  Plan of Care Reviewed With: patient        Progress: improving  Outcome Evaluation: B/P improved, Tele SR, VSS, a little restless, independent, anticipates going home today

## 2024-02-28 NOTE — PROGRESS NOTES
Patient Name: Valentine Arora  Age/Sex: 74 y.o. female  : 1950  MRN: [id number]    Date of Admission: 2024  Date of Encounter Visit: 24  Encounter Provider: Yeyo Gamboa PA-C  Place of Service: [facility] CARDIOLOG[facility]      Subjective:       Chief Complaint: Shortness of breath/chest pain     History of Present Illness:  Valentine Arora is a 74 y.o. female who presents to the ER 2024 with persistent chest pain for 1 hour.  It was described as a heaviness left of her sternum with radiation to her left arm.  She felt like the symptoms were similar to her MI in the past.  EKG showed sinus bradycardia with heart rate into the 40s and BNP was elevated to 3280.  She received IV diuresis and her blood pressure medications were adjusted as it was elevated.    PMH: Bilateral cerebrovascular disease followed by Dr. Ace, peripheral artery disease with bilateral common iliac stent placement 2018, CAD with prior MI 20 years ago with failed angioplasty s/p CABG x2 in , paroxysmal atrial fibrillation    Follow up:   24 on room air this morning.  1.9 L out overnight.  Potassium was 3.6, will replace.  Blood pressure is 150 systolic prior to a.m. meds.  It is down since medications have been administered.    Previous testing:   Echo 1/15/2024: EF 65.1% with normal diastolic function.  Mildly increased left atrium and right atrium.  Moderate TR.  RVSP 34 mmHg    MPI 9/10/2023: No evidence of myocardial schema.  EF over 70%    Home Medications:   Medications Prior to Admission   Medication Sig Dispense Refill Last Dose    allopurinol (ZYLOPRIM) 100 MG tablet Take 1 tablet by mouth 2 (Two) Times a Day.   2024    amiodarone (PACERONE) 200 MG tablet Take 1 tablet by mouth Daily. (Patient taking differently: Take 1 tablet by mouth Every Night.) 90 tablet 1 2024    atenolol (TENORMIN) 25 MG tablet Take 1 tablet by mouth Every Night.   2024     cloNIDine (CATAPRES) 0.1 MG tablet Take 2 tablets by mouth 3 (Three) Times a Day.   2/25/2024 at 0800    clopidogrel (PLAVIX) 75 MG tablet TAKE ONE TABLET BY MOUTH DAILY (Patient taking differently: Take 1 tablet by mouth Every Night.) 90 tablet 3 2/24/2024    Eliquis 5 MG tablet tablet Take 1 tablet by mouth 2 (Two) Times a Day.   2/25/2024 at 0800    escitalopram (LEXAPRO) 5 MG tablet Take 1 tablet by mouth Every Night.   2/24/2024    ferrous sulfate 325 (65 FE) MG tablet Take 1 tablet by mouth Daily With Breakfast.   2/25/2024 at 0800    hydrALAZINE (APRESOLINE) 50 MG tablet Take 2 tablets by mouth 3 (Three) Times a Day.   2/25/2024 at 0800    hydroCHLOROthiazide (HYDRODIURIL) 12.5 MG tablet Take 1 tablet by mouth Daily.   2/25/2024 at 0800    metFORMIN (GLUCOPHAGE) 500 MG tablet Take 1 tablet by mouth 2 (Two) Times a Day With Meals.   2/25/2024 at 0800    olmesartan (BENICAR) 40 MG tablet Take 1 tablet by mouth Every Night.   2/24/2024    pantoprazole (PROTONIX) 40 MG EC tablet Take 1 tablet by mouth 2 (Two) Times a Day. 180 tablet 3 2/25/2024 at 0800    rosuvastatin (CRESTOR) 10 MG tablet Take 1 tablet by mouth Daily.   2/25/2024 at 0800    spironolactone (ALDACTONE) 25 MG tablet Take 1 tablet by mouth Daily. (Patient taking differently: Take 1 tablet by mouth Every Night.) 30 tablet 11 2/24/2024    ALPRAZolam (XANAX) 0.25 MG tablet Take 1 tablet by mouth As Needed for Anxiety.   More than a month    furosemide (LASIX) 40 MG tablet Take 1 tablet by mouth Daily As Needed (excess fluid).   More than a month    traMADol (ULTRAM) 50 MG tablet Take 1 tablet by mouth Every 6 (Six) Hours As Needed for Moderate Pain.   More than a month       Allergies:  Allergies   Allergen Reactions    Codeine Itching    Other Unknown (See Comments)     Vicryl: doesn't heal         Review of Systems:  All systems reviewed. Pertinent positives identified in HPI. All other systems are negative.       Objective:     Objective:  Temp:   [97.5 °F (36.4 °C)-99 °F (37.2 °C)] 98.4 °F (36.9 °C)  Heart Rate:  [79-89] 79  Resp:  [20] 20  BP: (152-156)/(66-80) 152/75    Intake/Output Summary (Last 24 hours) at 2/28/2024 0908  Last data filed at 2/27/2024 1935  Gross per 24 hour   Intake 1200 ml   Output 1925 ml   Net -725 ml     Body mass index is 33.5 kg/m².      02/25/24  0905 02/25/24  1313 02/28/24  0610   Weight: 81.6 kg (180 lb) 87.2 kg (192 lb 3.2 oz) 83.1 kg (183 lb 3.2 oz)         Physical Exam:   General: 74 y.o. female No acute distress, laying in bed. Alert and Oriented.   Neck: No JVD or carotid bruit  Lungs: Clear to ausculation bilaterally, symmetric  Heart: Regular rate and rhythm with no overt murmurs, rubs or gallops. Normal S1 and S2.   Abdomen: soft, non-tender  Extremities: No lower extremity edema or cyanosis.   Neuo: no lateralizing defects.     Lab Review:     Results from last 7 days   Lab Units 02/28/24  0434 02/27/24  0340 02/25/24  0908   SODIUM mmol/L 131* 135* 133*   POTASSIUM mmol/L 3.6 3.7 5.0   CHLORIDE mmol/L 94* 97* 101   CO2 mmol/L 24.4 24.2 20.1*   BUN mg/dL 29* 23 32*   CREATININE mg/dL 1.64* 1.26* 1.43*   GLUCOSE mg/dL 127* 114* 109*   CALCIUM mg/dL 8.6 9.1 8.9       Results from last 7 days   Lab Units 02/25/24  1337 02/25/24  1102 02/25/24  0908   HSTROP T ng/L 22* 19* 15*     Results from last 7 days   Lab Units 02/25/24  0908   WBC 10*3/mm3 8.37   HEMOGLOBIN g/dL 9.0*   HEMATOCRIT % 28.8*   PLATELETS 10*3/mm3 406                     Results from last 7 days   Lab Units 02/25/24  0908   PROBNP pg/mL 3,280.0*               Imaging:    chest X-ray    Results for orders placed during the hospital encounter of 01/15/24    Adult Transthoracic Echo Complete W/ Cont if Necessary Per Protocol    Interpretation Summary    Left ventricular systolic function is normal. Calculated left ventricular EF = 65.1%    Left ventricular diastolic function was normal.    Left atrial volume is mildly increased.    The right atrial cavity  is mildly  dilated.    Moderate tricuspid valve regurgitation is present.    Estimated right ventricular systolic pressure from tricuspid regurgitation is normal (<35 mmHg). Calculated right ventricular systolic pressure from tricuspid regurgitation is 34 mmHg.      Telemetry/EK24: Sinus rhythm with infrequent PVCs    I personally viewed and interpreted the patient's EKG/Telemetry data.    Assessment/ Plan:       1.  Acute on chronic heart failure with preserved ejection fraction-olmesartan transition to Entresto.  Spironolactone increased.  Diuresed well.  Likely due to uncontrolled hypertension. Will hold daily bumex on discharge due to elevated crt. She will need repeat labs in 1 week at follow up in office to check potassium/crt to montior intitation of entresto. Daily weights.     2.  Resistant hypertension-blood pressures improved overnight.  Continue Entresto.  Clonidine was weaned to 0.1 mg 3 times daily and we will continue to wean as an outpatient to prevent rebound hypertension.  Continue hydralazine 100 mg 3 times daily.  Will need to keep outpatient blood pressure log.     3.  Sinus bradycardia-atenolol weans due to episodes of bradycardia. Resolved.     4.  Chest pain in the setting of history of CAD s/p CABG x 2 in -serial troponin minimally elevated without significant change in delta.  Symptoms have resolved.  EKG with no acute ischemic changes.  Continue medical therapy in the form of Crestor and clopidogrel.  Beta-blocker held due to bradycardia    5.  Paroxysmal atrial fibrillation- in SR. No events on monitor overnight. Continue amiodarone 200 mg daily. Will need OP amiodarone monitoring with TSH, LFT x 6 months and amiodarone level monitoring. CXR was reviewed. Atenolol d/c due to bradycardia. Continue eliuqis 5 mg twice daily.     6.  Hyperlipidemia-continue Crestor    Ms. Arora will need to follow up with cardiology in 1 week in office with labs to check renal function/  potassium.     Thank you for allowing me to participate in the care of Valentine Arora. Feel free to contact me directly with any further questions or concerns.    Yeyo Gamboa PA-C  [facility]  02/28/24  09:08 EST

## 2024-02-28 NOTE — DISCHARGE SUMMARY
Valentine Arora  1950  [id number]    Hospitalists Discharge Summary    Date of Admission: 2/25/2024  Date of Discharge:  2/28/2024    Primary Discharge Diagnoses:  Hypertensive Urgency  Typical Chest Pain  Symptomatic Bradycardia  PAF    Secondary Discharge Diagnoses:  Diabetes Mellitus, Type 2 in Obese A1c of 5.9% Body mass index is 33.5 kg/m².  CKDIIIb  GERD  Depression  Gout  Chronic pain syndrome    History of Present Illness (taken from H&P):  Patient is a 74-year-old female with past medical history of CAD/MI, paroxysmal atrial flutter, hyperlipidemia, diabetes mellitus type 2 non-insulin-requiring, obesity, Sarabia's esophagus, GERD, hypertension.  She presents to [facility] ED complaining of left-sided chest pain, very similar in nature to her previous heart attack.  She reports her chest pain episode occurred this morning and lasted about an hour.  She also had concurrent heaviness to the left of her chest which radiated down the left arm.  She also reports that her Apple Watch was alarming her to the fact that she is headed low heart rate.  She reports feeling very hot during the episode.  The pain is intermittent.  At this time patient is chest pain-free.  Nothing seems to make better or worse.  She did have shortness of breath as well during the episode.      Workup in the ED revealed elevated troponin of 15 and subsequent rising to 19, no acute ischemic changes, case discussed with Dr. Parikh of cardiology who asked patient be admitted for observation.     Hospital Course:  Ms. Arora was admitted to the MedSurg unit, and her atenolol was stopped.  She was seen in consultation by cardiology and blood pressure regimen was adjusted.  With good blood pressure control she had no further chest pain, and her bradycardia resolved with cessation of Tenormin.  Creatinine remained at baseline although at discharge it was trending up slightly.  Given that she was started on Entresto and Aldactone, I  decreased her Bumex dose to daily, but explained to her that she would need close follow-up to make for lab work to make sure her potassium balance was stable.    PCP  Patient Care Team:  Aliya Alejandro MD as PCP - General (Family Medicine)    Consults:   Consults       Date and Time Order Name Status Description    2/25/2024  1:01 PM Inpatient Cardiology Consult              Operations and Procedures Performed:       XR Chest PA & Lateral    Result Date: 2/28/2024  Narrative: XR CHEST PA AND LATERAL Date of Exam: 2/28/2024 9:20 AM EST Indication: Vascular Congestion Comparison: 2/25/2024 at 1017 hours Findings: There is improved aeration throughout the lungs bilaterally. There may be mild residual interstitial prominence. No well-defined infiltrates or pleural effusions are observed. The cardiac silhouette and mediastinum are stable. Postoperative changes are noted. No acute osseous abnormalities are seen.     Impression: Impression: Improved aeration throughout the lungs bilaterally. There may be mild residual interstitial prominence suggesting mild residual pulmonary edema. Electronically Signed: Carlos Alexis MD  2/28/2024 9:45 AM EST  Workstation ID: [id number]    XR Chest 2 View    Result Date: 2/25/2024  Narrative: XR CHEST 2 VW Date of Exam: 2/25/2024 10:11 AM EST Indication: Chest pain Comparison: 1/2/2024. Findings: The heart size is normal postsurgical changes apparent. The pulmonary vascular markings are increased felt to represent vascular congestion. There is no airspace disease, pleural effusion, or pneumothorax. There are chronic age-related changes involving the bony thorax and thoracic aorta.     Impression: Impression: 1. Pulmonary vascular congestion. 2. Otherwise no active pulmonary disease. Electronically Signed: Timothy Michelle MD  2/25/2024 10:39 AM EST  Workstation ID: [id number]     Allergies:  is allergic to codeine and other.    Mina  reviewed    Discharge Medications:      Discharge Medications        New Medications        Instructions Start Date   bumetanide 1 MG tablet  Commonly known as: BUMEX   1 mg, Oral, Daily      sacubitril-valsartan 24-26 MG tablet  Commonly known as: ENTRESTO   2 tablets, Oral, Every 12 Hours Scheduled             Changes to Medications        Instructions Start Date   amiodarone 200 MG tablet  Commonly known as: PACERONE  What changed: when to take this   200 mg, Oral, Daily      clopidogrel 75 MG tablet  Commonly known as: PLAVIX  What changed: when to take this   TAKE ONE TABLET BY MOUTH DAILY      hydrALAZINE 100 MG tablet  Commonly known as: APRESOLINE  What changed: medication strength   100 mg, Oral, 3 Times Daily      spironolactone 50 MG tablet  Commonly known as: ALDACTONE  What changed:   medication strength  how much to take   50 mg, Oral, Daily             Continue These Medications        Instructions Start Date   allopurinol 100 MG tablet  Commonly known as: ZYLOPRIM   Take 1 tablet by mouth 2 (Two) Times a Day.      ALPRAZolam 0.25 MG tablet  Commonly known as: XANAX   Take 1 tablet by mouth As Needed for Anxiety.      cloNIDine 0.1 MG tablet  Commonly known as: CATAPRES   0.2 mg, Oral, 3 Times Daily      Eliquis 5 MG tablet tablet  Generic drug: apixaban   5 mg, Oral, 2 Times Daily      escitalopram 5 MG tablet  Commonly known as: LEXAPRO   5 mg, Oral, Nightly      ferrous sulfate 325 (65 FE) MG tablet   325 mg, Oral, Daily With Breakfast      metFORMIN 500 MG tablet  Commonly known as: GLUCOPHAGE   500 mg, Oral, 2 Times Daily With Meals      pantoprazole 40 MG EC tablet  Commonly known as: PROTONIX   40 mg, Oral, 2 Times Daily      rosuvastatin 10 MG tablet  Commonly known as: CRESTOR   10 mg, Oral, Daily      traMADol 50 MG tablet  Commonly known as: ULTRAM   50 mg, Oral, Every 6 Hours PRN             Stop These Medications      atenolol 25 MG tablet  Commonly known as: TENORMIN     furosemide 40 MG tablet  Commonly known as: LASIX      hydroCHLOROthiazide 12.5 MG tablet     olmesartan 40 MG tablet  Commonly known as: BENICAR              Last Lab Results:   Lab Results (most recent)       Procedure Component Value Units Date/Time    POC Glucose Once [[id number]]  (Normal) Collected: 02/28/24 0833    Specimen: Blood Updated: 02/28/24 0840     Glucose 109 mg/dL     Basic Metabolic Panel [[id number]]  (Abnormal) Collected: 02/28/24 0434    Specimen: Blood Updated: 02/28/24 0604     Glucose 127 mg/dL      BUN 29 mg/dL      Creatinine 1.64 mg/dL      Sodium 131 mmol/L      Potassium 3.6 mmol/L      Chloride 94 mmol/L      CO2 24.4 mmol/L      Calcium 8.6 mg/dL      BUN/Creatinine Ratio 17.7     Anion Gap 12.6 mmol/L      eGFR 32.9 mL/min/1.73     Narrative:      GFR Normal >60  Chronic Kidney Disease <60  Kidney Failure <15    The GFR formula is only valid for adults with stable renal function between ages 18 and 70.    POC Glucose Once [[id number]]  (Normal) Collected: 02/27/24 2113    Specimen: Blood Updated: 02/27/24 2119     Glucose 129 mg/dL     Basic Metabolic Panel [[id number]]  (Abnormal) Collected: 02/27/24 0340    Specimen: Blood Updated: 02/27/24 0451     Glucose 114 mg/dL      BUN 23 mg/dL      Creatinine 1.26 mg/dL      Sodium 135 mmol/L      Potassium 3.7 mmol/L      Chloride 97 mmol/L      CO2 24.2 mmol/L      Calcium 9.1 mg/dL      BUN/Creatinine Ratio 18.3     Anion Gap 13.8 mmol/L      eGFR 45.2 mL/min/1.73     Narrative:      GFR Normal >60  Chronic Kidney Disease <60  Kidney Failure <15    The GFR formula is only valid for adults with stable renal function between ages 18 and 70.    Hemoglobin A1c [[id number]]  (Abnormal) Collected: 02/25/24 0908    Specimen: Blood Updated: 02/26/24 1458     Hemoglobin A1C 5.90 %     Narrative:      Hemoglobin A1C Ranges:    Increased Risk for Diabetes  5.7% to 6.4%  Diabetes                     >= 6.5%  Diabetic Goal                < 7.0%    High Sensitivity Troponin T [[id number]]  (Abnormal)  Collected: 02/25/24 1337    Specimen: Blood Updated: 02/25/24 1403     HS Troponin T 22 ng/L     Narrative:      High Sensitive Troponin T Reference Range:  <14.0 ng/L- Negative Female for AMI  <22.0 ng/L- Negative Male for AMI  >=14 - Abnormal Female indicating possible myocardial injury.  >=22 - Abnormal Male indicating possible myocardial injury.   Clinicians would have to utilize clinical acumen, EKG, Troponin, and serial changes to determine if it is an Acute Myocardial Infarction or myocardial injury due to an underlying chronic condition.         High Sensitivity Troponin T 2Hr [[id number]]  (Abnormal) Collected: 02/25/24 1102    Specimen: Blood Updated: 02/25/24 1130     HS Troponin T 19 ng/L      Troponin T Delta 4 ng/L     Narrative:      High Sensitive Troponin T Reference Range:  <14.0 ng/L- Negative Female for AMI  <22.0 ng/L- Negative Male for AMI  >=14 - Abnormal Female indicating possible myocardial injury.  >=22 - Abnormal Male indicating possible myocardial injury.   Clinicians would have to utilize clinical acumen, EKG, Troponin, and serial changes to determine if it is an Acute Myocardial Infarction or myocardial injury due to an underlying chronic condition.         BNP [[id number]]  (Abnormal) Collected: 02/25/24 0908    Specimen: Blood Updated: 02/25/24 1025     proBNP 3,280.0 pg/mL     Narrative:      This assay is used as an aid in the diagnosis of individuals suspected of having heart failure. It can be used as an aid in the diagnosis of acute decompensated heart failure (ADHF) in patients presenting with signs and symptoms of ADHF to the emergency department (ED). In addition, NT-proBNP of <300 pg/mL indicates ADHF is not likely.    Age Range Result Interpretation  NT-proBNP Concentration (pg/mL:      <50             Positive            >450                   Gray                 300-450                    Negative             <300    50-75           Positive            >900                   Bruce                300-900                  Negative            <300      >75             Positive            >1800                  Gray                300-1800                  Negative            <300    High Sensitivity Troponin T [[id number]]  (Abnormal) Collected: 02/25/24 0908    Specimen: Blood Updated: 02/25/24 1016     HS Troponin T 15 ng/L     Narrative:      High Sensitive Troponin T Reference Range:  <14.0 ng/L- Negative Female for AMI  <22.0 ng/L- Negative Male for AMI  >=14 - Abnormal Female indicating possible myocardial injury.  >=22 - Abnormal Male indicating possible myocardial injury.   Clinicians would have to utilize clinical acumen, EKG, Troponin, and serial changes to determine if it is an Acute Myocardial Infarction or myocardial injury due to an underlying chronic condition.         [facility] Draw [[id number]] Collected: 02/25/24 0908    Specimen: Blood Updated: 02/25/24 1015    Narrative:      The following orders were created for panel order [facility] Draw.  Procedure                               Abnormality         Status                     ---------                               -----------         ------                     Green Top (Gel)[[id number]]                                  Final result               Lavender Top[[id number]]                                     Final result               Gold Top - SST[[id number]]                                   Final result               Light Blue Top[[id number]]                                   Final result                 Please view results for these tests on the individual orders.    Green Top (Gel) [[id number]] Collected: 02/25/24 0908    Specimen: Blood Updated: 02/25/24 1015     Extra Tube Hold for add-ons.     Comment: Auto resulted.       Lavender Top [[id number]] Collected: 02/25/24 0908    Specimen: Blood Updated: 02/25/24 1015     Extra Tube hold for add-on     Comment: Auto resulted       Gold Top - SST [[id number]] Collected: 02/25/24  0908    Specimen: Blood Updated: 02/25/24 1015     Extra Tube Hold for add-ons.     Comment: Auto resulted.       Light Blue Top [[id number]] Collected: 02/25/24 0908    Specimen: Blood Updated: 02/25/24 1015     Extra Tube Hold for add-ons.     Comment: Auto resulted       Comprehensive Metabolic Panel [[id number]]  (Abnormal) Collected: 02/25/24 0908    Specimen: Blood Updated: 02/25/24 1014     Glucose 109 mg/dL      BUN 32 mg/dL      Creatinine 1.43 mg/dL      Sodium 133 mmol/L      Potassium 5.0 mmol/L      Chloride 101 mmol/L      CO2 20.1 mmol/L      Calcium 8.9 mg/dL      Total Protein 6.2 g/dL      Albumin 3.9 g/dL      ALT (SGPT) 57 U/L      AST (SGOT) 41 U/L      Alkaline Phosphatase 71 U/L      Total Bilirubin <0.2 mg/dL      Globulin 2.3 gm/dL      A/G Ratio 1.7 g/dL      BUN/Creatinine Ratio 22.4     Anion Gap 11.9 mmol/L      eGFR 38.8 mL/min/1.73     Narrative:      GFR Normal >60  Chronic Kidney Disease <60  Kidney Failure <15    The GFR formula is only valid for adults with stable renal function between ages 18 and 70.    D-dimer, Quantitative [[id number]]  (Normal) Collected: 02/25/24 0908    Specimen: Blood Updated: 02/25/24 1011     D-Dimer, Quantitative 0.58 MCGFEU/mL     Narrative:      According to the assay 's published package insert, a normal (<0.50 MCGFEU/mL) D-dimer result in conjunction with a non-high clinical probability assessment, excludes deep vein thrombosis (DVT) and pulmonary embolism (PE) with high sensitivity.    D-dimer values increase with age and this can make VTE exclusion of an older population difficult. To address this, the [employer], based on best available evidence and recent guidelines, recommends that clinicians use age-adjusted D-dimer thresholds in patients greater than 50 years of age with: a) a low probability of PE who do not meet all Pulmonary Embolism Rule Out Criteria, or b) in those with intermediate probability of PE.   The  "formula for an age-adjusted D-dimer cut-off is \"age/100\".  For example, a 60 year old patient would have an age-adjusted cut-off of 0.60 MCGFEU/mL and an 80 year old 0.80 MCGFEU/mL.    CBC & Differential [[id number]]  (Abnormal) Collected: 02/25/24 0908    Specimen: Blood Updated: 02/25/24 1004    Narrative:      The following orders were created for panel order CBC & Differential.  Procedure                               Abnormality         Status                     ---------                               -----------         ------                     CBC Auto Differential[[id number]]        Abnormal            Final result                 Please view results for these tests on the individual orders.    CBC Auto Differential [[id number]]  (Abnormal) Collected: 02/25/24 0908    Specimen: Blood Updated: 02/25/24 1004     WBC 8.37 10*3/mm3      RBC 3.16 10*6/mm3      Hemoglobin 9.0 g/dL      Hematocrit 28.8 %      MCV 91.1 fL      MCH 28.5 pg      MCHC 31.3 g/dL      RDW 16.5 %      RDW-SD 54.6 fl      MPV 9.7 fL      Platelets 406 10*3/mm3      Neutrophil % 71.7 %      Lymphocyte % 15.9 %      Monocyte % 7.9 %      Eosinophil % 3.2 %      Basophil % 0.7 %      Immature Grans % 0.6 %      Neutrophils, Absolute 6.00 10*3/mm3      Lymphocytes, Absolute 1.33 10*3/mm3      Monocytes, Absolute 0.66 10*3/mm3      Eosinophils, Absolute 0.27 10*3/mm3      Basophils, Absolute 0.06 10*3/mm3      Immature Grans, Absolute 0.05 10*3/mm3      nRBC 0.0 /100 WBC           Imaging Results (Most Recent)       Procedure Component Value Units Date/Time    XR Chest PA & Lateral [[id number]] Collected: 02/28/24 0943     Updated: 02/28/24 1008    Narrative:      XR CHEST PA AND LATERAL    Date of Exam: 2/28/2024 9:20 AM EST    Indication: Vascular Congestion    Comparison: 2/25/2024 at 1017 hours    Findings:  There is improved aeration throughout the lungs bilaterally. There may be mild residual interstitial prominence. No well-defined " infiltrates or pleural effusions are observed. The cardiac silhouette and mediastinum are stable. Postoperative changes are   noted. No acute osseous abnormalities are seen.      Impression:      Impression:  Improved aeration throughout the lungs bilaterally. There may be mild residual interstitial prominence suggesting mild residual pulmonary edema.      Electronically Signed: Carlos Alexis MD    2/28/2024 9:45 AM EST    Workstation ID: [id number]    XR Chest 2 View [[id number]] Collected: 02/25/24 1038     Updated: 02/25/24 1043    Narrative:      XR CHEST 2 VW    Date of Exam: 2/25/2024 10:11 AM EST    Indication: Chest pain    Comparison: 1/2/2024.    Findings:  The heart size is normal postsurgical changes apparent. The pulmonary vascular markings are increased felt to represent vascular congestion. There is no airspace disease, pleural effusion, or pneumothorax. There are chronic age-related changes involving   the bony thorax and thoracic aorta.      Impression:      Impression:    1. Pulmonary vascular congestion.  2. Otherwise no active pulmonary disease.    Electronically Signed: Timothy Michelle MD    2/25/2024 10:39 AM EST    Workstation ID: [id number]            PROCEDURES      Condition on Discharge:  Stable    Physical Exam at Discharge  Vital Signs  Temp:  [97.5 °F (36.4 °C)-99 °F (37.2 °C)] 98.4 °F (36.9 °C)  Heart Rate:  [79-89] 79  Resp:  [20] 20  BP: (152-156)/(66-80) 152/75    Physical Exam:  Physical Exam   Constitutional: Patient appears well-developed and well-nourished and in no acute distress   Cardiovascular: Regular rate, regular rhythm, S1 normal and S2 normal.  Exam reveals no gallop and no friction rub.  No murmur heard.  Pulmonary/Chest: Lungs are clear to auscultation bilaterally. No respiratory distress. No wheezes. No rhonchi. No rales.   Abdominal: Obese. Soft. Bowel sounds are normal. There is no tenderness.   Musculoskeletal: Normal Muscle tone  Extremities: No edema.   Neurological:  Patient is alert and oriented to person, place, and time. Cranial nerves II-XII are grossly intact with no focal deficits.    Discharge Disposition  Home    Visiting Nurse:    No     Home PT/OT:  No     Home Safety Evaluation:  No     DME  None    Discharge Diet:      Dietary Orders (From admission, onward)       Start     Ordered    02/26/24 0844  Diet: Regular/House Diet, Cardiac Diets; Healthy Heart (2-3 Na+); Texture: Regular Texture (IDDSI 7); Fluid Consistency: Thin (IDDSI 0)  Diet Effective Now        References:    Diet Order [place]   Question Answer Comment   Diets: Regular/House Diet    Diets: Cardiac Diets    Cardiac Diet: Healthy Heart (2-3 Na+)    Texture: Regular Texture (IDDSI 7)    Fluid Consistency: Thin (IDDSI 0)        02/26/24 0844    02/25/24 1302  Patient is on Glucommander  (Glucommander™ SQ Insulin - Select Dosing Option)  Continuous        Question Answer Comment   Tray Note: Glucommander    Patient is on Glucommander: Yes        02/25/24 1301                    Activity at Discharge:  As tolerated      Follow-up Appointments  Future Appointments   Date Time Provider Department Center   3/7/2024 10:30 AM Kumar Frye III, MD [facility]   4/23/2024  9:30 AM Shayna Tran APRN [facility]     Additional Instructions for the Follow-ups that You Need to Schedule       Discharge Follow-up with PCP   As directed       Currently Documented PCP:    Aliya Alejandro MD    PCP Phone Number:    [phone]     Follow Up Details: Monday, March 4th.  Needs repeat chemistry panel.        Discharge Follow-up with Specialty: [facility] keep scheduled appointment.   As directed      Specialty: [facility] keep scheduled appointment.                Test Results Pending at Discharge       Fabrizio Brown MD  02/28/24  11:19 EST    Time: <30 minutes

## 2024-02-29 ENCOUNTER — TRANSCRIBE ORDERS (OUTPATIENT)
Dept: ADMINISTRATIVE | Facility: HOSPITAL | Age: 74
End: 2024-02-29
Payer: MEDICARE

## 2024-02-29 ENCOUNTER — HOSPITAL ENCOUNTER (INPATIENT)
Facility: HOSPITAL | Age: 74
LOS: 5 days | Discharge: HOME | DRG: 308 | End: 2024-03-05
Attending: INTERNAL MEDICINE | Admitting: INTERNAL MEDICINE
Payer: MEDICARE

## 2024-02-29 ENCOUNTER — READMISSION MANAGEMENT (OUTPATIENT)
Dept: CALL CENTER | Facility: HOSPITAL | Age: 74
End: 2024-02-29
Payer: MEDICARE

## 2024-02-29 ENCOUNTER — TELEPHONE (OUTPATIENT)
Dept: CARDIOLOGY | Facility: CLINIC | Age: 74
End: 2024-02-29
Payer: MEDICARE

## 2024-02-29 ENCOUNTER — APPOINTMENT (OUTPATIENT)
Dept: GENERAL RADIOLOGY | Facility: HOSPITAL | Age: 74
DRG: 308 | End: 2024-02-29
Payer: MEDICARE

## 2024-02-29 ENCOUNTER — HOSPITAL ENCOUNTER (EMERGENCY)
Facility: HOSPITAL | Age: 74
Discharge: ANOTHER HOSPITAL | DRG: 308 | End: 2024-02-29
Admitting: EMERGENCY MEDICINE
Payer: MEDICARE

## 2024-02-29 VITALS
DIASTOLIC BLOOD PRESSURE: 87 MMHG | HEART RATE: 109 BPM | SYSTOLIC BLOOD PRESSURE: 150 MMHG | BODY MASS INDEX: 32.39 KG/M2 | OXYGEN SATURATION: 98 % | HEIGHT: 62 IN | TEMPERATURE: 98.3 F | RESPIRATION RATE: 18 BRPM | WEIGHT: 176 LBS

## 2024-02-29 DIAGNOSIS — I48.91 ATRIAL FIBRILLATION WITH RAPID VENTRICULAR RESPONSE: ICD-10-CM

## 2024-02-29 DIAGNOSIS — Z12.31 VISIT FOR SCREENING MAMMOGRAM: Primary | ICD-10-CM

## 2024-02-29 DIAGNOSIS — R79.89 ELEVATED TROPONIN: ICD-10-CM

## 2024-02-29 DIAGNOSIS — N17.9 AKI (ACUTE KIDNEY INJURY): Primary | ICD-10-CM

## 2024-02-29 LAB
ALBUMIN SERPL-MCNC: 4.3 G/DL (ref 3.5–5.2)
ALBUMIN/GLOB SERPL: 1.7 G/DL
ALP SERPL-CCNC: 71 U/L (ref 39–117)
ALT SERPL W P-5'-P-CCNC: 29 U/L (ref 1–33)
ANION GAP SERPL CALCULATED.3IONS-SCNC: 17.5 MMOL/L (ref 5–15)
APTT PPP: 36.9 SECONDS (ref 24.3–38.1)
AST SERPL-CCNC: 24 U/L (ref 1–32)
BASOPHILS # BLD AUTO: 0.11 10*3/MM3 (ref 0–0.2)
BASOPHILS NFR BLD AUTO: 1 % (ref 0–1.5)
BILIRUB SERPL-MCNC: 0.3 MG/DL (ref 0–1.2)
BUN SERPL-MCNC: 42 MG/DL (ref 8–23)
BUN/CREAT SERPL: 18.5 (ref 7–25)
CALCIUM SPEC-SCNC: 9.1 MG/DL (ref 8.6–10.5)
CHLORIDE SERPL-SCNC: 95 MMOL/L (ref 98–107)
CO2 SERPL-SCNC: 20.5 MMOL/L (ref 22–29)
CREAT SERPL-MCNC: 2.27 MG/DL (ref 0.57–1)
DEPRECATED RDW RBC AUTO: 52.3 FL (ref 37–54)
EGFRCR SERPLBLD CKD-EPI 2021: 22.3 ML/MIN/1.73
EOSINOPHIL # BLD AUTO: 0.13 10*3/MM3 (ref 0–0.4)
EOSINOPHIL NFR BLD AUTO: 1.2 % (ref 0.3–6.2)
ERYTHROCYTE [DISTWIDTH] IN BLOOD BY AUTOMATED COUNT: 16.3 % (ref 12.3–15.4)
GEN 5 2HR TROPONIN T REFLEX: 72 NG/L
GLOBULIN UR ELPH-MCNC: 2.5 GM/DL
GLUCOSE BLDC GLUCOMTR-MCNC: 117 MG/DL (ref 70–130)
GLUCOSE SERPL-MCNC: 133 MG/DL (ref 65–99)
HCT VFR BLD AUTO: 38.6 % (ref 34–46.6)
HGB BLD-MCNC: 12.4 G/DL (ref 12–15.9)
IMM GRANULOCYTES # BLD AUTO: 0.05 10*3/MM3 (ref 0–0.05)
IMM GRANULOCYTES NFR BLD AUTO: 0.5 % (ref 0–0.5)
INR PPP: 1.29 (ref 0.9–1.1)
LYMPHOCYTES # BLD AUTO: 2.09 10*3/MM3 (ref 0.7–3.1)
LYMPHOCYTES NFR BLD AUTO: 19.7 % (ref 19.6–45.3)
MAGNESIUM SERPL-MCNC: 2.1 MG/DL (ref 1.6–2.4)
MCH RBC QN AUTO: 28.1 PG (ref 26.6–33)
MCHC RBC AUTO-ENTMCNC: 32.1 G/DL (ref 31.5–35.7)
MCV RBC AUTO: 87.5 FL (ref 79–97)
MONOCYTES # BLD AUTO: 1.16 10*3/MM3 (ref 0.1–0.9)
MONOCYTES NFR BLD AUTO: 11 % (ref 5–12)
NEUTROPHILS NFR BLD AUTO: 66.6 % (ref 42.7–76)
NEUTROPHILS NFR BLD AUTO: 7.05 10*3/MM3 (ref 1.7–7)
NRBC BLD AUTO-RTO: 0 /100 WBC (ref 0–0.2)
NT-PROBNP SERPL-MCNC: 984.6 PG/ML (ref 0–900)
PLATELET # BLD AUTO: 550 10*3/MM3 (ref 140–450)
PMV BLD AUTO: 9.1 FL (ref 6–12)
POTASSIUM SERPL-SCNC: 4.6 MMOL/L (ref 3.5–5.2)
PROT SERPL-MCNC: 6.8 G/DL (ref 6–8.5)
PROTHROMBIN TIME: 16.1 SECONDS (ref 12.1–15)
QT INTERVAL: 348 MS
QTC INTERVAL: 485 MS
RBC # BLD AUTO: 4.41 10*6/MM3 (ref 3.77–5.28)
SODIUM SERPL-SCNC: 133 MMOL/L (ref 136–145)
TROPONIN T DELTA: 13 NG/L
TROPONIN T SERPL HS-MCNC: 59 NG/L
TSH SERPL DL<=0.05 MIU/L-ACNC: 1.19 UIU/ML (ref 0.27–4.2)
WBC NRBC COR # BLD AUTO: 10.59 10*3/MM3 (ref 3.4–10.8)

## 2024-02-29 PROCEDURE — 71045 X-RAY EXAM CHEST 1 VIEW: CPT

## 2024-02-29 PROCEDURE — 99285 EMERGENCY DEPT VISIT HI MDM: CPT

## 2024-02-29 PROCEDURE — 93005 ELECTROCARDIOGRAM TRACING: CPT

## 2024-02-29 PROCEDURE — 85730 THROMBOPLASTIN TIME PARTIAL: CPT | Performed by: PHYSICIAN ASSISTANT

## 2024-02-29 PROCEDURE — 84443 ASSAY THYROID STIM HORMONE: CPT | Performed by: PHYSICIAN ASSISTANT

## 2024-02-29 PROCEDURE — 85610 PROTHROMBIN TIME: CPT | Performed by: PHYSICIAN ASSISTANT

## 2024-02-29 PROCEDURE — 85025 COMPLETE CBC W/AUTO DIFF WBC: CPT | Performed by: PHYSICIAN ASSISTANT

## 2024-02-29 PROCEDURE — 96374 THER/PROPH/DIAG INJ IV PUSH: CPT

## 2024-02-29 PROCEDURE — 25810000003 SODIUM CHLORIDE 0.9 % SOLUTION: Performed by: PHYSICIAN ASSISTANT

## 2024-02-29 PROCEDURE — 83880 ASSAY OF NATRIURETIC PEPTIDE: CPT | Performed by: PHYSICIAN ASSISTANT

## 2024-02-29 PROCEDURE — 84484 ASSAY OF TROPONIN QUANT: CPT | Performed by: PHYSICIAN ASSISTANT

## 2024-02-29 PROCEDURE — 93010 ELECTROCARDIOGRAM REPORT: CPT | Performed by: INTERNAL MEDICINE

## 2024-02-29 PROCEDURE — 83735 ASSAY OF MAGNESIUM: CPT | Performed by: PHYSICIAN ASSISTANT

## 2024-02-29 PROCEDURE — 25810000003 SODIUM CHLORIDE 0.9 % SOLUTION: Performed by: INTERNAL MEDICINE

## 2024-02-29 PROCEDURE — 82948 REAGENT STRIP/BLOOD GLUCOSE: CPT

## 2024-02-29 PROCEDURE — 36415 COLL VENOUS BLD VENIPUNCTURE: CPT

## 2024-02-29 PROCEDURE — 80053 COMPREHEN METABOLIC PANEL: CPT | Performed by: PHYSICIAN ASSISTANT

## 2024-02-29 RX ORDER — ONDANSETRON 4 MG/1
4 TABLET, ORALLY DISINTEGRATING ORAL EVERY 6 HOURS PRN
Status: DISCONTINUED | OUTPATIENT
Start: 2024-02-29 | End: 2024-03-05 | Stop reason: HOSPADM

## 2024-02-29 RX ORDER — ONDANSETRON 2 MG/ML
4 INJECTION INTRAMUSCULAR; INTRAVENOUS EVERY 6 HOURS PRN
Status: DISCONTINUED | OUTPATIENT
Start: 2024-02-29 | End: 2024-03-05 | Stop reason: HOSPADM

## 2024-02-29 RX ORDER — PANTOPRAZOLE SODIUM 40 MG/1
40 TABLET, DELAYED RELEASE ORAL 2 TIMES DAILY
Status: DISCONTINUED | OUTPATIENT
Start: 2024-02-29 | End: 2024-03-05 | Stop reason: HOSPADM

## 2024-02-29 RX ORDER — TRAMADOL HYDROCHLORIDE 50 MG/1
50 TABLET ORAL EVERY 6 HOURS PRN
Status: DISCONTINUED | OUTPATIENT
Start: 2024-02-29 | End: 2024-03-05 | Stop reason: HOSPADM

## 2024-02-29 RX ORDER — CLOPIDOGREL BISULFATE 75 MG/1
75 TABLET ORAL NIGHTLY
Status: DISCONTINUED | OUTPATIENT
Start: 2024-02-29 | End: 2024-03-05 | Stop reason: HOSPADM

## 2024-02-29 RX ORDER — ASPIRIN 81 MG/1
324 TABLET, CHEWABLE ORAL ONCE
Status: COMPLETED | OUTPATIENT
Start: 2024-02-29 | End: 2024-02-29

## 2024-02-29 RX ORDER — SODIUM CHLORIDE 9 MG/ML
125 INJECTION, SOLUTION INTRAVENOUS CONTINUOUS
Status: DISCONTINUED | OUTPATIENT
Start: 2024-02-29 | End: 2024-03-01

## 2024-02-29 RX ORDER — ACETAMINOPHEN 325 MG/1
650 TABLET ORAL EVERY 4 HOURS PRN
Status: DISCONTINUED | OUTPATIENT
Start: 2024-02-29 | End: 2024-03-05 | Stop reason: HOSPADM

## 2024-02-29 RX ORDER — INSULIN LISPRO 100 [IU]/ML
2-7 INJECTION, SOLUTION INTRAVENOUS; SUBCUTANEOUS
Status: DISCONTINUED | OUTPATIENT
Start: 2024-02-29 | End: 2024-03-05 | Stop reason: HOSPADM

## 2024-02-29 RX ORDER — ALLOPURINOL 100 MG/1
100 TABLET ORAL 2 TIMES DAILY
Status: DISCONTINUED | OUTPATIENT
Start: 2024-02-29 | End: 2024-03-05 | Stop reason: HOSPADM

## 2024-02-29 RX ORDER — AMOXICILLIN 250 MG
2 CAPSULE ORAL 2 TIMES DAILY PRN
Status: DISCONTINUED | OUTPATIENT
Start: 2024-02-29 | End: 2024-03-05 | Stop reason: HOSPADM

## 2024-02-29 RX ORDER — UREA 10 %
3 LOTION (ML) TOPICAL NIGHTLY PRN
Status: DISCONTINUED | OUTPATIENT
Start: 2024-02-29 | End: 2024-03-05 | Stop reason: HOSPADM

## 2024-02-29 RX ORDER — SODIUM CHLORIDE 0.9 % (FLUSH) 0.9 %
10 SYRINGE (ML) INJECTION AS NEEDED
Status: DISCONTINUED | OUTPATIENT
Start: 2024-02-29 | End: 2024-02-29 | Stop reason: HOSPADM

## 2024-02-29 RX ORDER — NICOTINE POLACRILEX 4 MG
15 LOZENGE BUCCAL
Status: DISCONTINUED | OUTPATIENT
Start: 2024-02-29 | End: 2024-03-05 | Stop reason: HOSPADM

## 2024-02-29 RX ORDER — IBUPROFEN 600 MG/1
1 TABLET ORAL
Status: DISCONTINUED | OUTPATIENT
Start: 2024-02-29 | End: 2024-03-05 | Stop reason: HOSPADM

## 2024-02-29 RX ORDER — DEXTROSE MONOHYDRATE 25 G/50ML
25 INJECTION, SOLUTION INTRAVENOUS
Status: DISCONTINUED | OUTPATIENT
Start: 2024-02-29 | End: 2024-03-05 | Stop reason: HOSPADM

## 2024-02-29 RX ORDER — BISACODYL 5 MG/1
5 TABLET, DELAYED RELEASE ORAL DAILY PRN
Status: DISCONTINUED | OUTPATIENT
Start: 2024-02-29 | End: 2024-03-05 | Stop reason: HOSPADM

## 2024-02-29 RX ORDER — DILTIAZEM HYDROCHLORIDE 5 MG/ML
0.25 INJECTION INTRAVENOUS ONCE
Status: COMPLETED | OUTPATIENT
Start: 2024-02-29 | End: 2024-02-29

## 2024-02-29 RX ORDER — BISACODYL 10 MG
10 SUPPOSITORY, RECTAL RECTAL DAILY PRN
Status: DISCONTINUED | OUTPATIENT
Start: 2024-02-29 | End: 2024-03-05 | Stop reason: HOSPADM

## 2024-02-29 RX ORDER — FERROUS SULFATE 325(65) MG
325 TABLET ORAL
Status: DISCONTINUED | OUTPATIENT
Start: 2024-03-01 | End: 2024-03-05 | Stop reason: HOSPADM

## 2024-02-29 RX ORDER — POLYETHYLENE GLYCOL 3350 17 G/17G
17 POWDER, FOR SOLUTION ORAL DAILY PRN
Status: DISCONTINUED | OUTPATIENT
Start: 2024-02-29 | End: 2024-03-05 | Stop reason: HOSPADM

## 2024-02-29 RX ORDER — ESCITALOPRAM OXALATE 5 MG/1
5 TABLET ORAL NIGHTLY
Status: DISCONTINUED | OUTPATIENT
Start: 2024-02-29 | End: 2024-03-05 | Stop reason: HOSPADM

## 2024-02-29 RX ORDER — AMIODARONE HYDROCHLORIDE 200 MG/1
200 TABLET ORAL NIGHTLY
Status: DISCONTINUED | OUTPATIENT
Start: 2024-02-29 | End: 2024-03-05 | Stop reason: HOSPADM

## 2024-02-29 RX ORDER — ROSUVASTATIN CALCIUM 10 MG/1
10 TABLET, COATED ORAL DAILY
Status: DISCONTINUED | OUTPATIENT
Start: 2024-03-01 | End: 2024-03-05 | Stop reason: HOSPADM

## 2024-02-29 RX ADMIN — DILTIAZEM HYDROCHLORIDE 5 MG: 5 INJECTION, SOLUTION INTRAVENOUS at 14:17

## 2024-02-29 RX ADMIN — ESCITALOPRAM 5 MG: 5 TABLET, FILM COATED ORAL at 22:00

## 2024-02-29 RX ADMIN — APIXABAN 5 MG: 5 TABLET, FILM COATED ORAL at 21:36

## 2024-02-29 RX ADMIN — ASPIRIN 81 MG CHEWABLE TABLET 324 MG: 81 TABLET CHEWABLE at 15:06

## 2024-02-29 RX ADMIN — AMIODARONE HYDROCHLORIDE 200 MG: 200 TABLET ORAL at 21:41

## 2024-02-29 RX ADMIN — Medication 3 MG: at 22:01

## 2024-02-29 RX ADMIN — SODIUM CHLORIDE 500 ML: 9 INJECTION, SOLUTION INTRAVENOUS at 14:02

## 2024-02-29 RX ADMIN — ALLOPURINOL 100 MG: 100 TABLET ORAL at 21:37

## 2024-02-29 RX ADMIN — PANTOPRAZOLE SODIUM 40 MG: 40 TABLET, DELAYED RELEASE ORAL at 21:36

## 2024-02-29 RX ADMIN — SODIUM CHLORIDE 125 ML/HR: 9 INJECTION, SOLUTION INTRAVENOUS at 21:36

## 2024-02-29 RX ADMIN — CLOPIDOGREL BISULFATE 75 MG: 75 TABLET, FILM COATED ORAL at 21:36

## 2024-02-29 NOTE — TELEPHONE ENCOUNTER
Pt called to tell us that she is going to the ER at [facility] because her heart rate is 130.

## 2024-02-29 NOTE — OUTREACH NOTE
Prep Survey      Flowsheet Row Responses   [facility] facility patient discharged from? [place]   Is LACE score < 7 ? No   Eligibility Readm Mgmt   Discharge diagnosis Chest pain   Does the patient have one of the following disease processes/diagnoses(primary or secondary)? Other   Does the patient have Home health ordered? No   Is there a DME ordered? No   Medication alerts for this patient see avs   Prep survey completed? Yes            Chikis DUBOSE - Registered Nurse

## 2024-02-29 NOTE — TELEPHONE ENCOUNTER
Pt called in stating that she was just discharged yesterday from [facility] where she was admitted with low HR and high BP.  She states that many of her medications were changed while she was in the hospital, and today, she is back in [facility], which she says she hasn't been in in years.  HR running 100-115, BP today 149/67, complains of light CP similar to what she has experienced while in [facility] in the past.  She continues to take eliquis and plavix.  She has a follow up to see Dr. Frye scheduled for next Thursday.  Further recommendations?    She also sent in a couple of pictures of her apple watch on [employer].    Thanks so much,  Stephy Palacios, RN  Triage RN  02/29/24 11:58 EST

## 2024-02-29 NOTE — ED PROVIDER NOTES
"Subjective   History of Present Illness  74-year-old female with history of hypertension, hyperlipidemia, chronic back pain, CAD, atrial fibrillation on amiodarone and Eliquis presents today complaining of chest pain and shortness of breath that started this morning.  Heart rate has been in the 130s this morning.  Patient was recently discharged from this hospital yesterday.  States that she had a couple of her medications adjusted.  This feels like her previous A-fib flare.  States that chest pain is on the left side of her chest and feels \"warm.\"  Denies any radiation of pain.  She called her cardiologist Dr. Kumar Frye and was instructed to come to the ED for further evaluation.    History provided by:  Patient   used: No    Chest Pain  Pain location:  L chest  Pain quality: hot    Pain radiates to:  Does not radiate  Pain severity:  Moderate  Onset quality:  Sudden  Duration:  6 hours  Timing:  Constant  Progression:  Unchanged  Chronicity:  Chronic  Context: not breathing, not drug use, not eating, not intercourse, not lifting, not movement, not raising an arm, not at rest, not stress and not trauma    Relieved by:  None tried  Worsened by:  Nothing  Ineffective treatments:  None tried  Associated symptoms: shortness of breath    Associated symptoms: no abdominal pain, no AICD problem, no altered mental status, no anorexia, no anxiety, no back pain, no claudication, no cough, no diaphoresis, no dizziness, no dysphagia, no fatigue, no fever, no headache, no heartburn, no lower extremity edema, no nausea, no near-syncope, no numbness, no orthopnea, no palpitations, no PND, no syncope, no vomiting and no weakness    Risk factors: coronary artery disease, high cholesterol, hypertension, obesity and pregnancy    Risk factors: no aortic disease, no birth control, no diabetes mellitus, no Gregorio-Danlos syndrome, no immobilization, not male, no Marfan's syndrome, no prior DVT/PE, no smoking and no " surgery        Review of Systems   Constitutional:  Negative for diaphoresis, fatigue and fever.   HENT:  Negative for trouble swallowing.    Respiratory:  Positive for shortness of breath. Negative for cough and wheezing.    Cardiovascular:  Positive for chest pain. Negative for palpitations, orthopnea, claudication, leg swelling, syncope, PND and near-syncope.   Gastrointestinal:  Negative for abdominal pain, anorexia, heartburn, nausea and vomiting.   Musculoskeletal:  Negative for back pain.   Neurological:  Negative for dizziness, weakness, numbness and headaches.       Past Medical History:   Diagnosis Date    Arthritis     Atrial flutter, unspecified type 05/17/2023    Sarabia esophagus     CAD (coronary artery disease)     Chronic back pain     Colon polyp     GERD (gastroesophageal reflux disease)     Hyperlipidemia     Hypertension     Myocardial infarction     1995    PAD (peripheral artery disease) 09/11/2019    Shingles     Type 2 diabetes mellitus        Allergies   Allergen Reactions    Codeine Itching    Other Unknown (See Comments)     Vicryl: doesn't heal       Past Surgical History:   Procedure Laterality Date    COLONOSCOPY      COLONOSCOPY N/A 6/24/2020    Procedure: COLONOSCOPY;  Surgeon: Mathew Roberts MD;  Location: [facility] OR;  Service: Gastroenterology;  Laterality: N/A;  Descending colon polyp x 2, Ascending colon polyp, Sigmoid colon polyp, Rectal polyp    COLONOSCOPY N/A 8/7/2023    Procedure: COLONOSCOPY;  Surgeon: Mathew Roberts MD;  Location: [facility] OR;  Service: Gastroenterology;  Laterality: N/A;  Normal    CORONARY ARTERY BYPASS GRAFT  1995    x 2 vessels    ENDOSCOPY N/A 6/24/2020    Procedure: ESOPHAGOGASTRODUODENOSCOPY;  Surgeon: Mathew Roberts MD;  Location: [facility] OR;  Service: Gastroenterology;  Laterality: N/A;  Ulcerative esophagitis, Gastritis, Duodenitis  Duodenal biopsy, gastric biopsy, distal esophageal biopsy    ENDOSCOPY N/A  2020    Procedure: ESOPHAGOGASTRODUODENOSCOPY with biopsies;  Surgeon: Mathew Roberts MD;  Location:  [facility] OR;  Service: Gastroenterology;  Laterality: N/A;  Esophagitis  Sarabia's Esophagus  Hiatal hernia  Gastritis  Gastric biopsy  Distal esophagus biopsy    ENDOSCOPY N/A 2023    Procedure: Esophagogastroduedenoscopy;  Surgeon: Mathew Roberts MD;  Location:  [facility] OR;  Service: Gastroenterology;  Laterality: N/A;  Gastritis; short segment Sarabia's; Esophageal stricture- dilatation; Biopsies- gastric, distal esophagus    INNER EAR SURGERY      JOINT REPLACEMENT      right knee    LEG SURGERY      x 2 s/p fall    LUMBAR FUSION      L4/L5    SKIN GRAFT      to left lower leg x 2    TONSILLECTOMY      WRIST FUSION Left        Family History   Problem Relation Age of Onset    Heart disease Mother     Heart disease Father     Colon cancer Neg Hx     Colon polyps Neg Hx     Breast cancer Neg Hx        Social History     Socioeconomic History    Marital status:    Tobacco Use    Smoking status: Former     Types: Cigarettes     Quit date:      Years since quittin.1    Smokeless tobacco: Never    Tobacco comments:     QUIT    Vaping Use    Vaping Use: Never used   Substance and Sexual Activity    Alcohol use: Not Currently     Comment: occasional    Drug use: No    Sexual activity: Defer           Objective   Physical Exam  Vitals and nursing note reviewed.   Constitutional:       General: She is not in acute distress.     Appearance: Normal appearance. She is well-developed. She is obese. She is not ill-appearing.   HENT:      Head: Normocephalic and atraumatic.      Right Ear: Tympanic membrane, ear canal and external ear normal.      Left Ear: Tympanic membrane, ear canal and external ear normal.      Nose: Nose normal.      Mouth/Throat:      Mouth: Mucous membranes are moist.      Pharynx: Oropharynx is clear.   Eyes:      General: No scleral icterus.      Extraocular Movements: Extraocular movements intact.      Conjunctiva/sclera: Conjunctivae normal.      Pupils: Pupils are equal, round, and reactive to light.   Cardiovascular:      Rate and Rhythm: Tachycardia present. Rhythm irregular.      Pulses: Normal pulses.      Heart sounds: Normal heart sounds. No murmur heard.  Pulmonary:      Effort: Pulmonary effort is normal. No respiratory distress.      Breath sounds: Normal breath sounds.   Chest:      Chest wall: No mass.   Abdominal:      General: Abdomen is flat. Bowel sounds are normal. There is no distension.      Palpations: Abdomen is soft. There is no mass.      Tenderness: There is no abdominal tenderness. There is no guarding or rebound.   Musculoskeletal:         General: Normal range of motion.      Cervical back: Normal range of motion and neck supple.      Right lower leg: No edema.      Left lower leg: No edema.   Skin:     General: Skin is warm and dry.      Capillary Refill: Capillary refill takes less than 2 seconds.      Findings: No rash.   Neurological:      General: No focal deficit present.      Mental Status: She is alert and oriented to person, place, and time. Mental status is at baseline.      Cranial Nerves: No cranial nerve deficit.   Psychiatric:         Mood and Affect: Mood normal.         Behavior: Behavior normal.         Thought Content: Thought content normal.         Judgment: Judgment normal.         Procedures       WBC   Date Value Ref Range Status   02/29/2024 10.59 3.40 - 10.80 10*3/mm3 Final     RBC   Date Value Ref Range Status   02/29/2024 4.41 3.77 - 5.28 10*6/mm3 Final     Hemoglobin   Date Value Ref Range Status   02/29/2024 12.4 12.0 - 15.9 g/dL Final     Hematocrit   Date Value Ref Range Status   02/29/2024 38.6 34.0 - 46.6 % Final     MCV   Date Value Ref Range Status   02/29/2024 87.5 79.0 - 97.0 fL Final     MCH   Date Value Ref Range Status   02/29/2024 28.1 26.6 - 33.0 pg Final     MCHC   Date Value Ref Range  Status   02/29/2024 32.1 31.5 - 35.7 g/dL Final     RDW   Date Value Ref Range Status   02/29/2024 16.3 (H) 12.3 - 15.4 % Final     RDW-SD   Date Value Ref Range Status   02/29/2024 52.3 37.0 - 54.0 fl Final     MPV   Date Value Ref Range Status   02/29/2024 9.1 6.0 - 12.0 fL Final     Platelets   Date Value Ref Range Status   02/29/2024 550 (H) 140 - 450 10*3/mm3 Final     Neutrophil %   Date Value Ref Range Status   02/29/2024 66.6 42.7 - 76.0 % Final     Lymphocyte %   Date Value Ref Range Status   02/29/2024 19.7 19.6 - 45.3 % Final     Monocyte %   Date Value Ref Range Status   02/29/2024 11.0 5.0 - 12.0 % Final     Eosinophil %   Date Value Ref Range Status   02/29/2024 1.2 0.3 - 6.2 % Final     Basophil %   Date Value Ref Range Status   02/29/2024 1.0 0.0 - 1.5 % Final     Immature Grans %   Date Value Ref Range Status   02/29/2024 0.5 0.0 - 0.5 % Final     Neutrophils, Absolute   Date Value Ref Range Status   02/29/2024 7.05 (H) 1.70 - 7.00 10*3/mm3 Final     Lymphocytes, Absolute   Date Value Ref Range Status   02/29/2024 2.09 0.70 - 3.10 10*3/mm3 Final     Monocytes, Absolute   Date Value Ref Range Status   02/29/2024 1.16 (H) 0.10 - 0.90 10*3/mm3 Final     Eosinophils, Absolute   Date Value Ref Range Status   02/29/2024 0.13 0.00 - 0.40 10*3/mm3 Final     Basophils, Absolute   Date Value Ref Range Status   02/29/2024 0.11 0.00 - 0.20 10*3/mm3 Final     Immature Grans, Absolute   Date Value Ref Range Status   02/29/2024 0.05 0.00 - 0.05 10*3/mm3 Final     nRBC   Date Value Ref Range Status   02/29/2024 0.0 0.0 - 0.2 /100 WBC Final     Lab Results   Component Value Date    GLUCOSE 133 (H) 02/29/2024    BUN 42 (H) 02/29/2024    CREATININE 2.27 (H) 02/29/2024    EGFR 22.3 (L) 02/29/2024    BCR 18.5 02/29/2024    K 4.6 02/29/2024    CO2 20.5 (L) 02/29/2024    CALCIUM 9.1 02/29/2024    ALBUMIN 4.3 02/29/2024    BILITOT 0.3 02/29/2024    AST 24 02/29/2024    ALT 29 02/29/2024     I have reviewed all the lab  results. There are some abnormalities that are not critical to the patient's health, but I would like to discuss these in person at an office appointment. Please ask her to schedule a follow up visit with me at her convenience.  XR Chest 1 View    Result Date: 2/29/2024  There is no significant change when compared to the prior study. There is no evidence for acute cardiopulmonary process. Electronically Signed: Carlos Alexis MD  2/29/2024 3:09 PM EST  Workstation ID: [id number]    XR Chest PA & Lateral    Result Date: 2/28/2024  Impression: Improved aeration throughout the lungs bilaterally. There may be mild residual interstitial prominence suggesting mild residual pulmonary edema. Electronically Signed: Carlos Alexis MD  2/28/2024 9:45 AM EST  Workstation ID: [id number]       ED Course  ED Course as of 02/29/24 1806   Thu Feb 29, 2024   1617 ECG 12 Lead Chest Pain  Atrial fibrillation with RVR.  Diltiazem bolus given.  Patient became a little slightly hypotensive 100/60s.  Only 5 mg given. [YQ]   1657 HS Troponin T(!!): 72  Spoke with cardiology Dr. Soler, no intervention recommended at this time.  She will be admitted under medicine. [YQ]   1722 ECG 12 Lead Chest Pain [YQ]   1722 XR Chest 1 View [YQ]      ED Course User Index  [YQ] Sandip Braxton PA-C                                             Medical Decision Making  Initial EKG showed atrial fibrillation with RVR at 116.  1 dose of diltiazem bolus at 0.25 mg/kg given.  However, patient became mildly hypotensive at 100/60s.  Only 5 mg given.  Patient became rate controlled.  Blood pressure trending up after slight fluid resuscitation given history of CHF.  No overt signs of fluid overload.  Chest x-ray clear.  Doubt CHF exacerbation.  Troponin mildly elevated with a delta troponin of 13.  In the setting of SVEN, unlikely NSTEMI.  After discussion with patient and family, they prefer to be transferred to [facility].  Discussed case with  cardiologist Dr. Soler, no intervention required at this time and this is likely related to worsening renal function.  Spoke with hospitalist Dr. Wells, transfer accepted.  Patient is hemodynamically stable at this time.    Differential diagnosis: PE, ACS, CHF exacerbation, atrial fibrillation, SVEN, electrolyte derangement    Problems Addressed:  SVEN (acute kidney injury): complicated acute illness or injury  Atrial fibrillation with rapid ventricular response: complicated acute illness or injury  Elevated troponin: complicated acute illness or injury    Amount and/or Complexity of Data Reviewed  Labs: ordered. Decision-making details documented in ED Course.  Radiology: ordered. Decision-making details documented in ED Course.  ECG/medicine tests:  Decision-making details documented in ED Course.    Risk  OTC drugs.  Prescription drug management.        Final diagnoses:   SVEN (acute kidney injury)   Atrial fibrillation with rapid ventricular response   Elevated troponin       ED Disposition  ED Disposition       ED Disposition   Transfer to Another Facility     Condition   --    Comment   --               No follow-up provider specified.       Medication List        Changed      amiodarone 200 MG tablet  Commonly known as: PACERONE  Take 1 tablet by mouth Daily.  What changed: when to take this     clopidogrel 75 MG tablet  Commonly known as: PLAVIX  TAKE ONE TABLET BY MOUTH DAILY  What changed: when to take this                 Sandip Braxton PA-C  02/29/24 1745       Sandip Braxton PA-C  02/29/24 1806

## 2024-03-01 PROBLEM — N18.31 CHRONIC KIDNEY DISEASE, STAGE 3A: Status: ACTIVE | Noted: 2024-03-01

## 2024-03-01 LAB
ANION GAP SERPL CALCULATED.3IONS-SCNC: 15.8 MMOL/L (ref 5–15)
BUN SERPL-MCNC: 42 MG/DL (ref 8–23)
BUN/CREAT SERPL: 19.7 (ref 7–25)
CALCIUM SPEC-SCNC: 8.9 MG/DL (ref 8.6–10.5)
CHLORIDE SERPL-SCNC: 100 MMOL/L (ref 98–107)
CO2 SERPL-SCNC: 19.2 MMOL/L (ref 22–29)
CREAT SERPL-MCNC: 2.13 MG/DL (ref 0.57–1)
DEPRECATED RDW RBC AUTO: 48.6 FL (ref 37–54)
EGFRCR SERPLBLD CKD-EPI 2021: 24.1 ML/MIN/1.73
ERYTHROCYTE [DISTWIDTH] IN BLOOD BY AUTOMATED COUNT: 15.6 % (ref 12.3–15.4)
GLUCOSE BLDC GLUCOMTR-MCNC: 102 MG/DL (ref 70–130)
GLUCOSE BLDC GLUCOMTR-MCNC: 111 MG/DL (ref 70–130)
GLUCOSE BLDC GLUCOMTR-MCNC: 131 MG/DL (ref 70–130)
GLUCOSE BLDC GLUCOMTR-MCNC: 136 MG/DL (ref 70–130)
GLUCOSE SERPL-MCNC: 113 MG/DL (ref 65–99)
HCT VFR BLD AUTO: 33.5 % (ref 34–46.6)
HGB BLD-MCNC: 10.8 G/DL (ref 12–15.9)
MCH RBC QN AUTO: 28 PG (ref 26.6–33)
MCHC RBC AUTO-ENTMCNC: 32.2 G/DL (ref 31.5–35.7)
MCV RBC AUTO: 86.8 FL (ref 79–97)
PLATELET # BLD AUTO: 429 10*3/MM3 (ref 140–450)
PMV BLD AUTO: 9.1 FL (ref 6–12)
POTASSIUM SERPL-SCNC: 4.2 MMOL/L (ref 3.5–5.2)
RBC # BLD AUTO: 3.86 10*6/MM3 (ref 3.77–5.28)
SODIUM SERPL-SCNC: 135 MMOL/L (ref 136–145)
WBC NRBC COR # BLD AUTO: 6.81 10*3/MM3 (ref 3.4–10.8)

## 2024-03-01 PROCEDURE — 85027 COMPLETE CBC AUTOMATED: CPT | Performed by: INTERNAL MEDICINE

## 2024-03-01 PROCEDURE — 82948 REAGENT STRIP/BLOOD GLUCOSE: CPT

## 2024-03-01 PROCEDURE — 99222 1ST HOSP IP/OBS MODERATE 55: CPT | Performed by: INTERNAL MEDICINE

## 2024-03-01 PROCEDURE — 80048 BASIC METABOLIC PNL TOTAL CA: CPT | Performed by: INTERNAL MEDICINE

## 2024-03-01 RX ORDER — HYDRALAZINE HYDROCHLORIDE 50 MG/1
100 TABLET, FILM COATED ORAL EVERY 8 HOURS SCHEDULED
Status: DISCONTINUED | OUTPATIENT
Start: 2024-03-01 | End: 2024-03-01

## 2024-03-01 RX ORDER — CLONIDINE HYDROCHLORIDE 0.1 MG/1
0.1 TABLET ORAL EVERY 12 HOURS SCHEDULED
Status: DISCONTINUED | OUTPATIENT
Start: 2024-03-01 | End: 2024-03-04

## 2024-03-01 RX ORDER — ALPRAZOLAM 0.25 MG/1
0.25 TABLET ORAL 3 TIMES DAILY PRN
Status: DISCONTINUED | OUTPATIENT
Start: 2024-03-01 | End: 2024-03-05 | Stop reason: HOSPADM

## 2024-03-01 RX ORDER — HYDRALAZINE HYDROCHLORIDE 50 MG/1
100 TABLET, FILM COATED ORAL EVERY 8 HOURS SCHEDULED
Status: DISCONTINUED | OUTPATIENT
Start: 2024-03-01 | End: 2024-03-05 | Stop reason: HOSPADM

## 2024-03-01 RX ADMIN — PANTOPRAZOLE SODIUM 40 MG: 40 TABLET, DELAYED RELEASE ORAL at 09:22

## 2024-03-01 RX ADMIN — ALLOPURINOL 100 MG: 100 TABLET ORAL at 09:22

## 2024-03-01 RX ADMIN — HYDRALAZINE HYDROCHLORIDE 100 MG: 50 TABLET ORAL at 11:08

## 2024-03-01 RX ADMIN — AMIODARONE HYDROCHLORIDE 200 MG: 200 TABLET ORAL at 21:22

## 2024-03-01 RX ADMIN — ROSUVASTATIN CALCIUM 10 MG: 10 TABLET, FILM COATED ORAL at 09:22

## 2024-03-01 RX ADMIN — CLONIDINE HYDROCHLORIDE 0.1 MG: 0.1 TABLET ORAL at 11:08

## 2024-03-01 RX ADMIN — HYDRALAZINE HYDROCHLORIDE 100 MG: 50 TABLET ORAL at 21:21

## 2024-03-01 RX ADMIN — METOPROLOL TARTRATE 12.5 MG: 25 TABLET, FILM COATED ORAL at 09:22

## 2024-03-01 RX ADMIN — PANTOPRAZOLE SODIUM 40 MG: 40 TABLET, DELAYED RELEASE ORAL at 21:22

## 2024-03-01 RX ADMIN — CLONIDINE HYDROCHLORIDE 0.1 MG: 0.1 TABLET ORAL at 21:21

## 2024-03-01 RX ADMIN — Medication 3 MG: at 21:30

## 2024-03-01 RX ADMIN — APIXABAN 5 MG: 5 TABLET, FILM COATED ORAL at 21:22

## 2024-03-01 RX ADMIN — METOPROLOL TARTRATE 12.5 MG: 25 TABLET, FILM COATED ORAL at 21:22

## 2024-03-01 RX ADMIN — FERROUS SULFATE TAB 325 MG (65 MG ELEMENTAL FE) 325 MG: 325 (65 FE) TAB at 09:22

## 2024-03-01 RX ADMIN — CLOPIDOGREL BISULFATE 75 MG: 75 TABLET, FILM COATED ORAL at 21:21

## 2024-03-01 RX ADMIN — APIXABAN 5 MG: 5 TABLET, FILM COATED ORAL at 09:22

## 2024-03-01 RX ADMIN — ESCITALOPRAM 5 MG: 5 TABLET, FILM COATED ORAL at 21:22

## 2024-03-01 RX ADMIN — ACETAMINOPHEN 325MG 650 MG: 325 TABLET ORAL at 01:10

## 2024-03-01 RX ADMIN — ALLOPURINOL 100 MG: 100 TABLET ORAL at 21:21

## 2024-03-01 NOTE — OUTREACH NOTE
Medical Week 1 Survey      Flowsheet Row Responses   [facility] facility patient discharged from? [place]   Does the patient have one of the following disease processes/diagnoses(primary or secondary)? Other   Week 1 attempt successful? No   Unsuccessful attempts Attempt 1   Revoke Readmitted            BENEDICTO TERRY - Registered Nurse

## 2024-03-01 NOTE — CONSULTS
Patient Name: Valentine Arora  :1950  74 y.o.    Date of Admission: 2024  Date of Consultation:  24  Encounter Provider: Dai Suazo MD  Place of Service: [facility] CARDIOLOGY  Referring Provider: Sandip Braxton PA-C  Patient Care Team:  Aliya Alejandro MD as PCP - General (Family Medicine)      Chief complaint: CHF / HPEF AF CAD    History of Present Illness:  This is a pleasant 74-year-old woman who has a past medical history of coronary disease status post bypass, atrial flutter/A-fib, HFpEF, obesity with a BMI of 33, PAD with prior intervention, type 2 diabetes.    She has had several hospitalizations over the last 6 to 9 months.  In May 2023 she was diagnosed with A-fib.  She was hospitalized in 2023 with chest pain found to be in A-fib/flutter had a normal nuclear stress.  Since then has been on amiodarone.  Earlier this week she was admitted to [facility] with bradycardia.  She stated her heart rate was in the 30s and 40s.  She did not have presyncope or syncope.  She believes she was in sinus rhythm throughout this time.  During that hospitalization she was in acute diastolic heart failure.  She was aggressively diuresed.  Entresto moderate dose was started.  Clonidine was decreased due to bradycardia from 0.2 3 times daily to 0.1 3 times daily.  Atenolol 25 mg was stopped.  She was home for 1 day.  Her heart rate reached 130.  She came to the emergency room again as a result.  She has a burning sensation when she is in A-fib.  When she arrived here her blood pressure was 80s over 50s.  This morning her blood pressures greater than 200.  She and her daughter state that her urine is very dark.  Her creatinine is risen from 1.4 up to 2.1 today.  It peaked at 2.2.  She was given IV fluids overnight.  Her Entresto, clonidine, hydralazine have been held.  This morning she thinks she is in sinus rhythm when in fact she is in A-fib with a  heart rate in the 90s to 100s.    Recent testing includes  Echo: May 2023, normal systolic function, mild LVH, normal diastolic function  Holter May 2023: Sinus rhythm with intermittent sinus bradycardia  Nuclear stress September 2023 diaphoretic attenuation artifact, normal perfusion EF greater than 70%  Holter September 2023 sinus rhythm with Mobitz 1  Holter January 2024: Sinus rhythm, heart rate range 35-81  Echo January 2024: Normal systolic function, normal diastolic dysfunction, normal PA pressure, dilated LA, moderate TR          Past Medical History:   Diagnosis Date    Arthritis     Atrial flutter, unspecified type 05/17/2023    Sarabia esophagus     CAD (coronary artery disease)     Chronic back pain     Colon polyp     GERD (gastroesophageal reflux disease)     Hyperlipidemia     Hypertension     Myocardial infarction     1995    PAD (peripheral artery disease) 09/11/2019    Shingles     Type 2 diabetes mellitus        Past Surgical History:   Procedure Laterality Date    COLONOSCOPY      COLONOSCOPY N/A 6/24/2020    Procedure: COLONOSCOPY;  Surgeon: Mathew Roberts MD;  Location: [facility];  Service: Gastroenterology;  Laterality: N/A;  Descending colon polyp x 2, Ascending colon polyp, Sigmoid colon polyp, Rectal polyp    COLONOSCOPY N/A 8/7/2023    Procedure: COLONOSCOPY;  Surgeon: Mathew Roberts MD;  Location: [facility];  Service: Gastroenterology;  Laterality: N/A;  Normal    CORONARY ARTERY BYPASS GRAFT  1995    x 2 vessels    ENDOSCOPY N/A 6/24/2020    Procedure: ESOPHAGOGASTRODUODENOSCOPY;  Surgeon: Mathew Roberts MD;  Location: [facility];  Service: Gastroenterology;  Laterality: N/A;  Ulcerative esophagitis, Gastritis, Duodenitis  Duodenal biopsy, gastric biopsy, distal esophageal biopsy    ENDOSCOPY N/A 9/17/2020    Procedure: ESOPHAGOGASTRODUODENOSCOPY with biopsies;  Surgeon: Mathew Roberts MD;  Location: [facility];  Service:  Gastroenterology;  Laterality: N/A;  Esophagitis  Sarabia's Esophagus  Hiatal hernia  Gastritis  Gastric biopsy  Distal esophagus biopsy    ENDOSCOPY N/A 8/7/2023    Procedure: Esophagogastroduedenoscopy;  Surgeon: Mathew Roberts MD;  Location: [facility];  Service: Gastroenterology;  Laterality: N/A;  Gastritis; short segment Sarabia's; Esophageal stricture- dilatation; Biopsies- gastric, distal esophagus    INNER EAR SURGERY      JOINT REPLACEMENT      right knee    LEG SURGERY      x 2 s/p fall    LUMBAR FUSION      L4/L5    SKIN GRAFT      to left lower leg x 2    TONSILLECTOMY      WRIST FUSION Left          Prior to Admission medications    Medication Sig Start Date End Date Taking? Authorizing Provider   allopurinol (ZYLOPRIM) 100 MG tablet Take 1 tablet by mouth 2 (Two) Times a Day.   Yes Yaw Cedeño MD   ALPRAZolam (XANAX) 0.25 MG tablet Take 1 tablet by mouth As Needed for Anxiety. 10/19/23  Yes Yaw Cedeño MD   amiodarone (PACERONE) 200 MG tablet Take 1 tablet by mouth Daily.  Patient taking differently: Take 1 tablet by mouth Every Night. 1/12/24  Yes Yeyo Rodríguez PA-C   bumetanide (BUMEX) 1 MG tablet Take 1 tablet by mouth Daily. 2/28/24  Yes Fabrizio Brown MD   cloNIDine (CATAPRES) 0.1 MG tablet Take 2 tablets by mouth 3 (Three) Times a Day.   Yes Yaw Cedeño MD   clopidogrel (PLAVIX) 75 MG tablet TAKE ONE TABLET BY MOUTH DAILY  Patient taking differently: Take 1 tablet by mouth Every Night. 10/4/23  Yes Kumar Frye III, MD   Eliquis 5 MG tablet tablet Take 1 tablet by mouth 2 (Two) Times a Day. 9/6/23  Yes Yaw Cedeño MD   escitalopram (LEXAPRO) 5 MG tablet Take 1 tablet by mouth Every Night. 10/10/23  Yes Yaw Cedeño MD   ferrous sulfate 325 (65 FE) MG tablet Take 1 tablet by mouth Daily With Breakfast.   Yes Yaw Cedeño MD   hydrALAZINE (APRESOLINE) 100 MG tablet Take 1 tablet by mouth 3 (Three) Times a Day for 30  days. 2/28/24 3/29/24 Yes Fabrizio Brown MD   metFORMIN (GLUCOPHAGE) 500 MG tablet Take 1 tablet by mouth 2 (Two) Times a Day With Meals.   Yes ProviderYaw MD   pantoprazole (PROTONIX) 40 MG EC tablet Take 1 tablet by mouth 2 (Two) Times a Day. 22  Yes Shayna Tran APRN   rosuvastatin (CRESTOR) 10 MG tablet Take 1 tablet by mouth Daily. 1/12/15  Yes Yaw Cedeño MD   sacubitril-valsartan (ENTRESTO) 49-51 MG tablet Take 1 tablet by mouth Every 12 (Twelve) Hours. 24  Yes Fabrizio Brown MD   spironolactone (ALDACTONE) 50 MG tablet Take 1 tablet by mouth Daily. 24  Yes Fabrizio Brown MD   traMADol (ULTRAM) 50 MG tablet Take 1 tablet by mouth Every 6 (Six) Hours As Needed for Moderate Pain.  3/1/24  ProviderYaw MD       Allergies   Allergen Reactions    Codeine Itching    Other Unknown (See Comments)     Vicryl: doesn't heal       Social History     Socioeconomic History    Marital status:    Tobacco Use    Smoking status: Former     Packs/day: 0.50     Years: 20.00     Additional pack years: 0.00     Total pack years: 10.00     Types: Cigarettes     Quit date:      Years since quittin.    Smokeless tobacco: Never    Tobacco comments:     QUIT    Vaping Use    Vaping Use: Never used   Substance and Sexual Activity    Alcohol use: Not Currently     Comment: occasional    Drug use: No    Sexual activity: Defer       Family History   Problem Relation Age of Onset    Heart disease Mother     Heart disease Father     Colon cancer Neg Hx     Colon polyps Neg Hx     Breast cancer Neg Hx        REVIEW OF SYSTEMS:   All systems reviewed.  Pertinent positives identified in HPI.  All other systems are negative.      Objective:     Vitals:    24 0013 24 0607 24 0728 24 0752   BP: 168/52  (!) 213/74 (!) 190/64   BP Location: Right arm  Right arm Right arm   Patient Position: Lying  Lying Lying   Pulse: 83  102    Resp: 18  18    Temp:  "97.4 °F (36.3 °C)  97.9 °F (36.6 °C)    TempSrc: Oral  Oral    SpO2: 97%  98%    Weight:  82.3 kg (181 lb 7 oz)     Height:    157.5 cm (62.01\")     Body mass index is 33.18 kg/m².    General Appearance:    Alert, cooperative, in no acute distress.  Obese   Head:    Normocephalic, without obvious abnormality, atraumatic   Eyes:            Lids and lashes normal, conjunctivae and sclerae normal, no icterus, no pallor, corneas clear, PERRLA   Ears:    Ears appear intact with no abnormalities noted   Throat:   No oral lesions, no thrush, oral mucosa moist   Neck:   No adenopathy, supple, trachea midline, no thyromegaly, no carotid bruit, no JVD   Back:     No kyphosis present, no scoliosis present, no skin lesions, erythema or scars, no tenderness to percussion or palpation, range of motion normal   Lungs:     Clear to auscultation, respirations regular, even and unlabored    Heart:    Regular rhythm and normal rate, normal S1 and S2, no murmur, no gallop, no rub, no click   Chest Wall:    No abnormalities observed   Abdomen:     Normal bowel sounds, no masses, no organomegaly, soft, nontender, nondistended, no guarding, no rebound  tenderness   Extremities:   Moves all extremities well, no edema, no cyanosis, no redness   Pulses:   Pulses palpable and equal bilaterally. Normal radial, carotid, femoral, dorsalis pedis and posterior tibial pulses bilaterally. Normal abdominal aorta   Skin:  Psychiatric:   No bleeding, bruising or rash    Alert and oriented x 3, normal mood and affect   Lab Review:     Results from last 7 days   Lab Units 03/01/24  0557 02/29/24  1400   SODIUM mmol/L 135* 133*   POTASSIUM mmol/L 4.2 4.6   CHLORIDE mmol/L 100 95*   CO2 mmol/L 19.2* 20.5*   BUN mg/dL 42* 42*   CREATININE mg/dL 2.13* 2.27*   CALCIUM mg/dL 8.9 9.1   BILIRUBIN mg/dL  --  0.3   ALK PHOS U/L  --  71   ALT (SGPT) U/L  --  29   AST (SGOT) U/L  --  24   GLUCOSE mg/dL 113* 133*     Results from last 7 days   Lab Units " 02/29/24  1603 02/29/24  1400 02/25/24  1337   HSTROP T ng/L 72* 59* 22*     Results from last 7 days   Lab Units 03/01/24  0557   WBC 10*3/mm3 6.81   HEMOGLOBIN g/dL 10.8*   HEMATOCRIT % 33.5*   PLATELETS 10*3/mm3 429     Results from last 7 days   Lab Units 02/29/24  1400   INR  1.29*   APTT seconds 36.9     Results from last 7 days   Lab Units 02/29/24  1400   MAGNESIUM mg/dL 2.1                   I personally viewed and interpreted the patient's EKG/Telemetry data.        Assessment and Plan:       1.  Atrial fibrillation with varying heart rates: She was apparently in sinus rhythm with heart rates in the 30s.  As a result clonidine was decreased and atenolol stopped.  Now she is in A-fib with heart rates greater than 130.  Presumably A-fib is also related to overdiuresis.  She is receiving fluids overnight.  -   I will start metoprolol tartrate 12.5 twice daily.  She will continue amiodarone 200.  She really hates being in A-fib, if she remains in A-fib and symptomatic I would consider cardioversion on Monday.   -Eliquis 5 twice daily    2.  HFpEF: LVH on echo, dilated left atrium.  Presumably triggered by A-fib during her last hospitalization.  She is now euvolemic/dry.  Her creatinine is peaked at 2.2 is down to 2.1 today.  Her BNP has improved from greater than 3000-900.  I would not diurese her today.  Stop IV fluids.  Encourage p.o. intake    3.  CAD history of CABG: She describes burning in her chest when she is in A-fib.  Otherwise she does not have exertional symptoms.  She had a normal nuclear stress in September 2023.  I do not see any major ischemic changes on her EKG on this admission.  The daughter who appears to be a medical professional is very interested in a right left heart catheterization.  I tried to explain that at this time I do not think it is indicated but if she were to continue to have burning chest pain outside of AF, we could reconsider it.    4.  CKD/SVEN: Creatinine has been as high  as 2 as far back as 2018 but then normalized for several years.  It is crept up slowly.  Hold Entresto, Bumex, spironolactone.  Will ultimately need an SGLT2 inhibitor    5.  Hypertension: Greater than 200 this morning.  As low as 80 overnight.  Stop IV fluids.  Restart hydralazine 100 3 times daily.  Start low-dose metoprolol for tachycardia.  Hold Entresto.  Clonidine was decreased from 0.2 3 times daily to 0.1 3 times daily during her [place] hospitalization.  Will try 0.1 twice daily with an effort to wean this.     Dai Suazo MD  03/01/24  09:22 EST

## 2024-03-01 NOTE — PROGRESS NOTES
Name: Valentine Arora ADMIT: 2024   : 1950  PCP: Aliya Alejandro MD    MRN: [id number] LOS: 1 days   AGE/SEX: 74 y.o. female  ROOM: [facility]     Subjective   Subjective   No acute events. Patient denies new complaints. No more chest pain. No family at bedside.    Objective   Objective   Vital Signs  Temp:  [97.4 °F (36.3 °C)-98.3 °F (36.8 °C)] 98.1 °F (36.7 °C)  Heart Rate:  [] 70  Resp:  [16-18] 18  BP: ()/(52-95) 114/63  SpO2:  [96 %-100 %] 99 %  on   ;   Device (Oxygen Therapy): room air  Body mass index is 33.18 kg/m².  Physical Exam  Vitals and nursing note reviewed.   Constitutional:       General: She is not in acute distress.     Appearance: She is not toxic-appearing or diaphoretic.   HENT:      Head: Normocephalic and atraumatic.      Nose: Nose normal.      Mouth/Throat:      Mouth: Mucous membranes are moist.      Pharynx: Oropharynx is clear.   Eyes:      Conjunctiva/sclera: Conjunctivae normal.      Pupils: Pupils are equal, round, and reactive to light.   Cardiovascular:      Rate and Rhythm: Normal rate. Rhythm irregular.      Pulses: Normal pulses.   Abdominal:      General: Bowel sounds are normal.      Palpations: Abdomen is soft.      Tenderness: There is no abdominal tenderness.   Musculoskeletal:         General: No swelling or tenderness.      Cervical back: Neck supple.   Skin:     General: Skin is warm and dry.      Capillary Refill: Capillary refill takes less than 2 seconds.   Neurological:      General: No focal deficit present.      Mental Status: She is alert and oriented to person, place, and time.   Psychiatric:         Mood and Affect: Mood normal.         Behavior: Behavior normal.       Results Review     I reviewed the patient's new clinical results.  Results from last 7 days   Lab Units 24  0557 24  1400 24  0908   WBC 10*3/mm3 6.81 10.59 8.37   HEMOGLOBIN g/dL 10.8* 12.4 9.0*   PLATELETS 10*3/mm3 429 550* 406     Results from  last 7 days   Lab Units 03/01/24  0557 02/29/24  1400 02/28/24  0434 02/27/24  0340   SODIUM mmol/L 135* 133* 131* 135*   POTASSIUM mmol/L 4.2 4.6 3.6 3.7   CHLORIDE mmol/L 100 95* 94* 97*   CO2 mmol/L 19.2* 20.5* 24.4 24.2   BUN mg/dL 42* 42* 29* 23   CREATININE mg/dL 2.13* 2.27* 1.64* 1.26*   GLUCOSE mg/dL 113* 133* 127* 114*   EGFR mL/min/1.73 24.1* 22.3* 32.9* 45.2*     Results from last 7 days   Lab Units 02/29/24  1400 02/25/24  0908   ALBUMIN g/dL 4.3 3.9   BILIRUBIN mg/dL 0.3 <0.2   ALK PHOS U/L 71 71   AST (SGOT) U/L 24 41*   ALT (SGPT) U/L 29 57*     Results from last 7 days   Lab Units 03/01/24  0557 02/29/24  1400 02/28/24 0434 02/27/24  0340 02/25/24  0908   CALCIUM mg/dL 8.9 9.1 8.6 9.1 8.9   ALBUMIN g/dL  --  4.3  --   --  3.9   MAGNESIUM mg/dL  --  2.1  --   --   --        Glucose   Date/Time Value Ref Range Status   03/01/2024 1111 131 (H) 70 - 130 mg/dL Final   03/01/2024 0605 102 70 - 130 mg/dL Final   02/29/2024 2141 117 70 - 130 mg/dL Final   02/28/2024 0833 109 70 - 130 mg/dL Final   02/27/2024 2113 129 70 - 130 mg/dL Final   02/27/2024 1707 130 70 - 130 mg/dL Final       XR Chest 1 View    Result Date: 2/29/2024  There is no significant change when compared to the prior study. There is no evidence for acute cardiopulmonary process. Electronically Signed: Carlos Alexis MD  2/29/2024 3:09 PM EST  Workstation ID: [id number]     I have personally reviewed all medications:  Scheduled Medications  allopurinol, 100 mg, Oral, BID  amiodarone, 200 mg, Oral, Nightly  apixaban, 5 mg, Oral, BID  cloNIDine, 0.1 mg, Oral, Q12H  clopidogrel, 75 mg, Oral, Nightly  escitalopram, 5 mg, Oral, Nightly  ferrous sulfate, 325 mg, Oral, Daily With Breakfast  hydrALAZINE, 100 mg, Oral, Q8H  insulin lispro, 2-7 Units, Subcutaneous, 4x Daily AC & at Bedtime  metoprolol tartrate, 12.5 mg, Oral, Q12H  pantoprazole, 40 mg, Oral, BID  rosuvastatin, 10 mg, Oral, Daily    Infusions   Diet  Diet: Cardiac Diets; Healthy Heart  (2-3 Na+); Texture: Regular Texture (IDDSI 7); Fluid Consistency: Thin (IDDSI 0)    I have personally reviewed:  [x]  Laboratory   []  Microbiology   [x]  Radiology   [x]  EKG/Telemetry  []  Cardiology/Vascular   []  Pathology    [x]  Records      Assessment/Plan     Active Hospital Problems    Diagnosis  POA    **Chest pain [R07.9]  Yes    Chronic kidney disease, stage 3a [N18.31]  Yes    Gastroesophageal reflux disease without esophagitis [K21.9]  Yes    PAD (peripheral artery disease) [I73.9]  Yes    Hypertension [I10]  Yes    Hyperlipidemia [E78.5]  Yes    Coronary artery disease involving coronary bypass graft of native heart [I25.810]  Yes      Resolved Hospital Problems   No resolved problems to display.   Atypical Chest Pain/Afib/RVR  - accompanied by Afib/RVR which is probably the cause, no evidence of ACS-has known history of CAD and is on statin and plavix  - HR is improved with the addition of metoprolol, continue on amiodarone also  - will observe for now-if symptoms return then cardioversion will be considered by cardiology, appreciate recs  - continue eliquis    HTN/Chronic Diastolic CHF  - BP improved from admission, appears euvolemic, was dry on admission  - continue on clonidine and hydralazine  - hold entresto and lasix given SVEN  - monitor BP and volume status    SVEN on CKD stage 3b  - serum creatinine fluctuates but baseline appears to be 1.3-1.5  - improved somewhat with IV fluids, stop these now and observe  - check PVR  - hold lasix, aldactone, and entresto as above      Eliquis (home med) for DVT prophylaxis.  Full code.  Discussed with patient and nursing staff.  Anticipate discharge home in 2-3 days.      Edgard Burk MD  [facility]  03/01/24  15:30 EST

## 2024-03-01 NOTE — H&P
HISTORY AND PHYSICAL   [facility]        Date of Admission: 2024  Patient Identification:  Name: Valentine Arora  Age: 74 y.o.  Sex: female  :  1950  MRN: [id number]                     Primary Care Physician: Aliya Alejandro MD    Chief Complaint:  74 year old female presented to the emergency room in [place] with chest pain and shortness of breath as well as palpitations; she was just discharged from the hospital there yesterday; she had a fib with rvr in the ED; she is followed by dr otero; no fever or chills; some of her medications were changed during her hospital stay    History of Present Illness:   As above    Past Medical History:  Past Medical History:   Diagnosis Date    Arthritis     Atrial flutter, unspecified type 2023    Sarabia esophagus     CAD (coronary artery disease)     Chronic back pain     Colon polyp     GERD (gastroesophageal reflux disease)     Hyperlipidemia     Hypertension     Myocardial infarction         PAD (peripheral artery disease) 2019    Shingles     Type 2 diabetes mellitus      Past Surgical History:  Past Surgical History:   Procedure Laterality Date    COLONOSCOPY      COLONOSCOPY N/A 2020    Procedure: COLONOSCOPY;  Surgeon: Mathew Roberts MD;  Location: [facility] OR;  Service: Gastroenterology;  Laterality: N/A;  Descending colon polyp x 2, Ascending colon polyp, Sigmoid colon polyp, Rectal polyp    COLONOSCOPY N/A 2023    Procedure: COLONOSCOPY;  Surgeon: Mathew Roberts MD;  Location: [facility] OR;  Service: Gastroenterology;  Laterality: N/A;  Normal    CORONARY ARTERY BYPASS GRAFT  1995    x 2 vessels    ENDOSCOPY N/A 2020    Procedure: ESOPHAGOGASTRODUODENOSCOPY;  Surgeon: Mathew Roberts MD;  Location: [facility] OR;  Service: Gastroenterology;  Laterality: N/A;  Ulcerative esophagitis, Gastritis, Duodenitis  Duodenal biopsy, gastric biopsy, distal esophageal biopsy     ENDOSCOPY N/A 9/17/2020    Procedure: ESOPHAGOGASTRODUODENOSCOPY with biopsies;  Surgeon: Mathew Roberts MD;  Location:  [facility] OR;  Service: Gastroenterology;  Laterality: N/A;  Esophagitis  Sarabia's Esophagus  Hiatal hernia  Gastritis  Gastric biopsy  Distal esophagus biopsy    ENDOSCOPY N/A 8/7/2023    Procedure: Esophagogastroduedenoscopy;  Surgeon: Mathew Roberts MD;  Location:  [facility] OR;  Service: Gastroenterology;  Laterality: N/A;  Gastritis; short segment Sarabia's; Esophageal stricture- dilatation; Biopsies- gastric, distal esophagus    INNER EAR SURGERY      JOINT REPLACEMENT      right knee    LEG SURGERY      x 2 s/p fall    LUMBAR FUSION      L4/L5    SKIN GRAFT      to left lower leg x 2    TONSILLECTOMY      WRIST FUSION Left       Home Meds:  Medications Prior to Admission   Medication Sig Dispense Refill Last Dose    allopurinol (ZYLOPRIM) 100 MG tablet Take 1 tablet by mouth 2 (Two) Times a Day.       ALPRAZolam (XANAX) 0.25 MG tablet Take 1 tablet by mouth As Needed for Anxiety.       amiodarone (PACERONE) 200 MG tablet Take 1 tablet by mouth Daily. (Patient taking differently: Take 1 tablet by mouth Every Night.) 90 tablet 1     bumetanide (BUMEX) 1 MG tablet Take 1 tablet by mouth Daily. 30 tablet 0     cloNIDine (CATAPRES) 0.1 MG tablet Take 2 tablets by mouth 3 (Three) Times a Day.       clopidogrel (PLAVIX) 75 MG tablet TAKE ONE TABLET BY MOUTH DAILY (Patient taking differently: Take 1 tablet by mouth Every Night.) 90 tablet 3     Eliquis 5 MG tablet tablet Take 1 tablet by mouth 2 (Two) Times a Day.       escitalopram (LEXAPRO) 5 MG tablet Take 1 tablet by mouth Every Night.       ferrous sulfate 325 (65 FE) MG tablet Take 1 tablet by mouth Daily With Breakfast.       hydrALAZINE (APRESOLINE) 100 MG tablet Take 1 tablet by mouth 3 (Three) Times a Day for 30 days. 90 tablet 0     metFORMIN (GLUCOPHAGE) 500 MG tablet Take 1 tablet by mouth 2 (Two) Times a Day With  Meals.       pantoprazole (PROTONIX) 40 MG EC tablet Take 1 tablet by mouth 2 (Two) Times a Day. 180 tablet 3     rosuvastatin (CRESTOR) 10 MG tablet Take 1 tablet by mouth Daily.       sacubitril-valsartan (ENTRESTO) 49-51 MG tablet Take 1 tablet by mouth Every 12 (Twelve) Hours. 60 tablet 0     spironolactone (ALDACTONE) 50 MG tablet Take 1 tablet by mouth Daily. 30 tablet 0     traMADol (ULTRAM) 50 MG tablet Take 1 tablet by mouth Every 6 (Six) Hours As Needed for Moderate Pain.          Allergies:  Allergies   Allergen Reactions    Codeine Itching    Other Unknown (See Comments)     Vicryl: doesn't heal     Immunizations:  Immunization History   Administered Date(s) Administered    COVID-19 (PFIZER) Purple Cap Monovalent 2021, 2021, 10/06/2021     Social History:   Social History     Social History Narrative    Not on file     Social History     Socioeconomic History    Marital status:    Tobacco Use    Smoking status: Former     Types: Cigarettes     Quit date:      Years since quittin.1    Smokeless tobacco: Never    Tobacco comments:     QUIT    Vaping Use    Vaping Use: Never used   Substance and Sexual Activity    Alcohol use: Not Currently     Comment: occasional    Drug use: No    Sexual activity: Defer       Family History:  Family History   Problem Relation Age of Onset    Heart disease Mother     Heart disease Father     Colon cancer Neg Hx     Colon polyps Neg Hx     Breast cancer Neg Hx         Review of Systems  See history of present illness and past medical history.  Patient denies headache, dizziness, syncope, falls, trauma, change in vision, change in hearing, change in taste, changes in weight, changes in appetite, focal weakness, numbness, or paresthesia.  Patient denies   orthopnea, PND, cough, sinus pressure, rhinorrhea, epistaxis, hemoptysis, nausea, vomiting,hematemesis, diarrhea, constipation or hematochezia.  Denies cold or heat intolerance, polydipsia,  polyuria, polyphagia. Denies hematuria, pyuria, dysuria, hesitancy, frequency or urgency. Denies consumption of raw and under cooked meats foods or change in water source.  Denies fever, chills, sweats, night sweats.  Denies missing any routine medications. Remainder of ROS is negative.    Objective:  T Max 24 hrs: Temp (24hrs), Av.1 °F (36.7 °C), Min:97.7 °F (36.5 °C), Max:98.3 °F (36.8 °C)    Vitals Ranges:   Temp:  [97.7 °F (36.5 °C)-98.3 °F (36.8 °C)] 97.7 °F (36.5 °C)  Heart Rate:  [] 109  Resp:  [18-20] 18  BP: ()/(55-95) 81/57      Exam:  BP (!) 81/57 (BP Location: Left arm, Patient Position: Lying)   Temp 97.7 °F (36.5 °C) (Oral)   Resp 18     General Appearance:    Alert, cooperative, no distress, appears stated age   Head:    Normocephalic, without obvious abnormality, atraumatic   Eyes:    PERRL, conjunctivae/corneas clear, EOM's intact, both eyes   Ears:    Normal external ear canals, both ears   Nose:   Nares normal, septum midline, mucosa normal, no drainage    or sinus tenderness   Throat:   Lips, mucosa, and tongue normal   Neck:   Supple, symmetrical, trachea midline, no adenopathy;     thyroid:  no enlargement/tenderness/nodules; no carotid    bruit or JVD   Back:     Symmetric, no curvature, ROM normal, no CVA tenderness   Lungs:     Clear to auscultation bilaterally, respirations unlabored   Chest Wall:    No tenderness or deformity    Heart:    Regular rate and rhythm, S1 and S2 normal, no murmur, rub   or gallop   Abdomen:     Soft, nontender, bowel sounds active all four quadrants,     no masses, no hepatomegaly, no splenomegaly   Extremities:   Extremities normal, atraumatic, no cyanosis or edema      .    Data Review:  Labs in chart were reviewed.  WBC   Date Value Ref Range Status   2024 10.59 3.40 - 10.80 10*3/mm3 Final     Hemoglobin   Date Value Ref Range Status   2024 12.4 12.0 - 15.9 g/dL Final     Hematocrit   Date Value Ref Range Status   2024 38.6  34.0 - 46.6 % Final     Platelets   Date Value Ref Range Status   02/29/2024 550 (H) 140 - 450 10*3/mm3 Final     Sodium   Date Value Ref Range Status   02/29/2024 133 (L) 136 - 145 mmol/L Final     Potassium   Date Value Ref Range Status   02/29/2024 4.6 3.5 - 5.2 mmol/L Final     Comment:     Slight hemolysis detected by analyzer. Result may be falsely elevated.     Chloride   Date Value Ref Range Status   02/29/2024 95 (L) 98 - 107 mmol/L Final     CO2   Date Value Ref Range Status   02/29/2024 20.5 (L) 22.0 - 29.0 mmol/L Final     BUN   Date Value Ref Range Status   02/29/2024 42 (H) 8 - 23 mg/dL Final     Creatinine   Date Value Ref Range Status   02/29/2024 2.27 (H) 0.57 - 1.00 mg/dL Final     Glucose   Date Value Ref Range Status   02/29/2024 133 (H) 65 - 99 mg/dL Final     Calcium   Date Value Ref Range Status   02/29/2024 9.1 8.6 - 10.5 mg/dL Final     Magnesium   Date Value Ref Range Status   02/29/2024 2.1 1.6 - 2.4 mg/dL Final       Results from last 7 days   Lab Units 02/29/24  1400   TSH uIU/mL 1.190     Results from last 7 days   Lab Units 02/25/24  0908   HEMOGLOBIN A1C % 5.90*      Imaging Results (All)       None              Assessment:  Active Hospital Problems    Diagnosis  POA    **Chest pain [R07.9]  Yes      Resolved Hospital Problems   No resolved problems to display.   A fib  Chronic diastolic chf  Hypertension  Cad  Hyperlipidemia  Obesity  Diabetes  Acute kidney injury  hyponatremia    Plan    Hold bp meds  Hold entresto and diuretics  Consult cardiology   Accu checks, insulin sliding scale  Monitor on telemetry  Dw patient and ed provider in [facility] Ag Chapman MD  2/29/2024  20:06 EST

## 2024-03-01 NOTE — PLAN OF CARE
Problem: Adult Inpatient Plan of Care  Goal: Plan of Care Review  Outcome: Ongoing, Progressing  Flowsheets (Taken 3/1/2024 0613)  Progress: no change  Plan of Care Reviewed With: patient  Outcome Evaluation: pt admits last evening from home, pt states that she went to urgent care in [place] due to heart palpitations, chest pain and dspnea, pt states that she has hx of afibb and she felt if something  was not right, pt denies CP at this time and does show afibb on the gala, pt is a&ox4, up adlib, no skin issues noted, diet orders for heart healhty diet, cont B&B, no constipation noted at this time, pt is on RA, pt has 22g PIV in left FA placed by this nurse, VSS at this time, NAD noted, pt has no complaints this am  Goal: Patient-Specific Goal (Individualized)  Outcome: Ongoing, Progressing  Goal: Absence of Hospital-Acquired Illness or Injury  Outcome: Ongoing, Progressing  Intervention: Identify and Manage Fall Risk  Recent Flowsheet Documentation  Taken 3/1/2024 0400 by Paulette Galindo RN  Safety Promotion/Fall Prevention: safety round/check completed  Taken 3/1/2024 0200 by Paulette Galindo RN  Safety Promotion/Fall Prevention: safety round/check completed  Taken 3/1/2024 0000 by Paulette Galindo RN  Safety Promotion/Fall Prevention: safety round/check completed  Intervention: Prevent Skin Injury  Recent Flowsheet Documentation  Taken 3/1/2024 0400 by Paulette Galindo RN  Body Position: position changed independently  Taken 3/1/2024 0200 by Paulette Galindo RN  Body Position: position changed independently  Taken 3/1/2024 0000 by Paulette Galindo RN  Body Position: position changed independently  Intervention: Prevent and Manage VTE (Venous Thromboembolism) Risk  Recent Flowsheet Documentation  Taken 3/1/2024 0000 by Paulette Galindo RN  Activity Management:   ambulated in room   up ad venice  Range of Motion: active ROM (range of motion) encouraged  Intervention:  Prevent Infection  Recent Flowsheet Documentation  Taken 3/1/2024 0400 by Paulette Galindo RN  Infection Prevention:   hand hygiene promoted   rest/sleep promoted  Taken 3/1/2024 0200 by Paulette Galindo RN  Infection Prevention:   hand hygiene promoted   rest/sleep promoted  Taken 3/1/2024 0000 by Paulette Galindo RN  Infection Prevention:   hand hygiene promoted   rest/sleep promoted  Goal: Optimal Comfort and Wellbeing  Outcome: Ongoing, Progressing  Intervention: Provide Person-Centered Care  Recent Flowsheet Documentation  Taken 3/1/2024 0000 by Paulette Galindo RN  Trust Relationship/Rapport: care explained  Goal: Readiness for Transition of Care  Outcome: Ongoing, Progressing     Problem: Hypertension Comorbidity  Goal: Blood Pressure in Desired Range  Outcome: Ongoing, Progressing  Intervention: Maintain Blood Pressure Management  Recent Flowsheet Documentation  Taken 3/1/2024 0400 by Paulette Galindo RN  Medication Review/Management: medications reviewed  Taken 3/1/2024 0200 by Paulette Galindo RN  Medication Review/Management: medications reviewed  Taken 3/1/2024 0000 by Paulette Galindo RN  Medication Review/Management: medications reviewed   Goal Outcome Evaluation:  Plan of Care Reviewed With: patient        Progress: no change  Outcome Evaluation: pt admits last evening from home, pt states that she went to urgent care in [place] due to heart palpitations, chest pain and dspnea, pt states that she has hx of afibb and she felt if something  was not right, pt denies CP at this time and does show afibb on the gala, pt is a&ox4, up adlib, no skin issues noted, diet orders for heart healhty diet, cont B&B, no constipation noted at this time, pt is on RA, pt has 22g PIV in left FA placed by this nurse, VSS at this time, NAD noted, pt has no complaints this am

## 2024-03-02 LAB
ANION GAP SERPL CALCULATED.3IONS-SCNC: 11.9 MMOL/L (ref 5–15)
BASOPHILS # BLD AUTO: 0.07 10*3/MM3 (ref 0–0.2)
BASOPHILS NFR BLD AUTO: 1.1 % (ref 0–1.5)
BUN SERPL-MCNC: 33 MG/DL (ref 8–23)
BUN/CREAT SERPL: 24.8 (ref 7–25)
CALCIUM SPEC-SCNC: 8 MG/DL (ref 8.6–10.5)
CHLORIDE SERPL-SCNC: 104 MMOL/L (ref 98–107)
CO2 SERPL-SCNC: 22.1 MMOL/L (ref 22–29)
CREAT SERPL-MCNC: 1.33 MG/DL (ref 0.57–1)
DEPRECATED RDW RBC AUTO: 50.8 FL (ref 37–54)
EGFRCR SERPLBLD CKD-EPI 2021: 42.3 ML/MIN/1.73
EOSINOPHIL # BLD AUTO: 0.25 10*3/MM3 (ref 0–0.4)
EOSINOPHIL NFR BLD AUTO: 4 % (ref 0.3–6.2)
ERYTHROCYTE [DISTWIDTH] IN BLOOD BY AUTOMATED COUNT: 15.5 % (ref 12.3–15.4)
GEN 5 2HR TROPONIN T REFLEX: 25 NG/L
GLUCOSE BLDC GLUCOMTR-MCNC: 103 MG/DL (ref 70–130)
GLUCOSE BLDC GLUCOMTR-MCNC: 108 MG/DL (ref 70–130)
GLUCOSE BLDC GLUCOMTR-MCNC: 121 MG/DL (ref 70–130)
GLUCOSE BLDC GLUCOMTR-MCNC: 153 MG/DL (ref 70–130)
GLUCOSE SERPL-MCNC: 115 MG/DL (ref 65–99)
HCT VFR BLD AUTO: 32.4 % (ref 34–46.6)
HGB BLD-MCNC: 10.2 G/DL (ref 12–15.9)
IMM GRANULOCYTES # BLD AUTO: 0.02 10*3/MM3 (ref 0–0.05)
IMM GRANULOCYTES NFR BLD AUTO: 0.3 % (ref 0–0.5)
LYMPHOCYTES # BLD AUTO: 2.08 10*3/MM3 (ref 0.7–3.1)
LYMPHOCYTES NFR BLD AUTO: 33 % (ref 19.6–45.3)
MCH RBC QN AUTO: 27.9 PG (ref 26.6–33)
MCHC RBC AUTO-ENTMCNC: 31.5 G/DL (ref 31.5–35.7)
MCV RBC AUTO: 88.5 FL (ref 79–97)
MONOCYTES # BLD AUTO: 0.69 10*3/MM3 (ref 0.1–0.9)
MONOCYTES NFR BLD AUTO: 10.9 % (ref 5–12)
NEUTROPHILS NFR BLD AUTO: 3.2 10*3/MM3 (ref 1.7–7)
NEUTROPHILS NFR BLD AUTO: 50.7 % (ref 42.7–76)
NRBC BLD AUTO-RTO: 0 /100 WBC (ref 0–0.2)
PLATELET # BLD AUTO: 361 10*3/MM3 (ref 140–450)
PMV BLD AUTO: 9 FL (ref 6–12)
POTASSIUM SERPL-SCNC: 4.6 MMOL/L (ref 3.5–5.2)
QT INTERVAL: 434 MS
QTC INTERVAL: 489 MS
RBC # BLD AUTO: 3.66 10*6/MM3 (ref 3.77–5.28)
SODIUM SERPL-SCNC: 138 MMOL/L (ref 136–145)
TROPONIN T DELTA: -4 NG/L
TROPONIN T SERPL HS-MCNC: 29 NG/L
WBC NRBC COR # BLD AUTO: 6.31 10*3/MM3 (ref 3.4–10.8)

## 2024-03-02 PROCEDURE — 84484 ASSAY OF TROPONIN QUANT: CPT | Performed by: INTERNAL MEDICINE

## 2024-03-02 PROCEDURE — 82948 REAGENT STRIP/BLOOD GLUCOSE: CPT

## 2024-03-02 PROCEDURE — 85025 COMPLETE CBC W/AUTO DIFF WBC: CPT | Performed by: INTERNAL MEDICINE

## 2024-03-02 PROCEDURE — 93010 ELECTROCARDIOGRAM REPORT: CPT | Performed by: INTERNAL MEDICINE

## 2024-03-02 PROCEDURE — 63710000001 INSULIN LISPRO (HUMAN) PER 5 UNITS: Performed by: INTERNAL MEDICINE

## 2024-03-02 PROCEDURE — 99232 SBSQ HOSP IP/OBS MODERATE 35: CPT | Performed by: INTERNAL MEDICINE

## 2024-03-02 PROCEDURE — 80048 BASIC METABOLIC PNL TOTAL CA: CPT | Performed by: INTERNAL MEDICINE

## 2024-03-02 PROCEDURE — 93005 ELECTROCARDIOGRAM TRACING: CPT | Performed by: INTERNAL MEDICINE

## 2024-03-02 RX ORDER — AMLODIPINE BESYLATE 5 MG/1
5 TABLET ORAL
Status: DISCONTINUED | OUTPATIENT
Start: 2024-03-02 | End: 2024-03-04

## 2024-03-02 RX ADMIN — INSULIN LISPRO 2 UNITS: 100 INJECTION, SOLUTION INTRAVENOUS; SUBCUTANEOUS at 21:12

## 2024-03-02 RX ADMIN — HYDRALAZINE HYDROCHLORIDE 100 MG: 50 TABLET ORAL at 06:28

## 2024-03-02 RX ADMIN — APIXABAN 5 MG: 5 TABLET, FILM COATED ORAL at 21:10

## 2024-03-02 RX ADMIN — APIXABAN 5 MG: 5 TABLET, FILM COATED ORAL at 08:40

## 2024-03-02 RX ADMIN — FERROUS SULFATE TAB 325 MG (65 MG ELEMENTAL FE) 325 MG: 325 (65 FE) TAB at 08:40

## 2024-03-02 RX ADMIN — CLONIDINE HYDROCHLORIDE 0.1 MG: 0.1 TABLET ORAL at 08:40

## 2024-03-02 RX ADMIN — ALLOPURINOL 100 MG: 100 TABLET ORAL at 21:12

## 2024-03-02 RX ADMIN — AMIODARONE HYDROCHLORIDE 200 MG: 200 TABLET ORAL at 21:12

## 2024-03-02 RX ADMIN — METOPROLOL TARTRATE 12.5 MG: 25 TABLET, FILM COATED ORAL at 08:40

## 2024-03-02 RX ADMIN — ROSUVASTATIN CALCIUM 10 MG: 10 TABLET, FILM COATED ORAL at 08:40

## 2024-03-02 RX ADMIN — AMLODIPINE BESYLATE 5 MG: 5 TABLET ORAL at 12:05

## 2024-03-02 RX ADMIN — CLONIDINE HYDROCHLORIDE 0.1 MG: 0.1 TABLET ORAL at 21:11

## 2024-03-02 RX ADMIN — METOPROLOL TARTRATE 12.5 MG: 25 TABLET, FILM COATED ORAL at 21:09

## 2024-03-02 RX ADMIN — CLOPIDOGREL BISULFATE 75 MG: 75 TABLET, FILM COATED ORAL at 21:09

## 2024-03-02 RX ADMIN — ALLOPURINOL 100 MG: 100 TABLET ORAL at 08:40

## 2024-03-02 RX ADMIN — PANTOPRAZOLE SODIUM 40 MG: 40 TABLET, DELAYED RELEASE ORAL at 21:11

## 2024-03-02 RX ADMIN — PANTOPRAZOLE SODIUM 40 MG: 40 TABLET, DELAYED RELEASE ORAL at 08:40

## 2024-03-02 RX ADMIN — ESCITALOPRAM 5 MG: 5 TABLET, FILM COATED ORAL at 21:11

## 2024-03-02 RX ADMIN — HYDRALAZINE HYDROCHLORIDE 100 MG: 50 TABLET ORAL at 21:10

## 2024-03-02 RX ADMIN — HYDRALAZINE HYDROCHLORIDE 100 MG: 50 TABLET ORAL at 14:24

## 2024-03-02 NOTE — SIGNIFICANT NOTE
PT eval order received. Pt declined PT eval due to already up amb in room and hallway without assistance. Pt declined any endurance or balance issues at this time. PT will sign off. RN confirmed pt is amb ad venice indep.

## 2024-03-02 NOTE — PLAN OF CARE
Goal Outcome Evaluation:     Pt VSS, aox4, no complaints of chest pain, SOB, or dizziness overnight. No acute rhythm changes overnight. Safety maintained, pt resting comfortably at this time.   Problem: Adult Inpatient Plan of Care  Goal: Optimal Comfort and Wellbeing  Intervention: Provide Person-Centered Care     Problem: Hypertension Comorbidity  Goal: Blood Pressure in Desired Range  Outcome: Ongoing, Progressing  Intervention: Maintain Blood Pressure Management     Problem: Fall Injury Risk  Goal: Absence of Fall and Fall-Related Injury  Outcome: Ongoing, Progressing  Intervention: Identify and Manage Contributors  Intervention: Promote Injury-Free Environment

## 2024-03-02 NOTE — PLAN OF CARE
Goal Outcome Evaluation:  Plan of Care Reviewed With: patient        Progress: improving  Outcome Evaluation: vss, ra, afib/aflut, a/o x4. Possible cardioversion if she has not converted by monday.

## 2024-03-02 NOTE — PROGRESS NOTES
[facility] Progress Note       Encounter Date:24  Patient:Valentine Arora  :1950  MRN:[id number]      Chief Complaint: Follow up atrial fibrillation      Subjective:        Patient doing well this morning feeling better    Review of Systems:  Review of Systems   Constitutional: Positive for malaise/fatigue.   Cardiovascular:  Negative for chest pain and palpitations.   Respiratory:  Negative for shortness of breath.        Medications:  Scheduled Meds:  allopurinol, 100 mg, Oral, BID  amiodarone, 200 mg, Oral, Nightly  apixaban, 5 mg, Oral, BID  cloNIDine, 0.1 mg, Oral, Q12H  clopidogrel, 75 mg, Oral, Nightly  escitalopram, 5 mg, Oral, Nightly  ferrous sulfate, 325 mg, Oral, Daily With Breakfast  hydrALAZINE, 100 mg, Oral, Q8H  insulin lispro, 2-7 Units, Subcutaneous, 4x Daily AC & at Bedtime  metoprolol tartrate, 12.5 mg, Oral, Q12H  pantoprazole, 40 mg, Oral, BID  rosuvastatin, 10 mg, Oral, Daily    Continuous Infusions:   PRN Meds:    acetaminophen    ALPRAZolam    senna-docusate sodium **AND** polyethylene glycol **AND** bisacodyl **AND** bisacodyl    dextrose    dextrose    glucagon (human recombinant)    melatonin    ondansetron ODT **OR** ondansetron    traMADol         Objective:       Vitals:    24 2325 24 0500 24 0742   BP: (!) 185/74 156/55  157/65   BP Location: Right arm Right arm  Right arm   Patient Position: Lying Lying  Lying   Pulse: 74 68  72   Resp: 18 18  18   Temp: 98.6 °F (37 °C) 97.9 °F (36.6 °C)  97.5 °F (36.4 °C)   TempSrc: Oral Oral  Oral   SpO2: 99% 97%  100%   Weight:   82.3 kg (181 lb 7 oz)    Height:               Physical Exam:  Constitutional: Well appearing, well developed, no acute distress   HENT: Oropharynx clear and membrane moist  Eyes: Normal conjunctiva, no sclera icterus.  Neck: Supple, no carotid bruit bilaterally.  Cardiovascular: Irregularly irregular rate and rhythm, No Murmur, No bilateral lower extremity  "edema.  Pulmonary: Normal respiratory effort, normal lung sounds, no wheezing.  Neurological: Alert and orient x 3.   Skin: Warm, dry, no ecchymosis, no rash.  Psych: Appropriate mood and affect. Normal judgment and insight.           Lab Review:   Results from last 7 days   Lab Units 03/02/24  0355 03/01/24  0557 02/29/24  1400 02/28/24  0434 02/27/24  0340 02/25/24  0908   SODIUM mmol/L 138 135* 133* 131* 135* 133*   POTASSIUM mmol/L 4.6 4.2 4.6 3.6 3.7 5.0   CHLORIDE mmol/L 104 100 95* 94* 97* 101   CO2 mmol/L 22.1 19.2* 20.5* 24.4 24.2 20.1*   BUN mg/dL 33* 42* 42* 29* 23 32*   CREATININE mg/dL 1.33* 2.13* 2.27* 1.64* 1.26* 1.43*   GLUCOSE mg/dL 115* 113* 133* 127* 114* 109*   CALCIUM mg/dL 8.0* 8.9 9.1 8.6 9.1 8.9   AST (SGOT) U/L  --   --  24  --   --  41*   ALT (SGPT) U/L  --   --  29  --   --  57*     Results from last 7 days   Lab Units 02/29/24  1603 02/29/24  1400 02/25/24  1337 02/25/24  1102 02/25/24  0908   HSTROP T ng/L 72* 59* 22* 19* 15*     Results from last 7 days   Lab Units 03/02/24  0355 03/01/24  0557 02/29/24  1400 02/25/24  0908   WBC 10*3/mm3 6.31 6.81 10.59 8.37   HEMOGLOBIN g/dL 10.2* 10.8* 12.4 9.0*   HEMATOCRIT % 32.4* 33.5* 38.6 28.8*   PLATELETS 10*3/mm3 361 429 550* 406     Results from last 7 days   Lab Units 02/29/24  1400   INR  1.29*   APTT seconds 36.9     Results from last 7 days   Lab Units 02/29/24  1400   MAGNESIUM mg/dL 2.1           Invalid input(s): \"LDLCALC\"  Results from last 7 days   Lab Units 02/29/24  1400 02/25/24  0908   PROBNP pg/mL 984.6* 3,280.0*     Results from last 7 days   Lab Units 02/29/24  1400   TSH uIU/mL 1.190            Assessment:         No diagnosis found.       Plan:       Ms. Arora is a 74 y.o. woman with past medical history notable for paroxysmal atrial fibrillation on chronic anticoagulation, coronary disease with prior bypass, chronic diastolic congestive heart failure, obesity, peripheral vascular disease, type 2 diabetes who presents back to " the emergency room on 3/1/2024 with recurrent atrial fibrillation with rapid rate.  She has had fairly labile blood pressures and heart rates during this time she has had multiple changes with her medications over this time.  She was started on a low-dose metoprolol yesterday her blood pressures are doing reasonable there are still little bit high we will try and focus on optimizing her blood pressure control we will add on low-dose amlodipine today as well as continuing her current medical therapy.  Her rate seems also to be very well-controlled however if still in atrial flutter/fibrillation on Monday tentative plans are for cardioversion pending clinical course.  Her diuretics have been held due to acute kidney injury but will likely restart tomorrow as kidney function seems to be getting better.  Likely hold Aldactone and Entresto for the time being.    Paroxysmal atrial fibrillation:  Currently in atrial flutter well rate controlled  Continue metoprolol  Possible plans for cardioversion on Monday if still in atrial flutter    Essential hypertension:  Blood pressure not quite at goal we will continue to titrate blood pressure medications  Will add on amlodipine and see how she does  Home Aldactone and previously started Entresto are on hold given acute kidney injury    Acute kidney injury:  Holding Aldactone and Entresto  Will continue to monitor    Acute on chronic diastolic congestive heart failure:  Would hold Entresto  Volume status seems reasonable  Will focus on rate control and blood pressure control  Possibly restart diuretics tomorrow pending renal function and blood pressure response             Timothy Uriostegui MD  [facility]  03/02/24  10:30 EST

## 2024-03-02 NOTE — PROGRESS NOTES
Name: Valentine Arora ADMIT: 2024   : 1950  PCP: Aliya Alejandro MD    MRN: [id number] LOS: 2 days   AGE/SEX: 74 y.o. female  ROOM: [facility]     Subjective   Subjective   No acute events. Patient denies new complaints. Overall feeling better but she had some burning chest pain last night. Her  is at bedside.    Objective   Objective   Vital Signs  Temp:  [97.5 °F (36.4 °C)-98.6 °F (37 °C)] 97.5 °F (36.4 °C)  Heart Rate:  [68-74] 72  Resp:  [18] 18  BP: (114-185)/(55-74) 157/65  SpO2:  [97 %-100 %] 100 %  on   ;   Device (Oxygen Therapy): room air  Body mass index is 33.18 kg/m².  Physical Exam  Vitals and nursing note reviewed.   Constitutional:       General: She is not in acute distress.     Appearance: She is not toxic-appearing or diaphoretic.   HENT:      Head: Normocephalic and atraumatic.      Nose: Nose normal.      Mouth/Throat:      Mouth: Mucous membranes are moist.      Pharynx: Oropharynx is clear.   Eyes:      Conjunctiva/sclera: Conjunctivae normal.      Pupils: Pupils are equal, round, and reactive to light.   Cardiovascular:      Rate and Rhythm: Normal rate. Rhythm irregular.      Pulses: Normal pulses.   Abdominal:      General: Bowel sounds are normal.      Palpations: Abdomen is soft.      Tenderness: There is no abdominal tenderness.   Musculoskeletal:         General: No swelling or tenderness.      Cervical back: Neck supple.   Skin:     General: Skin is warm and dry.      Capillary Refill: Capillary refill takes less than 2 seconds.   Neurological:      General: No focal deficit present.      Mental Status: She is alert and oriented to person, place, and time.   Psychiatric:         Mood and Affect: Mood normal.         Behavior: Behavior normal.       Results Review     I reviewed the patient's new clinical results.  Results from last 7 days   Lab Units 24  0355 24  0557 24  1400 24  0908   WBC 10*3/mm3 6.31 6.81 10.59 8.37   HEMOGLOBIN  g/dL 10.2* 10.8* 12.4 9.0*   PLATELETS 10*3/mm3 361 429 550* 406     Results from last 7 days   Lab Units 03/02/24  0355 03/01/24  0557 02/29/24  1400 02/28/24  0434   SODIUM mmol/L 138 135* 133* 131*   POTASSIUM mmol/L 4.6 4.2 4.6 3.6   CHLORIDE mmol/L 104 100 95* 94*   CO2 mmol/L 22.1 19.2* 20.5* 24.4   BUN mg/dL 33* 42* 42* 29*   CREATININE mg/dL 1.33* 2.13* 2.27* 1.64*   GLUCOSE mg/dL 115* 113* 133* 127*   EGFR mL/min/1.73 42.3* 24.1* 22.3* 32.9*     Results from last 7 days   Lab Units 02/29/24  1400 02/25/24  0908   ALBUMIN g/dL 4.3 3.9   BILIRUBIN mg/dL 0.3 <0.2   ALK PHOS U/L 71 71   AST (SGOT) U/L 24 41*   ALT (SGPT) U/L 29 57*     Results from last 7 days   Lab Units 03/02/24  0355 03/01/24  0557 02/29/24  1400 02/28/24  0434 02/27/24  0340 02/25/24  0908   CALCIUM mg/dL 8.0* 8.9 9.1 8.6   < > 8.9   ALBUMIN g/dL  --   --  4.3  --   --  3.9   MAGNESIUM mg/dL  --   --  2.1  --   --   --     < > = values in this interval not displayed.       Glucose   Date/Time Value Ref Range Status   03/02/2024 1135 121 70 - 130 mg/dL Final   03/02/2024 0624 108 70 - 130 mg/dL Final   03/01/2024 2045 111 70 - 130 mg/dL Final   03/01/2024 1618 136 (H) 70 - 130 mg/dL Final   03/01/2024 1111 131 (H) 70 - 130 mg/dL Final   03/01/2024 0605 102 70 - 130 mg/dL Final   02/29/2024 2141 117 70 - 130 mg/dL Final       XR Chest 1 View    Result Date: 2/29/2024  There is no significant change when compared to the prior study. There is no evidence for acute cardiopulmonary process. Electronically Signed: Carlos Alexis MD  2/29/2024 3:09 PM EST  Workstation ID: [id number]     I have personally reviewed all medications:  Scheduled Medications  allopurinol, 100 mg, Oral, BID  amiodarone, 200 mg, Oral, Nightly  amLODIPine, 5 mg, Oral, Q24H  apixaban, 5 mg, Oral, BID  cloNIDine, 0.1 mg, Oral, Q12H  clopidogrel, 75 mg, Oral, Nightly  escitalopram, 5 mg, Oral, Nightly  ferrous sulfate, 325 mg, Oral, Daily With Breakfast  hydrALAZINE, 100 mg,  Oral, Q8H  insulin lispro, 2-7 Units, Subcutaneous, 4x Daily AC & at Bedtime  metoprolol tartrate, 12.5 mg, Oral, Q12H  pantoprazole, 40 mg, Oral, BID  rosuvastatin, 10 mg, Oral, Daily    Infusions   Diet  Diet: Cardiac Diets; Healthy Heart (2-3 Na+); Texture: Regular Texture (IDDSI 7); Fluid Consistency: Thin (IDDSI 0)    I have personally reviewed:  [x]  Laboratory   []  Microbiology   [x]  Radiology   [x]  EKG/Telemetry  []  Cardiology/Vascular   []  Pathology    [x]  Records      Assessment/Plan     Active Hospital Problems    Diagnosis  POA    **Chest pain [R07.9]  Yes    Chronic kidney disease, stage 3a [N18.31]  Yes    Gastroesophageal reflux disease without esophagitis [K21.9]  Yes    PAD (peripheral artery disease) [I73.9]  Yes    Hypertension [I10]  Yes    Hyperlipidemia [E78.5]  Yes    Coronary artery disease involving coronary bypass graft of native heart [I25.810]  Yes      Resolved Hospital Problems   No resolved problems to display.   Atypical Chest Pain/Afib/RVR  - accompanied by Afib/RVR which is probably the cause, no evidence of ACS-has known history of CAD and is on statin and plavix  - HR is improved with the addition of metoprolol, continue on amiodarone also  - will observe for now-if symptoms return then cardioversion will be considered by cardiology, appreciate recs  - continue eliquis    HTN/Chronic Diastolic CHF  - BP improved from admission, appears euvolemic, was dry on admission  - continue on clonidine and hydralazine  - hold entresto and lasix given SVEN-can hopefully restart lasix in the next day or so  - monitor BP and volume status    SVEN on CKD stage 3b  - serum creatinine fluctuates but baseline appears to be 1.3-1.5  - improving, no evidence of urinary retention  - hold lasix, aldactone, and entresto as above      Eliquis (home med) for DVT prophylaxis.  Full code.  Discussed with patient and nursing staff.  Anticipate discharge home in 2-3 days.      Edgard Burk,  MD [facility]  03/02/24  13:04 EST    Portions of this text have been copied and I have reviewed these. They are accurate as of 3/2/2024

## 2024-03-03 LAB
ANION GAP SERPL CALCULATED.3IONS-SCNC: 9 MMOL/L (ref 5–15)
BASOPHILS # BLD AUTO: 0.07 10*3/MM3 (ref 0–0.2)
BASOPHILS NFR BLD AUTO: 1 % (ref 0–1.5)
BUN SERPL-MCNC: 30 MG/DL (ref 8–23)
BUN/CREAT SERPL: 22.9 (ref 7–25)
CALCIUM SPEC-SCNC: 9.2 MG/DL (ref 8.6–10.5)
CHLORIDE SERPL-SCNC: 105 MMOL/L (ref 98–107)
CO2 SERPL-SCNC: 24 MMOL/L (ref 22–29)
CREAT SERPL-MCNC: 1.31 MG/DL (ref 0.57–1)
DEPRECATED RDW RBC AUTO: 47.8 FL (ref 37–54)
EGFRCR SERPLBLD CKD-EPI 2021: 43.1 ML/MIN/1.73
EOSINOPHIL # BLD AUTO: 0.27 10*3/MM3 (ref 0–0.4)
EOSINOPHIL NFR BLD AUTO: 4 % (ref 0.3–6.2)
ERYTHROCYTE [DISTWIDTH] IN BLOOD BY AUTOMATED COUNT: 15.1 % (ref 12.3–15.4)
GLUCOSE BLDC GLUCOMTR-MCNC: 104 MG/DL (ref 70–130)
GLUCOSE BLDC GLUCOMTR-MCNC: 114 MG/DL (ref 70–130)
GLUCOSE BLDC GLUCOMTR-MCNC: 134 MG/DL (ref 70–130)
GLUCOSE BLDC GLUCOMTR-MCNC: 140 MG/DL (ref 70–130)
GLUCOSE SERPL-MCNC: 120 MG/DL (ref 65–99)
HCT VFR BLD AUTO: 32.4 % (ref 34–46.6)
HGB BLD-MCNC: 9.9 G/DL (ref 12–15.9)
IMM GRANULOCYTES # BLD AUTO: 0.02 10*3/MM3 (ref 0–0.05)
IMM GRANULOCYTES NFR BLD AUTO: 0.3 % (ref 0–0.5)
LYMPHOCYTES # BLD AUTO: 2.1 10*3/MM3 (ref 0.7–3.1)
LYMPHOCYTES NFR BLD AUTO: 31.2 % (ref 19.6–45.3)
MCH RBC QN AUTO: 26.5 PG (ref 26.6–33)
MCHC RBC AUTO-ENTMCNC: 30.6 G/DL (ref 31.5–35.7)
MCV RBC AUTO: 86.6 FL (ref 79–97)
MONOCYTES # BLD AUTO: 0.82 10*3/MM3 (ref 0.1–0.9)
MONOCYTES NFR BLD AUTO: 12.2 % (ref 5–12)
NEUTROPHILS NFR BLD AUTO: 3.46 10*3/MM3 (ref 1.7–7)
NEUTROPHILS NFR BLD AUTO: 51.3 % (ref 42.7–76)
NRBC BLD AUTO-RTO: 0 /100 WBC (ref 0–0.2)
PLATELET # BLD AUTO: 367 10*3/MM3 (ref 140–450)
PMV BLD AUTO: 9.1 FL (ref 6–12)
POTASSIUM SERPL-SCNC: 4.8 MMOL/L (ref 3.5–5.2)
RBC # BLD AUTO: 3.74 10*6/MM3 (ref 3.77–5.28)
SODIUM SERPL-SCNC: 138 MMOL/L (ref 136–145)
WBC NRBC COR # BLD AUTO: 6.74 10*3/MM3 (ref 3.4–10.8)

## 2024-03-03 PROCEDURE — 80048 BASIC METABOLIC PNL TOTAL CA: CPT | Performed by: INTERNAL MEDICINE

## 2024-03-03 PROCEDURE — 85025 COMPLETE CBC W/AUTO DIFF WBC: CPT | Performed by: INTERNAL MEDICINE

## 2024-03-03 PROCEDURE — 82948 REAGENT STRIP/BLOOD GLUCOSE: CPT

## 2024-03-03 PROCEDURE — 99232 SBSQ HOSP IP/OBS MODERATE 35: CPT | Performed by: INTERNAL MEDICINE

## 2024-03-03 RX ADMIN — APIXABAN 5 MG: 5 TABLET, FILM COATED ORAL at 20:44

## 2024-03-03 RX ADMIN — HYDRALAZINE HYDROCHLORIDE 100 MG: 50 TABLET ORAL at 14:18

## 2024-03-03 RX ADMIN — ALLOPURINOL 100 MG: 100 TABLET ORAL at 20:44

## 2024-03-03 RX ADMIN — CLONIDINE HYDROCHLORIDE 0.1 MG: 0.1 TABLET ORAL at 08:38

## 2024-03-03 RX ADMIN — CLONIDINE HYDROCHLORIDE 0.1 MG: 0.1 TABLET ORAL at 20:44

## 2024-03-03 RX ADMIN — PANTOPRAZOLE SODIUM 40 MG: 40 TABLET, DELAYED RELEASE ORAL at 08:38

## 2024-03-03 RX ADMIN — METOPROLOL TARTRATE 12.5 MG: 25 TABLET, FILM COATED ORAL at 08:38

## 2024-03-03 RX ADMIN — PANTOPRAZOLE SODIUM 40 MG: 40 TABLET, DELAYED RELEASE ORAL at 20:44

## 2024-03-03 RX ADMIN — AMIODARONE HYDROCHLORIDE 200 MG: 200 TABLET ORAL at 23:42

## 2024-03-03 RX ADMIN — ALLOPURINOL 100 MG: 100 TABLET ORAL at 08:38

## 2024-03-03 RX ADMIN — CLOPIDOGREL BISULFATE 75 MG: 75 TABLET, FILM COATED ORAL at 20:44

## 2024-03-03 RX ADMIN — ROSUVASTATIN CALCIUM 10 MG: 10 TABLET, FILM COATED ORAL at 08:38

## 2024-03-03 RX ADMIN — AMLODIPINE BESYLATE 5 MG: 5 TABLET ORAL at 08:38

## 2024-03-03 RX ADMIN — HYDRALAZINE HYDROCHLORIDE 100 MG: 50 TABLET ORAL at 20:44

## 2024-03-03 RX ADMIN — APIXABAN 5 MG: 5 TABLET, FILM COATED ORAL at 08:38

## 2024-03-03 RX ADMIN — METOPROLOL TARTRATE 12.5 MG: 25 TABLET, FILM COATED ORAL at 20:44

## 2024-03-03 RX ADMIN — HYDRALAZINE HYDROCHLORIDE 100 MG: 50 TABLET ORAL at 06:08

## 2024-03-03 RX ADMIN — FERROUS SULFATE TAB 325 MG (65 MG ELEMENTAL FE) 325 MG: 325 (65 FE) TAB at 08:38

## 2024-03-03 RX ADMIN — ESCITALOPRAM 5 MG: 5 TABLET, FILM COATED ORAL at 20:44

## 2024-03-03 NOTE — PLAN OF CARE
Goal Outcome Evaluation:         Progress: improving  Outcome Evaluation: VSS, on aoom air. alert and oriented X4. up ad venice to bathroom. patient heart rate controlled, and remains on A-flutter. turns self in bed. patient denies pain.

## 2024-03-03 NOTE — PROGRESS NOTES
Name: Valentine Arora ADMIT: 2024   : 1950  PCP: Aliya Alejandro MD    MRN: [id number] LOS: 3 days   AGE/SEX: 74 y.o. female  ROOM: [facility]     Subjective   Subjective   No acute events. Patient denies new complaints. Overall feeling better but she continues to have episodes of burning chest pain last night. No family at bedside.    Objective   Objective   Vital Signs  Temp:  [97.5 °F (36.4 °C)-98.2 °F (36.8 °C)] 98.1 °F (36.7 °C)  Heart Rate:  [66-80] 70  Resp:  [18] 18  BP: (152-172)/(52-82) 162/52  SpO2:  [97 %-100 %] 98 %  on   ;   Device (Oxygen Therapy): room air  Body mass index is 33.26 kg/m².  Physical Exam  Vitals and nursing note reviewed.   Constitutional:       General: She is not in acute distress.     Appearance: She is not toxic-appearing or diaphoretic.   HENT:      Head: Normocephalic and atraumatic.      Nose: Nose normal.      Mouth/Throat:      Mouth: Mucous membranes are moist.      Pharynx: Oropharynx is clear.   Eyes:      Conjunctiva/sclera: Conjunctivae normal.      Pupils: Pupils are equal, round, and reactive to light.   Cardiovascular:      Rate and Rhythm: Normal rate. Rhythm irregular.      Pulses: Normal pulses.   Abdominal:      General: Bowel sounds are normal.      Palpations: Abdomen is soft.      Tenderness: There is no abdominal tenderness.   Musculoskeletal:         General: No swelling or tenderness.      Cervical back: Neck supple.   Skin:     General: Skin is warm and dry.      Capillary Refill: Capillary refill takes less than 2 seconds.   Neurological:      General: No focal deficit present.      Mental Status: She is alert and oriented to person, place, and time.   Psychiatric:         Mood and Affect: Mood normal.         Behavior: Behavior normal.       Results Review     I reviewed the patient's new clinical results.  Results from last 7 days   Lab Units 24  0346 24  0355 24  0557 24  1400   WBC 10*3/mm3 6.74 6.31 6.81  10.59   HEMOGLOBIN g/dL 9.9* 10.2* 10.8* 12.4   PLATELETS 10*3/mm3 367 361 429 550*     Results from last 7 days   Lab Units 03/03/24  0346 03/02/24  0355 03/01/24  0557 02/29/24  1400   SODIUM mmol/L 138 138 135* 133*   POTASSIUM mmol/L 4.8 4.6 4.2 4.6   CHLORIDE mmol/L 105 104 100 95*   CO2 mmol/L 24.0 22.1 19.2* 20.5*   BUN mg/dL 30* 33* 42* 42*   CREATININE mg/dL 1.31* 1.33* 2.13* 2.27*   GLUCOSE mg/dL 120* 115* 113* 133*   EGFR mL/min/1.73 43.1* 42.3* 24.1* 22.3*     Results from last 7 days   Lab Units 02/29/24  1400   ALBUMIN g/dL 4.3   BILIRUBIN mg/dL 0.3   ALK PHOS U/L 71   AST (SGOT) U/L 24   ALT (SGPT) U/L 29     Results from last 7 days   Lab Units 03/03/24  0346 03/02/24  0355 03/01/24  0557 02/29/24  1400   CALCIUM mg/dL 9.2 8.0* 8.9 9.1   ALBUMIN g/dL  --   --   --  4.3   MAGNESIUM mg/dL  --   --   --  2.1       Glucose   Date/Time Value Ref Range Status   03/03/2024 1119 114 70 - 130 mg/dL Final   03/03/2024 0616 104 70 - 130 mg/dL Final   03/02/2024 2046 153 (H) 70 - 130 mg/dL Final   03/02/2024 1656 103 70 - 130 mg/dL Final   03/02/2024 1135 121 70 - 130 mg/dL Final   03/02/2024 0624 108 70 - 130 mg/dL Final   03/01/2024 2045 111 70 - 130 mg/dL Final       No radiology results for the last day    I have personally reviewed all medications:  Scheduled Medications  allopurinol, 100 mg, Oral, BID  amiodarone, 200 mg, Oral, Nightly  amLODIPine, 5 mg, Oral, Q24H  apixaban, 5 mg, Oral, BID  cloNIDine, 0.1 mg, Oral, Q12H  clopidogrel, 75 mg, Oral, Nightly  escitalopram, 5 mg, Oral, Nightly  ferrous sulfate, 325 mg, Oral, Daily With Breakfast  hydrALAZINE, 100 mg, Oral, Q8H  insulin lispro, 2-7 Units, Subcutaneous, 4x Daily AC & at Bedtime  metoprolol tartrate, 12.5 mg, Oral, Q12H  pantoprazole, 40 mg, Oral, BID  rosuvastatin, 10 mg, Oral, Daily    Infusions   Diet  Diet: Cardiac Diets; Healthy Heart (2-3 Na+); Texture: Regular Texture (IDDSI 7); Fluid Consistency: Thin (IDDSI 0)  NPO Diet NPO Type: Strict  NPO    I have personally reviewed:  [x]  Laboratory   []  Microbiology   []  Radiology   [x]  EKG/Telemetry  []  Cardiology/Vascular   []  Pathology    []  Records      Assessment/Plan     Active Hospital Problems    Diagnosis  POA    **Chest pain [R07.9]  Yes    Chronic kidney disease, stage 3a [N18.31]  Yes    Gastroesophageal reflux disease without esophagitis [K21.9]  Yes    PAD (peripheral artery disease) [I73.9]  Yes    Hypertension [I10]  Yes    Hyperlipidemia [E78.5]  Yes    Coronary artery disease involving coronary bypass graft of native heart [I25.810]  Yes      Resolved Hospital Problems   No resolved problems to display.   Atypical Chest Pain/Afib/RVR  - accompanied by Afib/RVR which is probably the cause, no evidence of ACS-has known history of CAD and is on statin and plavix  - HR is improved with the addition of metoprolol, continue on amiodarone also  - continue eliquis  - plans for cardioversion tomorrow noted  - appreciate cardiology recs    HTN/Chronic Diastolic CHF  - BP improved from admission, appears euvolemic, was dry on admission  - continue on clonidine and hydralazine  - hold entresto and lasix given SVEN-can hopefully restart lasix in the next day or so  - monitor BP and volume status    SVEN on CKD stage 3b  - serum creatinine fluctuates but baseline appears to be 1.3-1.5  - improving, no evidence of urinary retention  - hold lasix, aldactone, and entresto as above      Eliquis (home med) for DVT prophylaxis.  Full code.  Discussed with patient and nursing staff.  Anticipate discharge home in 2-3 days.      Edgard Burk MD  [facility]  03/03/24  13:43 EST    Portions of this text have been copied and I have reviewed these. They are accurate as of 3/3/2024

## 2024-03-03 NOTE — PROGRESS NOTES
[facility] Progress Note       Encounter Date:24  Patient:Valentine Arora  :1950  MRN:[id number]      Chief Complaint: Follow up atrial fibrillation      Subjective:        Heart rate doing better but when she gets up and moves around definitely feels her heart racing more that being said heart rate reasonably controlled    Review of Systems:  Review of Systems   Constitutional: Positive for malaise/fatigue.   Cardiovascular:  Negative for chest pain and palpitations.   Respiratory:  Negative for shortness of breath.        Medications:  Scheduled Meds:  allopurinol, 100 mg, Oral, BID  amiodarone, 200 mg, Oral, Nightly  amLODIPine, 5 mg, Oral, Q24H  apixaban, 5 mg, Oral, BID  cloNIDine, 0.1 mg, Oral, Q12H  clopidogrel, 75 mg, Oral, Nightly  escitalopram, 5 mg, Oral, Nightly  ferrous sulfate, 325 mg, Oral, Daily With Breakfast  hydrALAZINE, 100 mg, Oral, Q8H  insulin lispro, 2-7 Units, Subcutaneous, 4x Daily AC & at Bedtime  metoprolol tartrate, 12.5 mg, Oral, Q12H  pantoprazole, 40 mg, Oral, BID  rosuvastatin, 10 mg, Oral, Daily    Continuous Infusions:   PRN Meds:    acetaminophen    ALPRAZolam    senna-docusate sodium **AND** polyethylene glycol **AND** bisacodyl **AND** bisacodyl    dextrose    dextrose    glucagon (human recombinant)    melatonin    ondansetron ODT **OR** ondansetron    traMADol         Objective:       Vitals:    24 0607 24 0608 24 0700 24 0726   BP: 155/63 155/63  159/64   BP Location: Right arm   Right arm   Patient Position: Lying   Lying   Pulse:  69 66 76   Resp:    18   Temp:    97.5 °F (36.4 °C)   TempSrc:    Oral   SpO2:   98% 97%   Weight:       Height:               Physical Exam:  Constitutional: Well appearing, well developed, no acute distress   HENT: Oropharynx clear and membrane moist  Eyes: Normal conjunctiva, no sclera icterus.  Neck: Supple, no carotid bruit bilaterally.  Cardiovascular: Irregularly irregular rate and  "rhythm, No Murmur, No bilateral lower extremity edema.  Pulmonary: Normal respiratory effort, normal lung sounds, no wheezing.  Neurological: Alert and orient x 3.   Skin: Warm, dry, no ecchymosis, no rash.  Psych: Appropriate mood and affect. Normal judgment and insight.           Lab Review:   Results from last 7 days   Lab Units 03/03/24  0346 03/02/24  0355 03/01/24  0557 02/29/24  1400 02/28/24  0434 02/27/24  0340   SODIUM mmol/L 138 138 135* 133* 131* 135*   POTASSIUM mmol/L 4.8 4.6 4.2 4.6 3.6 3.7   CHLORIDE mmol/L 105 104 100 95* 94* 97*   CO2 mmol/L 24.0 22.1 19.2* 20.5* 24.4 24.2   BUN mg/dL 30* 33* 42* 42* 29* 23   CREATININE mg/dL 1.31* 1.33* 2.13* 2.27* 1.64* 1.26*   GLUCOSE mg/dL 120* 115* 113* 133* 127* 114*   CALCIUM mg/dL 9.2 8.0* 8.9 9.1 8.6 9.1   AST (SGOT) U/L  --   --   --  24  --   --    ALT (SGPT) U/L  --   --   --  29  --   --      Results from last 7 days   Lab Units 03/02/24  1825 03/02/24  1624 02/29/24  1603 02/29/24  1400 02/25/24  1337 02/25/24  1102   HSTROP T ng/L 25* 29* 72* 59* 22* 19*     Results from last 7 days   Lab Units 03/03/24  0346 03/02/24  0355 03/01/24  0557 02/29/24  1400   WBC 10*3/mm3 6.74 6.31 6.81 10.59   HEMOGLOBIN g/dL 9.9* 10.2* 10.8* 12.4   HEMATOCRIT % 32.4* 32.4* 33.5* 38.6   PLATELETS 10*3/mm3 367 361 429 550*     Results from last 7 days   Lab Units 02/29/24  1400   INR  1.29*   APTT seconds 36.9     Results from last 7 days   Lab Units 02/29/24  1400   MAGNESIUM mg/dL 2.1           Invalid input(s): \"LDLCALC\"  Results from last 7 days   Lab Units 02/29/24  1400   PROBNP pg/mL 984.6*     Results from last 7 days   Lab Units 02/29/24  1400   TSH uIU/mL 1.190            Assessment:         No diagnosis found.       Plan:       Ms. Arora is a 74 y.o. woman with past medical history notable for paroxysmal atrial fibrillation on chronic anticoagulation, coronary disease with prior bypass, chronic diastolic congestive heart failure, obesity, peripheral vascular " disease, type 2 diabetes who presents back to the emergency room on 3/1/2024 with recurrent atrial fibrillation with rapid rate.  She has had fairly labile blood pressures and heart rates during this time she has had multiple changes with her medications over this time.  She was started on a low-dose metoprolol yesterday her blood pressures are doing reasonable there are still little bit high we will try and focus on optimizing her blood pressure control we will add on low-dose amlodipine today as well as continuing her current medical therapy.  Her rate seems also to be very well-controlled however if still in atrial flutter/fibrillation on Monday tentative plans are for cardioversion pending clinical course.  Her diuretics have been held due to acute kidney injury but will likely restart tomorrow as kidney function seems to be getting better.  Likely hold Aldactone and Entresto for the time being.    Paroxysmal atrial fibrillation:  Currently in atrial flutter well rate controlled  Continue metoprolol  Plans for cardioversion on Monday if still in atrial flutter in a.m.    Essential hypertension:  Blood pressure not quite at goal we will continue to titrate blood pressure medications  Amlodipine added 3/2 will monitor response as this gets to steady state  Home Aldactone and previously started Entresto are on hold given acute kidney injury    Acute kidney injury:  Holding Aldactone and Entresto  Will continue to monitor    Acute on chronic diastolic congestive heart failure:  Would hold Entresto  Volume status seems reasonable  Will focus on rate control and blood pressure control  Possibly restart diuretics tomorrow pending renal function and blood pressure response             Timothy Uriostegui MD  [facility]  03/03/24  09:58 EST

## 2024-03-03 NOTE — PLAN OF CARE
Problem: Adult Inpatient Plan of Care  Goal: Absence of Hospital-Acquired Illness or Injury  Intervention: Identify and Manage Fall Risk  Recent Flowsheet Documentation  Taken 3/3/2024 1800 by Maty Leiva RN  Safety Promotion/Fall Prevention:   room organization consistent   safety round/check completed   clutter free environment maintained   assistive device/personal items within reach  Taken 3/3/2024 1612 by Maty Leiva RN  Safety Promotion/Fall Prevention:   assistive device/personal items within reach   clutter free environment maintained   room organization consistent   safety round/check completed  Taken 3/3/2024 1404 by Maty Leiva RN  Safety Promotion/Fall Prevention:   activity supervised   assistive device/personal items within reach   room organization consistent   safety round/check completed  Taken 3/3/2024 1200 by Maty Leiva RN  Safety Promotion/Fall Prevention:   activity supervised   assistive device/personal items within reach   room organization consistent   safety round/check completed  Taken 3/3/2024 0842 by Maty Leiva RN  Safety Promotion/Fall Prevention:   safety round/check completed   room organization consistent   assistive device/personal items within reach     Problem: Adult Inpatient Plan of Care  Goal: Absence of Hospital-Acquired Illness or Injury  Intervention: Prevent Skin Injury  Recent Flowsheet Documentation  Taken 3/3/2024 1800 by Maty Leiva RN  Body Position:   position changed independently   supine  Taken 3/3/2024 1700 by Maty Leiva RN  Body Position: position changed independently  Taken 3/3/2024 1612 by Maty Leiva RN  Body Position: position changed independently  Taken 3/3/2024 1404 by Maty Leiva RN  Body Position: lower extremity elevated  Taken 3/3/2024 1200 by Maty Leiva RN  Body Position:   position changed independently   supine  Taken 3/3/2024 0842 by Maty Leiva RN  Body Position: upper extremity elevated     Problem: Adult Inpatient Plan of  Care  Goal: Absence of Hospital-Acquired Illness or Injury  Intervention: Prevent and Manage VTE (Venous Thromboembolism) Risk  Recent Flowsheet Documentation  Taken 3/3/2024 1800 by Maty Leiva RN  Activity Management:   up ad venice   activity encouraged  Taken 3/3/2024 1404 by Maty Leiva RN  Activity Management:   ambulated in room   ambulated to bathroom  Range of Motion: active ROM (range of motion) encouraged  Taken 3/3/2024 1200 by Maty Leiva RN  Activity Management: up ad venice  Taken 3/3/2024 0842 by Maty Leiva RN  Activity Management: up ad venice  Range of Motion: active ROM (range of motion) encouraged     Problem: Adult Inpatient Plan of Care  Goal: Absence of Hospital-Acquired Illness or Injury  Intervention: Prevent Infection  Recent Flowsheet Documentation  Taken 3/3/2024 1800 by Maty Leiva RN  Infection Prevention:   hand hygiene promoted   single patient room provided  Taken 3/3/2024 1612 by Maty Leiva RN  Infection Prevention:   hand hygiene promoted   single patient room provided  Taken 3/3/2024 1404 by Maty Leiva RN  Infection Prevention:   hand hygiene promoted   single patient room provided  Taken 3/3/2024 1200 by Maty Leiva RN  Infection Prevention:   single patient room provided   hand hygiene promoted  Taken 3/3/2024 0842 by Maty Leiva RN  Infection Prevention: hand hygiene promoted   Goal Outcome Evaluation:           Progress: no change  Outcome Evaluation: Patient female 74 years old coming to facility with SOA today AO x 4 no SOA on Afib Aflutter plan tomorrow cardioversion will be NPO after midnight educated patient consent sign.

## 2024-03-04 ENCOUNTER — APPOINTMENT (OUTPATIENT)
Dept: CARDIOLOGY | Facility: HOSPITAL | Age: 74
DRG: 308 | End: 2024-03-04
Payer: MEDICARE

## 2024-03-04 LAB
ANION GAP SERPL CALCULATED.3IONS-SCNC: 11 MMOL/L (ref 5–15)
BASOPHILS # BLD AUTO: 0.12 10*3/MM3 (ref 0–0.2)
BASOPHILS NFR BLD AUTO: 1.7 % (ref 0–1.5)
BUN SERPL-MCNC: 27 MG/DL (ref 8–23)
BUN/CREAT SERPL: 19.9 (ref 7–25)
CALCIUM SPEC-SCNC: 8.7 MG/DL (ref 8.6–10.5)
CHLORIDE SERPL-SCNC: 103 MMOL/L (ref 98–107)
CO2 SERPL-SCNC: 23 MMOL/L (ref 22–29)
CREAT SERPL-MCNC: 1.36 MG/DL (ref 0.57–1)
DEPRECATED RDW RBC AUTO: 50.9 FL (ref 37–54)
EGFRCR SERPLBLD CKD-EPI 2021: 41.2 ML/MIN/1.73
EOSINOPHIL # BLD AUTO: 0.32 10*3/MM3 (ref 0–0.4)
EOSINOPHIL NFR BLD AUTO: 4.6 % (ref 0.3–6.2)
ERYTHROCYTE [DISTWIDTH] IN BLOOD BY AUTOMATED COUNT: 15.4 % (ref 12.3–15.4)
GLUCOSE BLDC GLUCOMTR-MCNC: 110 MG/DL (ref 70–130)
GLUCOSE BLDC GLUCOMTR-MCNC: 118 MG/DL (ref 70–130)
GLUCOSE BLDC GLUCOMTR-MCNC: 132 MG/DL (ref 70–130)
GLUCOSE BLDC GLUCOMTR-MCNC: 137 MG/DL (ref 70–130)
GLUCOSE BLDC GLUCOMTR-MCNC: 159 MG/DL (ref 70–130)
GLUCOSE SERPL-MCNC: 111 MG/DL (ref 65–99)
HCT VFR BLD AUTO: 31.7 % (ref 34–46.6)
HGB BLD-MCNC: 9.9 G/DL (ref 12–15.9)
IMM GRANULOCYTES # BLD AUTO: 0.02 10*3/MM3 (ref 0–0.05)
IMM GRANULOCYTES NFR BLD AUTO: 0.3 % (ref 0–0.5)
LYMPHOCYTES # BLD AUTO: 2.14 10*3/MM3 (ref 0.7–3.1)
LYMPHOCYTES NFR BLD AUTO: 30.8 % (ref 19.6–45.3)
MCH RBC QN AUTO: 28 PG (ref 26.6–33)
MCHC RBC AUTO-ENTMCNC: 31.2 G/DL (ref 31.5–35.7)
MCV RBC AUTO: 89.8 FL (ref 79–97)
MONOCYTES # BLD AUTO: 0.82 10*3/MM3 (ref 0.1–0.9)
MONOCYTES NFR BLD AUTO: 11.8 % (ref 5–12)
NEUTROPHILS NFR BLD AUTO: 3.53 10*3/MM3 (ref 1.7–7)
NEUTROPHILS NFR BLD AUTO: 50.8 % (ref 42.7–76)
NRBC BLD AUTO-RTO: 0 /100 WBC (ref 0–0.2)
PLATELET # BLD AUTO: 383 10*3/MM3 (ref 140–450)
PMV BLD AUTO: 8.8 FL (ref 6–12)
POTASSIUM SERPL-SCNC: 4.7 MMOL/L (ref 3.5–5.2)
QT INTERVAL: 426 MS
QT INTERVAL: 460 MS
QTC INTERVAL: 447 MS
QTC INTERVAL: 493 MS
RBC # BLD AUTO: 3.53 10*6/MM3 (ref 3.77–5.28)
SODIUM SERPL-SCNC: 137 MMOL/L (ref 136–145)
WBC NRBC COR # BLD AUTO: 6.95 10*3/MM3 (ref 3.4–10.8)

## 2024-03-04 PROCEDURE — 63710000001 INSULIN LISPRO (HUMAN) PER 5 UNITS: Performed by: INTERNAL MEDICINE

## 2024-03-04 PROCEDURE — 93005 ELECTROCARDIOGRAM TRACING: CPT | Performed by: INTERNAL MEDICINE

## 2024-03-04 PROCEDURE — 25010000002 FENTANYL CITRATE (PF) 50 MCG/ML SOLUTION: Performed by: INTERNAL MEDICINE

## 2024-03-04 PROCEDURE — 80048 BASIC METABOLIC PNL TOTAL CA: CPT | Performed by: INTERNAL MEDICINE

## 2024-03-04 PROCEDURE — 99232 SBSQ HOSP IP/OBS MODERATE 35: CPT | Performed by: INTERNAL MEDICINE

## 2024-03-04 PROCEDURE — 25010000002 MIDAZOLAM PER 1 MG: Performed by: INTERNAL MEDICINE

## 2024-03-04 PROCEDURE — 93010 ELECTROCARDIOGRAM REPORT: CPT | Performed by: STUDENT IN AN ORGANIZED HEALTH CARE EDUCATION/TRAINING PROGRAM

## 2024-03-04 PROCEDURE — 93010 ELECTROCARDIOGRAM REPORT: CPT | Performed by: INTERNAL MEDICINE

## 2024-03-04 PROCEDURE — 92960 CARDIOVERSION ELECTRIC EXT: CPT | Performed by: INTERNAL MEDICINE

## 2024-03-04 PROCEDURE — 5A2204Z RESTORATION OF CARDIAC RHYTHM, SINGLE: ICD-10-PCS | Performed by: INTERNAL MEDICINE

## 2024-03-04 PROCEDURE — 85025 COMPLETE CBC W/AUTO DIFF WBC: CPT | Performed by: INTERNAL MEDICINE

## 2024-03-04 PROCEDURE — 92960 CARDIOVERSION ELECTRIC EXT: CPT

## 2024-03-04 PROCEDURE — 82948 REAGENT STRIP/BLOOD GLUCOSE: CPT

## 2024-03-04 RX ORDER — FENTANYL CITRATE 50 UG/ML
INJECTION, SOLUTION INTRAMUSCULAR; INTRAVENOUS
Status: COMPLETED | OUTPATIENT
Start: 2024-03-04 | End: 2024-03-04

## 2024-03-04 RX ORDER — SODIUM CHLORIDE 9 MG/ML
INJECTION, SOLUTION INTRAVENOUS
Status: COMPLETED | OUTPATIENT
Start: 2024-03-04 | End: 2024-03-04

## 2024-03-04 RX ORDER — AMLODIPINE BESYLATE 10 MG/1
10 TABLET ORAL
Status: DISCONTINUED | OUTPATIENT
Start: 2024-03-04 | End: 2024-03-05 | Stop reason: HOSPADM

## 2024-03-04 RX ORDER — CLONIDINE HYDROCHLORIDE 0.1 MG/1
0.1 TABLET ORAL DAILY
Status: DISCONTINUED | OUTPATIENT
Start: 2024-03-05 | End: 2024-03-05

## 2024-03-04 RX ORDER — MIDAZOLAM HYDROCHLORIDE 5 MG/ML
INJECTION INTRAMUSCULAR; INTRAVENOUS
Status: COMPLETED | OUTPATIENT
Start: 2024-03-04 | End: 2024-03-04

## 2024-03-04 RX ADMIN — FENTANYL CITRATE 25 MCG: 50 INJECTION, SOLUTION INTRAMUSCULAR; INTRAVENOUS at 11:41

## 2024-03-04 RX ADMIN — INSULIN LISPRO 2 UNITS: 100 INJECTION, SOLUTION INTRAVENOUS; SUBCUTANEOUS at 17:49

## 2024-03-04 RX ADMIN — AMIODARONE HYDROCHLORIDE 200 MG: 200 TABLET ORAL at 21:03

## 2024-03-04 RX ADMIN — MIDAZOLAM 1 MG: 5 INJECTION INTRAMUSCULAR; INTRAVENOUS at 11:41

## 2024-03-04 RX ADMIN — PANTOPRAZOLE SODIUM 40 MG: 40 TABLET, DELAYED RELEASE ORAL at 08:53

## 2024-03-04 RX ADMIN — ROSUVASTATIN CALCIUM 10 MG: 10 TABLET, FILM COATED ORAL at 08:53

## 2024-03-04 RX ADMIN — METOPROLOL TARTRATE 12.5 MG: 25 TABLET, FILM COATED ORAL at 08:53

## 2024-03-04 RX ADMIN — SODIUM CHLORIDE 50 ML/HR: 9 INJECTION, SOLUTION INTRAVENOUS at 11:06

## 2024-03-04 RX ADMIN — FENTANYL CITRATE 25 MCG: 50 INJECTION, SOLUTION INTRAMUSCULAR; INTRAVENOUS at 11:27

## 2024-03-04 RX ADMIN — MIDAZOLAM 1 MG: 5 INJECTION INTRAMUSCULAR; INTRAVENOUS at 11:36

## 2024-03-04 RX ADMIN — CLOPIDOGREL BISULFATE 75 MG: 75 TABLET, FILM COATED ORAL at 21:03

## 2024-03-04 RX ADMIN — AMLODIPINE BESYLATE 10 MG: 10 TABLET ORAL at 10:05

## 2024-03-04 RX ADMIN — ESCITALOPRAM 5 MG: 5 TABLET, FILM COATED ORAL at 21:02

## 2024-03-04 RX ADMIN — PANTOPRAZOLE SODIUM 40 MG: 40 TABLET, DELAYED RELEASE ORAL at 21:02

## 2024-03-04 RX ADMIN — MIDAZOLAM 1 MG: 5 INJECTION INTRAMUSCULAR; INTRAVENOUS at 11:24

## 2024-03-04 RX ADMIN — METOPROLOL TARTRATE 12.5 MG: 25 TABLET, FILM COATED ORAL at 21:03

## 2024-03-04 RX ADMIN — ALLOPURINOL 100 MG: 100 TABLET ORAL at 08:53

## 2024-03-04 RX ADMIN — MIDAZOLAM 1 MG: 5 INJECTION INTRAMUSCULAR; INTRAVENOUS at 11:27

## 2024-03-04 RX ADMIN — FENTANYL CITRATE 25 MCG: 50 INJECTION, SOLUTION INTRAMUSCULAR; INTRAVENOUS at 11:32

## 2024-03-04 RX ADMIN — APIXABAN 5 MG: 5 TABLET, FILM COATED ORAL at 21:02

## 2024-03-04 RX ADMIN — FENTANYL CITRATE 25 MCG: 50 INJECTION, SOLUTION INTRAMUSCULAR; INTRAVENOUS at 11:24

## 2024-03-04 RX ADMIN — HYDRALAZINE HYDROCHLORIDE 100 MG: 50 TABLET ORAL at 14:48

## 2024-03-04 RX ADMIN — MIDAZOLAM 1 MG: 5 INJECTION INTRAMUSCULAR; INTRAVENOUS at 11:32

## 2024-03-04 RX ADMIN — FENTANYL CITRATE 25 MCG: 50 INJECTION, SOLUTION INTRAMUSCULAR; INTRAVENOUS at 11:36

## 2024-03-04 RX ADMIN — ALLOPURINOL 100 MG: 100 TABLET ORAL at 21:03

## 2024-03-04 RX ADMIN — APIXABAN 5 MG: 5 TABLET, FILM COATED ORAL at 08:53

## 2024-03-04 RX ADMIN — HYDRALAZINE HYDROCHLORIDE 100 MG: 50 TABLET ORAL at 21:02

## 2024-03-04 RX ADMIN — FERROUS SULFATE TAB 325 MG (65 MG ELEMENTAL FE) 325 MG: 325 (65 FE) TAB at 08:53

## 2024-03-04 RX ADMIN — ALPRAZOLAM 0.25 MG: 0.25 TABLET ORAL at 22:12

## 2024-03-04 NOTE — PROGRESS NOTES
"    Patient Name: Valentine Arora  :1950  74 y.o.      Patient Care Team:  Aliya Alejandro MD as PCP - General (Family Medicine)    Chief Complaint: AF CHF CAD    Interval History: exertional burning chest pain. Flutter on EKG this morning.        Objective   Vital Signs  Temp:  [98.1 °F (36.7 °C)-98.6 °F (37 °C)] 98.2 °F (36.8 °C)  Heart Rate:  [68-78] 68  Resp:  [18] 18  BP: (155-181)/(50-86) 181/66    Intake/Output Summary (Last 24 hours) at 3/4/2024 0845  Last data filed at 3/4/2024 0700  Gross per 24 hour   Intake 480 ml   Output --   Net 480 ml     Flowsheet Rows      Flowsheet Row First Filed Value   Admission Height 157.5 cm (62.01\") Documented at 2024 0752   Admission Weight 82.3 kg (181 lb 7 oz) Documented at 2024 0607            Physical Exam:   General Appearance:    Alert, cooperative, in no acute distress   Lungs:     Clear to auscultation.  Normal respiratory effort and rate.      Heart:    Regular rhythm and normal rate, normal S1 and S2, no murmurs, gallops or rubs.     Chest Wall:    No abnormalities observed   Abdomen:     Soft, nontender, positive bowel sounds.     Extremities:   no cyanosis, clubbing or edema.  No marked joint deformities.  Adequate musculoskeletal strength.       Results Review:    Results from last 7 days   Lab Units 24  0415   SODIUM mmol/L 137   POTASSIUM mmol/L 4.7   CHLORIDE mmol/L 103   CO2 mmol/L 23.0   BUN mg/dL 27*   CREATININE mg/dL 1.36*   GLUCOSE mg/dL 111*   CALCIUM mg/dL 8.7     Results from last 7 days   Lab Units 24  1825 24  1624 24  1603   HSTROP T ng/L 25* 29* 72*     Results from last 7 days   Lab Units 24  0415   WBC 10*3/mm3 6.95   HEMOGLOBIN g/dL 9.9*   HEMATOCRIT % 31.7*   PLATELETS 10*3/mm3 383     Results from last 7 days   Lab Units 24  1400   INR  1.29*   APTT seconds 36.9     Results from last 7 days   Lab Units 24  1400   MAGNESIUM mg/dL 2.1                   Medication Review: "   allopurinol, 100 mg, Oral, BID  amiodarone, 200 mg, Oral, Nightly  amLODIPine, 5 mg, Oral, Q24H  apixaban, 5 mg, Oral, BID  cloNIDine, 0.1 mg, Oral, Q12H  clopidogrel, 75 mg, Oral, Nightly  escitalopram, 5 mg, Oral, Nightly  ferrous sulfate, 325 mg, Oral, Daily With Breakfast  hydrALAZINE, 100 mg, Oral, Q8H  insulin lispro, 2-7 Units, Subcutaneous, 4x Daily AC & at Bedtime  metoprolol tartrate, 12.5 mg, Oral, Q12H  pantoprazole, 40 mg, Oral, BID  rosuvastatin, 10 mg, Oral, Daily              Assessment & Plan   AF/ Fl. Plan CV today. Continue metoprolol 12.5 BID. Eliquis 5 BID. Continue amiodarone to maintain NSR  CAD s/p CABG: if burning chest pain persists in NSR will need cath tomorrow   HFPEF: resolved. Reassess need for oral diuretics. I do not feel strongly that she needs entresto.   SVEN on CKD due to diuresis, resolved  HTN: uptitrate amlodipine to 10. Decr clonidine to 0.1 daily. Continue hydralazine 100 TID.   Sinus bradycardia while on atenolol and clonidine TID. Switched to metoprolol and weaning clonidine  Possible d/c tomorrow     Dai Suazo MD  [facility]  03/04/24  08:45 EST

## 2024-03-04 NOTE — PROGRESS NOTES
Name: Valentine Arora ADMIT: 2024   : 1950  PCP: Aliya Alejandro MD    MRN: [id number] LOS: 4 days   AGE/SEX: 74 y.o. female  ROOM: [facility]     Subjective   Subjective   No acute events. Patient denies new complaints. She continues to have episodes of burning chest pain last night. No family at bedside.    Objective   Objective   Vital Signs  Temp:  [98.1 °F (36.7 °C)-98.6 °F (37 °C)] 98.2 °F (36.8 °C)  Heart Rate:  [66-78] 66  Resp:  [16-18] 16  BP: (155-181)/(50-86) 180/58  SpO2:  [96 %-100 %] 100 %  on   ;   Device (Oxygen Therapy): room air  Body mass index is 33.52 kg/m².  Physical Exam  Vitals and nursing note reviewed.   Constitutional:       General: She is not in acute distress.     Appearance: She is not toxic-appearing or diaphoretic.   HENT:      Head: Normocephalic and atraumatic.      Nose: Nose normal.      Mouth/Throat:      Mouth: Mucous membranes are moist.      Pharynx: Oropharynx is clear.   Eyes:      Conjunctiva/sclera: Conjunctivae normal.      Pupils: Pupils are equal, round, and reactive to light.   Cardiovascular:      Rate and Rhythm: Normal rate. Rhythm irregular.      Pulses: Normal pulses.   Abdominal:      General: Bowel sounds are normal.      Palpations: Abdomen is soft.      Tenderness: There is no abdominal tenderness.   Musculoskeletal:         General: No swelling or tenderness.      Cervical back: Neck supple.   Skin:     General: Skin is warm and dry.      Capillary Refill: Capillary refill takes less than 2 seconds.   Neurological:      General: No focal deficit present.      Mental Status: She is alert and oriented to person, place, and time.   Psychiatric:         Mood and Affect: Mood normal.         Behavior: Behavior normal.       Results Review     I reviewed the patient's new clinical results.  Results from last 7 days   Lab Units 24  0415 24  0346 24  0355 24  0557   WBC 10*3/mm3 6.95 6.74 6.31 6.81   HEMOGLOBIN g/dL  9.9* 9.9* 10.2* 10.8*   PLATELETS 10*3/mm3 383 367 361 429     Results from last 7 days   Lab Units 03/04/24 0415 03/03/24 0346 03/02/24  0355 03/01/24  0557   SODIUM mmol/L 137 138 138 135*   POTASSIUM mmol/L 4.7 4.8 4.6 4.2   CHLORIDE mmol/L 103 105 104 100   CO2 mmol/L 23.0 24.0 22.1 19.2*   BUN mg/dL 27* 30* 33* 42*   CREATININE mg/dL 1.36* 1.31* 1.33* 2.13*   GLUCOSE mg/dL 111* 120* 115* 113*   EGFR mL/min/1.73 41.2* 43.1* 42.3* 24.1*     Results from last 7 days   Lab Units 02/29/24  1400   ALBUMIN g/dL 4.3   BILIRUBIN mg/dL 0.3   ALK PHOS U/L 71   AST (SGOT) U/L 24   ALT (SGPT) U/L 29     Results from last 7 days   Lab Units 03/04/24 0415 03/03/24 0346 03/02/24 0355 03/01/24  0557 02/29/24  1400   CALCIUM mg/dL 8.7 9.2 8.0* 8.9 9.1   ALBUMIN g/dL  --   --   --   --  4.3   MAGNESIUM mg/dL  --   --   --   --  2.1       Glucose   Date/Time Value Ref Range Status   03/04/2024 0757 118 70 - 130 mg/dL Final   03/04/2024 0630 110 70 - 130 mg/dL Final   03/03/2024 2117 134 (H) 70 - 130 mg/dL Final   03/03/2024 1606 140 (H) 70 - 130 mg/dL Final   03/03/2024 1119 114 70 - 130 mg/dL Final   03/03/2024 0616 104 70 - 130 mg/dL Final   03/02/2024 2046 153 (H) 70 - 130 mg/dL Final       No radiology results for the last day    I have personally reviewed all medications:  Scheduled Medications  allopurinol, 100 mg, Oral, BID  amiodarone, 200 mg, Oral, Nightly  amLODIPine, 10 mg, Oral, Q24H  apixaban, 5 mg, Oral, BID  [START ON 3/5/2024] cloNIDine, 0.1 mg, Oral, Daily  clopidogrel, 75 mg, Oral, Nightly  escitalopram, 5 mg, Oral, Nightly  ferrous sulfate, 325 mg, Oral, Daily With Breakfast  hydrALAZINE, 100 mg, Oral, Q8H  insulin lispro, 2-7 Units, Subcutaneous, 4x Daily AC & at Bedtime  metoprolol tartrate, 12.5 mg, Oral, Q12H  pantoprazole, 40 mg, Oral, BID  rosuvastatin, 10 mg, Oral, Daily    Infusions  sodium chloride, , Last Rate: 50 mL/hr (03/04/24 1106)    Diet  NPO Diet NPO Type: Strict NPO    I have personally  reviewed:  [x]  Laboratory   []  Microbiology   []  Radiology   [x]  EKG/Telemetry  []  Cardiology/Vascular   []  Pathology    []  Records      Assessment/Plan     Active Hospital Problems    Diagnosis  POA    **Chest pain [R07.9]  Yes    Chronic kidney disease, stage 3a [N18.31]  Yes    Gastroesophageal reflux disease without esophagitis [K21.9]  Yes    PAD (peripheral artery disease) [I73.9]  Yes    Hypertension [I10]  Yes    Hyperlipidemia [E78.5]  Yes    Coronary artery disease involving coronary bypass graft of native heart [I25.810]  Yes      Resolved Hospital Problems   No resolved problems to display.   Atypical Chest Pain/Afib/RVR  - accompanied by Afib/RVR which is probably the cause, no evidence of ACS-has known history of CAD and is on statin and plavix  - HR is improved with the addition of metoprolol, continue on amiodarone also  - continue eliquis  - plans for cardioversion today noted  - appreciate cardiology recs    HTN/Chronic Diastolic CHF  - BP improved from admission, appears euvolemic, was dry on admission  - continue on clonidine and hydralazine  - hold entresto and lasix given SVEN-can hopefully restart lasix in the next day or so  - monitor BP and volume status    SVEN on CKD stage 3b  - serum creatinine fluctuates but baseline appears to be 1.3-1.5  - improving, no evidence of urinary retention  - hold lasix, aldactone, and entresto as above      Eliquis (home med) for DVT prophylaxis.  Full code.  Discussed with patient, spouse, and nursing staff.  Anticipate discharge home tomorrow.      Edgard Burk MD  [facility]  03/04/24  11:11 EST    Portions of this text have been copied and I have reviewed these. They are accurate as of 3/4/2024

## 2024-03-04 NOTE — SIGNIFICANT NOTE
03/04/24 0654   OTHER   Discipline physical therapist   Therapy Assessment/Plan (PT)   Criteria for Skilled Interventions Met (PT) no problems identified which require skilled intervention  (Nsg doc pt is up adlib/indep along with an ampac of 23. No indication for acute PT needs.)

## 2024-03-04 NOTE — PLAN OF CARE
Goal Outcome Evaluation:           Progress: no change  Outcome Evaluation: Pt appeared to sleep well, alert and oriented x4, coop with care, up ad venice, c/o burning in chest once, but resolved after hs meds, tolerated meds whole with water, has been npo since mn for possible cardioversion today, remains in afib/aflutter.

## 2024-03-05 VITALS
OXYGEN SATURATION: 100 % | WEIGHT: 183.3 LBS | SYSTOLIC BLOOD PRESSURE: 156 MMHG | BODY MASS INDEX: 33.73 KG/M2 | RESPIRATION RATE: 18 BRPM | HEIGHT: 62 IN | HEART RATE: 74 BPM | TEMPERATURE: 97.7 F | DIASTOLIC BLOOD PRESSURE: 52 MMHG

## 2024-03-05 LAB
ANION GAP SERPL CALCULATED.3IONS-SCNC: 7 MMOL/L (ref 5–15)
BASOPHILS # BLD AUTO: 0.09 10*3/MM3 (ref 0–0.2)
BASOPHILS NFR BLD AUTO: 1.2 % (ref 0–1.5)
BUN SERPL-MCNC: 27 MG/DL (ref 8–23)
BUN/CREAT SERPL: 21.1 (ref 7–25)
CALCIUM SPEC-SCNC: 9 MG/DL (ref 8.6–10.5)
CHLORIDE SERPL-SCNC: 105 MMOL/L (ref 98–107)
CO2 SERPL-SCNC: 23 MMOL/L (ref 22–29)
CREAT SERPL-MCNC: 1.28 MG/DL (ref 0.57–1)
DEPRECATED RDW RBC AUTO: 49.5 FL (ref 37–54)
EGFRCR SERPLBLD CKD-EPI 2021: 44.3 ML/MIN/1.73
EOSINOPHIL # BLD AUTO: 0.3 10*3/MM3 (ref 0–0.4)
EOSINOPHIL NFR BLD AUTO: 4.1 % (ref 0.3–6.2)
ERYTHROCYTE [DISTWIDTH] IN BLOOD BY AUTOMATED COUNT: 15.2 % (ref 12.3–15.4)
GLUCOSE BLDC GLUCOMTR-MCNC: 108 MG/DL (ref 70–130)
GLUCOSE BLDC GLUCOMTR-MCNC: 93 MG/DL (ref 70–130)
GLUCOSE SERPL-MCNC: 116 MG/DL (ref 65–99)
HCT VFR BLD AUTO: 29.6 % (ref 34–46.6)
HGB BLD-MCNC: 9.3 G/DL (ref 12–15.9)
IMM GRANULOCYTES # BLD AUTO: 0.03 10*3/MM3 (ref 0–0.05)
IMM GRANULOCYTES NFR BLD AUTO: 0.4 % (ref 0–0.5)
LYMPHOCYTES # BLD AUTO: 2 10*3/MM3 (ref 0.7–3.1)
LYMPHOCYTES NFR BLD AUTO: 27.3 % (ref 19.6–45.3)
MCH RBC QN AUTO: 28.3 PG (ref 26.6–33)
MCHC RBC AUTO-ENTMCNC: 31.4 G/DL (ref 31.5–35.7)
MCV RBC AUTO: 90 FL (ref 79–97)
MONOCYTES # BLD AUTO: 0.82 10*3/MM3 (ref 0.1–0.9)
MONOCYTES NFR BLD AUTO: 11.2 % (ref 5–12)
NEUTROPHILS NFR BLD AUTO: 4.08 10*3/MM3 (ref 1.7–7)
NEUTROPHILS NFR BLD AUTO: 55.8 % (ref 42.7–76)
NRBC BLD AUTO-RTO: 0 /100 WBC (ref 0–0.2)
PLATELET # BLD AUTO: 349 10*3/MM3 (ref 140–450)
PMV BLD AUTO: 8.7 FL (ref 6–12)
POTASSIUM SERPL-SCNC: 4.5 MMOL/L (ref 3.5–5.2)
QT INTERVAL: 418 MS
QTC INTERVAL: 509 MS
RBC # BLD AUTO: 3.29 10*6/MM3 (ref 3.77–5.28)
SODIUM SERPL-SCNC: 135 MMOL/L (ref 136–145)
WBC NRBC COR # BLD AUTO: 7.32 10*3/MM3 (ref 3.4–10.8)

## 2024-03-05 PROCEDURE — 82948 REAGENT STRIP/BLOOD GLUCOSE: CPT

## 2024-03-05 PROCEDURE — 85025 COMPLETE CBC W/AUTO DIFF WBC: CPT | Performed by: INTERNAL MEDICINE

## 2024-03-05 PROCEDURE — 63710000001 ONDANSETRON ODT 4 MG TABLET DISPERSIBLE: Performed by: INTERNAL MEDICINE

## 2024-03-05 PROCEDURE — 99232 SBSQ HOSP IP/OBS MODERATE 35: CPT | Performed by: INTERNAL MEDICINE

## 2024-03-05 PROCEDURE — 80048 BASIC METABOLIC PNL TOTAL CA: CPT | Performed by: INTERNAL MEDICINE

## 2024-03-05 RX ORDER — CARVEDILOL 12.5 MG/1
12.5 TABLET ORAL 2 TIMES DAILY WITH MEALS
Status: DISCONTINUED | OUTPATIENT
Start: 2024-03-05 | End: 2024-03-05 | Stop reason: HOSPADM

## 2024-03-05 RX ORDER — CLONIDINE HYDROCHLORIDE 0.1 MG/1
0.1 TABLET ORAL DAILY
Start: 2024-03-05 | End: 2024-03-07 | Stop reason: SDUPTHER

## 2024-03-05 RX ORDER — CLONIDINE HYDROCHLORIDE 0.1 MG/1
0.1 TABLET ORAL EVERY 12 HOURS SCHEDULED
Status: DISCONTINUED | OUTPATIENT
Start: 2024-03-05 | End: 2024-03-05 | Stop reason: HOSPADM

## 2024-03-05 RX ORDER — CARVEDILOL 12.5 MG/1
12.5 TABLET ORAL 2 TIMES DAILY WITH MEALS
Qty: 60 TABLET | Refills: 0 | Status: SHIPPED | OUTPATIENT
Start: 2024-03-05

## 2024-03-05 RX ORDER — AMLODIPINE BESYLATE 10 MG/1
10 TABLET ORAL
Qty: 30 TABLET | Refills: 0 | Status: SHIPPED | OUTPATIENT
Start: 2024-03-06 | End: 2024-03-11

## 2024-03-05 RX ADMIN — APIXABAN 5 MG: 5 TABLET, FILM COATED ORAL at 08:05

## 2024-03-05 RX ADMIN — FERROUS SULFATE TAB 325 MG (65 MG ELEMENTAL FE) 325 MG: 325 (65 FE) TAB at 08:05

## 2024-03-05 RX ADMIN — HYDRALAZINE HYDROCHLORIDE 100 MG: 50 TABLET ORAL at 06:07

## 2024-03-05 RX ADMIN — METOPROLOL TARTRATE 12.5 MG: 25 TABLET, FILM COATED ORAL at 08:05

## 2024-03-05 RX ADMIN — TRAMADOL HYDROCHLORIDE 50 MG: 50 TABLET ORAL at 08:05

## 2024-03-05 RX ADMIN — AMLODIPINE BESYLATE 10 MG: 10 TABLET ORAL at 08:04

## 2024-03-05 RX ADMIN — PANTOPRAZOLE SODIUM 40 MG: 40 TABLET, DELAYED RELEASE ORAL at 08:05

## 2024-03-05 RX ADMIN — ALLOPURINOL 100 MG: 100 TABLET ORAL at 08:04

## 2024-03-05 RX ADMIN — ONDANSETRON 4 MG: 4 TABLET, ORALLY DISINTEGRATING ORAL at 08:05

## 2024-03-05 RX ADMIN — ROSUVASTATIN CALCIUM 10 MG: 10 TABLET, FILM COATED ORAL at 08:04

## 2024-03-05 RX ADMIN — CLONIDINE HYDROCHLORIDE 0.1 MG: 0.1 TABLET ORAL at 08:04

## 2024-03-05 NOTE — DISCHARGE SUMMARY
"Date of Admission: 2/29/2024  Date of Discharge:  3/5/2024  Primary Care Physician: Aliya Alejandro MD     Discharge Diagnosis:  Active Hospital Problems    Diagnosis  POA    **Chest pain [R07.9]  Yes    Chronic kidney disease, stage 3a [N18.31]  Yes    Gastroesophageal reflux disease without esophagitis [K21.9]  Yes    PAD (peripheral artery disease) [I73.9]  Yes    Hypertension [I10]  Yes    Hyperlipidemia [E78.5]  Yes    Coronary artery disease involving coronary bypass graft of native heart [I25.810]  Yes      Resolved Hospital Problems   No resolved problems to display.       Presenting Problem/History of Present Illness:  Chest pain [R07.9]     Hospital Course:  The patient is a 74 y.o. female with a history of HTN, chronic diastolic CHF, CKD Stage 3b, and PAF on eliquis who presented with atypical chest pain. Please see admission H&P for further details. She had some bradycardia and low blood pressures on admission and had an SVEN as a result that resolved with correction of these issues. Her diuretics and entresto were held and her clonidine was reduced with the intent to taper due to the bradycardia. Cardiology evaluated her and thought that her symptoms were due to afib. She underwent a successful cardioversion on 3/4/24 and did well after this. She is medically stable and will be discharged on the regimen below. She should keep scheduled cardiology follow up and close PCP follow up.    Exam Today:  Blood pressure 177/67, pulse 86, temperature 97.7 °F (36.5 °C), temperature source Oral, resp. rate 18, height 157.5 cm (62.01\"), weight 83.1 kg (183 lb 4.8 oz), SpO2 99%.  Vitals and nursing note reviewed.   Constitutional:       General: She is not in acute distress.     Appearance: She is not toxic-appearing or diaphoretic.   HENT:      Head: Normocephalic and atraumatic.      Nose: Nose normal.      Mouth/Throat:      Mouth: Mucous membranes are moist.      Pharynx: Oropharynx is clear.   Eyes:    " "  Conjunctiva/sclera: Conjunctivae normal.      Pupils: Pupils are equal, round, and reactive to light.   Cardiovascular:      Rate and Rhythm: Normal rate. Rhythm irregular.     Pulses: Normal pulses.   Abdominal:      General: Bowel sounds are normal.      Palpations: Abdomen is soft.      Tenderness: There is no abdominal tenderness.   Musculoskeletal:         General: No swelling or tenderness.      Cervical back: Neck supple.   Skin:     General: Skin is warm and dry.      Capillary Refill: Capillary refill takes less than 2 seconds.   Neurological:      General: No focal deficit present.      Mental Status: She is alert and oriented to person, place, and time.   Psychiatric:         Mood and Affect: Mood normal.         Behavior: Behavior normal.     Procedures Performed:  Cardioversion External in Cardiology Department    Accession Number: [id number]   Date of Study: 3/4/24   Ordering Provider: Timothy Uriostegui MD   Clinical Indications: atrial flutter        [facility]  Performing Staff   Cardiology: Lester Braga MD    Tech: Meena Weeks RN        Admission Information    Admission Date/Time Discharge Date/Time Room/Bed   02/29/24  1908  S415/1     Interpretation Summary         The cardioversion was successful with synchronized biphasic shock x 1 at 150J.    Post cardioversion the patient displayed a sinus rhythm.     Patient Hx Of Height, Weight, and Vitals    Height Weight BSA (Calculated - sq m) BMI (Calculated) Retired BMI (kg/m2) Pulse BP   157.5 cm (62.01\") 83.1 kg (183 lb 4.8 oz) 1.81 sq meters 33.5  86 177/67             Cardioversion    Procedure Prep Informed consent was obtained. Patient brought to procedure room with an initial rhythm of atrial flutter. Sedation was performed by a cardiologist.   Shock 1 Synchronized with a biphasic shock. 150 joules delivered to patient.   Impression Post cardioversion the patient displayed a sinus rhythm.The cardioversion was successful.    "       Consults:   Consults       Date and Time Order Name Status Description    2/29/2024  8:06 PM Inpatient Cardiology Consult Completed              Discharge Disposition:  Home or Self Care    Discharge Medications:     Discharge Medications        New Medications        Instructions Start Date   amLODIPine 10 MG tablet  Commonly known as: NORVASC   10 mg, Oral, Every 24 Hours Scheduled   Start Date: March 6, 2024     carvedilol 12.5 MG tablet  Commonly known as: COREG   12.5 mg, Oral, 2 Times Daily With Meals             Changes to Medications        Instructions Start Date   amiodarone 200 MG tablet  Commonly known as: PACERONE  What changed: when to take this   200 mg, Oral, Daily      cloNIDine 0.1 MG tablet  Commonly known as: CATAPRES  What changed:   how much to take  when to take this   0.1 mg, Oral, Daily      clopidogrel 75 MG tablet  Commonly known as: PLAVIX  What changed: when to take this   TAKE ONE TABLET BY MOUTH DAILY             Continue These Medications        Instructions Start Date   allopurinol 100 MG tablet  Commonly known as: ZYLOPRIM   Take 1 tablet by mouth 2 (Two) Times a Day.      ALPRAZolam 0.25 MG tablet  Commonly known as: XANAX   Take 1 tablet by mouth As Needed for Anxiety.      Eliquis 5 MG tablet tablet  Generic drug: apixaban   5 mg, Oral, 2 Times Daily      escitalopram 5 MG tablet  Commonly known as: LEXAPRO   5 mg, Oral, Nightly      ferrous sulfate 325 (65 FE) MG tablet   325 mg, Oral, Daily With Breakfast      hydrALAZINE 100 MG tablet  Commonly known as: APRESOLINE   100 mg, Oral, 3 Times Daily      pantoprazole 40 MG EC tablet  Commonly known as: PROTONIX   40 mg, Oral, 2 Times Daily      rosuvastatin 10 MG tablet  Commonly known as: CRESTOR   10 mg, Oral, Daily             Stop These Medications      bumetanide 1 MG tablet  Commonly known as: BUMEX     Entresto 49-51 MG tablet  Generic drug: sacubitril-valsartan     metFORMIN 500 MG tablet  Commonly known as:  GLUCOPHAGE     spironolactone 50 MG tablet  Commonly known as: ALDACTONE              Discharge Diet:   Diet Instructions       Diet: Cardiac Diets; Healthy Heart (2-3 Na+); Regular (IDDSI 7); Thin (IDDSI 0)      Discharge Diet: Cardiac Diets    Cardiac Diet: Healthy Heart (2-3 Na+)    Texture: Regular (IDDSI 7)    Fluid Consistency: Thin (IDDSI 0)            Activity at Discharge:   Activity Instructions       Activity as Tolerated              Follow-up Appointments:  Future Appointments   Date Time Provider Department [place]   3/7/2024 10:30 AM Kumar Frye III, MD [facility] CD [facility]   3/27/2024 11:00 AM [facility] M[facility]M 1 BH [facility] MAMMO [facility]   4/23/2024  9:30 AM Shayna Tran APRN [facility] GE [facility]     Additional Instructions for the Follow-ups that You Need to Schedule       Discharge Follow-up with PCP   As directed       Currently Documented PCP:    Aliya Alejandro MD    PCP Phone Number:    [phone]     Follow Up Details: 1 week        Discharge Follow-up with Specialty: cardiology   As directed      Specialty: cardiology   Follow Up Details: as scheduled                   Edgard Burk MD  03/05/24  14:18 EST    Time Spent on Discharge Activities: Greater than 30 minutes.

## 2024-03-05 NOTE — PLAN OF CARE
Goal Outcome Evaluation:  Plan of Care Reviewed With: patient        Progress: improving  Outcome Evaluation: pt c/o some heart burn accompanied by elevated bp. Reported that heart burn subsided. Rhythm remains in NSR after cardioversion. Cardiology notified of the above. Continuing to observe.

## 2024-03-05 NOTE — CASE MANAGEMENT/SOCIAL WORK
Case Management Discharge Note      Final Note: Home, no additional CCP needs.         Selected Continued Care - Admitted Since 2/29/2024       Destination    No services have been selected for the patient.                Durable Medical Equipment    No services have been selected for the patient.                Dialysis/Infusion    No services have been selected for the patient.                Home Medical Care    No services have been selected for the patient.                Therapy    No services have been selected for the patient.                Community Resources    No services have been selected for the patient.                Community & DME    No services have been selected for the patient.                    Transportation Services  Private: Car    Final Discharge Disposition Code: 01 - home or self-care

## 2024-03-05 NOTE — PLAN OF CARE
Goal Outcome Evaluation:                 VSS. RA. Sinus rhythm. BP high this morning, antihypertensive medications review. Back pain treated with tramadol. Plan to be DC today.  Problem: Adult Inpatient Plan of Care  Goal: Absence of Hospital-Acquired Illness or Injury  Intervention: Identify and Manage Fall Risk  Recent Flowsheet Documentation  Taken 3/5/2024 1220 by Eloy Huizar RN  Safety Promotion/Fall Prevention:   safety round/check completed   room organization consistent  Taken 3/5/2024 1020 by Eloy Huizar RN  Safety Promotion/Fall Prevention:   safety round/check completed   room organization consistent  Taken 3/5/2024 0800 by Eloy Huizar RN  Safety Promotion/Fall Prevention:   safety round/check completed   room organization consistent  Intervention: Prevent Skin Injury  Recent Flowsheet Documentation  Taken 3/5/2024 1220 by Eloy Huizar RN  Body Position: position changed independently  Taken 3/5/2024 1020 by Eloy Huizar RN  Body Position: position changed independently  Taken 3/5/2024 0800 by Eloy Huizar RN  Body Position: position changed independently  Skin Protection:   transparent dressing maintained   adhesive use limited  Intervention: Prevent and Manage VTE (Venous Thromboembolism) Risk  Recent Flowsheet Documentation  Taken 3/5/2024 1220 by Eloy Huizar RN  Activity Management: up ad venice  Taken 3/5/2024 1020 by Eloy Huizar RN  Activity Management: up ad venice  Taken 3/5/2024 0800 by Eloy Huizar RN  Activity Management: up ad venice  Range of Motion: active ROM (range of motion) encouraged  Intervention: Prevent Infection  Recent Flowsheet Documentation  Taken 3/5/2024 1220 by Eloy Huizar RN  Infection Prevention:   single patient room provided   rest/sleep promoted   hand hygiene promoted  Taken 3/5/2024 1020 by Eloy Huizar  RN  Infection Prevention:   single patient room provided   rest/sleep promoted   hand hygiene promoted  Taken 3/5/2024 0800 by Eloy Huizar RN  Infection Prevention:   single patient room provided   rest/sleep promoted   hand hygiene promoted  Goal: Optimal Comfort and Wellbeing  Intervention: Monitor Pain and Promote Comfort  Recent Flowsheet Documentation  Taken 3/5/2024 0800 by Eloy Huizar RN  Pain Management Interventions:   see MAR   quiet environment facilitated  Intervention: Provide Person-Centered Care  Recent Flowsheet Documentation  Taken 3/5/2024 0800 by Eloy Huizar RN  Trust Relationship/Rapport:   thoughts/feelings acknowledged   reassurance provided   questions encouraged   questions answered   empathic listening provided   emotional support provided   choices provided   care explained

## 2024-03-05 NOTE — PROGRESS NOTES
"    Patient Name: Valentine Arora  :1950  74 y.o.      Patient Care Team:  Aliya Alejandro MD as PCP - General (Family Medicine)    Chief Complaint: AF CHF CAD    Interval History: s/p cardioversion. No further burning     Objective   Vital Signs  Temp:  [97.7 °F (36.5 °C)-98.4 °F (36.9 °C)] 97.7 °F (36.5 °C)  Heart Rate:  [70-94] 86  Resp:  [18] 18  BP: (173-201)/(52-67) 177/67    Intake/Output Summary (Last 24 hours) at 3/5/2024 1327  Last data filed at 3/4/2024 1450  Gross per 24 hour   Intake 300 ml   Output --   Net 300 ml     Flowsheet Rows      Flowsheet Row First Filed Value   Admission Height 157.5 cm (62.01\") Documented at 2024 0752   Admission Weight 82.3 kg (181 lb 7 oz) Documented at 2024 0607            Physical Exam:   General Appearance:    Alert, cooperative, in no acute distress   Lungs:     Clear to auscultation.  Normal respiratory effort and rate.      Heart:    Regular rhythm and normal rate, normal S1 and S2, no murmurs, gallops or rubs.     Chest Wall:    No abnormalities observed   Abdomen:     Soft, nontender, positive bowel sounds.     Extremities:   no cyanosis, clubbing or edema.  No marked joint deformities.  Adequate musculoskeletal strength.       Results Review:    Results from last 7 days   Lab Units 24  0336   SODIUM mmol/L 135*   POTASSIUM mmol/L 4.5   CHLORIDE mmol/L 105   CO2 mmol/L 23.0   BUN mg/dL 27*   CREATININE mg/dL 1.28*   GLUCOSE mg/dL 116*   CALCIUM mg/dL 9.0     Results from last 7 days   Lab Units 24  1825 24  1624 24  1603   HSTROP T ng/L 25* 29* 72*     Results from last 7 days   Lab Units 24  0336   WBC 10*3/mm3 7.32   HEMOGLOBIN g/dL 9.3*   HEMATOCRIT % 29.6*   PLATELETS 10*3/mm3 349     Results from last 7 days   Lab Units 24  1400   INR  1.29*   APTT seconds 36.9     Results from last 7 days   Lab Units 24  1400   MAGNESIUM mg/dL 2.1                   Medication Review:   allopurinol, 100 mg, " Oral, BID  amiodarone, 200 mg, Oral, Nightly  amLODIPine, 10 mg, Oral, Q24H  apixaban, 5 mg, Oral, BID  carvedilol, 12.5 mg, Oral, BID With Meals  cloNIDine, 0.1 mg, Oral, Q12H  clopidogrel, 75 mg, Oral, Nightly  escitalopram, 5 mg, Oral, Nightly  ferrous sulfate, 325 mg, Oral, Daily With Breakfast  hydrALAZINE, 100 mg, Oral, Q8H  insulin lispro, 2-7 Units, Subcutaneous, 4x Daily AC & at Bedtime  pantoprazole, 40 mg, Oral, BID  rosuvastatin, 10 mg, Oral, Daily              Assessment & Plan   AF/ Fl.  CV 3/4/24.   CAD s/p CABG: if burning chest pain persists in NSR will need cath    HFPEF: related to tachycardia. resolved.   SVEN on CKD due to diuresis, resolved  HTN: titrating  Sinus bradycardia while on atenolol and clonidine TID.     Severe HTN last night. Improved this morning. OK to d/c from my standpoint on:   Coreg 12.5 BID  Amlodipine 10 QD  Clonidine 0.1 daily (weaning due to bradycardia)  Hydralazine 100 TID  Plavix 75 qd,  Amio 200 qd, Eliquis  Hold diuretics. Stop xander Suazo MD  [facility]  03/05/24  13:27 EST

## 2024-03-06 ENCOUNTER — READMISSION MANAGEMENT (OUTPATIENT)
Dept: CALL CENTER | Facility: HOSPITAL | Age: 74
End: 2024-03-06
Payer: MEDICARE

## 2024-03-06 NOTE — OUTREACH NOTE
Prep Survey      Flowsheet Row Responses   [facility] facility patient discharged from? [facility]   Is LACE score < 7 ? No   Eligibility Readm Mgmt   Discharge diagnosis Chest pain   Does the patient have one of the following disease processes/diagnoses(primary or secondary)? Other   Does the patient have Home health ordered? No   Is there a DME ordered? No   Medication alerts for this patient see avs   Prep survey completed? Yes            Chikis DUBOSE - Registered Nurse

## 2024-03-06 NOTE — PROGRESS NOTES
"Enter Query Response Below      Query Response: Patient with acute exacerbation of her diastolic congestive heart failure due to atrial fibrillation with rapid ventricular response as demonstrated by her elevated proBNP level.  With improved blood pressure and heart rate control her symptoms improved..             If applicable, please update the problem list.     Patient: Valentine Arora        : 1950  Account: [id number]           Admit Date: 2024        How to Respond to this query:       a. Click New Note     b. Answer query within the yellow box.                c. Update the Problem List, if applicable.      If you have any questions about this query contact me at:[email or web address]     ,    Patient presented  with shortness of breath, palpitations and is diagnosed with atrial fibrillation with RVR.  -Cardiology consult note 3/1 documents acute heart failure during hospitalization \"earlier this week\" at [facility], with aggressive diuresis, and current diagnosis of HFpEF \"now euvolemic/dry.\"    -Cardiology note (3/2) includes ProBNP (24) 3280.0 and (24) 984.6, and documents \"Acute on chronic diastolic congestive heart failure: Would hold Entresto Volume status seems reasonable.\"    There is no documentation of lower extremity edema, volume overload and no imaging is done this stay.  Treatment included Crestor, Amiodarone, Norvasc, Clonidine, Hydralazine and Metoprolol.  Home meds, Aldactone and Entresto, were on hold due to renal function.    Please clarify if patient treated/monitored for one or more of the following:    Acute on chronic diastolic congestive heart failure is supported with additional indicators _________  Chronic diastolic heart failure  Other- specify_____  Unable to determine    By submitting this query, we are merely seeking further clarification of documentation to accurately reflect all conditions that you are monitoring, evaluating, treating or " that extend the hospitalization or utilize additional resources of care. Please utilize your independent clinical judgment when addressing the question(s) above.     This query and your response, once completed, will be entered into the legal medical record.    Sincerely,  Lucero SHELBY, RN  Clinical Documentation Integrity Program   [email or web address]

## 2024-03-07 ENCOUNTER — TELEPHONE (OUTPATIENT)
Dept: CARDIOLOGY | Facility: CLINIC | Age: 74
End: 2024-03-07
Payer: MEDICARE

## 2024-03-07 RX ORDER — CLONIDINE HYDROCHLORIDE 0.1 MG/1
0.1 TABLET ORAL 2 TIMES DAILY
Start: 2024-03-07 | End: 2024-03-14

## 2024-03-07 NOTE — TELEPHONE ENCOUNTER
Dr. Suazo,     Pt's B/P medication was decreased while she was in the hospital. Now that she is out, her B/P is starting to go up. Yesterday and today her readings are:     153/54, HR 75  164/78,   180/71, HR 91  185/82, HR 84     She said she was on Clonidine 0.2 mg TID and now she is just taking 0.1 mg once a day. She asked if she needs to go back up on the clonidine.     She has an appt to see Zoya next week, but she didn't know if she needs to change something in the mean time.     Thank you,     Adri Rodríguez, RN  Triage [facility] [place]  03/07/24 10:22 EST

## 2024-03-07 NOTE — TELEPHONE ENCOUNTER
Notified pt of recommendations from Dr. Suazo. Pt verbalized understanding.    Thank you,    Adri Rodríguez, RN  [place] [facility] [place]  03/07/24 15:23 EST

## 2024-03-11 ENCOUNTER — TELEPHONE (OUTPATIENT)
Dept: CARDIOLOGY | Facility: CLINIC | Age: 74
End: 2024-03-11
Payer: MEDICARE

## 2024-03-11 RX ORDER — NIFEDIPINE 60 MG/1
60 TABLET, EXTENDED RELEASE ORAL DAILY
Qty: 30 TABLET | Refills: 11 | Status: SHIPPED | OUTPATIENT
Start: 2024-03-11 | End: 2024-03-14

## 2024-03-11 NOTE — TELEPHONE ENCOUNTER
Caller: Valentine Arora    Relationship: Self    Best call back number: [phone]    What is the best time to reach you: ANYTIME    Who are you requesting to speak with (clinical staff, provider,  specific staff member): CLINICAL    What was the call regarding: PT IS CALLING TO GIVE SR. UMMAT HER BLOOD PRESSURE READINGS, THEY ARE BELOW:    3/7/24- 137/60 (70) AT 10PM    3/8/24- 10AM 138/54 (50)  5PM 167/63 (75)  10PM 174/62 (70)    3/9/24- 3PM 166/59 (71)  5PM 165/58 (70)  10PM 177/64 (70)    3/10/24- 10AM 131/51 (66)  7PM 176/64 (68)  10 /62 (73)

## 2024-03-14 ENCOUNTER — OFFICE VISIT (OUTPATIENT)
Dept: CARDIOLOGY | Facility: CLINIC | Age: 74
End: 2024-03-14
Payer: MEDICARE

## 2024-03-14 VITALS
WEIGHT: 191 LBS | HEIGHT: 62 IN | DIASTOLIC BLOOD PRESSURE: 68 MMHG | BODY MASS INDEX: 35.15 KG/M2 | HEART RATE: 65 BPM | SYSTOLIC BLOOD PRESSURE: 118 MMHG

## 2024-03-14 DIAGNOSIS — K22.719 BARRETT'S ESOPHAGUS WITH DYSPLASIA: ICD-10-CM

## 2024-03-14 DIAGNOSIS — I73.9 PAD (PERIPHERAL ARTERY DISEASE): Chronic | ICD-10-CM

## 2024-03-14 DIAGNOSIS — I10 PRIMARY HYPERTENSION: Chronic | ICD-10-CM

## 2024-03-14 DIAGNOSIS — E78.2 MIXED HYPERLIPIDEMIA: Chronic | ICD-10-CM

## 2024-03-14 DIAGNOSIS — I25.810 CORONARY ARTERY DISEASE INVOLVING CORONARY BYPASS GRAFT OF NATIVE HEART, UNSPECIFIED WHETHER ANGINA PRESENT: ICD-10-CM

## 2024-03-14 DIAGNOSIS — I48.92 ATRIAL FLUTTER, UNSPECIFIED TYPE: Primary | ICD-10-CM

## 2024-03-14 RX ORDER — PANTOPRAZOLE SODIUM 40 MG/1
40 TABLET, DELAYED RELEASE ORAL 2 TIMES DAILY
Qty: 180 TABLET | Refills: 3 | Status: SHIPPED | OUTPATIENT
Start: 2024-03-14

## 2024-03-14 RX ORDER — CLONIDINE HYDROCHLORIDE 0.1 MG/1
0.1 TABLET ORAL 3 TIMES DAILY
Start: 2024-03-14

## 2024-03-14 NOTE — PROGRESS NOTES
Subjective:     Encounter Date:03/14/2024      Patient ID: Valentine Arora is a 74 y.o. female.    Chief Complaint:follow up HTN  History of Present Illness  This is a 73 y/o female who previously followed with Dr. Frye and will be following with Dr. Suazo moving forward. She is new to me today. She has a pmhx of coronary artery disease, MI 20 years ago, CABG in 1996, peripheral artery disease s/p bilateral common iliac artery stents in 2019, and bilateral cerebrovascular disease.     In May 2023, she presented with PAF and was started on anticoagulation with apixaban in addition to her clopidogrel. She was then seen in the office by Dr. Frye in December 2023. She reported her BP was labile. Her amlodipine had been increased to 10 mg daily which helped but she developed lower extremity swelling. A Holter was placed that showed SR with no recurrent atrial arrhythmias. Due to the swelling, amlodipine was decreased to 5mg and spironolactone was added.    She then presented to the ED on 2/25/24 with chest pain similar to what she experienced prior to her MI in 1995. Her Apple Watch showed bradycardia but no atrial arrhythmias. n ER, EKG showed SB with HR 54, first degree AVB, troponin 15, proBNP 3280, D dimer negative, CR 1.43, , ALT/AST 57/41, WBC 8.7, HGB 9, CXR showed mild pulmonary vascular congestion, otherwise no acute cardiopulmonary abnormality . Pt was given IV lasix, ASA and nitropaste. Entresto and a loop diuretic were added to her regimen and spironolactone was increased. Atenolol and clonidine was held due to reported bradycardia. At the time of discharge, she had been resume on clonidine 0.1 mg TID.    She went home and returned a day later with elevated heart rate. In the ED, she was in afib. It was felt that her afib was due to overdiuresis.She was started on metoprolol tartrate and apixaban was continued. She had BP readings as low as 80 and greater than 200. Entresto was held.  She was  eventually cardioverted on 3/4/24. At the time of discharge, she was sent home on carvedilol 12.5 BID, amlodipine 10, clonidine 0.1 daily, hydralazine 100 TID, and amiodarone 200.     She contacted the office on 3/11 with her blood pressure readings. She was still having elevated readings, particularly in the afternoon in the 160s-170s systolic. Her amlodipine was switched to nifedipine. She is here today for a follow up visit. She tells me that she has noticed more bradycardia on nifedipine as well as significant swelling in her lower extremities. Her BP is still elevated. She denies any chest pain, shortness of breath, palpitations, dizziness or syncope. She denies orthopnea or PND. Her BP today in office is 118/68. I did review her readings and she is having some significant elevated readings at home.    I have reviewed and updated as appropriate allergies, current medications, past family history, past medical history, past surgical history and problem list.    Review of Systems   Constitutional: Positive for malaise/fatigue. Negative for fever, weight gain and weight loss.   HENT:  Negative for congestion, hoarse voice and sore throat.    Eyes:  Negative for blurred vision and double vision.   Cardiovascular:  Positive for leg swelling. Negative for chest pain, dyspnea on exertion, orthopnea, palpitations and syncope.   Respiratory:  Negative for cough, shortness of breath and wheezing.    Gastrointestinal:  Negative for abdominal pain, hematemesis, hematochezia and melena.   Genitourinary:  Negative for dysuria and hematuria.   Neurological:  Negative for dizziness, headaches, light-headedness and numbness.   Psychiatric/Behavioral:  Negative for depression. The patient is not nervous/anxious.          Current Outpatient Medications:     allopurinol (ZYLOPRIM) 100 MG tablet, Take 1 tablet by mouth 2 (Two) Times a Day., Disp: , Rfl:     ALPRAZolam (XANAX) 0.25 MG tablet, Take 1 tablet by mouth As Needed for  Anxiety., Disp: , Rfl:     amiodarone (PACERONE) 200 MG tablet, Take 1 tablet by mouth Daily. (Patient taking differently: Take 1 tablet by mouth Every Night.), Disp: 90 tablet, Rfl: 1    carvedilol (COREG) 12.5 MG tablet, Take 1 tablet by mouth 2 (Two) Times a Day With Meals., Disp: 60 tablet, Rfl: 0    cloNIDine (CATAPRES) 0.1 MG tablet, Take 1 tablet by mouth 3 (Three) Times a Day., Disp: , Rfl:     clopidogrel (PLAVIX) 75 MG tablet, TAKE ONE TABLET BY MOUTH DAILY (Patient taking differently: Take 1 tablet by mouth Every Night.), Disp: 90 tablet, Rfl: 3    Eliquis 5 MG tablet tablet, Take 1 tablet by mouth 2 (Two) Times a Day., Disp: , Rfl:     escitalopram (LEXAPRO) 5 MG tablet, Take 1 tablet by mouth Every Night., Disp: , Rfl:     ferrous sulfate 325 (65 FE) MG tablet, Take 1 tablet by mouth Daily With Breakfast., Disp: , Rfl:     hydrALAZINE (APRESOLINE) 100 MG tablet, Take 1 tablet by mouth 3 (Three) Times a Day for 30 days., Disp: 90 tablet, Rfl: 0    pantoprazole (PROTONIX) 40 MG EC tablet, TAKE ONE TABLET BY MOUTH TWICE A DAY, Disp: 180 tablet, Rfl: 3    rosuvastatin (CRESTOR) 10 MG tablet, Take 1 tablet by mouth Daily., Disp: , Rfl:     Past Medical History:   Diagnosis Date    Arthritis     Atrial flutter, unspecified type 05/17/2023    Sarabia esophagus     CAD (coronary artery disease)     Chronic back pain     Colon polyp     GERD (gastroesophageal reflux disease)     Hyperlipidemia     Hypertension     Myocardial infarction     1995    PAD (peripheral artery disease) 09/11/2019    Shingles     Type 2 diabetes mellitus        Past Surgical History:   Procedure Laterality Date    COLONOSCOPY      COLONOSCOPY N/A 6/24/2020    Procedure: COLONOSCOPY;  Surgeon: Mathew Roberts MD;  Location: [facility];  Service: Gastroenterology;  Laterality: N/A;  Descending colon polyp x 2, Ascending colon polyp, Sigmoid colon polyp, Rectal polyp    COLONOSCOPY N/A 8/7/2023    Procedure: COLONOSCOPY;   Surgeon: Mathew Roberts MD;  Location: [facility] OR;  Service: Gastroenterology;  Laterality: N/A;  Normal    CORONARY ARTERY BYPASS GRAFT  1995    x 2 vessels    ENDOSCOPY N/A 2020    Procedure: ESOPHAGOGASTRODUODENOSCOPY;  Surgeon: Mathew Roberts MD;  Location: [facility] OR;  Service: Gastroenterology;  Laterality: N/A;  Ulcerative esophagitis, Gastritis, Duodenitis  Duodenal biopsy, gastric biopsy, distal esophageal biopsy    ENDOSCOPY N/A 2020    Procedure: ESOPHAGOGASTRODUODENOSCOPY with biopsies;  Surgeon: Mathew Roberts MD;  Location:  [facility] OR;  Service: Gastroenterology;  Laterality: N/A;  Esophagitis  Sarabia's Esophagus  Hiatal hernia  Gastritis  Gastric biopsy  Distal esophagus biopsy    ENDOSCOPY N/A 2023    Procedure: Esophagogastroduedenoscopy;  Surgeon: Mathew Roberts MD;  Location:  [facility] OR;  Service: Gastroenterology;  Laterality: N/A;  Gastritis; short segment Sarabia's; Esophageal stricture- dilatation; Biopsies- gastric, distal esophagus    INNER EAR SURGERY      JOINT REPLACEMENT      right knee    LEG SURGERY      x 2 s/p fall    LUMBAR FUSION      L4/L5    SKIN GRAFT      to left lower leg x 2    TONSILLECTOMY      WRIST FUSION Left        Family History   Problem Relation Age of Onset    Heart disease Mother     Heart disease Father     Colon cancer Neg Hx     Colon polyps Neg Hx     Breast cancer Neg Hx        Social History     Tobacco Use    Smoking status: Former     Current packs/day: 0.00     Average packs/day: 0.5 packs/day for 20.0 years (10.0 ttl pk-yrs)     Types: Cigarettes     Start date:      Quit date:      Years since quittin.2    Smokeless tobacco: Never    Tobacco comments:     QUIT    Vaping Use    Vaping status: Never Used   Substance Use Topics    Alcohol use: Not Currently     Comment: occasional    Drug use: No         ECG 12 Lead    Date/Time: 3/14/2024 3:55 PM  Performed by: Zoya Moreno  "APRN    Authorized by: Zoya Moreno APRN  Comparison: compared with previous ECG from 3/4/2024  Similar to previous ECG  Rhythm: sinus rhythm             Objective:     Visit Vitals  /68   Pulse 65   Ht 157.5 cm (62\")   Wt 86.6 kg (191 lb)   BMI 34.93 kg/m²             Physical Exam  Constitutional:       Appearance: Normal appearance. She is obese.   HENT:      Head: Normocephalic.   Neck:      Vascular: No carotid bruit.   Cardiovascular:      Rate and Rhythm: Normal rate and regular rhythm.      Chest Wall: PMI is not displaced.      Pulses: Normal pulses.           Radial pulses are 2+ on the right side and 2+ on the left side.        Posterior tibial pulses are 2+ on the right side and 2+ on the left side.      Heart sounds: Normal heart sounds. No murmur heard.      with a grade of 2/6.      No friction rub. No gallop.   Pulmonary:      Effort: Pulmonary effort is normal.      Breath sounds: Normal breath sounds.   Abdominal:      General: Bowel sounds are normal. There is no distension.      Palpations: Abdomen is soft.   Musculoskeletal:      Right lower leg: No edema.      Left lower leg: No edema.   Skin:     General: Skin is warm and dry.      Capillary Refill: Capillary refill takes less than 2 seconds.   Neurological:      Mental Status: She is alert and oriented to person, place, and time.   Psychiatric:         Mood and Affect: Mood normal.         Behavior: Behavior normal.         Thought Content: Thought content normal.          Lab Review:   Lipid Panel          9/10/2023    10:17   Lipid Panel   Total Cholesterol 160    Triglycerides 179    HDL Cholesterol 60    VLDL Cholesterol 30    LDL Cholesterol  70    LDL/HDL Ratio 1.07          Cardiac Procedures:       Assessment:         Diagnoses and all orders for this visit:    1. Atrial flutter, unspecified type (Primary)    2. Coronary artery disease involving coronary bypass graft of native heart, unspecified whether angina present    3. " Mixed hyperlipidemia    4. Primary hypertension    5. PAD (peripheral artery disease)    Other orders  -     cloNIDine (CATAPRES) 0.1 MG tablet; Take 1 tablet by mouth 3 (Three) Times a Day.  -     ECG 12 Lead            Plan:         HTN: blood pressure is not well controlled. Nifedipine unfortunately has caused swelling. Not on ACEi/ARB due to renal function. Unable to titrate up on beta blocker due to bradycardia. Clonidine has worked for her in the past and she did not have issues with bradycardia while on it she says. I think we should go back to what was working for her prior to her hospitalization with the clonidine, monitor her BP and HR. I do want her to start it at a lower dose of 0.1 mg TID. I think too many different providers have made changes to her regimen recently and it is becoming a bit confusing. I have asked her to message me her BP readings next week and we will make adjustments accordingly.  PAF: s/p cardioversion. Remains in sinus. On apixaban, amiodarone and carvedilol  CAD: s/p CABG in 1995. EKG is stable. Continue medical management.   Bradycardia: while on atenolol and clonidine. Will monitor with resuming clonidine.     Thank you for allowing me to participate in this patient's care. Please call with any questions or concerns. Ms Arora will follow up with Dr. Suazo in 6 weeks.          Your medication list            Accurate as of March 14, 2024  3:56 PM. If you have any questions, ask your nurse or doctor.                CHANGE how you take these medications        Instructions Last Dose Given Next Dose Due   amiodarone 200 MG tablet  Commonly known as: PACERONE  What changed: when to take this      Take 1 tablet by mouth Daily.       cloNIDine 0.1 MG tablet  Commonly known as: CATAPRES  What changed: when to take this  Changed by: TERRI Huddleston      Take 1 tablet by mouth 3 (Three) Times a Day.       clopidogrel 75 MG tablet  Commonly known as: PLAVIX  What changed: when to take  this      TAKE ONE TABLET BY MOUTH DAILY              CONTINUE taking these medications        Instructions Last Dose Given Next Dose Due   allopurinol 100 MG tablet  Commonly known as: ZYLOPRIM      Take 1 tablet by mouth 2 (Two) Times a Day.       ALPRAZolam 0.25 MG tablet  Commonly known as: XANAX      Take 1 tablet by mouth As Needed for Anxiety.       carvedilol 12.5 MG tablet  Commonly known as: COREG      Take 1 tablet by mouth 2 (Two) Times a Day With Meals.       Eliquis 5 MG tablet tablet  Generic drug: apixaban      Take 1 tablet by mouth 2 (Two) Times a Day.       escitalopram 5 MG tablet  Commonly known as: LEXAPRO      Take 1 tablet by mouth Every Night.       ferrous sulfate 325 (65 FE) MG tablet      Take 1 tablet by mouth Daily With Breakfast.       hydrALAZINE 100 MG tablet  Commonly known as: APRESOLINE      Take 1 tablet by mouth 3 (Three) Times a Day for 30 days.       pantoprazole 40 MG EC tablet  Commonly known as: PROTONIX      TAKE ONE TABLET BY MOUTH TWICE A DAY       rosuvastatin 10 MG tablet  Commonly known as: CRESTOR      Take 1 tablet by mouth Daily.              STOP taking these medications      NIFEdipine XL 60 MG 24 hr tablet  Commonly known as: Procardia XL  Stopped by: TERRI Huddleston                  Where to Get Your Medications        These medications were sent to [facility] [id number] - [place] - [address] - [phone]  - [phone] FX  [address] [address]      Phone: [phone]   pantoprazole 40 MG EC tablet       Information about where to get these medications is not yet available    Ask your nurse or doctor about these medications  cloNIDine 0.1 MG tablet           TERRI Huddleston  03/14/24  11:19 AM EDT

## 2024-03-15 ENCOUNTER — READMISSION MANAGEMENT (OUTPATIENT)
Dept: CALL CENTER | Facility: HOSPITAL | Age: 74
End: 2024-03-15
Payer: MEDICARE

## 2024-03-15 NOTE — OUTREACH NOTE
Medical Week 2 Survey      Flowsheet Row Responses   [facility], [place] patient discharged from? [facility]   Does the patient have one of the following disease processes/diagnoses(primary or secondary)? Other   Week 2 attempt successful? Yes   Call start time 1653   Discharge diagnosis Chest pain   Call end time 1656   Person spoke with today (if not patient) and relationship Patient   Meds reviewed with patient/caregiver? Yes   Is the patient having any side effects they believe may be caused by any medication additions or changes? No   Does the patient have all medications ordered at discharge? Yes   Is the patient taking all medications as directed (includes completed medication regime)? Yes   Comments regarding appointments Pt states she saw Cardiologist yesterday, 3/14/24. Another f/u appt scheduled for 4/15/24. GI f/u on 4/23/24.   Does the patient have a primary care provider?  Yes   Comments regarding PCP PCP, Dr. Alejandro.   Has the patient kept scheduled appointments due by today? Yes   Has home health visited the patient within 72 hours of discharge? N/A   Psychosocial issues? No   Comments Brief call. Pt states she is doing ok. Her b/p is still high and the Cardiologist is adjusting her medications. No palpitations, fluttering reported.   Did the patient receive a copy of their discharge instructions? Yes   Nursing interventions Reviewed instructions with patient   What is the patient's perception of their health status since discharge? Improving   Is the patient/caregiver able to teach back signs and symptoms related to disease process for when to call PCP? Yes   Is the patient/caregiver able to teach back signs and symptoms related to disease process for when to call 911? Yes   Is the patient/caregiver able to teach back the hierarchy of who to call/visit for symptoms/problems? PCP, Specialist, Home health nurse, Urgent Care, ED, 911 Yes   Week 2 Call Completed? Yes   Graduated Yes   Is the patient  interested in additional calls from an ambulatory ? No   Would this patient benefit from a Referral to [facility] Social Work? No   Graduated/Revoked comments Pt denies any needs or concerns. No questions. No further calls needed.   Call end time 2183            Mary ANSARI - Registered Nurse

## 2024-03-25 RX ORDER — CARVEDILOL 12.5 MG/1
12.5 TABLET ORAL 2 TIMES DAILY WITH MEALS
Qty: 180 TABLET | Refills: 2 | Status: SHIPPED | OUTPATIENT
Start: 2024-03-25

## 2024-03-27 ENCOUNTER — HOSPITAL ENCOUNTER (OUTPATIENT)
Dept: MAMMOGRAPHY | Facility: HOSPITAL | Age: 74
Discharge: HOME | End: 2024-03-27
Admitting: FAMILY MEDICINE
Payer: MEDICARE

## 2024-03-27 DIAGNOSIS — Z12.31 VISIT FOR SCREENING MAMMOGRAM: ICD-10-CM

## 2024-03-27 PROCEDURE — 77067 SCR MAMMO BI INCL CAD: CPT

## 2024-03-27 PROCEDURE — 77067 SCR MAMMO BI INCL CAD: CPT | Performed by: RADIOLOGY

## 2024-03-27 PROCEDURE — 77063 BREAST TOMOSYNTHESIS BI: CPT

## 2024-03-27 PROCEDURE — 77063 BREAST TOMOSYNTHESIS BI: CPT | Performed by: RADIOLOGY

## 2024-03-29 RX ORDER — CLONIDINE HYDROCHLORIDE 0.1 MG/1
0.2 TABLET ORAL 3 TIMES DAILY
Start: 2024-03-29

## 2024-04-01 ENCOUNTER — TRANSCRIBE ORDERS (OUTPATIENT)
Dept: ADMINISTRATIVE | Facility: HOSPITAL | Age: 74
End: 2024-04-01
Payer: MEDICARE

## 2024-04-01 DIAGNOSIS — R92.8 ABNORMAL MAMMOGRAM OF RIGHT BREAST: Primary | ICD-10-CM

## 2024-04-08 RX ORDER — APIXABAN 5 MG/1
5 TABLET, FILM COATED ORAL 2 TIMES DAILY
Qty: 60 TABLET | Refills: 4 | Status: SHIPPED | OUTPATIENT
Start: 2024-04-08

## 2024-04-15 ENCOUNTER — OFFICE VISIT (OUTPATIENT)
Age: 74
End: 2024-04-15
Payer: MEDICARE

## 2024-04-15 VITALS
HEIGHT: 62 IN | HEART RATE: 68 BPM | DIASTOLIC BLOOD PRESSURE: 70 MMHG | BODY MASS INDEX: 35.33 KG/M2 | WEIGHT: 192 LBS | SYSTOLIC BLOOD PRESSURE: 156 MMHG

## 2024-04-15 DIAGNOSIS — I25.118 CORONARY ARTERY DISEASE OF NATIVE ARTERY OF NATIVE HEART WITH STABLE ANGINA PECTORIS: Primary | ICD-10-CM

## 2024-04-15 PROCEDURE — 3077F SYST BP >= 140 MM HG: CPT | Performed by: INTERNAL MEDICINE

## 2024-04-15 PROCEDURE — 3078F DIAST BP <80 MM HG: CPT | Performed by: INTERNAL MEDICINE

## 2024-04-15 PROCEDURE — 99214 OFFICE O/P EST MOD 30 MIN: CPT | Performed by: INTERNAL MEDICINE

## 2024-04-15 PROCEDURE — 1160F RVW MEDS BY RX/DR IN RCRD: CPT | Performed by: INTERNAL MEDICINE

## 2024-04-15 PROCEDURE — 1159F MED LIST DOCD IN RCRD: CPT | Performed by: INTERNAL MEDICINE

## 2024-04-15 RX ORDER — FUROSEMIDE 40 MG/1
40 TABLET ORAL AS NEEDED
COMMUNITY
End: 2024-04-15 | Stop reason: SDUPTHER

## 2024-04-15 RX ORDER — AMLODIPINE BESYLATE 5 MG/1
5 TABLET ORAL 2 TIMES DAILY
Qty: 180 TABLET | Refills: 3 | Status: SHIPPED | OUTPATIENT
Start: 2024-04-15

## 2024-04-15 RX ORDER — CLONIDINE HYDROCHLORIDE 0.2 MG/1
0.2 TABLET ORAL 3 TIMES DAILY
COMMUNITY
Start: 2024-03-08

## 2024-04-15 RX ORDER — FUROSEMIDE 40 MG/1
40 TABLET ORAL DAILY
Start: 2024-04-15

## 2024-04-15 RX ORDER — AMLODIPINE BESYLATE 10 MG/1
5 TABLET ORAL DAILY
COMMUNITY
End: 2024-04-15 | Stop reason: SDUPTHER

## 2024-04-15 NOTE — PROGRESS NOTES
Subjective:     Encounter Date:04/15/2024      Patient ID: Valentine Arora is a 74 y.o. female.    Chief Complaint: CAD, HFpEF, HTN, AF  HPI:   74-year-old woman with coronary disease status post bypass, atrial flutter/fibrillation, HFpEF, obesity, PAD with prior intervention, type 2 diabetes.  In May 2023 she was diagnosed with atrial fibrillation and and has been on amiodarone since September 2023.  She was then admitted to [facility] in March 2024 with bradycardia and acute diastolic heart failure.  She then was admitted to [facility] with hypotension and A-fib/tachycardia.  During that hospitalization she was diuresed and cardioverted.  Medications were adjusted.  During that hospitalization we realized that when she is not in A-fib she is often asymptomatic and heart rates are not very fast.  She returns today.  She has been working with Zoya johnson nurse practitioner on her blood pressure.  Amlodipine has been uptitrated after nifedipine caused edema, no metoprolol switched to Coreg, back on 3 times daily clonidine and hydralazine.  She is taking Lasix as needed.  As far she knows she has had no further atrial fibrillation.  Her blood pressure is mostly in the 130s to 150s which is a great improvement.  She still has intermittent burning left-sided chest pain over the left breast.      Echo: May 2023, normal systolic function, mild LVH, normal diastolic function  Holter May 2023: Sinus rhythm with intermittent sinus bradycardia  Nuclear stress September 2023 diaphoretic attenuation artifact, normal perfusion EF greater than 70%  Holter September 2023 sinus rhythm with Mobitz 1  Holter January 2024: Sinus rhythm, heart rate range 35-81  Echo January 2024: Normal systolic function, normal diastolic dysfunction, normal PA pressure, dilated LA, moderate TR     The following portions of the patient's history were reviewed and updated as appropriate: allergies, current medications, past family  history, past medical history, past social history, past surgical history and problem list.     REVIEW OF SYSTEMS:   All systems reviewed.  Pertinent positives identified in HPI.  All other systems are negative.    Past Medical History:   Diagnosis Date    Arthritis     Atrial flutter, unspecified type 2023    Sarabia esophagus     CAD (coronary artery disease)     Chronic back pain     Colon polyp     GERD (gastroesophageal reflux disease)     Hyperlipidemia     Hypertension     Myocardial infarction         PAD (peripheral artery disease) 2019    Shingles     Type 2 diabetes mellitus        Family History   Problem Relation Age of Onset    Heart disease Mother     Heart disease Father     Colon cancer Neg Hx     Colon polyps Neg Hx     Breast cancer Neg Hx        Social History     Socioeconomic History    Marital status:    Tobacco Use    Smoking status: Former     Current packs/day: 0.00     Average packs/day: 0.5 packs/day for 20.0 years (10.0 ttl pk-yrs)     Types: Cigarettes     Start date:      Quit date:      Years since quittin.3    Smokeless tobacco: Never    Tobacco comments:     QUIT    Vaping Use    Vaping status: Never Used   Substance and Sexual Activity    Alcohol use: Not Currently     Comment: occasional    Drug use: No    Sexual activity: Defer       Allergies   Allergen Reactions    Codeine Itching    Other Unknown (See Comments)     Vicryl: doesn't heal       Past Surgical History:   Procedure Laterality Date    COLONOSCOPY      COLONOSCOPY N/A 2020    Procedure: COLONOSCOPY;  Surgeon: Mathew Roberts MD;  Location: [facility];  Service: Gastroenterology;  Laterality: N/A;  Descending colon polyp x 2, Ascending colon polyp, Sigmoid colon polyp, Rectal polyp    COLONOSCOPY N/A 2023    Procedure: COLONOSCOPY;  Surgeon: Mathew Roberts MD;  Location: [facility] OR;  Service: Gastroenterology;  Laterality: N/A;  Normal    CORONARY  ARTERY BYPASS GRAFT  1995    x 2 vessels    ENDOSCOPY N/A 6/24/2020    Procedure: ESOPHAGOGASTRODUODENOSCOPY;  Surgeon: Mathew Roberts MD;  Location: [facility] OR;  Service: Gastroenterology;  Laterality: N/A;  Ulcerative esophagitis, Gastritis, Duodenitis  Duodenal biopsy, gastric biopsy, distal esophageal biopsy    ENDOSCOPY N/A 9/17/2020    Procedure: ESOPHAGOGASTRODUODENOSCOPY with biopsies;  Surgeon: Mathew Roberts MD;  Location:  [facility] OR;  Service: Gastroenterology;  Laterality: N/A;  Esophagitis  Sarabia's Esophagus  Hiatal hernia  Gastritis  Gastric biopsy  Distal esophagus biopsy    ENDOSCOPY N/A 8/7/2023    Procedure: Esophagogastroduedenoscopy;  Surgeon: Mathew Roberts MD;  Location:  [facility] OR;  Service: Gastroenterology;  Laterality: N/A;  Gastritis; short segment Sarabia's; Esophageal stricture- dilatation; Biopsies- gastric, distal esophagus    INNER EAR SURGERY      JOINT REPLACEMENT      right knee    LEG SURGERY      x 2 s/p fall    LUMBAR FUSION      L4/L5    SKIN GRAFT      to left lower leg x 2    TONSILLECTOMY      WRIST FUSION Left        Procedures       Objective:         PHYSICAL EXAM:  GEN: VSS, no distress,   Eyes: normal sclera, normal lids and lashes  HENT: moist mucus membranes,   Respiratory: CTAB, no rales or wheezes  CV: RRR, no murmurs, , +2 DP and 2+ carotid pulses b/l  GI: NABS, soft,  Nontender, nondistended  MSK: no edema, no scoliosis or kyphosis  Skin: no rash, warm, dry  Heme/Lymph: no bruising or bleeding  Psych: organized thought, normal behavior and affect  Neuro: Cranial nerves grossly intact, Alert and Oriented x 3.         Assessment:          Diagnosis Plan   1. Coronary artery disease of native artery of native heart with stable angina pectoris  Case Request Cath Lab: Coronary angiography, Left heart catheterization, Left ventricular angiography, Cardiac Catheterization/Vascular Study             Plan:       1.  Atrial  fibrillation: Status post cardioversion March 2024.  Eliquis 5 twice daily.  Coreg.  Appears to be in sinus rhythm today.  2.  CAD: History of two-vessel bypass 1995.  Will repeat cardiac catheterization given ongoing left-sided chest burning and severe hypertension.  Hold Eliquis 48 hours in advance.  Risks and benefits discussed.  3.  Hypertension: Improved, has worsened over the last year.  Will make Lasix daily.  Continue all other medications.  4.  SVEN: Renal function improved compared to February.  Monitor.  5.  HFpEF: Prompted by atrial fibrillation, euvolemic on exam today.    Dr. Alejandro, thank you very much for referring this kind patient to me. Please call me with any questions or concerns.       Dai Suazo MD  04/15/24  [facility]    Outpatient Encounter Medications as of 4/15/2024   Medication Sig Dispense Refill    allopurinol (ZYLOPRIM) 100 MG tablet Take 1 tablet by mouth 2 (Two) Times a Day.      ALPRAZolam (XANAX) 0.25 MG tablet Take 1 tablet by mouth As Needed for Anxiety.      amiodarone (PACERONE) 200 MG tablet Take 1 tablet by mouth Daily. (Patient taking differently: Take 1 tablet by mouth Every Night.) 90 tablet 1    amLODIPine (NORVASC) 10 MG tablet Take 0.5 tablets by mouth Daily.      apixaban (Eliquis) 5 MG tablet tablet TAKE 1 TABLET BY MOUTH TWICE A DAY 60 tablet 4    carvedilol (COREG) 12.5 MG tablet Take 1 tablet by mouth 2 (Two) Times a Day With Meals. 180 tablet 2    cloNIDine (CATAPRES) 0.2 MG tablet Take 1 tablet by mouth 2 (Two) Times a Day.      clopidogrel (PLAVIX) 75 MG tablet TAKE ONE TABLET BY MOUTH DAILY (Patient taking differently: Take 1 tablet by mouth Every Night.) 90 tablet 3    escitalopram (LEXAPRO) 5 MG tablet Take 1 tablet by mouth Every Night.      ferrous sulfate 325 (65 FE) MG tablet Take 1 tablet by mouth Daily With Breakfast.      furosemide (LASIX) 40 MG tablet Take 1 tablet by mouth As Needed.      metFORMIN (GLUCOPHAGE) 500 MG tablet  Take 1 tablet by mouth 2 (Two) Times a Day With Meals.      pantoprazole (PROTONIX) 40 MG EC tablet TAKE ONE TABLET BY MOUTH TWICE A  tablet 3    rosuvastatin (CRESTOR) 10 MG tablet Take 1 tablet by mouth Daily.      hydrALAZINE (APRESOLINE) 100 MG tablet Take 1 tablet by mouth 3 (Three) Times a Day for 30 days. 90 tablet 0    [DISCONTINUED] carvedilol (COREG) 12.5 MG tablet Take 1 tablet by mouth 2 (Two) Times a Day With Meals. 60 tablet 0    [DISCONTINUED] cloNIDine (CATAPRES) 0.1 MG tablet Take 1 tablet by mouth 3 (Three) Times a Day.      [DISCONTINUED] cloNIDine (CATAPRES) 0.1 MG tablet Take 2 tablets by mouth 3 (Three) Times a Day.      [DISCONTINUED] Eliquis 5 MG tablet tablet Take 1 tablet by mouth 2 (Two) Times a Day.       No facility-administered encounter medications on file as of 4/15/2024.

## 2024-04-20 ENCOUNTER — APPOINTMENT (OUTPATIENT)
Dept: CT IMAGING | Facility: HOSPITAL | Age: 74
End: 2024-04-20
Payer: MEDICARE

## 2024-04-20 ENCOUNTER — APPOINTMENT (OUTPATIENT)
Dept: GENERAL RADIOLOGY | Facility: HOSPITAL | Age: 74
End: 2024-04-20
Payer: MEDICARE

## 2024-04-20 ENCOUNTER — HOSPITAL ENCOUNTER (OUTPATIENT)
Facility: HOSPITAL | Age: 74
Setting detail: OBSERVATION
Discharge: HOME | End: 2024-04-22
Attending: EMERGENCY MEDICINE | Admitting: HOSPITALIST
Payer: MEDICARE

## 2024-04-20 ENCOUNTER — TELEPHONE (OUTPATIENT)
Dept: CARDIOLOGY | Facility: CLINIC | Age: 74
End: 2024-04-20
Payer: MEDICARE

## 2024-04-20 DIAGNOSIS — H91.93 BILATERAL HEARING LOSS, UNSPECIFIED HEARING LOSS TYPE: ICD-10-CM

## 2024-04-20 DIAGNOSIS — R07.9 CHEST PAIN, UNSPECIFIED TYPE: Primary | ICD-10-CM

## 2024-04-20 LAB
ALBUMIN SERPL-MCNC: 4.5 G/DL (ref 3.5–5.2)
ALBUMIN/GLOB SERPL: 2.1 G/DL
ALP SERPL-CCNC: 66 U/L (ref 39–117)
ALT SERPL W P-5'-P-CCNC: 20 U/L (ref 1–33)
ANION GAP SERPL CALCULATED.3IONS-SCNC: 13.7 MMOL/L (ref 5–15)
AST SERPL-CCNC: 19 U/L (ref 1–32)
BASOPHILS # BLD AUTO: 0.04 10*3/MM3 (ref 0–0.2)
BASOPHILS NFR BLD AUTO: 0.7 % (ref 0–1.5)
BILIRUB SERPL-MCNC: 0.3 MG/DL (ref 0–1.2)
BUN SERPL-MCNC: 20 MG/DL (ref 8–23)
BUN/CREAT SERPL: 17.4 (ref 7–25)
CALCIUM SPEC-SCNC: 9.4 MG/DL (ref 8.6–10.5)
CHLORIDE SERPL-SCNC: 99 MMOL/L (ref 98–107)
CO2 SERPL-SCNC: 24.3 MMOL/L (ref 22–29)
CREAT SERPL-MCNC: 1.15 MG/DL (ref 0.57–1)
DEPRECATED RDW RBC AUTO: 43.1 FL (ref 37–54)
EGFRCR SERPLBLD CKD-EPI 2021: 50.4 ML/MIN/1.73
EOSINOPHIL # BLD AUTO: 0.12 10*3/MM3 (ref 0–0.4)
EOSINOPHIL NFR BLD AUTO: 2 % (ref 0.3–6.2)
ERYTHROCYTE [DISTWIDTH] IN BLOOD BY AUTOMATED COUNT: 13.5 % (ref 12.3–15.4)
GEN 5 2HR TROPONIN T REFLEX: 15 NG/L
GLOBULIN UR ELPH-MCNC: 2.1 GM/DL
GLUCOSE SERPL-MCNC: 112 MG/DL (ref 65–99)
HCT VFR BLD AUTO: 33.4 % (ref 34–46.6)
HGB BLD-MCNC: 10.6 G/DL (ref 12–15.9)
HOLD SPECIMEN: NORMAL
HOLD SPECIMEN: NORMAL
IMM GRANULOCYTES # BLD AUTO: 0.03 10*3/MM3 (ref 0–0.05)
IMM GRANULOCYTES NFR BLD AUTO: 0.5 % (ref 0–0.5)
LYMPHOCYTES # BLD AUTO: 0.94 10*3/MM3 (ref 0.7–3.1)
LYMPHOCYTES NFR BLD AUTO: 15.5 % (ref 19.6–45.3)
MCH RBC QN AUTO: 27.7 PG (ref 26.6–33)
MCHC RBC AUTO-ENTMCNC: 31.7 G/DL (ref 31.5–35.7)
MCV RBC AUTO: 87.4 FL (ref 79–97)
MONOCYTES # BLD AUTO: 0.38 10*3/MM3 (ref 0.1–0.9)
MONOCYTES NFR BLD AUTO: 6.3 % (ref 5–12)
NEUTROPHILS NFR BLD AUTO: 4.55 10*3/MM3 (ref 1.7–7)
NEUTROPHILS NFR BLD AUTO: 75 % (ref 42.7–76)
NRBC BLD AUTO-RTO: 0 /100 WBC (ref 0–0.2)
PLATELET # BLD AUTO: 390 10*3/MM3 (ref 140–450)
PMV BLD AUTO: 8.1 FL (ref 6–12)
POTASSIUM SERPL-SCNC: 4.1 MMOL/L (ref 3.5–5.2)
PROT SERPL-MCNC: 6.6 G/DL (ref 6–8.5)
QT INTERVAL: 452 MS
QTC INTERVAL: 474 MS
RBC # BLD AUTO: 3.82 10*6/MM3 (ref 3.77–5.28)
SODIUM SERPL-SCNC: 137 MMOL/L (ref 136–145)
TROPONIN T DELTA: -3 NG/L
TROPONIN T SERPL HS-MCNC: 18 NG/L
WBC NRBC COR # BLD AUTO: 6.06 10*3/MM3 (ref 3.4–10.8)
WHOLE BLOOD HOLD COAG: NORMAL
WHOLE BLOOD HOLD SPECIMEN: NORMAL

## 2024-04-20 PROCEDURE — G0378 HOSPITAL OBSERVATION PER HR: HCPCS

## 2024-04-20 PROCEDURE — 93005 ELECTROCARDIOGRAM TRACING: CPT

## 2024-04-20 PROCEDURE — 99285 EMERGENCY DEPT VISIT HI MDM: CPT

## 2024-04-20 PROCEDURE — 25810000003 SODIUM CHLORIDE 0.9 % SOLUTION: Performed by: HOSPITALIST

## 2024-04-20 PROCEDURE — 36415 COLL VENOUS BLD VENIPUNCTURE: CPT | Performed by: EMERGENCY MEDICINE

## 2024-04-20 PROCEDURE — 93005 ELECTROCARDIOGRAM TRACING: CPT | Performed by: EMERGENCY MEDICINE

## 2024-04-20 PROCEDURE — 71045 X-RAY EXAM CHEST 1 VIEW: CPT

## 2024-04-20 PROCEDURE — 84484 ASSAY OF TROPONIN QUANT: CPT | Performed by: EMERGENCY MEDICINE

## 2024-04-20 PROCEDURE — 96361 HYDRATE IV INFUSION ADD-ON: CPT

## 2024-04-20 PROCEDURE — 93010 ELECTROCARDIOGRAM REPORT: CPT | Performed by: INTERNAL MEDICINE

## 2024-04-20 PROCEDURE — 96360 HYDRATION IV INFUSION INIT: CPT

## 2024-04-20 PROCEDURE — 70450 CT HEAD/BRAIN W/O DYE: CPT

## 2024-04-20 PROCEDURE — 85025 COMPLETE CBC W/AUTO DIFF WBC: CPT | Performed by: EMERGENCY MEDICINE

## 2024-04-20 PROCEDURE — 80053 COMPREHEN METABOLIC PANEL: CPT | Performed by: EMERGENCY MEDICINE

## 2024-04-20 RX ORDER — AMLODIPINE BESYLATE 10 MG/1
10 TABLET ORAL DAILY
Status: DISCONTINUED | OUTPATIENT
Start: 2024-04-20 | End: 2024-04-21

## 2024-04-20 RX ORDER — ASPIRIN 81 MG/1
81 TABLET, CHEWABLE ORAL DAILY
Status: DISCONTINUED | OUTPATIENT
Start: 2024-04-20 | End: 2024-04-22 | Stop reason: HOSPADM

## 2024-04-20 RX ORDER — CLOPIDOGREL BISULFATE 75 MG/1
75 TABLET ORAL DAILY
Status: DISCONTINUED | OUTPATIENT
Start: 2024-04-20 | End: 2024-04-22 | Stop reason: HOSPADM

## 2024-04-20 RX ORDER — CARVEDILOL 12.5 MG/1
12.5 TABLET ORAL 2 TIMES DAILY WITH MEALS
Status: DISCONTINUED | OUTPATIENT
Start: 2024-04-20 | End: 2024-04-22 | Stop reason: HOSPADM

## 2024-04-20 RX ORDER — AMIODARONE HYDROCHLORIDE 200 MG/1
200 TABLET ORAL NIGHTLY
Status: DISCONTINUED | OUTPATIENT
Start: 2024-04-20 | End: 2024-04-22 | Stop reason: HOSPADM

## 2024-04-20 RX ORDER — SODIUM CHLORIDE 0.9 % (FLUSH) 0.9 %
10 SYRINGE (ML) INJECTION AS NEEDED
Status: DISCONTINUED | OUTPATIENT
Start: 2024-04-20 | End: 2024-04-22

## 2024-04-20 RX ORDER — SODIUM CHLORIDE 9 MG/ML
75 INJECTION, SOLUTION INTRAVENOUS CONTINUOUS
Status: DISCONTINUED | OUTPATIENT
Start: 2024-04-20 | End: 2024-04-21

## 2024-04-20 RX ORDER — HYDRALAZINE HYDROCHLORIDE 50 MG/1
100 TABLET, FILM COATED ORAL 3 TIMES DAILY
Status: DISCONTINUED | OUTPATIENT
Start: 2024-04-20 | End: 2024-04-22 | Stop reason: HOSPADM

## 2024-04-20 RX ORDER — ASPIRIN 325 MG
325 TABLET ORAL ONCE
Status: DISCONTINUED | OUTPATIENT
Start: 2024-04-20 | End: 2024-04-20

## 2024-04-20 RX ORDER — CLONIDINE HYDROCHLORIDE 0.1 MG/1
0.2 TABLET ORAL 3 TIMES DAILY
Status: DISCONTINUED | OUTPATIENT
Start: 2024-04-20 | End: 2024-04-22 | Stop reason: HOSPADM

## 2024-04-20 RX ORDER — PANTOPRAZOLE SODIUM 40 MG/1
40 TABLET, DELAYED RELEASE ORAL
Status: DISCONTINUED | OUTPATIENT
Start: 2024-04-21 | End: 2024-04-21

## 2024-04-20 RX ADMIN — ASPIRIN 81 MG: 81 TABLET, CHEWABLE ORAL at 20:53

## 2024-04-20 RX ADMIN — CARVEDILOL 12.5 MG: 12.5 TABLET, FILM COATED ORAL at 21:53

## 2024-04-20 RX ADMIN — HYDRALAZINE HYDROCHLORIDE 100 MG: 50 TABLET ORAL at 21:53

## 2024-04-20 RX ADMIN — AMIODARONE HYDROCHLORIDE 200 MG: 200 TABLET ORAL at 22:52

## 2024-04-20 RX ADMIN — CLOPIDOGREL BISULFATE 75 MG: 75 TABLET, FILM COATED ORAL at 20:53

## 2024-04-20 RX ADMIN — APIXABAN 5 MG: 5 TABLET, FILM COATED ORAL at 21:53

## 2024-04-20 RX ADMIN — CLONIDINE HYDROCHLORIDE 0.2 MG: 0.1 TABLET ORAL at 21:53

## 2024-04-20 RX ADMIN — AMLODIPINE BESYLATE 10 MG: 10 TABLET ORAL at 20:53

## 2024-04-20 RX ADMIN — SODIUM CHLORIDE 75 ML/HR: 9 INJECTION, SOLUTION INTRAVENOUS at 20:53

## 2024-04-20 NOTE — TELEPHONE ENCOUNTER
"I spoke with Ms. Arora at 2:20 pm. She reports that had an \"a-fib attack\" this morning around 8:45 am. She noted her blood pressure to be 195/86, HR in the 70's. The afib lasted for about 30 minutes. She had multiple episodes of vomiting then suddenly lost hearing in her \"good\" ear. She is nearly deaf in 1 ear at baseline. Her blood pressure at 1:30 pm was 190/88.     She feels that she may need to go to the emergency room due to her sudden decrease in hearing. I agreed and she reported that she would be coming to the [facility] ED.     Amee Dunn, APRN   "

## 2024-04-20 NOTE — ED NOTES
"Pt experienced left chest \"burning\" this am.  She vomited x 4.  She lost hearing in her right ear and can hardly hear in left ear post incident.  She reports afib hx  "

## 2024-04-20 NOTE — PLAN OF CARE
Goal Outcome Evaluation:              Outcome Evaluation: Arrival from ED, denies any pain/discomfort/chest pain/burning. A/ox4, able to voice own needs and wants. Up with SBA. Maintaining sats on RA. Pt can not hear out of R ear and very limited hearing out of L. Pt verbalizes understanding of NPO status. Skin intact. Safety maintained

## 2024-04-20 NOTE — ED PROVIDER NOTES
EMERGENCY DEPARTMENT ENCOUNTER    Room Number:  37/37  PCP: Aliya Alejandro MD  Historian: Patient      HPI:  Chief Complaint: Chest pain and hearing loss  A complete HPI/ROS/PMH/PSH/SH/FH are unobtainable due to: None  Context: Valentine Arora is a 74 y.o. female who presents to the ED c/o chest pain and hearing loss.  Patient has history of intermittent atrial fibrillation.  Patient has history of coronary disease.  Patient is scheduled for cardiac catheterization next week.  States she had episode of A-fib today and started having left-sided chest pain.  States it burned and then went into her arm.  She vomited.  Patient states since that episode she has lost complete hearing in her right ear and partial hearing in her left ear.  Patient has no focal weakness or numbness.  Does not feel congested.  No vomiting or diarrhea.            PAST MEDICAL HISTORY  Active Ambulatory Problems     Diagnosis Date Noted    Coronary artery disease involving coronary bypass graft of native heart     Hypertension     Hyperlipidemia     Morbidly obese 09/11/2019    PAD (peripheral artery disease) 09/11/2019    Esophageal dysphagia 05/22/2020    Encounter for screening for malignant neoplasm of colon 05/22/2020    Sarabia's esophagus with dysplasia 08/05/2020    Gastroesophageal reflux disease without esophagitis 10/20/2022    Personal history of colonic polyps 05/08/2023    Atrial flutter, unspecified type 05/17/2023    Irritable bowel syndrome with constipation 10/20/2023    Chest pain 02/25/2024    Chronic kidney disease, stage 3a 03/01/2024    Coronary artery disease of native artery of native heart with stable angina pectoris 04/15/2024     Resolved Ambulatory Problems     Diagnosis Date Noted    Myocardial infarction     S/P CABG (coronary artery bypass graft) 01/19/2017    Ulcer of esophagus without bleeding 08/05/2020    Dehydration with hyponatremia 06/15/2021    Esophageal obstruction 05/08/2023    Chest  discomfort 05/21/2023    Palpitations 09/10/2023     Past Medical History:   Diagnosis Date    Arthritis     Sarabia esophagus     CAD (coronary artery disease)     Chronic back pain     Colon polyp     GERD (gastroesophageal reflux disease)     Shingles     Type 2 diabetes mellitus          PAST SURGICAL HISTORY  Past Surgical History:   Procedure Laterality Date    COLONOSCOPY      COLONOSCOPY N/A 6/24/2020    Procedure: COLONOSCOPY;  Surgeon: Mathew Roberts MD;  Location: [facility] OR;  Service: Gastroenterology;  Laterality: N/A;  Descending colon polyp x 2, Ascending colon polyp, Sigmoid colon polyp, Rectal polyp    COLONOSCOPY N/A 8/7/2023    Procedure: COLONOSCOPY;  Surgeon: Mathew Roberts MD;  Location: [facility] OR;  Service: Gastroenterology;  Laterality: N/A;  Normal    CORONARY ARTERY BYPASS GRAFT  1995    x 2 vessels    ENDOSCOPY N/A 6/24/2020    Procedure: ESOPHAGOGASTRODUODENOSCOPY;  Surgeon: Mathew Roberts MD;  Location: [facility] OR;  Service: Gastroenterology;  Laterality: N/A;  Ulcerative esophagitis, Gastritis, Duodenitis  Duodenal biopsy, gastric biopsy, distal esophageal biopsy    ENDOSCOPY N/A 9/17/2020    Procedure: ESOPHAGOGASTRODUODENOSCOPY with biopsies;  Surgeon: Mathew Roberts MD;  Location: [facility] OR;  Service: Gastroenterology;  Laterality: N/A;  Esophagitis  Sarabia's Esophagus  Hiatal hernia  Gastritis  Gastric biopsy  Distal esophagus biopsy    ENDOSCOPY N/A 8/7/2023    Procedure: Esophagogastroduedenoscopy;  Surgeon: Mathew Roberts MD;  Location: [facility] OR;  Service: Gastroenterology;  Laterality: N/A;  Gastritis; short segment Sarabia's; Esophageal stricture- dilatation; Biopsies- gastric, distal esophagus    INNER EAR SURGERY      JOINT REPLACEMENT      right knee    LEG SURGERY      x 2 s/p fall    LUMBAR FUSION      L4/L5    SKIN GRAFT      to left lower leg x 2    TONSILLECTOMY      WRIST FUSION Left          FAMILY  HISTORY  Family History   Problem Relation Age of Onset    Heart disease Mother     Heart disease Father     Colon cancer Neg Hx     Colon polyps Neg Hx     Breast cancer Neg Hx          SOCIAL HISTORY  Social History     Socioeconomic History    Marital status:    Tobacco Use    Smoking status: Former     Current packs/day: 0.00     Average packs/day: 0.5 packs/day for 20.0 years (10.0 ttl pk-yrs)     Types: Cigarettes     Start date:      Quit date:      Years since quittin.3    Smokeless tobacco: Never    Tobacco comments:     QUIT    Vaping Use    Vaping status: Never Used   Substance and Sexual Activity    Alcohol use: Not Currently     Comment: occasional    Drug use: No    Sexual activity: Defer         ALLERGIES  Codeine and Other        REVIEW OF SYSTEMS  Review of Systems   Hearing loss chest pain      PHYSICAL EXAM  ED Triage Vitals [24 1433]   Temp Heart Rate Resp BP SpO2   98.4 °F (36.9 °C) 87 20 -- 93 %      Temp src Heart Rate Source Patient Position BP Location FiO2 (%)   Tympanic Monitor -- -- --       Physical Exam      GENERAL: no acute distress  HENT: nares patent.  Significant decreased hearing right ear  EYES: no scleral icterus  CV: regular rhythm, normal rate  RESPIRATORY: normal effort  ABDOMEN: soft  MUSCULOSKELETAL: no deformity  NEURO: alert, moves all extremities, follows commands  PSYCH:  calm, cooperative  SKIN: warm, dry    Vital signs and nursing notes reviewed.          LAB RESULTS  Recent Results (from the past 24 hour(s))   ECG 12 Lead Other; burning    Collection Time: 24  2:36 PM   Result Value Ref Range    QT Interval 452 ms    QTC Interval 474 ms   Comprehensive Metabolic Panel    Collection Time: 24  2:58 PM    Specimen: Blood   Result Value Ref Range    Glucose 112 (H) 65 - 99 mg/dL    BUN 20 8 - 23 mg/dL    Creatinine 1.15 (H) 0.57 - 1.00 mg/dL    Sodium 137 136 - 145 mmol/L    Potassium 4.1 3.5 - 5.2 mmol/L    Chloride 99 98 - 107  mmol/L    CO2 24.3 22.0 - 29.0 mmol/L    Calcium 9.4 8.6 - 10.5 mg/dL    Total Protein 6.6 6.0 - 8.5 g/dL    Albumin 4.5 3.5 - 5.2 g/dL    ALT (SGPT) 20 1 - 33 U/L    AST (SGOT) 19 1 - 32 U/L    Alkaline Phosphatase 66 39 - 117 U/L    Total Bilirubin 0.3 0.0 - 1.2 mg/dL    Globulin 2.1 gm/dL    A/G Ratio 2.1 g/dL    BUN/Creatinine Ratio 17.4 7.0 - 25.0    Anion Gap 13.7 5.0 - 15.0 mmol/L    eGFR 50.4 (L) >60.0 mL/min/1.73   High Sensitivity Troponin T    Collection Time: 04/20/24  2:58 PM    Specimen: Blood   Result Value Ref Range    HS Troponin T 18 (H) <14 ng/L   Green Top (Gel)    Collection Time: 04/20/24  2:58 PM   Result Value Ref Range    Extra Tube Hold for add-ons.    Lavender Top    Collection Time: 04/20/24  2:58 PM   Result Value Ref Range    Extra Tube hold for add-on    Gold Top - SST    Collection Time: 04/20/24  2:58 PM   Result Value Ref Range    Extra Tube Hold for add-ons.    Light Blue Top    Collection Time: 04/20/24  2:58 PM   Result Value Ref Range    Extra Tube Hold for add-ons.    CBC Auto Differential    Collection Time: 04/20/24  2:58 PM    Specimen: Blood   Result Value Ref Range    WBC 6.06 3.40 - 10.80 10*3/mm3    RBC 3.82 3.77 - 5.28 10*6/mm3    Hemoglobin 10.6 (L) 12.0 - 15.9 g/dL    Hematocrit 33.4 (L) 34.0 - 46.6 %    MCV 87.4 79.0 - 97.0 fL    MCH 27.7 26.6 - 33.0 pg    MCHC 31.7 31.5 - 35.7 g/dL    RDW 13.5 12.3 - 15.4 %    RDW-SD 43.1 37.0 - 54.0 fl    MPV 8.1 6.0 - 12.0 fL    Platelets 390 140 - 450 10*3/mm3    Neutrophil % 75.0 42.7 - 76.0 %    Lymphocyte % 15.5 (L) 19.6 - 45.3 %    Monocyte % 6.3 5.0 - 12.0 %    Eosinophil % 2.0 0.3 - 6.2 %    Basophil % 0.7 0.0 - 1.5 %    Immature Grans % 0.5 0.0 - 0.5 %    Neutrophils, Absolute 4.55 1.70 - 7.00 10*3/mm3    Lymphocytes, Absolute 0.94 0.70 - 3.10 10*3/mm3    Monocytes, Absolute 0.38 0.10 - 0.90 10*3/mm3    Eosinophils, Absolute 0.12 0.00 - 0.40 10*3/mm3    Basophils, Absolute 0.04 0.00 - 0.20 10*3/mm3    Immature Grans,  Absolute 0.03 0.00 - 0.05 10*3/mm3    nRBC 0.0 0.0 - 0.2 /100 WBC       Ordered the above labs and reviewed the results.        RADIOLOGY  CT Head Without Contrast    Result Date: 4/20/2024  CT OF THE BRAIN WITHOUT CONTRAST 04/20/2024  HISTORY: Hearing loss.  TECHNIQUE: Axial images were obtained through the brain without intravenous contrast.  FINDINGS: There is mild to moderate diffuse atrophy. There is no evidence of acute infarction, hemorrhage, midline shift or mass effect.  No bony abnormalities are seen. There is minimal mucosal thickening in the sphenoid sinus.      1. No acute process.  Radiation dose reduction techniques were utilized, including automated exposure control and exposure modulation based on body size.       XR Chest 1 View    Result Date: 4/20/2024  XR CHEST 1 VW-4/20/2024  HISTORY: Chest pain.  Heart size is mildly enlarged. Lungs appear free of acute infiltrates. Sternotomy wires are seen. There is some aortic calcification.      1. Mild cardiomegaly.   This report was finalized on 4/20/2024 2:58 PM by Dr. Sahil Erickson M.D on Workstation: [id number]       Ordered the above noted radiological studies.  Chest x-ray independently interpreted by me and shows no evidence of pneumonia          PROCEDURES  Procedures    EKG          EKG time: 1436  Rhythm/Rate: Normal sinus rhythm 66  P waves and IA: Normal P waves  QRS, axis: Normal QRS  ST and T waves: Nonspecific ST-T wave    Interpreted Contemporaneously by me, independently viewed  Unchanged compared to prior 3/4/2024      MEDICATIONS GIVEN IN ER  Medications   sodium chloride 0.9 % flush 10 mL (has no administration in time range)                   MEDICAL DECISION MAKING, PROGRESS, and CONSULTS    All labs have been independently reviewed by me.  All radiology studies have been reviewed by me and I have also reviewed the radiology report.   EKG's independently viewed and interpreted by me.  Discussion below represents my analysis of  pertinent findings related to patient's condition, differential diagnosis, treatment plan and final disposition.      Additional sources:  - Discussed/ obtained information from independent historians: None    - External (non-ED) record review: Epic reviewed patient seen by [facility] 4/15/2024 for coronary disease    - Chronic or social conditions impacting care: None     - Shared decision making: None      Orders placed during this visit:  Orders Placed This Encounter   Procedures    XR Chest 1 View    CT Head Without Contrast    [place] Draw    Comprehensive Metabolic Panel    High Sensitivity Troponin T    CBC Auto Differential    High Sensitivity Troponin T 2Hr    NPO Diet NPO Type: Strict NPO    Undress & Gown    Continuous Pulse Oximetry    ENT (on-call MD unless specified)    LIPPS (on-call MD unless specified)    Oxygen Therapy- Nasal Cannula; Titrate 1-6 LPM Per SpO2; 90 - 95%    ECG 12 Lead Other; burning    ECG 12 Lead ED Triage Standing Order; Chest Pain    ECG 12 Lead ED Triage Standing Order; Chest Pain    Insert Peripheral IV    Initiate Observation Status    CBC & Differential    Green Top (Gel)    Lavender Top    Gold Top - SST    Light Blue Top         Additional orders considered but not ordered:  None        Differential diagnosis includes but is not limited to:    ACS versus noncardiac chest pain      Independent interpretation of labs, radiology studies, and discussions with consultants:  ED Course as of 04/20/24 1618   Sat Apr 20, 2024   1616 16:16 EDT  Patient with chest pain causing her to vomit that is resolved.  Patient is scheduled for cardiac catheterization next week.  Discussed with Dr. Hines will admit.  Patient's hearing loss is on and tympanic membrane's look normal.  Will consult ENT [SL]      ED Course User Index  [SL] Walt Perkins MD                 DIAGNOSIS  Final diagnoses:   Chest pain, unspecified type   Bilateral hearing loss, unspecified hearing loss type          DISPOSITION  admit            Latest Documented Vital Signs:  As of 16:18 EDT  BP- 170/67 HR- 64 Temp- 98.4 °F (36.9 °C) (Tympanic) O2 sat- 98%              --    Please note that portions of this were completed with a voice recognition program.       Note Disclaimer: At [facility], we believe that sharing information builds trust and better relationships. You are receiving this note because you are receiving care at [facility] or recently visited. It is possible you will see health information before a provider has talked with you about it. This kind of information can be easy to misunderstand. To help you fully understand what it means for your health, we urge you to discuss this note with your provider.            Walt Perkins MD  04/20/24 2791

## 2024-04-20 NOTE — TELEPHONE ENCOUNTER
"  Ms. Arora paged the answering service.      On the message that said the following: \"Patient had A-fib attack at 8:45 AM.  Blood pressure 195/86.  After vomiting 4 times, she is having trouble earring.  She is already deaf in 1 ear.  Blood pressure is now 190/88).    I tried calling patient to evaluate her symptoms.  There was no answer.  I left a message.  I will try back where she needs to call the answering service again.  "

## 2024-04-20 NOTE — CONSULTS
[facility]  ENT CONSULT NOTE  2024    Patient Identification:  Name: Valentine Arora  Age: 74 y.o.  Sex: female  :  1950  MRN: [id number]                     Date of Admission: 2024      CC:    Acute hearing loss.     Subjective     HPI:   Patient has a history of otosclerosis. She underwent stapedotomy in the past on the right. She had an exacerbation of her atrial fibrillation which caused violent vomiting. She then developed sudden bilateral hearing loss. She wears hearing aids bilaterally. She denies any pain or otorrhea.    ROS:  Review of Systems - History obtained from the patient    HISTORY      Past Medical History:   Diagnosis Date    Arthritis     Atrial flutter, unspecified type 2023    Sarabia esophagus     CAD (coronary artery disease)     Chronic back pain     Colon polyp     GERD (gastroesophageal reflux disease)     Hyperlipidemia     Hypertension     Myocardial infarction         PAD (peripheral artery disease) 2019    Shingles     Type 2 diabetes mellitus         Past Surgical History:   Procedure Laterality Date    COLONOSCOPY      COLONOSCOPY N/A 2020    Procedure: COLONOSCOPY;  Surgeon: Mathew Roberts MD;  Location: [facility] OR;  Service: Gastroenterology;  Laterality: N/A;  Descending colon polyp x 2, Ascending colon polyp, Sigmoid colon polyp, Rectal polyp    COLONOSCOPY N/A 2023    Procedure: COLONOSCOPY;  Surgeon: Mathew Roberts MD;  Location: [facility] OR;  Service: Gastroenterology;  Laterality: N/A;  Normal    CORONARY ARTERY BYPASS GRAFT  1995    x 2 vessels    ENDOSCOPY N/A 2020    Procedure: ESOPHAGOGASTRODUODENOSCOPY;  Surgeon: Mathew Roberts MD;  Location: [facility] OR;  Service: Gastroenterology;  Laterality: N/A;  Ulcerative esophagitis, Gastritis, Duodenitis  Duodenal biopsy, gastric biopsy, distal esophageal biopsy    ENDOSCOPY N/A 2020    Procedure: ESOPHAGOGASTRODUODENOSCOPY with  biopsies;  Surgeon: Mathew Roberts MD;  Location:  [facility] OR;  Service: Gastroenterology;  Laterality: N/A;  Esophagitis  Sarabia's Esophagus  Hiatal hernia  Gastritis  Gastric biopsy  Distal esophagus biopsy    ENDOSCOPY N/A 2023    Procedure: Esophagogastroduedenoscopy;  Surgeon: Mathew Roberts MD;  Location:  [facility] OR;  Service: Gastroenterology;  Laterality: N/A;  Gastritis; short segment Sarabia's; Esophageal stricture- dilatation; Biopsies- gastric, distal esophagus    INNER EAR SURGERY      JOINT REPLACEMENT      right knee    LEG SURGERY      x 2 s/p fall    LUMBAR FUSION      L4/L5    SKIN GRAFT      to left lower leg x 2    TONSILLECTOMY      WRIST FUSION Left         Social History     Socioeconomic History    Marital status:    Tobacco Use    Smoking status: Former     Current packs/day: 0.00     Average packs/day: 0.5 packs/day for 20.0 years (10.0 ttl pk-yrs)     Types: Cigarettes     Start date:      Quit date:      Years since quittin.3    Smokeless tobacco: Never    Tobacco comments:     QUIT    Vaping Use    Vaping status: Never Used   Substance and Sexual Activity    Alcohol use: Not Currently     Comment: occasional    Drug use: No    Sexual activity: Defer      (Not in a hospital admission)       Allergies   Allergen Reactions    Codeine Itching    Other Unknown (See Comments)     Vicryl: doesn't heal        Immunization History   Administered Date(s) Administered    COVID-19 (PFIZER) Purple Cap Monovalent 2021, 2021, 10/06/2021        Family History   Problem Relation Age of Onset    Heart disease Mother     Heart disease Father     Colon cancer Neg Hx     Colon polyps Neg Hx     Breast cancer Neg Hx           Objective     PE:    Temp:  [98.4 °F (36.9 °C)] 98.4 °F (36.9 °C)  Heart Rate:  [62-87] 63  Resp:  [20] 20  BP: (155-204)/(61-81) 155/69   Body mass index is 34.75 kg/m².     General appearance: alert, well appearing, and in  no distress.   Ability to Communicate: normal means of communication, clear voice, normal hearing   Ears - bilateral TM's and external ear canals normal.   Nasal exam - normal and patent, no erythema, discharge or polyps.   Oropharyngeal exam - mucous membranes moist, pharynx normal without lesions.   Neck exam - supple, no significant adenopathy.   CVS exam: normal rate, regular rhythm, normal S1, S2, no murmurs, rubs, clicks or gallops.   Chest: clear to auscultation, no wheezes, rales or rhonchi, symmetric air entry.   Neurological exam reveals alert, oriented, normal speech, no focal findings or movement disorder noted.    DATA      MEDICATIONS     Current Facility-Administered Medications   Medication Dose Route Frequency Provider Last Rate Last Admin    sodium chloride 0.9 % flush 10 mL  10 mL Intravenous PRN Walt Perkins MD         Current Outpatient Medications   Medication Sig Dispense Refill    allopurinol (ZYLOPRIM) 100 MG tablet Take 1 tablet by mouth 2 (Two) Times a Day.      ALPRAZolam (XANAX) 0.25 MG tablet Take 1 tablet by mouth As Needed for Anxiety.      amiodarone (PACERONE) 200 MG tablet Take 1 tablet by mouth Daily. (Patient taking differently: Take 1 tablet by mouth Every Night.) 90 tablet 1    amLODIPine (NORVASC) 5 MG tablet Take 1 tablet by mouth 2 (Two) Times a Day. 180 tablet 3    apixaban (Eliquis) 5 MG tablet tablet TAKE 1 TABLET BY MOUTH TWICE A DAY 60 tablet 4    carvedilol (COREG) 12.5 MG tablet Take 1 tablet by mouth 2 (Two) Times a Day With Meals. 180 tablet 2    cloNIDine (CATAPRES) 0.2 MG tablet Take 1 tablet by mouth 2 (Two) Times a Day.      clopidogrel (PLAVIX) 75 MG tablet TAKE ONE TABLET BY MOUTH DAILY (Patient taking differently: Take 1 tablet by mouth Every Night.) 90 tablet 3    escitalopram (LEXAPRO) 5 MG tablet Take 1 tablet by mouth Every Night.      ferrous sulfate 325 (65 FE) MG tablet Take 1 tablet by mouth Daily With Breakfast.      furosemide (LASIX) 40 MG  tablet Take 1 tablet by mouth Daily.      hydrALAZINE (APRESOLINE) 100 MG tablet Take 1 tablet by mouth 3 (Three) Times a Day for 30 days. 90 tablet 0    metFORMIN (GLUCOPHAGE) 500 MG tablet Take 1 tablet by mouth 2 (Two) Times a Day With Meals.      pantoprazole (PROTONIX) 40 MG EC tablet TAKE ONE TABLET BY MOUTH TWICE A  tablet 3    rosuvastatin (CRESTOR) 10 MG tablet Take 1 tablet by mouth Daily.          No intake or output data in the 24 hours ending 04/20/24 1741       WBC   Date Value Ref Range Status   04/20/2024 6.06 3.40 - 10.80 10*3/mm3 Final     Hemoglobin   Date Value Ref Range Status   04/20/2024 10.6 (L) 12.0 - 15.9 g/dL Final     Hematocrit   Date Value Ref Range Status   04/20/2024 33.4 (L) 34.0 - 46.6 % Final     Platelets   Date Value Ref Range Status   04/20/2024 390 140 - 450 10*3/mm3 Final     Sodium   Date Value Ref Range Status   04/20/2024 137 136 - 145 mmol/L Final     Potassium   Date Value Ref Range Status   04/20/2024 4.1 3.5 - 5.2 mmol/L Final     Chloride   Date Value Ref Range Status   04/20/2024 99 98 - 107 mmol/L Final     CO2   Date Value Ref Range Status   04/20/2024 24.3 22.0 - 29.0 mmol/L Final     BUN   Date Value Ref Range Status   04/20/2024 20 8 - 23 mg/dL Final     Creatinine   Date Value Ref Range Status   04/20/2024 1.15 (H) 0.57 - 1.00 mg/dL Final     Glucose   Date Value Ref Range Status   04/20/2024 112 (H) 65 - 99 mg/dL Final           Imaging Results (All)       Procedure Component Value Units Date/Time    CT Head Without Contrast [[id number]] Collected: 04/20/24 1604     Updated: 04/20/24 1605    Narrative:      CT OF THE BRAIN WITHOUT CONTRAST 04/20/2024     HISTORY: Hearing loss.     TECHNIQUE: Axial images were obtained through the brain without  intravenous contrast.     FINDINGS: There is mild to moderate diffuse atrophy. There is no  evidence of acute infarction, hemorrhage, midline shift or mass effect.     No bony abnormalities are seen. There is  minimal mucosal thickening in  the sphenoid sinus.       Impression:      1. No acute process.     Radiation dose reduction techniques were utilized, including automated  exposure control and exposure modulation based on body size.          XR Chest 1 View [[id number]] Collected: 04/20/24 1456     Updated: 04/20/24 1501    Narrative:      XR CHEST 1 VW-4/20/2024     HISTORY: Chest pain.     Heart size is mildly enlarged. Lungs appear free of acute infiltrates.  Sternotomy wires are seen. There is some aortic calcification.       Impression:      1. Mild cardiomegaly.        This report was finalized on 4/20/2024 2:58 PM by Dr. aShil Erickson M.D on Workstation: [id number]                 ASSESSMENT        Chest pain       Patient had a violent vomiting episode with acute sudden bilateral hearing loss. She has  undergone stapedotomy on the right in the past. I don't appreciate any evidence of middle ear fluid or infection. With her history of stapedotomy, she is concerned of possible displacement of her prosthesis. Luckily there is no vertigo, which makes that unlikely. Of course a hearing evaluation would be necessary to document any change from her baseline and since she will be hospitalized for a period of time.    PLAN        If patient is able to do so, I have given her the option of high dose oral steroids to hopefully treat the sudden hearing loss. This will need to be cleared by her primary team since is preparing to undergo another procedure. I usually do a prednisone taper starting at 60 mg daily for 5-7 days and then taper 10 mg daily for the next 5 days. Please contact me to let me know if this is an option.        Ben Sheehan MD  4/20/2024  17:41 EDT

## 2024-04-21 PROBLEM — H91.93 ACUTE HEARING LOSS OF BOTH EARS: Status: ACTIVE | Noted: 2024-04-21

## 2024-04-21 PROBLEM — R11.2 NAUSEA AND VOMITING: Status: ACTIVE | Noted: 2024-04-21

## 2024-04-21 LAB
ANION GAP SERPL CALCULATED.3IONS-SCNC: 11.1 MMOL/L (ref 5–15)
BASOPHILS # BLD AUTO: 0.06 10*3/MM3 (ref 0–0.2)
BASOPHILS NFR BLD AUTO: 1.2 % (ref 0–1.5)
BUN SERPL-MCNC: 17 MG/DL (ref 8–23)
BUN/CREAT SERPL: 17.2 (ref 7–25)
CALCIUM SPEC-SCNC: 8.6 MG/DL (ref 8.6–10.5)
CHLORIDE SERPL-SCNC: 102 MMOL/L (ref 98–107)
CO2 SERPL-SCNC: 24.9 MMOL/L (ref 22–29)
CREAT SERPL-MCNC: 0.99 MG/DL (ref 0.57–1)
DEPRECATED RDW RBC AUTO: 44.8 FL (ref 37–54)
EGFRCR SERPLBLD CKD-EPI 2021: 60.3 ML/MIN/1.73
EOSINOPHIL # BLD AUTO: 0.23 10*3/MM3 (ref 0–0.4)
EOSINOPHIL NFR BLD AUTO: 4.4 % (ref 0.3–6.2)
ERYTHROCYTE [DISTWIDTH] IN BLOOD BY AUTOMATED COUNT: 13.7 % (ref 12.3–15.4)
GLUCOSE BLDC GLUCOMTR-MCNC: 125 MG/DL (ref 70–130)
GLUCOSE BLDC GLUCOMTR-MCNC: 156 MG/DL (ref 70–130)
GLUCOSE BLDC GLUCOMTR-MCNC: 178 MG/DL (ref 70–130)
GLUCOSE SERPL-MCNC: 101 MG/DL (ref 65–99)
HCT VFR BLD AUTO: 29.9 % (ref 34–46.6)
HGB BLD-MCNC: 9.4 G/DL (ref 12–15.9)
IMM GRANULOCYTES # BLD AUTO: 0.03 10*3/MM3 (ref 0–0.05)
IMM GRANULOCYTES NFR BLD AUTO: 0.6 % (ref 0–0.5)
LYMPHOCYTES # BLD AUTO: 1.57 10*3/MM3 (ref 0.7–3.1)
LYMPHOCYTES NFR BLD AUTO: 30.3 % (ref 19.6–45.3)
MCH RBC QN AUTO: 28.1 PG (ref 26.6–33)
MCHC RBC AUTO-ENTMCNC: 31.4 G/DL (ref 31.5–35.7)
MCV RBC AUTO: 89.3 FL (ref 79–97)
MONOCYTES # BLD AUTO: 0.64 10*3/MM3 (ref 0.1–0.9)
MONOCYTES NFR BLD AUTO: 12.3 % (ref 5–12)
NEUTROPHILS NFR BLD AUTO: 2.66 10*3/MM3 (ref 1.7–7)
NEUTROPHILS NFR BLD AUTO: 51.2 % (ref 42.7–76)
NRBC BLD AUTO-RTO: 0 /100 WBC (ref 0–0.2)
PLATELET # BLD AUTO: 338 10*3/MM3 (ref 140–450)
PMV BLD AUTO: 8.6 FL (ref 6–12)
POTASSIUM SERPL-SCNC: 3.5 MMOL/L (ref 3.5–5.2)
RBC # BLD AUTO: 3.35 10*6/MM3 (ref 3.77–5.28)
SODIUM SERPL-SCNC: 138 MMOL/L (ref 136–145)
TROPONIN T SERPL HS-MCNC: 14 NG/L
WBC NRBC COR # BLD AUTO: 5.19 10*3/MM3 (ref 3.4–10.8)

## 2024-04-21 PROCEDURE — 96361 HYDRATE IV INFUSION ADD-ON: CPT

## 2024-04-21 PROCEDURE — 80048 BASIC METABOLIC PNL TOTAL CA: CPT | Performed by: HOSPITALIST

## 2024-04-21 PROCEDURE — 63710000001 PREDNISONE PER 1 MG: Performed by: OTOLARYNGOLOGY

## 2024-04-21 PROCEDURE — G0378 HOSPITAL OBSERVATION PER HR: HCPCS

## 2024-04-21 PROCEDURE — 84484 ASSAY OF TROPONIN QUANT: CPT | Performed by: HOSPITALIST

## 2024-04-21 PROCEDURE — 99214 OFFICE O/P EST MOD 30 MIN: CPT | Performed by: INTERNAL MEDICINE

## 2024-04-21 PROCEDURE — 85025 COMPLETE CBC W/AUTO DIFF WBC: CPT | Performed by: HOSPITALIST

## 2024-04-21 PROCEDURE — 25810000003 SODIUM CHLORIDE 0.9 % SOLUTION: Performed by: HOSPITALIST

## 2024-04-21 PROCEDURE — 82948 REAGENT STRIP/BLOOD GLUCOSE: CPT

## 2024-04-21 PROCEDURE — 63710000001 INSULIN LISPRO (HUMAN) PER 5 UNITS: Performed by: HOSPITALIST

## 2024-04-21 RX ORDER — ALLOPURINOL 100 MG/1
100 TABLET ORAL 2 TIMES DAILY
Status: DISCONTINUED | OUTPATIENT
Start: 2024-04-21 | End: 2024-04-22 | Stop reason: HOSPADM

## 2024-04-21 RX ORDER — PREDNISONE 20 MG/1
20 TABLET ORAL 3 TIMES DAILY
Status: DISCONTINUED | OUTPATIENT
Start: 2024-04-21 | End: 2024-04-22 | Stop reason: HOSPADM

## 2024-04-21 RX ORDER — FUROSEMIDE 40 MG/1
40 TABLET ORAL DAILY
Status: DISCONTINUED | OUTPATIENT
Start: 2024-04-21 | End: 2024-04-22 | Stop reason: HOSPADM

## 2024-04-21 RX ORDER — ALPRAZOLAM 0.25 MG/1
0.25 TABLET ORAL 3 TIMES DAILY PRN
Status: DISCONTINUED | OUTPATIENT
Start: 2024-04-21 | End: 2024-04-22

## 2024-04-21 RX ORDER — ROSUVASTATIN CALCIUM 10 MG/1
10 TABLET, COATED ORAL DAILY
Status: DISCONTINUED | OUTPATIENT
Start: 2024-04-21 | End: 2024-04-22 | Stop reason: HOSPADM

## 2024-04-21 RX ORDER — PREDNISONE 50 MG/1
50 TABLET ORAL ONCE
Status: DISCONTINUED | OUTPATIENT
Start: 2024-04-26 | End: 2024-04-22 | Stop reason: HOSPADM

## 2024-04-21 RX ORDER — AMLODIPINE BESYLATE 5 MG/1
5 TABLET ORAL 2 TIMES DAILY
Status: DISCONTINUED | OUTPATIENT
Start: 2024-04-21 | End: 2024-04-22 | Stop reason: HOSPADM

## 2024-04-21 RX ORDER — PREDNISONE 20 MG/1
20 TABLET ORAL ONCE
Status: DISCONTINUED | OUTPATIENT
Start: 2024-04-29 | End: 2024-04-22 | Stop reason: HOSPADM

## 2024-04-21 RX ORDER — NICOTINE POLACRILEX 4 MG
15 LOZENGE BUCCAL
Status: DISCONTINUED | OUTPATIENT
Start: 2024-04-21 | End: 2024-04-22

## 2024-04-21 RX ORDER — ESCITALOPRAM OXALATE 5 MG/1
5 TABLET ORAL NIGHTLY
Status: DISCONTINUED | OUTPATIENT
Start: 2024-04-21 | End: 2024-04-22 | Stop reason: HOSPADM

## 2024-04-21 RX ORDER — FERROUS SULFATE 325(65) MG
325 TABLET ORAL EVERY OTHER DAY
Status: DISCONTINUED | OUTPATIENT
Start: 2024-04-21 | End: 2024-04-22 | Stop reason: HOSPADM

## 2024-04-21 RX ORDER — IBUPROFEN 600 MG/1
1 TABLET ORAL
Status: DISCONTINUED | OUTPATIENT
Start: 2024-04-21 | End: 2024-04-22

## 2024-04-21 RX ORDER — PREDNISONE 20 MG/1
40 TABLET ORAL ONCE
Status: DISCONTINUED | OUTPATIENT
Start: 2024-04-27 | End: 2024-04-22 | Stop reason: HOSPADM

## 2024-04-21 RX ORDER — PREDNISONE 10 MG/1
10 TABLET ORAL ONCE
Status: DISCONTINUED | OUTPATIENT
Start: 2024-04-30 | End: 2024-04-22 | Stop reason: HOSPADM

## 2024-04-21 RX ORDER — DEXTROSE MONOHYDRATE 25 G/50ML
25 INJECTION, SOLUTION INTRAVENOUS
Status: DISCONTINUED | OUTPATIENT
Start: 2024-04-21 | End: 2024-04-22

## 2024-04-21 RX ORDER — INSULIN LISPRO 100 [IU]/ML
2-7 INJECTION, SOLUTION INTRAVENOUS; SUBCUTANEOUS
Status: DISCONTINUED | OUTPATIENT
Start: 2024-04-21 | End: 2024-04-22 | Stop reason: HOSPADM

## 2024-04-21 RX ORDER — POLYETHYLENE GLYCOL 3350 17 G/17G
17 POWDER, FOR SOLUTION ORAL DAILY
Status: DISCONTINUED | OUTPATIENT
Start: 2024-04-21 | End: 2024-04-22 | Stop reason: HOSPADM

## 2024-04-21 RX ORDER — PANTOPRAZOLE SODIUM 40 MG/1
40 TABLET, DELAYED RELEASE ORAL
Status: DISCONTINUED | OUTPATIENT
Start: 2024-04-21 | End: 2024-04-22 | Stop reason: HOSPADM

## 2024-04-21 RX ADMIN — PREDNISONE 20 MG: 20 TABLET ORAL at 15:16

## 2024-04-21 RX ADMIN — HYDRALAZINE HYDROCHLORIDE 100 MG: 50 TABLET ORAL at 09:26

## 2024-04-21 RX ADMIN — INSULIN LISPRO 2 UNITS: 100 INJECTION, SOLUTION INTRAVENOUS; SUBCUTANEOUS at 17:35

## 2024-04-21 RX ADMIN — AMIODARONE HYDROCHLORIDE 200 MG: 200 TABLET ORAL at 20:20

## 2024-04-21 RX ADMIN — CARVEDILOL 12.5 MG: 12.5 TABLET, FILM COATED ORAL at 17:34

## 2024-04-21 RX ADMIN — ROSUVASTATIN CALCIUM 10 MG: 10 TABLET, FILM COATED ORAL at 15:16

## 2024-04-21 RX ADMIN — PANTOPRAZOLE SODIUM 40 MG: 40 TABLET, DELAYED RELEASE ORAL at 07:12

## 2024-04-21 RX ADMIN — CLONIDINE HYDROCHLORIDE 0.2 MG: 0.1 TABLET ORAL at 15:16

## 2024-04-21 RX ADMIN — ALLOPURINOL 100 MG: 100 TABLET ORAL at 20:20

## 2024-04-21 RX ADMIN — CLOPIDOGREL BISULFATE 75 MG: 75 TABLET, FILM COATED ORAL at 09:27

## 2024-04-21 RX ADMIN — ESCITALOPRAM 5 MG: 5 TABLET, FILM COATED ORAL at 20:20

## 2024-04-21 RX ADMIN — INSULIN LISPRO 2 UNITS: 100 INJECTION, SOLUTION INTRAVENOUS; SUBCUTANEOUS at 21:19

## 2024-04-21 RX ADMIN — CLONIDINE HYDROCHLORIDE 0.2 MG: 0.1 TABLET ORAL at 20:19

## 2024-04-21 RX ADMIN — APIXABAN 5 MG: 5 TABLET, FILM COATED ORAL at 20:20

## 2024-04-21 RX ADMIN — PANTOPRAZOLE SODIUM 40 MG: 40 TABLET, DELAYED RELEASE ORAL at 17:35

## 2024-04-21 RX ADMIN — PREDNISONE 20 MG: 20 TABLET ORAL at 12:51

## 2024-04-21 RX ADMIN — SODIUM CHLORIDE 75 ML/HR: 9 INJECTION, SOLUTION INTRAVENOUS at 09:46

## 2024-04-21 RX ADMIN — POLYETHYLENE GLYCOL 3350 17 G: 17 POWDER, FOR SOLUTION ORAL at 12:51

## 2024-04-21 RX ADMIN — AMLODIPINE BESYLATE 5 MG: 5 TABLET ORAL at 20:20

## 2024-04-21 RX ADMIN — APIXABAN 5 MG: 5 TABLET, FILM COATED ORAL at 12:50

## 2024-04-21 RX ADMIN — PREDNISONE 20 MG: 20 TABLET ORAL at 20:19

## 2024-04-21 RX ADMIN — HYDRALAZINE HYDROCHLORIDE 100 MG: 50 TABLET ORAL at 15:16

## 2024-04-21 RX ADMIN — FUROSEMIDE 40 MG: 40 TABLET ORAL at 15:16

## 2024-04-21 RX ADMIN — CLONIDINE HYDROCHLORIDE 0.2 MG: 0.1 TABLET ORAL at 09:27

## 2024-04-21 RX ADMIN — CARVEDILOL 12.5 MG: 12.5 TABLET, FILM COATED ORAL at 09:27

## 2024-04-21 RX ADMIN — AMLODIPINE BESYLATE 5 MG: 10 TABLET ORAL at 09:27

## 2024-04-21 RX ADMIN — FERROUS SULFATE TAB 325 MG (65 MG ELEMENTAL FE) 325 MG: 325 (65 FE) TAB at 15:15

## 2024-04-21 RX ADMIN — ASPIRIN 81 MG: 81 TABLET, CHEWABLE ORAL at 09:27

## 2024-04-21 RX ADMIN — HYDRALAZINE HYDROCHLORIDE 100 MG: 50 TABLET ORAL at 20:19

## 2024-04-21 NOTE — PLAN OF CARE
Goal Outcome Evaluation:              Outcome Evaluation: A/ox4, able to voice own needs and wants. Up ad venice. Denies any pain or discomfort. Hearing difficulties remain unchanged since admission. Prednisone taper initiated this shift. Educated pt on risk of spontaneous hip fx with high dose steriods per ENT request, she verbalized her understanding. Pt remains SR/SB on monitor.

## 2024-04-21 NOTE — CONSULTS
Date of Hospital Visit: 24  Encounter Provider: Dwayne Parikh MD  Place of Service: [facility] CARDIOLOGY  Patient Name: Valentine Arora  :1950  Referral Provider: Murphy Hines MD    Chief complaint: chest pain, hearing loss    History of Present Illness    Ms. Arora is a 73yo woman who follows with Dr Dai Suazo. She has a history of CAD s/p CABG. She has a history of paroxysmal atrial fibrillation and has been on amiodarone. She was admitted in March with bradycardia and acute on chronic HFpEF and severe HTN. Her meds were adjusted, and she quickly developed tachycardia and hypotension. She was cardioverted out of atrial fibrillation in March.     Over the last few weeks, many adjustments have been made to her blood pressure medications. She was seen in the office less than a week ago, and is scheduled to undergo coronary angiography in 3 days for ongoing burning pain under her left breast.     She woke up yesterday and was walking around her house and says she became severely nauseated and then started vomiting violently. She says this is how she knows she was in atrial fibrillation. She also has chest pain when she's in AF. It all resolved very quickly but she came to the ED because she has had severe acute worsening of her hearing loss since this happened. She was seen by Dr Sheehan ENT, this AM, who recommends oral steroids.     Of note, she was started on oral furosemide, 40mg daily, on 4/15.     ECHO 1/15/2024    Left ventricular systolic function is normal. Calculated left ventricular EF = 65.1%    Left ventricular diastolic function was normal.    Left atrial volume is mildly increased.    The right atrial cavity is mildly  dilated.    Moderate tricuspid valve regurgitation is present.    Estimated right ventricular systolic pressure from tricuspid regurgitation is normal (<35 mmHg). Calculated right ventricular systolic pressure from tricuspid regurgitation is 34  mmHg.     Stress Test 9/10/2023    Diaphragmatic attenuation artifact is present.    Myocardial perfusion imaging indicates a normal myocardial perfusion study with no evidence of ischemia.    Left ventricular ejection fraction is hyperdynamic (Calculated EF > 70%).    Impressions are consistent with a low risk study.       Past Medical History:   Diagnosis Date    Arthritis     Atrial fibrillation     Atrial flutter, unspecified type 05/17/2023    Sarabia esophagus     CAD (coronary artery disease)     Chronic back pain     Colon polyp     GERD (gastroesophageal reflux disease)     Hyperlipidemia     Hypertension     Myocardial infarction     1995    PAD (peripheral artery disease) 09/11/2019    Shingles     Type 2 diabetes mellitus        Past Surgical History:   Procedure Laterality Date    COLONOSCOPY      COLONOSCOPY N/A 6/24/2020    Procedure: COLONOSCOPY;  Surgeon: Mathew Roberts MD;  Location:  [facility] OR;  Service: Gastroenterology;  Laterality: N/A;  Descending colon polyp x 2, Ascending colon polyp, Sigmoid colon polyp, Rectal polyp    COLONOSCOPY N/A 8/7/2023    Procedure: COLONOSCOPY;  Surgeon: Mathew Roberts MD;  Location:  [facility] OR;  Service: Gastroenterology;  Laterality: N/A;  Normal    CORONARY ARTERY BYPASS GRAFT  1995    x 2 vessels    ENDOSCOPY N/A 6/24/2020    Procedure: ESOPHAGOGASTRODUODENOSCOPY;  Surgeon: Mathew Roberts MD;  Location:  [facility] OR;  Service: Gastroenterology;  Laterality: N/A;  Ulcerative esophagitis, Gastritis, Duodenitis  Duodenal biopsy, gastric biopsy, distal esophageal biopsy    ENDOSCOPY N/A 9/17/2020    Procedure: ESOPHAGOGASTRODUODENOSCOPY with biopsies;  Surgeon: Mathew Roberts MD;  Location:  [facility] OR;  Service: Gastroenterology;  Laterality: N/A;  Esophagitis  Sarabia's Esophagus  Hiatal hernia  Gastritis  Gastric biopsy  Distal esophagus biopsy    ENDOSCOPY N/A 8/7/2023    Procedure: Esophagogastroduedenoscopy;   Surgeon: Mathew Roberts MD;  Location: [facility] [place];  Service: Gastroenterology;  Laterality: N/A;  Gastritis; short segment Sarabia's; Esophageal stricture- dilatation; Biopsies- gastric, distal esophagus    INNER EAR SURGERY      JOINT REPLACEMENT      right knee    LEG SURGERY      x 2 s/p fall    LUMBAR FUSION      L4/L5    SKIN GRAFT      to left lower leg x 2    TONSILLECTOMY      WRIST FUSION Left        Prior to Admission medications    Medication Sig Start Date End Date Taking? Authorizing Provider   allopurinol (ZYLOPRIM) 100 MG tablet Take 1 tablet by mouth 2 (Two) Times a Day.   Yes Yaw Cedeño MD   ALPRAZolam (XANAX) 0.25 MG tablet Take 1 tablet by mouth As Needed for Anxiety. 10/19/23  Yes Yaw Cedeño MD   amiodarone (PACERONE) 200 MG tablet Take 1 tablet by mouth Daily.  Patient taking differently: Take 1 tablet by mouth Every Night. 1/12/24  Yes Yeyo Rodríguez PA-C   amLODIPine (NORVASC) 5 MG tablet Take 1 tablet by mouth 2 (Two) Times a Day. 4/15/24  Yes Dai Suazo MD   apixaban (Eliquis) 5 MG tablet tablet TAKE 1 TABLET BY MOUTH TWICE A DAY 4/8/24  Yes Kumar Frye III, MD   carvedilol (COREG) 12.5 MG tablet Take 1 tablet by mouth 2 (Two) Times a Day With Meals. 3/25/24  Yes Zoya Moreno APRN   cloNIDine (CATAPRES) 0.2 MG tablet Take 1 tablet by mouth 3 (Three) Times a Day. 3/8/24  Yes Yaw Cedeño MD   clopidogrel (PLAVIX) 75 MG tablet TAKE ONE TABLET BY MOUTH DAILY  Patient taking differently: Take 1 tablet by mouth Every Night. 10/4/23  Yes Kumar Frye III, MD   escitalopram (LEXAPRO) 5 MG tablet Take 1 tablet by mouth Every Night. 10/10/23  Yes Yaw Cedeño MD   ferrous sulfate 325 (65 FE) MG tablet Take 1 tablet by mouth Every Other Day.   Yes Yaw Cedeño MD   furosemide (LASIX) 40 MG tablet Take 1 tablet by mouth Daily. 4/15/24  Yes Dai Suazo MD   hydrALAZINE (APRESOLINE) 100 MG tablet Take 1 tablet by mouth 3  (Three) Times a Day for 30 days. 24 Yes Fabrizio Brown MD   metFORMIN (GLUCOPHAGE) 500 MG tablet Take 1 tablet by mouth 2 (Two) Times a Day With Meals. 3/28/24  Yes Yaw Cedeño MD   pantoprazole (PROTONIX) 40 MG EC tablet TAKE ONE TABLET BY MOUTH TWICE A DAY 3/14/24  Yes Shayna Tran APRN   rosuvastatin (CRESTOR) 10 MG tablet Take 1 tablet by mouth Daily. 1/12/15  Yes Yaw Cedeño MD       Social History     Socioeconomic History    Marital status:    Tobacco Use    Smoking status: Former     Current packs/day: 0.00     Average packs/day: 0.5 packs/day for 20.0 years (10.0 ttl pk-yrs)     Types: Cigarettes     Start date:      Quit date:      Years since quittin.3    Smokeless tobacco: Never    Tobacco comments:     QUIT    Vaping Use    Vaping status: Never Used   Substance and Sexual Activity    Alcohol use: Not Currently     Comment: occasional    Drug use: No    Sexual activity: Defer       Family History   Problem Relation Age of Onset    Heart disease Mother     Heart disease Father     Colon cancer Neg Hx     Colon polyps Neg Hx     Breast cancer Neg Hx        Review of Systems   HENT:  Positive for hearing loss.    Cardiovascular:  Positive for chest pain and palpitations.   Gastrointestinal:  Positive for nausea and vomiting.   All other systems reviewed and are negative.       Objective:     Vitals:    24 0754 24 0918 24 1324 24 1441   BP: 146/48 169/58 144/51 151/57   BP Location: Right arm Right arm Right arm Left arm   Patient Position: Lying Lying Lying Lying   Pulse: 58 62 57 59   Resp: 18  18    Temp: 97.9 °F (36.6 °C)  98.1 °F (36.7 °C)    TempSrc: Oral  Oral    SpO2: 98% 97% 95% 98%   Weight:       Height:         Body mass index is 34.75 kg/m².    Last Weight and Admission Weight        24  1433   Weight: 86.2 kg (190 lb)     Flowsheet Rows      Flowsheet Row First Filed Value   Admission Height 157.5 cm  "(62\") Documented at 04/20/2024 1433   Admission Weight 86.2 kg (190 lb) Documented at 04/20/2024 1433              Intake/Output Summary (Last 24 hours) at 4/21/2024 1626  Last data filed at 4/21/2024 1329  Gross per 24 hour   Intake 880 ml   Output --   Net 880 ml         Physical Exam  Vitals reviewed.   Constitutional:       Comments: obese   HENT:      Head: Normocephalic.      Nose: Nose normal.      Mouth/Throat:      Pharynx: Oropharynx is clear.   Eyes:      Conjunctiva/sclera: Conjunctivae normal.   Neck:      Comments: Cannot assess JVD due to body habitus  Cardiovascular:      Rate and Rhythm: Normal rate and regular rhythm.      Heart sounds: Normal heart sounds.   Pulmonary:      Effort: Pulmonary effort is normal.      Breath sounds: Normal breath sounds.   Abdominal:      Palpations: Abdomen is soft.      Tenderness: There is no abdominal tenderness.      Comments: Cannot feel organs or aorta   Musculoskeletal:         General: No swelling. Normal range of motion.      Cervical back: Normal range of motion.   Skin:     General: Skin is warm and dry.   Neurological:      General: No focal deficit present.      Mental Status: She is alert.      Cranial Nerves: No cranial nerve deficit.   Psychiatric:         Mood and Affect: Mood normal.                 Lab Review:                Results from last 7 days   Lab Units 04/21/24  0340   SODIUM mmol/L 138   POTASSIUM mmol/L 3.5   CHLORIDE mmol/L 102   CO2 mmol/L 24.9   BUN mg/dL 17   CREATININE mg/dL 0.99   GLUCOSE mg/dL 101*   CALCIUM mg/dL 8.6     Results from last 7 days   Lab Units 04/21/24  0340 04/20/24  1832 04/20/24  1458   HSTROP T ng/L 14* 15* 18*     Results from last 7 days   Lab Units 04/21/24  0340   WBC 10*3/mm3 5.19   HEMOGLOBIN g/dL 9.4*   HEMATOCRIT % 29.9*   PLATELETS 10*3/mm3 338                               I personally viewed and interpreted the patient's EKG/Telemetry data    Assessment/Plan:     1. PAF  2. CAD with stable angina  3. " CKD3  4. Severe HTN  5. Acute on chronic hearing loss    *Her PAF was very short lived. She has been in SR and remains on amiodarone, carvedilol, and apixaban.    *She has not had any acute CP overnight. Her EKG is nonischemic and her hsTn is WNL.    *Her BP is chronically high and labile. Currently her SBP is ~150mm Hg which is a reasonable goal.     *She was started on a relatively low dose of furosemide last week but she has been on loop diuretics in the past and I doubt it is playing a role in her acute hearing loss that seems to have started right after a violent episode of vomiting.    *From our standpoint she could go home and come back for her cath on Wednesday; Dr Hines would like to watch her another night as she's being started on steroids. Dr Suazo will see her in the AM.

## 2024-04-21 NOTE — H&P
History and physical    Primary care physician  Dr. JAQUEZ    Chief complaint  Chest pain  Hearing loss    History of present illness  74-year-old white female with history of coronary artery disease peripheral artery disease paroxysmal atrial fibrillation diabetes mellitus hypertension hyperlipidemia and gastroesophageal reflux disease presented to [facility] emergency room with chest pain which is intermittent for last few days to week but developed sudden onset of severe hearing loss followed by nausea vomiting but no diaphoresis.  Patient also denies any fever chills shortness of breath palpitation abdominal pain or diarrhea.  Patient evaluated in ER and admitted for further workup.  At the time of interview she is able to give me detailed history but I have to repeat my questions couple of times.  Patient is in no distress and her  also contributed to the history.    PAST MEDICAL HISTORY   Arthritis      Sarabia esophagus      CAD (coronary artery disease)      Chronic back pain      Colon polyp      GERD (gastroesophageal reflux disease)      Shingles      Type 2 diabetes mellitus        PAST SURGICAL HISTORY              Procedure Laterality Date    COLONOSCOPY        COLONOSCOPY N/A 6/24/2020     Procedure: COLONOSCOPY;  Surgeon: Mathew Roberts MD;  Location: [facility] OR;  Service: Gastroenterology;  Laterality: N/A;  Descending colon polyp x 2, Ascending colon polyp, Sigmoid colon polyp, Rectal polyp    COLONOSCOPY N/A 8/7/2023     Procedure: COLONOSCOPY;  Surgeon: Mathew Roberts MD;  Location: [facility] OR;  Service: Gastroenterology;  Laterality: N/A;  Normal    CORONARY ARTERY BYPASS GRAFT   1995     x 2 vessels    ENDOSCOPY N/A 6/24/2020     Procedure: ESOPHAGOGASTRODUODENOSCOPY;  Surgeon: Mathew Roberts MD;  Location: [facility] OR;  Service: Gastroenterology;  Laterality: N/A;  Ulcerative esophagitis, Gastritis, Duodenitis  Duodenal biopsy, gastric  "biopsy, distal esophageal biopsy    ENDOSCOPY N/A 2020     Procedure: ESOPHAGOGASTRODUODENOSCOPY with biopsies;  Surgeon: Mathew Roberts MD;  Location:  [facility] OR;  Service: Gastroenterology;  Laterality: N/A;  Esophagitis  Sarabia's Esophagus  Hiatal hernia  Gastritis  Gastric biopsy  Distal esophagus biopsy    ENDOSCOPY N/A 2023     Procedure: Esophagogastroduedenoscopy;  Surgeon: Mathew Roberts MD;  Location:  [facility] OR;  Service: Gastroenterology;  Laterality: N/A;  Gastritis; short segment Sarabia's; Esophageal stricture- dilatation; Biopsies- gastric, distal esophagus    INNER EAR SURGERY        JOINT REPLACEMENT         right knee    LEG SURGERY         x 2 s/p fall    LUMBAR FUSION         L4/L5    SKIN GRAFT         to left lower leg x 2    TONSILLECTOMY        WRIST FUSION Left           FAMILY HISTORY           Problem Relation Age of Onset    Heart disease Mother      Heart disease Father      Colon cancer Neg Hx      Colon polyps Neg Hx      Breast cancer Neg Hx        SOCIAL HISTORY                 Socioeconomic History    Marital status:    Tobacco Use    Smoking status: Former       Current packs/day: 0.00       Average packs/day: 0.5 packs/day for 20.0 years (10.0 ttl pk-yrs)       Types: Cigarettes       Start date:        Quit date:        Years since quittin.3    Smokeless tobacco: Never    Tobacco comments:       QUIT    Vaping Use    Vaping status: Never Used   Substance and Sexual Activity    Alcohol use: Not Currently       Comment: occasional    Drug use: No    Sexual activity: Defer         ALLERGIES  Codeine and Other  Home medications reviewed     REVIEW OF SYSTEMS  Per history of present illness     PHYSICAL EXAM  Blood pressure 169/58, pulse 62, temperature 97.9 °F (36.6 °C), temperature source Oral, resp. rate 18, height 157.5 cm (62\"), weight 86.2 kg (190 lb), SpO2 97%.    GENERAL: Awake and alert no acute distress  HENT: nares " patent.  Significant decreased hearing right ear  EYES: no scleral icterus  CV: regular rhythm, normal rate  RESPIRATORY: normal effort and moving air bilaterally  ABDOMEN: soft nontender nondistended bowel sounds positive  MUSCULOSKELETAL: no deformity  NEURO: alert, moves all extremities, follows commands with severe hearing loss  PSYCH:  calm, cooperative  SKIN: warm, dry     LAB RESULTS  Lab Results (last 24 hours)       Procedure Component Value Units Date/Time    POC Glucose Once [[id number]]  (Normal) Collected: 04/21/24 0800    Specimen: Blood Updated: 04/21/24 0805     Glucose 125 mg/dL     Basic Metabolic Panel [[id number]]  (Abnormal) Collected: 04/21/24 0340    Specimen: Blood Updated: 04/21/24 0434     Glucose 101 mg/dL      BUN 17 mg/dL      Creatinine 0.99 mg/dL      Sodium 138 mmol/L      Potassium 3.5 mmol/L      Chloride 102 mmol/L      CO2 24.9 mmol/L      Calcium 8.6 mg/dL      BUN/Creatinine Ratio 17.2     Anion Gap 11.1 mmol/L      eGFR 60.3 mL/min/1.73     Narrative:      GFR Normal >60  Chronic Kidney Disease <60  Kidney Failure <15    The GFR formula is only valid for adults with stable renal function between ages 18 and 70.    High Sensitivity Troponin T [[id number]]  (Abnormal) Collected: 04/21/24 0340    Specimen: Blood Updated: 04/21/24 0434     HS Troponin T 14 ng/L     Narrative:      High Sensitive Troponin T Reference Range:  <14.0 ng/L- Negative Female for AMI  <22.0 ng/L- Negative Male for AMI  >=14 - Abnormal Female indicating possible myocardial injury.  >=22 - Abnormal Male indicating possible myocardial injury.   Clinicians would have to utilize clinical acumen, EKG, Troponin, and serial changes to determine if it is an Acute Myocardial Infarction or myocardial injury due to an underlying chronic condition.         CBC & Differential [[id number]]  (Abnormal) Collected: 04/21/24 0340    Specimen: Blood Updated: 04/21/24 0415    Narrative:      The following orders were created for  panel order CBC & Differential.  Procedure                               Abnormality         Status                     ---------                               -----------         ------                     CBC Auto Differential[[id number]]        Abnormal            Final result                 Please view results for these tests on the individual orders.    CBC Auto Differential [[id number]]  (Abnormal) Collected: 04/21/24 0340    Specimen: Blood Updated: 04/21/24 0415     WBC 5.19 10*3/mm3      RBC 3.35 10*6/mm3      Hemoglobin 9.4 g/dL      Hematocrit 29.9 %      MCV 89.3 fL      MCH 28.1 pg      MCHC 31.4 g/dL      RDW 13.7 %      RDW-SD 44.8 fl      MPV 8.6 fL      Platelets 338 10*3/mm3      Neutrophil % 51.2 %      Lymphocyte % 30.3 %      Monocyte % 12.3 %      Eosinophil % 4.4 %      Basophil % 1.2 %      Immature Grans % 0.6 %      Neutrophils, Absolute 2.66 10*3/mm3      Lymphocytes, Absolute 1.57 10*3/mm3      Monocytes, Absolute 0.64 10*3/mm3      Eosinophils, Absolute 0.23 10*3/mm3      Basophils, Absolute 0.06 10*3/mm3      Immature Grans, Absolute 0.03 10*3/mm3      nRBC 0.0 /100 WBC     High Sensitivity Troponin T 2Hr [[id number]]  (Abnormal) Collected: 04/20/24 1832    Specimen: Blood Updated: 04/20/24 1858     HS Troponin T 15 ng/L      Troponin T Delta -3 ng/L     Narrative:      High Sensitive Troponin T Reference Range:  <14.0 ng/L- Negative Female for AMI  <22.0 ng/L- Negative Male for AMI  >=14 - Abnormal Female indicating possible myocardial injury.  >=22 - Abnormal Male indicating possible myocardial injury.   Clinicians would have to utilize clinical acumen, EKG, Troponin, and serial changes to determine if it is an Acute Myocardial Infarction or myocardial injury due to an underlying chronic condition.         Covel Draw [[id number]] Collected: 04/20/24 1458    Specimen: Blood Updated: 04/20/24 1600    Narrative:      The following orders were created for panel order Covel  Draw.  Procedure                               Abnormality         Status                     ---------                               -----------         ------                     Green Top (Gel)[[id number]]                                  Final result               Lavender Top[[id number]]                                     Final result               Gold Top - SST[[id number]]                                   Final result               Light Blue Top[[id number]]                                   Final result                 Please view results for these tests on the individual orders.    Green Top (Gel) [[id number]] Collected: 04/20/24 1458    Specimen: Blood Updated: 04/20/24 1600     Extra Tube Hold for add-ons.     Comment: Auto resulted.       Lavender Top [[id number]] Collected: 04/20/24 1458    Specimen: Blood Updated: 04/20/24 1600     Extra Tube hold for add-on     Comment: Auto resulted       Gold Top - SST [[id number]] Collected: 04/20/24 1458    Specimen: Blood Updated: 04/20/24 1600     Extra Tube Hold for add-ons.     Comment: Auto resulted.       Light Blue Top [[id number]] Collected: 04/20/24 1458    Specimen: Blood Updated: 04/20/24 1600     Extra Tube Hold for add-ons.     Comment: Auto resulted       Comprehensive Metabolic Panel [[id number]]  (Abnormal) Collected: 04/20/24 1458    Specimen: Blood Updated: 04/20/24 1526     Glucose 112 mg/dL      BUN 20 mg/dL      Creatinine 1.15 mg/dL      Sodium 137 mmol/L      Potassium 4.1 mmol/L      Chloride 99 mmol/L      CO2 24.3 mmol/L      Calcium 9.4 mg/dL      Total Protein 6.6 g/dL      Albumin 4.5 g/dL      ALT (SGPT) 20 U/L      AST (SGOT) 19 U/L      Alkaline Phosphatase 66 U/L      Total Bilirubin 0.3 mg/dL      Globulin 2.1 gm/dL      A/G Ratio 2.1 g/dL      BUN/Creatinine Ratio 17.4     Anion Gap 13.7 mmol/L      eGFR 50.4 mL/min/1.73     Narrative:      GFR Normal >60  Chronic Kidney Disease <60  Kidney Failure <15    The GFR formula is only valid  for adults with stable renal function between ages 18 and 70.    High Sensitivity Troponin T [[id number]]  (Abnormal) Collected: 04/20/24 1458    Specimen: Blood Updated: 04/20/24 1526     HS Troponin T 18 ng/L     Narrative:      High Sensitive Troponin T Reference Range:  <14.0 ng/L- Negative Female for AMI  <22.0 ng/L- Negative Male for AMI  >=14 - Abnormal Female indicating possible myocardial injury.  >=22 - Abnormal Male indicating possible myocardial injury.   Clinicians would have to utilize clinical acumen, EKG, Troponin, and serial changes to determine if it is an Acute Myocardial Infarction or myocardial injury due to an underlying chronic condition.         CBC & Differential [[id number]]  (Abnormal) Collected: 04/20/24 1458    Specimen: Blood Updated: 04/20/24 1507    Narrative:      The following orders were created for panel order CBC & Differential.  Procedure                               Abnormality         Status                     ---------                               -----------         ------                     CBC Auto Differential[[id number]]        Abnormal            Final result                 Please view results for these tests on the individual orders.    CBC Auto Differential [[id number]]  (Abnormal) Collected: 04/20/24 1458    Specimen: Blood Updated: 04/20/24 1507     WBC 6.06 10*3/mm3      RBC 3.82 10*6/mm3      Hemoglobin 10.6 g/dL      Hematocrit 33.4 %      MCV 87.4 fL      MCH 27.7 pg      MCHC 31.7 g/dL      RDW 13.5 %      RDW-SD 43.1 fl      MPV 8.1 fL      Platelets 390 10*3/mm3      Neutrophil % 75.0 %      Lymphocyte % 15.5 %      Monocyte % 6.3 %      Eosinophil % 2.0 %      Basophil % 0.7 %      Immature Grans % 0.5 %      Neutrophils, Absolute 4.55 10*3/mm3      Lymphocytes, Absolute 0.94 10*3/mm3      Monocytes, Absolute 0.38 10*3/mm3      Eosinophils, Absolute 0.12 10*3/mm3      Basophils, Absolute 0.04 10*3/mm3      Immature Grans, Absolute 0.03 10*3/mm3      nRBC  0.0 /100 WBC           Imaging Results (Last 24 Hours)       Procedure Component Value Units Date/Time    CT Head Without Contrast [[id number]] Collected: 04/20/24 1604     Updated: 04/20/24 1605    Narrative:      CT OF THE BRAIN WITHOUT CONTRAST 04/20/2024     HISTORY: Hearing loss.     TECHNIQUE: Axial images were obtained through the brain without  intravenous contrast.     FINDINGS: There is mild to moderate diffuse atrophy. There is no  evidence of acute infarction, hemorrhage, midline shift or mass effect.     No bony abnormalities are seen. There is minimal mucosal thickening in  the sphenoid sinus.       Impression:      1. No acute process.     Radiation dose reduction techniques were utilized, including automated  exposure control and exposure modulation based on body size.          XR Chest 1 View [[id number]] Collected: 04/20/24 1456     Updated: 04/20/24 1501    Narrative:      XR CHEST 1 VW-4/20/2024     HISTORY: Chest pain.     Heart size is mildly enlarged. Lungs appear free of acute infiltrates.  Sternotomy wires are seen. There is some aortic calcification.       Impression:      1. Mild cardiomegaly.        This report was finalized on 4/20/2024 2:58 PM by Dr. Sahil Erickson M.D on Workstation: [id number]             Results  [facility]20/2024 [facility]         Author: -- Service: -- Author Type: --   Filed: Date of Service: Creation Time:   Status: (Other)   HEART RATE= 66  bpm  RR Interval= 909  ms  WI Interval= 280  ms  P Horizontal Axis= -24  deg  P Front Axis= 84  deg  QRSD Interval= 103  ms  QT Interval= 452  ms  QTcB= 474  ms  QRS Axis= 27  deg  T Wave Axis= 0  deg  - ABNORMAL ECG -  Sinus rhythm  Sinus pause - new  Prolonged WI interval  Borderline T wave abnormalities          Current Facility-Administered Medications:     allopurinol (ZYLOPRIM) tablet 100 mg, 100 mg, Oral, BID, Murphy Hines MD    ALPRAZolam (XANAX) tablet 0.25 mg, 0.25 mg, Oral, PRN,  Murphy Hines MD    amiodarone (PACERONE) tablet 200 mg, 200 mg, Oral, Nightly, Murphy Hines MD, 200 mg at 04/20/24 2252    amLODIPine (NORVASC) tablet 5 mg, 5 mg, Oral, BID, Murphy Hines MD    apixaban (ELIQUIS) tablet 5 mg, 5 mg, Oral, Q12H, Murphy Hines MD, 5 mg at 04/21/24 1250    aspirin chewable tablet 81 mg, 81 mg, Oral, Daily, Murphy Hines MD, 81 mg at 04/21/24 0927    carvedilol (COREG) tablet 12.5 mg, 12.5 mg, Oral, BID With Meals, Murphy Hines MD, 12.5 mg at 04/21/24 0927    cloNIDine (CATAPRES) tablet 0.2 mg, 0.2 mg, Oral, TID, Murphy Hines MD, 0.2 mg at 04/21/24 0927    clopidogrel (PLAVIX) tablet 75 mg, 75 mg, Oral, Daily, Murphy Hines MD, 75 mg at 04/21/24 0927    dextrose (D50W) (25 g/50 mL) IV injection 25 g, 25 g, Intravenous, Q15 Min PRN, Murphy Hines MD    dextrose (GLUTOSE) oral gel 15 g, 15 g, Oral, Q15 Min PRN, Murphy Hines MD    escitalopram (LEXAPRO) tablet 5 mg, 5 mg, Oral, Nightly, Murphy Hines MD    ferrous sulfate tablet 325 mg, 325 mg, Oral, Every Other Day, Murphy Hines MD    furosemide (LASIX) tablet 40 mg, 40 mg, Oral, Daily, Murphy Hines MD    glucagon (GLUCAGEN) injection 1 mg, 1 mg, Intramuscular, Q15 Min PRN, Murphy Hines MD    hydrALAZINE (APRESOLINE) tablet 100 mg, 100 mg, Oral, TID, Murphy Hines MD, 100 mg at 04/21/24 0926    insulin lispro (HUMALOG/ADMELOG) injection 2-7 Units, 2-7 Units, Subcutaneous, 4x Daily AC & at Bedtime, Murphy Hines MD    pantoprazole (PROTONIX) EC tablet 40 mg, 40 mg, Oral, BID AC, Murphy Hines MD    polyethylene glycol (MIRALAX) packet 17 g, 17 g, Oral, Daily, Murphy Hines MD, 17 g at 04/21/24 1251    [START ON 4/30/2024] predniSONE (DELTASONE) tablet 10 mg, 10 mg, Oral, Once, Ben Sheehan MD    predniSONE (DELTASONE) tablet 20 mg, 20 mg, Oral, TID, Ben Sheehan MD, 20 mg at 04/21/24 1251    [START ON 4/29/2024] predniSONE (DELTASONE) tablet 20 mg, 20 mg, Oral, Once, Ben Sheehan MD    [START ON 4/28/2024] predniSONE  (DELTASONE) tablet 30 mg, 30 mg, Oral, Once, Ben Sheehan MD    [START ON 4/27/2024] predniSONE (DELTASONE) tablet 40 mg, 40 mg, Oral, Once, Ben Sheehan MD    [START ON 4/26/2024] predniSONE (DELTASONE) tablet 50 mg, 50 mg, Oral, Once, Ben Sheehan MD    rosuvastatin (CRESTOR) tablet 10 mg, 10 mg, Oral, Daily, Tapan Hines MD    sodium chloride 0.9 % flush 10 mL, 10 mL, Intravenous, PRN, Tapan Hines MD     ASSESSMENT  Chest pain with known coronary artery disease rule out acute coronary syndrome  Severe bilateral hearing loss with history of otosclerosis per ENT  Paroxysmal atrial fibrillation  Peripheral artery disease  Diabetes mellitus  Hypertension  Hyperlipidemia  Chronic anemia  Sarabia's esophagus  Gastroesophageal reflux disease    PLAN  Admit  Serial cardiac enzymes and EKG  Steroids per ENT  Cardiology to follow patient as patient already scheduled for cardiac catheterization   Adjust home medications  Stress ulcer DVT prophylaxis  Supportive  Patient is full code  Discussed with nursing staff and family  Follow closely further recommendation current hospital course    TAPAN HINES MD

## 2024-04-22 ENCOUNTER — TELEPHONE (OUTPATIENT)
Dept: CARDIOLOGY | Facility: CLINIC | Age: 74
End: 2024-04-22

## 2024-04-22 VITALS
WEIGHT: 190 LBS | HEART RATE: 68 BPM | DIASTOLIC BLOOD PRESSURE: 59 MMHG | SYSTOLIC BLOOD PRESSURE: 166 MMHG | BODY MASS INDEX: 34.96 KG/M2 | OXYGEN SATURATION: 97 % | HEIGHT: 62 IN | TEMPERATURE: 98.1 F | RESPIRATION RATE: 18 BRPM

## 2024-04-22 LAB
ALBUMIN SERPL-MCNC: 3.9 G/DL (ref 3.5–5.2)
ALBUMIN/GLOB SERPL: 1.7 G/DL
ALP SERPL-CCNC: 56 U/L (ref 39–117)
ALT SERPL W P-5'-P-CCNC: 16 U/L (ref 1–33)
ANION GAP SERPL CALCULATED.3IONS-SCNC: 10 MMOL/L (ref 5–15)
AST SERPL-CCNC: 11 U/L (ref 1–32)
BASOPHILS # BLD AUTO: 0 10*3/MM3 (ref 0–0.2)
BASOPHILS NFR BLD AUTO: 0 % (ref 0–1.5)
BILIRUB SERPL-MCNC: 0.2 MG/DL (ref 0–1.2)
BUN SERPL-MCNC: 18 MG/DL (ref 8–23)
BUN/CREAT SERPL: 15.9 (ref 7–25)
CALCIUM SPEC-SCNC: 9.1 MG/DL (ref 8.6–10.5)
CHLORIDE SERPL-SCNC: 98 MMOL/L (ref 98–107)
CHOLEST SERPL-MCNC: 173 MG/DL (ref 0–200)
CO2 SERPL-SCNC: 24 MMOL/L (ref 22–29)
CREAT SERPL-MCNC: 1.13 MG/DL (ref 0.57–1)
DEPRECATED RDW RBC AUTO: 41.6 FL (ref 37–54)
EGFRCR SERPLBLD CKD-EPI 2021: 51.5 ML/MIN/1.73
EOSINOPHIL # BLD AUTO: 0 10*3/MM3 (ref 0–0.4)
EOSINOPHIL NFR BLD AUTO: 0 % (ref 0.3–6.2)
ERYTHROCYTE [DISTWIDTH] IN BLOOD BY AUTOMATED COUNT: 13.2 % (ref 12.3–15.4)
GEN 5 2HR TROPONIN T REFLEX: 12 NG/L
GLOBULIN UR ELPH-MCNC: 2.3 GM/DL
GLUCOSE BLDC GLUCOMTR-MCNC: 135 MG/DL (ref 70–130)
GLUCOSE BLDC GLUCOMTR-MCNC: 140 MG/DL (ref 70–130)
GLUCOSE SERPL-MCNC: 164 MG/DL (ref 65–99)
HBA1C MFR BLD: 5.9 % (ref 4.8–5.6)
HCT VFR BLD AUTO: 31.5 % (ref 34–46.6)
HDLC SERPL-MCNC: 85 MG/DL (ref 40–60)
HGB BLD-MCNC: 10 G/DL (ref 12–15.9)
IMM GRANULOCYTES # BLD AUTO: 0.1 10*3/MM3 (ref 0–0.05)
IMM GRANULOCYTES NFR BLD AUTO: 1.4 % (ref 0–0.5)
LDLC SERPL CALC-MCNC: 75 MG/DL (ref 0–100)
LDLC/HDLC SERPL: 0.87 {RATIO}
LYMPHOCYTES # BLD AUTO: 0.58 10*3/MM3 (ref 0.7–3.1)
LYMPHOCYTES NFR BLD AUTO: 7.9 % (ref 19.6–45.3)
MCH RBC QN AUTO: 27.7 PG (ref 26.6–33)
MCHC RBC AUTO-ENTMCNC: 31.7 G/DL (ref 31.5–35.7)
MCV RBC AUTO: 87.3 FL (ref 79–97)
MONOCYTES # BLD AUTO: 0.1 10*3/MM3 (ref 0.1–0.9)
MONOCYTES NFR BLD AUTO: 1.4 % (ref 5–12)
NEUTROPHILS NFR BLD AUTO: 6.55 10*3/MM3 (ref 1.7–7)
NEUTROPHILS NFR BLD AUTO: 89.3 % (ref 42.7–76)
NRBC BLD AUTO-RTO: 0 /100 WBC (ref 0–0.2)
NT-PROBNP SERPL-MCNC: 940 PG/ML (ref 0–900)
PLATELET # BLD AUTO: 382 10*3/MM3 (ref 140–450)
PMV BLD AUTO: 8.5 FL (ref 6–12)
POTASSIUM SERPL-SCNC: 3.9 MMOL/L (ref 3.5–5.2)
PROT SERPL-MCNC: 6.2 G/DL (ref 6–8.5)
RBC # BLD AUTO: 3.61 10*6/MM3 (ref 3.77–5.28)
SODIUM SERPL-SCNC: 132 MMOL/L (ref 136–145)
TRIGL SERPL-MCNC: 70 MG/DL (ref 0–150)
TROPONIN T DELTA: 0 NG/L
TROPONIN T SERPL HS-MCNC: 12 NG/L
TSH SERPL DL<=0.05 MIU/L-ACNC: 0.43 UIU/ML (ref 0.27–4.2)
VLDLC SERPL-MCNC: 13 MG/DL (ref 5–40)
WBC NRBC COR # BLD AUTO: 7.33 10*3/MM3 (ref 3.4–10.8)

## 2024-04-22 PROCEDURE — 84484 ASSAY OF TROPONIN QUANT: CPT | Performed by: HOSPITALIST

## 2024-04-22 PROCEDURE — 84443 ASSAY THYROID STIM HORMONE: CPT | Performed by: HOSPITALIST

## 2024-04-22 PROCEDURE — G0378 HOSPITAL OBSERVATION PER HR: HCPCS

## 2024-04-22 PROCEDURE — 83880 ASSAY OF NATRIURETIC PEPTIDE: CPT | Performed by: HOSPITALIST

## 2024-04-22 PROCEDURE — 85025 COMPLETE CBC W/AUTO DIFF WBC: CPT | Performed by: HOSPITALIST

## 2024-04-22 PROCEDURE — 63710000001 PREDNISONE PER 1 MG: Performed by: OTOLARYNGOLOGY

## 2024-04-22 PROCEDURE — 83036 HEMOGLOBIN GLYCOSYLATED A1C: CPT | Performed by: HOSPITALIST

## 2024-04-22 PROCEDURE — 99214 OFFICE O/P EST MOD 30 MIN: CPT | Performed by: INTERNAL MEDICINE

## 2024-04-22 PROCEDURE — 82948 REAGENT STRIP/BLOOD GLUCOSE: CPT

## 2024-04-22 PROCEDURE — 80053 COMPREHEN METABOLIC PANEL: CPT | Performed by: HOSPITALIST

## 2024-04-22 PROCEDURE — 80061 LIPID PANEL: CPT | Performed by: HOSPITALIST

## 2024-04-22 RX ORDER — PREDNISONE 10 MG/1
30 TABLET ORAL ONCE
Qty: 3 TABLET | Refills: 0 | Status: SHIPPED | OUTPATIENT
Start: 2024-04-28 | End: 2024-04-28

## 2024-04-22 RX ORDER — PREDNISONE 20 MG/1
40 TABLET ORAL ONCE
Qty: 2 TABLET | Refills: 0 | Status: SHIPPED | OUTPATIENT
Start: 2024-04-27 | End: 2024-04-27

## 2024-04-22 RX ORDER — PREDNISONE 10 MG/1
TABLET ORAL
Qty: 38 TABLET | Refills: 0 | Status: SHIPPED | OUTPATIENT
Start: 2024-04-30 | End: 2024-05-08

## 2024-04-22 RX ORDER — PREDNISONE 50 MG/1
50 TABLET ORAL ONCE
Qty: 1 TABLET | Refills: 0 | Status: SHIPPED | OUTPATIENT
Start: 2024-04-26 | End: 2024-04-26

## 2024-04-22 RX ORDER — ASPIRIN 81 MG/1
81 TABLET, CHEWABLE ORAL DAILY
Qty: 30 TABLET | Refills: 0 | Status: SHIPPED | OUTPATIENT
Start: 2024-04-23 | End: 2024-04-24 | Stop reason: HOSPADM

## 2024-04-22 RX ORDER — PREDNISONE 20 MG/1
20 TABLET ORAL ONCE
Qty: 1 TABLET | Refills: 0 | Status: SHIPPED | OUTPATIENT
Start: 2024-04-29 | End: 2024-04-29

## 2024-04-22 RX ORDER — POLYETHYLENE GLYCOL 3350 17 G/17G
17 POWDER, FOR SOLUTION ORAL DAILY
Qty: 510 G | Refills: 0 | Status: SHIPPED | OUTPATIENT
Start: 2024-04-23 | End: 2024-05-23

## 2024-04-22 RX ORDER — PREDNISONE 20 MG/1
20 TABLET ORAL 3 TIMES DAILY
Qty: 11 TABLET | Refills: 0 | Status: SHIPPED | OUTPATIENT
Start: 2024-04-22 | End: 2024-04-26

## 2024-04-22 RX ADMIN — CLOPIDOGREL BISULFATE 75 MG: 75 TABLET, FILM COATED ORAL at 09:26

## 2024-04-22 RX ADMIN — PREDNISONE 20 MG: 20 TABLET ORAL at 09:26

## 2024-04-22 RX ADMIN — HYDRALAZINE HYDROCHLORIDE 100 MG: 50 TABLET ORAL at 14:25

## 2024-04-22 RX ADMIN — PANTOPRAZOLE SODIUM 40 MG: 40 TABLET, DELAYED RELEASE ORAL at 09:27

## 2024-04-22 RX ADMIN — AMLODIPINE BESYLATE 5 MG: 5 TABLET ORAL at 09:26

## 2024-04-22 RX ADMIN — CARVEDILOL 12.5 MG: 12.5 TABLET, FILM COATED ORAL at 09:25

## 2024-04-22 RX ADMIN — ASPIRIN 81 MG: 81 TABLET, CHEWABLE ORAL at 09:26

## 2024-04-22 RX ADMIN — ALLOPURINOL 100 MG: 100 TABLET ORAL at 09:27

## 2024-04-22 RX ADMIN — CLONIDINE HYDROCHLORIDE 0.2 MG: 0.1 TABLET ORAL at 14:25

## 2024-04-22 RX ADMIN — POLYETHYLENE GLYCOL 3350 17 G: 17 POWDER, FOR SOLUTION ORAL at 09:27

## 2024-04-22 RX ADMIN — ROSUVASTATIN CALCIUM 10 MG: 10 TABLET, FILM COATED ORAL at 09:27

## 2024-04-22 RX ADMIN — HYDRALAZINE HYDROCHLORIDE 100 MG: 50 TABLET ORAL at 09:26

## 2024-04-22 RX ADMIN — FUROSEMIDE 40 MG: 40 TABLET ORAL at 09:27

## 2024-04-22 RX ADMIN — CLONIDINE HYDROCHLORIDE 0.2 MG: 0.1 TABLET ORAL at 09:26

## 2024-04-22 NOTE — SIGNIFICANT NOTE
04/22/24 1233   OTHER   Discipline occupational therapist   Rehab Time/Intention   Session Not Performed other (see comments)  (The pt is up ad venice. OT to s/o.)

## 2024-04-22 NOTE — PLAN OF CARE
Goal Outcome Evaluation:              Outcome Evaluation: a/ox4. independent. no chest pain. NSR-SB on tele. ready for discharge.

## 2024-04-22 NOTE — DISCHARGE SUMMARY
Discharge summary    Date of admission 4/20/2024  Date of discharge 4/22/2024    Final diagnosis  Chest pain with known coronary artery disease   Severe bilateral hearing loss with history of otosclerosis   Paroxysmal atrial fibrillation  Peripheral artery disease  Diabetes mellitus  Hypertension  Hyperlipidemia  Chronic anemia  Sarabia's esophagus  Gastroesophageal reflux disease    Discharge medications    Current Facility-Administered Medications:     allopurinol (ZYLOPRIM) tablet 100 mg, 100 mg, Oral, BID, Murphy Hines MD, 100 mg at 04/22/24 0927    amiodarone (PACERONE) tablet 200 mg, 200 mg, Oral, Nightly, Murphy Hines MD, 200 mg at 04/21/24 2020    amLODIPine (NORVASC) tablet 5 mg, 5 mg, Oral, BID, Murphy Hines MD, 5 mg at 04/22/24 0926    aspirin chewable tablet 81 mg, 81 mg, Oral, Daily, Murphy Hines MD, 81 mg at 04/22/24 0926    carvedilol (COREG) tablet 12.5 mg, 12.5 mg, Oral, BID With Meals, Murphy Hines MD, 12.5 mg at 04/22/24 0925    cloNIDine (CATAPRES) tablet 0.2 mg, 0.2 mg, Oral, TID, Murphy Hines MD, 0.2 mg at 04/22/24 0926    clopidogrel (PLAVIX) tablet 75 mg, 75 mg, Oral, Daily, Murphy Hines MD, 75 mg at 04/22/24 0926    escitalopram (LEXAPRO) tablet 5 mg, 5 mg, Oral, Nightly, Murphy Hines MD, 5 mg at 04/21/24 2020    ferrous sulfate tablet 325 mg, 325 mg, Oral, Every Other Day, Murphy Hines MD, 325 mg at 04/21/24 1515    furosemide (LASIX) tablet 40 mg, 40 mg, Oral, Daily, Murphy Hines MD, 40 mg at 04/22/24 0927    hydrALAZINE (APRESOLINE) tablet 100 mg, 100 mg, Oral, TID, Murphy Hines MD, 100 mg at 04/22/24 0926    insulin lispro (HUMALOG/ADMELOG) injection 2-7 Units, 2-7 Units, Subcutaneous, 4x Daily AC & at Bedtime, Murphy Hines MD, 2 Units at 04/21/24 2119    pantoprazole (PROTONIX) EC tablet 40 mg, 40 mg, Oral, BID AC, Murphy Hines MD, 40 mg at 04/22/24 0927    polyethylene glycol (MIRALAX) packet 17 g, 17 g, Oral, Daily, Murphy Hines MD, 17 g at 04/22/24 0927    [START ON  4/30/2024] predniSONE (DELTASONE) tablet 10 mg, 10 mg, Oral, Once, Ben Sheehan MD    predniSONE (DELTASONE) tablet 20 mg, 20 mg, Oral, TID, Ben Sheehan MD, 20 mg at 04/22/24 0926    [START ON 4/29/2024] predniSONE (DELTASONE) tablet 20 mg, 20 mg, Oral, Once, Ben Sheehan MD    [START ON 4/28/2024] predniSONE (DELTASONE) tablet 30 mg, 30 mg, Oral, Once, Ben Sheehan MD    [START ON 4/27/2024] predniSONE (DELTASONE) tablet 40 mg, 40 mg, Oral, Once, Ben Sheehan MD    [START ON 4/26/2024] predniSONE (DELTASONE) tablet 50 mg, 50 mg, Oral, Once, Ben Sheehan MD    rosuvastatin (CRESTOR) tablet 10 mg, 10 mg, Oral, Daily, Tapan Hines MD, 10 mg at 04/22/24 0927     Consults obtained  Cardiology  ENT    Procedures  None    Hospital course  74-year-old white female with multiple medical problems including known coronary artery disease peripheral artery disease atrial fibrillation diabetes hypertension hyperlipidemia and gastroesophageal disease admitted to emergency room with chest pain and bilateral hearing loss with history of otosclerosis.  Patient admitted for management and started on tapering dose of steroids and ruled out for MI by enzymes and EKG and further evaluated by cardiology and ENT.  Patient is already scheduled for cardiac catheterization on Wednesday and her Eliquis and Glucophage held and cleared for discharge and readmitted on Wednesday for cardiac catheterization.  Patient is chest pain-free and her hearing much improved with the steroids.    Discharge diet regular    Activity as tolerated    Medications as above    Follow-up with prime doctor in 1 week and follow-up with cardiology ENT per the instruction and take medication as directed and be admitted on manage if Accardi catheterization.  Patient advised not to take Eliquis until cardiac catheterization completed and restarted by her cardiologist.    TAPAN HINES MD

## 2024-04-22 NOTE — H&P (VIEW-ONLY)
"    Patient Name: Valentine Arora  :1950  74 y.o.      Patient Care Team:  Aliya Alejandro MD as PCP - General (Family Medicine)    Chief Complaint: AF CAD    Interval History: no further AF. Feels well. Remains hearing impaired on the right       Objective   Vital Signs  Temp:  [98.1 °F (36.7 °C)-98.6 °F (37 °C)] 98.1 °F (36.7 °C)  Heart Rate:  [57-70] 68  Resp:  [18] 18  BP: (144-166)/(49-63) 166/59    Intake/Output Summary (Last 24 hours) at 2024 0933  Last data filed at 2024 0726  Gross per 24 hour   Intake 380 ml   Output --   Net 380 ml     Flowsheet Rows      Flowsheet Row First Filed Value   Admission Height 157.5 cm (62\") Documented at 2024 1433   Admission Weight 86.2 kg (190 lb) Documented at 2024 1433            Physical Exam:   General Appearance:    Alert, cooperative, in no acute distress   Lungs:     Clear to auscultation.  Normal respiratory effort and rate.      Heart:    Regular rhythm and normal rate, normal S1 and S2, no murmurs, gallops or rubs.     Chest Wall:    No abnormalities observed   Abdomen:     Soft, nontender, positive bowel sounds.     Extremities:   no cyanosis, clubbing or edema.  No marked joint deformities.  Adequate musculoskeletal strength.       Results Review:    Results from last 7 days   Lab Units 24  0414   SODIUM mmol/L 132*   POTASSIUM mmol/L 3.9   CHLORIDE mmol/L 98   CO2 mmol/L 24.0   BUN mg/dL 18   CREATININE mg/dL 1.13*   GLUCOSE mg/dL 164*   CALCIUM mg/dL 9.1     Results from last 7 days   Lab Units 24  0613 24  0414 24  0340   HSTROP T ng/L 12 12 14*     Results from last 7 days   Lab Units 24  0414   WBC 10*3/mm3 7.33   HEMOGLOBIN g/dL 10.0*   HEMATOCRIT % 31.5*   PLATELETS 10*3/mm3 382             Results from last 7 days   Lab Units 24  0414   CHOLESTEROL mg/dL 173   TRIGLYCERIDES mg/dL 70   HDL CHOL mg/dL 85*   LDL CHOL mg/dL 75               Medication Review:   allopurinol, 100 mg, " Oral, BID  amiodarone, 200 mg, Oral, Nightly  amLODIPine, 5 mg, Oral, BID  aspirin, 81 mg, Oral, Daily  carvedilol, 12.5 mg, Oral, BID With Meals  cloNIDine, 0.2 mg, Oral, TID  clopidogrel, 75 mg, Oral, Daily  escitalopram, 5 mg, Oral, Nightly  ferrous sulfate, 325 mg, Oral, Every Other Day  furosemide, 40 mg, Oral, Daily  hydrALAZINE, 100 mg, Oral, TID  insulin lispro, 2-7 Units, Subcutaneous, 4x Daily AC & at Bedtime  pantoprazole, 40 mg, Oral, BID AC  polyethylene glycol, 17 g, Oral, Daily  [START ON 4/30/2024] predniSONE, 10 mg, Oral, Once  predniSONE, 20 mg, Oral, TID  [START ON 4/29/2024] predniSONE, 20 mg, Oral, Once  [START ON 4/28/2024] predniSONE, 30 mg, Oral, Once  [START ON 4/27/2024] predniSONE, 40 mg, Oral, Once  [START ON 4/26/2024] predniSONE, 50 mg, Oral, Once  rosuvastatin, 10 mg, Oral, Daily              Assessment & Plan   pAF/ hold eliquis x 48 hours for cath Wednesday  CAD h/o CABG intermittent angina and burning.   HTN improved  Acute hearing loss after vomitting    Ok for dc from my standpoint  Hold eliquis on dc  Cath Wednesday already scheduled with me  Dai Suazo MD  [facility]  04/22/24  09:33 EDT

## 2024-04-22 NOTE — CASE MANAGEMENT/SOCIAL WORK
Case Management Discharge Note      Final Note: Home, spouse to transport         Selected Continued Care - Discharged on 4/22/2024 Admission date: 4/20/2024 - Discharge disposition: Home or Self Care      Destination    No services have been selected for the patient.                Durable Medical Equipment    No services have been selected for the patient.                Dialysis/Infusion    No services have been selected for the patient.                Home Medical Care    No services have been selected for the patient.                Therapy    No services have been selected for the patient.                Community Resources    No services have been selected for the patient.                Community & DME    No services have been selected for the patient.                    Transportation Services  Private: Car    Final Discharge Disposition Code: 01 - home or self-care

## 2024-04-22 NOTE — TELEPHONE ENCOUNTER
Caller: Valentine Arora    Relationship: Self    Best call back number: [phone]    PATIENT IS RETURNING CALL.  SHE WAS CONTACTED ABOUT HER LABS FOR HER HEART CATH ON WEDNESDAY.   SHE IS CURRENTLY ADMITTED AT  AND THE LABS HAVE BEEN COMPLETED. PLEASE REACH OUT TO THE PATIENT TO CONFIRM.

## 2024-04-22 NOTE — PLAN OF CARE
Goal Outcome Evaluation  VSS  Pt denies pain and or shortness of breath  Safety maintained this shift

## 2024-04-22 NOTE — PROGRESS NOTES
"Daily progress note    Primary care physician  Dr. JAQUEZ    Subjective  Awake and alert and feeling much better than yesterday with no new complaint and wants to go home.  Patient family at bedside.  Patient hearing improved and denies any more chest pain shortness of breath palpitation.    History of present illness  74-year-old white female with history of coronary artery disease peripheral artery disease paroxysmal atrial fibrillation diabetes mellitus hypertension hyperlipidemia and gastroesophageal reflux disease presented to [facility] emergency room with chest pain which is intermittent for last few days to week but developed sudden onset of severe hearing loss followed by nausea vomiting but no diaphoresis.  Patient also denies any fever chills shortness of breath palpitation abdominal pain or diarrhea.  Patient evaluated in ER and admitted for further workup.  At the time of interview she is able to give me detailed history but I have to repeat my questions couple of times.  Patient is in no distress and her  also contributed to the history.     REVIEW OF SYSTEMS  Per history of present illness     PHYSICAL EXAM  Blood pressure 166/59, pulse 68, temperature 98.1 °F (36.7 °C), temperature source Oral, resp. rate 18, height 157.5 cm (62\"), weight 86.2 kg (190 lb), SpO2 97%.    GENERAL: Awake and alert no acute distress  HENT: nares patent.  Significant decreased hearing right ear  EYES: no scleral icterus  CV: regular rhythm, normal rate  RESPIRATORY: normal effort and moving air bilaterally  ABDOMEN: soft nontender nondistended bowel sounds positive  MUSCULOSKELETAL: no deformity  NEURO: alert, moves all extremities, follows commands with severe hearing loss  PSYCH:  calm, cooperative  SKIN: warm, dry     LAB RESULTS  Lab Results (last 24 hours)       Procedure Component Value Units Date/Time    POC Glucose Once [[id number]]  (Abnormal) Collected: 04/22/24 1158    Specimen: Blood " Updated: 04/22/24 1201     Glucose 140 mg/dL     POC Glucose Once [[id number]]  (Abnormal) Collected: 04/22/24 0803    Specimen: Blood Updated: 04/22/24 0804     Glucose 135 mg/dL     High Sensitivity Troponin T 2Hr [[id number]]  (Normal) Collected: 04/22/24 0613    Specimen: Blood Updated: 04/22/24 0659     HS Troponin T 12 ng/L      Troponin T Delta 0 ng/L     Narrative:      High Sensitive Troponin T Reference Range:  <14.0 ng/L- Negative Female for AMI  <22.0 ng/L- Negative Male for AMI  >=14 - Abnormal Female indicating possible myocardial injury.  >=22 - Abnormal Male indicating possible myocardial injury.   Clinicians would have to utilize clinical acumen, EKG, Troponin, and serial changes to determine if it is an Acute Myocardial Infarction or myocardial injury due to an underlying chronic condition.         High Sensitivity Troponin T [[id number]]  (Normal) Collected: 04/22/24 0414    Specimen: Blood Updated: 04/22/24 0502     HS Troponin T 12 ng/L     Narrative:      High Sensitive Troponin T Reference Range:  <14.0 ng/L- Negative Female for AMI  <22.0 ng/L- Negative Male for AMI  >=14 - Abnormal Female indicating possible myocardial injury.  >=22 - Abnormal Male indicating possible myocardial injury.   Clinicians would have to utilize clinical acumen, EKG, Troponin, and serial changes to determine if it is an Acute Myocardial Infarction or myocardial injury due to an underlying chronic condition.         BNP [[id number]]  (Abnormal) Collected: 04/22/24 0414    Specimen: Blood Updated: 04/22/24 0502     proBNP 940.0 pg/mL     Narrative:      This assay is used as an aid in the diagnosis of individuals suspected of having heart failure. It can be used as an aid in the diagnosis of acute decompensated heart failure (ADHF) in patients presenting with signs and symptoms of ADHF to the emergency department (ED). In addition, NT-proBNP of <300 pg/mL indicates ADHF is not likely.    Age Range Result Interpretation   NT-proBNP Concentration (pg/mL:      <50             Positive            >450                   Gray                 300-450                    Negative             <300    50-75           Positive            >900                  Gray                300-900                  Negative            <300      >75             Positive            >1800                  Gray                300-1800                  Negative            <300    TSH [[id number]]  (Normal) Collected: 04/22/24 0414    Specimen: Blood Updated: 04/22/24 0502     TSH 0.434 uIU/mL     Comprehensive Metabolic Panel [[id number]]  (Abnormal) Collected: 04/22/24 0414    Specimen: Blood Updated: 04/22/24 0456     Glucose 164 mg/dL      BUN 18 mg/dL      Creatinine 1.13 mg/dL      Sodium 132 mmol/L      Potassium 3.9 mmol/L      Chloride 98 mmol/L      CO2 24.0 mmol/L      Calcium 9.1 mg/dL      Total Protein 6.2 g/dL      Albumin 3.9 g/dL      ALT (SGPT) 16 U/L      AST (SGOT) 11 U/L      Alkaline Phosphatase 56 U/L      Total Bilirubin 0.2 mg/dL      Globulin 2.3 gm/dL      A/G Ratio 1.7 g/dL      BUN/Creatinine Ratio 15.9     Anion Gap 10.0 mmol/L      eGFR 51.5 mL/min/1.73     Narrative:      GFR Normal >60  Chronic Kidney Disease <60  Kidney Failure <15    The GFR formula is only valid for adults with stable renal function between ages 18 and 70.    Lipid Panel [[id number]]  (Abnormal) Collected: 04/22/24 0414    Specimen: Blood Updated: 04/22/24 0456     Total Cholesterol 173 mg/dL      Triglycerides 70 mg/dL      HDL Cholesterol 85 mg/dL      LDL Cholesterol  75 mg/dL      VLDL Cholesterol 13 mg/dL      LDL/HDL Ratio 0.87    Narrative:      Cholesterol Reference Ranges  (U.S. Department of Health and Human Services ATP III Classifications)    Desirable          <200 mg/dL  Borderline High    200-239 mg/dL  High Risk          >240 mg/dL      Triglyceride Reference Ranges  (U.S. Department of Health and Human Services ATP III Classifications)    Normal            <150 mg/dL  Borderline High  150-199 mg/dL  High             200-499 mg/dL  Very High        >500 mg/dL    HDL Reference Ranges  (U.S. Department of Health and Human Services ATP III Classifications)    Low     <40 mg/dl (major risk factor for CHD)  High    >60 mg/dl ('negative' risk factor for CHD)        LDL Reference Ranges  (U.S. Department of Health and Human Services ATP III Classifications)    Optimal          <100 mg/dL  Near Optimal     100-129 mg/dL  Borderline High  130-159 mg/dL  High             160-189 mg/dL  Very High        >189 mg/dL    Hemoglobin A1c [[id number]]  (Abnormal) Collected: 04/22/24 0414    Specimen: Blood Updated: 04/22/24 0451     Hemoglobin A1C 5.90 %     Narrative:      Hemoglobin A1C Ranges:    Increased Risk for Diabetes  5.7% to 6.4%  Diabetes                     >= 6.5%  Diabetic Goal                < 7.0%    CBC & Differential [[id number]]  (Abnormal) Collected: 04/22/24 0414    Specimen: Blood Updated: 04/22/24 0439    Narrative:      The following orders were created for panel order CBC & Differential.  Procedure                               Abnormality         Status                     ---------                               -----------         ------                     CBC Auto Differential[[id number]]        Abnormal            Final result                 Please view results for these tests on the individual orders.    CBC Auto Differential [[id number]]  (Abnormal) Collected: 04/22/24 0414    Specimen: Blood Updated: 04/22/24 0439     WBC 7.33 10*3/mm3      RBC 3.61 10*6/mm3      Hemoglobin 10.0 g/dL      Hematocrit 31.5 %      MCV 87.3 fL      MCH 27.7 pg      MCHC 31.7 g/dL      RDW 13.2 %      RDW-SD 41.6 fl      MPV 8.5 fL      Platelets 382 10*3/mm3      Neutrophil % 89.3 %      Lymphocyte % 7.9 %      Monocyte % 1.4 %      Eosinophil % 0.0 %      Basophil % 0.0 %      Immature Grans % 1.4 %      Neutrophils, Absolute 6.55 10*3/mm3      Lymphocytes, Absolute  0.58 10*3/mm3      Monocytes, Absolute 0.10 10*3/mm3      Eosinophils, Absolute 0.00 10*3/mm3      Basophils, Absolute 0.00 10*3/mm3      Immature Grans, Absolute 0.10 10*3/mm3      nRBC 0.0 /100 WBC     POC Glucose Once [[id number]]  (Abnormal) Collected: 04/21/24 2051    Specimen: Blood Updated: 04/21/24 2052     Glucose 178 mg/dL     POC Glucose Once [[id number]]  (Abnormal) Collected: 04/21/24 1537    Specimen: Blood Updated: 04/21/24 1542     Glucose 156 mg/dL           Imaging Results (Last 24 Hours)       ** No results found for the last 24 hours. **          Results  Scan on 4/20/2024 1437 by [facility], [place]: ECG 12-LEAD         Author: -- Service: -- Author Type: --   Filed: Date of Service: Creation Time:   Status: (Other)   HEART RATE= 66  bpm  RR Interval= 909  ms  RI Interval= 280  ms  P Horizontal Axis= -24  deg  P Front Axis= 84  deg  QRSD Interval= 103  ms  QT Interval= 452  ms  QTcB= 474  ms  QRS Axis= 27  deg  T Wave Axis= 0  deg  - ABNORMAL ECG -  Sinus rhythm  Sinus pause - new  Prolonged RI interval  Borderline T wave abnormalities          Current Facility-Administered Medications:     allopurinol (ZYLOPRIM) tablet 100 mg, 100 mg, Oral, BID, Murphy Hines MD, 100 mg at 04/22/24 0927    ALPRAZolam (XANAX) tablet 0.25 mg, 0.25 mg, Oral, TID PRN, Murphy Hines MD    amiodarone (PACERONE) tablet 200 mg, 200 mg, Oral, Nightly, Murphy Hines MD, 200 mg at 04/21/24 2020    amLODIPine (NORVASC) tablet 5 mg, 5 mg, Oral, BID, Murphy Hines MD, 5 mg at 04/22/24 0926    aspirin chewable tablet 81 mg, 81 mg, Oral, Daily, Murphy Hines MD, 81 mg at 04/22/24 0926    carvedilol (COREG) tablet 12.5 mg, 12.5 mg, Oral, BID With Meals, Murphy Hines MD, 12.5 mg at 04/22/24 0925    cloNIDine (CATAPRES) tablet 0.2 mg, 0.2 mg, Oral, TID, Murphy Hines MD, 0.2 mg at 04/22/24 0926    clopidogrel (PLAVIX) tablet 75 mg, 75 mg, Oral, Daily, Murphy Hines MD, 75 mg at 04/22/24 0926    dextrose (D50W) (25 g/50 mL) IV  injection 25 g, 25 g, Intravenous, Q15 Min PRN, Murphy Hines MD    dextrose (GLUTOSE) oral gel 15 g, 15 g, Oral, Q15 Min PRN, Murphy Hines MD    escitalopram (LEXAPRO) tablet 5 mg, 5 mg, Oral, Nightly, Murphy Hines MD, 5 mg at 04/21/24 2020    ferrous sulfate tablet 325 mg, 325 mg, Oral, Every Other Day, Murphy Hines MD, 325 mg at 04/21/24 1515    furosemide (LASIX) tablet 40 mg, 40 mg, Oral, Daily, Murphy Hines MD, 40 mg at 04/22/24 0927    glucagon (GLUCAGEN) injection 1 mg, 1 mg, Intramuscular, Q15 Min PRN, Murphy Hines MD    hydrALAZINE (APRESOLINE) tablet 100 mg, 100 mg, Oral, TID, Murphy Hines MD, 100 mg at 04/22/24 0926    insulin lispro (HUMALOG/ADMELOG) injection 2-7 Units, 2-7 Units, Subcutaneous, 4x Daily AC & at Bedtime, Murphy Hines MD, 2 Units at 04/21/24 2119    pantoprazole (PROTONIX) EC tablet 40 mg, 40 mg, Oral, BID AC, Murphy Hines MD, 40 mg at 04/22/24 0927    polyethylene glycol (MIRALAX) packet 17 g, 17 g, Oral, Daily, Murphy Hines MD, 17 g at 04/22/24 0927    [START ON 4/30/2024] predniSONE (DELTASONE) tablet 10 mg, 10 mg, Oral, Once, Ben Sheehan MD    predniSONE (DELTASONE) tablet 20 mg, 20 mg, Oral, TID, Ben Sheehan MD, 20 mg at 04/22/24 0926    [START ON 4/29/2024] predniSONE (DELTASONE) tablet 20 mg, 20 mg, Oral, Once, Ben Sheehan MD    [START ON 4/28/2024] predniSONE (DELTASONE) tablet 30 mg, 30 mg, Oral, Once, Ben Sheehan MD    [START ON 4/27/2024] predniSONE (DELTASONE) tablet 40 mg, 40 mg, Oral, Once, Ben Sheehan MD    [START ON 4/26/2024] predniSONE (DELTASONE) tablet 50 mg, 50 mg, Oral, Once, Ben Sheehan MD    rosuvastatin (CRESTOR) tablet 10 mg, 10 mg, Oral, Daily, Murphy Hines MD, 10 mg at 04/22/24 0927    sodium chloride 0.9 % flush 10 mL, 10 mL, Intravenous, PRN, Murphy Hines MD     ASSESSMENT  Chest pain with known coronary artery disease   Severe bilateral hearing loss with history of otosclerosis per ENT  Paroxysmal atrial  fibrillation  Peripheral artery disease  Diabetes mellitus  Hypertension  Hyperlipidemia  Chronic anemia  Sarabia's esophagus  Gastroesophageal reflux disease    PLAN  Discharge home  Discharge summary dictated    TAPAN FERNANDEZ MD    Copied text in this note has been reviewed and is accurate as of 04/22/24

## 2024-04-22 NOTE — CASE MANAGEMENT/SOCIAL WORK
Discharge Planning Assessment  [facility]     Patient Name: Valentine Mark  MRN: [id number]  Today's Date: 4/22/2024    Admit Date: 4/20/2024    Plan: Home, spouse to transport   Discharge Needs Assessment       Row Name 04/22/24 1243       Living Environment    People in Home spouse    Name(s) of People in Home Fran Mark [phone]    Current Living Arrangements home    Potentially Unsafe Housing Conditions none    In the past 12 months has the electric, gas, oil, or water company threatened to shut off services in your home? No    Primary Care Provided by self    Provides Primary Care For no one    Family Caregiver if Needed spouse    Family Caregiver Names Fran Mark    Quality of Family Relationships helpful;involved    Able to Return to Prior Arrangements yes       Resource/Environmental Concerns    Resource/Environmental Concerns none    Transportation Concerns none       Transportation Needs    In the past 12 months, has lack of transportation kept you from medical appointments or from getting medications? no    In the past 12 months, has lack of transportation kept you from meetings, work, or from getting things needed for daily living? No       Food Insecurity    Within the past 12 months, you worried that your food would run out before you got the money to buy more. Never true    Within the past 12 months, the food you bought just didn't last and you didn't have money to get more. Never true       Transition Planning    Patient/Family Anticipates Transition to home;home with family    Patient/Family Anticipated Services at Transition none    Transportation Anticipated family or friend will provide       Discharge Needs Assessment    Equipment Currently Used at Home cane, straight;walker, rolling  Does not use, but has available    Concerns to be Addressed no discharge needs identified;denies needs/concerns at this time    Anticipated Changes Related to Illness none    Equipment Needed After Discharge  none                   Discharge Plan       Row Name 04/22/24 1244       Plan    Plan Home, spouse to transport    Plan Comments Met with pt at bedside. Introduced self and expalined role of . Face sheet verified, PCP is Johanna Alejandro. Pt denies any difficulty paying for medications, and she obtains her medications from [facility]. Pt lives with her spouse, Fran, and stated that he could assist with any care needs that may arise. Pt is independent in ADL's and does not use any assistive devices at this time, however she has a cane/walker available if needed. Pt has had [facility] in the past, and she has never been to rehab. She does not anticipate requiring either service upon discharge. Upon discharge, pt stated that her spouse will transport home. Explained that CCP would follow to assess for discharge needs.                  Continued Care and Services - Admitted Since 4/20/2024    No active coordination exists for this encounter.       Expected Discharge Date and Time       Expected Discharge Date Expected Discharge Time    Apr 22, 2024            Demographic Summary    No documentation.                  Functional Status    No documentation.                  Psychosocial    No documentation.                  Abuse/Neglect    No documentation.                  Legal    No documentation.                  Substance Abuse    No documentation.                  Patient Forms    No documentation.                     Alison Barnett RN

## 2024-04-22 NOTE — SIGNIFICANT NOTE
04/22/24 1033   OTHER   Discipline physical therapist   Rehab Time/Intention   Session Not Performed other (see comments)  (Per chart, pt is up ad venice - independent with mobility. Noted [facility] 24. No acute PT needs indicated prior to DC, plans for home possibly today. PT will sign-off.)   Therapy Assessment/Plan (PT)   Criteria for Skilled Interventions Met (PT) no;no problems identified which require skilled intervention

## 2024-04-22 NOTE — PROGRESS NOTES
"    Patient Name: Valentine Arora  :1950  74 y.o.      Patient Care Team:  Aliya Alejandro MD as PCP - General (Family Medicine)    Chief Complaint: AF CAD    Interval History: no further AF. Feels well. Remains hearing impaired on the right       Objective   Vital Signs  Temp:  [98.1 °F (36.7 °C)-98.6 °F (37 °C)] 98.1 °F (36.7 °C)  Heart Rate:  [57-70] 68  Resp:  [18] 18  BP: (144-166)/(49-63) 166/59    Intake/Output Summary (Last 24 hours) at 2024 0933  Last data filed at 2024 0726  Gross per 24 hour   Intake 380 ml   Output --   Net 380 ml     Flowsheet Rows      Flowsheet Row First Filed Value   Admission Height 157.5 cm (62\") Documented at 2024 1433   Admission Weight 86.2 kg (190 lb) Documented at 2024 1433            Physical Exam:   General Appearance:    Alert, cooperative, in no acute distress   Lungs:     Clear to auscultation.  Normal respiratory effort and rate.      Heart:    Regular rhythm and normal rate, normal S1 and S2, no murmurs, gallops or rubs.     Chest Wall:    No abnormalities observed   Abdomen:     Soft, nontender, positive bowel sounds.     Extremities:   no cyanosis, clubbing or edema.  No marked joint deformities.  Adequate musculoskeletal strength.       Results Review:    Results from last 7 days   Lab Units 24  0414   SODIUM mmol/L 132*   POTASSIUM mmol/L 3.9   CHLORIDE mmol/L 98   CO2 mmol/L 24.0   BUN mg/dL 18   CREATININE mg/dL 1.13*   GLUCOSE mg/dL 164*   CALCIUM mg/dL 9.1     Results from last 7 days   Lab Units 24  0613 24  0414 24  0340   HSTROP T ng/L 12 12 14*     Results from last 7 days   Lab Units 24  0414   WBC 10*3/mm3 7.33   HEMOGLOBIN g/dL 10.0*   HEMATOCRIT % 31.5*   PLATELETS 10*3/mm3 382             Results from last 7 days   Lab Units 24  0414   CHOLESTEROL mg/dL 173   TRIGLYCERIDES mg/dL 70   HDL CHOL mg/dL 85*   LDL CHOL mg/dL 75               Medication Review:   allopurinol, 100 mg, " Oral, BID  amiodarone, 200 mg, Oral, Nightly  amLODIPine, 5 mg, Oral, BID  aspirin, 81 mg, Oral, Daily  carvedilol, 12.5 mg, Oral, BID With Meals  cloNIDine, 0.2 mg, Oral, TID  clopidogrel, 75 mg, Oral, Daily  escitalopram, 5 mg, Oral, Nightly  ferrous sulfate, 325 mg, Oral, Every Other Day  furosemide, 40 mg, Oral, Daily  hydrALAZINE, 100 mg, Oral, TID  insulin lispro, 2-7 Units, Subcutaneous, 4x Daily AC & at Bedtime  pantoprazole, 40 mg, Oral, BID AC  polyethylene glycol, 17 g, Oral, Daily  [START ON 4/30/2024] predniSONE, 10 mg, Oral, Once  predniSONE, 20 mg, Oral, TID  [START ON 4/29/2024] predniSONE, 20 mg, Oral, Once  [START ON 4/28/2024] predniSONE, 30 mg, Oral, Once  [START ON 4/27/2024] predniSONE, 40 mg, Oral, Once  [START ON 4/26/2024] predniSONE, 50 mg, Oral, Once  rosuvastatin, 10 mg, Oral, Daily              Assessment & Plan   pAF/ hold eliquis x 48 hours for cath Wednesday  CAD h/o CABG intermittent angina and burning.   HTN improved  Acute hearing loss after vomitting    Ok for dc from my standpoint  Hold eliquis on dc  Cath Wednesday already scheduled with me  Dai Suazo MD  [facility]  04/22/24  09:33 EDT

## 2024-04-23 ENCOUNTER — READMISSION MANAGEMENT (OUTPATIENT)
Dept: CALL CENTER | Facility: HOSPITAL | Age: 74
End: 2024-04-23
Payer: MEDICARE

## 2024-04-23 NOTE — OUTREACH NOTE
Prep Survey      Flowsheet Row Responses   [facility] patient discharged from? [facility]   Is LACE score < 7 ? No   Eligibility Readm Mgmt   Discharge diagnosis Chest pain   Does the patient have one of the following disease processes/diagnoses(primary or secondary)? Other   Does the patient have Home health ordered? No   Is there a DME ordered? No   Comments regarding appointments Follow-up with prime doctor in 1 week and follow-up with cardiology ENT per the instruction and take medication as directed and be admitted on manage if Accardi catheterization.  Patient advised not to take Eliquis until cardiac catheterization completed and restarted by her cardiologist.   Medication alerts for this patient see avs   Prep survey completed? Yes            Chikis DUBOSE - Registered Nurse

## 2024-04-24 ENCOUNTER — HOSPITAL ENCOUNTER (OUTPATIENT)
Facility: HOSPITAL | Age: 74
Setting detail: HOSPITAL OUTPATIENT SURGERY
Discharge: HOME | End: 2024-04-24
Attending: INTERNAL MEDICINE | Admitting: INTERNAL MEDICINE
Payer: MEDICARE

## 2024-04-24 VITALS
HEIGHT: 62 IN | DIASTOLIC BLOOD PRESSURE: 58 MMHG | SYSTOLIC BLOOD PRESSURE: 164 MMHG | WEIGHT: 185 LBS | RESPIRATION RATE: 18 BRPM | BODY MASS INDEX: 34.04 KG/M2 | HEART RATE: 57 BPM | TEMPERATURE: 97.9 F | OXYGEN SATURATION: 100 %

## 2024-04-24 DIAGNOSIS — I25.118 CORONARY ARTERY DISEASE OF NATIVE ARTERY OF NATIVE HEART WITH STABLE ANGINA PECTORIS: ICD-10-CM

## 2024-04-24 LAB — GLUCOSE BLDC GLUCOMTR-MCNC: 121 MG/DL (ref 70–130)

## 2024-04-24 PROCEDURE — 25510000001 IOPAMIDOL PER 1 ML: Performed by: INTERNAL MEDICINE

## 2024-04-24 PROCEDURE — 25010000002 FENTANYL CITRATE (PF) 50 MCG/ML SOLUTION: Performed by: INTERNAL MEDICINE

## 2024-04-24 PROCEDURE — 82948 REAGENT STRIP/BLOOD GLUCOSE: CPT

## 2024-04-24 PROCEDURE — 93455 CORONARY ART/GRFT ANGIO S&I: CPT | Performed by: INTERNAL MEDICINE

## 2024-04-24 PROCEDURE — 25010000002 HEPARIN (PORCINE) PER 1000 UNITS: Performed by: INTERNAL MEDICINE

## 2024-04-24 PROCEDURE — C1769 GUIDE WIRE: HCPCS | Performed by: INTERNAL MEDICINE

## 2024-04-24 PROCEDURE — 25010000002 MIDAZOLAM PER 1 MG: Performed by: INTERNAL MEDICINE

## 2024-04-24 PROCEDURE — C1894 INTRO/SHEATH, NON-LASER: HCPCS | Performed by: INTERNAL MEDICINE

## 2024-04-24 PROCEDURE — 99152 MOD SED SAME PHYS/QHP 5/>YRS: CPT | Performed by: INTERNAL MEDICINE

## 2024-04-24 RX ORDER — HEPARIN SODIUM 1000 [USP'U]/ML
INJECTION, SOLUTION INTRAVENOUS; SUBCUTANEOUS
Status: DISCONTINUED | OUTPATIENT
Start: 2024-04-24 | End: 2024-04-24 | Stop reason: HOSPADM

## 2024-04-24 RX ORDER — NITROGLYCERIN 0.4 MG/1
0.4 TABLET SUBLINGUAL
Status: DISCONTINUED | OUTPATIENT
Start: 2024-04-24 | End: 2024-04-24 | Stop reason: HOSPADM

## 2024-04-24 RX ORDER — AMLODIPINE BESYLATE 5 MG/1
5 TABLET ORAL 2 TIMES DAILY
Start: 2024-04-24

## 2024-04-24 RX ORDER — MIDAZOLAM HYDROCHLORIDE 1 MG/ML
INJECTION INTRAMUSCULAR; INTRAVENOUS
Status: DISCONTINUED | OUTPATIENT
Start: 2024-04-24 | End: 2024-04-24 | Stop reason: HOSPADM

## 2024-04-24 RX ORDER — VERAPAMIL HYDROCHLORIDE 2.5 MG/ML
INJECTION, SOLUTION INTRAVENOUS
Status: DISCONTINUED | OUTPATIENT
Start: 2024-04-24 | End: 2024-04-24 | Stop reason: HOSPADM

## 2024-04-24 RX ORDER — LIDOCAINE HYDROCHLORIDE 20 MG/ML
INJECTION, SOLUTION INFILTRATION; PERINEURAL
Status: DISCONTINUED | OUTPATIENT
Start: 2024-04-24 | End: 2024-04-24 | Stop reason: HOSPADM

## 2024-04-24 RX ORDER — SODIUM CHLORIDE 0.9 % (FLUSH) 0.9 %
10 SYRINGE (ML) INJECTION AS NEEDED
Status: DISCONTINUED | OUTPATIENT
Start: 2024-04-24 | End: 2024-04-24 | Stop reason: HOSPADM

## 2024-04-24 RX ORDER — SODIUM CHLORIDE 9 MG/ML
75 INJECTION, SOLUTION INTRAVENOUS CONTINUOUS
Status: DISCONTINUED | OUTPATIENT
Start: 2024-04-24 | End: 2024-04-24 | Stop reason: HOSPADM

## 2024-04-24 RX ORDER — METFORMIN HYDROCHLORIDE 500 MG/1
500 TABLET, EXTENDED RELEASE ORAL
COMMUNITY

## 2024-04-24 RX ORDER — SODIUM CHLORIDE 9 MG/ML
INJECTION, SOLUTION INTRAVENOUS
Status: COMPLETED | OUTPATIENT
Start: 2024-04-24 | End: 2024-04-24

## 2024-04-24 RX ORDER — FENTANYL CITRATE 50 UG/ML
INJECTION, SOLUTION INTRAMUSCULAR; INTRAVENOUS
Status: DISCONTINUED | OUTPATIENT
Start: 2024-04-24 | End: 2024-04-24 | Stop reason: HOSPADM

## 2024-04-24 RX ORDER — ACETAMINOPHEN 325 MG/1
650 TABLET ORAL EVERY 4 HOURS PRN
Status: DISCONTINUED | OUTPATIENT
Start: 2024-04-24 | End: 2024-04-24 | Stop reason: HOSPADM

## 2024-04-24 RX ORDER — ISOSORBIDE MONONITRATE 30 MG/1
30 TABLET, EXTENDED RELEASE ORAL DAILY
Qty: 90 TABLET | Refills: 3 | Status: SHIPPED | OUTPATIENT
Start: 2024-04-24

## 2024-04-24 NOTE — DISCHARGE INSTRUCTIONS
[facility]  [address] [place] [address]    Coronary Angiogram (Radial/Ulnar Approach) After Care    Refer to this sheet in the next few weeks. These instructions provide you with information on caring for yourself after your procedure. Your caregiver may also give you more specific instructions. Your treatment has been planned according to current medical practices, but problems sometimes occur. Call your caregiver if you have any problems or questions after your procedure.    Home Care Instructions:  You may shower the day after the procedure. Remove the bandage (dressing) and gently wash the site with plain soap and water. Gently pat the site dry. You may apply a band aid daily for 2 days if desired.    Do not apply powder or lotion to the site.  Do not submerge the affected site in water for 3 to 5 days or until the site is completely healed.   Do not lift, push or pull anything over 5 pounds for 5 days after your procedure or as directed by your physician.  As a reference, a gallon of milk weighs 8 pounds.   Inspect the site at least twice daily. You may notice some bruising at the site and it may be tender for 1 to 2 weeks.     Increase your fluid intake for the next 2 days.    Keep arm elevated for 24 hours. For the remainder of the day, keep your arm in “Pledge of Allegiance” position when up and about.     You may drive 24 hours after the procedure unless otherwise instructed by your caregiver.  Do not operate machinery or power tools for 24 hours.  A responsible adult should be with you for the first 24 hours after you arrive home. Do not make any important legal decisions or sign legal papers for 24 hours.  Do not drink alcohol for 24 hours.    Metformin or any medications containing Metformin should not be taken for 48 hours after your procedure.      Call Your Doctor if:   You have unusual pain at the radial/ulnar (wrist) site.  You have redness, warmth, swelling, or pain at the  radial/ulnar (wrist) site.  You have drainage (other than a small amount of blood on the dressing).  `You have chills or a fever > 101.  Your arm becomes pale or dark, cool, tingly, or numb.  You develop chest pain, shortness of breath, feel faint or pass out.    You have heavy bleeding from the site, hold pressure on the site for 20 minutes.  If the bleeding stops, apply a fresh bandage and call your cardiologist.  However, if you        continue to have bleeding, call 911 and continue to apply pressure to the site.   You have any symptoms of a stroke.  Remember BE FAST  B-balance. Sudden trouble walking or loss of balance.  E-eyes.  Sudden changes in how you see or a sudden onset of a very bad headache.   F-face. Sudden weakness or loss of feeling of the face or facial droop on one side.   A-arms Sudden weakness or numbness in one arm.  One arm drifts down if they are both held out in front of you. This happens suddenly and usually on one side of the body.   S-speech.  Sudden trouble speaking, slurred speech or trouble understanding what are saying.   T-time  Time to call emergency services.  Write down the symptoms and the time they started.

## 2024-04-24 NOTE — Clinical Note
Hemostasis started on the right radial artery. R-Band was used in achieving hemostasis. Radial compression device applied to vessel. Hemostasis achieved successfully. Closure device additional comment: 14 Cc air

## 2024-04-26 ENCOUNTER — READMISSION MANAGEMENT (OUTPATIENT)
Dept: CALL CENTER | Facility: HOSPITAL | Age: 74
End: 2024-04-26
Payer: MEDICARE

## 2024-04-26 NOTE — OUTREACH NOTE
Medical Week 1 Survey      Flowsheet Row Responses   [facility] patient discharged from? [place]   Does the patient have one of the following disease processes/diagnoses(primary or secondary)? Other   Week 1 attempt successful? Yes   Call start time 1332   Call end time 1334   Discharge diagnosis Chest pain   Meds reviewed with patient/caregiver? Yes   Is the patient having any side effects they believe may be caused by any medication additions or changes? No   Does the patient have all medications ordered at discharge? Yes   Is the patient taking all medications as directed (includes completed medication regime)? Yes   Does the patient have a primary care provider?  Yes   Does the patient have an appointment with their PCP within 7 days of discharge? No   What is preventing the patient from scheduling follow up appointments within 7 days of discharge? Haven't had time   Nursing Interventions Advised patient to make appointment   Has the patient kept scheduled appointments due by today? N/A   Has home health visited the patient within 72 hours of discharge? N/A   Psychosocial issues? No   Did the patient receive a copy of their discharge instructions? Yes   Nursing interventions Reviewed instructions with patient   What is the patient's perception of their health status since discharge? Improving   Is the patient/caregiver able to teach back signs and symptoms related to disease process for when to call PCP? Yes   Is the patient/caregiver able to teach back signs and symptoms related to disease process for when to call 911? Yes   Is the patient/caregiver able to teach back the hierarchy of who to call/visit for symptoms/problems? PCP, Specialist, Home health nurse, Urgent Care, ED, 911 Yes   If the patient is a current smoker, are they able to teach back resources for cessation? Not a smoker   Additional teach back comments states has recent hearing loss, has appt with ENT in 1 week to assess   Week 1 call  completed? Yes   Graduated/Revoked comments call brief, pt states has no questions   Call end time 1191            Pati CABRERA - Registered Nurse

## 2024-05-09 ENCOUNTER — HOSPITAL ENCOUNTER (OUTPATIENT)
Dept: MAMMOGRAPHY | Facility: HOSPITAL | Age: 74
Discharge: HOME | End: 2024-05-09
Payer: MEDICARE

## 2024-05-09 ENCOUNTER — HOSPITAL ENCOUNTER (OUTPATIENT)
Dept: ULTRASOUND IMAGING | Facility: HOSPITAL | Age: 74
Discharge: HOME | End: 2024-05-09
Payer: MEDICARE

## 2024-05-09 DIAGNOSIS — R92.8 ABNORMAL MAMMOGRAM OF RIGHT BREAST: ICD-10-CM

## 2024-05-09 PROCEDURE — 77065 DX MAMMO INCL CAD UNI: CPT

## 2024-05-09 PROCEDURE — 76642 ULTRASOUND BREAST LIMITED: CPT

## 2024-05-09 PROCEDURE — G0279 TOMOSYNTHESIS, MAMMO: HCPCS | Performed by: RADIOLOGY

## 2024-05-09 PROCEDURE — G0279 TOMOSYNTHESIS, MAMMO: HCPCS

## 2024-05-09 PROCEDURE — 77065 DX MAMMO INCL CAD UNI: CPT | Performed by: RADIOLOGY

## 2024-05-09 PROCEDURE — 76642 ULTRASOUND BREAST LIMITED: CPT | Performed by: RADIOLOGY

## 2024-05-23 ENCOUNTER — HOSPITAL ENCOUNTER (OUTPATIENT)
Dept: MAMMOGRAPHY | Facility: HOSPITAL | Age: 74
Discharge: HOME | End: 2024-05-23
Payer: MEDICARE

## 2024-05-23 ENCOUNTER — HOSPITAL ENCOUNTER (OUTPATIENT)
Dept: ULTRASOUND IMAGING | Facility: HOSPITAL | Age: 74
Discharge: HOME | End: 2024-05-23
Payer: MEDICARE

## 2024-05-23 DIAGNOSIS — N63.10 MASS OF RIGHT BREAST, UNSPECIFIED QUADRANT: ICD-10-CM

## 2024-05-23 DIAGNOSIS — N63.0 BREAST MASS IN FEMALE: ICD-10-CM

## 2024-05-23 PROCEDURE — 88305 TISSUE EXAM BY PATHOLOGIST: CPT | Performed by: FAMILY MEDICINE

## 2024-05-23 RX ORDER — LIDOCAINE HYDROCHLORIDE AND EPINEPHRINE 10; 10 MG/ML; UG/ML
5 INJECTION, SOLUTION INFILTRATION; PERINEURAL ONCE
Status: COMPLETED | OUTPATIENT
Start: 2024-05-23 | End: 2024-05-23

## 2024-05-23 RX ORDER — LIDOCAINE HYDROCHLORIDE 10 MG/ML
5 INJECTION, SOLUTION INFILTRATION; PERINEURAL ONCE
Status: DISCONTINUED | OUTPATIENT
Start: 2024-05-23 | End: 2024-05-24 | Stop reason: HOSPADM

## 2024-05-23 RX ADMIN — LIDOCAINE HYDROCHLORIDE,EPINEPHRINE BITARTRATE 10 ML: 10; .01 INJECTION, SOLUTION INFILTRATION; PERINEURAL at 13:54

## 2024-05-25 LAB
LAB AP CASE REPORT: NORMAL
LAB AP CLINICAL INFORMATION: NORMAL
PATH REPORT.FINAL DX SPEC: NORMAL
PATH REPORT.GROSS SPEC: NORMAL

## 2024-05-30 ENCOUNTER — OFFICE VISIT (OUTPATIENT)
Age: 74
End: 2024-05-30
Payer: MEDICARE

## 2024-05-30 VITALS
DIASTOLIC BLOOD PRESSURE: 60 MMHG | BODY MASS INDEX: 35.51 KG/M2 | SYSTOLIC BLOOD PRESSURE: 148 MMHG | HEART RATE: 63 BPM | HEIGHT: 62 IN | OXYGEN SATURATION: 97 % | WEIGHT: 193 LBS

## 2024-05-30 DIAGNOSIS — I25.10 CORONARY ARTERY DISEASE INVOLVING NATIVE CORONARY ARTERY OF NATIVE HEART WITHOUT ANGINA PECTORIS: Primary | ICD-10-CM

## 2024-05-30 NOTE — PROGRESS NOTES
Subjective:     Encounter Date: 05/30/24      Patient ID: Valentine Arora is a 74 y.o. female.    Chief Complaint: CAD, HFpEF, HTN, AF  HPI:   74-year-old woman with coronary disease status post bypass, atrial flutter/fibrillation, HFpEF, obesity, PAD with prior intervention, type 2 diabetes.  In May 2023 she was diagnosed with atrial fibrillation and and has been on amiodarone since September 2023.  She was then admitted to [facility] in March 2024 with bradycardia and acute diastolic heart failure.  She then was admitted to [facility] with hypotension and A-fib/tachycardia.  During that hospitalization she was diuresed and cardioverted.  Medications were adjusted. During that hospitalization we realized that when she is not in A-fib she is often asymptomatic and heart rates are not very fast.    In April 2024 she continued to complain of burning angina despite improved blood pressures for that reason we proceeded with repeat cardiac catheterization.  She was found to have severe multivessel disease: Known occluded RCA occluded SVG to PDA, robust left-to-right collaterals which backfills to the mid RCA.  She has proximal severe LAD disease with a patent SVG to diagonal and jump to LAD.  Distal to the anastomosis the vessels were normal.  She had borderline mid circumflex disease.  We decided to treat her medically with up titration of her long-acting nitrates.      She returns today. She is feeling well. Minimal AF (typical symptoms are fluttering,  nausea, fatigue). No further burning angina. BP well controlled 120s-150s.       Echo: May 2023, normal systolic function, mild LVH, normal diastolic function  Holter May 2023: Sinus rhythm with intermittent sinus bradycardia  Nuclear stress September 2023 diaphoretic attenuation artifact, normal perfusion EF greater than 70%  Holter September 2023 sinus rhythm with Mobitz 1  Holter January 2024: Sinus rhythm, heart rate range 35-81  Echo January 2024:  PCL spoke with Skye in intake at [facility] who reported that \"packet is still under review with the nurse\". Intake reported that they will contact PCL with update when they have one.    Normal systolic function, normal diastolic dysfunction, normal PA pressure, dilated LA, moderate TR     The following portions of the patient's history were reviewed and updated as appropriate: allergies, current medications, past family history, past medical history, past social history, past surgical history and problem list.     REVIEW OF SYSTEMS:   All systems reviewed.  Pertinent positives identified in HPI.  All other systems are negative.    Past Medical History:   Diagnosis Date    Arthritis     Atrial fibrillation     Atrial flutter, unspecified type 2023    Sarabia esophagus     CAD (coronary artery disease)     Chronic back pain     Colon polyp     GERD (gastroesophageal reflux disease)     Hyperlipidemia     Hypertension     Myocardial infarction         PAD (peripheral artery disease) 2019    PONV (postoperative nausea and vomiting)     Shingles     Type 2 diabetes mellitus        Family History   Problem Relation Age of Onset    Heart disease Mother     Heart disease Father     Colon cancer Neg Hx     Colon polyps Neg Hx     Breast cancer Neg Hx        Social History     Socioeconomic History    Marital status:    Tobacco Use    Smoking status: Former     Current packs/day: 0.00     Average packs/day: 0.5 packs/day for 20.0 years (10.0 ttl pk-yrs)     Types: Cigarettes     Start date:      Quit date:      Years since quittin.4    Smokeless tobacco: Never    Tobacco comments:     QUIT    Vaping Use    Vaping status: Never Used   Substance and Sexual Activity    Alcohol use: Not Currently     Comment: rarely    Drug use: No    Sexual activity: Defer       Allergies   Allergen Reactions    Codeine Itching    Other Unknown (See Comments)     Vicryl: doesn't heal       Past Surgical History:   Procedure Laterality Date    CARDIAC CATHETERIZATION N/A 2024    Procedure: Coronary angiography, Left heart catheterization, Left ventricular angiography;  Surgeon: Lakeisha  MD Dai;  Location:  [facility];  Service: Cardiology;  Laterality: N/A;    CARDIAC CATHETERIZATION  4/24/2024    Procedure: Saphenous Vein Graft;  Surgeon: Dai Suazo MD;  Location:  [facility];  Service: Cardiology;;    COLONOSCOPY      COLONOSCOPY N/A 6/24/2020    Procedure: COLONOSCOPY;  Surgeon: Mathew Roberts MD;  Location:  [facility] OR;  Service: Gastroenterology;  Laterality: N/A;  Descending colon polyp x 2, Ascending colon polyp, Sigmoid colon polyp, Rectal polyp    COLONOSCOPY N/A 8/7/2023    Procedure: COLONOSCOPY;  Surgeon: Mathew Roberts MD;  Location:  [facility] OR;  Service: Gastroenterology;  Laterality: N/A;  Normal    CORONARY ARTERY BYPASS GRAFT  1995    x 2 vessels    ENDOSCOPY N/A 6/24/2020    Procedure: ESOPHAGOGASTRODUODENOSCOPY;  Surgeon: Mathew Roberts MD;  Location: [facility] OR;  Service: Gastroenterology;  Laterality: N/A;  Ulcerative esophagitis, Gastritis, Duodenitis  Duodenal biopsy, gastric biopsy, distal esophageal biopsy    ENDOSCOPY N/A 9/17/2020    Procedure: ESOPHAGOGASTRODUODENOSCOPY with biopsies;  Surgeon: Mathew Roberts MD;  Location: [facility] OR;  Service: Gastroenterology;  Laterality: N/A;  Esophagitis  Sarabia's Esophagus  Hiatal hernia  Gastritis  Gastric biopsy  Distal esophagus biopsy    ENDOSCOPY N/A 8/7/2023    Procedure: Esophagogastroduedenoscopy;  Surgeon: Mathew Roberts MD;  Location: [facility] OR;  Service: Gastroenterology;  Laterality: N/A;  Gastritis; short segment Sarabia's; Esophageal stricture- dilatation; Biopsies- gastric, distal esophagus    INNER EAR SURGERY      JOINT REPLACEMENT      right knee    LEG SURGERY      x 2 s/p fall    LUMBAR FUSION      L4/L5    SKIN GRAFT      to left lower leg x 2    TONSILLECTOMY      WRIST FUSION Left        Procedures       Objective:         PHYSICAL EXAM:  GEN: VSS, no distress,   Eyes: normal sclera, normal lids and  lashes  HENT: moist mucus membranes,   Respiratory: CTAB, no rales or wheezes  CV: RRR, no murmurs, , +2 DP and 2+ carotid pulses b/l  GI: NABS, soft,  Nontender, nondistended  MSK: no edema, no scoliosis or kyphosis  Skin: no rash, warm, dry  Heme/Lymph: no bruising or bleeding  Psych: organized thought, normal behavior and affect  Neuro: Cranial nerves grossly intact, Alert and Oriented x 3.         Assessment:         No diagnosis found.         Plan:       1.  Atrial fibrillation: Status post cardioversion March 2024.  Eliquis 5 twice daily.  Coreg.  Appears to be in sinus rhythm today. Intermittent symptoms could consider ablation if worsened in the future.   2.  CAD: History of two-vessel bypass 1995.  Multivessel CAD with occluded SVG to PDA and occluded RCA with left to right collaterals; patent SVG to diagonal and LAD. Medically managed angina with long acting nitrates.  3.  Hypertension: Improved, adequate control continue  meds.   4.  SVEN: Renal function improved compared to February.  Monitor.  5.  HFpEF: Prompted by atrial fibrillation, euvolemic on exam today.    Dr. Alejandro, thank you very much for referring this kind patient to me. Please call me with any questions or concerns.       Dai Suazo MD  05/30/24  [facility]    Outpatient Encounter Medications as of 5/30/2024   Medication Sig Dispense Refill    allopurinol (ZYLOPRIM) 100 MG tablet Take 1 tablet by mouth 2 (Two) Times a Day.      ALPRAZolam (XANAX) 0.25 MG tablet Take 1 tablet by mouth As Needed for Anxiety.      amiodarone (PACERONE) 200 MG tablet Take 1 tablet by mouth Daily. (Patient taking differently: Take 1 tablet by mouth Every Night.) 90 tablet 1    amLODIPine (NORVASC) 5 MG tablet Take 1 tablet by mouth 2 (Two) Times a Day.      apixaban (ELIQUIS) 5 MG tablet tablet Take 1 tablet by mouth 2 (Two) Times a Day.      carvedilol (COREG) 12.5 MG tablet Take 1 tablet by mouth 2 (Two) Times a Day With Meals. 180  tablet 2    cloNIDine (CATAPRES) 0.2 MG tablet Take 1 tablet by mouth 3 (Three) Times a Day.      clopidogrel (PLAVIX) 75 MG tablet TAKE ONE TABLET BY MOUTH DAILY (Patient taking differently: Take 1 tablet by mouth Every Night.) 90 tablet 3    escitalopram (LEXAPRO) 5 MG tablet Take 1 tablet by mouth Every Night.      ferrous sulfate 325 (65 FE) MG tablet Take 1 tablet by mouth Every Other Day.      furosemide (LASIX) 40 MG tablet Take 1 tablet by mouth Daily.      hydrALAZINE (APRESOLINE) 100 MG tablet Take 1 tablet by mouth 3 (Three) Times a Day for 30 days. 90 tablet 0    isosorbide mononitrate (IMDUR) 30 MG 24 hr tablet Take 1 tablet by mouth Daily. 90 tablet 3    metFORMIN ER (GLUCOPHAGE-XR) 500 MG 24 hr tablet Take 1 tablet by mouth 2 (Two) Times a Day Before Meals.      pantoprazole (PROTONIX) 40 MG EC tablet TAKE ONE TABLET BY MOUTH TWICE A  tablet 3    [] polyethylene glycol (MIRALAX) 17 GM/SCOOP powder Take 17 g by mouth Daily 510 g 0    [] predniSONE (DELTASONE) 10 MG tablet Take 2 tablets by mouth 3 times a day for 11 DOSES, THEN 5 tablets Daily for 1 day, THEN 4 tablets Daily for 1 day, THEN 3 tablets Daily for 1 day, THEN 2 tablets Daily for 1 day, THEN 1 tablet Daily for 1 day. 38 tablet 0    rosuvastatin (CRESTOR) 10 MG tablet Take 1 tablet by mouth Daily.       No facility-administered encounter medications on file as of 2024.

## 2024-07-15 DIAGNOSIS — I48.91 ATRIAL FIBRILLATION, UNSPECIFIED TYPE: ICD-10-CM

## 2024-07-15 RX ORDER — AMIODARONE HYDROCHLORIDE 200 MG/1
200 TABLET ORAL DAILY
Qty: 90 TABLET | Refills: 1 | Status: SHIPPED | OUTPATIENT
Start: 2024-07-15

## 2024-09-11 ENCOUNTER — OFFICE VISIT (OUTPATIENT)
Dept: CARDIOLOGY | Facility: CLINIC | Age: 74
End: 2024-09-11
Payer: MEDICARE

## 2024-09-11 VITALS
BODY MASS INDEX: 34.41 KG/M2 | SYSTOLIC BLOOD PRESSURE: 132 MMHG | WEIGHT: 187 LBS | HEART RATE: 58 BPM | OXYGEN SATURATION: 99 % | HEIGHT: 62 IN | DIASTOLIC BLOOD PRESSURE: 60 MMHG

## 2024-09-11 DIAGNOSIS — I48.0 PAROXYSMAL ATRIAL FIBRILLATION: Primary | ICD-10-CM

## 2024-09-11 PROCEDURE — 3078F DIAST BP <80 MM HG: CPT | Performed by: NURSE PRACTITIONER

## 2024-09-11 PROCEDURE — 3075F SYST BP GE 130 - 139MM HG: CPT | Performed by: NURSE PRACTITIONER

## 2024-09-11 PROCEDURE — 93000 ELECTROCARDIOGRAM COMPLETE: CPT | Performed by: NURSE PRACTITIONER

## 2024-09-11 PROCEDURE — 1160F RVW MEDS BY RX/DR IN RCRD: CPT | Performed by: NURSE PRACTITIONER

## 2024-09-11 PROCEDURE — 99214 OFFICE O/P EST MOD 30 MIN: CPT | Performed by: NURSE PRACTITIONER

## 2024-09-11 PROCEDURE — 1159F MED LIST DOCD IN RCRD: CPT | Performed by: NURSE PRACTITIONER

## 2024-09-11 RX ORDER — CLOPIDOGREL BISULFATE 75 MG/1
75 TABLET ORAL NIGHTLY
Qty: 90 TABLET | Refills: 3 | Status: SHIPPED | OUTPATIENT
Start: 2024-09-11

## 2024-09-11 RX ORDER — HYDRALAZINE HYDROCHLORIDE 100 MG/1
100 TABLET, FILM COATED ORAL 3 TIMES DAILY
Qty: 90 TABLET | Refills: 3 | Status: SHIPPED | OUTPATIENT
Start: 2024-09-11 | End: 2025-01-09

## 2024-09-11 NOTE — PROGRESS NOTES
Date of Office Visit: 2024  Encounter Provider: TERRI Robertson  Place of Service: [facility] CARDIOLOGY  Patient Name: Valentine Arora  :1950    Chief complaint:  Atrial flutter and coronary disease    HPI: Valentine Arora is a 74 y.o. female who is a patient of Dr. Taylor and is known to me from previous.  She has a history of coronary artery disease post bypass, atrial flutter/fibrillation, diastolic heart failure, obesity, peripheral arterial disease with previous intervention and type 2 diabetes mellitus.    In May 2023 she was diagnosed with atrial fibrillation and has been on amiodarone since.  She was admitted to the hospital in March in [place] with bradycardia and diastolic heart failure she was hypotensive and having A-fib/tachycardia.  She got cardioverted and diuresed meds were adjusted.    In April she complained of burning angina despite blood pressure control.  We did a cardiac cath she was found to have severe multivessel CAD.  She has a known occluded right coronary with occluded vein graft to the PDA, there was robust left-to-right collaterals backfilling the mid RCA.  She had severe disease in the LAD with patent vein graft to the diagonal and a jump graft to the LAD.  Distal anastomosis of the vessels were normal.  Borderline disease in the circumflex.  We uptitrated her medication and her long-acting nitrates.    She was last in the office in May maintaining sinus rhythm angina was well-managed.  She is here for follow-up.  She denies any chest pain pressure or tightness she states she has been doing better since she was started on isosorbide.  She has no palpitations.  Blood pressures at home have been anywhere from the 120s to 140s.  She has been checking it daily at home.  She does cook with salt but needs to watch her salt better.    Previous testing and notes have been reviewed by me.   Past Medical History:   Diagnosis Date    Arthritis      Atrial fibrillation     Atrial flutter, unspecified type 05/17/2023    Sarabia esophagus     CAD (coronary artery disease)     Chronic back pain     Colon polyp     GERD (gastroesophageal reflux disease)     Hyperlipidemia     Hypertension     Myocardial infarction     1995    PAD (peripheral artery disease) 09/11/2019    PONV (postoperative nausea and vomiting)     Shingles     Type 2 diabetes mellitus        Past Surgical History:   Procedure Laterality Date    CARDIAC CATHETERIZATION N/A 4/24/2024    Procedure: Coronary angiography, Left heart catheterization, Left ventricular angiography;  Surgeon: Dai Suazo MD;  Location:  [facility];  Service: Cardiology;  Laterality: N/A;    CARDIAC CATHETERIZATION  4/24/2024    Procedure: Saphenous Vein Graft;  Surgeon: Dai Suazo MD;  Location:  [facility];  Service: Cardiology;;    COLONOSCOPY      COLONOSCOPY N/A 6/24/2020    Procedure: COLONOSCOPY;  Surgeon: Mathew Roberts MD;  Location: [facility] OR;  Service: Gastroenterology;  Laterality: N/A;  Descending colon polyp x 2, Ascending colon polyp, Sigmoid colon polyp, Rectal polyp    COLONOSCOPY N/A 8/7/2023    Procedure: COLONOSCOPY;  Surgeon: Mathew Roberts MD;  Location: [facility] OR;  Service: Gastroenterology;  Laterality: N/A;  Normal    CORONARY ARTERY BYPASS GRAFT  1995    x 2 vessels    ENDOSCOPY N/A 6/24/2020    Procedure: ESOPHAGOGASTRODUODENOSCOPY;  Surgeon: Mathew Roberts MD;  Location: [facility] OR;  Service: Gastroenterology;  Laterality: N/A;  Ulcerative esophagitis, Gastritis, Duodenitis  Duodenal biopsy, gastric biopsy, distal esophageal biopsy    ENDOSCOPY N/A 9/17/2020    Procedure: ESOPHAGOGASTRODUODENOSCOPY with biopsies;  Surgeon: Mathew Roberts MD;  Location: [facility] OR;  Service: Gastroenterology;  Laterality: N/A;  Esophagitis  Sarabia's Esophagus  Hiatal hernia  Gastritis  Gastric biopsy  Distal esophagus  biopsy    ENDOSCOPY N/A 2023    Procedure: Esophagogastroduedenoscopy;  Surgeon: Mathew Roberts MD;  Location: [facility];  Service: Gastroenterology;  Laterality: N/A;  Gastritis; short segment Sarabia's; Esophageal stricture- dilatation; Biopsies- gastric, distal esophagus    INNER EAR SURGERY      JOINT REPLACEMENT      right knee    LEG SURGERY      x 2 s/p fall    LUMBAR FUSION      L4/L5    SKIN GRAFT      to left lower leg x 2    TONSILLECTOMY      WRIST FUSION Left        Social History     Socioeconomic History    Marital status:    Tobacco Use    Smoking status: Former     Current packs/day: 0.00     Average packs/day: 0.5 packs/day for 20.0 years (10.0 ttl pk-yrs)     Types: Cigarettes     Start date:      Quit date:      Years since quittin.7    Smokeless tobacco: Never    Tobacco comments:     QUIT    Vaping Use    Vaping status: Never Used   Substance and Sexual Activity    Alcohol use: Not Currently     Comment: rarely    Drug use: No    Sexual activity: Defer       Family History   Problem Relation Age of Onset    Heart disease Mother     Heart disease Father     Colon cancer Neg Hx     Colon polyps Neg Hx     Breast cancer Neg Hx        Review of Systems   Constitutional: Negative for diaphoresis and malaise/fatigue.   Cardiovascular:  Negative for chest pain, claudication, dyspnea on exertion, irregular heartbeat, leg swelling, near-syncope, orthopnea, palpitations, paroxysmal nocturnal dyspnea and syncope.   Respiratory:  Negative for cough, shortness of breath and sleep disturbances due to breathing.    Musculoskeletal:  Negative for falls.   Neurological:  Negative for dizziness and weakness.   Psychiatric/Behavioral:  Negative for altered mental status and substance abuse.        Allergies   Allergen Reactions    Codeine Itching    Other Unknown (See Comments)     Vicryl: doesn't heal         Current Outpatient Medications:     allopurinol (ZYLOPRIM) 100  "MG tablet, Take 1 tablet by mouth 2 (Two) Times a Day., Disp: , Rfl:     ALPRAZolam (XANAX) 0.25 MG tablet, Take 1 tablet by mouth As Needed for Anxiety., Disp: , Rfl:     amiodarone (PACERONE) 200 MG tablet, TAKE 1 TABLET BY MOUTH DAILY, Disp: 90 tablet, Rfl: 1    amLODIPine (NORVASC) 5 MG tablet, Take 1 tablet by mouth 2 (Two) Times a Day., Disp: , Rfl:     apixaban (ELIQUIS) 5 MG tablet tablet, Take 1 tablet by mouth 2 (Two) Times a Day., Disp: , Rfl:     carvedilol (COREG) 12.5 MG tablet, Take 1 tablet by mouth 2 (Two) Times a Day With Meals., Disp: 180 tablet, Rfl: 2    cloNIDine (CATAPRES) 0.2 MG tablet, Take 1 tablet by mouth 3 (Three) Times a Day., Disp: , Rfl:     clopidogrel (PLAVIX) 75 MG tablet, TAKE ONE TABLET BY MOUTH DAILY (Patient taking differently: Take 1 tablet by mouth Every Night.), Disp: 90 tablet, Rfl: 3    escitalopram (LEXAPRO) 5 MG tablet, Take 1 tablet by mouth Every Night., Disp: , Rfl:     furosemide (LASIX) 40 MG tablet, Take 1 tablet by mouth Daily., Disp: , Rfl:     isosorbide mononitrate (IMDUR) 30 MG 24 hr tablet, Take 1 tablet by mouth Daily., Disp: 90 tablet, Rfl: 3    metFORMIN ER (GLUCOPHAGE-XR) 500 MG 24 hr tablet, Take 1 tablet by mouth 2 (Two) Times a Day Before Meals., Disp: , Rfl:     pantoprazole (PROTONIX) 40 MG EC tablet, TAKE ONE TABLET BY MOUTH TWICE A DAY, Disp: 180 tablet, Rfl: 3    rosuvastatin (CRESTOR) 10 MG tablet, Take 1 tablet by mouth Daily., Disp: , Rfl:     ferrous sulfate 325 (65 FE) MG tablet, Take 1 tablet by mouth Every Other Day., Disp: , Rfl:     hydrALAZINE (APRESOLINE) 100 MG tablet, Take 1 tablet by mouth 3 (Three) Times a Day for 30 days., Disp: 90 tablet, Rfl: 0      Objective:     Vitals:    09/11/24 1044   BP: 132/60   BP Location: Left arm   Patient Position: Sitting   Pulse: 58   SpO2: 99%   Weight: 84.8 kg (187 lb)   Height: 157.5 cm (62\")     Body mass index is 34.2 kg/m².    PHYSICAL EXAM:    Constitutional:       General: Not in acute " distress.     Appearance: Normal appearance. Well-developed.   Eyes:      Pupils: Pupils are equal, round, and reactive to light.   HENT:      Head: Normocephalic.   Neck:      Vascular: Carotid bruit present. No JVD.   Pulmonary:      Effort: Pulmonary effort is normal. No tachypnea.      Breath sounds: Normal breath sounds. No wheezing. No rales.   Cardiovascular:      Normal rate. Regular rhythm.      No gallop.    Pulses:     Intact distal pulses.   Edema:     Peripheral edema present.     Pretibial: trace edema of the left pretibial area.     Ankle: trace edema of the left ankle.  Abdominal:      General: Bowel sounds are normal.      Palpations: Abdomen is soft.      Tenderness: There is no abdominal tenderness.   Musculoskeletal: Normal range of motion.      Cervical back: Normal range of motion and neck supple. No edema. Skin:     General: Skin is warm and dry.   Neurological:      Mental Status: Alert and oriented to person, place, and time.           ECG 12 Lead    Date/Time: 9/11/2024 11:04 AM  Performed by: Sofi Alex APRN    Authorized by: Sofi Alex APRN  Comparison: compared with previous ECG from 4/20/2024  Similar to previous ECG  Rhythm: sinus arrhythmia  Rate: normal  QRS axis: normal  Other findings: non-specific ST-T wave changes    Clinical impression: non-specific ECG          Assessment:      1.  Coronary artery disease with previous CABG-stable angina.  On nitrates without chest pain continue Plavix and statin therapy    2.  Paroxysmal A-fib previous cardioversion maintaining sinus rhythm on amiodarone and carvedilol.  Anticoagulated with Eliquis 5 mg twice a day no recurrent episodes if recurrent episodes occur consider ablation    3.  Peripheral arterial disease follows with vascular surgery.  History of iliac stenting has known carotid disease and is scheduled for follow-up.     4.  Type 2 diabetes mellitus with controlled blood sugars with a hemoglobin A1c 5.95.    5.    Dyslipidemia Lipids at goal HDL of 85 LDL of 75 triglycerides of 70   Plan:       follow-up in 6 months with Dr. Suazo.            Your medication list            Accurate as of September 11, 2024 11:00 AM. If you have any questions, ask your nurse or doctor.                CHANGE how you take these medications        Instructions Last Dose Given Next Dose Due   clopidogrel 75 MG tablet  Commonly known as: PLAVIX  What changed: when to take this      TAKE ONE TABLET BY MOUTH DAILY              CONTINUE taking these medications        Instructions Last Dose Given Next Dose Due   allopurinol 100 MG tablet  Commonly known as: ZYLOPRIM      Take 1 tablet by mouth 2 (Two) Times a Day.       ALPRAZolam 0.25 MG tablet  Commonly known as: XANAX      Take 1 tablet by mouth As Needed for Anxiety.       amiodarone 200 MG tablet  Commonly known as: PACERONE      TAKE 1 TABLET BY MOUTH DAILY       amLODIPine 5 MG tablet  Commonly known as: NORVASC      Take 1 tablet by mouth 2 (Two) Times a Day.       apixaban 5 MG tablet tablet  Commonly known as: ELIQUIS      Take 1 tablet by mouth 2 (Two) Times a Day.       carvedilol 12.5 MG tablet  Commonly known as: COREG      Take 1 tablet by mouth 2 (Two) Times a Day With Meals.       cloNIDine 0.2 MG tablet  Commonly known as: CATAPRES      Take 1 tablet by mouth 3 (Three) Times a Day.       escitalopram 5 MG tablet  Commonly known as: LEXAPRO      Take 1 tablet by mouth Every Night.       ferrous sulfate 325 (65 FE) MG tablet      Take 1 tablet by mouth Every Other Day.       furosemide 40 MG tablet  Commonly known as: LASIX      Take 1 tablet by mouth Daily.       hydrALAZINE 100 MG tablet  Commonly known as: APRESOLINE      Take 1 tablet by mouth 3 (Three) Times a Day for 30 days.       isosorbide mononitrate 30 MG 24 hr tablet  Commonly known as: IMDUR      Take 1 tablet by mouth Daily.       metFORMIN  MG 24 hr tablet  Commonly known as: GLUCOPHAGE-XR      Take 1 tablet by  mouth 2 (Two) Times a Day Before Meals.       pantoprazole 40 MG EC tablet  Commonly known as: PROTONIX      TAKE ONE TABLET BY MOUTH TWICE A DAY       rosuvastatin 10 MG tablet  Commonly known as: CRESTOR      Take 1 tablet by mouth Daily.                  As always, it has been a pleasure to participate in your patient's care.      Sincerely,     Sofi MURRAY

## 2024-10-23 ENCOUNTER — PATIENT MESSAGE (OUTPATIENT)
Age: 74
End: 2024-10-23
Payer: MEDICARE

## 2024-12-18 ENCOUNTER — APPOINTMENT (OUTPATIENT)
Dept: GENERAL RADIOLOGY | Facility: HOSPITAL | Age: 74
End: 2024-12-18
Payer: MEDICARE

## 2024-12-18 ENCOUNTER — HOSPITAL ENCOUNTER (EMERGENCY)
Facility: HOSPITAL | Age: 74
Discharge: HOME | End: 2024-12-18
Attending: STUDENT IN AN ORGANIZED HEALTH CARE EDUCATION/TRAINING PROGRAM
Payer: MEDICARE

## 2024-12-18 VITALS
DIASTOLIC BLOOD PRESSURE: 60 MMHG | HEIGHT: 62 IN | SYSTOLIC BLOOD PRESSURE: 178 MMHG | WEIGHT: 180 LBS | HEART RATE: 62 BPM | BODY MASS INDEX: 33.13 KG/M2 | TEMPERATURE: 98 F | OXYGEN SATURATION: 95 % | RESPIRATION RATE: 18 BRPM

## 2024-12-18 DIAGNOSIS — J98.9 RESPIRATORY ILLNESS: ICD-10-CM

## 2024-12-18 DIAGNOSIS — R11.2 NAUSEA AND VOMITING, UNSPECIFIED VOMITING TYPE: ICD-10-CM

## 2024-12-18 DIAGNOSIS — R07.9 NONSPECIFIC CHEST PAIN: Primary | ICD-10-CM

## 2024-12-18 LAB
ALBUMIN SERPL-MCNC: 4.1 G/DL (ref 3.5–5.2)
ALBUMIN/GLOB SERPL: 1.6 G/DL
ALP SERPL-CCNC: 68 U/L (ref 39–117)
ALT SERPL W P-5'-P-CCNC: 15 U/L (ref 1–33)
ANION GAP SERPL CALCULATED.3IONS-SCNC: 13.4 MMOL/L (ref 5–15)
AST SERPL-CCNC: 16 U/L (ref 1–32)
BASOPHILS # BLD AUTO: 0.04 10*3/MM3 (ref 0–0.2)
BASOPHILS NFR BLD AUTO: 0.5 % (ref 0–1.5)
BILIRUB SERPL-MCNC: <0.2 MG/DL (ref 0–1.2)
BUN SERPL-MCNC: 16 MG/DL (ref 8–23)
BUN/CREAT SERPL: 14.2 (ref 7–25)
CALCIUM SPEC-SCNC: 9.1 MG/DL (ref 8.6–10.5)
CHLORIDE SERPL-SCNC: 99 MMOL/L (ref 98–107)
CO2 SERPL-SCNC: 24.6 MMOL/L (ref 22–29)
CREAT SERPL-MCNC: 1.13 MG/DL (ref 0.57–1)
DEPRECATED RDW RBC AUTO: 44.9 FL (ref 37–54)
EGFRCR SERPLBLD CKD-EPI 2021: 51.5 ML/MIN/1.73
EOSINOPHIL # BLD AUTO: 0.1 10*3/MM3 (ref 0–0.4)
EOSINOPHIL NFR BLD AUTO: 1.3 % (ref 0.3–6.2)
ERYTHROCYTE [DISTWIDTH] IN BLOOD BY AUTOMATED COUNT: 13.8 % (ref 12.3–15.4)
GEN 5 1HR TROPONIN T REFLEX: 24 NG/L
GLOBULIN UR ELPH-MCNC: 2.5 GM/DL
GLUCOSE SERPL-MCNC: 115 MG/DL (ref 65–99)
HCT VFR BLD AUTO: 30.5 % (ref 34–46.6)
HGB BLD-MCNC: 10 G/DL (ref 12–15.9)
HOLD SPECIMEN: NORMAL
HOLD SPECIMEN: NORMAL
IMM GRANULOCYTES # BLD AUTO: 0.04 10*3/MM3 (ref 0–0.05)
IMM GRANULOCYTES NFR BLD AUTO: 0.5 % (ref 0–0.5)
LYMPHOCYTES # BLD AUTO: 1.08 10*3/MM3 (ref 0.7–3.1)
LYMPHOCYTES NFR BLD AUTO: 14.2 % (ref 19.6–45.3)
MCH RBC QN AUTO: 29.1 PG (ref 26.6–33)
MCHC RBC AUTO-ENTMCNC: 32.8 G/DL (ref 31.5–35.7)
MCV RBC AUTO: 88.7 FL (ref 79–97)
MONOCYTES # BLD AUTO: 0.59 10*3/MM3 (ref 0.1–0.9)
MONOCYTES NFR BLD AUTO: 7.8 % (ref 5–12)
NEUTROPHILS NFR BLD AUTO: 5.75 10*3/MM3 (ref 1.7–7)
NEUTROPHILS NFR BLD AUTO: 75.7 % (ref 42.7–76)
NRBC BLD AUTO-RTO: 0 /100 WBC (ref 0–0.2)
NT-PROBNP SERPL-MCNC: 2209 PG/ML (ref 0–900)
PLATELET # BLD AUTO: 368 10*3/MM3 (ref 140–450)
PMV BLD AUTO: 9.4 FL (ref 6–12)
POTASSIUM SERPL-SCNC: 3.2 MMOL/L (ref 3.5–5.2)
PROT SERPL-MCNC: 6.6 G/DL (ref 6–8.5)
QT INTERVAL: 511 MS
QTC INTERVAL: 498 MS
RBC # BLD AUTO: 3.44 10*6/MM3 (ref 3.77–5.28)
SODIUM SERPL-SCNC: 137 MMOL/L (ref 136–145)
TROPONIN T % DELTA: -11 %
TROPONIN T NUMERIC DELTA: -3 NG/L
TROPONIN T SERPL HS-MCNC: 27 NG/L
WBC NRBC COR # BLD AUTO: 7.6 10*3/MM3 (ref 3.4–10.8)
WHOLE BLOOD HOLD COAG: NORMAL
WHOLE BLOOD HOLD SPECIMEN: NORMAL

## 2024-12-18 PROCEDURE — 93010 ELECTROCARDIOGRAM REPORT: CPT | Performed by: INTERNAL MEDICINE

## 2024-12-18 PROCEDURE — 71045 X-RAY EXAM CHEST 1 VIEW: CPT

## 2024-12-18 PROCEDURE — 83880 ASSAY OF NATRIURETIC PEPTIDE: CPT | Performed by: STUDENT IN AN ORGANIZED HEALTH CARE EDUCATION/TRAINING PROGRAM

## 2024-12-18 PROCEDURE — 99284 EMERGENCY DEPT VISIT MOD MDM: CPT

## 2024-12-18 PROCEDURE — 36415 COLL VENOUS BLD VENIPUNCTURE: CPT

## 2024-12-18 PROCEDURE — 84484 ASSAY OF TROPONIN QUANT: CPT | Performed by: STUDENT IN AN ORGANIZED HEALTH CARE EDUCATION/TRAINING PROGRAM

## 2024-12-18 PROCEDURE — 80053 COMPREHEN METABOLIC PANEL: CPT | Performed by: STUDENT IN AN ORGANIZED HEALTH CARE EDUCATION/TRAINING PROGRAM

## 2024-12-18 PROCEDURE — 85025 COMPLETE CBC W/AUTO DIFF WBC: CPT | Performed by: STUDENT IN AN ORGANIZED HEALTH CARE EDUCATION/TRAINING PROGRAM

## 2024-12-18 PROCEDURE — 93005 ELECTROCARDIOGRAM TRACING: CPT | Performed by: STUDENT IN AN ORGANIZED HEALTH CARE EDUCATION/TRAINING PROGRAM

## 2024-12-18 RX ORDER — CARVEDILOL 12.5 MG/1
12.5 TABLET ORAL 2 TIMES DAILY WITH MEALS
Qty: 180 TABLET | Refills: 2 | Status: SHIPPED | OUTPATIENT
Start: 2024-12-18

## 2024-12-18 NOTE — ED PROVIDER NOTES
EMERGENCY DEPARTMENT ENCOUNTER  Room Number:  28/28  PCP: Aliya Alejandor MD  Independent Historians: Patient and Family      HPI:  Chief Complaint: had concerns including Chest Pain and Nausea.     A complete HPI/ROS/PMH/PSH/SH/FH are unobtainable due to: None    Chronic or social conditions impacting patient care (Social Determinants of Health): None      Context: Valentine Arora is a 74 y.o. female with a medical history of CAD, CABG, hypertension, hyperlipidemia, BMI greater than 30, heart failure, who presents to the ED c/o acute chest pain associated with nausea, vomiting, lightheadedness.  This pain started at 1330, over 2 hours prior to arrival.  There is a 1 hour duration of her pain.  Her symptoms are resolved at this time.  She notes that she had been feeling ill for approximately 4 days with cough and congestion.  She reports she just saw her family doctor started her on an antibiotic.  She was feeling improved today and was cleaning prior to onset of her symptoms.      Review of prior external notes (non-ED) -and- Review of prior external test results outside of this encounter:  Left heart cath 4/20/2024.  Severe multivessel disease.  Known occluded RCA.  Occluded SVG to PDA.  Robust left-to-right collaterals backfilled to mid RCA.  Severe proximal LAD with widely patent SVG diagonal with jump to LAD.    Prescription drug monitoring program review:         PAST MEDICAL HISTORY  Active Ambulatory Problems     Diagnosis Date Noted    Coronary artery disease involving coronary bypass graft of native heart     Hypertension     Hyperlipidemia     Morbidly obese 09/11/2019    PAD (peripheral artery disease) 09/11/2019    Esophageal dysphagia 05/22/2020    Encounter for screening for malignant neoplasm of colon 05/22/2020    Sarabia's esophagus with dysplasia 08/05/2020    Gastroesophageal reflux disease without esophagitis 10/20/2022    Personal history of colonic polyps 05/08/2023    Atrial  flutter, unspecified type 05/17/2023    Irritable bowel syndrome with constipation 10/20/2023    Chronic kidney disease, stage 3a 03/01/2024    Coronary artery disease of native artery of native heart with stable angina pectoris 04/15/2024    Nausea and vomiting 04/21/2024    Acute hearing loss of both ears 04/21/2024    Atrial fibrillation      Resolved Ambulatory Problems     Diagnosis Date Noted    Myocardial infarction     S/P CABG (coronary artery bypass graft) 01/19/2017    Ulcer of esophagus without bleeding 08/05/2020    Dehydration with hyponatremia 06/15/2021    Esophageal obstruction 05/08/2023    Chest discomfort 05/21/2023    Palpitations 09/10/2023     Past Medical History:   Diagnosis Date    Arthritis     Sarabia esophagus     CAD (coronary artery disease)     Chronic back pain     Colon polyp     GERD (gastroesophageal reflux disease)     PONV (postoperative nausea and vomiting)     Shingles     Type 2 diabetes mellitus          PAST SURGICAL HISTORY  Past Surgical History:   Procedure Laterality Date    CARDIAC CATHETERIZATION N/A 4/24/2024    Procedure: Coronary angiography, Left heart catheterization, Left ventricular angiography;  Surgeon: Dai Suazo MD;  Location[facility] C[facility];  Service: Cardiology;  Laterality: N/A;    CARDIAC CATHETERIZATION  4/24/2024    Procedure: Saphenous Vein Graft;  Surgeon: Dai Suazo MD;  Location:  [facility] CATH INVASIVE LOCATION;  Service: Cardiology;;    COLONOSCOPY      COLONOSCOPY N/A 6/24/2020    Procedure: COLONOSCOPY;  Surgeon: Mathew Roberts MD;  Location:  [facility] OR;  Service: Gastroenterology;  Laterality: N/A;  Descending colon polyp x 2, Ascending colon polyp, Sigmoid colon polyp, Rectal polyp    COLONOSCOPY N/A 8/7/2023    Procedure: COLONOSCOPY;  Surgeon: Mathew Roberts MD;  Location:  [facility] OR;  Service: Gastroenterology;  Laterality: N/A;  Normal    CORONARY ARTERY BYPASS GRAFT  1995    x 2 vessels     ENDOSCOPY N/A 2020    Procedure: ESOPHAGOGASTRODUODENOSCOPY;  Surgeon: Mathew Roberts MD;  Location:  [facility] OR;  Service: Gastroenterology;  Laterality: N/A;  Ulcerative esophagitis, Gastritis, Duodenitis  Duodenal biopsy, gastric biopsy, distal esophageal biopsy    ENDOSCOPY N/A 2020    Procedure: ESOPHAGOGASTRODUODENOSCOPY with biopsies;  Surgeon: Mathew Roberts MD;  Location:  [facility] OR;  Service: Gastroenterology;  Laterality: N/A;  Esophagitis  Sarabia's Esophagus  Hiatal hernia  Gastritis  Gastric biopsy  Distal esophagus biopsy    ENDOSCOPY N/A 2023    Procedure: Esophagogastroduedenoscopy;  Surgeon: Mathew Roberts MD;  Location:  [facility] OR;  Service: Gastroenterology;  Laterality: N/A;  Gastritis; short segment Sarabia's; Esophageal stricture- dilatation; Biopsies- gastric, distal esophagus    INNER EAR SURGERY      JOINT REPLACEMENT      right knee    LEG SURGERY      x 2 s/p fall    LUMBAR FUSION      L4/L5    SKIN GRAFT      to left lower leg x 2    TONSILLECTOMY      WRIST FUSION Left          FAMILY HISTORY  Family History   Problem Relation Age of Onset    Heart disease Mother     Heart disease Father     Colon cancer Neg Hx     Colon polyps Neg Hx     Breast cancer Neg Hx          SOCIAL HISTORY  Social History     Socioeconomic History    Marital status:    Tobacco Use    Smoking status: Former     Current packs/day: 0.00     Average packs/day: 0.5 packs/day for 20.0 years (10.0 ttl pk-yrs)     Types: Cigarettes     Start date:      Quit date:      Years since quittin.9    Smokeless tobacco: Never    Tobacco comments:     QUIT    Vaping Use    Vaping status: Never Used   Substance and Sexual Activity    Alcohol use: Not Currently     Comment: rarely    Drug use: No    Sexual activity: Defer         ALLERGIES  Codeine and Other      REVIEW OF SYSTEMS  Review of Systems  Included in HPI  All systems reviewed and negative except  for those discussed in HPI.      PHYSICAL EXAM    I have reviewed the triage vital signs and nursing notes.    ED Triage Vitals [12/18/24 1542]   Temp Heart Rate Resp BP SpO2   98 °F (36.7 °C) 59 18 176/67 96 %      Temp src Heart Rate Source Patient Position BP Location FiO2 (%)   -- Monitor -- -- --       Physical Exam  GENERAL: alert, no acute distress  SKIN: Warm, dry  HENT: Normocephalic, atraumatic  EYES: no scleral icterus  CV: regular rhythm, regular rate  RESPIRATORY: normal effort, lungs clear  ABDOMEN: soft, nontender, nondistended  MUSCULOSKELETAL: no deformity, 2+ pretibial pitting edema left lower extremity, 1+ pretibial pitting edema right lower extremity  NEURO: alert, moves all extremities, follows commands            LAB RESULTS  Recent Results (from the past 24 hours)   Green Top (Gel)    Collection Time: 12/18/24  4:01 PM   Result Value Ref Range    Extra Tube Hold for add-ons.    Lavender Top    Collection Time: 12/18/24  4:01 PM   Result Value Ref Range    Extra Tube hold for add-on    Gold Top - SST    Collection Time: 12/18/24  4:01 PM   Result Value Ref Range    Extra Tube Hold for add-ons.    Light Blue Top    Collection Time: 12/18/24  4:01 PM   Result Value Ref Range    Extra Tube Hold for add-ons.    CBC Auto Differential    Collection Time: 12/18/24  4:01 PM    Specimen: Blood   Result Value Ref Range    WBC 7.60 3.40 - 10.80 10*3/mm3    RBC 3.44 (L) 3.77 - 5.28 10*6/mm3    Hemoglobin 10.0 (L) 12.0 - 15.9 g/dL    Hematocrit 30.5 (L) 34.0 - 46.6 %    MCV 88.7 79.0 - 97.0 fL    MCH 29.1 26.6 - 33.0 pg    MCHC 32.8 31.5 - 35.7 g/dL    RDW 13.8 12.3 - 15.4 %    RDW-SD 44.9 37.0 - 54.0 fl    MPV 9.4 6.0 - 12.0 fL    Platelets 368 140 - 450 10*3/mm3    Neutrophil % 75.7 42.7 - 76.0 %    Lymphocyte % 14.2 (L) 19.6 - 45.3 %    Monocyte % 7.8 5.0 - 12.0 %    Eosinophil % 1.3 0.3 - 6.2 %    Basophil % 0.5 0.0 - 1.5 %    Immature Grans % 0.5 0.0 - 0.5 %    Neutrophils, Absolute 5.75 1.70 - 7.00  10*3/mm3    Lymphocytes, Absolute 1.08 0.70 - 3.10 10*3/mm3    Monocytes, Absolute 0.59 0.10 - 0.90 10*3/mm3    Eosinophils, Absolute 0.10 0.00 - 0.40 10*3/mm3    Basophils, Absolute 0.04 0.00 - 0.20 10*3/mm3    Immature Grans, Absolute 0.04 0.00 - 0.05 10*3/mm3    nRBC 0.0 0.0 - 0.2 /100 WBC   BNP    Collection Time: 12/18/24  4:01 PM    Specimen: Blood   Result Value Ref Range    proBNP 2,209.0 (H) 0.0 - 900.0 pg/mL   ECG 12 Lead Chest Pain    Collection Time: 12/18/24  4:16 PM   Result Value Ref Range    QT Interval 511 ms    QTC Interval 498 ms   High Sensitivity Troponin T    Collection Time: 12/18/24  4:55 PM    Specimen: Arm, Left; Blood   Result Value Ref Range    HS Troponin T 27 (H) <14 ng/L   Comprehensive Metabolic Panel    Collection Time: 12/18/24  4:55 PM    Specimen: Arm, Left; Blood   Result Value Ref Range    Glucose 115 (H) 65 - 99 mg/dL    BUN 16 8 - 23 mg/dL    Creatinine 1.13 (H) 0.57 - 1.00 mg/dL    Sodium 137 136 - 145 mmol/L    Potassium 3.2 (L) 3.5 - 5.2 mmol/L    Chloride 99 98 - 107 mmol/L    CO2 24.6 22.0 - 29.0 mmol/L    Calcium 9.1 8.6 - 10.5 mg/dL    Total Protein 6.6 6.0 - 8.5 g/dL    Albumin 4.1 3.5 - 5.2 g/dL    ALT (SGPT) 15 1 - 33 U/L    AST (SGOT) 16 1 - 32 U/L    Alkaline Phosphatase 68 39 - 117 U/L    Total Bilirubin <0.2 0.0 - 1.2 mg/dL    Globulin 2.5 gm/dL    A/G Ratio 1.6 g/dL    BUN/Creatinine Ratio 14.2 7.0 - 25.0    Anion Gap 13.4 5.0 - 15.0 mmol/L    eGFR 51.5 (L) >60.0 mL/min/1.73   High Sensitivity Troponin T 1Hr    Collection Time: 12/18/24  7:02 PM    Specimen: Hand, Left; Blood   Result Value Ref Range    HS Troponin T 24 (H) <14 ng/L    Troponin T Numeric Delta -3 ng/L    Troponin T % Delta -11 %         RADIOLOGY  XR Chest 1 View    Result Date: 12/18/2024  XR CHEST 1 VW-   INDICATION: Upper respiratory infection, chest pain  COMPARISON: Chest radiograph April 20, 2024  TECHNIQUE: 1 view chest  FINDINGS:  Vascular congestion. No focal opacity. No effusions.  Stable mediastinum. Enlarged cardiac silhouette. Median sternotomy.      Cardiomegaly with vascular congestion, without edema  This report was finalized on 12/18/2024 4:18 PM by Dr. Ben Ramachandran M.D on Workstation: [id number]         MEDICATIONS GIVEN IN ER  Medications - No data to display      ORDERS PLACED DURING THIS VISIT:  Orders Placed This Encounter   Procedures    XR Chest 1 View    High Sensitivity Troponin T    [place] Draw    Comprehensive Metabolic Panel    CBC Auto Differential    BNP    High Sensitivity Troponin T 1Hr    ECG 12 Lead Chest Pain    CBC & Differential    Green Top (Gel)    Lavender Top    Gold Top - SST    Light Blue Top         OUTPATIENT MEDICATION MANAGEMENT:  No current Epic-ordered facility-administered medications on file.     Current Outpatient Medications Ordered in Epic   Medication Sig Dispense Refill    allopurinol (ZYLOPRIM) 100 MG tablet Take 1 tablet by mouth 2 (Two) Times a Day.      ALPRAZolam (XANAX) 0.25 MG tablet Take 1 tablet by mouth As Needed for Anxiety.      amiodarone (PACERONE) 200 MG tablet TAKE 1 TABLET BY MOUTH DAILY 90 tablet 1    amLODIPine (NORVASC) 5 MG tablet Take 1 tablet by mouth 2 (Two) Times a Day.      apixaban (ELIQUIS) 5 MG tablet tablet Take 1 tablet by mouth 2 (Two) Times a Day. 180 tablet 1    apixaban (ELIQUIS) 5 MG tablet tablet Take 1 tablet by mouth 2 (Two) Times a Day. 180 tablet 3    carvedilol (COREG) 12.5 MG tablet TAKE 1 TABLET BY MOUTH TWICE A DAY WITH A MEAL 180 tablet 2    cloNIDine (CATAPRES) 0.2 MG tablet Take 1 tablet by mouth 3 (Three) Times a Day.      clopidogrel (PLAVIX) 75 MG tablet Take 1 tablet by mouth Every Night. 90 tablet 3    escitalopram (LEXAPRO) 5 MG tablet Take 1 tablet by mouth Every Night.      furosemide (LASIX) 40 MG tablet Take 1 tablet by mouth Daily.      hydrALAZINE (APRESOLINE) 100 MG tablet Take 1 tablet by mouth 3 (Three) Times a Day for 120 days. 90 tablet 3    isosorbide mononitrate (IMDUR) 30  MG 24 hr tablet Take 1 tablet by mouth Daily. 90 tablet 3    metFORMIN ER (GLUCOPHAGE-XR) 500 MG 24 hr tablet Take 1 tablet by mouth 2 (Two) Times a Day Before Meals.      pantoprazole (PROTONIX) 40 MG EC tablet TAKE ONE TABLET BY MOUTH TWICE A  tablet 3    rosuvastatin (CRESTOR) 10 MG tablet Take 1 tablet by mouth Daily.           PROCEDURES  Procedures            PROGRESS, DATA ANALYSIS, CONSULTS, AND MEDICAL DECISION MAKING  All labs have been independently interpreted by me.  All radiology studies have been reviewed by me. All EKG's have been independently viewed and interpreted by me.  Discussion below represents my analysis of pertinent findings related to patient's condition, differential diagnosis, treatment plan and final disposition.    Differential diagnosis includes but is not limited to viral illness, ACS, STEMI, NSTEMI, pneumonia, CHF exacerbation, pulmonary edema.    Clinical Scores:         HEART Score: 5                               ED Course as of 12/18/24 2004   Wed Dec 18, 2024   1611 Patient reports that she has had cough congestion for 4 days.  She was just seen at her family doctor's office and started on antibiotic.  She states that she was feeling better today and did some cleaning.  Then she began to feel nauseous and started vomiting.  After vomiting several times she had warm pain in the left side of her chest going to her left on.  This was associated with lightheadedness.  The pain started at 1330 and lasted for 1 hour.  No symptoms at this time.    On exam patient has left greater than right pretibial pitting edema.  She has chronic left lower extremity edema from prior trauma.    Will obtain cardiopulmonary evaluation, EKG, chest x-ray.  Patient and family in room are agreeable with plan [DN]   1613 XR Chest 1 View  I reviewed x-ray images, sternotomy wires, left greater than right interstitial markings, interpreted by self  I reviewed radiologist interpretation of x-ray  images [DN]   1613 ECG 12 Lead Chest Pain  EKG reviewed, normal sinus rhythm, rate 57, normal axis, first-degree AV block with QTc prolongation and otherwise normal intervals, low voltage T waves diffusely, no ST changes, no STEMI    I compared EKG to prior on 4/20/2024.  T waves are slightly more low voltage today.  Otherwise no significant change.  Interpreted by self/EP [DN]   1642 proBNP(!): 2,209.0  Up from 900 on prior blood work 8 months ago [DN]   1727 HS Troponin T(!): 27 [DN]   1727 Creatinine(!): 1.13  Unchanged from blood work 8 months ago [DN]   1934 Troponin T Numeric Delta: -3 [DN]   2000 WBC: 7.60 [DN]   2001 I discussed results with patient and family at bedside patient feels improved.  Symptoms have resolved.  Discussed chest pain could have been musculoskeletal, or GI in relation to the vomiting.  Minimally elevated troponin, likely due to underlying illness and known multivessel disease.  Patient desires discharge.  Return precautions discussed. [DN]      ED Course User Index  [DN] Mark Haddad MD             AS OF 20:04 EST VITALS:    BP - 178/60  HR - 62  TEMP - 98 °F (36.7 °C)  O2 SATS - 95%    COMPLEXITY OF CARE  Admission was considered but after careful review of the patient's presentation, physical examination, diagnostic results, and response to treatment the patient may be safely discharged with outpatient follow-up.      DIAGNOSIS  Final diagnoses:   Respiratory illness   Nausea and vomiting, unspecified vomiting type   Nonspecific chest pain         DISPOSITION  ED Disposition       ED Disposition   Discharge    Condition   Stable    Comment   --                Please note that portions of this document were completed with a voice recognition program.    Note Disclaimer: At [facility], we believe that sharing information builds trust and better relationships. You are receiving this note because you recently visited [facility]. It is possible you will see health information  before a provider has talked with you about it. This kind of information can be easy to misunderstand. To help you fully understand what it means for your health, we urge you to discuss this note with your provider.         Mark Haddad MD  12/18/24 1617       Mark Haddad MD  12/18/24 2004

## 2024-12-18 NOTE — ED NOTES
Pt to ED from home due to chest discomfort, n/v that started at 1400. Pt observed dry heaving by EMS.     Pt was ambulatory for EMS.     Pt recently seen by PCP for congestion and cough, tested negative for COVID

## 2025-01-02 ENCOUNTER — LAB (OUTPATIENT)
Dept: LAB | Facility: HOSPITAL | Age: 75
End: 2025-01-02
Payer: MEDICARE

## 2025-01-02 ENCOUNTER — OFFICE VISIT (OUTPATIENT)
Dept: CARDIOLOGY | Facility: CLINIC | Age: 75
End: 2025-01-02
Payer: MEDICARE

## 2025-01-02 VITALS
HEART RATE: 62 BPM | HEIGHT: 62 IN | DIASTOLIC BLOOD PRESSURE: 60 MMHG | SYSTOLIC BLOOD PRESSURE: 136 MMHG | BODY MASS INDEX: 33.13 KG/M2 | WEIGHT: 180 LBS

## 2025-01-02 DIAGNOSIS — I50.32 CHRONIC DIASTOLIC (CONGESTIVE) HEART FAILURE: ICD-10-CM

## 2025-01-02 DIAGNOSIS — I50.32 CHRONIC DIASTOLIC (CONGESTIVE) HEART FAILURE: Primary | ICD-10-CM

## 2025-01-02 LAB
ANION GAP SERPL CALCULATED.3IONS-SCNC: 11.1 MMOL/L (ref 5–15)
BUN SERPL-MCNC: 15 MG/DL (ref 8–23)
BUN/CREAT SERPL: 10.4 (ref 7–25)
CALCIUM SPEC-SCNC: 9.2 MG/DL (ref 8.6–10.5)
CHLORIDE SERPL-SCNC: 99 MMOL/L (ref 98–107)
CO2 SERPL-SCNC: 23.9 MMOL/L (ref 22–29)
CREAT SERPL-MCNC: 1.44 MG/DL (ref 0.57–1)
EGFRCR SERPLBLD CKD-EPI 2021: 38.5 ML/MIN/1.73
GLUCOSE SERPL-MCNC: 108 MG/DL (ref 65–99)
NT-PROBNP SERPL-MCNC: 1181 PG/ML (ref 0–900)
POTASSIUM SERPL-SCNC: 3.9 MMOL/L (ref 3.5–5.2)
SODIUM SERPL-SCNC: 134 MMOL/L (ref 136–145)

## 2025-01-02 PROCEDURE — 36415 COLL VENOUS BLD VENIPUNCTURE: CPT

## 2025-01-02 PROCEDURE — 83880 ASSAY OF NATRIURETIC PEPTIDE: CPT

## 2025-01-02 PROCEDURE — 80048 BASIC METABOLIC PNL TOTAL CA: CPT

## 2025-01-02 NOTE — PROGRESS NOTES
Date of Office Visit: 2025  Encounter Provider: TERRI Robertson  Place of Service: [facility] CARDIOLOGY  Patient Name: Valentine Arora  :1950    Chief complaint: Atrial fibrillation    HPI: Valentine Arora is a 74 y.o. female who is a patient of Dr. Taylor and is known to me from previous.  She has a history of coronary artery disease post bypass, atrial flutter/fibrillation, diastolic heart failure, obesity, peripheral arterial disease with previous intervention and type 2 diabetes mellitus.     In May 2023 she was diagnosed with atrial fibrillation and has been on amiodarone since.  She was admitted to the hospital in March in [place] with bradycardia and diastolic heart failure she was hypotensive and having A-fib/tachycardia.  She got cardioverted and diuresed meds were adjusted.     In April she complained of burning angina despite blood pressure control.  We did a cardiac cath she was found to have severe multivessel CAD.  She has a known occluded right coronary with occluded vein graft to the PDA, there was robust left-to-right collaterals backfilling the mid RCA.  She had severe disease in the LAD with patent vein graft to the diagonal and a jump graft to the LAD.  Distal anastomosis of the vessels were normal.  Borderline disease in the circumflex.  We uptitrated her medication and her long-acting nitrates.    She was in the office in May her symptoms were well-managed.  She had been doing better since starting the isosorbide.  I last saw her in the office in September she was not having any chest pain.    In November she was in the ER she had some pleuritic like chest pain and shortness of breath.  She was given some IV Lasix and felt significantly better.  She had a recent surgery and received some IV fluids during that admission.  She has chronic diastolic heart failure.    On  she had a left carotid endarterectomy after she got home she had a  lot of vomiting and went to the emergency room she received IV fluids and Zofran and felt better but the next day she became very swollen in her face hands arms and legs.  She went back to the ER and they gave her a dose of Lasix then she had an upper respiratory infection a week before Melba she was put on steroids and had vomiting again she went to the ER third time and got IV Zofran.  Her BNP was mildly elevated around 2000 she had some vascular congestion on her chest x-ray on that visit.  She is currently doing well.  She has a little bit of swelling in her left lower extremity but this is chronic.  She denies any chest pain, pressure or tightness.  She is ambulating without difficulty and no orthopnea.  Previous testing and notes have been reviewed by me.   Past Medical History:   Diagnosis Date    Arthritis     Atrial fibrillation     Atrial flutter, unspecified type 05/17/2023    Sarabia esophagus     CAD (coronary artery disease)     Chronic back pain     Colon polyp     GERD (gastroesophageal reflux disease)     Hyperlipidemia     Hypertension     Myocardial infarction     1995    PAD (peripheral artery disease) 09/11/2019    PONV (postoperative nausea and vomiting)     Shingles     Type 2 diabetes mellitus        Past Surgical History:   Procedure Laterality Date    CARDIAC CATHETERIZATION N/A 4/24/2024    Procedure: Coronary angiography, Left heart catheterization, Left ventricular angiography;  Surgeon: Dai Suazo MD;  Location: [facility] INVASIVE LOCATION;  Service: Cardiology;  Laterality: N/A;    CARDIAC CATHETERIZATION  4/24/2024    Procedure: Saphenous Vein Graft;  Surgeon: Dai Suazo MD;  Location: [facility] INVASIVE LOCATION;  Service: Cardiology;;    COLONOSCOPY      COLONOSCOPY N/A 6/24/2020    Procedure: COLONOSCOPY;  Surgeon: Mathew Roberts MD;  Location: [facility] OR;  Service: Gastroenterology;  Laterality: N/A;  Descending colon polyp x 2, Ascending colon polyp,  Sigmoid colon polyp, Rectal polyp    COLONOSCOPY N/A 2023    Procedure: COLONOSCOPY;  Surgeon: Mathew Roberts MD;  Location: [facility] OR;  Service: Gastroenterology;  Laterality: N/A;  Normal    CORONARY ARTERY BYPASS GRAFT  1995    x 2 vessels    ENDOSCOPY N/A 2020    Procedure: ESOPHAGOGASTRODUODENOSCOPY;  Surgeon: Mathew Roberts MD;  Location: [facility] OR;  Service: Gastroenterology;  Laterality: N/A;  Ulcerative esophagitis, Gastritis, Duodenitis  Duodenal biopsy, gastric biopsy, distal esophageal biopsy    ENDOSCOPY N/A 2020    Procedure: ESOPHAGOGASTRODUODENOSCOPY with biopsies;  Surgeon: Mathew Roberts MD;  Location:  [facility] OR;  Service: Gastroenterology;  Laterality: N/A;  Esophagitis  Sarabia's Esophagus  Hiatal hernia  Gastritis  Gastric biopsy  Distal esophagus biopsy    ENDOSCOPY N/A 2023    Procedure: Esophagogastroduedenoscopy;  Surgeon: Mathew Roberts MD;  Location: [facility] OR;  Service: Gastroenterology;  Laterality: N/A;  Gastritis; short segment Sarabia's; Esophageal stricture- dilatation; Biopsies- gastric, distal esophagus    INNER EAR SURGERY      JOINT REPLACEMENT      right knee    LEG SURGERY      x 2 s/p fall    LUMBAR FUSION      L4/L5    SKIN GRAFT      to left lower leg x 2    TONSILLECTOMY      WRIST FUSION Left        Social History     Socioeconomic History    Marital status:    Tobacco Use    Smoking status: Former     Current packs/day: 0.00     Average packs/day: 0.5 packs/day for 20.0 years (10.0 ttl pk-yrs)     Types: Cigarettes     Start date:      Quit date:      Years since quittin.0    Smokeless tobacco: Never    Tobacco comments:     QUIT    Vaping Use    Vaping status: Never Used   Substance and Sexual Activity    Alcohol use: Not Currently     Comment: rarely    Drug use: No    Sexual activity: Defer       Family History   Problem Relation Age of Onset    Heart disease Mother     Heart  disease Father     Colon cancer Neg Hx     Colon polyps Neg Hx     Breast cancer Neg Hx        Review of Systems   Constitutional: Negative for diaphoresis and malaise/fatigue.   Cardiovascular:  Negative for chest pain, claudication, dyspnea on exertion, irregular heartbeat, leg swelling, near-syncope, orthopnea, palpitations, paroxysmal nocturnal dyspnea and syncope.   Respiratory:  Positive for shortness of breath. Negative for cough and sleep disturbances due to breathing.    Musculoskeletal:  Negative for falls.   Neurological:  Negative for dizziness and weakness.   Psychiatric/Behavioral:  Negative for altered mental status and substance abuse.        Allergies   Allergen Reactions    Codeine Itching    Other Unknown (See Comments)     Vicryl: doesn't heal         Current Outpatient Medications:     allopurinol (ZYLOPRIM) 100 MG tablet, Take 1 tablet by mouth 2 (Two) Times a Day., Disp: , Rfl:     ALPRAZolam (XANAX) 0.25 MG tablet, Take 1 tablet by mouth As Needed for Anxiety., Disp: , Rfl:     amiodarone (PACERONE) 200 MG tablet, TAKE 1 TABLET BY MOUTH DAILY, Disp: 90 tablet, Rfl: 1    amLODIPine (NORVASC) 5 MG tablet, Take 1 tablet by mouth 2 (Two) Times a Day., Disp: , Rfl:     apixaban (ELIQUIS) 5 MG tablet tablet, Take 1 tablet by mouth 2 (Two) Times a Day., Disp: 180 tablet, Rfl: 1    apixaban (ELIQUIS) 5 MG tablet tablet, Take 1 tablet by mouth 2 (Two) Times a Day., Disp: 180 tablet, Rfl: 3    carvedilol (COREG) 12.5 MG tablet, TAKE 1 TABLET BY MOUTH TWICE A DAY WITH A MEAL, Disp: 180 tablet, Rfl: 2    cloNIDine (CATAPRES) 0.2 MG tablet, Take 1 tablet by mouth 3 (Three) Times a Day., Disp: , Rfl:     clopidogrel (PLAVIX) 75 MG tablet, Take 1 tablet by mouth Every Night., Disp: 90 tablet, Rfl: 3    escitalopram (LEXAPRO) 5 MG tablet, Take 1 tablet by mouth Every Night., Disp: , Rfl:     furosemide (LASIX) 40 MG tablet, Take 1 tablet by mouth Daily., Disp: , Rfl:     hydrALAZINE (APRESOLINE) 100 MG  "tablet, Take 1 tablet by mouth 3 (Three) Times a Day for 120 days., Disp: 90 tablet, Rfl: 3    isosorbide mononitrate (IMDUR) 30 MG 24 hr tablet, Take 1 tablet by mouth Daily., Disp: 90 tablet, Rfl: 3    metFORMIN ER (GLUCOPHAGE-XR) 500 MG 24 hr tablet, Take 1 tablet by mouth 2 (Two) Times a Day Before Meals., Disp: , Rfl:     pantoprazole (PROTONIX) 40 MG EC tablet, TAKE ONE TABLET BY MOUTH TWICE A DAY, Disp: 180 tablet, Rfl: 3    rosuvastatin (CRESTOR) 10 MG tablet, Take 1 tablet by mouth Daily., Disp: , Rfl:         Objective:     Vitals:    01/02/25 1457   BP: 136/60   BP Location: Left arm   Patient Position: Sitting   Pulse: 62   Weight: 81.6 kg (180 lb)   Height: 157.5 cm (62\")     Body mass index is 32.92 kg/m².    PHYSICAL EXAM:    Constitutional:       General: Not in acute distress.     Appearance: Normal appearance. Well-developed.   Eyes:      Pupils: Pupils are equal, round, and reactive to light.   HENT:      Head: Normocephalic.   Neck:      Vascular: Carotid bruit (bilateral) present. No JVD.   Pulmonary:      Effort: Pulmonary effort is normal. No tachypnea.      Breath sounds: Normal breath sounds. No wheezing. No rales.   Cardiovascular:      Normal rate. Regular rhythm.      No gallop.    Pulses:     Intact distal pulses.   Edema:     Peripheral edema present.     Pretibial: 1+ edema of the left pretibial area and trace edema of the right pretibial area.     Ankle: 1+ edema of the left ankle and trace edema of the right ankle.  Abdominal:      General: Bowel sounds are normal.      Palpations: Abdomen is soft.      Tenderness: There is no abdominal tenderness.   Musculoskeletal: Normal range of motion.      Cervical back: Normal range of motion and neck supple. No edema. Skin:     General: Skin is warm and dry.   Neurological:      Mental Status: Alert and oriented to person, place, and time.           ECG 12 Lead    Date/Time: 1/2/2025 3:30 PM  Performed by: Sofi Alex, " TERRI    Authorized by: Sofi Alex APRN  Comparison: compared with previous ECG from 12/18/2024  Similar to previous ECG  Rhythm: sinus rhythm  Rate: normal  QRS axis: normal  Other findings: non-specific ST-T wave changes    Clinical impression: non-specific ECG      Lipid Panel          4/22/2024    04:14   Lipid Panel   Total Cholesterol 173    Triglycerides 70    HDL Cholesterol 85    VLDL Cholesterol 13    LDL Cholesterol  75    LDL/HDL Ratio 0.87          Assessment/Plan:        1.  Chronic diastolic heart failure-age-related.  Continue carvedilol 12.5 mg twice a day and furosemide 40 mg daily.  Will check labs today check a BMP and a BNP she was a little hypokalemic    2.  Status post left carotid endarterectomy-healing well following with vascular surgery    3.  Essential hypertension controlled continue amlodipine 5 mg twice a day carvedilol 12.5 mg twice a day, clonidine 0.2 mg 3 times a day, Imdur 30 mg daily    4.  Dyslipidemia-lipids at goal on rosuvastatin 10 mg daily continue this dose.    Follow-up in 3 months with Dr. Suazo and I will call with lab results.  She already has an appointment       Your medication list            Accurate as of January 2, 2025  3:27 PM. If you have any questions, ask your nurse or doctor.                CONTINUE taking these medications        Instructions Last Dose Given Next Dose Due   allopurinol 100 MG tablet  Commonly known as: ZYLOPRIM      Take 1 tablet by mouth 2 (Two) Times a Day.       ALPRAZolam 0.25 MG tablet  Commonly known as: XANAX      Take 1 tablet by mouth As Needed for Anxiety.       amiodarone 200 MG tablet  Commonly known as: PACERONE      TAKE 1 TABLET BY MOUTH DAILY       amLODIPine 5 MG tablet  Commonly known as: NORVASC      Take 1 tablet by mouth 2 (Two) Times a Day.       apixaban 5 MG tablet tablet  Commonly known as: ELIQUIS      Take 1 tablet by mouth 2 (Two) Times a Day.       apixaban 5 MG tablet tablet  Commonly known as:  ELIQUIS      Take 1 tablet by mouth 2 (Two) Times a Day.       carvedilol 12.5 MG tablet  Commonly known as: COREG      TAKE 1 TABLET BY MOUTH TWICE A DAY WITH A MEAL       cloNIDine 0.2 MG tablet  Commonly known as: CATAPRES      Take 1 tablet by mouth 3 (Three) Times a Day.       clopidogrel 75 MG tablet  Commonly known as: PLAVIX      Take 1 tablet by mouth Every Night.       escitalopram 5 MG tablet  Commonly known as: LEXAPRO      Take 1 tablet by mouth Every Night.       furosemide 40 MG tablet  Commonly known as: LASIX      Take 1 tablet by mouth Daily.       hydrALAZINE 100 MG tablet  Commonly known as: APRESOLINE      Take 1 tablet by mouth 3 (Three) Times a Day for 120 days.       isosorbide mononitrate 30 MG 24 hr tablet  Commonly known as: IMDUR      Take 1 tablet by mouth Daily.       metFORMIN  MG 24 hr tablet  Commonly known as: GLUCOPHAGE-XR      Take 1 tablet by mouth 2 (Two) Times a Day Before Meals.       pantoprazole 40 MG EC tablet  Commonly known as: PROTONIX      TAKE ONE TABLET BY MOUTH TWICE A DAY       rosuvastatin 10 MG tablet  Commonly known as: CRESTOR      Take 1 tablet by mouth Daily.                  As always, it has been a pleasure to participate in your patient's care.      Sincerely,     Sofi MURRAY

## 2025-01-03 DIAGNOSIS — N17.9 AKI (ACUTE KIDNEY INJURY): Primary | ICD-10-CM

## 2025-01-03 RX ORDER — FUROSEMIDE 40 MG/1
20 TABLET ORAL DAILY
Start: 2025-01-03

## 2025-01-09 ENCOUNTER — LAB (OUTPATIENT)
Dept: LAB | Facility: HOSPITAL | Age: 75
End: 2025-01-09
Payer: MEDICARE

## 2025-01-09 DIAGNOSIS — N17.9 AKI (ACUTE KIDNEY INJURY): ICD-10-CM

## 2025-01-09 LAB
ANION GAP SERPL CALCULATED.3IONS-SCNC: 11.8 MMOL/L (ref 5–15)
BUN SERPL-MCNC: 15 MG/DL (ref 8–23)
BUN/CREAT SERPL: 13.9 (ref 7–25)
CALCIUM SPEC-SCNC: 9.4 MG/DL (ref 8.6–10.5)
CHLORIDE SERPL-SCNC: 101 MMOL/L (ref 98–107)
CO2 SERPL-SCNC: 22.2 MMOL/L (ref 22–29)
CREAT SERPL-MCNC: 1.08 MG/DL (ref 0.57–1)
EGFRCR SERPLBLD CKD-EPI 2021: 54 ML/MIN/1.73
GLUCOSE SERPL-MCNC: 121 MG/DL (ref 65–99)
POTASSIUM SERPL-SCNC: 4.2 MMOL/L (ref 3.5–5.2)
SODIUM SERPL-SCNC: 135 MMOL/L (ref 136–145)

## 2025-01-09 PROCEDURE — 80048 BASIC METABOLIC PNL TOTAL CA: CPT

## 2025-01-09 PROCEDURE — 36415 COLL VENOUS BLD VENIPUNCTURE: CPT

## 2025-01-22 DIAGNOSIS — I48.91 ATRIAL FIBRILLATION, UNSPECIFIED TYPE: ICD-10-CM

## 2025-01-23 RX ORDER — AMIODARONE HYDROCHLORIDE 200 MG/1
200 TABLET ORAL DAILY
Qty: 90 TABLET | Refills: 1 | Status: SHIPPED | OUTPATIENT
Start: 2025-01-23

## 2025-01-23 NOTE — TELEPHONE ENCOUNTER
Caller: Valentine Arora    Relationship: Self    Best call back number: [phone]    Requested Prescriptions:   Requested Prescriptions     Pending Prescriptions Disp Refills    amiodarone (PACERONE) 200 MG tablet 90 tablet 1     Sig: Take 1 tablet by mouth Daily.        Pharmacy where request should be sent: [id number] [place] [place] [facility] [address] - [phone] PH - [phone] FX     Last office visit with prescribing clinician: 1/2/2025   Last telemedicine visit with prescribing clinician: Visit date not found   Next office visit with prescribing clinician: Visit date not found     Additional details provided by patient:      Does the patient have less than a 3 day supply:  [] Yes  [] No    Would you like a call back once the refill request has been completed: [] Yes [] No    If the office needs to give you a call back, can they leave a voicemail: [] Yes [] No    Guille Mohr Rep   01/23/25 08:41 EST

## 2025-03-04 RX ORDER — HYDRALAZINE HYDROCHLORIDE 100 MG/1
100 TABLET, FILM COATED ORAL 3 TIMES DAILY
Qty: 90 TABLET | Refills: 1 | Status: SHIPPED | OUTPATIENT
Start: 2025-03-04 | End: 2025-07-02

## 2025-03-06 DIAGNOSIS — K22.719 BARRETT'S ESOPHAGUS WITH DYSPLASIA: ICD-10-CM

## 2025-03-06 RX ORDER — PANTOPRAZOLE SODIUM 40 MG/1
40 TABLET, DELAYED RELEASE ORAL 2 TIMES DAILY
Qty: 180 TABLET | Refills: 0 | Status: SHIPPED | OUTPATIENT
Start: 2025-03-06

## 2025-04-15 ENCOUNTER — OFFICE VISIT (OUTPATIENT)
Age: 75
End: 2025-04-15
Payer: MEDICARE

## 2025-04-15 VITALS
HEIGHT: 62 IN | HEART RATE: 76 BPM | DIASTOLIC BLOOD PRESSURE: 58 MMHG | SYSTOLIC BLOOD PRESSURE: 152 MMHG | BODY MASS INDEX: 34.78 KG/M2 | WEIGHT: 189 LBS

## 2025-04-15 DIAGNOSIS — I10 PRIMARY HYPERTENSION: Primary | ICD-10-CM

## 2025-04-15 RX ORDER — ISOSORBIDE MONONITRATE 30 MG/1
30 TABLET, EXTENDED RELEASE ORAL DAILY
Qty: 90 TABLET | Refills: 3 | Status: SHIPPED | OUTPATIENT
Start: 2025-04-15 | End: 2025-04-15 | Stop reason: SDUPTHER

## 2025-04-15 RX ORDER — ISOSORBIDE MONONITRATE 30 MG/1
30 TABLET, EXTENDED RELEASE ORAL 2 TIMES DAILY
Qty: 90 TABLET | Refills: 3 | Status: SHIPPED | OUTPATIENT
Start: 2025-04-15

## 2025-04-15 NOTE — PROGRESS NOTES
Subjective:     Encounter Date: 04/15/25      Patient ID: Valentine Arora is a 75 y.o. female.    Chief Complaint: CAD, HFpEF, HTN, AF  HPI:   75 year-old woman with coronary disease status post bypass, atrial flutter/fibrillation, HFpEF, obesity, PAD with prior intervention, type 2 diabetes.  In May 2023 she was diagnosed with atrial fibrillation and and has been on amiodarone since September 2023.  She was then admitted to [facility] in March 2024 with bradycardia and acute diastolic heart failure.  She then was admitted to [facility] with hypotension and A-fib/tachycardia.  During that hospitalization she was diuresed and cardioverted.  Medications were adjusted. During that hospitalization we realized that when she is not in A-fib she is often asymptomatic and heart rates are not very fast.    In April 2024 she continued to complain of burning angina despite improved blood pressures for that reason we proceeded with repeat cardiac catheterization.  She was found to have severe multivessel disease: Known occluded RCA occluded SVG to PDA, robust left-to-right collaterals which backfills to the mid RCA.  She has proximal severe LAD disease with a patent SVG to diagonal and jump to LAD.  Distal to the anastomosis the vessels were normal.  She had borderline mid circumflex disease.  We decided to treat her medically with up titration of her long-acting nitrates.      She had right carotid CEA in Nov 2024.     She returns today.  Overall she is feeling well without angina or dyspnea.  She is only had 1 episode of A-fib as far she knows since she was here last which lasted about 2 hours and resolved spontaneously.  She notices that her blood pressures are rising mostly in the 160s particularly in the afternoon and evening.      Echo: May 2023, normal systolic function, mild LVH, normal diastolic function  Holter May 2023: Sinus rhythm with intermittent sinus bradycardia  Nuclear stress September 2023  diaphoretic attenuation artifact, normal perfusion EF greater than 70%  Holter 2023 sinus rhythm with Mobitz 1  Holter 2024: Sinus rhythm, heart rate range 35-81  Echo 2024: Normal systolic function, normal diastolic dysfunction, normal PA pressure, dilated LA, moderate TR     The following portions of the patient's history were reviewed and updated as appropriate: allergies, current medications, past family history, past medical history, past social history, past surgical history and problem list.     REVIEW OF SYSTEMS:   All systems reviewed.  Pertinent positives identified in HPI.  All other systems are negative.    Past Medical History:   Diagnosis Date    Abnormal ECG     Arthritis     Atrial fibrillation     Atrial flutter, unspecified type 2023    Sarabia esophagus     CAD (coronary artery disease)     CHF (congestive heart failure)     Chronic back pain     Colon polyp     GERD (gastroesophageal reflux disease)     Hyperlipidemia     Hypertension     Myocardial infarction         PAD (peripheral artery disease) 2019    PONV (postoperative nausea and vomiting)     Shingles     Type 2 diabetes mellitus        Family History   Problem Relation Age of Onset    Heart disease Mother     Hypertension Mother     Heart disease Father     Heart attack Brother     Heart disease Brother     Heart disease Brother     Colon cancer Neg Hx     Colon polyps Neg Hx     Breast cancer Neg Hx        Social History     Socioeconomic History    Marital status:    Tobacco Use    Smoking status: Former     Current packs/day: 0.00     Average packs/day: 0.5 packs/day for 20.0 years (10.0 ttl pk-yrs)     Types: Cigarettes     Start date:      Quit date:      Years since quittin.3    Smokeless tobacco: Never    Tobacco comments:     QUIT    Vaping Use    Vaping status: Never Used   Substance and Sexual Activity    Alcohol use: Not Currently     Comment: rarely    Drug  use: No    Sexual activity: Defer       Allergies   Allergen Reactions    Codeine Itching    Other Unknown (See Comments)     Vicryl: doesn't heal       Past Surgical History:   Procedure Laterality Date    CARDIAC CATHETERIZATION N/A 04/24/2024    Procedure: Coronary angiography, Left heart catheterization, Left ventricular angiography;  Surgeon: Dai Suazo MD;  Location:  [facility] LOCATION;  Service: Cardiology;  Laterality: N/A;    CARDIAC CATHETERIZATION  04/24/2024    Procedure: Saphenous Vein Graft;  Surgeon: Dai Suazo MD;  Location:  [facility]ATION;  Service: Cardiology;;    COLONOSCOPY      COLONOSCOPY N/A 06/24/2020    Procedure: COLONOSCOPY;  Surgeon: Mathew Roberts MD;  Location: [facility] OR;  Service: Gastroenterology;  Laterality: N/A;  Descending colon polyp x 2, Ascending colon polyp, Sigmoid colon polyp, Rectal polyp    COLONOSCOPY N/A 08/07/2023    Procedure: COLONOSCOPY;  Surgeon: Mathew Roberts MD;  Location: [facility] OR;  Service: Gastroenterology;  Laterality: N/A;  Normal    CORONARY ARTERY BYPASS GRAFT  1995    x 2 vessels    ENDOSCOPY N/A 06/24/2020    Procedure: ESOPHAGOGASTRODUODENOSCOPY;  Surgeon: Mathew Roberts MD;  Location: [facility] OR;  Service: Gastroenterology;  Laterality: N/A;  Ulcerative esophagitis, Gastritis, Duodenitis  Duodenal biopsy, gastric biopsy, distal esophageal biopsy    ENDOSCOPY N/A 09/17/2020    Procedure: ESOPHAGOGASTRODUODENOSCOPY with biopsies;  Surgeon: Mathew Roberts MD;  Location: [facility] OR;  Service: Gastroenterology;  Laterality: N/A;  Esophagitis  Sarabia's Esophagus  Hiatal hernia  Gastritis  Gastric biopsy  Distal esophagus biopsy    ENDOSCOPY N/A 08/07/2023    Procedure: Esophagogastroduedenoscopy;  Surgeon: Mathew Roberts MD;  Location:  [facility] OR;  Service: Gastroenterology;  Laterality: N/A;  Gastritis; short segment Sarabia's; Esophageal stricture- dilatation;  Biopsies- gastric, distal esophagus    INNER EAR SURGERY      JOINT REPLACEMENT      right knee    LEG SURGERY      x 2 s/p fall    LUMBAR FUSION      L4/L5    SKIN GRAFT      to left lower leg x 2    TONSILLECTOMY      UPPER GASTROINTESTINAL ENDOSCOPY      VEIN SURGERY      WRIST FUSION Left        Procedures       Objective:         PHYSICAL EXAM:  GEN: VSS, no distress,   Eyes: normal sclera, normal lids and lashes  HENT: moist mucus membranes,   Respiratory: CTAB, no rales or wheezes  CV: RRR, no murmurs, , +2 DP and 2+ carotid pulses b/l  GI: NABS, soft,  Nontender, nondistended  MSK: no edema, no scoliosis or kyphosis  Skin: no rash, warm, dry  Heme/Lymph: no bruising or bleeding  Psych: organized thought, normal behavior and affect  Neuro: Cranial nerves grossly intact, Alert and Oriented x 3.         Assessment:          Diagnosis Plan   1. Primary hypertension             Plan:       1.  Atrial fibrillation: Status post cardioversion March 2024.  Eliquis 5 twice daily.  Coreg12.5 rates in the 60s.   2.  CAD: History of two-vessel bypass 1995.  Multivessel CAD with occluded SVG to PDA and occluded RCA with left to right collaterals; patent SVG to diagonal and LAD. Medically managed angina with long acting nitrates.  3.  Hypertension: Continue clonidine 0.2 3 times daily, Coreg 12.5 twice daily, amlodipine 5 twice daily, hydralazine 100 3 times daily, Lasix 40, uptitrate Imdur to 30 mg twice daily  4.  SVEN: Renal function improved compared to February.  Monitor.  5.  HFpEF: Prompted by atrial fibrillation, euvolemic on exam today.    Dr. Alejandro, thank you very much for referring this kind patient to me. Please call me with any questions or concerns.       Dai Suazo MD  04/15/25  [facility]    Outpatient Encounter Medications as of 4/15/2025   Medication Sig Dispense Refill    allopurinol (ZYLOPRIM) 100 MG tablet Take 1 tablet by mouth 2 (Two) Times a Day.      ALPRAZolam (XANAX) 0.25  MG tablet Take 1 tablet by mouth As Needed for Anxiety.      amiodarone (PACERONE) 200 MG tablet Take 1 tablet by mouth Daily. 90 tablet 1    amLODIPine (NORVASC) 5 MG tablet Take 1 tablet by mouth 2 (Two) Times a Day.      apixaban (ELIQUIS) 5 MG tablet tablet Take 1 tablet by mouth 2 (Two) Times a Day. 180 tablet 3    carvedilol (COREG) 12.5 MG tablet TAKE 1 TABLET BY MOUTH TWICE A DAY WITH A MEAL 180 tablet 2    cloNIDine (CATAPRES) 0.2 MG tablet Take 1 tablet by mouth 3 (Three) Times a Day.      clopidogrel (PLAVIX) 75 MG tablet Take 1 tablet by mouth Every Night. 90 tablet 3    escitalopram (LEXAPRO) 5 MG tablet Take 1 tablet by mouth Every Night.      furosemide (LASIX) 40 MG tablet Take 0.5 tablets by mouth Daily.      hydrALAZINE (APRESOLINE) 100 MG tablet Take 1 tablet by mouth 3 (Three) Times a Day for 120 days. 90 tablet 1    isosorbide mononitrate (IMDUR) 30 MG 24 hr tablet Take 1 tablet by mouth 2 (Two) Times a Day. 90 tablet 3    metFORMIN ER (GLUCOPHAGE-XR) 500 MG 24 hr tablet Take 1 tablet by mouth 2 (Two) Times a Day Before Meals.      pantoprazole (PROTONIX) 40 MG EC tablet TAKE 1 TABLET BY MOUTH 2 TIMES A  tablet 0    rosuvastatin (CRESTOR) 10 MG tablet Take 1 tablet by mouth Daily.      [DISCONTINUED] isosorbide mononitrate (IMDUR) 30 MG 24 hr tablet TAKE 1 TABLET BY MOUTH DAILY 90 tablet 3    [DISCONTINUED] apixaban (ELIQUIS) 5 MG tablet tablet Take 1 tablet by mouth 2 (Two) Times a Day. 180 tablet 1    [DISCONTINUED] isosorbide mononitrate (IMDUR) 30 MG 24 hr tablet Take 1 tablet by mouth Daily. 90 tablet 3     No facility-administered encounter medications on file as of 4/15/2025.

## 2025-04-29 RX ORDER — CARVEDILOL 25 MG/1
25 TABLET ORAL 2 TIMES DAILY WITH MEALS
COMMUNITY
End: 2025-04-29 | Stop reason: SDUPTHER

## 2025-04-29 RX ORDER — CARVEDILOL 25 MG/1
25 TABLET ORAL 2 TIMES DAILY WITH MEALS
Qty: 60 TABLET | Refills: 1 | Status: SHIPPED | OUTPATIENT
Start: 2025-04-29

## 2025-04-29 RX ORDER — HYDRALAZINE HYDROCHLORIDE 100 MG/1
100 TABLET, FILM COATED ORAL 3 TIMES DAILY
Qty: 90 TABLET | Refills: 1 | Status: SHIPPED | OUTPATIENT
Start: 2025-04-29

## 2025-05-05 RX ORDER — FUROSEMIDE 40 MG/1
40 TABLET ORAL 2 TIMES DAILY
Qty: 60 TABLET | Refills: 3 | Status: SHIPPED | OUTPATIENT
Start: 2025-05-05 | End: 2025-05-06 | Stop reason: SDUPTHER

## 2025-05-06 RX ORDER — FUROSEMIDE 40 MG/1
40 TABLET ORAL 2 TIMES DAILY
Qty: 60 TABLET | Refills: 3 | Status: SHIPPED | OUTPATIENT
Start: 2025-05-06

## 2025-05-13 DIAGNOSIS — I10 ESSENTIAL HYPERTENSION: Primary | ICD-10-CM

## 2025-05-13 RX ORDER — SPIRONOLACTONE 25 MG/1
25 TABLET ORAL DAILY
Qty: 90 TABLET | Refills: 3 | Status: SHIPPED | OUTPATIENT
Start: 2025-05-13

## 2025-05-23 ENCOUNTER — LAB (OUTPATIENT)
Dept: LAB | Facility: HOSPITAL | Age: 75
End: 2025-05-23
Payer: MEDICARE

## 2025-05-23 DIAGNOSIS — I10 ESSENTIAL HYPERTENSION: ICD-10-CM

## 2025-05-23 LAB
ANION GAP SERPL CALCULATED.3IONS-SCNC: 15 MMOL/L (ref 5–15)
BUN SERPL-MCNC: 24 MG/DL (ref 8–23)
BUN/CREAT SERPL: 19.5 (ref 7–25)
CALCIUM SPEC-SCNC: 9.1 MG/DL (ref 8.6–10.5)
CHLORIDE SERPL-SCNC: 97 MMOL/L (ref 98–107)
CO2 SERPL-SCNC: 22 MMOL/L (ref 22–29)
CREAT SERPL-MCNC: 1.23 MG/DL (ref 0.57–1)
EGFRCR SERPLBLD CKD-EPI 2021: 46.2 ML/MIN/1.73
GLUCOSE SERPL-MCNC: 69 MG/DL (ref 65–99)
POTASSIUM SERPL-SCNC: 4.5 MMOL/L (ref 3.5–5.2)
SODIUM SERPL-SCNC: 134 MMOL/L (ref 136–145)
TSH SERPL DL<=0.05 MIU/L-ACNC: 1.98 UIU/ML (ref 0.27–4.2)

## 2025-05-23 PROCEDURE — 36415 COLL VENOUS BLD VENIPUNCTURE: CPT

## 2025-05-23 PROCEDURE — 83835 ASSAY OF METANEPHRINES: CPT

## 2025-05-23 PROCEDURE — 80048 BASIC METABOLIC PNL TOTAL CA: CPT

## 2025-05-23 PROCEDURE — 84443 ASSAY THYROID STIM HORMONE: CPT

## 2025-05-23 PROCEDURE — 82088 ASSAY OF ALDOSTERONE: CPT

## 2025-05-23 PROCEDURE — 84244 ASSAY OF RENIN: CPT

## 2025-05-23 RX ORDER — CARVEDILOL 25 MG/1
TABLET ORAL
Qty: 60 TABLET | Refills: 11 | Status: SHIPPED | OUTPATIENT
Start: 2025-05-23

## 2025-05-27 DIAGNOSIS — T50.905A BRADYCARDIA, DRUG INDUCED: Primary | ICD-10-CM

## 2025-05-27 DIAGNOSIS — R00.1 BRADYCARDIA, DRUG INDUCED: Primary | ICD-10-CM

## 2025-05-27 LAB
ALDOST SERPL-MCNC: 16.1 NG/DL (ref 0–30)
ALDOST/RENIN PLAS-RTO: 3.2 {RATIO} (ref 0–30)
RENIN PLAS-CCNC: 5.01 NG/ML/HR (ref 0.17–5.38)

## 2025-05-28 LAB
METANEPH FREE SERPL-MCNC: 80.2 PG/ML (ref 0–88)
NORMETANEPHRINE SERPL-MCNC: 179.1 PG/ML (ref 0–285.2)

## 2025-06-01 DIAGNOSIS — K22.719 BARRETT'S ESOPHAGUS WITH DYSPLASIA: ICD-10-CM

## 2025-06-02 RX ORDER — PANTOPRAZOLE SODIUM 40 MG/1
40 TABLET, DELAYED RELEASE ORAL 2 TIMES DAILY
Qty: 180 TABLET | Refills: 0 | OUTPATIENT
Start: 2025-06-02

## 2025-06-05 DIAGNOSIS — K22.719 BARRETT'S ESOPHAGUS WITH DYSPLASIA: ICD-10-CM

## 2025-06-05 RX ORDER — PANTOPRAZOLE SODIUM 40 MG/1
40 TABLET, DELAYED RELEASE ORAL 2 TIMES DAILY
Qty: 180 TABLET | Refills: 0 | Status: SHIPPED | OUTPATIENT
Start: 2025-06-05

## 2025-06-05 NOTE — TELEPHONE ENCOUNTER
Caller: Valentine Arora    Relationship: Self    Best call back number: [phone]      Requested Prescriptions:   Requested Prescriptions     Pending Prescriptions Disp Refills    pantoprazole (PROTONIX) 40 MG EC tablet 180 tablet 0     Sig: Take 1 tablet by mouth 2 (Two) Times a Day.        Pharmacy where request should be sent:  [facility]      Next office visit with prescribing clinician: 7/23/2025       Additional details provided by patient: PT MADE HER FUTURE APPT AND WILL NEED ANOTHER REFILL BEFORE.     Does the patient have less than a 3 day supply:  [x] Yes  [] No      Guille Moore Rep   06/05/25 08:46 EDT

## 2025-06-10 ENCOUNTER — HOSPITAL ENCOUNTER (OUTPATIENT)
Dept: CARDIOLOGY | Facility: HOSPITAL | Age: 75
Discharge: HOME | End: 2025-06-10
Admitting: INTERNAL MEDICINE
Payer: MEDICARE

## 2025-06-10 DIAGNOSIS — I10 ESSENTIAL HYPERTENSION: ICD-10-CM

## 2025-06-10 LAB
BH CV ECHO MEAS - DIST REN A EDV LEFT: 13.6 CM/S
BH CV ECHO MEAS - DIST REN A PSV LEFT: 80.8 CM/S
BH CV ECHO MEAS - MID REN A EDV LEFT: 9.5 CM/S
BH CV ECHO MEAS - MID REN A PSV LEFT: 117.2 CM/S
BH CV ECHO MEAS - PROX REN A EDV LEFT: 11.6 CM/S
BH CV ECHO MEAS - PROX REN A PSV LEFT: 140.4 CM/S
BH CV VAS RENAL AORTIC MID PSV: 166 CM/S
BH CV VAS RENAL HILUM LEFT EDV: 15.7 CM/S
BH CV VAS RENAL HILUM LEFT PSV: 57.2 CM/S
BH CV VAS RENAL HILUM RIGHT EDV: 11.9 CM/S
BH CV VAS RENAL HILUM RIGHT PSV: 70.6 CM/S
BH CV XLRA MEAS - KID L LEFT: 10.5 CM
BH CV XLRA MEAS DIST REN A EDV RIGHT: 9 CM/S
BH CV XLRA MEAS DIST REN A PSV RIGHT: 88.1 CM/S
BH CV XLRA MEAS KID L RIGHT: 9.9 CM
BH CV XLRA MEAS KID W RIGHT: 5 CM
BH CV XLRA MEAS MID REN A EDV RIGHT: 16.5 CM/S
BH CV XLRA MEAS MID REN A PSV RIGHT: 130.4 CM/S
BH CV XLRA MEAS PROX REN A EDV RIGHT: 22.1 CM/S
BH CV XLRA MEAS PROX REN A PSV RIGHT: 162.2 CM/S
BH CV XLRA MEAS RAR LEFT: 0.89
BH CV XLRA MEAS RAR RIGHT: 1.09
BH CV XLRA MEAS RENAL A ORG EDV LEFT: 11.6 CM/S
BH CV XLRA MEAS RENAL A ORG EDV RIGHT: 22.1 CM/S
BH CV XLRA MEAS RENAL A ORG PSV LEFT: 148.5 CM/S
BH CV XLRA MEAS RENAL A ORG PSV RIGHT: 180.6 CM/S
LEFT KIDNEY WIDTH: 6.1 CM
LEFT RENAL UPPER PARENCHYMA MAX: 53.6 CM/S
LEFT RENAL UPPER PARENCHYMA MIN: 12.7 CM/S
LEFT RENAL UPPER PARENCHYMA RI: 0.76
RIGHT RENAL UPPER PARENCHYMA MAX: 43.9 CM/S
RIGHT RENAL UPPER PARENCHYMA MIN: 12.9 CM/S
RIGHT RENAL UPPER PARENCHYMA RI: 0.71

## 2025-06-10 PROCEDURE — 93975 VASCULAR STUDY: CPT

## 2025-06-10 PROCEDURE — 93975 VASCULAR STUDY: CPT | Performed by: SURGERY

## 2025-06-24 RX ORDER — ISOSORBIDE MONONITRATE 60 MG/1
60 TABLET, EXTENDED RELEASE ORAL DAILY
Qty: 90 TABLET | Refills: 3 | Status: SHIPPED | OUTPATIENT
Start: 2025-06-24

## 2025-06-24 RX ORDER — CARVEDILOL 25 MG/1
12.5 TABLET ORAL 2 TIMES DAILY WITH MEALS
Qty: 60 TABLET | Refills: 11 | Status: SHIPPED | OUTPATIENT
Start: 2025-06-24

## 2025-06-26 RX ORDER — HYDRALAZINE HYDROCHLORIDE 100 MG/1
100 TABLET, FILM COATED ORAL 3 TIMES DAILY
Qty: 270 TABLET | Refills: 1 | Status: SHIPPED | OUTPATIENT
Start: 2025-06-26

## 2025-07-07 RX ORDER — AMLODIPINE BESYLATE 5 MG/1
5 TABLET ORAL 2 TIMES DAILY
Qty: 180 TABLET | Refills: 3 | Status: SHIPPED | OUTPATIENT
Start: 2025-07-07

## 2025-07-07 NOTE — TELEPHONE ENCOUNTER
Protocol Failed.     NOV 7/15/2025 LR    LOV 4/15/2025         Plan   1.  Atrial fibrillation: Status post cardioversion March 2024.  Eliquis 5 twice daily.  Coreg12.5 rates in the 60s.   2.  CAD: History of two-vessel bypass 1995.  Multivessel CAD with occluded SVG to PDA and occluded RCA with left to right collaterals; patent SVG to diagonal and LAD. Medically managed angina with long acting nitrates.  3.  Hypertension: Continue clonidine 0.2 3 times daily, Coreg 12.5 twice daily, amlodipine 5 twice daily, hydralazine 100 3 times daily, Lasix 40, uptitrate Imdur to 30 mg twice daily  4.  SVEN: Renal function improved compared to February.  Monitor.  5.  HFpEF: Prompted by atrial fibrillation, euvolemic on exam today.  
No
No

## 2025-07-09 ENCOUNTER — TRANSCRIBE ORDERS (OUTPATIENT)
Dept: ADMINISTRATIVE | Facility: HOSPITAL | Age: 75
End: 2025-07-09
Payer: MEDICARE

## 2025-07-09 DIAGNOSIS — Z12.31 SCREENING MAMMOGRAM FOR BREAST CANCER: Primary | ICD-10-CM

## 2025-07-15 ENCOUNTER — OFFICE VISIT (OUTPATIENT)
Age: 75
End: 2025-07-15
Payer: MEDICARE

## 2025-07-15 VITALS
HEART RATE: 44 BPM | HEIGHT: 62 IN | DIASTOLIC BLOOD PRESSURE: 70 MMHG | OXYGEN SATURATION: 98 % | SYSTOLIC BLOOD PRESSURE: 142 MMHG | WEIGHT: 191 LBS | BODY MASS INDEX: 35.15 KG/M2

## 2025-07-15 DIAGNOSIS — T50.905A BRADYCARDIA, DRUG INDUCED: Primary | ICD-10-CM

## 2025-07-15 DIAGNOSIS — R00.1 BRADYCARDIA, DRUG INDUCED: Primary | ICD-10-CM

## 2025-07-15 RX ORDER — NIFEDIPINE 60 MG/1
60 TABLET, EXTENDED RELEASE ORAL DAILY
Qty: 30 TABLET | Refills: 11 | Status: SHIPPED | OUTPATIENT
Start: 2025-07-15 | End: 2025-07-18 | Stop reason: SDUPTHER

## 2025-07-15 RX ORDER — CARVEDILOL 12.5 MG/1
12.5 TABLET ORAL 2 TIMES DAILY WITH MEALS
Qty: 60 TABLET | Refills: 11 | Status: SHIPPED | OUTPATIENT
Start: 2025-07-15

## 2025-07-15 RX ORDER — CLONIDINE HYDROCHLORIDE 0.1 MG/1
0.1 TABLET ORAL 2 TIMES DAILY
Qty: 60 TABLET | Refills: 11 | Status: SHIPPED | OUTPATIENT
Start: 2025-07-15

## 2025-07-15 NOTE — PROGRESS NOTES
Date of Office Visit: 07/15/2025  Encounter Provider: TERRI Robertson  Place of Service: [facility] CARDIOLOGY  Patient Name: Valentine Arora  :1950    Chief complaint: Coronary disease, atrial fibrillation    HPI: Valentine Arora is a 75 y.o. female who is a patient of Dr. Suazo and is known to me from previous.  She has a history of coronary disease with bypass surgery, atrial fibrillation, diastolic heart failure, peripheral arterial disease with intervention, type 2 diabetes mellitus and obesity.  She was diagnosed with A-fib in  she had been on amiodarone.  In 2024 she was admitted to [place] with bradycardia and acute diastolic heart failure.  She was hypotensive and in A-fib with RVR.  She was diuresed and cardioverted.      In 2024 she continued to complain of burning in the chest regardless of blood pressure control.  She had a cardiac cath she was found to have severe multivessel disease.  She had a known occluded RCA with occluded vein graft to the PDA, left-to-right collaterals backfilling the mid RCA, she had severe proximal LAD disease with patent vein graft to the diagonal and a jump to the LAD.  Distal to the anastomosis the vessels were normal there is a borderline lesion in the circumflex we put her on long-acting nitrates and treated her medically.  In November of that year she had a carotid endarterectomy.     She was last in the office in April her blood pressures were running a little high she had had 1 episode of atrial fibrillation.  We uptitrated her Imdur to twice a day.  She is here for follow-up.    She comes in today for follow-up she denies any chest pain, pressure or tightness.  She is fatigued.  She has not had any palpitations or feel episodes of A-fib.  She has noted that her heart rate has been low at home.  She did see it as low as 38.  But it is anywhere from the upper 40s to 60s.    Previous testing and notes have been  reviewed by me.   Echo: May 2023, normal systolic function, mild LVH, normal diastolic function  Holter May 2023: Sinus rhythm with intermittent sinus bradycardia  Nuclear stress September 2023 diaphoretic attenuation artifact, normal perfusion EF greater than 70%  Holter September 2023 sinus rhythm with Mobitz 1  Holter January 2024: Sinus rhythm, heart rate range 35-81  Echo January 2024: Normal systolic function, normal diastolic dysfunction, normal PA pressure, dilated LA, moderate TR     Past Medical History:   Diagnosis Date    Abnormal ECG     Arthritis     Atrial fibrillation     Atrial flutter, unspecified type 05/17/2023    Sarabia esophagus     CAD (coronary artery disease)     CHF (congestive heart failure)     Chronic back pain     Colon polyp     GERD (gastroesophageal reflux disease)     Hyperlipidemia     Hypertension     Myocardial infarction     1995    PAD (peripheral artery disease) 09/11/2019    PONV (postoperative nausea and vomiting)     Shingles     Type 2 diabetes mellitus        Past Surgical History:   Procedure Laterality Date    CARDIAC CATHETERIZATION N/A 04/24/2024    Procedure: Coronary angiography, Left heart catheterization, Left ventricular angiography;  Surgeon: Dai Suazo MD;  Location: [facility] CATH INVASIVE LOCATION;  Service: Cardiology;  Laterality: N/A;    CARDIAC CATHETERIZATION  04/24/2024    Procedure: Saphenous Vein Graft;  Surgeon: Dai Suazo MD;  Location:  [facility] CATH INVASIVE LOCATION;  Service: Cardiology;;    COLONOSCOPY      COLONOSCOPY N/A 06/24/2020    Procedure: COLONOSCOPY;  Surgeon: Mathew Roberts MD;  Location: [facility] OR;  Service: Gastroenterology;  Laterality: N/A;  Descending colon polyp x 2, Ascending colon polyp, Sigmoid colon polyp, Rectal polyp    COLONOSCOPY N/A 08/07/2023    Procedure: COLONOSCOPY;  Surgeon: Mathew Roberts MD;  Location: [facility] OR;  Service: Gastroenterology;  Laterality: N/A;  Normal    CORONARY  ARTERY BYPASS GRAFT  1995    x 2 vessels    ENDOSCOPY N/A 2020    Procedure: ESOPHAGOGASTRODUODENOSCOPY;  Surgeon: Mathew Roberts MD;  Location: [facility] OR;  Service: Gastroenterology;  Laterality: N/A;  Ulcerative esophagitis, Gastritis, Duodenitis  Duodenal biopsy, gastric biopsy, distal esophageal biopsy    ENDOSCOPY N/A 2020    Procedure: ESOPHAGOGASTRODUODENOSCOPY with biopsies;  Surgeon: Mathew Roberts MD;  Location:  [facility] OR;  Service: Gastroenterology;  Laterality: N/A;  Esophagitis  Sarabia's Esophagus  Hiatal hernia  Gastritis  Gastric biopsy  Distal esophagus biopsy    ENDOSCOPY N/A 2023    Procedure: Esophagogastroduedenoscopy;  Surgeon: Mathew Roberts MD;  Location:  [facility] OR;  Service: Gastroenterology;  Laterality: N/A;  Gastritis; short segment Sarabia's; Esophageal stricture- dilatation; Biopsies- gastric, distal esophagus    INNER EAR SURGERY      JOINT REPLACEMENT      right knee    LEG SURGERY      x 2 s/p fall    LUMBAR FUSION      L4/L5    SKIN GRAFT      to left lower leg x 2    TONSILLECTOMY      UPPER GASTROINTESTINAL ENDOSCOPY      VEIN SURGERY      WRIST FUSION Left        Social History     Socioeconomic History    Marital status:    Tobacco Use    Smoking status: Former     Current packs/day: 0.00     Average packs/day: 0.5 packs/day for 20.0 years (10.0 ttl pk-yrs)     Types: Cigarettes     Start date:      Quit date:      Years since quittin.5    Smokeless tobacco: Never    Tobacco comments:     QUIT    Vaping Use    Vaping status: Never Used   Substance and Sexual Activity    Alcohol use: Not Currently     Comment: rarely    Drug use: No    Sexual activity: Defer       Family History   Problem Relation Age of Onset    Heart disease Mother     Hypertension Mother     Heart disease Father     Heart attack Brother     Heart disease Brother     Heart disease Brother     Colon cancer Neg Hx     Colon polyps  Neg Hx     Breast cancer Neg Hx        Review of Systems   Constitutional: Positive for malaise/fatigue. Negative for diaphoresis.   Cardiovascular:  Negative for chest pain, claudication, dyspnea on exertion, irregular heartbeat, leg swelling, near-syncope, orthopnea, palpitations, paroxysmal nocturnal dyspnea and syncope.   Respiratory:  Negative for cough, shortness of breath and sleep disturbances due to breathing.    Musculoskeletal:  Negative for falls.   Neurological:  Negative for dizziness and weakness.   Psychiatric/Behavioral:  Negative for altered mental status and substance abuse.        Allergies   Allergen Reactions    Codeine Itching    Other Unknown (See Comments)     Vicryl: doesn't heal         Current Outpatient Medications:     allopurinol (ZYLOPRIM) 100 MG tablet, Take 1 tablet by mouth 2 (Two) Times a Day., Disp: , Rfl:     ALPRAZolam (XANAX) 0.25 MG tablet, Take 1 tablet by mouth As Needed for Anxiety., Disp: , Rfl:     amiodarone (PACERONE) 200 MG tablet, Take 1 tablet by mouth Daily., Disp: 90 tablet, Rfl: 1    apixaban (ELIQUIS) 5 MG tablet tablet, Take 1 tablet by mouth 2 (Two) Times a Day., Disp: 180 tablet, Rfl: 3    carvedilol (COREG) 12.5 MG tablet, Take 1 tablet by mouth 2 (Two) Times a Day With Meals., Disp: 60 tablet, Rfl: 11    cloNIDine (CATAPRES) 0.1 MG tablet, Take 1 tablet by mouth 2 (Two) Times a Day., Disp: 60 tablet, Rfl: 11    clopidogrel (PLAVIX) 75 MG tablet, Take 1 tablet by mouth Every Night., Disp: 90 tablet, Rfl: 3    escitalopram (LEXAPRO) 5 MG tablet, Take 1 tablet by mouth Every Night., Disp: , Rfl:     furosemide (LASIX) 40 MG tablet, Take 1 tablet by mouth Daily., Disp: , Rfl:     hydrALAZINE (APRESOLINE) 100 MG tablet, TAKE 1 TABLET BY MOUTH 3 TIMES A DAY, Disp: 270 tablet, Rfl: 1    metFORMIN ER (GLUCOPHAGE-XR) 500 MG 24 hr tablet, Take 1 tablet by mouth 2 (Two) Times a Day Before Meals., Disp: , Rfl:     pantoprazole (PROTONIX) 40 MG EC tablet, Take 1 tablet  "by mouth 2 (Two) Times a Day., Disp: 180 tablet, Rfl: 0    rosuvastatin (CRESTOR) 10 MG tablet, Take 1 tablet by mouth Daily., Disp: , Rfl:     spironolactone (ALDACTONE) 25 MG tablet, Take 1 tablet by mouth Daily., Disp: 90 tablet, Rfl: 3    NIFEdipine XL (PROCARDIA XL) 60 MG 24 hr tablet, Take 1 tablet by mouth Daily., Disp: 30 tablet, Rfl: 11        Objective:     Vitals:    07/15/25 1059   BP: 142/70   Pulse: (!) 44   SpO2: 98%   Weight: 86.6 kg (191 lb)   Height: 157.5 cm (62\")     Body mass index is 34.93 kg/m².    PHYSICAL EXAM:    Constitutional:       General: Not in acute distress.     Appearance: Normal appearance. Well-developed.   Eyes:      Pupils: Pupils are equal, round, and reactive to light.   HENT:      Head: Normocephalic.   Neck:      Vascular: No carotid bruit or JVD.   Pulmonary:      Effort: Pulmonary effort is normal. No tachypnea.      Breath sounds: Normal breath sounds. No wheezing. No rales.   Cardiovascular:      Normal rate. Regular rhythm.      No gallop.    Pulses:     Intact distal pulses.   Edema:     Peripheral edema absent.   Abdominal:      General: Bowel sounds are normal.      Palpations: Abdomen is soft.      Tenderness: There is no abdominal tenderness.   Musculoskeletal: Normal range of motion.      Cervical back: Normal range of motion and neck supple. No edema. Skin:     General: Skin is warm and dry.   Neurological:      Mental Status: Alert and oriented to person, place, and time.           ECG 12 Lead    Date/Time: 7/15/2025 12:09 PM  Performed by: Sofi Alex APRN    Authorized by: Sofi Alex APRN  Comparison: compared with previous ECG from 1/2/2025  Similar to previous ECG  Rhythm comments: Junctional  Rate: bradycardic  BPM: 44  QRS axis: normal  Other findings: T wave abnormality    Clinical impression: abnormal EKG              Assessment/Plan:      1.  Paroxysmal atrial fibrillation- In junctional rhythm today. On coreg 12.5mg BID, recently " reduced due to bradycardia. Will decreased clonidine to 0.1mg BID. Anticoagulated with Eliquis 5mg BID. Continue. Will bring back on Friday for EKG. Will hopefully get to remove clonidine if BP tolerates.    2. Essential Hypertension- BP stable in the 120-140's/50-60's. Switch to nifedipine instead of amlodipine. Continue hydralazine 100mg TID, Spironolactone 25mg daily    4. Coronary artery disease- Lipids at goal, EKG stable. No angina symptoms    5. Dyslipidemia- lipids at goal. Continue crestor 10mg daily    Follow up in 3 months. EKG rhythm is still low on Friday stop clonidine and increase nifedipine for BP         Your medication list            Accurate as of July 15, 2025 12:14 PM. If you have any questions, ask your nurse or doctor.                START taking these medications        Instructions Last Dose Given Next Dose Due   NIFEdipine XL 60 MG 24 hr tablet  Commonly known as: PROCARDIA XL  Started by: Sofi Alex      Take 1 tablet by mouth Daily.              CHANGE how you take these medications        Instructions Last Dose Given Next Dose Due   carvedilol 12.5 MG tablet  Commonly known as: COREG  What changed: medication strength  Changed by: Sofi Alex      Take 1 tablet by mouth 2 (Two) Times a Day With Meals.       cloNIDine 0.1 MG tablet  Commonly known as: CATAPRES  What changed:   medication strength  how much to take  when to take this  Changed by: Sofi Alex      Take 1 tablet by mouth 2 (Two) Times a Day.              CONTINUE taking these medications        Instructions Last Dose Given Next Dose Due   allopurinol 100 MG tablet  Commonly known as: ZYLOPRIM      Take 1 tablet by mouth 2 (Two) Times a Day.       ALPRAZolam 0.25 MG tablet  Commonly known as: XANAX      Take 1 tablet by mouth As Needed for Anxiety.       amiodarone 200 MG tablet  Commonly known as: PACERONE      Take 1 tablet by mouth Daily.       apixaban 5 MG tablet tablet  Commonly known as: ELIQUIS      Take 1 tablet by  mouth 2 (Two) Times a Day.       clopidogrel 75 MG tablet  Commonly known as: PLAVIX      Take 1 tablet by mouth Every Night.       escitalopram 5 MG tablet  Commonly known as: LEXAPRO      Take 1 tablet by mouth Every Night.       furosemide 40 MG tablet  Commonly known as: LASIX      Take 1 tablet by mouth Daily.       hydrALAZINE 100 MG tablet  Commonly known as: APRESOLINE      TAKE 1 TABLET BY MOUTH 3 TIMES A DAY       metFORMIN  MG 24 hr tablet  Commonly known as: GLUCOPHAGE-XR      Take 1 tablet by mouth 2 (Two) Times a Day Before Meals.       pantoprazole 40 MG EC tablet  Commonly known as: PROTONIX      Take 1 tablet by mouth 2 (Two) Times a Day.       rosuvastatin 10 MG tablet  Commonly known as: CRESTOR      Take 1 tablet by mouth Daily.       spironolactone 25 MG tablet  Commonly known as: ALDACTONE      Take 1 tablet by mouth Daily.              STOP taking these medications      amLODIPine 5 MG tablet  Commonly known as: NORVASC  Stopped by: Sofi Alex        isosorbide mononitrate 60 MG 24 hr tablet  Commonly known as: IMDUR  Stopped by: Sofi Alex                  Where to Get Your Medications        These medications were sent to [facility] [id number] [place], [place] - [address] - [phone] [facility] [phone]   [address] [address]      Phone: [phone]   carvedilol 12.5 MG tablet  cloNIDine 0.1 MG tablet  NIFEdipine XL 60 MG 24 hr tablet           As always, it has been a pleasure to participate in your patient's care.      Sincerely,     Sofi MURRAY

## 2025-07-18 ENCOUNTER — HOSPITAL ENCOUNTER (OUTPATIENT)
Dept: MAMMOGRAPHY | Facility: HOSPITAL | Age: 75
Discharge: HOME | End: 2025-07-18
Admitting: FAMILY MEDICINE
Payer: MEDICARE

## 2025-07-18 ENCOUNTER — TELEPHONE (OUTPATIENT)
Dept: CARDIOLOGY | Age: 75
End: 2025-07-18
Payer: MEDICARE

## 2025-07-18 DIAGNOSIS — Z12.31 SCREENING MAMMOGRAM FOR BREAST CANCER: ICD-10-CM

## 2025-07-18 PROCEDURE — 77067 SCR MAMMO BI INCL CAD: CPT

## 2025-07-18 PROCEDURE — 77063 BREAST TOMOSYNTHESIS BI: CPT | Performed by: RADIOLOGY

## 2025-07-18 PROCEDURE — 77067 SCR MAMMO BI INCL CAD: CPT | Performed by: RADIOLOGY

## 2025-07-18 PROCEDURE — 77063 BREAST TOMOSYNTHESIS BI: CPT

## 2025-07-18 RX ORDER — NIFEDIPINE 60 MG/1
120 TABLET, EXTENDED RELEASE ORAL DAILY
Qty: 60 TABLET | Refills: 11 | Status: SHIPPED | OUTPATIENT
Start: 2025-07-18

## 2025-07-18 NOTE — TELEPHONE ENCOUNTER
----- Message from Sofi Alex sent at 7/18/2025 10:30 AM EDT -----  Please call patient and see how she is feeling on the nifedipine.  I want to increase it to 90 mg and stop the clonidine

## 2025-07-18 NOTE — TELEPHONE ENCOUNTER
"\"Well I sent her a message this morning, my heart rate is much better but my blood pressure is as high as it can be\".  Patient Message with Sofi Alex APRN (07/18/2025)     Do you want her to go ahead and decrease clonidine to once per day now or wait until later, once we see how she responds to the increased nifedipine?    She also wants to know what the EKG from today showed?    Micheline AGUAYO RN  Triage [facility]  07/18/25 12:11 EDT    "

## 2025-07-18 NOTE — TELEPHONE ENCOUNTER
I called pt to update her on recommendation regarding clonidine.  Once I hear what the results were of EKG I will call her with those.    Micheline AGUAYO RN  [place] [facility]  07/18/25 16:01 EDT

## 2025-07-21 ENCOUNTER — TELEPHONE (OUTPATIENT)
Age: 75
End: 2025-07-21
Payer: MEDICARE

## 2025-07-21 RX ORDER — DOXAZOSIN 1 MG/1
1 TABLET ORAL NIGHTLY
Qty: 30 TABLET | Refills: 2 | Status: ON HOLD | OUTPATIENT
Start: 2025-07-21

## 2025-07-21 NOTE — TELEPHONE ENCOUNTER
Hub staff attempted to follow warm transfer process and was unsuccessful     Caller: Valentine Arora    Relationship to patient: Self    Best call back number: [phone]     Patient is needing: PT IS HAVING HIGH BP FROM 179-205 RANGE. TODAY IT /77, SHE IS ALSO HAVING A HEADACHE. IN RECENT [employer] MESSAGE, PT ATTACHED BP RECORDINGS, HUB UNABLE TO REACH CLINICAL AND PT IS REQUESTING TO SPEAK TO [facility]MA[facility] TODAY, PLS CALL.

## 2025-07-22 ENCOUNTER — APPOINTMENT (OUTPATIENT)
Dept: GENERAL RADIOLOGY | Facility: HOSPITAL | Age: 75
End: 2025-07-22
Payer: MEDICARE

## 2025-07-22 ENCOUNTER — HOSPITAL ENCOUNTER (OUTPATIENT)
Facility: HOSPITAL | Age: 75
Setting detail: OBSERVATION
Discharge: HOME | End: 2025-07-23
Attending: EMERGENCY MEDICINE | Admitting: EMERGENCY MEDICINE
Payer: MEDICARE

## 2025-07-22 DIAGNOSIS — D64.9 ANEMIA, UNSPECIFIED TYPE: ICD-10-CM

## 2025-07-22 DIAGNOSIS — N17.9 ACUTE RENAL FAILURE SUPERIMPOSED ON CHRONIC KIDNEY DISEASE, UNSPECIFIED ACUTE RENAL FAILURE TYPE, UNSPECIFIED CKD STAGE: ICD-10-CM

## 2025-07-22 DIAGNOSIS — R07.9 CHEST PAIN, UNSPECIFIED TYPE: Primary | ICD-10-CM

## 2025-07-22 DIAGNOSIS — I25.10 CORONARY ARTERY DISEASE INVOLVING NATIVE CORONARY ARTERY OF NATIVE HEART, UNSPECIFIED WHETHER ANGINA PRESENT: ICD-10-CM

## 2025-07-22 DIAGNOSIS — N18.9 ACUTE RENAL FAILURE SUPERIMPOSED ON CHRONIC KIDNEY DISEASE, UNSPECIFIED ACUTE RENAL FAILURE TYPE, UNSPECIFIED CKD STAGE: ICD-10-CM

## 2025-07-22 DIAGNOSIS — I10 HYPERTENSION, UNSPECIFIED TYPE: ICD-10-CM

## 2025-07-22 DIAGNOSIS — R73.9 HYPERGLYCEMIA: ICD-10-CM

## 2025-07-22 DIAGNOSIS — R79.89 ELEVATED TROPONIN: ICD-10-CM

## 2025-07-22 DIAGNOSIS — R11.2 NAUSEA AND VOMITING, UNSPECIFIED VOMITING TYPE: ICD-10-CM

## 2025-07-22 LAB
ALBUMIN SERPL-MCNC: 4.6 G/DL (ref 3.5–5.2)
ALBUMIN/GLOB SERPL: 1.7 G/DL
ALP SERPL-CCNC: 71 U/L (ref 39–117)
ALT SERPL W P-5'-P-CCNC: 20 U/L (ref 1–33)
ANION GAP SERPL CALCULATED.3IONS-SCNC: 14.7 MMOL/L (ref 5–15)
APTT PPP: 38.5 SECONDS (ref 22.7–35.4)
AST SERPL-CCNC: 19 U/L (ref 1–32)
BASOPHILS # BLD AUTO: 0.07 10*3/MM3 (ref 0–0.2)
BASOPHILS NFR BLD AUTO: 0.8 % (ref 0–1.5)
BILIRUB SERPL-MCNC: 0.3 MG/DL (ref 0–1.2)
BILIRUB UR QL STRIP: NEGATIVE
BUN SERPL-MCNC: 25 MG/DL (ref 8–23)
BUN/CREAT SERPL: 17.2 (ref 7–25)
CALCIUM SPEC-SCNC: 9.8 MG/DL (ref 8.6–10.5)
CHLORIDE SERPL-SCNC: 97 MMOL/L (ref 98–107)
CLARITY UR: CLEAR
CO2 SERPL-SCNC: 25.3 MMOL/L (ref 22–29)
COLOR UR: YELLOW
CREAT SERPL-MCNC: 1.45 MG/DL (ref 0.57–1)
DEPRECATED RDW RBC AUTO: 47.1 FL (ref 37–54)
EGFRCR SERPLBLD CKD-EPI 2021: 37.9 ML/MIN/1.73
EOSINOPHIL # BLD AUTO: 0.28 10*3/MM3 (ref 0–0.4)
EOSINOPHIL NFR BLD AUTO: 3.1 % (ref 0.3–6.2)
ERYTHROCYTE [DISTWIDTH] IN BLOOD BY AUTOMATED COUNT: 13.8 % (ref 12.3–15.4)
GEN 5 1HR TROPONIN T REFLEX: 15 NG/L
GLOBULIN UR ELPH-MCNC: 2.7 GM/DL
GLUCOSE BLDC GLUCOMTR-MCNC: 115 MG/DL (ref 70–130)
GLUCOSE BLDC GLUCOMTR-MCNC: 138 MG/DL (ref 70–130)
GLUCOSE SERPL-MCNC: 141 MG/DL (ref 65–99)
GLUCOSE UR STRIP-MCNC: NEGATIVE MG/DL
HCT VFR BLD AUTO: 36.8 % (ref 34–46.6)
HGB BLD-MCNC: 11.5 G/DL (ref 12–15.9)
HGB UR QL STRIP.AUTO: NEGATIVE
HOLD SPECIMEN: NORMAL
HOLD SPECIMEN: NORMAL
IMM GRANULOCYTES # BLD AUTO: 0.06 10*3/MM3 (ref 0–0.05)
IMM GRANULOCYTES NFR BLD AUTO: 0.7 % (ref 0–0.5)
INR PPP: 1.31 (ref 0.9–1.1)
KETONES UR QL STRIP: NEGATIVE
LEUKOCYTE ESTERASE UR QL STRIP.AUTO: NEGATIVE
LIPASE SERPL-CCNC: 85 U/L (ref 13–60)
LYMPHOCYTES # BLD AUTO: 1 10*3/MM3 (ref 0.7–3.1)
LYMPHOCYTES NFR BLD AUTO: 11.1 % (ref 19.6–45.3)
MAGNESIUM SERPL-MCNC: 2.3 MG/DL (ref 1.6–2.4)
MCH RBC QN AUTO: 28.9 PG (ref 26.6–33)
MCHC RBC AUTO-ENTMCNC: 31.3 G/DL (ref 31.5–35.7)
MCV RBC AUTO: 92.5 FL (ref 79–97)
MONOCYTES # BLD AUTO: 0.74 10*3/MM3 (ref 0.1–0.9)
MONOCYTES NFR BLD AUTO: 8.2 % (ref 5–12)
NEUTROPHILS NFR BLD AUTO: 6.84 10*3/MM3 (ref 1.7–7)
NEUTROPHILS NFR BLD AUTO: 76.1 % (ref 42.7–76)
NITRITE UR QL STRIP: NEGATIVE
NRBC BLD AUTO-RTO: 0 /100 WBC (ref 0–0.2)
NT-PROBNP SERPL-MCNC: 2176 PG/ML (ref 0–900)
PH UR STRIP.AUTO: 8 [PH] (ref 5–8)
PLATELET # BLD AUTO: 375 10*3/MM3 (ref 140–450)
PMV BLD AUTO: 8.5 FL (ref 6–12)
POTASSIUM SERPL-SCNC: 4.3 MMOL/L (ref 3.5–5.2)
PROT SERPL-MCNC: 7.3 G/DL (ref 6–8.5)
PROT UR QL STRIP: NEGATIVE
PROTHROMBIN TIME: 16.2 SECONDS (ref 11.7–14.2)
QT INTERVAL: 496 MS
QTC INTERVAL: 503 MS
RBC # BLD AUTO: 3.98 10*6/MM3 (ref 3.77–5.28)
SODIUM SERPL-SCNC: 137 MMOL/L (ref 136–145)
SP GR UR STRIP: 1.01 (ref 1–1.03)
TROPONIN T % DELTA: -6
TROPONIN T NUMERIC DELTA: -1 NG/L
TROPONIN T SERPL HS-MCNC: 16 NG/L
TROPONIN T SERPL HS-MCNC: 17 NG/L
UROBILINOGEN UR QL STRIP: NORMAL
WBC NRBC COR # BLD AUTO: 8.99 10*3/MM3 (ref 3.4–10.8)
WHOLE BLOOD HOLD COAG: NORMAL
WHOLE BLOOD HOLD SPECIMEN: NORMAL

## 2025-07-22 PROCEDURE — 93005 ELECTROCARDIOGRAM TRACING: CPT | Performed by: EMERGENCY MEDICINE

## 2025-07-22 PROCEDURE — 84484 ASSAY OF TROPONIN QUANT: CPT | Performed by: EMERGENCY MEDICINE

## 2025-07-22 PROCEDURE — 85730 THROMBOPLASTIN TIME PARTIAL: CPT | Performed by: EMERGENCY MEDICINE

## 2025-07-22 PROCEDURE — 83880 ASSAY OF NATRIURETIC PEPTIDE: CPT | Performed by: EMERGENCY MEDICINE

## 2025-07-22 PROCEDURE — 82948 REAGENT STRIP/BLOOD GLUCOSE: CPT

## 2025-07-22 PROCEDURE — G0378 HOSPITAL OBSERVATION PER HR: HCPCS

## 2025-07-22 PROCEDURE — 99285 EMERGENCY DEPT VISIT HI MDM: CPT

## 2025-07-22 PROCEDURE — 85025 COMPLETE CBC W/AUTO DIFF WBC: CPT | Performed by: EMERGENCY MEDICINE

## 2025-07-22 PROCEDURE — 25810000003 SODIUM CHLORIDE 0.9 % SOLUTION: Performed by: EMERGENCY MEDICINE

## 2025-07-22 PROCEDURE — 84484 ASSAY OF TROPONIN QUANT: CPT | Performed by: NURSE PRACTITIONER

## 2025-07-22 PROCEDURE — 93010 ELECTROCARDIOGRAM REPORT: CPT | Performed by: INTERNAL MEDICINE

## 2025-07-22 PROCEDURE — 83690 ASSAY OF LIPASE: CPT | Performed by: EMERGENCY MEDICINE

## 2025-07-22 PROCEDURE — 36415 COLL VENOUS BLD VENIPUNCTURE: CPT

## 2025-07-22 PROCEDURE — 83735 ASSAY OF MAGNESIUM: CPT | Performed by: EMERGENCY MEDICINE

## 2025-07-22 PROCEDURE — 81003 URINALYSIS AUTO W/O SCOPE: CPT | Performed by: EMERGENCY MEDICINE

## 2025-07-22 PROCEDURE — 80053 COMPREHEN METABOLIC PANEL: CPT | Performed by: EMERGENCY MEDICINE

## 2025-07-22 PROCEDURE — 85610 PROTHROMBIN TIME: CPT | Performed by: EMERGENCY MEDICINE

## 2025-07-22 PROCEDURE — 71045 X-RAY EXAM CHEST 1 VIEW: CPT

## 2025-07-22 RX ORDER — SODIUM CHLORIDE 0.9 % (FLUSH) 0.9 %
10 SYRINGE (ML) INJECTION AS NEEDED
Status: DISCONTINUED | OUTPATIENT
Start: 2025-07-22 | End: 2025-07-23 | Stop reason: HOSPADM

## 2025-07-22 RX ORDER — CLONIDINE HYDROCHLORIDE 0.1 MG/1
0.1 TABLET ORAL 2 TIMES DAILY
Status: DISCONTINUED | OUTPATIENT
Start: 2025-07-22 | End: 2025-07-23 | Stop reason: HOSPADM

## 2025-07-22 RX ORDER — HYDRALAZINE HYDROCHLORIDE 50 MG/1
100 TABLET, FILM COATED ORAL 3 TIMES DAILY
Status: DISCONTINUED | OUTPATIENT
Start: 2025-07-22 | End: 2025-07-23 | Stop reason: HOSPADM

## 2025-07-22 RX ORDER — NIFEDIPINE 60 MG/1
120 TABLET, EXTENDED RELEASE ORAL DAILY
Status: DISCONTINUED | OUTPATIENT
Start: 2025-07-22 | End: 2025-07-23

## 2025-07-22 RX ORDER — ASPIRIN 325 MG
325 TABLET ORAL ONCE
Status: COMPLETED | OUTPATIENT
Start: 2025-07-22 | End: 2025-07-22

## 2025-07-22 RX ORDER — ESCITALOPRAM OXALATE 5 MG/1
5 TABLET ORAL NIGHTLY
Status: DISCONTINUED | OUTPATIENT
Start: 2025-07-22 | End: 2025-07-23 | Stop reason: HOSPADM

## 2025-07-22 RX ORDER — FUROSEMIDE 40 MG/1
40 TABLET ORAL DAILY
Status: DISCONTINUED | OUTPATIENT
Start: 2025-07-23 | End: 2025-07-23 | Stop reason: HOSPADM

## 2025-07-22 RX ORDER — ALLOPURINOL 100 MG/1
100 TABLET ORAL 2 TIMES DAILY
Status: DISCONTINUED | OUTPATIENT
Start: 2025-07-22 | End: 2025-07-23 | Stop reason: HOSPADM

## 2025-07-22 RX ORDER — NICOTINE POLACRILEX 4 MG
15 LOZENGE BUCCAL
Status: DISCONTINUED | OUTPATIENT
Start: 2025-07-22 | End: 2025-07-23 | Stop reason: HOSPADM

## 2025-07-22 RX ORDER — ALPRAZOLAM 0.25 MG
0.25 TABLET ORAL 3 TIMES DAILY PRN
Status: DISCONTINUED | OUTPATIENT
Start: 2025-07-22 | End: 2025-07-23 | Stop reason: HOSPADM

## 2025-07-22 RX ORDER — DEXTROSE MONOHYDRATE 25 G/50ML
25 INJECTION, SOLUTION INTRAVENOUS
Status: DISCONTINUED | OUTPATIENT
Start: 2025-07-22 | End: 2025-07-23 | Stop reason: HOSPADM

## 2025-07-22 RX ORDER — PANTOPRAZOLE SODIUM 40 MG/1
40 TABLET, DELAYED RELEASE ORAL 2 TIMES DAILY
Status: DISCONTINUED | OUTPATIENT
Start: 2025-07-22 | End: 2025-07-23 | Stop reason: HOSPADM

## 2025-07-22 RX ORDER — CLOPIDOGREL BISULFATE 75 MG/1
75 TABLET ORAL NIGHTLY
Status: DISCONTINUED | OUTPATIENT
Start: 2025-07-22 | End: 2025-07-23 | Stop reason: HOSPADM

## 2025-07-22 RX ORDER — SODIUM CHLORIDE 9 MG/ML
40 INJECTION, SOLUTION INTRAVENOUS AS NEEDED
Status: DISCONTINUED | OUTPATIENT
Start: 2025-07-22 | End: 2025-07-23 | Stop reason: HOSPADM

## 2025-07-22 RX ORDER — INSULIN LISPRO 100 [IU]/ML
2-9 INJECTION, SOLUTION INTRAVENOUS; SUBCUTANEOUS
Status: DISCONTINUED | OUTPATIENT
Start: 2025-07-22 | End: 2025-07-23 | Stop reason: HOSPADM

## 2025-07-22 RX ORDER — TERAZOSIN 1 MG/1
1 CAPSULE ORAL NIGHTLY
Status: DISCONTINUED | OUTPATIENT
Start: 2025-07-22 | End: 2025-07-23 | Stop reason: HOSPADM

## 2025-07-22 RX ORDER — AMIODARONE HYDROCHLORIDE 200 MG/1
200 TABLET ORAL DAILY
Status: DISCONTINUED | OUTPATIENT
Start: 2025-07-23 | End: 2025-07-23 | Stop reason: HOSPADM

## 2025-07-22 RX ORDER — SPIRONOLACTONE 25 MG/1
25 TABLET ORAL DAILY
Status: DISCONTINUED | OUTPATIENT
Start: 2025-07-23 | End: 2025-07-23

## 2025-07-22 RX ORDER — FERROUS GLUCONATE 324(38)MG
324 TABLET ORAL
COMMUNITY

## 2025-07-22 RX ORDER — IBUPROFEN 600 MG/1
1 TABLET ORAL
Status: DISCONTINUED | OUTPATIENT
Start: 2025-07-22 | End: 2025-07-23 | Stop reason: HOSPADM

## 2025-07-22 RX ORDER — SODIUM CHLORIDE 0.9 % (FLUSH) 0.9 %
10 SYRINGE (ML) INJECTION EVERY 12 HOURS SCHEDULED
Status: DISCONTINUED | OUTPATIENT
Start: 2025-07-22 | End: 2025-07-23 | Stop reason: HOSPADM

## 2025-07-22 RX ORDER — ROSUVASTATIN CALCIUM 5 MG/1
10 TABLET, COATED ORAL DAILY
Status: DISCONTINUED | OUTPATIENT
Start: 2025-07-23 | End: 2025-07-23 | Stop reason: HOSPADM

## 2025-07-22 RX ORDER — CARVEDILOL 12.5 MG/1
12.5 TABLET ORAL 2 TIMES DAILY WITH MEALS
Status: DISCONTINUED | OUTPATIENT
Start: 2025-07-22 | End: 2025-07-23 | Stop reason: HOSPADM

## 2025-07-22 RX ADMIN — ESCITALOPRAM 5 MG: 5 TABLET, FILM COATED ORAL at 20:54

## 2025-07-22 RX ADMIN — PANTOPRAZOLE SODIUM 40 MG: 40 TABLET, DELAYED RELEASE ORAL at 20:54

## 2025-07-22 RX ADMIN — APIXABAN 5 MG: 5 TABLET, FILM COATED ORAL at 20:53

## 2025-07-22 RX ADMIN — CARVEDILOL 12.5 MG: 12.5 TABLET, FILM COATED ORAL at 17:24

## 2025-07-22 RX ADMIN — CLONIDINE HYDROCHLORIDE 0.1 MG: 0.1 TABLET ORAL at 20:52

## 2025-07-22 RX ADMIN — NIFEDIPINE 120 MG: 60 TABLET, EXTENDED RELEASE ORAL at 15:31

## 2025-07-22 RX ADMIN — Medication 10 ML: at 20:56

## 2025-07-22 RX ADMIN — ALLOPURINOL 100 MG: 100 TABLET ORAL at 20:52

## 2025-07-22 RX ADMIN — ASPIRIN 325 MG ORAL TABLET 325 MG: 325 PILL ORAL at 12:43

## 2025-07-22 RX ADMIN — SODIUM CHLORIDE 1000 ML: 9 INJECTION, SOLUTION INTRAVENOUS at 12:43

## 2025-07-22 RX ADMIN — CLOPIDOGREL BISULFATE 75 MG: 75 TABLET, FILM COATED ORAL at 20:54

## 2025-07-22 RX ADMIN — TERAZOSIN 1 MG: 1 CAPSULE ORAL at 20:54

## 2025-07-22 RX ADMIN — HYDRALAZINE HYDROCHLORIDE 100 MG: 50 TABLET ORAL at 20:52

## 2025-07-22 RX ADMIN — Medication 10 ML: at 14:05

## 2025-07-22 NOTE — H&P
[facility]   HISTORY AND PHYSICAL    Patient Name: Valentine Arora  : 1950  MRN: [id number]  Primary Care Physician:  Aliya Alejandro MD  Date of admission: 2025    Subjective   Subjective     Chief Complaint: Chest pain    HPI:    Valentine Arora is a 75 y.o. female with PMH including but not limited to CAD, CHF, GERD, hypertension, hyperlipidemia, DM2 and PAD presents to [facility] with chest pain and nausea and vomiting.  Patient was recently seen by cardiology on Tuesday and was having low heart rate therefore she was taken off of amlodipine and had her carvedilol was decreased and was started on nifedipine.  States she started feeling poorly and on  had 4 episodes of nausea and vomiting as well as chest pain.  Her chest pain episodes were mainly localized to the left side of her chest and radiates into her left arm.  No aggravating and or alleviating factors.  Once again this morning she woke up this morning with chest pain and had few episodes of nausea and vomiting.  Denies associated shortness of breath, back pain or diaphoresis.  Denies fever or chills.  Denies lower extremity edema.    ED workup reviewed: Troponin 16 and 15, BNP 2176, sodium 137, potassium 4.3, creatinine 1.45, glucose 141, WBC 8.9, hemoglobin 11.5, and platelets 375.  Negative for UTI.  Chest x-ray shows cardiomegaly otherwise negative acute.  EKG shows atrial flutter with a rate of 62 and a prolonged QT interval.      Review of Systems   All systems were reviewed and negative except for: Mentioned above in HPI    Personal History     Past Medical History:   Diagnosis Date    Abnormal ECG     Arthritis     Atrial fibrillation     Atrial flutter, unspecified type 2023    Sarabia esophagus     CAD (coronary artery disease)     CHF (congestive heart failure)     Chronic back pain     Colon polyp     GERD (gastroesophageal reflux disease)     Hyperlipidemia     Hypertension     Myocardial  infarction     1995    PAD (peripheral artery disease) 09/11/2019    PONV (postoperative nausea and vomiting)     Shingles     Type 2 diabetes mellitus        Past Surgical History:   Procedure Laterality Date    BREAST BIOPSY Right 05/23/2024    benign    CARDIAC CATHETERIZATION N/A 04/24/2024    Procedure: Coronary angiography, Left heart catheterization, Left ventricular angiography;  Surgeon: Dai Suazo MD;  Location:  [facility];  Service: Cardiology;  Laterality: N/A;    CARDIAC CATHETERIZATION  04/24/2024    Procedure: Saphenous Vein Graft;  Surgeon: Dai Suazo MD;  Location:  [facility];  Service: Cardiology;;    COLONOSCOPY      COLONOSCOPY N/A 06/24/2020    Procedure: COLONOSCOPY;  Surgeon: Mathew Roberts MD;  Location: [facility] OR;  Service: Gastroenterology;  Laterality: N/A;  Descending colon polyp x 2, Ascending colon polyp, Sigmoid colon polyp, Rectal polyp    COLONOSCOPY N/A 08/07/2023    Procedure: COLONOSCOPY;  Surgeon: Mathew Roberts MD;  Location: [facility] OR;  Service: Gastroenterology;  Laterality: N/A;  Normal    CORONARY ARTERY BYPASS GRAFT  1995    x 2 vessels    ENDOSCOPY N/A 06/24/2020    Procedure: ESOPHAGOGASTRODUODENOSCOPY;  Surgeon: Mathew Roberts MD;  Location: [facility] OR;  Service: Gastroenterology;  Laterality: N/A;  Ulcerative esophagitis, Gastritis, Duodenitis  Duodenal biopsy, gastric biopsy, distal esophageal biopsy    ENDOSCOPY N/A 09/17/2020    Procedure: ESOPHAGOGASTRODUODENOSCOPY with biopsies;  Surgeon: Mathew Roberts MD;  Location: [facility] OR;  Service: Gastroenterology;  Laterality: N/A;  Esophagitis  Sarabia's Esophagus  Hiatal hernia  Gastritis  Gastric biopsy  Distal esophagus biopsy    ENDOSCOPY N/A 08/07/2023    Procedure: Esophagogastroduedenoscopy;  Surgeon: Mathew Roberts MD;  Location: [facility] OR;  Service: Gastroenterology;  Laterality: N/A;  Gastritis; short  segment Sarabia's; Esophageal stricture- dilatation; Biopsies- gastric, distal esophagus    INNER EAR SURGERY      JOINT REPLACEMENT      right knee    LEG SURGERY      x 2 s/p fall    LUMBAR FUSION      L4/L5    SKIN GRAFT      to left lower leg x 2    TONSILLECTOMY      UPPER GASTROINTESTINAL ENDOSCOPY      VEIN SURGERY      WRIST FUSION Left        Family History: family history includes Heart attack in her brother; Heart disease in her brother, brother, father, and mother; Hypertension in her mother. Otherwise pertinent FHx was reviewed and not pertinent to current issue.    Social History:  reports that she quit smoking about 30 years ago. Her smoking use included cigarettes. She started smoking about 50 years ago. She has a 10 pack-year smoking history. She has never used smokeless tobacco. She reports that she does not currently use alcohol. She reports that she does not use drugs.    Home Medications:  ALPRAZolam, NIFEdipine XL, allopurinol, amiodarone, apixaban, carvedilol, cloNIDine, clopidogrel, doxazosin, escitalopram, ferrous gluconate, furosemide, hydrALAZINE, metFORMIN ER, ondansetron ODT, pantoprazole, rosuvastatin, and spironolactone    Allergies:  Allergies   Allergen Reactions    Codeine Itching    Other Unknown (See Comments)     Vicryl: doesn't heal       Objective   Objective     Vitals:   Temp:  [97.5 °F (36.4 °C)-98.4 °F (36.9 °C)] 98.4 °F (36.9 °C)  Heart Rate:  [60-72] 72  Resp:  [16-18] 18  BP: (153-175)/(50-58) 167/52  Physical Exam    Constitutional: Awake, alert   Eyes: PERRLA, sclerae anicteric, no conjunctival injection   HENT: NCAT, mucous membranes moist   Neck: Supple, no thyromegaly, no lymphadenopathy, trachea midline   Respiratory: Clear to auscultation bilaterally, nonlabored respirations    Cardiovascular: RRR, no murmurs, rubs, or gallops, palpable pedal pulses bilaterally   Gastrointestinal: Positive bowel sounds, soft, nontender, nondistended   Musculoskeletal: No bilateral  ankle edema, no clubbing or cyanosis to extremities   Psychiatric: Appropriate affect, cooperative   Neurologic: Oriented x 3, strength symmetric in all extremities, Cranial Nerves grossly intact to confrontation, speech clear   Skin: No rashes     Result Review    Result Review:  I have personally reviewed the results from the time of this admission to 7/22/2025 14:04 EDT and agree with these findings:  []  Laboratory list / accordion  []  Microbiology  []  Radiology  []  EKG/Telemetry   []  Cardiology/Vascular   []  Pathology  []  Old records  []  Other    Assessment & Plan   Assessment / Plan     Brief Patient Summary:  Valentine Arora is a 75 y.o. female who has been admitted to observation due to chest pain and nausea/vomiting    Active Hospital Problems:  Active Hospital Problems    Diagnosis     **Chest pain      Plan:   CAD  History of MI  Chest pain  -Troponin 16 and 15  - BNP 2176  - EKG nonischemic   -Chest x-ray shows cardiomegaly otherwise negative acute  - Cardiology consult  - Cardiac monitoring    CKD  - Creatinine 1.45, baseline 1.11.3  - Avoid nephrotoxins  - Trend with a.m. labs    Hypertension  Hyperlipidemia  - Continue home regiment  - Vital signs per nursing    A-fib  - Continue Eliquis and amiodarone  - Cardiac monitoring    Borderline diabetes  -Accu-Chek before meals and at bedtime  - Insulin sliding scale    VTE Prophylaxis:  Pharmacologic & mechanical VTE prophylaxis orders are present.      CODE STATUS:    Code Status (Patient has no pulse and is not breathing): CPR (Attempt to Resuscitate)  Medical Interventions (Patient has pulse or is breathing): Full Support  Level Of Support Discussed With: Patient    Admission Status:  I believe this patient meets observation status.    During patient visit, I utilized appropriate personal protective equipment including gloves. Appropriate PPE was worn during the entire visit.  Hand hygiene was completed before and after    60 minutes has been spent  by [facility] providers in the care of this patient while under observation status    Electronically signed by TERRI Tavarez, 07/22/25, 2:04 PM EDT.

## 2025-07-22 NOTE — ED NOTES
Nursing report ED to floor  Valentine Arora  75 y.o.  female    HPI :  HPI  Stated Reason for Visit: CP with vomiting  History Obtained From: patient  Did patient miss a scheduled dialysis appointment?: No  Precipitating Event(s): none  Duration (Days): 1    Chief Complaint  Chief Complaint   Patient presents with    Chest Pain     Started this am    Vomiting     Since Sunday       Admitting doctor:   Walt Perkins MD    Admitting diagnosis:   The primary encounter diagnosis was Chest pain, unspecified type. Diagnoses of Elevated troponin, Hypertension, unspecified type, Hyperglycemia, Coronary artery disease involving native coronary artery of native heart, unspecified whether angina present, Nausea and vomiting, unspecified vomiting type, Acute renal failure superimposed on chronic kidney disease, unspecified acute renal failure type, unspecified CKD stage, and Anemia, unspecified type were also pertinent to this visit.    Code status:   Current Code Status       Date Active Code Status Order ID Comments User Context       Prior            Allergies:   Codeine and Other    Isolation:   No active isolations    Intake and Output  No intake or output data in the 24 hours ending 07/22/25 1222    Weight:       07/22/25  0953   Weight: 83.9 kg (185 lb)       Most recent vitals:   Vitals:    07/22/25 1128 07/22/25 1133 07/22/25 1134 07/22/25 1201   BP:  175/58  168/54   BP Location:       Pulse: 66 60  69   Resp:   16    Temp:       TempSrc:       SpO2: 98% 96%  94%   Weight:       Height:           Active LDAs/IV Access:   Lines, Drains & Airways       Active LDAs       Name Placement date Placement time Site Days    Peripheral IV 07/22/25 1032 20 G Right Antecubital 07/22/25  1032  Antecubital  less than 1                    Labs (abnormal labs have a star):   Labs Reviewed   COMPREHENSIVE METABOLIC PANEL - Abnormal; Notable for the following components:       Result Value    Glucose 141 (*)     BUN 25.0 (*)      Creatinine 1.45 (*)     Chloride 97 (*)     eGFR 37.9 (*)     All other components within normal limits    Narrative:     GFR Categories in Chronic Kidney Disease (CKD)              GFR Category          GFR (mL/min/1.73)    Interpretation  G1                    90 or greater        Normal or high (1)  G2                    60-89                Mild decrease (1)  G3a                   45-59                Mild to moderate decrease  G3b                   30-44                Moderate to severe decrease  G4                    15-29                Severe decrease  G5                    14 or less           Kidney failure    (1)In the absence of evidence of kidney disease, neither GFR category G1 or G2 fulfill the criteria for CKD.    eGFR calculation 2021 CKD-EPI creatinine equation, which does not include race as a factor   TROPONIN - Abnormal; Notable for the following components:    HS Troponin T 16 (*)     All other components within normal limits    Narrative:     High Sensitive Troponin T Reference Range:  <14.0 ng/L- Negative Female for AMI  <22.0 ng/L- Negative Male for AMI  >=14 - Abnormal Female indicating possible myocardial injury.  >=22 - Abnormal Male indicating possible myocardial injury.   Clinicians would have to utilize clinical acumen, EKG, Troponin, and serial changes to determine if it is an Acute Myocardial Infarction or myocardial injury due to an underlying chronic condition.        CBC WITH AUTO DIFFERENTIAL - Abnormal; Notable for the following components:    Hemoglobin 11.5 (*)     MCHC 31.3 (*)     Neutrophil % 76.1 (*)     Lymphocyte % 11.1 (*)     Immature Grans % 0.7 (*)     Immature Grans, Absolute 0.06 (*)     All other components within normal limits   LIPASE - Abnormal; Notable for the following components:    Lipase 85 (*)     All other components within normal limits   PROTIME-INR - Abnormal; Notable for the following components:    Protime 16.2 (*)     INR 1.31 (*)     All other  components within normal limits   APTT - Abnormal; Notable for the following components:    PTT 38.5 (*)     All other components within normal limits   BNP (IN-HOUSE) - Abnormal; Notable for the following components:    proBNP 2,176.0 (*)     All other components within normal limits    Narrative:     This assay is used as an aid in the diagnosis of individuals suspected of having heart failure. It can be used as an aid in the diagnosis of acute decompensated heart failure (ADHF) in patients presenting with signs and symptoms of ADHF to the emergency department (ED). In addition, NT-proBNP of <300 pg/mL indicates ADHF is not likely.    Age Range Result Interpretation  NT-proBNP Concentration (pg/mL:      <50             Positive            >450                   Gray                 300-450                    Negative             <300    50-75           Positive            >900                  Gray                300-900                  Negative            <300      >75             Positive            >1800                  Gray                300-1800                  Negative            <300   HIGH SENSITIVITIY TROPONIN T 1HR - Abnormal; Notable for the following components:    HS Troponin T 15 (*)     All other components within normal limits    Narrative:     High Sensitive Troponin T Reference Range:  <14.0 ng/L- Negative Female for AMI  <22.0 ng/L- Negative Male for AMI  >=14 - Abnormal Female indicating possible myocardial injury.  >=22 - Abnormal Male indicating possible myocardial injury.   Clinicians would have to utilize clinical acumen, EKG, Troponin, and serial changes to determine if it is an Acute Myocardial Infarction or myocardial injury due to an underlying chronic condition.        URINALYSIS W/ MICROSCOPIC IF INDICATED (NO CULTURE) - Normal    Narrative:     Urine microscopic not indicated.   MAGNESIUM - Normal   RAINBOW DRAW    Narrative:     The following orders were created for panel order  [place] Draw.  Procedure                               Abnormality         Status                     ---------                               -----------         ------                     Green Top (Gel)[[id number]]                                  Final result               Lavender Top[[id number]]                                     Final result               Gold Top - SST[[id number]]                                   Final result               Light Blue Top[[id number]]                                   Final result                 Please view results for these tests on the individual orders.   CBC AND DIFFERENTIAL    Narrative:     The following orders were created for panel order CBC & Differential.  Procedure                               Abnormality         Status                     ---------                               -----------         ------                     CBC Auto Differential[[id number]]        Abnormal            Final result                 Please view results for these tests on the individual orders.   GREEN TOP   LAVENDER TOP   GOLD TOP - SST   LIGHT BLUE TOP       EKG:   ECG 12 Lead ED Triage Standing Order; Chest Pain   Preliminary Result   HEART RATE=62  bpm   RR Lisdsrdp=652  ms   CA Interval=  ms   P Horizontal Axis=  deg   P Front Axis=  deg   QRSD Interval=97  ms   QT Zwruizgj=365  ms   RTrA=652  ms   QRS Axis=7  deg   T Wave Axis=178  deg   - ABNORMAL ECG -   Atrial flutter   LVH by voltage   Nonspecific T abnormalities, diffuse leads   Prolonged QT interval   Date and Time of Study:2025-07-22 09:57:39          Meds given in ED:   Medications   sodium chloride 0.9 % flush 10 mL (has no administration in time range)   aspirin tablet 325 mg (has no administration in time range)   sodium chloride 0.9 % bolus 1,000 mL (has no administration in time range)       Imaging results:  XR Chest 1 View  Result Date: 7/22/2025  Stable cardiomegaly with no active pulmonary disease.  This report was  finalized on 2025 10:58 AM by Dg Adler MD on Workstation: [id number]        Ambulatory status:   - independently    Social issues:   Social History     Socioeconomic History    Marital status:    Tobacco Use    Smoking status: Former     Current packs/day: 0.00     Average packs/day: 0.5 packs/day for 20.0 years (10.0 ttl pk-yrs)     Types: Cigarettes     Start date:      Quit date:      Years since quittin.5    Smokeless tobacco: Never    Tobacco comments:     QUIT    Vaping Use    Vaping status: Never Used   Substance and Sexual Activity    Alcohol use: Not Currently     Comment: rarely    Drug use: No    Sexual activity: Defer       Peripheral Neurovascular  Peripheral Neurovascular (Adult)  Peripheral Neurovascular WDL: .WDL except, pulse assessment  Additional Documentation: Edema (Group)  Edema  Edema: (S) ankle, left (pt stated this chronic)    Neuro Cognitive  Neuro Cognitive (Adult)  Cognitive/Neuro/Behavioral WDL: WDL, arousability, mood/behavior, motor response, level of consciousness, orientation, all, speech  Level of Consciousness: Alert  Arousal Level: opens eyes spontaneously  Orientation: person, situation, place, time, oriented x 4  Speech: clear, spontaneous, logical  Mood/Behavior: cooperative, calm, behavior appropriate to situation  Additional Documentation: Headache Assessment (Group)  Headache Assessment  Headache Location: frontal  (0-10) Headache Severity Ratin  Motor Response  Motor Response: general motor response  General Motor Response: purposeful motor response    Learning  Learning Assessment  Learning Readiness and Ability: no barriers identified  Education Provided  Person Taught: patient, spouse  Teaching Method: verbal instruction  Teaching Focus: symptom/problem overview, medical device/equipment use  Education Outcome Evaluation: eager to learn, verbalizes understanding    Respiratory  Respiratory WDL  Respiratory WDL: WDL,  all  Rhythm/Pattern, Respiratory: pattern regular, unlabored, depth regular, no shortness of breath reported  Expansion/Accessory Muscles/Retractions: no use of accessory muscles, expansion symmetric, no retractions  Cough Frequency: no cough    Abdominal Pain  Safety Interventions  Safety Precautions/Falls Reduction: family at bedside    Pain Assessments  Pain (Adult)  (0-10) Pain Rating: Rest: 5 (aching HA and burning in the chest and L arm)  (0-10) Pain Rating: Activity: 5  Pain Location: head, chest  Pain Description: intermittent    NIH Stroke Scale       Aurelio oMran RN  07/22/25 12:22 EDT

## 2025-07-22 NOTE — ED PROVIDER NOTES
EMERGENCY DEPARTMENT ENCOUNTER  Room Number:  30/30  Date of encounter:  7/22/2025  PCP: Aliya Alejandro MD  Patient Care Team:  Aliya Alejandro MD as PCP - General (Family Medicine)     HPI:  Context: Valentine Arora is a 75 y.o. female who presents to the ED c/o chief complaint of chest pain and nausea vomiting.  Patient reports that she was seen by cardiologist on Tuesday, heart rate was low, reports that she was taken off of amlodipine and had her carvedilol was decreased and was started on nifedipine.  Patient reports that she has been feeling poorly since.  Patient reports Sunday she began to experience nausea vomiting as well as chest pain, chest pain brief episode that then resolved.  Patient reports that she woke up with chest pain this morning at approximately 9:00, burning left-sided chest pain that radiates to her left arm, chest pain improved and minimal at present but has been continuous, no associated shortness of breath.  Patient reports nausea vomiting with 3 times emesis this morning, emesis nonbloody nonbilious.  Patient denies any shortness of breath, no diaphoresis.  Patient denies any abdominal pain, no diarrhea, no urinary symptoms, no fever or systemic symptoms.  Patient reports that she was not aware that she is currently in A-fib.    MEDICAL HISTORY REVIEW  Reviewed in EPIC    PAST MEDICAL HISTORY  Active Ambulatory Problems     Diagnosis Date Noted    Coronary artery disease involving coronary bypass graft of native heart     Hypertension     Hyperlipidemia     Morbidly obese 09/11/2019    PAD (peripheral artery disease) 09/11/2019    Esophageal dysphagia 05/22/2020    Encounter for screening for malignant neoplasm of colon 05/22/2020    Sarabia's esophagus with dysplasia 08/05/2020    Gastroesophageal reflux disease without esophagitis 10/20/2022    Personal history of colonic polyps 05/08/2023    Atrial flutter, unspecified type 05/17/2023    Irritable bowel  syndrome with constipation 10/20/2023    Chronic kidney disease, stage 3a 03/01/2024    Coronary artery disease of native artery of native heart with stable angina pectoris 04/15/2024    Nausea and vomiting 04/21/2024    Acute hearing loss of both ears 04/21/2024    Atrial fibrillation      Resolved Ambulatory Problems     Diagnosis Date Noted    Myocardial infarction     S/P CABG (coronary artery bypass graft) 01/19/2017    Ulcer of esophagus without bleeding 08/05/2020    Dehydration with hyponatremia 06/15/2021    Esophageal obstruction 05/08/2023    Chest discomfort 05/21/2023    Palpitations 09/10/2023     Past Medical History:   Diagnosis Date    Abnormal ECG     Arthritis     Sarabia esophagus     CAD (coronary artery disease)     CHF (congestive heart failure)     Chronic back pain     Colon polyp     GERD (gastroesophageal reflux disease)     PONV (postoperative nausea and vomiting)     Shingles     Type 2 diabetes mellitus        PAST SURGICAL HISTORY  Past Surgical History:   Procedure Laterality Date    BREAST BIOPSY Right 05/23/2024    benign    CARDIAC CATHETERIZATION N/A 04/24/2024    Procedure: Coronary angiography, Left heart catheterization, Left ventricular angiography;  Surgeon: Dai Suazo MD;  Location: [facility] CATH INVASIVE LOCATION;  Service: Cardiology;  Laterality: N/A;    CARDIAC CATHETERIZATION  04/24/2024    Procedure: Saphenous Vein Graft;  Surgeon: Dai Suazo MD;  Location:  [facility] CATH INVASIVE LOCATION;  Service: Cardiology;;    COLONOSCOPY      COLONOSCOPY N/A 06/24/2020    Procedure: COLONOSCOPY;  Surgeon: Mathew Roberts MD;  Location: [facility] OR;  Service: Gastroenterology;  Laterality: N/A;  Descending colon polyp x 2, Ascending colon polyp, Sigmoid colon polyp, Rectal polyp    COLONOSCOPY N/A 08/07/2023    Procedure: COLONOSCOPY;  Surgeon: Mathew Roberts MD;  Location:  [facility] OR;  Service: Gastroenterology;  Laterality: N/A;  Normal    CORONARY  ARTERY BYPASS GRAFT  1995    x 2 vessels    ENDOSCOPY N/A 2020    Procedure: ESOPHAGOGASTRODUODENOSCOPY;  Surgeon: Mathew Roberts MD;  Location:  [facility] OR;  Service: Gastroenterology;  Laterality: N/A;  Ulcerative esophagitis, Gastritis, Duodenitis  Duodenal biopsy, gastric biopsy, distal esophageal biopsy    ENDOSCOPY N/A 2020    Procedure: ESOPHAGOGASTRODUODENOSCOPY with biopsies;  Surgeon: Mathew Roberts MD;  Location:  [facility] OR;  Service: Gastroenterology;  Laterality: N/A;  Esophagitis  Sarabia's Esophagus  Hiatal hernia  Gastritis  Gastric biopsy  Distal esophagus biopsy    ENDOSCOPY N/A 2023    Procedure: Esophagogastroduedenoscopy;  Surgeon: Mathew Roberts MD;  Location:  [facility] OR;  Service: Gastroenterology;  Laterality: N/A;  Gastritis; short segment Sarabia's; Esophageal stricture- dilatation; Biopsies- gastric, distal esophagus    INNER EAR SURGERY      JOINT REPLACEMENT      right knee    LEG SURGERY      x 2 s/p fall    LUMBAR FUSION      L4/L5    SKIN GRAFT      to left lower leg x 2    TONSILLECTOMY      UPPER GASTROINTESTINAL ENDOSCOPY      VEIN SURGERY      WRIST FUSION Left        FAMILY HISTORY  Family History   Problem Relation Age of Onset    Heart disease Mother     Hypertension Mother     Heart disease Father     Heart attack Brother     Heart disease Brother     Heart disease Brother     Colon cancer Neg Hx     Colon polyps Neg Hx     Breast cancer Neg Hx        SOCIAL HISTORY  Social History     Socioeconomic History    Marital status:    Tobacco Use    Smoking status: Former     Current packs/day: 0.00     Average packs/day: 0.5 packs/day for 20.0 years (10.0 ttl pk-yrs)     Types: Cigarettes     Start date:      Quit date:      Years since quittin.5    Smokeless tobacco: Never    Tobacco comments:     QUIT    Vaping Use    Vaping status: Never Used   Substance and Sexual Activity    Alcohol use: Not Currently      Comment: rarely    Drug use: No    Sexual activity: Defer       ALLERGIES  Codeine and Other    The patient's allergies have been reviewed    PHYSICAL EXAM  I have reviewed the triage vital signs and nursing notes.  ED Triage Vitals   Temp Heart Rate Resp BP SpO2   07/22/25 0954 07/22/25 0953 07/22/25 0953 07/22/25 1008 07/22/25 0953   97.6 °F (36.4 °C) 66 16 169/54 98 %      Temp src Heart Rate Source Patient Position BP Location FiO2 (%)   07/22/25 0954 07/22/25 0953 -- 07/22/25 1008 --   Oral Monitor  Left arm        General: No acute distress.  HENT: NCAT, PERRL, Nares patent.  Eyes: no scleral icterus.  Neck: trachea midline, no ROM limitations.  CV: Irregularly irregular  Respiratory: normal effort, CTAB.  Abdomen: soft, nondistended, NTTP, no rebound tenderness, no guarding or rigidity.  Musculoskeletal: no deformity.  Neuro: alert, moves all extremities, follows commands.  Skin: warm, dry.    LAB RESULTS  Recent Results (from the past 24 hours)   ECG 12 Lead ED Triage Standing Order; Chest Pain    Collection Time: 07/22/25  9:57 AM   Result Value Ref Range    QT Interval 496 ms    QTC Interval 503 ms   Comprehensive Metabolic Panel    Collection Time: 07/22/25 10:16 AM    Specimen: Blood   Result Value Ref Range    Glucose 141 (H) 65 - 99 mg/dL    BUN 25.0 (H) 8.0 - 23.0 mg/dL    Creatinine 1.45 (H) 0.57 - 1.00 mg/dL    Sodium 137 136 - 145 mmol/L    Potassium 4.3 3.5 - 5.2 mmol/L    Chloride 97 (L) 98 - 107 mmol/L    CO2 25.3 22.0 - 29.0 mmol/L    Calcium 9.8 8.6 - 10.5 mg/dL    Total Protein 7.3 6.0 - 8.5 g/dL    Albumin 4.6 3.5 - 5.2 g/dL    ALT (SGPT) 20 1 - 33 U/L    AST (SGOT) 19 1 - 32 U/L    Alkaline Phosphatase 71 39 - 117 U/L    Total Bilirubin 0.3 0.0 - 1.2 mg/dL    Globulin 2.7 gm/dL    A/G Ratio 1.7 g/dL    BUN/Creatinine Ratio 17.2 7.0 - 25.0    Anion Gap 14.7 5.0 - 15.0 mmol/L    eGFR 37.9 (L) >60.0 mL/min/1.73   High Sensitivity Troponin T    Collection Time: 07/22/25 10:16 AM     Specimen: Blood   Result Value Ref Range    HS Troponin T 16 (H) <14 ng/L   Green Top (Gel)    Collection Time: 07/22/25 10:16 AM   Result Value Ref Range    Extra Tube Hold for add-ons.    Lavender Top    Collection Time: 07/22/25 10:16 AM   Result Value Ref Range    Extra Tube hold for add-on    Gold Top - SST    Collection Time: 07/22/25 10:16 AM   Result Value Ref Range    Extra Tube Hold for add-ons.    Light Blue Top    Collection Time: 07/22/25 10:16 AM   Result Value Ref Range    Extra Tube Hold for add-ons.    CBC Auto Differential    Collection Time: 07/22/25 10:16 AM    Specimen: Blood   Result Value Ref Range    WBC 8.99 3.40 - 10.80 10*3/mm3    RBC 3.98 3.77 - 5.28 10*6/mm3    Hemoglobin 11.5 (L) 12.0 - 15.9 g/dL    Hematocrit 36.8 34.0 - 46.6 %    MCV 92.5 79.0 - 97.0 fL    MCH 28.9 26.6 - 33.0 pg    MCHC 31.3 (L) 31.5 - 35.7 g/dL    RDW 13.8 12.3 - 15.4 %    RDW-SD 47.1 37.0 - 54.0 fl    MPV 8.5 6.0 - 12.0 fL    Platelets 375 140 - 450 10*3/mm3    Neutrophil % 76.1 (H) 42.7 - 76.0 %    Lymphocyte % 11.1 (L) 19.6 - 45.3 %    Monocyte % 8.2 5.0 - 12.0 %    Eosinophil % 3.1 0.3 - 6.2 %    Basophil % 0.8 0.0 - 1.5 %    Immature Grans % 0.7 (H) 0.0 - 0.5 %    Neutrophils, Absolute 6.84 1.70 - 7.00 10*3/mm3    Lymphocytes, Absolute 1.00 0.70 - 3.10 10*3/mm3    Monocytes, Absolute 0.74 0.10 - 0.90 10*3/mm3    Eosinophils, Absolute 0.28 0.00 - 0.40 10*3/mm3    Basophils, Absolute 0.07 0.00 - 0.20 10*3/mm3    Immature Grans, Absolute 0.06 (H) 0.00 - 0.05 10*3/mm3    nRBC 0.0 0.0 - 0.2 /100 WBC   Lipase    Collection Time: 07/22/25 10:16 AM    Specimen: Blood   Result Value Ref Range    Lipase 85 (H) 13 - 60 U/L   Protime-INR    Collection Time: 07/22/25 10:16 AM    Specimen: Blood   Result Value Ref Range    Protime 16.2 (H) 11.7 - 14.2 Seconds    INR 1.31 (H) 0.90 - 1.10   aPTT    Collection Time: 07/22/25 10:16 AM    Specimen: Blood   Result Value Ref Range    PTT 38.5 (H) 22.7 - 35.4 seconds   Magnesium     Collection Time: 07/22/25 10:16 AM    Specimen: Blood   Result Value Ref Range    Magnesium 2.3 1.6 - 2.4 mg/dL   BNP    Collection Time: 07/22/25 10:16 AM    Specimen: Blood   Result Value Ref Range    proBNP 2,176.0 (H) 0.0 - 900.0 pg/mL   Urinalysis With Microscopic If Indicated (No Culture) - Urine, Clean Catch    Collection Time: 07/22/25 10:37 AM    Specimen: Urine, Clean Catch   Result Value Ref Range    Color, UA Yellow Yellow, Straw    Appearance, UA Clear Clear    pH, UA 8.0 5.0 - 8.0    Specific Gravity, UA 1.006 1.005 - 1.030    Glucose, UA Negative Negative    Ketones, UA Negative Negative    Bilirubin, UA Negative Negative    Blood, UA Negative Negative    Protein, UA Negative Negative    Leuk Esterase, UA Negative Negative    Nitrite, UA Negative Negative    Urobilinogen, UA 0.2 E.U./dL 0.2 - 1.0 E.U./dL   High Sensitivity Troponin T 1Hr    Collection Time: 07/22/25 11:27 AM    Specimen: Arm, Left; Blood   Result Value Ref Range    HS Troponin T 15 (H) <14 ng/L    Troponin T Numeric Delta -1 ng/L    Troponin T % Delta -6 Abnormal if >/= 20%       I ordered the above labs and reviewed the results.    RADIOLOGY  XR Chest 1 View  Result Date: 7/22/2025  XR CHEST 1 VW-  DATE OF EXAM: 7/22/2025 10:30 AM  INDICATION: Chest Pain Triage Protocol.  COMPARISON: Chest radiographs 12/18/2024, 4/20/2024, and 2/29/2024. CT abdomen and pelvis 6/15/2021.  TECHNIQUE: A single portable AP view of the chest was obtained.  FINDINGS: Overlying artifacts. Stable sternotomy wires and postoperative changes from CABG. The lungs are clear. No pneumothorax. Stable cardiomegaly, likely accentuated by technique. Calcified atherosclerotic disease in the thoracic aorta. No acute osseous abnormality is identified.      Stable cardiomegaly with no active pulmonary disease.  This report was finalized on 7/22/2025 10:58 AM by Dg Adler MD on Workstation: [id number]        I ordered the above noted radiological studies. I reviewed  the images and results. I agree with the radiologist interpretation.    PROCEDURES  Procedures    MEDICATIONS GIVEN IN ER  Medications   sodium chloride 0.9 % flush 10 mL (has no administration in time range)   aspirin tablet 325 mg (has no administration in time range)   sodium chloride 0.9 % bolus 1,000 mL (has no administration in time range)       PROGRESS, DATA ANALYSIS, CONSULTS, AND MEDICAL DECISION MAKING  A complete history and physical exam have been performed.  All available laboratory and imaging results have been reviewed by myself prior to disposition.    MDM    After the initial H&P, I discussed pertinent information from history and physical exam with patient/family.  Discussed differential diagnosis.  Discussed plan for ED evaluation/workup/treatment.  All questions answered.  Patient/family is agreeable with plan.  ED Course as of 07/22/25 1219   Tue Jul 22, 2025   1000 My differential diagnosis for chest pain includes but is not limited to:  Muscle strain, costochondritis, myositis, pleurisy, rib fracture, intercostal neuritis, herpes zoster, tumor, myocardial infarction, coronary syndrome, unstable angina, angina, aortic dissection, mitral valve prolapse, pericarditis, palpitations, pulmonary embolus, pneumonia, pneumothorax, lung cancer, GERD, esophagitis, esophageal spasm     [JG]   1000 EKG independently viewed and contemporaneously interpreted by ED physician. Time: 9:57 AM.  Rate 62.  Interpretation: Atrial flutter, normal axis, normal QRS, nonspecific ST changes, [JG]   1126 I reviewed chest x-ray in PACS, no pulmonary edema per my read. [JG]   1210 Creatinine elevated at 1.45, appears to be acute on chronic kidney injury as last value from 2 months ago was 1.23.  Patient receiving IV fluids. [JG]   1211 Review of prior external notes (non-ED) -and- Review of prior external test results outside of this encounter:   I reviewed patient's cardiac cath from September last year, patient with  severe multivessel disease with known occluded RCA with occluded SVG to PDA.  I reviewed patient's echocardiogram from January last year, normal LV systolic function with a EF of 65.1%. [JG]   1217 Phone call with [employer] with [employer].  Discussed the patient, relevant history, exam, diagnostics, ED findings/progress, and concerns. They agree to admit the patient to tele obs under Dr Perkins. Care assumed by the admitting physician at this time.    [employer], [employer] with [employer] [JG]      ED Course User Index  [JG] Gonzalo Patten MD       AS OF 12:19 EDT VITALS:    BP - 168/54  HR - 69  TEMP - 97.6 °F (36.4 °C) (Oral)  O2 SATS - 94%    DIAGNOSIS  Final diagnoses:   Chest pain, unspecified type   Elevated troponin   Hypertension, unspecified type   Hyperglycemia   Coronary artery disease involving native coronary artery of native heart, unspecified whether angina present   Nausea and vomiting, unspecified vomiting type   Acute renal failure superimposed on chronic kidney disease, unspecified acute renal failure type, unspecified CKD stage   Anemia, unspecified type         DISPOSITION  ADMISSION    Discussed treatment plan and reason for admission with pt/family and admitting physician.  Pt/family voiced understanding of the plan for admission for further testing/treatment as needed.        Gonzalo Patten MD  07/22/25 [id number]

## 2025-07-23 VITALS
WEIGHT: 188 LBS | OXYGEN SATURATION: 98 % | DIASTOLIC BLOOD PRESSURE: 62 MMHG | BODY MASS INDEX: 34.6 KG/M2 | SYSTOLIC BLOOD PRESSURE: 142 MMHG | RESPIRATION RATE: 18 BRPM | TEMPERATURE: 97.7 F | HEART RATE: 72 BPM | HEIGHT: 62 IN

## 2025-07-23 DIAGNOSIS — I48.91 ATRIAL FIBRILLATION, UNSPECIFIED TYPE: ICD-10-CM

## 2025-07-23 PROBLEM — Z12.11 ENCOUNTER FOR SCREENING FOR MALIGNANT NEOPLASM OF COLON: Status: RESOLVED | Noted: 2020-05-22 | Resolved: 2025-07-23

## 2025-07-23 PROBLEM — I25.118 CORONARY ARTERY DISEASE OF NATIVE ARTERY OF NATIVE HEART WITH STABLE ANGINA PECTORIS: Status: RESOLVED | Noted: 2024-04-15 | Resolved: 2025-07-23

## 2025-07-23 PROBLEM — E66.01 MORBIDLY OBESE: Chronic | Status: RESOLVED | Noted: 2019-09-11 | Resolved: 2025-07-23

## 2025-07-23 PROBLEM — H91.93 ACUTE HEARING LOSS OF BOTH EARS: Status: RESOLVED | Noted: 2024-04-21 | Resolved: 2025-07-23

## 2025-07-23 PROBLEM — R11.2 NAUSEA AND VOMITING: Status: RESOLVED | Noted: 2024-04-21 | Resolved: 2025-07-23

## 2025-07-23 LAB
ANION GAP SERPL CALCULATED.3IONS-SCNC: 10.1 MMOL/L (ref 5–15)
BUN SERPL-MCNC: 20 MG/DL (ref 8–23)
BUN/CREAT SERPL: 15.3 (ref 7–25)
CALCIUM SPEC-SCNC: 9.2 MG/DL (ref 8.6–10.5)
CHLORIDE SERPL-SCNC: 102 MMOL/L (ref 98–107)
CO2 SERPL-SCNC: 27.9 MMOL/L (ref 22–29)
CREAT SERPL-MCNC: 1.31 MG/DL (ref 0.57–1)
DEPRECATED RDW RBC AUTO: 44.2 FL (ref 37–54)
EGFRCR SERPLBLD CKD-EPI 2021: 42.8 ML/MIN/1.73
ERYTHROCYTE [DISTWIDTH] IN BLOOD BY AUTOMATED COUNT: 13.6 % (ref 12.3–15.4)
GLUCOSE BLDC GLUCOMTR-MCNC: 117 MG/DL (ref 70–130)
GLUCOSE BLDC GLUCOMTR-MCNC: 121 MG/DL (ref 70–130)
GLUCOSE SERPL-MCNC: 111 MG/DL (ref 65–99)
HCT VFR BLD AUTO: 31.9 % (ref 34–46.6)
HGB BLD-MCNC: 10.1 G/DL (ref 12–15.9)
MCH RBC QN AUTO: 28.7 PG (ref 26.6–33)
MCHC RBC AUTO-ENTMCNC: 31.7 G/DL (ref 31.5–35.7)
MCV RBC AUTO: 90.6 FL (ref 79–97)
PLATELET # BLD AUTO: 306 10*3/MM3 (ref 140–450)
PMV BLD AUTO: 8.5 FL (ref 6–12)
POTASSIUM SERPL-SCNC: 4 MMOL/L (ref 3.5–5.2)
RBC # BLD AUTO: 3.52 10*6/MM3 (ref 3.77–5.28)
SODIUM SERPL-SCNC: 140 MMOL/L (ref 136–145)
WBC NRBC COR # BLD AUTO: 7.33 10*3/MM3 (ref 3.4–10.8)

## 2025-07-23 PROCEDURE — G0378 HOSPITAL OBSERVATION PER HR: HCPCS

## 2025-07-23 PROCEDURE — 99214 OFFICE O/P EST MOD 30 MIN: CPT | Performed by: INTERNAL MEDICINE

## 2025-07-23 PROCEDURE — 82948 REAGENT STRIP/BLOOD GLUCOSE: CPT

## 2025-07-23 PROCEDURE — 85027 COMPLETE CBC AUTOMATED: CPT | Performed by: NURSE PRACTITIONER

## 2025-07-23 PROCEDURE — 80048 BASIC METABOLIC PNL TOTAL CA: CPT | Performed by: NURSE PRACTITIONER

## 2025-07-23 RX ORDER — NIFEDIPINE 60 MG/1
60 TABLET, EXTENDED RELEASE ORAL 2 TIMES DAILY
Status: DISCONTINUED | OUTPATIENT
Start: 2025-07-23 | End: 2025-07-23 | Stop reason: HOSPADM

## 2025-07-23 RX ORDER — EPLERENONE 50 MG/1
50 TABLET ORAL
Qty: 30 TABLET | Refills: 0 | Status: SHIPPED | OUTPATIENT
Start: 2025-07-23 | End: 2025-08-22

## 2025-07-23 RX ORDER — EPLERENONE 25 MG/1
50 TABLET ORAL
Status: DISCONTINUED | OUTPATIENT
Start: 2025-07-23 | End: 2025-07-23 | Stop reason: HOSPADM

## 2025-07-23 RX ADMIN — NIFEDIPINE 120 MG: 60 TABLET, EXTENDED RELEASE ORAL at 08:52

## 2025-07-23 RX ADMIN — PANTOPRAZOLE SODIUM 40 MG: 40 TABLET, DELAYED RELEASE ORAL at 08:53

## 2025-07-23 RX ADMIN — ROSUVASTATIN CALCIUM 10 MG: 5 TABLET, FILM COATED ORAL at 08:52

## 2025-07-23 RX ADMIN — AMIODARONE HYDROCHLORIDE 200 MG: 200 TABLET ORAL at 08:53

## 2025-07-23 RX ADMIN — FUROSEMIDE 40 MG: 40 TABLET ORAL at 08:53

## 2025-07-23 RX ADMIN — HYDRALAZINE HYDROCHLORIDE 100 MG: 50 TABLET ORAL at 08:53

## 2025-07-23 RX ADMIN — CLONIDINE HYDROCHLORIDE 0.1 MG: 0.1 TABLET ORAL at 08:53

## 2025-07-23 RX ADMIN — Medication 10 ML: at 08:54

## 2025-07-23 RX ADMIN — CARVEDILOL 12.5 MG: 12.5 TABLET, FILM COATED ORAL at 08:53

## 2025-07-23 RX ADMIN — ALLOPURINOL 100 MG: 100 TABLET ORAL at 08:53

## 2025-07-23 RX ADMIN — SPIRONOLACTONE 25 MG: 25 TABLET ORAL at 08:52

## 2025-07-23 NOTE — CONSULTS
Date of Hospital Visit: 25  Encounter Provider: Dwayne Parikh MD  Place of Service: [facility] CARDIOLOGY  Patient Name: Valentine Arora  :1950  Referral Provider: Walt Perkins MD    Chief complaint: high BP    History of Present Illness    Ms. Arora is a 76 yo woman who follows with Dr Suazo. She has CAD s/p CABG, PAF, resistant HTN, and chronic HFpEF.    Her last cardiac catheterization in 2024 revealed a  RCA with occluded SVG to the PDA, left to right collaterals, patent SVG to diagonal with jump to the LAD, borderline LCX disease.  No intervention was recommended.    She has been having problems with high BP and low HR. She was seen last week; carvedilol was cut in half and clonidine was cut back (with the plan to wean it). She was in a junctional rhythm that day in the office. She was changed from amlodipine to nifedipine and started on doxasozin. Her BP has remained very high since then. Her watch has reported AF, although rates are controlled.    She presents with burning chest pain and generalized malaise. The pain is not like her prior angina. Her Tn is minimally elevated but without delta. She is in rate controlled atrial flutter.     48 HOUR HOLTER 25    A relatively benign monitor study.    Average heart rate 50 bpm.  Minimum heart rate 35 bpm, sinus bradycardia.   Triggered events correlate to sinus bradycardia.    CARDIAC CATH 24     Severe multivessel disease.  Known occluded RCA.  Occluded SVG to PDA.  Robust left-to-right collaterals backfilled to the mid RCA.    Severe proximal LAD with widely patent SVG to diagonal with jump to LAD.  Distal to the anastomoses the diagonal and LAD are normal.    Borderline mid circumflex disease.  If intervention is attempted in the future would use groin access given significant tortuosity of the brachiocephalic artery.    Recommendations: Uptitrate antianginal therapy with long-acting nitrates.  EP  consultation as an outpatient for atrial fibrillation management    ECHO 1-15-24    Left ventricular systolic function is normal. Calculated left ventricular EF = 65.1%    Left ventricular diastolic function was normal.    Left atrial volume is mildly increased.    The right atrial cavity is mildly  dilated.    Moderate tricuspid valve regurgitation is present.    Estimated right ventricular systolic pressure from tricuspid regurgitation is normal (<35 mmHg). Calculated right ventricular systolic pressure from tricuspid regurgitation is 34 mmHg.    Past Medical History:   Diagnosis Date    Arthritis     Atrial fibrillation     Atrial flutter, unspecified type 05/17/2023    Sarabia esophagus     CAD (coronary artery disease)     Chronic back pain     Chronic kidney disease, stage 3a 03/01/2024    Colon polyp     GERD (gastroesophageal reflux disease)     Hyperlipidemia     Hypertension     PAD (peripheral artery disease) 09/11/2019    PONV (postoperative nausea and vomiting)     Shingles     Type 2 diabetes mellitus        Past Surgical History:   Procedure Laterality Date    BREAST BIOPSY Right 05/23/2024    benign    CARDIAC CATHETERIZATION N/A 04/24/2024    Procedure: Coronary angiography, Left heart catheterization, Left ventricular angiography;  Surgeon: Dai Suazo MD;  Location: [facility] CATH INVASIVE LOCATION;  Service: Cardiology;  Laterality: N/A;    CARDIAC CATHETERIZATION  04/24/2024    Procedure: Saphenous Vein Graft;  Surgeon: Dai Suazo MD;  Location:  [facility] CATH INVASIVE LOCATION;  Service: Cardiology;;    COLONOSCOPY      COLONOSCOPY N/A 06/24/2020    Procedure: COLONOSCOPY;  Surgeon: Mathew Roberts MD;  Location: [facility] OR;  Service: Gastroenterology;  Laterality: N/A;  Descending colon polyp x 2, Ascending colon polyp, Sigmoid colon polyp, Rectal polyp    COLONOSCOPY N/A 08/07/2023    Procedure: COLONOSCOPY;  Surgeon: Mathew Roberts MD;  Location: [facility] OR;  Service:  Gastroenterology;  Laterality: N/A;  Normal    CORONARY ARTERY BYPASS GRAFT  1995    x 2 vessels    ENDOSCOPY N/A 06/24/2020    Procedure: ESOPHAGOGASTRODUODENOSCOPY;  Surgeon: Mathew Roberts MD;  Location: [facility] OR;  Service: Gastroenterology;  Laterality: N/A;  Ulcerative esophagitis, Gastritis, Duodenitis  Duodenal biopsy, gastric biopsy, distal esophageal biopsy    ENDOSCOPY N/A 09/17/2020    Procedure: ESOPHAGOGASTRODUODENOSCOPY with biopsies;  Surgeon: Mathew Roberts MD;  Location:  [facility] OR;  Service: Gastroenterology;  Laterality: N/A;  Esophagitis  Sarabia's Esophagus  Hiatal hernia  Gastritis  Gastric biopsy  Distal esophagus biopsy    ENDOSCOPY N/A 08/07/2023    Procedure: Esophagogastroduedenoscopy;  Surgeon: Mathew Roberts MD;  Location:  [facility] OR;  Service: Gastroenterology;  Laterality: N/A;  Gastritis; short segment Sarabia's; Esophageal stricture- dilatation; Biopsies- gastric, distal esophagus    INNER EAR SURGERY      JOINT REPLACEMENT      right knee    LEG SURGERY      x 2 s/p fall    LUMBAR FUSION      L4/L5    SKIN GRAFT      to left lower leg x 2    TONSILLECTOMY      UPPER GASTROINTESTINAL ENDOSCOPY      VEIN SURGERY      WRIST FUSION Left        Prior to Admission medications    Medication Sig Start Date End Date Taking? Authorizing Provider   allopurinol (ZYLOPRIM) 100 MG tablet Take 1 tablet by mouth 2 (Two) Times a Day.   Yes Yaw Cedeño MD   ALPRAZolam (XANAX) 0.25 MG tablet Take 1 tablet by mouth As Needed for Anxiety. 10/19/23  Yes Yaw Cedeño MD   amiodarone (PACERONE) 200 MG tablet Take 1 tablet by mouth Daily. 1/23/25  Yes Sofi Alex APRN   apixaban (ELIQUIS) 5 MG tablet tablet Take 1 tablet by mouth 2 (Two) Times a Day. 9/11/24  Yes Sofi Alex APRN   carvedilol (COREG) 12.5 MG tablet Take 1 tablet by mouth 2 (Two) Times a Day With Meals. 7/15/25  Yes Sofi Alex APRN   cloNIDine (CATAPRES) 0.1 MG  tablet Take 1 tablet by mouth 2 (Two) Times a Day. 7/15/25  Yes Sofi Alex APRN   clopidogrel (PLAVIX) 75 MG tablet Take 1 tablet by mouth Every Night. 24  Yes Sofi Alex APRN   doxazosin (Cardura) 1 MG tablet Take 1 tablet by mouth Every Night. 25  Yes Mendez Wolff APRN   escitalopram (LEXAPRO) 5 MG tablet Take 1 tablet by mouth Every Night. 10/10/23  Yes Yaw Cedeño MD   ferrous gluconate (FERGON) 324 MG tablet Take 1 tablet by mouth Daily With Breakfast.   Yes Yaw Cedeño MD   furosemide (LASIX) 40 MG tablet Take 1 tablet by mouth Daily.   Yes Yaw Cedeño MD   hydrALAZINE (APRESOLINE) 100 MG tablet TAKE 1 TABLET BY MOUTH 3 TIMES A DAY 25  Yes Sofi Alex APRN   metFORMIN ER (GLUCOPHAGE-XR) 500 MG 24 hr tablet Take 1 tablet by mouth 2 (Two) Times a Day Before Meals.   Yes Yaw Cedeño MD   NIFEdipine XL (PROCARDIA XL) 60 MG 24 hr tablet Take 2 tablets by mouth Daily. 25  Yes Sofi Alex APRN   ondansetron ODT (ZOFRAN-ODT) 4 MG disintegrating tablet Take 1 tablet by mouth As Needed. 25  Yes Yaw Cedeño MD   pantoprazole (PROTONIX) 40 MG EC tablet Take 1 tablet by mouth 2 (Two) Times a Day. 25  Yes Shayna Tran APRN   rosuvastatin (CRESTOR) 10 MG tablet Take 1 tablet by mouth Daily. 1/12/15  Yes Yaw Cedeño MD   spironolactone (ALDACTONE) 25 MG tablet Take 1 tablet by mouth Daily. 25  Yes Dai Suazo MD       Social History     Socioeconomic History    Marital status:    Tobacco Use    Smoking status: Former     Current packs/day: 0.00     Average packs/day: 0.5 packs/day for 20.0 years (10.0 ttl pk-yrs)     Types: Cigarettes     Start date:      Quit date:      Years since quittin.5    Smokeless tobacco: Never    Tobacco comments:     QUIT    Vaping Use    Vaping status: Never Used   Substance and Sexual Activity    Alcohol use: Not Currently     Comment:  "rarely    Drug use: No    Sexual activity: Defer       Family History   Problem Relation Age of Onset    Heart disease Mother     Hypertension Mother     Heart disease Father     Heart attack Brother     Heart disease Brother     Heart disease Brother     Colon cancer Neg Hx     Colon polyps Neg Hx     Breast cancer Neg Hx        Review of Systems   Constitutional:  Positive for fatigue.   Cardiovascular:  Positive for chest pain.        Objective:     Vitals:    07/22/25 2327 07/23/25 0348 07/23/25 0722 07/23/25 1123   BP: 149/59 154/48 (!) 181/69 142/62   BP Location: Left arm Left arm Left arm Right arm   Patient Position: Lying Lying Sitting Lying   Pulse: 60 64 75 72   Resp: 16 16 18 18   Temp: 98.2 °F (36.8 °C) 98.4 °F (36.9 °C) 98.4 °F (36.9 °C) 97.7 °F (36.5 °C)   TempSrc: Oral Oral Oral Oral   SpO2: 96% 95% 98%    Weight:       Height:         Body mass index is 34.39 kg/m².    Last Weight and Admission Weight        07/22/25  1345   Weight: 85.3 kg (188 lb)     Flowsheet Rows      Flowsheet Row First Filed Value   Admission Height 157.5 cm (62\") Documented at 07/22/2025 0953   Admission Weight 83.9 kg (185 lb) Documented at 07/22/2025 0953            No intake or output data in the 24 hours ending 07/23/25 1540      Physical Exam  Vitals reviewed.   Constitutional:       Appearance: She is well-developed.   HENT:      Head: Normocephalic.      Nose: Nose normal.   Eyes:      Conjunctiva/sclera: Conjunctivae normal.   Neck:      Vascular: No JVD.   Cardiovascular:      Rate and Rhythm: Normal rate. Rhythm irregular.      Pulses: Normal pulses.      Heart sounds: Normal heart sounds.   Pulmonary:      Effort: Pulmonary effort is normal.      Breath sounds: Normal breath sounds.   Abdominal:      Palpations: Abdomen is soft.      Tenderness: There is no abdominal tenderness.   Musculoskeletal:         General: Normal range of motion.      Cervical back: Normal range of motion.      Right lower leg: No edema. "      Left lower leg: No edema.   Skin:     General: Skin is warm and dry.      Findings: No erythema.   Neurological:      General: No focal deficit present.      Mental Status: She is alert.   Psychiatric:         Mood and Affect: Mood normal.         Thought Content: Thought content normal.                 Lab Review:                Results from last 7 days   Lab Units 07/23/25  0357   SODIUM mmol/L 140   POTASSIUM mmol/L 4.0   CHLORIDE mmol/L 102   CO2 mmol/L 27.9   BUN mg/dL 20.0   CREATININE mg/dL 1.31*   GLUCOSE mg/dL 111*   CALCIUM mg/dL 9.2     Results from last 7 days   Lab Units 07/22/25  1519 07/22/25  1127 07/22/25  1016   HSTROP T ng/L 17* 15* 16*     Results from last 7 days   Lab Units 07/23/25  0357   WBC 10*3/mm3 7.33   HEMOGLOBIN g/dL 10.1*   HEMATOCRIT % 31.9*   PLATELETS 10*3/mm3 306     Results from last 7 days   Lab Units 07/22/25  1016   INR  1.31*   APTT seconds 38.5*         Results from last 7 days   Lab Units 07/22/25  1016   MAGNESIUM mg/dL 2.3           7-22-25      7-15-25      I personally viewed and interpreted the patient's EKG/Telemetry data    Assessment/Plan:     1. Noncardiac chest pan  2. Uncontrolled HTN  3. PAF/flutter  4. Junctional escape rhythm    *I suspect her BP is high due to cessation of BB and weaning of clonidine, however, I agree with that plan due to the junctional rhythm she was in last week.  *Continue nifedipine but increase to 60mg BID, and change spironolactone to eplerenone 50mg daily  *Continue all other BP meds, consider switching from hydralazine to minoxidil  *She had a recent RA doppler that did not show severe KATY  *Continue amiodarone/apixaban  *Okay for discharge with f/u in office    Sincerely,  Dwayne Parikh

## 2025-07-23 NOTE — PLAN OF CARE
Problem: Adult Inpatient Plan of Care  Goal: Absence of Hospital-Acquired Illness or Injury  Intervention: Identify and Manage Fall Risk  Recent Flowsheet Documentation  Taken 7/23/2025 0448 by Sam De Los Santos RN  Safety Promotion/Fall Prevention:   safety round/check completed   nonskid shoes/slippers when out of bed   clutter free environment maintained  Taken 7/23/2025 0229 by Sam De Los Santos RN  Safety Promotion/Fall Prevention:   safety round/check completed   nonskid shoes/slippers when out of bed   clutter free environment maintained  Taken 7/23/2025 0033 by Sam De Los Santos RN  Safety Promotion/Fall Prevention:   safety round/check completed   nonskid shoes/slippers when out of bed   clutter free environment maintained  Taken 7/23/2025 0024 by Sam De Los Santos RN  Safety Promotion/Fall Prevention:   safety round/check completed   nonskid shoes/slippers when out of bed   fall prevention program maintained  Taken 7/22/2025 2236 by Sam De Los Santos RN  Safety Promotion/Fall Prevention:   safety round/check completed   nonskid shoes/slippers when out of bed   clutter free environment maintained  Taken 7/22/2025 2057 by Sam De Los Santos RN  Safety Promotion/Fall Prevention:   safety round/check completed   nonskid shoes/slippers when out of bed   clutter free environment maintained   Goal Outcome Evaluation:  Plan of Care Reviewed With: patient        Progress: improving  Outcome Evaluation: Patient alert and oriented x4, RA, up with assistance. Patient denies chest pain during shift.  Echo in Am .Cardiac consult

## 2025-07-23 NOTE — PROGRESS NOTES
Full note to follow.    Non-cardiac chest pain. Very high BP, likely due to clonidine withdrawal.    Change spironolactone to eplerenone 50mg daily. Increase nifedipine to 60mg BID. No other med changes, f/u as outpatient.

## 2025-07-23 NOTE — PROGRESS NOTES
MD ATTESTATION NOTE      Brief HPI: Patient complains of occasional burning discomfort in her chest but denies shortness of breath, nausea, vomiting, or sweating.    PHYSICAL EXAM    GENERAL: Awake, alert, and oriented x 3.  Resting comfortably in no acute distress  HENT: nares patent  EYES: no scleral icterus  CV: regular rhythm, normal rate  RESPIRATORY: normal effort, CTAB  ABDOMEN: soft, nontender  MUSCULOSKELETAL: no deformity, extremities are nontender  NEURO: moves all extremities, follows commands, speech is clear and fluent, no facial droop  PSYCH:  calm, cooperative  SKIN: warm, dry    Vital signs and nursing notes reviewed.        Plan: Creatinine has improved to 1.31.  Hemoglobin is at baseline.  Chest pain is atypical.  Cardiology consult is pending.

## 2025-07-23 NOTE — CASE MANAGEMENT/SOCIAL WORK
Discharge Planning Assessment  [facility]     Patient Name: Valentine Arora  MRN: [id number]  Today's Date: 7/23/2025    Admit Date: 7/22/2025    Plan: plans to return to her home, spouse to provide transportation   Discharge Needs Assessment       Row Name 07/23/25 1006       Living Environment    People in Home spouse    Name(s) of People in Home Fran and Valentine Arora.    Unique Family Situation Patient's spouse had a recent fall/injury and will finally be able to remove his neck brace  that he has worn for months.  Patient does not anticipate any further helath concerns regarding such with her spouse.    Current Living Arrangements home    Potentially Unsafe Housing Conditions none    In the past 12 months has the electric, gas, oil, or water company threatened to shut off services in your home? No    Primary Care Provided by self    Provides Primary Care For spouse    Caregiving Concerns return of chest pain, she is followed by Dr. Mccollum.    Family Caregiver if Needed child(rocio), adult;spouse    Quality of Family Relationships helpful;involved;supportive    Able to Return to Prior Arrangements yes       Resource/Environmental Concerns    Resource/Environmental Concerns none    Transportation Concerns none       Transportation Needs    In the past 12 months, has lack of transportation kept you from medical appointments or from getting medications? no    In the past 12 months, has lack of transportation kept you from meetings, work, or from getting things needed for daily living? No       Food Insecurity    Within the past 12 months, you worried that your food would run out before you got the money to buy more. Never true    Within the past 12 months, the food you bought just didn't last and you didn't have money to get more. Never true       Transition Planning    Patient/Family Anticipates Transition to home with family    Patient/Family Anticipated Services at Transition none    Transportation Anticipated  family or friend will provide       Discharge Needs Assessment    Readmission Within the Last 30 Days current reason for admission unrelated to previous admission  patient was at [facility] 2 weeks ago for a reaction to new medication, isosorbide mononitrate    Equipment Currently Used at Home bp cuff    Concerns to be Addressed denies needs/concerns at this time;no discharge needs identified    Do you want help finding or keeping work or a job? Patient declined    Do you want help with school or training? For example, starting or completing job training or getting a high school diploma, GED or equivalent Patient declined    Anticipated Changes Related to Illness none    Equipment Needed After Discharge none    Provided Post Acute Provider List? N/A    Provided Post Acute Provider Quality & Resource List? N/A                   Discharge Plan       Row Name 07/23/25 1010       Plan    Plan plans to return to her home, spouse to provide transportation    Patient/Family in Agreement with Plan yes    Plan Comments Entered room, identified self and role of CCP nurse/discharge planner. Verified demographics as correct.  PCP is Johanna Alejandro MD. [facility]. Admitted for Chest pain, feels much better now.  Pt. denies having any DME needs at this time. Pt lives iwth her spouse in a single family home with 1 step to enter. there are 3 floors, they live on the first floor. Deniesa ny financial concerns. She still drives and is fully independent in all her ADLs.  She is a retired . Pt. plans to go home, spouse to drive her home.  No new needs identified. AAOx4. CCP to follow for any further needs that may arise. ROBBIE Sousa RN ccp                  Continued Care and Services - Admitted Since 7/22/2025    No active coordination exists.          Demographic Summary       Row Name 07/23/25 1002       General Information    Admission Type observation    Arrived From emergency department     Required Notices Provided Observation Status Notice    Referral Source emergency department    Reason for Consult discharge planning    Preferred Language English    General Information Comments patient resides at [address] [place] [address] [address] [place] [address]. phone number [phone]. Lives with spouse Fran Mark [phone]       Contact Information    Permission Granted to Share Info With family/designee    Contact Information Comments emergency contact is Fran Mark [phone].                   Functional Status       Row Name 07/23/25 1004       Functional Status    Usual Activity Tolerance good    Current Activity Tolerance good    Functional Status Comments functioning at baseline       Physical Activity    On average, how many days per week do you engage in moderate to strenuous exercise (like a brisk walk)? 3 days    On average, how many minutes do you engage in exercise at this level? 30 min  patient walks her dog, does gardening.    Number of minutes of exercise per week 90       Assessment of Health Literacy    How often do you have someone help you read hospital materials? Never    How often do you have problems learning about your medical condition because of difficulty understanding written information? Never    How often do you have a problem understanding what is told to you about your medical condition? Never    How confident are you filling out medical forms by yourself? Quite a bit    Health Literacy Good       Functional Status, IADL    Medications independent    Meal Preparation independent    Housekeeping independent    Laundry independent    Shopping independent    If for any reason you need help with day-to-day activities such as bathing, preparing meals, shopping, managing finances, etc., do you get the help you need? I don't need any help    IADL Comments patient reports full independence       Mental Status    General Appearance WDL WDL       Mental Status Summary    Recent Changes in Mental  Status/Cognitive Functioning no changes    Mental Status Comments AAOx4       Employment/    Employment Status retired    Current or Previous Occupation professional                   Psychosocial    No documentation.                  Abuse/Neglect    No documentation.                  Legal    No documentation.                  Substance Abuse       Row Name 07/23/25 1005       Substance Use    Substance Use Status never used       AUDIT-C (Alcohol Use Disorders Identification Test)    Q1: How often do you have a drink containing alcohol? Never    Q2: How many drinks containing alcohol do you have on a typical day when you are drinking? None    Q3: How often do you have six or more drinks on one occasion? Never    Audit-C Score 0                   Patient Forms    No documentation.                     Marisol Sousa RN

## 2025-07-23 NOTE — DISCHARGE SUMMARY
ED OBSERVATION PROGRESS/DISCHARGE SUMMARY    Date of Admission: 7/22/2025   LOS: 0 days   PCP: Aliya Alejandro MD    Final Diagnosis: Chest pain, uncontrolled hypertension, PAF/flutter    Hospital Outcome:     75-year-old female admitted to the observation unit with a complaint of chest pain, nausea, and vomiting .ED workup reviewed: Troponin 16 and 15, BNP 2176, sodium 137, potassium 4.3, creatinine 1.45, glucose 141, WBC 8.9, hemoglobin 11.5, and platelets 375.  Negative for UTI.  Chest x-ray shows cardiomegaly otherwise negative acute.  EKG shows atrial flutter with a rate of 62 and a prolonged QT interval.     Cardiology was consulted and they have seen the patient.  Blood pressure is elevated.  They advise increasing her nifedipine to 60 mg twice daily.  Will stop her spironolactone and start eplerenone 50 mg daily further recommendation.  No further testing indicated from their standpoint.  She will be discharged home and they will arrange for follow-up in the office.    ROS:  General: no fevers, chills  Respiratory: no cough, dyspnea  Cardiovascular: no chest pain, palpitations  Abdomen: No abdominal pain, nausea, vomiting, or diarrhea  Neurologic: No focal weakness    Objective   Physical Exam:  I have reviewed the vital signs.  Temp:  [97.7 °F (36.5 °C)-98.4 °F (36.9 °C)] 97.7 °F (36.5 °C)  Heart Rate:  [60-75] 72  Resp:  [16-18] 18  BP: (139-181)/(48-71) 142/62  General Appearance:    Alert, cooperative, no distress  Head:    Normocephalic, atraumatic  Eyes:    Sclerae anicteric  Neck:   Supple, no mass  Lungs: Clear to auscultation bilaterally, respirations unlabored  Heart: Regular rate and rhythm, S1 and S2 normal, no murmur, rub or gallop  Abdomen:  Soft, nontender, bowel sounds active, nondistended  Extremities: No clubbing, cyanosis, or edema to lower extremities  Pulses:  2+ and symmetric in distal lower extremities  Skin: No rashes   Neurologic: Oriented x3, Normal strength to  extremities    Results Review:    I have reviewed the labs, radiology results and diagnostic studies.    Results from last 7 days   Lab Units 07/23/25  0357   WBC 10*3/mm3 7.33   HEMOGLOBIN g/dL 10.1*   HEMATOCRIT % 31.9*   PLATELETS 10*3/mm3 306     Results from last 7 days   Lab Units 07/23/25  0357 07/22/25  1016   SODIUM mmol/L 140 137   POTASSIUM mmol/L 4.0 4.3   CHLORIDE mmol/L 102 97*   CO2 mmol/L 27.9 25.3   BUN mg/dL 20.0 25.0*   CREATININE mg/dL 1.31* 1.45*   CALCIUM mg/dL 9.2 9.8   BILIRUBIN mg/dL  --  0.3   ALK PHOS U/L  --  71   ALT (SGPT) U/L  --  20   AST (SGOT) U/L  --  19   GLUCOSE mg/dL 111* 141*     Imaging Results (Last 24 Hours)       ** No results found for the last 24 hours. **            I have reviewed the medications.     Discharge Medications        New Medications        Instructions Start Date   eplerenone 50 MG tablet  Commonly known as: INSPRA   50 mg, Oral, Every 24 Hours Scheduled             Changes to Medications        Instructions Start Date   NIFEdipine CC 60 MG 24 hr tablet  Commonly known as: ADALAT CC  What changed:   how much to take  when to take this   60 mg, Oral, 2 Times Daily             Continue These Medications        Instructions Start Date   allopurinol 100 MG tablet  Commonly known as: ZYLOPRIM   Take 1 tablet by mouth 2 (Two) Times a Day.      ALPRAZolam 0.25 MG tablet  Commonly known as: XANAX   Take 1 tablet by mouth As Needed for Anxiety.      amiodarone 200 MG tablet  Commonly known as: PACERONE   200 mg, Oral, Daily      apixaban 5 MG tablet tablet  Commonly known as: ELIQUIS   5 mg, Oral, 2 Times Daily      carvedilol 12.5 MG tablet  Commonly known as: COREG   12.5 mg, Oral, 2 Times Daily With Meals      cloNIDine 0.1 MG tablet  Commonly known as: CATAPRES   0.1 mg, Oral, 2 Times Daily      clopidogrel 75 MG tablet  Commonly known as: PLAVIX   75 mg, Oral, Nightly      doxazosin 1 MG tablet  Commonly known as: Cardura   1 mg, Oral, Nightly      escitalopram  5 MG tablet  Commonly known as: LEXAPRO   5 mg, Nightly      ferrous gluconate 324 MG tablet  Commonly known as: FERGON   324 mg, Daily With Breakfast      furosemide 40 MG tablet  Commonly known as: LASIX   40 mg, Daily      hydrALAZINE 100 MG tablet  Commonly known as: APRESOLINE   100 mg, Oral, 3 Times Daily      metFORMIN  MG 24 hr tablet  Commonly known as: GLUCOPHAGE-XR   500 mg, Oral, 2 Times Daily Before Meals      ondansetron ODT 4 MG disintegrating tablet  Commonly known as: ZOFRAN-ODT   4 mg, As Needed      pantoprazole 40 MG EC tablet  Commonly known as: PROTONIX   40 mg, Oral, 2 Times Daily      rosuvastatin 10 MG tablet  Commonly known as: CRESTOR   10 mg, Daily             Stop These Medications      spironolactone 25 MG tablet  Commonly known as: ALDACTONE             ---------------------------------------------------------------------------------------------  Assessment & Plan   Assessment/Problem List    Chest pain    Hypertension    Plan:    CAD  History of MI  Chest pain  Troponin 16, 15, 17  BNP 2176  EKG nonischemic   Chest x-ray shows cardiomegaly otherwise negative acute  Cardiology consulted, no further inpatient testing needed  Blood pressure medication adjusted  Cardiology to arrange for outpatient follow-up     CKD  Creatinine 1.45, 1.31, appears to be around baseline     Hypertension  Hyperlipidemia  Stop spironolactone  Start eplerenone  Increase dose of nifedipine  Continue all other BP meds     A-fib  Continue Eliquis and amiodarone     Borderline diabetes  Continue metformin     Disposition: Home    Follow-up after Discharge: Primary care, cardiology    This note will serve as a discharge summary    TERRI Velasco 07/23/25 13:27 EDT    I have worn appropriate PPE during this patient encounter, sanitized my hands both with entering and exiting patient's room.    34 minutes has been spent by [facility] providers in the care of this patient  while under observation status on this date 07/23/25

## 2025-07-24 ENCOUNTER — TELEPHONE (OUTPATIENT)
Dept: CARDIOLOGY | Age: 75
End: 2025-07-24
Payer: MEDICARE

## 2025-07-24 ENCOUNTER — APPOINTMENT (OUTPATIENT)
Dept: GENERAL RADIOLOGY | Facility: HOSPITAL | Age: 75
End: 2025-07-24
Payer: MEDICARE

## 2025-07-24 ENCOUNTER — TELEPHONE (OUTPATIENT)
Age: 75
End: 2025-07-24
Payer: MEDICARE

## 2025-07-24 ENCOUNTER — HOSPITAL ENCOUNTER (EMERGENCY)
Facility: HOSPITAL | Age: 75
Discharge: HOME | End: 2025-07-24
Attending: EMERGENCY MEDICINE | Admitting: EMERGENCY MEDICINE
Payer: MEDICARE

## 2025-07-24 VITALS
DIASTOLIC BLOOD PRESSURE: 57 MMHG | HEART RATE: 75 BPM | RESPIRATION RATE: 24 BRPM | OXYGEN SATURATION: 98 % | SYSTOLIC BLOOD PRESSURE: 157 MMHG

## 2025-07-24 DIAGNOSIS — I10 HYPERTENSION, UNSPECIFIED TYPE: ICD-10-CM

## 2025-07-24 DIAGNOSIS — R51.9 NONINTRACTABLE HEADACHE, UNSPECIFIED CHRONICITY PATTERN, UNSPECIFIED HEADACHE TYPE: ICD-10-CM

## 2025-07-24 DIAGNOSIS — R20.2 PARESTHESIAS: Primary | ICD-10-CM

## 2025-07-24 LAB
ALBUMIN SERPL-MCNC: 4.7 G/DL (ref 3.5–5.2)
ALBUMIN/GLOB SERPL: 1.6 G/DL
ALP SERPL-CCNC: 73 U/L (ref 39–117)
ALT SERPL W P-5'-P-CCNC: 17 U/L (ref 1–33)
ANION GAP SERPL CALCULATED.3IONS-SCNC: 18 MMOL/L (ref 5–15)
AST SERPL-CCNC: 21 U/L (ref 1–32)
BASOPHILS # BLD AUTO: 0.07 10*3/MM3 (ref 0–0.2)
BASOPHILS NFR BLD AUTO: 0.8 % (ref 0–1.5)
BILIRUB SERPL-MCNC: 0.4 MG/DL (ref 0–1.2)
BUN SERPL-MCNC: 23 MG/DL (ref 8–23)
BUN/CREAT SERPL: 14 (ref 7–25)
CALCIUM SPEC-SCNC: 10 MG/DL (ref 8.6–10.5)
CHLORIDE SERPL-SCNC: 96 MMOL/L (ref 98–107)
CO2 SERPL-SCNC: 21 MMOL/L (ref 22–29)
CREAT SERPL-MCNC: 1.64 MG/DL (ref 0.57–1)
DEPRECATED RDW RBC AUTO: 47.1 FL (ref 37–54)
EGFRCR SERPLBLD CKD-EPI 2021: 32.7 ML/MIN/1.73
EOSINOPHIL # BLD AUTO: 0.2 10*3/MM3 (ref 0–0.4)
EOSINOPHIL NFR BLD AUTO: 2.2 % (ref 0.3–6.2)
ERYTHROCYTE [DISTWIDTH] IN BLOOD BY AUTOMATED COUNT: 14 % (ref 12.3–15.4)
GEN 5 1HR TROPONIN T REFLEX: 17 NG/L
GLOBULIN UR ELPH-MCNC: 2.9 GM/DL
GLUCOSE SERPL-MCNC: 113 MG/DL (ref 65–99)
HCT VFR BLD AUTO: 39.8 % (ref 34–46.6)
HGB BLD-MCNC: 12.7 G/DL (ref 12–15.9)
HOLD SPECIMEN: NORMAL
HOLD SPECIMEN: NORMAL
IMM GRANULOCYTES # BLD AUTO: 0.07 10*3/MM3 (ref 0–0.05)
IMM GRANULOCYTES NFR BLD AUTO: 0.8 % (ref 0–0.5)
LYMPHOCYTES # BLD AUTO: 1.12 10*3/MM3 (ref 0.7–3.1)
LYMPHOCYTES NFR BLD AUTO: 12.5 % (ref 19.6–45.3)
MAGNESIUM SERPL-MCNC: 2.2 MG/DL (ref 1.6–2.4)
MCH RBC QN AUTO: 29.3 PG (ref 26.6–33)
MCHC RBC AUTO-ENTMCNC: 31.9 G/DL (ref 31.5–35.7)
MCV RBC AUTO: 91.9 FL (ref 79–97)
MONOCYTES # BLD AUTO: 0.72 10*3/MM3 (ref 0.1–0.9)
MONOCYTES NFR BLD AUTO: 8 % (ref 5–12)
NEUTROPHILS NFR BLD AUTO: 6.81 10*3/MM3 (ref 1.7–7)
NEUTROPHILS NFR BLD AUTO: 75.7 % (ref 42.7–76)
NRBC BLD AUTO-RTO: 0 /100 WBC (ref 0–0.2)
NT-PROBNP SERPL-MCNC: 1245 PG/ML (ref 0–900)
PHOSPHATE SERPL-MCNC: 3.3 MG/DL (ref 2.5–4.5)
PLATELET # BLD AUTO: 451 10*3/MM3 (ref 140–450)
PMV BLD AUTO: 8.8 FL (ref 6–12)
POTASSIUM SERPL-SCNC: 4.1 MMOL/L (ref 3.5–5.2)
PROT SERPL-MCNC: 7.6 G/DL (ref 6–8.5)
QT INTERVAL: 383 MS
QTC INTERVAL: 427 MS
RBC # BLD AUTO: 4.33 10*6/MM3 (ref 3.77–5.28)
SODIUM SERPL-SCNC: 135 MMOL/L (ref 136–145)
TROPONIN T % DELTA: -6
TROPONIN T NUMERIC DELTA: -1 NG/L
TROPONIN T SERPL HS-MCNC: 18 NG/L
WBC NRBC COR # BLD AUTO: 8.99 10*3/MM3 (ref 3.4–10.8)
WHOLE BLOOD HOLD COAG: NORMAL

## 2025-07-24 PROCEDURE — 85025 COMPLETE CBC W/AUTO DIFF WBC: CPT | Performed by: EMERGENCY MEDICINE

## 2025-07-24 PROCEDURE — 93005 ELECTROCARDIOGRAM TRACING: CPT

## 2025-07-24 PROCEDURE — 36415 COLL VENOUS BLD VENIPUNCTURE: CPT

## 2025-07-24 PROCEDURE — 93005 ELECTROCARDIOGRAM TRACING: CPT | Performed by: EMERGENCY MEDICINE

## 2025-07-24 PROCEDURE — 83735 ASSAY OF MAGNESIUM: CPT | Performed by: EMERGENCY MEDICINE

## 2025-07-24 PROCEDURE — 83880 ASSAY OF NATRIURETIC PEPTIDE: CPT | Performed by: EMERGENCY MEDICINE

## 2025-07-24 PROCEDURE — 80053 COMPREHEN METABOLIC PANEL: CPT | Performed by: EMERGENCY MEDICINE

## 2025-07-24 PROCEDURE — 99284 EMERGENCY DEPT VISIT MOD MDM: CPT

## 2025-07-24 PROCEDURE — 84100 ASSAY OF PHOSPHORUS: CPT | Performed by: EMERGENCY MEDICINE

## 2025-07-24 PROCEDURE — 93010 ELECTROCARDIOGRAM REPORT: CPT | Performed by: INTERNAL MEDICINE

## 2025-07-24 PROCEDURE — 71045 X-RAY EXAM CHEST 1 VIEW: CPT

## 2025-07-24 PROCEDURE — 84484 ASSAY OF TROPONIN QUANT: CPT | Performed by: EMERGENCY MEDICINE

## 2025-07-24 RX ORDER — SODIUM CHLORIDE 0.9 % (FLUSH) 0.9 %
10 SYRINGE (ML) INJECTION AS NEEDED
Status: DISCONTINUED | OUTPATIENT
Start: 2025-07-24 | End: 2025-07-24 | Stop reason: HOSPADM

## 2025-07-24 RX ORDER — AMIODARONE HYDROCHLORIDE 200 MG/1
200 TABLET ORAL DAILY
Qty: 90 TABLET | Refills: 1 | Status: ON HOLD | OUTPATIENT
Start: 2025-07-24

## 2025-07-24 NOTE — DISCHARGE INSTRUCTIONS
Rest at home avoiding any particularly strenuous activity today.     Eat small, frequent meals and drink plenty of fluids. Take any medication prescribed as instructed. Continue to monitor you blood pressure at home.    Monitor for any signs of recurrent symptoms or worsening and see your primary doctor to discuss your ER visit while returning to the ER if any concerns as we discussed.

## 2025-07-24 NOTE — ED PROVIDER NOTES
EMERGENCY DEPARTMENT ENCOUNTER  Room Number:  20/20  PCP: Aliya Alejandro MD  Independent Historians: Historian: Patient      HPI:  Chief Complaint: had concerns including Chest Pain.     A complete HPI/ROS/PMH/PSH/SH/FH are unobtainable due to: EM_Unobtainable History : None    Chronic or social conditions impacting patient care (Social Determinants of Health): None      Context: The patient is a 75 y.o. female with a medical history of CAD, hypertension, hyperlipidemia, PAD, esophageal dysphagia, GERD, atrial fib/ flutter, stage III chronic kidney disease who presents to the ED c/o acute generalized symptoms that started about 45 minutes after taking new medications this morning.  She was admitted until yesterday for chest pain, was started on eplerenone and nifedipine was doubled.  She states her symptoms included feeling hot and flushed, nausea with some dry heaves, bilateral leg tingling and roaring in her head.  She states she still has a little bit of the roaring, it is much improved, all of her other symptoms have resolved.  She states she has chronic left-sided chest burning that feels unchanged from the symptoms that she always experiences when she is in A-fib flutter.  She denies any other chest pain as well as any shortness of breath syncope abdominal pain.      Review of prior external notes (non-ED) -and- Review of prior external test results outside of this encounter: Patient admitted 7/22/2025 to 7/23/2025 for chest pain nausea and vomiting.  Troponins 16 and 15, BNP 2176, chest x-ray showed cardiomegaly and otherwise no acute findings, EKG with atrial flutter and a rate of 62 and prolonged QT interval.  Cardiology evaluated, felt chest pain was noncardiac and blood pressure was likely very high due to clonidine withdrawal, recommended increasing nifedipine to 60 Mg twice daily due to elevated blood pressure, spironolactone discontinued and started on eplerenone        PAST MEDICAL  HISTORY  Active Ambulatory Problems     Diagnosis Date Noted    Coronary artery disease involving coronary bypass graft of native heart     Hypertension     Hyperlipidemia     PAD (peripheral artery disease) 09/11/2019    Esophageal dysphagia 05/22/2020    Sarabia's esophagus with dysplasia 08/05/2020    Gastroesophageal reflux disease without esophagitis 10/20/2022    Personal history of colonic polyps 05/08/2023    Atrial flutter, unspecified type 05/17/2023    Irritable bowel syndrome with constipation 10/20/2023    Chronic kidney disease, stage 3a 03/01/2024    Atrial fibrillation     Chest pain 07/22/2025     Resolved Ambulatory Problems     Diagnosis Date Noted    Myocardial infarction     S/P CABG (coronary artery bypass graft) 01/19/2017    Morbidly obese 09/11/2019    Encounter for screening for malignant neoplasm of colon 05/22/2020    Ulcer of esophagus without bleeding 08/05/2020    Dehydration with hyponatremia 06/15/2021    Esophageal obstruction 05/08/2023    Chest discomfort 05/21/2023    Palpitations 09/10/2023    Coronary artery disease of native artery of native heart with stable angina pectoris 04/15/2024    Nausea and vomiting 04/21/2024    Acute hearing loss of both ears 04/21/2024     Past Medical History:   Diagnosis Date    Arthritis     Sarabia esophagus     CAD (coronary artery disease)     Chronic back pain     Colon polyp     GERD (gastroesophageal reflux disease)     PONV (postoperative nausea and vomiting)     Shingles     Type 2 diabetes mellitus          PAST SURGICAL HISTORY  Past Surgical History:   Procedure Laterality Date    BREAST BIOPSY Right 05/23/2024    benign    CARDIAC CATHETERIZATION N/A 04/24/2024    Procedure: Coronary angiography, Left heart catheterization, Left ventricular angiography;  Surgeon: Dai Suzao MD;  Location: [facility] INVAS[facility] LOCATION;  Service: Cardiology;  Laterality: N/A;    CARDIAC CATHETERIZATION  04/24/2024    Procedure: Saphenous Vein  Graft;  Surgeon: Dai Suazo MD;  Location: [facility] CATH INVASIVE LOCATION;  Service: Cardiology;;    COLONOSCOPY      COLONOSCOPY N/A 06/24/2020    Procedure: COLONOSCOPY;  Surgeon: Mathew Roberts MD;  Location: [facility];  Service: Gastroenterology;  Laterality: N/A;  Descending colon polyp x 2, Ascending colon polyp, Sigmoid colon polyp, Rectal polyp    COLONOSCOPY N/A 08/07/2023    Procedure: COLONOSCOPY;  Surgeon: Mathew Roberts MD;  Location: [facility];  Service: Gastroenterology;  Laterality: N/A;  Normal    CORONARY ARTERY BYPASS GRAFT  1995    x 2 vessels    ENDOSCOPY N/A 06/24/2020    Procedure: ESOPHAGOGASTRODUODENOSCOPY;  Surgeon: Mathew Roberts MD;  Location: [facility];  Service: Gastroenterology;  Laterality: N/A;  Ulcerative esophagitis, Gastritis, Duodenitis  Duodenal biopsy, gastric biopsy, distal esophageal biopsy    ENDOSCOPY N/A 09/17/2020    Procedure: ESOPHAGOGASTRODUODENOSCOPY with biopsies;  Surgeon: Mathew Roberts MD;  Location: [facility];  Service: Gastroenterology;  Laterality: N/A;  Esophagitis  Sarabia's Esophagus  Hiatal hernia  Gastritis  Gastric biopsy  Distal esophagus biopsy    ENDOSCOPY N/A 08/07/2023    Procedure: Esophagogastroduedenoscopy;  Surgeon: Mathew Roberts MD;  Location: [facility];  Service: Gastroenterology;  Laterality: N/A;  Gastritis; short segment Sarabia's; Esophageal stricture- dilatation; Biopsies- gastric, distal esophagus    INNER EAR SURGERY      JOINT REPLACEMENT      right knee    LEG SURGERY      x 2 s/p fall    LUMBAR FUSION      L4/L5    SKIN GRAFT      to left lower leg x 2    TONSILLECTOMY      UPPER GASTROINTESTINAL ENDOSCOPY      VEIN SURGERY      WRIST FUSION Left          FAMILY HISTORY  Family History   Problem Relation Age of Onset    Heart disease Mother     Hypertension Mother     Heart disease Father     Heart attack Brother     Heart disease Brother     Heart disease Brother      Colon cancer Neg Hx     Colon polyps Neg Hx     Breast cancer Neg Hx          SOCIAL HISTORY  Social History     Socioeconomic History    Marital status:    Tobacco Use    Smoking status: Former     Current packs/day: 0.00     Average packs/day: 0.5 packs/day for 20.0 years (10.0 ttl pk-yrs)     Types: Cigarettes     Start date:      Quit date:      Years since quittin.5    Smokeless tobacco: Never    Tobacco comments:     QUIT    Vaping Use    Vaping status: Never Used   Substance and Sexual Activity    Alcohol use: Not Currently     Comment: rarely    Drug use: No    Sexual activity: Defer         ALLERGIES  Codeine and Other      REVIEW OF SYSTEMS  Review of Systems  Included in HPI  All systems reviewed and negative except for those discussed in HPI.      PHYSICAL EXAM    I have reviewed the triage vital signs and nursing notes.    ED Triage Vitals   Temp Heart Rate Resp BP SpO2   -- 25 1023 25 1023 25 1028 25 1023    84 24 155/44 100 %      Temp src Heart Rate Source Patient Position BP Location FiO2 (%)   -- -- 25 1028 25 1028 --     Sitting Right arm        Physical Exam  GENERAL: alert, no acute distress  SKIN: Warm, dry  HENT: Normocephalic, atraumatic  EYES: no scleral icterus  CV: regular rhythm, regular rate  RESPIRATORY: normal effort, lungs clear  ABDOMEN: nondistended soft nontender no guarding or rigidity  MUSCULOSKELETAL: no deformity.  Chronic left lower extremity edema from prior trauma and previous surgeries, no right lower extremity edema  NEURO: alert, moves all extremities, follows commands.  Muscle strength is 5 out of 5 in all 4 extremities, sensation intact to light touch in the face upper and lower extremities, no ataxia, no facial droop, normal speech and fund of knowledge            LAB RESULTS  Recent Results (from the past 24 hours)   ECG 12 Lead ED Triage Standing Order; Chest Pain    Collection Time: 25 10:27 AM    Result Value Ref Range    QT Interval 383 ms    QTC Interval 427 ms   Comprehensive Metabolic Panel    Collection Time: 07/24/25 10:34 AM    Specimen: Arm, Right; Blood   Result Value Ref Range    Glucose 113 (H) 65 - 99 mg/dL    BUN 23.0 8.0 - 23.0 mg/dL    Creatinine 1.64 (H) 0.57 - 1.00 mg/dL    Sodium 135 (L) 136 - 145 mmol/L    Potassium 4.1 3.5 - 5.2 mmol/L    Chloride 96 (L) 98 - 107 mmol/L    CO2 21.0 (L) 22.0 - 29.0 mmol/L    Calcium 10.0 8.6 - 10.5 mg/dL    Total Protein 7.6 6.0 - 8.5 g/dL    Albumin 4.7 3.5 - 5.2 g/dL    ALT (SGPT) 17 1 - 33 U/L    AST (SGOT) 21 1 - 32 U/L    Alkaline Phosphatase 73 39 - 117 U/L    Total Bilirubin 0.4 0.0 - 1.2 mg/dL    Globulin 2.9 gm/dL    A/G Ratio 1.6 g/dL    BUN/Creatinine Ratio 14.0 7.0 - 25.0    Anion Gap 18.0 (H) 5.0 - 15.0 mmol/L    eGFR 32.7 (L) >60.0 mL/min/1.73   High Sensitivity Troponin T    Collection Time: 07/24/25 10:34 AM    Specimen: Arm, Right; Blood   Result Value Ref Range    HS Troponin T 18 (H) <14 ng/L   Green Top (Gel)    Collection Time: 07/24/25 10:34 AM   Result Value Ref Range    Extra Tube Hold for add-ons.    Gold Top - SST    Collection Time: 07/24/25 10:34 AM   Result Value Ref Range    Extra Tube Hold for add-ons.    Light Blue Top    Collection Time: 07/24/25 10:34 AM   Result Value Ref Range    Extra Tube Hold for add-ons.    CBC Auto Differential    Collection Time: 07/24/25 10:34 AM    Specimen: Arm, Right; Blood   Result Value Ref Range    WBC 8.99 3.40 - 10.80 10*3/mm3    RBC 4.33 3.77 - 5.28 10*6/mm3    Hemoglobin 12.7 12.0 - 15.9 g/dL    Hematocrit 39.8 34.0 - 46.6 %    MCV 91.9 79.0 - 97.0 fL    MCH 29.3 26.6 - 33.0 pg    MCHC 31.9 31.5 - 35.7 g/dL    RDW 14.0 12.3 - 15.4 %    RDW-SD 47.1 37.0 - 54.0 fl    MPV 8.8 6.0 - 12.0 fL    Platelets 451 (H) 140 - 450 10*3/mm3    Neutrophil % 75.7 42.7 - 76.0 %    Lymphocyte % 12.5 (L) 19.6 - 45.3 %    Monocyte % 8.0 5.0 - 12.0 %    Eosinophil % 2.2 0.3 - 6.2 %    Basophil % 0.8 0.0 -  1.5 %    Immature Grans % 0.8 (H) 0.0 - 0.5 %    Neutrophils, Absolute 6.81 1.70 - 7.00 10*3/mm3    Lymphocytes, Absolute 1.12 0.70 - 3.10 10*3/mm3    Monocytes, Absolute 0.72 0.10 - 0.90 10*3/mm3    Eosinophils, Absolute 0.20 0.00 - 0.40 10*3/mm3    Basophils, Absolute 0.07 0.00 - 0.20 10*3/mm3    Immature Grans, Absolute 0.07 (H) 0.00 - 0.05 10*3/mm3    nRBC 0.0 0.0 - 0.2 /100 WBC   BNP    Collection Time: 07/24/25 10:34 AM    Specimen: Arm, Right; Blood   Result Value Ref Range    proBNP 1,245.0 (H) 0.0 - 900.0 pg/mL   Magnesium    Collection Time: 07/24/25 10:34 AM    Specimen: Arm, Right; Blood   Result Value Ref Range    Magnesium 2.2 1.6 - 2.4 mg/dL   Phosphorus    Collection Time: 07/24/25 10:34 AM    Specimen: Arm, Right; Blood   Result Value Ref Range    Phosphorus 3.3 2.5 - 4.5 mg/dL   High Sensitivity Troponin T 1Hr    Collection Time: 07/24/25 12:10 PM    Specimen: Blood   Result Value Ref Range    HS Troponin T 17 (H) <14 ng/L    Troponin T Numeric Delta -1 ng/L    Troponin T % Delta -6 Abnormal if >/= 20%         RADIOLOGY  XR Chest 1 View  Result Date: 7/24/2025  XR CHEST 1 VW-  Clinical: Chest pain  COMPARISON examination 7/22/2025  FINDINGS: Median sternotomy wires and vascular markers in position, cardiac size upper limits of normal to mildly enlarged. No pleural effusion, vascular congestion or acute airspace disease has developed.  CONCLUSION: No acute pulmonary process.  This report was finalized on 7/24/2025 11:12 AM by Dr. Eric Fofana M.D on Workstation: [id number]          MEDICATIONS GIVEN IN ER  Medications - No data to display        ORDERS PLACED DURING THIS VISIT:  Orders Placed This Encounter   Procedures    XR Chest 1 View    [place] Draw    Comprehensive Metabolic Panel    High Sensitivity Troponin T    CBC Auto Differential    BNP    Magnesium    Phosphorus    High Sensitivity Troponin T 1Hr    Undress & Gown    Continuous Pulse Oximetry    ECG 12 Lead ED Triage Standing Order;  Chest Pain    CBC & Differential    Green Top (Gel)    Gold Top - SST    Light Blue Top         OUTPATIENT MEDICATION MANAGEMENT:  No current Epic-ordered facility-administered medications on file.     Current Outpatient Medications Ordered in Epic   Medication Sig Dispense Refill    allopurinol (ZYLOPRIM) 100 MG tablet Take 1 tablet by mouth 2 (Two) Times a Day.      ALPRAZolam (XANAX) 0.25 MG tablet Take 1 tablet by mouth As Needed for Anxiety.      amiodarone (PACERONE) 200 MG tablet TAKE 1 TABLET BY MOUTH DAILY 90 tablet 1    apixaban (ELIQUIS) 5 MG tablet tablet Take 1 tablet by mouth 2 (Two) Times a Day. 180 tablet 3    carvedilol (COREG) 12.5 MG tablet Take 1 tablet by mouth 2 (Two) Times a Day With Meals. 60 tablet 11    cloNIDine (CATAPRES) 0.1 MG tablet Take 1 tablet by mouth 2 (Two) Times a Day. 60 tablet 11    clopidogrel (PLAVIX) 75 MG tablet Take 1 tablet by mouth Every Night. 90 tablet 3    doxazosin (Cardura) 1 MG tablet Take 1 tablet by mouth Every Night. 30 tablet 2    eplerenone (INSPRA) 50 MG tablet Take 1 tablet by mouth Daily for 30 days. 30 tablet 0    escitalopram (LEXAPRO) 5 MG tablet Take 1 tablet by mouth Every Night.      ferrous gluconate (FERGON) 324 MG tablet Take 1 tablet by mouth Daily With Breakfast.      furosemide (LASIX) 40 MG tablet Take 1 tablet by mouth Daily.      hydrALAZINE (APRESOLINE) 100 MG tablet TAKE 1 TABLET BY MOUTH 3 TIMES A  tablet 1    metFORMIN ER (GLUCOPHAGE-XR) 500 MG 24 hr tablet Take 1 tablet by mouth 2 (Two) Times a Day Before Meals.      NIFEdipine XL (ADALAT CC) 60 MG 24 hr tablet Take 1 tablet by mouth 2 (Two) Times a Day for 30 days. 60 tablet 0    ondansetron ODT (ZOFRAN-ODT) 4 MG disintegrating tablet Take 1 tablet by mouth As Needed.      pantoprazole (PROTONIX) 40 MG EC tablet Take 1 tablet by mouth 2 (Two) Times a Day. 180 tablet 0    rosuvastatin (CRESTOR) 10 MG tablet Take 1 tablet by mouth Daily.            PROCEDURES  Procedures            PROGRESS, DATA ANALYSIS, CONSULTS, AND MEDICAL DECISION MAKING  All labs have been independently interpreted by me.  All radiology studies have been reviewed by me. All EKG's have been independently viewed and interpreted by me.  Discussion below represents my analysis of pertinent findings related to patient's condition, differential diagnosis, treatment plan and final disposition.    DIFFERENTIAL    My differential diagnosis includes but is not limited to arrhythmia, anemia, electrolyte abnormality, dehydration, near syncope    Clinical Scores:                  ED Course as of 07/24/25 2024   Thu Jul 24, 2025   1204 I viewed patient's chest x-ray and on my interpretation patient has borderline heart size with no large pulmonary consolidation [AR]   1209 EKG ER MD interpretation   Time: 10: 27  Rhythm and rate: Atrial flutter with a 3-1 AV block at a rate of 74  Axis: Normal  QRS complexes: LVH with repolarization abnormality  ST segments: no elevation nor depressions  Comparison EKG is from July 22, 2020 [AR]   1332 I discussed all results with patient and answered all questions to the best of my ability. The patient was encouraged to follow up appropriately.      Routine discharge counseling was given, and the patient was instructed to promptly call or followup with their primary physician or even return to the ER if any worsening, changing or persistent symptoms.         [AR]      ED Course User Index  [AR] Bernadette Santacruz MD             BP - 157/57  HR - 75  TEMP -    O2 SATS - 98%    COMPLEXITY OF CARE  Admission was considered but after careful review of the patient's presentation, physical examination, diagnostic results, and response to treatment the patient may be safely discharged with outpatient follow-up.      DIAGNOSIS  Final diagnoses:   Paresthesias   Nonintractable headache, unspecified chronicity pattern, unspecified headache type   Hypertension,  unspecified type         DISPOSITION  ED Disposition       ED Disposition   Discharge    Condition   Stable    Comment   --                  FOLLOW UP  Dai Suazo MD  [address] [facility]  [address]  [facility] [id number] [phone]    Call today  to schedule followup to reassess BP and meds at that time        Prescribed Medications     Where to Get Your Medications        These medications were sent to [facility] [id number] - [place] - [address] - [phone]  - [phone]   [address] [address]      Phone: [phone]   amiodarone 200 MG tablet          Medication List      No changes were made to your prescriptions during this visit.                   Please note that portions of this document were completed with a voice recognition program.    Note Disclaimer: At [facility], we believe that sharing information builds trust and better relationships. You are receiving this note because you recently visited [facility]. It is possible you will see health information before a provider has talked with you about it. This kind of information can be easy to misunderstand. To help you fully understand what it means for your health, we urge you to discuss this note with your provider.         Laya Shahid PA-C  07/24/25 2025

## 2025-07-24 NOTE — ED PROVIDER NOTES
"MD ATTESTATION NOTE    SHARED VISIT: This visit was performed by BOTH a physician and an APC. The substantive portion of the medical decision making was performed by this attesting physician who made or approved the management plan and takes responsibility for patient management. All studies in the APC note (if performed) were independently interpreted by me.     The PRABHU and I have discussed this patient's history, physical exam, and treatment plan.  I have reviewed the documentation and affirm the documentation and agree with the treatment and plan.  The attached note describes my personal findings.      Independent Historians: Patient    A complete HPI/ROS/PMH/PSH/SH/FH are unobtainable due to: None    Chronic or social conditions impacting patient care (social determinants of health): None    Valentine Arora is a 75 y.o. female who presents to the ED c/o acute paresthesias, flushing, roaring sound \"in head\", and nausea about 45 min after taking am meds.  Pt with ongoing burning left sided cp she has had chronic that she relates to being in afib but attributed to msk pain by cardiology.  Pt with no sob, no palpitaiton, no syncope. She now feels better.           On exam:  GENERAL: pleasant cooperative f, well appearing, alert, no acute distress  SKIN: Warm, dry  HENT: Normocephalic, atraumatic  RESPIRATORY: relaxed breathing  MUSCULOSKELETAL: no deformity  NEURO: alert, moves all extremities, follows commands                                                             Labs  Recent Results (from the past 24 hours)   ECG 12 Lead ED Triage Standing Order; Chest Pain    Collection Time: 07/24/25 10:27 AM   Result Value Ref Range    QT Interval 383 ms    QTC Interval 427 ms   Comprehensive Metabolic Panel    Collection Time: 07/24/25 10:34 AM    Specimen: Arm, Right; Blood   Result Value Ref Range    Glucose 113 (H) 65 - 99 mg/dL    BUN 23.0 8.0 - 23.0 mg/dL    Creatinine 1.64 (H) 0.57 - 1.00 mg/dL    Sodium 135 (L) 136 " - 145 mmol/L    Potassium 4.1 3.5 - 5.2 mmol/L    Chloride 96 (L) 98 - 107 mmol/L    CO2 21.0 (L) 22.0 - 29.0 mmol/L    Calcium 10.0 8.6 - 10.5 mg/dL    Total Protein 7.6 6.0 - 8.5 g/dL    Albumin 4.7 3.5 - 5.2 g/dL    ALT (SGPT) 17 1 - 33 U/L    AST (SGOT) 21 1 - 32 U/L    Alkaline Phosphatase 73 39 - 117 U/L    Total Bilirubin 0.4 0.0 - 1.2 mg/dL    Globulin 2.9 gm/dL    A/G Ratio 1.6 g/dL    BUN/Creatinine Ratio 14.0 7.0 - 25.0    Anion Gap 18.0 (H) 5.0 - 15.0 mmol/L    eGFR 32.7 (L) >60.0 mL/min/1.73   High Sensitivity Troponin T    Collection Time: 07/24/25 10:34 AM    Specimen: Arm, Right; Blood   Result Value Ref Range    HS Troponin T 18 (H) <14 ng/L   Green Top (Gel)    Collection Time: 07/24/25 10:34 AM   Result Value Ref Range    Extra Tube Hold for add-ons.    Gold Top - SST    Collection Time: 07/24/25 10:34 AM   Result Value Ref Range    Extra Tube Hold for add-ons.    Light Blue Top    Collection Time: 07/24/25 10:34 AM   Result Value Ref Range    Extra Tube Hold for add-ons.    CBC Auto Differential    Collection Time: 07/24/25 10:34 AM    Specimen: Arm, Right; Blood   Result Value Ref Range    WBC 8.99 3.40 - 10.80 10*3/mm3    RBC 4.33 3.77 - 5.28 10*6/mm3    Hemoglobin 12.7 12.0 - 15.9 g/dL    Hematocrit 39.8 34.0 - 46.6 %    MCV 91.9 79.0 - 97.0 fL    MCH 29.3 26.6 - 33.0 pg    MCHC 31.9 31.5 - 35.7 g/dL    RDW 14.0 12.3 - 15.4 %    RDW-SD 47.1 37.0 - 54.0 fl    MPV 8.8 6.0 - 12.0 fL    Platelets 451 (H) 140 - 450 10*3/mm3    Neutrophil % 75.7 42.7 - 76.0 %    Lymphocyte % 12.5 (L) 19.6 - 45.3 %    Monocyte % 8.0 5.0 - 12.0 %    Eosinophil % 2.2 0.3 - 6.2 %    Basophil % 0.8 0.0 - 1.5 %    Immature Grans % 0.8 (H) 0.0 - 0.5 %    Neutrophils, Absolute 6.81 1.70 - 7.00 10*3/mm3    Lymphocytes, Absolute 1.12 0.70 - 3.10 10*3/mm3    Monocytes, Absolute 0.72 0.10 - 0.90 10*3/mm3    Eosinophils, Absolute 0.20 0.00 - 0.40 10*3/mm3    Basophils, Absolute 0.07 0.00 - 0.20 10*3/mm3    Immature Grans,  Absolute 0.07 (H) 0.00 - 0.05 10*3/mm3    nRBC 0.0 0.0 - 0.2 /100 WBC   BNP    Collection Time: 07/24/25 10:34 AM    Specimen: Arm, Right; Blood   Result Value Ref Range    proBNP 1,245.0 (H) 0.0 - 900.0 pg/mL   Magnesium    Collection Time: 07/24/25 10:34 AM    Specimen: Arm, Right; Blood   Result Value Ref Range    Magnesium 2.2 1.6 - 2.4 mg/dL   Phosphorus    Collection Time: 07/24/25 10:34 AM    Specimen: Arm, Right; Blood   Result Value Ref Range    Phosphorus 3.3 2.5 - 4.5 mg/dL   High Sensitivity Troponin T 1Hr    Collection Time: 07/24/25 12:10 PM    Specimen: Blood   Result Value Ref Range    HS Troponin T 17 (H) <14 ng/L    Troponin T Numeric Delta -1 ng/L    Troponin T % Delta -6 Abnormal if >/= 20%       Radiology  XR Chest 1 View  Result Date: 7/24/2025  XR CHEST 1 VW-  Clinical: Chest pain  COMPARISON examination 7/22/2025  FINDINGS: Median sternotomy wires and vascular markers in position, cardiac size upper limits of normal to mildly enlarged. No pleural effusion, vascular congestion or acute airspace disease has developed.  CONCLUSION: No acute pulmonary process.  This report was finalized on 7/24/2025 11:12 AM by Dr. Eric Fofana M.D on Workstation: [id number]        Medical Decision Making:  ED Course as of 07/24/25 1754   Thu Jul 24, 2025   1204 I viewed patient's chest x-ray and on my interpretation patient has borderline heart size with no large pulmonary consolidation [AR]   1209 EKG ER MD interpretation   Time: 10: 27  Rhythm and rate: Atrial flutter with a 3-1 AV block at a rate of 74  Axis: Normal  QRS complexes: LVH with repolarization abnormality  ST segments: no elevation nor depressions  Comparison EKG is from July 22, 2020 [AR]   1332 I discussed all results with patient and answered all questions to the best of my ability. The patient was encouraged to follow up appropriately.      Routine discharge counseling was given, and the patient was instructed to promptly call or followup  with their primary physician or even return to the ER if any worsening, changing or persistent symptoms.         [AR]      ED Course User Index  [AR] Bernadette Santacruz MD       MDM: The differential diagnosis for generalized weakness is quite broad and includes but is not limited to: hypoglycemia, orthostasis, arrhythmia, ACS, PE, electrolyte disturbances, dka, renal failure, profound anemia, aortic dissection, severe aortic stenosis, gi bleeding, intoxication, myasthenia gravis, sepsis, and medication effects--among other possibilities.    Procedures:  Procedures        PPE: I followed hospital protocols for proper PPE based on patient presentation including use of N95 mask for suspected infectious respiratory conditions.  Proper hand hygiene was performed both before and after the patient encounter.          Diagnosis  Final diagnoses:   Paresthesias   Nonintractable headache, unspecified chronicity pattern, unspecified headache type   Hypertension, unspecified type       Note Disclaimer: At [facility], we believe that sharing information builds trust and better relationships. You are receiving this note because you recently visited [facility]. It is possible you will see health information before a provider has talked with you about it. This kind of information can be easy to misunderstand. To help you fully understand what it means for your health, we urge you to discuss this note with your provider.       Bernadette Santacruz MD  07/26/25 0101

## 2025-07-24 NOTE — ED TRIAGE NOTES
Pt reports dc'd from hospital yesterday, today is having chest pain, arm burning, n/v, headache, started about 30min after starting a new medication

## 2025-07-24 NOTE — TELEPHONE ENCOUNTER
Caller: Valentine Arora    Relationship to patient: Self    Best call back number: [phone]     Patient is needing: PT IS HAVING BURNING IN CHEST, AND IS CALLING TO MAKE HOSP. NO TIMEFRAME DIRECTIONS LISTED, PLS CALL TO SCHEDULE. PT DECLINED TRIAGE, BUT IS ACTIVELY HAVING BURNING IN CHEST, PT STATED SHE'S HAD THAT SENSATION FOR AWHILE.

## 2025-07-24 NOTE — TELEPHONE ENCOUNTER
Patient has been to the ER twice this week. Can we get her a hospital f/u with Sofi please? Thanks     She can be reached at [phone]

## 2025-07-24 NOTE — CASE MANAGEMENT/SOCIAL WORK
Case Management Discharge Note      Final Note: Home, spouse to provide transportation.    Provided Post Acute Provider List?: N/A  Provided Post Acute Provider Quality & Resource List?: N/A    Selected Continued Care - Discharged on 7/23/2025 Admission date: 7/22/2025 - Discharge disposition: Home or Self Care      Destination    No services have been selected for the patient.                Durable Medical Equipment    No services have been selected for the patient.                Dialysis/Infusion    No services have been selected for the patient.                Home Medical Care    No services have been selected for the patient.                Therapy    No services have been selected for the patient.                Community Resources    No services have been selected for the patient.                Community & DME    No services have been selected for the patient.                    Transportation Services  Transportation: Private Transportation  Private: Car    Final Discharge Disposition Code: 01 - home or self-care

## 2025-07-25 NOTE — TELEPHONE ENCOUNTER
Sent a message to our schedulers yesterday about getting her an apt with Sofi Alex. See other telephone encounter from me yesterday.

## 2025-07-27 ENCOUNTER — APPOINTMENT (OUTPATIENT)
Dept: GENERAL RADIOLOGY | Facility: HOSPITAL | Age: 75
End: 2025-07-27
Payer: MEDICARE

## 2025-07-27 ENCOUNTER — HOSPITAL ENCOUNTER (OUTPATIENT)
Facility: HOSPITAL | Age: 75
Setting detail: OBSERVATION
Discharge: HOME | End: 2025-07-29
Attending: STUDENT IN AN ORGANIZED HEALTH CARE EDUCATION/TRAINING PROGRAM | Admitting: STUDENT IN AN ORGANIZED HEALTH CARE EDUCATION/TRAINING PROGRAM
Payer: MEDICARE

## 2025-07-27 ENCOUNTER — DOCUMENTATION (OUTPATIENT)
Age: 75
End: 2025-07-27
Payer: MEDICARE

## 2025-07-27 DIAGNOSIS — R07.9 CHEST PAIN, UNSPECIFIED TYPE: ICD-10-CM

## 2025-07-27 DIAGNOSIS — I48.92 ATRIAL FLUTTER, UNSPECIFIED TYPE: Primary | ICD-10-CM

## 2025-07-27 PROBLEM — I49.9 ARRHYTHMIA: Status: ACTIVE | Noted: 2025-07-27

## 2025-07-27 LAB
ALBUMIN SERPL-MCNC: 4.4 G/DL (ref 3.5–5.2)
ALBUMIN/GLOB SERPL: 1.7 G/DL
ALP SERPL-CCNC: 69 U/L (ref 39–117)
ALT SERPL W P-5'-P-CCNC: 16 U/L (ref 1–33)
ANION GAP SERPL CALCULATED.3IONS-SCNC: 13 MMOL/L (ref 5–15)
AST SERPL-CCNC: 19 U/L (ref 1–32)
BASOPHILS # BLD AUTO: 0.04 10*3/MM3 (ref 0–0.2)
BASOPHILS NFR BLD AUTO: 0.5 % (ref 0–1.5)
BILIRUB SERPL-MCNC: 0.3 MG/DL (ref 0–1.2)
BUN SERPL-MCNC: 22 MG/DL (ref 8–23)
BUN/CREAT SERPL: 14.9 (ref 7–25)
CALCIUM SPEC-SCNC: 9.4 MG/DL (ref 8.6–10.5)
CHLORIDE SERPL-SCNC: 98 MMOL/L (ref 98–107)
CO2 SERPL-SCNC: 22 MMOL/L (ref 22–29)
CREAT SERPL-MCNC: 1.48 MG/DL (ref 0.57–1)
DEPRECATED RDW RBC AUTO: 45.8 FL (ref 37–54)
EGFRCR SERPLBLD CKD-EPI 2021: 37 ML/MIN/1.73
EOSINOPHIL # BLD AUTO: 0.14 10*3/MM3 (ref 0–0.4)
EOSINOPHIL NFR BLD AUTO: 1.9 % (ref 0.3–6.2)
ERYTHROCYTE [DISTWIDTH] IN BLOOD BY AUTOMATED COUNT: 13.8 % (ref 12.3–15.4)
GEN 5 1HR TROPONIN T REFLEX: 15 NG/L
GLOBULIN UR ELPH-MCNC: 2.6 GM/DL
GLUCOSE SERPL-MCNC: 114 MG/DL (ref 65–99)
HCT VFR BLD AUTO: 38.3 % (ref 34–46.6)
HGB BLD-MCNC: 12.2 G/DL (ref 12–15.9)
HOLD SPECIMEN: NORMAL
HOLD SPECIMEN: NORMAL
IMM GRANULOCYTES # BLD AUTO: 0.05 10*3/MM3 (ref 0–0.05)
IMM GRANULOCYTES NFR BLD AUTO: 0.7 % (ref 0–0.5)
LYMPHOCYTES # BLD AUTO: 1.2 10*3/MM3 (ref 0.7–3.1)
LYMPHOCYTES NFR BLD AUTO: 16.1 % (ref 19.6–45.3)
MCH RBC QN AUTO: 28.8 PG (ref 26.6–33)
MCHC RBC AUTO-ENTMCNC: 31.9 G/DL (ref 31.5–35.7)
MCV RBC AUTO: 90.5 FL (ref 79–97)
MONOCYTES # BLD AUTO: 0.63 10*3/MM3 (ref 0.1–0.9)
MONOCYTES NFR BLD AUTO: 8.5 % (ref 5–12)
NEUTROPHILS NFR BLD AUTO: 5.38 10*3/MM3 (ref 1.7–7)
NEUTROPHILS NFR BLD AUTO: 72.3 % (ref 42.7–76)
NRBC BLD AUTO-RTO: 0 /100 WBC (ref 0–0.2)
PLATELET # BLD AUTO: 398 10*3/MM3 (ref 140–450)
PMV BLD AUTO: 8.4 FL (ref 6–12)
POTASSIUM SERPL-SCNC: 3.8 MMOL/L (ref 3.5–5.2)
PROT SERPL-MCNC: 7 G/DL (ref 6–8.5)
RBC # BLD AUTO: 4.23 10*6/MM3 (ref 3.77–5.28)
SODIUM SERPL-SCNC: 133 MMOL/L (ref 136–145)
TROPONIN T % DELTA: 0
TROPONIN T NUMERIC DELTA: 0 NG/L
TROPONIN T SERPL HS-MCNC: 15 NG/L
WBC NRBC COR # BLD AUTO: 7.44 10*3/MM3 (ref 3.4–10.8)
WHOLE BLOOD HOLD COAG: NORMAL
WHOLE BLOOD HOLD SPECIMEN: NORMAL

## 2025-07-27 PROCEDURE — 85025 COMPLETE CBC W/AUTO DIFF WBC: CPT

## 2025-07-27 PROCEDURE — 93010 ELECTROCARDIOGRAM REPORT: CPT | Performed by: INTERNAL MEDICINE

## 2025-07-27 PROCEDURE — G0378 HOSPITAL OBSERVATION PER HR: HCPCS

## 2025-07-27 PROCEDURE — 93005 ELECTROCARDIOGRAM TRACING: CPT

## 2025-07-27 PROCEDURE — 93005 ELECTROCARDIOGRAM TRACING: CPT | Performed by: STUDENT IN AN ORGANIZED HEALTH CARE EDUCATION/TRAINING PROGRAM

## 2025-07-27 PROCEDURE — 80053 COMPREHEN METABOLIC PANEL: CPT | Performed by: STUDENT IN AN ORGANIZED HEALTH CARE EDUCATION/TRAINING PROGRAM

## 2025-07-27 PROCEDURE — 84484 ASSAY OF TROPONIN QUANT: CPT | Performed by: STUDENT IN AN ORGANIZED HEALTH CARE EDUCATION/TRAINING PROGRAM

## 2025-07-27 PROCEDURE — 99285 EMERGENCY DEPT VISIT HI MDM: CPT

## 2025-07-27 PROCEDURE — 71045 X-RAY EXAM CHEST 1 VIEW: CPT

## 2025-07-27 PROCEDURE — 36415 COLL VENOUS BLD VENIPUNCTURE: CPT

## 2025-07-27 RX ORDER — ESCITALOPRAM OXALATE 10 MG/1
5 TABLET ORAL NIGHTLY
Status: DISCONTINUED | OUTPATIENT
Start: 2025-07-27 | End: 2025-07-29 | Stop reason: HOSPADM

## 2025-07-27 RX ORDER — EPLERENONE 25 MG/1
50 TABLET ORAL
Status: DISCONTINUED | OUTPATIENT
Start: 2025-07-28 | End: 2025-07-29 | Stop reason: HOSPADM

## 2025-07-27 RX ORDER — BISACODYL 10 MG
10 SUPPOSITORY, RECTAL RECTAL DAILY PRN
Status: DISCONTINUED | OUTPATIENT
Start: 2025-07-27 | End: 2025-07-29 | Stop reason: HOSPADM

## 2025-07-27 RX ORDER — ACETAMINOPHEN 325 MG/1
650 TABLET ORAL EVERY 4 HOURS PRN
Status: DISCONTINUED | OUTPATIENT
Start: 2025-07-27 | End: 2025-07-29 | Stop reason: HOSPADM

## 2025-07-27 RX ORDER — ONDANSETRON 2 MG/ML
4 INJECTION INTRAMUSCULAR; INTRAVENOUS EVERY 6 HOURS PRN
Status: DISCONTINUED | OUTPATIENT
Start: 2025-07-27 | End: 2025-07-29 | Stop reason: HOSPADM

## 2025-07-27 RX ORDER — PANTOPRAZOLE SODIUM 40 MG/1
40 TABLET, DELAYED RELEASE ORAL
Status: DISCONTINUED | OUTPATIENT
Start: 2025-07-27 | End: 2025-07-29 | Stop reason: HOSPADM

## 2025-07-27 RX ORDER — NIFEDIPINE 60 MG/1
60 TABLET, EXTENDED RELEASE ORAL 2 TIMES DAILY
Status: DISCONTINUED | OUTPATIENT
Start: 2025-07-27 | End: 2025-07-29 | Stop reason: HOSPADM

## 2025-07-27 RX ORDER — CARVEDILOL 12.5 MG/1
12.5 TABLET ORAL 2 TIMES DAILY WITH MEALS
Status: DISCONTINUED | OUTPATIENT
Start: 2025-07-27 | End: 2025-07-29 | Stop reason: HOSPADM

## 2025-07-27 RX ORDER — POLYETHYLENE GLYCOL 3350 17 G/17G
17 POWDER, FOR SOLUTION ORAL DAILY PRN
Status: DISCONTINUED | OUTPATIENT
Start: 2025-07-27 | End: 2025-07-29 | Stop reason: HOSPADM

## 2025-07-27 RX ORDER — BISACODYL 5 MG/1
5 TABLET, DELAYED RELEASE ORAL DAILY PRN
Status: DISCONTINUED | OUTPATIENT
Start: 2025-07-27 | End: 2025-07-29 | Stop reason: HOSPADM

## 2025-07-27 RX ORDER — CLOPIDOGREL BISULFATE 75 MG/1
75 TABLET ORAL NIGHTLY
Status: DISCONTINUED | OUTPATIENT
Start: 2025-07-27 | End: 2025-07-29 | Stop reason: HOSPADM

## 2025-07-27 RX ORDER — SODIUM CHLORIDE 0.9 % (FLUSH) 0.9 %
10 SYRINGE (ML) INJECTION AS NEEDED
Status: DISCONTINUED | OUTPATIENT
Start: 2025-07-27 | End: 2025-07-29 | Stop reason: HOSPADM

## 2025-07-27 RX ORDER — AMOXICILLIN 250 MG
2 CAPSULE ORAL 2 TIMES DAILY PRN
Status: DISCONTINUED | OUTPATIENT
Start: 2025-07-27 | End: 2025-07-29 | Stop reason: HOSPADM

## 2025-07-27 RX ORDER — CLONIDINE HYDROCHLORIDE 0.1 MG/1
0.1 TABLET ORAL 2 TIMES DAILY
Status: DISCONTINUED | OUTPATIENT
Start: 2025-07-27 | End: 2025-07-29 | Stop reason: HOSPADM

## 2025-07-27 RX ORDER — ONDANSETRON 4 MG/1
4 TABLET, ORALLY DISINTEGRATING ORAL EVERY 6 HOURS PRN
Status: DISCONTINUED | OUTPATIENT
Start: 2025-07-27 | End: 2025-07-29 | Stop reason: HOSPADM

## 2025-07-27 RX ORDER — ALLOPURINOL 100 MG/1
100 TABLET ORAL 2 TIMES DAILY
Status: DISCONTINUED | OUTPATIENT
Start: 2025-07-27 | End: 2025-07-29 | Stop reason: HOSPADM

## 2025-07-27 RX ORDER — TERAZOSIN 1 MG/1
1 CAPSULE ORAL NIGHTLY
Status: DISCONTINUED | OUTPATIENT
Start: 2025-07-27 | End: 2025-07-29 | Stop reason: HOSPADM

## 2025-07-27 RX ORDER — HYDRALAZINE HYDROCHLORIDE 50 MG/1
100 TABLET, FILM COATED ORAL EVERY 8 HOURS SCHEDULED
Status: DISCONTINUED | OUTPATIENT
Start: 2025-07-27 | End: 2025-07-29 | Stop reason: HOSPADM

## 2025-07-27 RX ORDER — ALPRAZOLAM 0.25 MG
0.25 TABLET ORAL 3 TIMES DAILY PRN
Status: DISCONTINUED | OUTPATIENT
Start: 2025-07-27 | End: 2025-07-29 | Stop reason: HOSPADM

## 2025-07-27 RX ORDER — AMIODARONE HYDROCHLORIDE 200 MG/1
200 TABLET ORAL NIGHTLY
Status: DISCONTINUED | OUTPATIENT
Start: 2025-07-27 | End: 2025-07-29 | Stop reason: HOSPADM

## 2025-07-27 RX ORDER — FERROUS SULFATE 325(65) MG
325 TABLET ORAL
Status: DISCONTINUED | OUTPATIENT
Start: 2025-07-28 | End: 2025-07-29 | Stop reason: HOSPADM

## 2025-07-27 RX ORDER — ROSUVASTATIN CALCIUM 10 MG/1
10 TABLET, COATED ORAL DAILY
Status: DISCONTINUED | OUTPATIENT
Start: 2025-07-28 | End: 2025-07-29 | Stop reason: HOSPADM

## 2025-07-27 RX ORDER — ACETAMINOPHEN 160 MG/5ML
650 SOLUTION ORAL EVERY 4 HOURS PRN
Status: DISCONTINUED | OUTPATIENT
Start: 2025-07-27 | End: 2025-07-29 | Stop reason: HOSPADM

## 2025-07-27 RX ORDER — ASPIRIN 325 MG
325 TABLET ORAL ONCE
Status: COMPLETED | OUTPATIENT
Start: 2025-07-27 | End: 2025-07-27

## 2025-07-27 RX ORDER — FUROSEMIDE 40 MG/1
40 TABLET ORAL DAILY
Status: DISCONTINUED | OUTPATIENT
Start: 2025-07-28 | End: 2025-07-29 | Stop reason: HOSPADM

## 2025-07-27 RX ORDER — ACETAMINOPHEN 650 MG/1
650 SUPPOSITORY RECTAL EVERY 4 HOURS PRN
Status: DISCONTINUED | OUTPATIENT
Start: 2025-07-27 | End: 2025-07-29 | Stop reason: HOSPADM

## 2025-07-27 RX ADMIN — APIXABAN 5 MG: 5 TABLET, FILM COATED ORAL at 20:08

## 2025-07-27 RX ADMIN — CLOPIDOGREL BISULFATE 75 MG: 75 TABLET, FILM COATED ORAL at 20:08

## 2025-07-27 RX ADMIN — CARVEDILOL 12.5 MG: 12.5 TABLET, FILM COATED ORAL at 20:08

## 2025-07-27 RX ADMIN — HYDRALAZINE HYDROCHLORIDE 100 MG: 50 TABLET ORAL at 21:44

## 2025-07-27 RX ADMIN — CLONIDINE HYDROCHLORIDE 0.1 MG: 0.1 TABLET ORAL at 20:08

## 2025-07-27 RX ADMIN — PANTOPRAZOLE SODIUM 40 MG: 40 TABLET, DELAYED RELEASE ORAL at 20:07

## 2025-07-27 RX ADMIN — TERAZOSIN 1 MG: 1 CAPSULE ORAL at 21:44

## 2025-07-27 RX ADMIN — ESCITALOPRAM 5 MG: 10 TABLET, FILM COATED ORAL at 20:07

## 2025-07-27 RX ADMIN — Medication 10 ML: at 20:08

## 2025-07-27 RX ADMIN — Medication 2.5 MG: at 20:08

## 2025-07-27 RX ADMIN — ALLOPURINOL 100 MG: 100 TABLET ORAL at 20:08

## 2025-07-27 RX ADMIN — ASPIRIN 325 MG ORAL TABLET 325 MG: 325 PILL ORAL at 18:28

## 2025-07-27 RX ADMIN — AMIODARONE HYDROCHLORIDE 200 MG: 200 TABLET ORAL at 20:08

## 2025-07-27 RX ADMIN — NIFEDIPINE 60 MG: 60 TABLET, EXTENDED RELEASE ORAL at 21:44

## 2025-07-27 NOTE — PROGRESS NOTES
Patient continues to feel terrible. Her  called the triage line this afternoon and they plan to take her to the emergency room. She is still having chest pain that is a burning sensation in her chest with left arm discomfort. Her blood pressures are uncontrolled in the 170s to 180s systolic at home. She is dizzy, shaking and can eat food. She feels nauseous. I told them to go to the emergency room. She believes it is because she has been back in atrial fibrillation. I will let the emergency room know that she is headed in to be seen.

## 2025-07-27 NOTE — H&P
HISTORY AND PHYSICAL   [facility]        Date of Admission: 2025  Patient Identification:  Name: Valentine Arora  Age: 75 y.o.  Sex: female  :  1950  MRN: [id number]                     Primary Care Physician: Aliya Alejandro MD    Chief Complaint:  75 year old female presented to the emergency room with chest burning and pain; she has also had pain of her left arm; she contacted her cardiologist and was advised to come to the ED; she has a history of a fib and this felt similar to her prior episodes; she denies fever or chills; no nausea or vomiting or diaphoresis    History of Present Illness:   As above    Past Medical History:  Past Medical History:   Diagnosis Date    Arthritis     Atrial fibrillation     Atrial flutter, unspecified type 2023    Sarabia esophagus     CAD (coronary artery disease)     Chronic back pain     Chronic kidney disease, stage 3a 2024    Colon polyp     GERD (gastroesophageal reflux disease)     Hyperlipidemia     Hypertension     PAD (peripheral artery disease) 2019    PONV (postoperative nausea and vomiting)     Shingles     Type 2 diabetes mellitus      Past Surgical History:  Past Surgical History:   Procedure Laterality Date    BREAST BIOPSY Right 2024    benign    CARDIAC CATHETERIZATION N/A 2024    Procedure: Coronary angiography, Left heart catheterization, Left ventricular angiography;  Surgeon: Dai Suazo MD;  Location: [facility] CATH INVASIVE LOCATION;  Service: Cardiology;  Laterality: N/A;    CARDIAC CATHETERIZATION  2024    Procedure: Saphenous Vein Graft;  Surgeon: Dai Suazo MD;  [facility];  Service: Cardiology;;    COLONOSCOPY      COLONOSCOPY N/A 2020    Procedure: COLONOSCOPY;  Surgeon: Mathew Roberts MD;  Location: [facility] OR;  Service: Gastroenterology;  Laterality: N/A;  Descending colon polyp x 2, Ascending colon polyp, Sigmoid colon polyp,  Rectal polyp    COLONOSCOPY N/A 08/07/2023    Procedure: COLONOSCOPY;  Surgeon: Mathew Roberts MD;  Location:  [facility] OR;  Service: Gastroenterology;  Laterality: N/A;  Normal    CORONARY ARTERY BYPASS GRAFT  1995    x 2 vessels    ENDOSCOPY N/A 06/24/2020    Procedure: ESOPHAGOGASTRODUODENOSCOPY;  Surgeon: Mathew Roberts MD;  Location:  [facility] OR;  Service: Gastroenterology;  Laterality: N/A;  Ulcerative esophagitis, Gastritis, Duodenitis  Duodenal biopsy, gastric biopsy, distal esophageal biopsy    ENDOSCOPY N/A 09/17/2020    Procedure: ESOPHAGOGASTRODUODENOSCOPY with biopsies;  Surgeon: Mathew Roberts MD;  Location:  [facility] OR;  Service: Gastroenterology;  Laterality: N/A;  Esophagitis  Sarabia's Esophagus  Hiatal hernia  Gastritis  Gastric biopsy  Distal esophagus biopsy    ENDOSCOPY N/A 08/07/2023    Procedure: Esophagogastroduedenoscopy;  Surgeon: Mathew Roberts MD;  Location:  [facility] OR;  Service: Gastroenterology;  Laterality: N/A;  Gastritis; short segment Sarabia's; Esophageal stricture- dilatation; Biopsies- gastric, distal esophagus    INNER EAR SURGERY      JOINT REPLACEMENT      right knee    LEG SURGERY      x 2 s/p fall    LUMBAR FUSION      L4/L5    SKIN GRAFT      to left lower leg x 2    TONSILLECTOMY      UPPER GASTROINTESTINAL ENDOSCOPY      VEIN SURGERY      WRIST FUSION Left       Home Meds:  Medications Prior to Admission   Medication Sig Dispense Refill Last Dose/Taking    allopurinol (ZYLOPRIM) 100 MG tablet Take 1 tablet by mouth 2 (Two) Times a Day.   7/27/2025 Morning    ALPRAZolam (XANAX) 0.25 MG tablet Take 1 tablet by mouth As Needed for Anxiety.   7/26/2025 Evening    amiodarone (PACERONE) 200 MG tablet TAKE 1 TABLET BY MOUTH DAILY 90 tablet 1 7/26/2025 Evening    apixaban (ELIQUIS) 5 MG tablet tablet Take 1 tablet by mouth 2 (Two) Times a Day. 180 tablet 3 7/27/2025 Morning    carvedilol (COREG) 12.5 MG tablet Take 1 tablet by mouth 2  (Two) Times a Day With Meals. 60 tablet 11 7/27/2025 Morning    cloNIDine (CATAPRES) 0.1 MG tablet Take 1 tablet by mouth 2 (Two) Times a Day. 60 tablet 11 7/27/2025 Morning    clopidogrel (PLAVIX) 75 MG tablet Take 1 tablet by mouth Every Night. 90 tablet 3 7/26/2025 Evening    doxazosin (Cardura) 1 MG tablet Take 1 tablet by mouth Every Night. 30 tablet 2 7/26/2025 Evening    eplerenone (INSPRA) 50 MG tablet Take 1 tablet by mouth Daily for 30 days. 30 tablet 0 7/27/2025 Morning    escitalopram (LEXAPRO) 5 MG tablet Take 1 tablet by mouth Every Night.   7/26/2025 Evening    ferrous gluconate (FERGON) 324 MG tablet Take 1 tablet by mouth Daily With Breakfast.   7/27/2025 Morning    furosemide (LASIX) 40 MG tablet Take 1 tablet by mouth Daily.   7/27/2025 Morning    hydrALAZINE (APRESOLINE) 100 MG tablet TAKE 1 TABLET BY MOUTH 3 TIMES A  tablet 1 7/27/2025 Morning    metFORMIN ER (GLUCOPHAGE-XR) 500 MG 24 hr tablet Take 1 tablet by mouth 2 (Two) Times a Day Before Meals.   7/27/2025 Morning    NIFEdipine XL (ADALAT CC) 60 MG 24 hr tablet Take 1 tablet by mouth 2 (Two) Times a Day for 30 days. 60 tablet 0 7/27/2025 Morning    ondansetron ODT (ZOFRAN-ODT) 4 MG disintegrating tablet Take 1 tablet by mouth As Needed.   7/27/2025 Noon    pantoprazole (PROTONIX) 40 MG EC tablet Take 1 tablet by mouth 2 (Two) Times a Day. 180 tablet 0 7/27/2025 Morning    rosuvastatin (CRESTOR) 10 MG tablet Take 1 tablet by mouth Daily.   7/27/2025 Morning       Allergies:  Allergies   Allergen Reactions    Codeine Itching    Other Unknown (See Comments)     Vicryl: doesn't heal     Immunizations:  Immunization History   Administered Date(s) Administered    COVID-19 (PFIZER) Purple Cap Monovalent 02/04/2021, 02/25/2021, 10/06/2021     Social History:   Social History     Social History Narrative    Not on file     Social History     Socioeconomic History    Marital status:    Tobacco Use    Smoking status: Former     Current  "packs/day: 0.00     Average packs/day: 0.5 packs/day for 20.0 years (10.0 ttl pk-yrs)     Types: Cigarettes     Start date:      Quit date:      Years since quittin.5    Smokeless tobacco: Never    Tobacco comments:     QUIT    Vaping Use    Vaping status: Never Used   Substance and Sexual Activity    Alcohol use: Not Currently     Comment: rarely    Drug use: No    Sexual activity: Defer       Family History:  Family History   Problem Relation Age of Onset    Heart disease Mother     Hypertension Mother     Heart disease Father     Heart attack Brother     Heart disease Brother     Heart disease Brother     Colon cancer Neg Hx     Colon polyps Neg Hx     Breast cancer Neg Hx         Review of Systems  See history of present illness and past medical history.  Patient denies headache, dizziness, syncope, falls, trauma, change in vision, change in hearing, change in taste, changes in weight, changes in appetite, focal weakness, numbness, or paresthesia.  Patient denies   dyspnea, orthopnea, PND, cough, sinus pressure, rhinorrhea, epistaxis, hemoptysis, nausea, vomiting,hematemesis, diarrhea, constipation or hematochezia.  Denies cold or heat intolerance, polydipsia, polyuria, polyphagia. Denies hematuria, pyuria, dysuria, hesitancy, frequency or urgency. Denies consumption of raw and under cooked meats foods or change in water source.  Denies fever, chills, sweats, night sweats.        Objective:  T Max 24 hrs: Temp (24hrs), Av.9 °F (36.6 °C), Min:97.8 °F (36.6 °C), Max:97.9 °F (36.6 °C)    Vitals Ranges:   Temp:  [97.8 °F (36.6 °C)-97.9 °F (36.6 °C)] 97.9 °F (36.6 °C)  Heart Rate:  [63-73] 63  Resp:  [18] 18  BP: (144-177)/(57-64) 144/57      Exam:  /57   Pulse 63   Temp 97.9 °F (36.6 °C) (Oral)   Resp 18   Ht 157.5 cm (62\")   Wt 80.7 kg (177 lb 14.6 oz)   SpO2 98%   BMI 32.54 kg/m²     General Appearance:    Alert, cooperative, no distress, appears stated age   Head:    " Normocephalic, without obvious abnormality, atraumatic   Eyes:    PERRL, conjunctivae/corneas clear, EOM's intact, both eyes   Ears:    Normal external ear canals, both ears   Nose:   Nares normal, septum midline, mucosa normal, no drainage    or sinus tenderness   Throat:   Lips, mucosa, and tongue normal   Neck:   Supple, symmetrical, trachea midline, no adenopathy;     thyroid:  no enlargement/tenderness/nodules; no carotid    bruit or JVD   Back:     Symmetric, no curvature, ROM normal, no CVA tenderness   Lungs:     Clear to auscultation bilaterally, respirations unlabored   Chest Wall:    No tenderness or deformity    Heart:    Regular rate and rhythm, S1 and S2 normal, no murmur, rub   or gallop   Abdomen:     Soft, nontender, bowel sounds active all four quadrants,     no masses, no hepatomegaly, no splenomegaly   Extremities:   Extremities normal, atraumatic, no cyanosis or edema      .    Data Review:  Labs in chart were reviewed.  WBC   Date Value Ref Range Status   07/27/2025 7.44 3.40 - 10.80 10*3/mm3 Final     Hemoglobin   Date Value Ref Range Status   07/27/2025 12.2 12.0 - 15.9 g/dL Final     Hematocrit   Date Value Ref Range Status   07/27/2025 38.3 34.0 - 46.6 % Final     Platelets   Date Value Ref Range Status   07/27/2025 398 140 - 450 10*3/mm3 Final     Sodium   Date Value Ref Range Status   07/27/2025 133 (L) 136 - 145 mmol/L Final     Potassium   Date Value Ref Range Status   07/27/2025 3.8 3.5 - 5.2 mmol/L Final     Chloride   Date Value Ref Range Status   07/27/2025 98 98 - 107 mmol/L Final     CO2   Date Value Ref Range Status   07/27/2025 22.0 22.0 - 29.0 mmol/L Final     BUN   Date Value Ref Range Status   07/27/2025 22.0 8.0 - 23.0 mg/dL Final     Creatinine   Date Value Ref Range Status   07/27/2025 1.48 (H) 0.57 - 1.00 mg/dL Final     Glucose   Date Value Ref Range Status   07/27/2025 114 (H) 65 - 99 mg/dL Final     Calcium   Date Value Ref Range Status   07/27/2025 9.4 8.6 - 10.5  mg/dL Final     AST (SGOT)   Date Value Ref Range Status   07/27/2025 19 1 - 32 U/L Final     ALT (SGPT)   Date Value Ref Range Status   07/27/2025 16 1 - 33 U/L Final     Alkaline Phosphatase   Date Value Ref Range Status   07/27/2025 69 39 - 117 U/L Final                Imaging Results (All)       Procedure Component Value Units Date/Time    XR Chest 1 View [[id number]] Collected: 07/27/25 1551     Updated: 07/27/25 1558    Narrative:      Emergency portable view of the chest on 7/27/2025     CLINICAL HISTORY: This is a 75-year-old female patient with chest pain     This is correlated to prior portable chest x-ray on 7/24/2025.     FINDINGS: There are multiple sternal wires from previous cardiac  surgery. The cardiomediastinal silhouette is mildly enlarged and the  pulmonary vasculature is within normal limits. The lungs are clear. The  costophrenic angles are sharp.       Impression:      1. No definite active disease is seen in the chest with no change when  compared to prior portable chest x-rays on 7/24/2025 and 7/22/2025.     2. There are multiple sternal wires from previous cardiac surgery and  there is mild enlargement of the cardiomediastinal silhouette.     This report was finalized on 7/27/2025 3:55 PM by Dr. Sathish Garcia M.D  on Workstation: [id number]                 Assessment:  Active Hospital Problems    Diagnosis  POA    **Arrhythmia [I49.9]  Yes      Resolved Hospital Problems   No resolved problems to display.   Chest pain  Cad  Hypertension  Hyperlipidemia  Barretts esophagus  Diabetes  A flutter/fib  Ckd3  pad    Plan:  Cardiology to see  Troponin is flat  Monitor on telemetry  Trend labs  Dw patient, ed provider  Patient is full code and  is gail Kaur Ag Chapman MD  7/27/2025  18:39 EDT

## 2025-07-27 NOTE — ED TRIAGE NOTES
"Patient to ED via pv from home c/o chest \"burning\"and left arm pain x week and a half. Patient sent to ED by her oncologist.   "

## 2025-07-27 NOTE — ED PROVIDER NOTES
EMERGENCY DEPARTMENT ENCOUNTER  Room Number:  16/16  PCP: Aliya Alejandro MD  Independent Historians: Patient and Family      HPI:  Chief Complaint: had concerns including Chest Pain.       Context: Valentine Arora is a 75 y.o. female with a medical history of HLD, HTN, CAD s/p CABG, PAD, A-flutter, diabetes who presents to the ED c/o acute chest pain and left arm pain as well as arrhythmia.  Patient states she has been having symptoms for the last several weeks and has been seen in the ER multiple times including an admission and cardiology consultation.  Patient has had some medication changes which have not resulted in resolution of symptoms.  Patient denies actual chest pain but instead describes a burning sensation through her chest and into her left arm.  Patient also has noted hypertension at home.  Patient called her cardiology team today and states that she was instructed to come to the ER for admission so they could figure out what is going on.      Review of prior external notes (non-ED) -and- Review of prior external test results outside of this encounter: Consult note from cardiology from 7/23/2025 reviewed and notable for consultation secondary to high blood pressure.  Patient noted to have a cardiac catheterization in April 2020 for.  Patient also noted to have intermittent episodes of atrial fib with rate control.  Plan was to continue nifedipine but increase it to 60 mg twice daily and change spironolactone to eplerenone.    Prescription drug monitoring program review:         PAST MEDICAL HISTORY  Active Ambulatory Problems     Diagnosis Date Noted    Coronary artery disease involving coronary bypass graft of native heart     Hypertension     Hyperlipidemia     PAD (peripheral artery disease) 09/11/2019    Esophageal dysphagia 05/22/2020    Sarabia's esophagus with dysplasia 08/05/2020    Gastroesophageal reflux disease without esophagitis 10/20/2022    Personal history of colonic polyps  05/08/2023    Atrial flutter, unspecified type 05/17/2023    Irritable bowel syndrome with constipation 10/20/2023    Chronic kidney disease, stage 3a 03/01/2024    Atrial fibrillation     Chest pain 07/22/2025     Resolved Ambulatory Problems     Diagnosis Date Noted    Myocardial infarction     S/P CABG (coronary artery bypass graft) 01/19/2017    Morbidly obese 09/11/2019    Encounter for screening for malignant neoplasm of colon 05/22/2020    Ulcer of esophagus without bleeding 08/05/2020    Dehydration with hyponatremia 06/15/2021    Esophageal obstruction 05/08/2023    Chest discomfort 05/21/2023    Palpitations 09/10/2023    Coronary artery disease of native artery of native heart with stable angina pectoris 04/15/2024    Nausea and vomiting 04/21/2024    Acute hearing loss of both ears 04/21/2024     Past Medical History:   Diagnosis Date    Arthritis     Sarabia esophagus     CAD (coronary artery disease)     Chronic back pain     Colon polyp     GERD (gastroesophageal reflux disease)     PONV (postoperative nausea and vomiting)     Shingles     Type 2 diabetes mellitus          PAST SURGICAL HISTORY  Past Surgical History:   Procedure Laterality Date    BREAST BIOPSY Right 05/23/2024    benign    CARDIAC CATHETERIZATION N/A 04/24/2024    Procedure: Coronary angiography, Left heart catheterization, Left ventricular angiography;  Surgeon: Dai Suazo MD;  Location: [facility] CATH INVASIVE LOCATION;  Service: Cardiology;  Laterality: N/A;    CARDIAC CATHETERIZATION  04/24/2024    Procedure: Saphenous Vein Graft;  Surgeon: Dai Suazo MD;  [facility];  Service: Cardiology;;    COLONOSCOPY      COLONOSCOPY N/A 06/24/2020    Procedure: COLONOSCOPY;  Surgeon: Mathew Roberts MD;  Location: [facility] OR;  Service: Gastroenterology;  Laterality: N/A;  Descending colon polyp x 2, Ascending colon polyp, Sigmoid colon polyp, Rectal polyp    COLONOSCOPY N/A 08/07/2023     Procedure: COLONOSCOPY;  Surgeon: Mathew Roberts MD;  Location: [facility] OR;  Service: Gastroenterology;  Laterality: N/A;  Normal    CORONARY ARTERY BYPASS GRAFT  1995    x 2 vessels    ENDOSCOPY N/A 2020    Procedure: ESOPHAGOGASTRODUODENOSCOPY;  Surgeon: Mathew Roberts MD;  Location: [facility] OR;  Service: Gastroenterology;  Laterality: N/A;  Ulcerative esophagitis, Gastritis, Duodenitis  Duodenal biopsy, gastric biopsy, distal esophageal biopsy    ENDOSCOPY N/A 2020    Procedure: ESOPHAGOGASTRODUODENOSCOPY with biopsies;  Surgeon: Mathew Roberts MD;  Location:  [facility] OR;  Service: Gastroenterology;  Laterality: N/A;  Esophagitis  Sarabia's Esophagus  Hiatal hernia  Gastritis  Gastric biopsy  Distal esophagus biopsy    ENDOSCOPY N/A 2023    Procedure: Esophagogastroduedenoscopy;  Surgeon: Mathew Roberts MD;  Location:  [facility] OR;  Service: Gastroenterology;  Laterality: N/A;  Gastritis; short segment Sarabia's; Esophageal stricture- dilatation; Biopsies- gastric, distal esophagus    INNER EAR SURGERY      JOINT REPLACEMENT      right knee    LEG SURGERY      x 2 s/p fall    LUMBAR FUSION      L4/L5    SKIN GRAFT      to left lower leg x 2    TONSILLECTOMY      UPPER GASTROINTESTINAL ENDOSCOPY      VEIN SURGERY      WRIST FUSION Left          FAMILY HISTORY  Family History   Problem Relation Age of Onset    Heart disease Mother     Hypertension Mother     Heart disease Father     Heart attack Brother     Heart disease Brother     Heart disease Brother     Colon cancer Neg Hx     Colon polyps Neg Hx     Breast cancer Neg Hx          SOCIAL HISTORY  Social History     Socioeconomic History    Marital status:    Tobacco Use    Smoking status: Former     Current packs/day: 0.00     Average packs/day: 0.5 packs/day for 20.0 years (10.0 ttl pk-yrs)     Types: Cigarettes     Start date:      Quit date:      Years since quittin.5     Smokeless tobacco: Never    Tobacco comments:     QUIT 1995   Vaping Use    Vaping status: Never Used   Substance and Sexual Activity    Alcohol use: Not Currently     Comment: rarely    Drug use: No    Sexual activity: Defer       Chronic or social conditions impacting patient care (Social Determinants of Health):  Social Drivers of Health     Tobacco Use: Medium Risk (7/23/2025)    Patient History     Smoking Tobacco Use: Former     Smokeless Tobacco Use: Never     Passive Exposure: Not on file   Alcohol Use: Not At Risk (7/23/2025)    AUDIT-C     Frequency of Alcohol Consumption: Never     Average Number of Drinks: Patient does not drink     Frequency of Binge Drinking: Never   Financial Resource Strain: Low Risk  (4/22/2024)    Overall Financial Resource Strain (CARDIA)     Difficulty of Paying Living Expenses: Not hard at all   Food Insecurity: No Food Insecurity (7/23/2025)    Hunger Vital Sign     Worried About Running Out of Food in the Last Year: Never true     Ran Out of Food in the Last Year: Never true   Transportation Needs: No Transportation Needs (7/23/2025)    PRAPARE - Transportation     Lack of Transportation (Medical): No     Lack of Transportation (Non-Medical): No   Physical Activity: Insufficiently Active (7/23/2025)    Exercise Vital Sign     Days of Exercise per Week: 3 days     Minutes of Exercise per Session: 30 min   Stress: No Stress Concern Present (4/22/2024)    [employer] - Occupational Stress Questionnaire     Feeling of Stress : Not at all   Social Connections: Unknown (7/23/2025)    Family and Community Support     Help with Day-to-Day Activities: I don't need any help     Lonely or Isolated: Not on file   Interpersonal Safety: Not At Risk (7/27/2025)    Abuse Screen     Unsafe at Home or Work/School: no     Feels Threatened by Someone?: no     Does Anyone Keep You from Contacting Others or Doint Things Outside the Home?: no     Physical Sign of Abuse  Present: no   Depression: Not at risk (4/22/2024)    PHQ-2     PHQ-2 Score: 0   Housing Stability: Not At Risk (7/23/2025)    Housing Stability     Current Living Arrangements: home     Potentially Unsafe Housing Conditions: none   Utilities: Not At Risk (7/23/2025)    [facility] Utilities     Threatened with loss of utilities: No   Health Literacy: Unknown (7/23/2025)    Education     Help with school or training?: Patient declined     Preferred Language: English   Employment: Unknown (7/23/2025)    Employment     Do you want help finding or keeping work or a job?: Patient declined   Disabilities: Not At Risk (7/22/2025)    Disabilities     Concentrating, Remembering, or Making Decisions Difficulty: no     Doing Errands Independently Difficulty: no       ALLERGIES  Codeine and Other      REVIEW OF SYSTEMS  Review of Systems  Included in HPI  All systems reviewed and negative except for those discussed in HPI.      PHYSICAL EXAM    I have reviewed the triage vital signs and nursing notes.    ED Triage Vitals   Temp Heart Rate Resp BP SpO2   07/27/25 1502 07/27/25 1502 07/27/25 1502 07/27/25 1511 07/27/25 1502   97.8 °F (36.6 °C) 73 18 151/64 98 %      Temp src Heart Rate Source Patient Position BP Location FiO2 (%)   07/27/25 1502 -- 07/27/25 1511 07/27/25 1511 --   Oral  Lying Left arm        Physical Exam  GENERAL: alert, no acute distress  SKIN: Warm, dry  HENT: Normocephalic, atraumatic  EYES: no scleral icterus  CV: Irregular rhythm, regular rate  RESPIRATORY: normal effort, lungs clear  ABDOMEN: soft, nontender, nondistended  MUSCULOSKELETAL: no deformity  NEURO: alert, moves all extremities, follows commands            LAB RESULTS  Recent Results (from the past 24 hours)   ECG 12 Lead ED Triage Standing Order; Chest Pain    Collection Time: 07/27/25  3:07 PM   Result Value Ref Range    QT Interval 481 ms    QTC Interval 531 ms   Comprehensive Metabolic Panel    Collection Time: 07/27/25  3:10 PM    Specimen: Arm,  Right; Blood   Result Value Ref Range    Glucose 114 (H) 65 - 99 mg/dL    BUN 22.0 8.0 - 23.0 mg/dL    Creatinine 1.48 (H) 0.57 - 1.00 mg/dL    Sodium 133 (L) 136 - 145 mmol/L    Potassium 3.8 3.5 - 5.2 mmol/L    Chloride 98 98 - 107 mmol/L    CO2 22.0 22.0 - 29.0 mmol/L    Calcium 9.4 8.6 - 10.5 mg/dL    Total Protein 7.0 6.0 - 8.5 g/dL    Albumin 4.4 3.5 - 5.2 g/dL    ALT (SGPT) 16 1 - 33 U/L    AST (SGOT) 19 1 - 32 U/L    Alkaline Phosphatase 69 39 - 117 U/L    Total Bilirubin 0.3 0.0 - 1.2 mg/dL    Globulin 2.6 gm/dL    A/G Ratio 1.7 g/dL    BUN/Creatinine Ratio 14.9 7.0 - 25.0    Anion Gap 13.0 5.0 - 15.0 mmol/L    eGFR 37.0 (L) >60.0 mL/min/1.73   High Sensitivity Troponin T    Collection Time: 07/27/25  3:10 PM    Specimen: Arm, Right; Blood   Result Value Ref Range    HS Troponin T 15 (H) <14 ng/L   Green Top (Gel)    Collection Time: 07/27/25  3:10 PM   Result Value Ref Range    Extra Tube Hold for add-ons.    Lavender Top    Collection Time: 07/27/25  3:10 PM   Result Value Ref Range    Extra Tube hold for add-on    Gold Top - SST    Collection Time: 07/27/25  3:10 PM   Result Value Ref Range    Extra Tube Hold for add-ons.    Light Blue Top    Collection Time: 07/27/25  3:10 PM   Result Value Ref Range    Extra Tube Hold for add-ons.    CBC Auto Differential    Collection Time: 07/27/25  3:10 PM    Specimen: Arm, Right; Blood   Result Value Ref Range    WBC 7.44 3.40 - 10.80 10*3/mm3    RBC 4.23 3.77 - 5.28 10*6/mm3    Hemoglobin 12.2 12.0 - 15.9 g/dL    Hematocrit 38.3 34.0 - 46.6 %    MCV 90.5 79.0 - 97.0 fL    MCH 28.8 26.6 - 33.0 pg    MCHC 31.9 31.5 - 35.7 g/dL    RDW 13.8 12.3 - 15.4 %    RDW-SD 45.8 37.0 - 54.0 fl    MPV 8.4 6.0 - 12.0 fL    Platelets 398 140 - 450 10*3/mm3    Neutrophil % 72.3 42.7 - 76.0 %    Lymphocyte % 16.1 (L) 19.6 - 45.3 %    Monocyte % 8.5 5.0 - 12.0 %    Eosinophil % 1.9 0.3 - 6.2 %    Basophil % 0.5 0.0 - 1.5 %    Immature Grans % 0.7 (H) 0.0 - 0.5 %    Neutrophils,  Absolute 5.38 1.70 - 7.00 10*3/mm3    Lymphocytes, Absolute 1.20 0.70 - 3.10 10*3/mm3    Monocytes, Absolute 0.63 0.10 - 0.90 10*3/mm3    Eosinophils, Absolute 0.14 0.00 - 0.40 10*3/mm3    Basophils, Absolute 0.04 0.00 - 0.20 10*3/mm3    Immature Grans, Absolute 0.05 0.00 - 0.05 10*3/mm3    nRBC 0.0 0.0 - 0.2 /100 WBC         RADIOLOGY  XR Chest 1 View  Result Date: 7/27/2025  Emergency portable view of the chest on 7/27/2025  CLINICAL HISTORY: This is a 75-year-old female patient with chest pain  This is correlated to prior portable chest x-ray on 7/24/2025.  FINDINGS: There are multiple sternal wires from previous cardiac surgery. The cardiomediastinal silhouette is mildly enlarged and the pulmonary vasculature is within normal limits. The lungs are clear. The costophrenic angles are sharp.      1. No definite active disease is seen in the chest with no change when compared to prior portable chest x-rays on 7/24/2025 and 7/22/2025.  2. There are multiple sternal wires from previous cardiac surgery and there is mild enlargement of the cardiomediastinal silhouette.  This report was finalized on 7/27/2025 3:55 PM by Dr. Sathish Garcia M.D on Workstation: [id number]          MEDICATIONS GIVEN IN ER  Medications   sodium chloride 0.9 % flush 10 mL (has no administration in time range)   aspirin tablet 325 mg (has no administration in time range)         ORDERS PLACED DURING THIS VISIT:  Orders Placed This Encounter   Procedures    XR Chest 1 View    [place] Draw    Comprehensive Metabolic Panel    High Sensitivity Troponin T    CBC Auto Differential    High Sensitivity Troponin T 1Hr    NPO Diet NPO Type: Strict NPO    Undress & Gown    Continuous Pulse Oximetry    Oxygen Therapy- Nasal Cannula; Titrate 1-6 LPM Per SpO2; 90 - 95%    ECG 12 Lead ED Triage Standing Order; Chest Pain    ECG 12 Lead ED Triage Standing Order; Chest Pain    Insert Peripheral IV    CBC & Differential    Green Top (Gel)    Lavender Top     Gold Top - SST    Light Blue Top         OUTPATIENT MEDICATION MANAGEMENT:  Current Facility-Administered Medications Ordered in Epic   Medication Dose Route Frequency Provider Last Rate Last Admin    aspirin tablet 325 mg  325 mg Oral Once Edgard Azar MD        sodium chloride 0.9 % flush 10 mL  10 mL Intravenous PRN Edgard Azar MD         Current Outpatient Medications Ordered in Epic   Medication Sig Dispense Refill    allopurinol (ZYLOPRIM) 100 MG tablet Take 1 tablet by mouth 2 (Two) Times a Day.      ALPRAZolam (XANAX) 0.25 MG tablet Take 1 tablet by mouth As Needed for Anxiety.      amiodarone (PACERONE) 200 MG tablet TAKE 1 TABLET BY MOUTH DAILY 90 tablet 1    apixaban (ELIQUIS) 5 MG tablet tablet Take 1 tablet by mouth 2 (Two) Times a Day. 180 tablet 3    carvedilol (COREG) 12.5 MG tablet Take 1 tablet by mouth 2 (Two) Times a Day With Meals. 60 tablet 11    cloNIDine (CATAPRES) 0.1 MG tablet Take 1 tablet by mouth 2 (Two) Times a Day. 60 tablet 11    clopidogrel (PLAVIX) 75 MG tablet Take 1 tablet by mouth Every Night. 90 tablet 3    doxazosin (Cardura) 1 MG tablet Take 1 tablet by mouth Every Night. 30 tablet 2    eplerenone (INSPRA) 50 MG tablet Take 1 tablet by mouth Daily for 30 days. 30 tablet 0    escitalopram (LEXAPRO) 5 MG tablet Take 1 tablet by mouth Every Night.      ferrous gluconate (FERGON) 324 MG tablet Take 1 tablet by mouth Daily With Breakfast.      furosemide (LASIX) 40 MG tablet Take 1 tablet by mouth Daily.      hydrALAZINE (APRESOLINE) 100 MG tablet TAKE 1 TABLET BY MOUTH 3 TIMES A  tablet 1    metFORMIN ER (GLUCOPHAGE-XR) 500 MG 24 hr tablet Take 1 tablet by mouth 2 (Two) Times a Day Before Meals.      NIFEdipine XL (ADALAT CC) 60 MG 24 hr tablet Take 1 tablet by mouth 2 (Two) Times a Day for 30 days. 60 tablet 0    ondansetron ODT (ZOFRAN-ODT) 4 MG disintegrating tablet Take 1 tablet by mouth As Needed.      pantoprazole (PROTONIX) 40 MG EC tablet Take 1 tablet  by mouth 2 (Two) Times a Day. 180 tablet 0    rosuvastatin (CRESTOR) 10 MG tablet Take 1 tablet by mouth Daily.           PROCEDURES  Procedures            PROGRESS, DATA ANALYSIS, CONSULTS, AND MEDICAL DECISION MAKING  All labs have been independently interpreted by me.  All radiology studies have been reviewed by me. All EKG's have been independently viewed and interpreted by me.  Discussion below represents my analysis of pertinent findings related to patient's condition, differential diagnosis, treatment plan and final disposition.    Differential diagnosis includes but is not limited to arrhythmia, chest pain, ACS, electrolyte abnormality.    Clinical Scores:         HEART Score: 5                               ED Course as of 07/27/25 1638   Sun Jul 27, 2025   1600 Chest x-ray interpreted by me demonstrates no evidence of dense consolidation [MW]   1631 EKG interpreted by me demonstrates atrial flutter, rate of 73, prolonged QT interval at 531, no ST elevation [MW]   1632 Workup in the emergency department is notable for atrial flutter, troponin of 15.  Patient continues to be symptomatic and was recommended for admission by her cardiologist.  Patient has ongoing burning sensation in her chest but denies true chest pain.  Will plan on admission for further evaluation and management. [MW]   1632 Discussed with Dr. Chapman who agrees to admit [MW]      ED Course User Index  [MW] Edgard Azar MD             AS OF 16:38 EDT VITALS:    BP - 144/60  HR - 72  TEMP - 97.8 °F (36.6 °C) (Oral)  O2 SATS - 95%    COMPLEXITY OF CARE  The patient requires admission.      DIAGNOSIS  Final diagnoses:   Atrial flutter, unspecified type   Chest pain, unspecified type         DISPOSITION  ED Disposition       ED Disposition   Decision to Admit    Condition   --    Comment   --                Please note that portions of this document were completed with a voice recognition program.    Note Disclaimer: At [facility], we  believe that sharing information builds trust and better relationships. You are receiving this note because you recently visited [facility]. It is possible you will see health information before a provider has talked with you about it. This kind of information can be easy to misunderstand. To help you fully understand what it means for your health, we urge you to discuss this note with your provider.         Edgard Azar MD  07/27/25 0951

## 2025-07-28 LAB
ANION GAP SERPL CALCULATED.3IONS-SCNC: 10.9 MMOL/L (ref 5–15)
BUN SERPL-MCNC: 23 MG/DL (ref 8–23)
BUN/CREAT SERPL: 14.7 (ref 7–25)
CALCIUM SPEC-SCNC: 8.9 MG/DL (ref 8.6–10.5)
CHLORIDE SERPL-SCNC: 100 MMOL/L (ref 98–107)
CO2 SERPL-SCNC: 25.1 MMOL/L (ref 22–29)
CREAT SERPL-MCNC: 1.56 MG/DL (ref 0.57–1)
DEPRECATED RDW RBC AUTO: 43.2 FL (ref 37–54)
EGFRCR SERPLBLD CKD-EPI 2021: 34.7 ML/MIN/1.73
ERYTHROCYTE [DISTWIDTH] IN BLOOD BY AUTOMATED COUNT: 13.3 % (ref 12.3–15.4)
GLUCOSE SERPL-MCNC: 102 MG/DL (ref 65–99)
HCT VFR BLD AUTO: 33 % (ref 34–46.6)
HGB BLD-MCNC: 10.9 G/DL (ref 12–15.9)
MAGNESIUM SERPL-MCNC: 2.3 MG/DL (ref 1.6–2.4)
MCH RBC QN AUTO: 29.5 PG (ref 26.6–33)
MCHC RBC AUTO-ENTMCNC: 33 G/DL (ref 31.5–35.7)
MCV RBC AUTO: 89.4 FL (ref 79–97)
PLATELET # BLD AUTO: 331 10*3/MM3 (ref 140–450)
PMV BLD AUTO: 8.9 FL (ref 6–12)
POTASSIUM SERPL-SCNC: 3.4 MMOL/L (ref 3.5–5.2)
POTASSIUM SERPL-SCNC: 4.2 MMOL/L (ref 3.5–5.2)
QT INTERVAL: 481 MS
QTC INTERVAL: 531 MS
RBC # BLD AUTO: 3.69 10*6/MM3 (ref 3.77–5.28)
SODIUM SERPL-SCNC: 136 MMOL/L (ref 136–145)
WBC NRBC COR # BLD AUTO: 6.19 10*3/MM3 (ref 3.4–10.8)

## 2025-07-28 PROCEDURE — 93010 ELECTROCARDIOGRAM REPORT: CPT | Performed by: INTERNAL MEDICINE

## 2025-07-28 PROCEDURE — 99214 OFFICE O/P EST MOD 30 MIN: CPT | Performed by: INTERNAL MEDICINE

## 2025-07-28 PROCEDURE — 83735 ASSAY OF MAGNESIUM: CPT | Performed by: STUDENT IN AN ORGANIZED HEALTH CARE EDUCATION/TRAINING PROGRAM

## 2025-07-28 PROCEDURE — 99215 OFFICE O/P EST HI 40 MIN: CPT

## 2025-07-28 PROCEDURE — G0378 HOSPITAL OBSERVATION PER HR: HCPCS

## 2025-07-28 PROCEDURE — 84132 ASSAY OF SERUM POTASSIUM: CPT | Performed by: STUDENT IN AN ORGANIZED HEALTH CARE EDUCATION/TRAINING PROGRAM

## 2025-07-28 PROCEDURE — 85027 COMPLETE CBC AUTOMATED: CPT | Performed by: INTERNAL MEDICINE

## 2025-07-28 PROCEDURE — 80048 BASIC METABOLIC PNL TOTAL CA: CPT | Performed by: INTERNAL MEDICINE

## 2025-07-28 PROCEDURE — 93005 ELECTROCARDIOGRAM TRACING: CPT | Performed by: NURSE PRACTITIONER

## 2025-07-28 RX ORDER — POTASSIUM CHLORIDE 1500 MG/1
40 TABLET, EXTENDED RELEASE ORAL EVERY 4 HOURS
Status: COMPLETED | OUTPATIENT
Start: 2025-07-28 | End: 2025-07-28

## 2025-07-28 RX ADMIN — NIFEDIPINE 60 MG: 60 TABLET, EXTENDED RELEASE ORAL at 08:45

## 2025-07-28 RX ADMIN — ALLOPURINOL 100 MG: 100 TABLET ORAL at 08:45

## 2025-07-28 RX ADMIN — HYDRALAZINE HYDROCHLORIDE 100 MG: 50 TABLET ORAL at 21:20

## 2025-07-28 RX ADMIN — CLOPIDOGREL BISULFATE 75 MG: 75 TABLET, FILM COATED ORAL at 21:21

## 2025-07-28 RX ADMIN — APIXABAN 5 MG: 5 TABLET, FILM COATED ORAL at 08:45

## 2025-07-28 RX ADMIN — ESCITALOPRAM 5 MG: 10 TABLET, FILM COATED ORAL at 21:20

## 2025-07-28 RX ADMIN — HYDRALAZINE HYDROCHLORIDE 100 MG: 50 TABLET ORAL at 05:09

## 2025-07-28 RX ADMIN — HYDRALAZINE HYDROCHLORIDE 100 MG: 50 TABLET ORAL at 13:02

## 2025-07-28 RX ADMIN — AMIODARONE HYDROCHLORIDE 200 MG: 200 TABLET ORAL at 21:21

## 2025-07-28 RX ADMIN — FERROUS SULFATE TAB 325 MG (65 MG ELEMENTAL FE) 325 MG: 325 (65 FE) TAB at 08:45

## 2025-07-28 RX ADMIN — Medication 2.5 MG: at 21:21

## 2025-07-28 RX ADMIN — CLONIDINE HYDROCHLORIDE 0.1 MG: 0.1 TABLET ORAL at 08:45

## 2025-07-28 RX ADMIN — NIFEDIPINE 60 MG: 60 TABLET, EXTENDED RELEASE ORAL at 21:20

## 2025-07-28 RX ADMIN — CARVEDILOL 12.5 MG: 12.5 TABLET, FILM COATED ORAL at 16:49

## 2025-07-28 RX ADMIN — EPLERENONE 50 MG: 25 TABLET, FILM COATED ORAL at 08:45

## 2025-07-28 RX ADMIN — APIXABAN 5 MG: 5 TABLET, FILM COATED ORAL at 21:21

## 2025-07-28 RX ADMIN — FUROSEMIDE 40 MG: 40 TABLET ORAL at 08:45

## 2025-07-28 RX ADMIN — ROSUVASTATIN CALCIUM 10 MG: 10 TABLET, FILM COATED ORAL at 08:45

## 2025-07-28 RX ADMIN — CARVEDILOL 12.5 MG: 12.5 TABLET, FILM COATED ORAL at 08:45

## 2025-07-28 RX ADMIN — POTASSIUM CHLORIDE 40 MEQ: 1500 TABLET, EXTENDED RELEASE ORAL at 11:33

## 2025-07-28 RX ADMIN — TERAZOSIN 1 MG: 1 CAPSULE ORAL at 21:21

## 2025-07-28 RX ADMIN — ALLOPURINOL 100 MG: 100 TABLET ORAL at 21:20

## 2025-07-28 RX ADMIN — ALPRAZOLAM 0.25 MG: 0.25 TABLET ORAL at 21:22

## 2025-07-28 RX ADMIN — POTASSIUM CHLORIDE 40 MEQ: 1500 TABLET, EXTENDED RELEASE ORAL at 08:45

## 2025-07-28 RX ADMIN — PANTOPRAZOLE SODIUM 40 MG: 40 TABLET, DELAYED RELEASE ORAL at 08:45

## 2025-07-28 RX ADMIN — PANTOPRAZOLE SODIUM 40 MG: 40 TABLET, DELAYED RELEASE ORAL at 16:48

## 2025-07-28 RX ADMIN — CLONIDINE HYDROCHLORIDE 0.1 MG: 0.1 TABLET ORAL at 21:22

## 2025-07-28 NOTE — CONSULTS
Patient Name: Valentine Arora  :1950  75 y.o.    Date of Admission: 2025  Date of Consultation:  25  Encounter Provider: Dai Suazo MD  Place of Service: [facility] CARDIOLOGY  Referring Provider: Natasha Chapman MD  Patient Care Team:  Aliya Alejandro MD as PCP - General (Family Medicine)      Chief complaint:    History of Present Illness:    75 year-old woman with coronary disease status post bypass, atrial flutter/fibrillation, HFpEF, obesity, PAD with prior intervention, type 2 diabetes.  In May 2023 she was diagnosed with atrial fibrillation and and has been on amiodarone since 2023.  She was then admitted to [facility] in 2024 with bradycardia and acute diastolic heart failure.  She then was admitted to [facility] with hypotension and A-fib/tachycardia.  During that hospitalization she was diuresed and cardioverted.  Medications were adjusted. During that hospitalization we realized that when she is not in A-fib she is often asymptomatic and heart rates are not very fast.     In 2024 she continued to complain of burning angina despite improved blood pressures for that reason we proceeded with repeat cardiac catheterization.  She was found to have severe multivessel disease: Known occluded RCA occluded SVG to PDA, robust left-to-right collaterals which backfills to the mid RCA.  She has proximal severe LAD disease with a patent SVG to diagonal and jump to LAD.  Distal to the anastomosis the vessels were normal.  She had borderline mid circumflex disease.  We decided to treat her medically with up titration of her long-acting nitrates.       She had right carotid CEA in 2024.     I saw her 2025.  She was feeling well however hypertensive.  We uptitrated beta-blockade and made other medical adjustments.  Blood pressures improved however then developed bradycardia on Holter monitor which was sinus,  and then was found to be in junctional rhythm at 1 point.  As such her beta-blockade and clonidine were tapered.  After that she developed recurrence of A-fib which she noted on her Apple Watch as well as symptomatically.  She was seen last week in the hospital while I was out of town.  Nifedipine was started and uptitrated at that time she was in atrial flutter.  She returned to the hospital again this weekend due to symptomatic atrial fibrillation resulting in fatigue, burning in the chest which were her previous symptoms of A-fib.  On exam today she is appears to be in normal rhythm, telemetry is unclear I have ordered a repeat EKG.  Overall her blood pressures are improved though not ideal on her current regimen.  At home her pressures were in the 180s over the weekend.    Past Medical History:   Diagnosis Date    Arthritis     Atrial fibrillation     Atrial flutter, unspecified type 05/17/2023    Sarabia esophagus     CAD (coronary artery disease)     Chronic back pain     Chronic kidney disease, stage 3a 03/01/2024    Colon polyp     GERD (gastroesophageal reflux disease)     Hyperlipidemia     Hypertension     PAD (peripheral artery disease) 09/11/2019    PONV (postoperative nausea and vomiting)     Shingles     Type 2 diabetes mellitus        Past Surgical History:   Procedure Laterality Date    BREAST BIOPSY Right 05/23/2024    benign    CARDIAC CATHETERIZATION N/A 04/24/2024    Procedure: Coronary angiography, Left heart catheterization, Left ventricular angiography;  Surgeon: Dai Suazo MD;  Location: [facility] CATH INVASIVE LOCATION;  Service: Cardiology;  Laterality: N/A;    CARDIAC CATHETERIZATION  04/24/2024    Procedure: Saphenous Vein Graft;  Surgeon: Dai Suazo MD;  Location: [facility] CATH INVASIVE LOCATION;  Service: Cardiology;;    COLONOSCOPY      COLONOSCOPY N/A 06/24/2020    Procedure: COLONOSCOPY;  Surgeon: Mathew Roberts MD;  Location: [facility] OR;  Service: Gastroenterology;   Laterality: N/A;  Descending colon polyp x 2, Ascending colon polyp, Sigmoid colon polyp, Rectal polyp    COLONOSCOPY N/A 08/07/2023    Procedure: COLONOSCOPY;  Surgeon: Mathew Roberts MD;  Location: [facility] OR;  Service: Gastroenterology;  Laterality: N/A;  Normal    CORONARY ARTERY BYPASS GRAFT  1995    x 2 vessels    ENDOSCOPY N/A 06/24/2020    Procedure: ESOPHAGOGASTRODUODENOSCOPY;  Surgeon: Mathew Roberts MD;  Location: [facility] OR;  Service: Gastroenterology;  Laterality: N/A;  Ulcerative esophagitis, Gastritis, Duodenitis  Duodenal biopsy, gastric biopsy, distal esophageal biopsy    ENDOSCOPY N/A 09/17/2020    Procedure: ESOPHAGOGASTRODUODENOSCOPY with biopsies;  Surgeon: Mathew Roberts MD;  Location: [facility] OR;  Service: Gastroenterology;  Laterality: N/A;  Esophagitis  Sarabia's Esophagus  Hiatal hernia  Gastritis  Gastric biopsy  Distal esophagus biopsy    ENDOSCOPY N/A 08/07/2023    Procedure: Esophagogastroduedenoscopy;  Surgeon: Mathew Roberts MD;  Location: [facility] OR;  Service: Gastroenterology;  Laterality: N/A;  Gastritis; short segment Sarabia's; Esophageal stricture- dilatation; Biopsies- gastric, distal esophagus    INNER EAR SURGERY      JOINT REPLACEMENT      right knee    LEG SURGERY      x 2 s/p fall    LUMBAR FUSION      L4/L5    SKIN GRAFT      to left lower leg x 2    TONSILLECTOMY      UPPER GASTROINTESTINAL ENDOSCOPY      VEIN SURGERY      WRIST FUSION Left          Prior to Admission medications    Medication Sig Start Date End Date Taking? Authorizing Provider   allopurinol (ZYLOPRIM) 100 MG tablet Take 1 tablet by mouth 2 (Two) Times a Day.   Yes Yaw Cedeño MD   ALPRAZolam (XANAX) 0.25 MG tablet Take 1 tablet by mouth As Needed for Anxiety. 10/19/23  Yes Yaw Cedeño MD   amiodarone (PACERONE) 200 MG tablet TAKE 1 TABLET BY MOUTH DAILY 7/24/25  Yes Sofi Alex APRN   apixaban (ELIQUIS) 5 MG tablet tablet Take 1  tablet by mouth 2 (Two) Times a Day. 9/11/24  Yes Sofi Alex APRN   carvedilol (COREG) 12.5 MG tablet Take 1 tablet by mouth 2 (Two) Times a Day With Meals. 7/15/25  Yes Sofi Alex APRN   cloNIDine (CATAPRES) 0.1 MG tablet Take 1 tablet by mouth 2 (Two) Times a Day. 7/15/25  Yes Sofi Alex APRN   clopidogrel (PLAVIX) 75 MG tablet Take 1 tablet by mouth Every Night. 9/11/24  Yes Sofi Alex APRN   doxazosin (Cardura) 1 MG tablet Take 1 tablet by mouth Every Night. 7/21/25  Yes Mendez Wolff APRN   eplerenone (INSPRA) 50 MG tablet Take 1 tablet by mouth Daily for 30 days. 7/23/25 8/22/25 Yes Karon Neri APRN   escitalopram (LEXAPRO) 5 MG tablet Take 1 tablet by mouth Every Night. 10/10/23  Yes Provider, MD Yaw   ferrous gluconate (FERGON) 324 MG tablet Take 1 tablet by mouth Daily With Breakfast.   Yes Provider, Historical, MD   furosemide (LASIX) 40 MG tablet Take 1 tablet by mouth Daily.   Yes Provider, MD Yaw   hydrALAZINE (APRESOLINE) 100 MG tablet TAKE 1 TABLET BY MOUTH 3 TIMES A DAY 6/26/25  Yes Sofi Alex APRN   metFORMIN ER (GLUCOPHAGE-XR) 500 MG 24 hr tablet Take 1 tablet by mouth 2 (Two) Times a Day Before Meals.   Yes Provider, MD Yaw   NIFEdipine XL (ADALAT CC) 60 MG 24 hr tablet Take 1 tablet by mouth 2 (Two) Times a Day for 30 days. 7/23/25 8/22/25 Yes Karon Neri APRN   ondansetron ODT (ZOFRAN-ODT) 4 MG disintegrating tablet Take 1 tablet by mouth As Needed. 6/27/25  Yes ProviderYaw MD   pantoprazole (PROTONIX) 40 MG EC tablet Take 1 tablet by mouth 2 (Two) Times a Day. 6/5/25  Yes Shayna Tran APRN   rosuvastatin (CRESTOR) 10 MG tablet Take 1 tablet by mouth Daily. 1/12/15  Yes Provider, Historical, MD       Allergies   Allergen Reactions    Codeine Itching    Other Unknown (See Comments)     Vicryl: doesn't heal       Social History     Socioeconomic History    Marital status:    Tobacco Use    Smoking  status: Former     Current packs/day: 0.00     Average packs/day: 0.5 packs/day for 20.0 years (10.0 ttl pk-yrs)     Types: Cigarettes     Start date:      Quit date:      Years since quittin.5    Smokeless tobacco: Never    Tobacco comments:     QUIT    Vaping Use    Vaping status: Never Used   Substance and Sexual Activity    Alcohol use: Not Currently     Comment: rarely    Drug use: No    Sexual activity: Defer       Family History   Problem Relation Age of Onset    Heart disease Mother     Hypertension Mother     Heart disease Father     Heart attack Brother     Heart disease Brother     Heart disease Brother     Colon cancer Neg Hx     Colon polyps Neg Hx     Breast cancer Neg Hx        REVIEW OF SYSTEMS:   All systems reviewed.  Pertinent positives identified in HPI.  All other systems are negative.      Objective:     Vitals:    25 2337 25 0433 25 0600 25 0736   BP: 119/47 116/54  141/52   BP Location: Left arm Left arm  Right arm   Patient Position: Lying Lying  Lying   Pulse: 57 55  68   Resp: 16 16  18   Temp: 97.8 °F (36.6 °C) 97.7 °F (36.5 °C)  98.2 °F (36.8 °C)   TempSrc: Oral Oral  Oral   SpO2: 97% 98%  100%   Weight:   81 kg (178 lb 9.2 oz)    Height:         Body mass index is 32.66 kg/m².    General Appearance:    Alert, cooperative, in no acute distress   Head:    Normocephalic, without obvious abnormality, atraumatic   Eyes:            Lids and lashes normal, conjunctivae and sclerae normal, no icterus, no pallor, corneas clear, PERRLA   Ears:    Ears appear intact with no abnormalities noted   Throat:   No oral lesions, no thrush, oral mucosa moist   Neck:   No adenopathy, supple, trachea midline, no thyromegaly, no carotid bruit, no JVD   Back:     No kyphosis present, no scoliosis present, no skin lesions, erythema or scars, no tenderness to percussion or palpation, range of motion normal   Lungs:     Clear to auscultation, respirations regular, even  and unlabored    Heart:    Regular rhythm and normal rate, normal S1 and S2, no murmur, no gallop, no rub, no click   Chest Wall:    No abnormalities observed   Abdomen:     Normal bowel sounds, no masses, no organomegaly, soft, nontender, nondistended, no guarding, no rebound  tenderness   Extremities:   Moves all extremities well, no edema, no cyanosis, no redness   Pulses:   Pulses palpable and equal bilaterally. Normal radial, carotid, femoral, dorsalis pedis and posterior tibial pulses bilaterally. Normal abdominal aorta   Skin:  Psychiatric:   No bleeding, bruising or rash    Alert and oriented x 3, normal mood and affect   Lab Review:     Results from last 7 days   Lab Units 07/28/25  0515 07/27/25  1510   SODIUM mmol/L 136 133*   POTASSIUM mmol/L 3.4* 3.8   CHLORIDE mmol/L 100 98   CO2 mmol/L 25.1 22.0   BUN mg/dL 23.0 22.0   CREATININE mg/dL 1.56* 1.48*   CALCIUM mg/dL 8.9 9.4   BILIRUBIN mg/dL  --  0.3   ALK PHOS U/L  --  69   ALT (SGPT) U/L  --  16   AST (SGOT) U/L  --  19   GLUCOSE mg/dL 102* 114*     Results from last 7 days   Lab Units 07/27/25  1622 07/27/25  1510 07/24/25  1210   HSTROP T ng/L 15* 15* 17*     Results from last 7 days   Lab Units 07/28/25  0515   WBC 10*3/mm3 6.19   HEMOGLOBIN g/dL 10.9*   HEMATOCRIT % 33.0*   PLATELETS 10*3/mm3 331     Results from last 7 days   Lab Units 07/22/25  1016   INR  1.31*   APTT seconds 38.5*     Results from last 7 days   Lab Units 07/28/25  0515   MAGNESIUM mg/dL 2.3                   I personally viewed and interpreted the patient's EKG/Telemetry data.        Assessment and Plan:       1.  Symptomatic atrial fibrillation: paroxysmal.  Currently on apixaban and amiodarone.  Medium dose Coreg. I was thinking if she was in permanent A-fib we could cardiovert her tomorrow but it seems that she is in sinus today.  I will have electrophysiology see her to consider an ablation plus or minus pacemaker given her issues with sinus bradycardia and need for  multiple medications for severe labile hypertension.  2.  Hypertension: Improved control here on her home regimen though at home her pressures are in the 180s on this regimen.  She will bring her cuff in to compare to the hospital cuff.  I have asked her to get up and move around as well.  3.  Sinus bradycardia/junctional rhythm: Markedly improved with lower beta-blocker and clonidine doses    Dai Suazo MD  07/28/25  11:15 EDT

## 2025-07-28 NOTE — CONSULTS
Electrophysiology Hospital Consult            Patient Name: Valentine Arora  Age/Sex: 75 y.o. female  : 1950  MRN: [id number]    Date of Admission: 2025  Date of Encounter Visit: 25  Encounter Provider: TERRI Hummel  Referring Provider: Natasha Chapman MD  Place of Service: [facility] CARDIOLOGY  Patient Care Team:  Aliya Alejandro MD as PCP - General (Family Medicine)      Subjective:   EP Consultation for: Atrial fibrillation/atrial flutter    Chief Complaint: Palpitations, generalized weakness    History of Present Illness:  Valentine Arora is a 75 y.o. female who follows with Dr. Suazo.  She has coronary artery disease status post CABG, diastolic heart failure, atrial fibrillation/atrial flutter.    She was initially diagnosed with atrial fibrillation in .  She was started on amiodarone at that time.    She was admitted in [place] in 2024 when she noted a lower heart rates.  She did not have any symptoms but her atenolol was stopped.    She was seen on  with complaints of chest pain and high blood pressure.  Her carvedilol was reduced.    She had a monitor showing no A-fib.  She was noted to have periods of bradycardia and Wenckebach but did not have any significant symptoms.        Presented back to the ED over the weekend with complaints of feeling generally unwell and noting palpitations.    She was noted to be in atrial fibrillation.    Despite adequate rate control, she is continued to have symptoms.    EP was asked to evaluate.      I saw the patient this afternoon.  Discussed in detail with her  and daughter at bedside.    When she is in A-fib she just feels terrible.  She feels weak, short of breath, and feels palpitations.    She has had some low heart rates in the past.  She denies any dizziness, syncope, near syncope.    She has not missed any doses of her apixaban.            Past Medical History:  Past  Medical History:   Diagnosis Date    Arthritis     Atrial fibrillation     Atrial flutter, unspecified type 05/17/2023    Sarabia esophagus     CAD (coronary artery disease)     Chronic back pain     Chronic kidney disease, stage 3a 03/01/2024    Colon polyp     GERD (gastroesophageal reflux disease)     Hyperlipidemia     Hypertension     PAD (peripheral artery disease) 09/11/2019    PONV (postoperative nausea and vomiting)     Shingles     Type 2 diabetes mellitus        Past Surgical History:   Procedure Laterality Date    BREAST BIOPSY Right 05/23/2024    benign    CARDIAC CATHETERIZATION N/A 04/24/2024    Procedure: Coronary angiography, Left heart catheterization, Left ventricular angiography;  Surgeon: Dai Suazo MD;  Location:  [facility] CATH INVASIVE LOCATION;  Service: Cardiology;  Laterality: N/A;    CARDIAC CATHETERIZATION  04/24/2024    Procedure: Saphenous Vein Graft;  Surgeon: Dai Suazo MD;  Location:  [facility] CATH INVASIVE LOCATION;  Service: Cardiology;;    COLONOSCOPY      COLONOSCOPY N/A 06/24/2020    Procedure: COLONOSCOPY;  Surgeon: Mathew Roberts MD;  Location: [facility] OR;  Service: Gastroenterology;  Laterality: N/A;  Descending colon polyp x 2, Ascending colon polyp, Sigmoid colon polyp, Rectal polyp    COLONOSCOPY N/A 08/07/2023    Procedure: COLONOSCOPY;  Surgeon: Mathew Roberts MD;  Location: [facility] OR;  Service: Gastroenterology;  Laterality: N/A;  Normal    CORONARY ARTERY BYPASS GRAFT  1995    x 2 vessels    ENDOSCOPY N/A 06/24/2020    Procedure: ESOPHAGOGASTRODUODENOSCOPY;  Surgeon: Mathew Roberts MD;  Location: [facility] OR;  Service: Gastroenterology;  Laterality: N/A;  Ulcerative esophagitis, Gastritis, Duodenitis  Duodenal biopsy, gastric biopsy, distal esophageal biopsy    ENDOSCOPY N/A 09/17/2020    Procedure: ESOPHAGOGASTRODUODENOSCOPY with biopsies;  Surgeon: Mathew Roberts MD;  Location: [facility] OR;  Service: Gastroenterology;   Laterality: N/A;  Esophagitis  Sarabia's Esophagus  Hiatal hernia  Gastritis  Gastric biopsy  Distal esophagus biopsy    ENDOSCOPY N/A 08/07/2023    Procedure: Esophagogastroduedenoscopy;  Surgeon: Mathew Roberts MD;  Location: [facility];  Service: Gastroenterology;  Laterality: N/A;  Gastritis; short segment Sarabia's; Esophageal stricture- dilatation; Biopsies- gastric, distal esophagus    INNER EAR SURGERY      JOINT REPLACEMENT      right knee    LEG SURGERY      x 2 s/p fall    LUMBAR FUSION      L4/L5    SKIN GRAFT      to left lower leg x 2    TONSILLECTOMY      UPPER GASTROINTESTINAL ENDOSCOPY      VEIN SURGERY      WRIST FUSION Left        Home Medications:   Medications Prior to Admission   Medication Sig Dispense Refill Last Dose/Taking    allopurinol (ZYLOPRIM) 100 MG tablet Take 1 tablet by mouth 2 (Two) Times a Day.   7/27/2025 Morning    ALPRAZolam (XANAX) 0.25 MG tablet Take 1 tablet by mouth As Needed for Anxiety.   7/26/2025 Evening    amiodarone (PACERONE) 200 MG tablet TAKE 1 TABLET BY MOUTH DAILY 90 tablet 1 7/26/2025 Evening    apixaban (ELIQUIS) 5 MG tablet tablet Take 1 tablet by mouth 2 (Two) Times a Day. 180 tablet 3 7/27/2025 Morning    carvedilol (COREG) 12.5 MG tablet Take 1 tablet by mouth 2 (Two) Times a Day With Meals. 60 tablet 11 7/27/2025 Morning    cloNIDine (CATAPRES) 0.1 MG tablet Take 1 tablet by mouth 2 (Two) Times a Day. 60 tablet 11 7/27/2025 Morning    clopidogrel (PLAVIX) 75 MG tablet Take 1 tablet by mouth Every Night. 90 tablet 3 7/26/2025 Evening    doxazosin (Cardura) 1 MG tablet Take 1 tablet by mouth Every Night. 30 tablet 2 7/26/2025 Evening    eplerenone (INSPRA) 50 MG tablet Take 1 tablet by mouth Daily for 30 days. 30 tablet 0 7/27/2025 Morning    escitalopram (LEXAPRO) 5 MG tablet Take 1 tablet by mouth Every Night.   7/26/2025 Evening    ferrous gluconate (FERGON) 324 MG tablet Take 1 tablet by mouth Daily With Breakfast.   7/27/2025 Morning     furosemide (LASIX) 40 MG tablet Take 1 tablet by mouth Daily.   2025 Morning    hydrALAZINE (APRESOLINE) 100 MG tablet TAKE 1 TABLET BY MOUTH 3 TIMES A  tablet 1 2025 Morning    metFORMIN ER (GLUCOPHAGE-XR) 500 MG 24 hr tablet Take 1 tablet by mouth 2 (Two) Times a Day Before Meals.   2025 Morning    NIFEdipine XL (ADALAT CC) 60 MG 24 hr tablet Take 1 tablet by mouth 2 (Two) Times a Day for 30 days. 60 tablet 0 2025 Morning    ondansetron ODT (ZOFRAN-ODT) 4 MG disintegrating tablet Take 1 tablet by mouth As Needed.   2025 Noon    pantoprazole (PROTONIX) 40 MG EC tablet Take 1 tablet by mouth 2 (Two) Times a Day. 180 tablet 0 2025 Morning    rosuvastatin (CRESTOR) 10 MG tablet Take 1 tablet by mouth Daily.   2025 Morning       Allergies:  Allergies   Allergen Reactions    Codeine Itching    Other Unknown (See Comments)     Vicryl: doesn't heal       Past Social History:  Social History     Socioeconomic History    Marital status:    Tobacco Use    Smoking status: Former     Current packs/day: 0.00     Average packs/day: 0.5 packs/day for 20.0 years (10.0 ttl pk-yrs)     Types: Cigarettes     Start date:      Quit date:      Years since quittin.5    Smokeless tobacco: Never    Tobacco comments:     QUIT    Vaping Use    Vaping status: Never Used   Substance and Sexual Activity    Alcohol use: Not Currently     Comment: rarely    Drug use: No    Sexual activity: Defer       Past Family History:  Family History   Problem Relation Age of Onset    Heart disease Mother     Hypertension Mother     Heart disease Father     Heart attack Brother     Heart disease Brother     Heart disease Brother     Colon cancer Neg Hx     Colon polyps Neg Hx     Breast cancer Neg Hx        Review of Systems: All systems reviewed. Pertinent positives identified in HPI. All other systems are negative.     14 point ROS was performed and is negative except as outlined in HPI.      Objective:     Objective:  Vital Signs (last 24 hours)         07/27 0700  07/28 0659 07/28 0700 07/28 1215   Most Recent      Temp (°F) 97.7 -  98.2      98.2     98.2 (36.8) 07/28 0736    Heart Rate 55 -  73      68     68 07/28 0736    Resp 16 -  18      18     18 07/28 0736    /54 -  177/63      141/52     141/52 07/28 0736    SpO2 (%) 95 -  98      100     100 07/28 0736    Flow (L/min) (Oxygen Therapy)   2       2 07/28 0433          Temp:  [97.7 °F (36.5 °C)-98.7 °F (37.1 °C)] 98.7 °F (37.1 °C)  Heart Rate:  [55-73] 64  Resp:  [16-18] 18  BP: (116-177)/(47-77) 158/77  Body mass index is 32.66 kg/m².        Physical Exam:     General Appearance: No acute distress, well developed and well nourished.   Respiratory: No signs of respiratory distress. Respiration rhythm and depth normal.   Cardiovascular:  Heart Rate and Rhythm: Atrial flutter, Heart Sounds: Normal S1 and S2. No S3 or S4 noted  Skin: Warm and dry.   Psychiatric: Patient alert and oriented to person, place, and time. Speech and behavior appropriate. Normal mood and affect.    Labs:   Lab Review:     Results from last 7 days   Lab Units 07/28/25  0515 07/27/25  1510 07/24/25  1034 07/23/25  0357 07/22/25  1016   SODIUM mmol/L 136 133* 135* 140 137   POTASSIUM mmol/L 3.4* 3.8 4.1 4.0 4.3   CHLORIDE mmol/L 100 98 96* 102 97*   CO2 mmol/L 25.1 22.0 21.0* 27.9 25.3   BUN mg/dL 23.0 22.0 23.0 20.0 25.0*   CREATININE mg/dL 1.56* 1.48* 1.64* 1.31* 1.45*   GLUCOSE mg/dL 102* 114* 113* 111* 141*   CALCIUM mg/dL 8.9 9.4 10.0 9.2 9.8   AST (SGOT) U/L  --  19 21  --  19   ALT (SGPT) U/L  --  16 17  --  20     Results from last 7 days   Lab Units 07/27/25  1622 07/27/25  1510 07/24/25  1210   HSTROP T ng/L 15* 15* 17*     Results from last 7 days   Lab Units 07/28/25  0515   WBC 10*3/mm3 6.19   HEMOGLOBIN g/dL 10.9*   HEMATOCRIT % 33.0*   PLATELETS 10*3/mm3 331     Results from last 7 days   Lab Units 07/22/25  1016   INR  1.31*   APTT seconds 38.5*          Results from last 7 days   Lab Units 07/28/25  0515   MAGNESIUM mg/dL 2.3         Results from last 7 days   Lab Units 07/24/25  1034   PROBNP pg/mL 1,245.0*                   I personally viewed and interpreted the patient's EKG/Telemetry tracings.    Assessment:       Arrhythmia        Plan:   She has some evidence of sick sinus syndrome and has had periods of Wenckebach block.    Her current problem is she is having recurrent A-fib that is symptomatic.    We discussed several options, I think ablation would be a reasonable option for her and this may allow us to avoid having to move forward with pacemaker placement, I did discuss with her and her family that she does have some signs of conduction disease and could very well end up with a pacemaker in the future but currently no indication.        We discussed all of these options in detail.  After discussion she would prefer to proceed with outpatient ablation in hopes of avoiding the need for pacemaker placement.  We discussed risk, benefits, and alternatives.  She is agreeable to proceed.    Will plan to do PVI and CTI.  She has not missed any doses of apixaban.    Plan for cardioversion tomorrow and I will set her up for outpatient ablation       Thank you for allowing me to participate in the care of Valentine Arora. Feel free to contact me directly with any further questions or concerns.    TERRI Hummel  [facility]  07/28/25  12:20 EDT

## 2025-07-28 NOTE — PROGRESS NOTES
Name: Valentine Arora ADMIT: 2025   : 1950  PCP: Aliya Alejandro MD    MRN: [id number] LOS: 0 days   AGE/SEX: 75 y.o. female  ROOM: [facility]     Subjective   Subjective   She is resting in bed, she reports some chest burning related to arrhythmia. She reports cardiology had just left, and she was in a normal rhythm. She otherwise offers no new complaints at this time.   She does endorse her last dose of Eliquis was yesterday.       Objective   Objective   Vital Signs  Temp:  [97.7 °F (36.5 °C)-98.7 °F (37.1 °C)] 98.7 °F (37.1 °C)  Heart Rate:  [55-73] 64  Resp:  [16-18] 18  BP: (116-177)/(47-77) 158/77  SpO2:  [95 %-100 %] 96 %  on  Flow (L/min) (Oxygen Therapy):  [2] 2;   Device (Oxygen Therapy): room air  Body mass index is 32.66 kg/m².  Physical Exam  Vitals and nursing note reviewed.   Constitutional:       Appearance: She is obese.   HENT:      Head: Atraumatic.   Eyes:      Conjunctiva/sclera: Conjunctivae normal.   Cardiovascular:      Rate and Rhythm: Normal rate. Rhythm irregular.      Pulses: Normal pulses.           Radial pulses are 2+ on the right side and 2+ on the left side.        Dorsalis pedis pulses are 2+ on the right side and 2+ on the left side.        Posterior tibial pulses are 2+ on the right side and 2+ on the left side.      Heart sounds: Normal heart sounds.   Pulmonary:      Effort: Pulmonary effort is normal.      Breath sounds: Normal breath sounds.   Abdominal:      General: Bowel sounds are normal.   Musculoskeletal:      Right lower leg: No edema.      Left lower leg: No edema.   Skin:     General: Skin is warm and dry.   Neurological:      General: No focal deficit present.   Psychiatric:         Mood and Affect: Mood normal.       Results Review     I reviewed the patient's new clinical results.  Results from last 7 days   Lab Units 25  0515 25  1510 25  1034 25  0357   WBC 10*3/mm3 6.19 7.44 8.99 7.33   HEMOGLOBIN g/dL 10.9* 12.2  "12.7 10.1*   PLATELETS 10*3/mm3 331 398 451* 306     Results from last 7 days   Lab Units 07/28/25  0515 07/27/25  1510 07/24/25  1034 07/23/25  0357   SODIUM mmol/L 136 133* 135* 140   POTASSIUM mmol/L 3.4* 3.8 4.1 4.0   CHLORIDE mmol/L 100 98 96* 102   CO2 mmol/L 25.1 22.0 21.0* 27.9   BUN mg/dL 23.0 22.0 23.0 20.0   CREATININE mg/dL 1.56* 1.48* 1.64* 1.31*   GLUCOSE mg/dL 102* 114* 113* 111*   EGFR mL/min/1.73 34.7* 37.0* 32.7* 42.8*     Results from last 7 days   Lab Units 07/27/25  1510 07/24/25  1034 07/22/25  1016   ALBUMIN g/dL 4.4 4.7 4.6   BILIRUBIN mg/dL 0.3 0.4 0.3   ALK PHOS U/L 69 73 71   AST (SGOT) U/L 19 21 19   ALT (SGPT) U/L 16 17 20     Results from last 7 days   Lab Units 07/28/25  0515 07/27/25  1510 07/24/25  1034 07/23/25  0357 07/22/25  1016   CALCIUM mg/dL 8.9 9.4 10.0 9.2 9.8   ALBUMIN g/dL  --  4.4 4.7  --  4.6   MAGNESIUM mg/dL 2.3  --  2.2  --  2.3   PHOSPHORUS mg/dL  --   --  3.3  --   --        No results found for: \"HGBA1C\", \"POCGLU\"    XR Chest 1 View  Result Date: 7/27/2025  1. No definite active disease is seen in the chest with no change when compared to prior portable chest x-rays on 7/24/2025 and 7/22/2025.  2. There are multiple sternal wires from previous cardiac surgery and there is mild enlargement of the cardiomediastinal silhouette.  This report was finalized on 7/27/2025 3:55 PM by Dr. Sathish Garcia M.D on Workstation: [id number]        I have personally reviewed all medications:  Scheduled Medications  allopurinol, 100 mg, Oral, BID  amiodarone, 200 mg, Oral, Nightly  apixaban, 5 mg, Oral, BID  carvedilol, 12.5 mg, Oral, BID With Meals  cloNIDine, 0.1 mg, Oral, BID  clopidogrel, 75 mg, Oral, Nightly  eplerenone, 50 mg, Oral, Q24H  escitalopram, 5 mg, Oral, Nightly  ferrous sulfate, 325 mg, Oral, Daily With Breakfast  furosemide, 40 mg, Oral, Daily  hydrALAZINE, 100 mg, Oral, Q8H  melatonin, 2.5 mg, Oral, Nightly  NIFEdipine XL, 60 mg, Oral, BID  pantoprazole, 40 mg, " Oral, BID AC  rosuvastatin, 10 mg, Oral, Daily  terazosin, 1 mg, Oral, Nightly    Infusions   Diet  Diet: Cardiac; Healthy Heart (2-3 Na+); Fluid Consistency: Thin (IDDSI 0)  NPO Diet NPO Type: Strict NPO    I have personally reviewed:  [x]  Laboratory   [x]  Microbiology   [x]  Radiology   [x]  EKG/Telemetry  [x]  Cardiology/Vascular   []  Pathology    []  Records       Assessment/Plan     Active Hospital Problems    Diagnosis  POA    **Arrhythmia [I49.9]  Yes    Atrial fibrillation [I48.91]  Yes    Chronic kidney disease, stage 3a [N18.31]  Yes    Atrial flutter, unspecified type [I48.92]  Yes    Gastroesophageal reflux disease without esophagitis [K21.9]  Yes    PAD (peripheral artery disease) [I73.9]  Yes    Coronary artery disease involving coronary bypass graft of native heart [I25.810]  Yes    Hyperlipidemia [E78.5]  Yes      Resolved Hospital Problems   No resolved problems to display.       Ulysses Parra is a very pleasant 75 y.o. female admitted with Arrhythmia. She has known history of atrial fibrillation, as well as atrial flutter with prior cardioversion.She is very symptomatic when she goes out of sinus rhythm. Cardiology has consulted EP for possible ablation and cardiac pacemaker.     Arrhythmia  Atrial fibrillation  Atrial flutter, unspecified  Chronic   Prior cardioversion   HR is currently controlled carvedilol, amiodarone, Procardia,  Last dose of Eliquis 07/27/25  Continue cardiac monitoring   Awaiting EP evaluation and recommendations   Appreciate cardiology assistance and recommendations   She is on home Crestor, Procardia, Cardura hydralazine, furosemide, Spiriva, clonidine, Coreg, and amiodarone, terazosin  325 mg ASA ER on 7/2725  Possible cardioversion today,-n.p.o. for now    Coronary artery disease involving coronary bypass graft of native heart  S/P CABG  She is currently free of chest pain       Hyperlipidemia  Chronic  Continue statin    PAD (peripheral artery disease)  S/p iliac  stenting  Plavix hold currently       Gastroesophageal reflux disease without esophagitis  Chronic  Continue home PPI    Chronic kidney disease, stage 3a  Chronic  Stable  Noted hypokalemia at time of admission-replacing per protocol  Noted mild hyponatremia initially, sodium has now normalized  Slight increase in creatinine overnight 1.56  Progress Notes  Daily weights  Avoid nephrotoxins  If continued increase in    Type 2 diabetes mellitus  Chronic  Hold home metformin  SSI ordered for hyperglycemia  Glucose stable 100            Eliquis (home med) for DVT prophylaxis.-once cleared by cardiology /EP   Full code.  Discussed with patient, family, and care team on multidisciplinary rounds.  Anticipate discharge home with family when cleared by consultants.  Expected Discharge Date: 7/29/2025; Expected Discharge Time:       TERRI Hernandez  [facility]  07/28/25  14:20 EDT

## 2025-07-28 NOTE — PLAN OF CARE
Problem: Adult Inpatient Plan of Care  Goal: Plan of Care Review  Outcome: Progressing  Goal: Patient-Specific Goal (Individualized)  Outcome: Progressing  Goal: Absence of Hospital-Acquired Illness or Injury  Outcome: Progressing  Intervention: Identify and Manage Fall Risk  Description: Perform standard risk assessment on admission using a validated tool or comprehensive approach appropriate to the patient; reassess fall risk frequently, with change in status or transfer to another level of care.Communicate risk to interprofessional healthcare team; ensure fall risk visible cue.Determine need for increased observation, equipment and environmental modification, as well as use of supportive, nonskid footwear.Adjust safety measures to individual needs and identified risk factors.Reinforce the importance of active participation with fall risk prevention, safety, and physical activity with the patient and family.Perform regular intentional rounding to assess need for position change, pain assessment and personal needs, including assistance with toileting.  Recent Flowsheet Documentation  Taken 7/28/2025 1400 by Jenni Chambers RN  Safety Promotion/Fall Prevention:   nonskid shoes/slippers when out of bed   safety round/check completed  Taken 7/28/2025 1200 by Jenni Chambers RN  Safety Promotion/Fall Prevention:   nonskid shoes/slippers when out of bed   safety round/check completed  Taken 7/28/2025 1000 by Jenni Chambers RN  Safety Promotion/Fall Prevention:   nonskid shoes/slippers when out of bed   safety round/check completed  Taken 7/28/2025 0800 by Jenni Chambers RN  Safety Promotion/Fall Prevention:   nonskid shoes/slippers when out of bed   safety round/check completed  Intervention: Prevent Skin Injury  Description: Perform a screening for skin injury risk, such as pressure or moisture-associated skin damage on admission and at regular intervals throughout hospital stay.Keep all  areas of skin (especially folds) clean and dry.Maintain adequate skin hydration.Relieve and redistribute pressure and protect bony prominences and skin at risk for injury; implement measures based on patient-specific risk factors.Match turning and repositioning schedule to clinical condition.Encourage weight shift frequently; assist with reposition if unable to complete independently.Float heels off bed; avoid pressure on the Achilles tendon.Keep skin free from extended contact with medical devices.Optimize nutrition and hydration.Encourage functional activity and mobility, as early as tolerated.Use aids (e.g., slide boards, mechanical lift) during transfer.  Recent Flowsheet Documentation  Taken 7/28/2025 1400 by Jenni Chambers RN  Body Position: position changed independently  Taken 7/28/2025 1200 by Jenni Chambers RN  Body Position: position changed independently  Taken 7/28/2025 1000 by Jenni Chambers RN  Body Position: position changed independently  Taken 7/28/2025 0800 by Jenni Chambers RN  Body Position: position changed independently  Skin Protection: incontinence pads utilized  Intervention: Prevent Infection  Description: Maintain skin and mucous membrane integrity; promote hand, oral and pulmonary hygiene.Optimize fluid balance, nutrition, sleep and glycemic control to maximize infection resistance.Identify potential sources of infection early to prevent or mitigate progression of infection (e.g., wound, lines, devices).Evaluate ongoing need for invasive devices; remove promptly when no longer indicated.Review vaccination status.  Recent Flowsheet Documentation  Taken 7/28/2025 1400 by Jenni Chambers RN  Infection Prevention: single patient room provided  Taken 7/28/2025 1200 by Jenni Chambers RN  Infection Prevention: single patient room provided  Taken 7/28/2025 1000 by Jenni Chambers RN  Infection Prevention: single patient room provided  Taken  7/28/2025 0800 by Jenni Chambers RN  Infection Prevention: single patient room provided  Goal: Optimal Comfort and Wellbeing  Outcome: Progressing  Intervention: Provide Person-Centered Care  Description: Use a family-focused approach to care; encourage support system presence and participation.Develop trust and rapport by proactively providing information, encouraging questions, addressing concerns and offering reassurance.Acknowledge emotional response to hospitalization.Recognize and utilize personal coping strategies and strengths; develop goals via shared decision-making.Honor spiritual and cultural preferences.  Recent Flowsheet Documentation  Taken 7/28/2025 1400 by Jenni Chambers RN  Trust Relationship/Rapport:   care explained   choices provided   emotional support provided   questions answered   empathic listening provided   questions encouraged   reassurance provided   thoughts/feelings acknowledged  Taken 7/28/2025 0800 by Jenni Chambers RN  Trust Relationship/Rapport:   care explained   choices provided   emotional support provided   empathic listening provided   questions answered   questions encouraged   reassurance provided   thoughts/feelings acknowledged  Goal: Readiness for Transition of Care  Outcome: Progressing     Problem: Fall Injury Risk  Goal: Absence of Fall and Fall-Related Injury  Outcome: Progressing  Intervention: Promote Injury-Free Environment  Description: Provide a safe, barrier-free environment that encourages independent activity.Keep care area uncluttered and well-lighted.Determine need for increased observation or monitoring.Avoid use of devices that minimize mobility, such as restraints or indwelling urinary catheter.  Recent Flowsheet Documentation  Taken 7/28/2025 1400 by Jenni Chambers RN  Safety Promotion/Fall Prevention:   nonskid shoes/slippers when out of bed   safety round/check completed  Taken 7/28/2025 1200 by Jenni Chambers  RN  Safety Promotion/Fall Prevention:   nonskid shoes/slippers when out of bed   safety round/check completed  Taken 7/28/2025 1000 by Jenni Chambers RN  Safety Promotion/Fall Prevention:   nonskid shoes/slippers when out of bed   safety round/check completed  Taken 7/28/2025 0800 by Jenni Chambers, RN  Safety Promotion/Fall Prevention:   nonskid shoes/slippers when out of bed   safety round/check completed     Problem: Pain Acute  Goal: Optimal Pain Control and Function  Outcome: Progressing  Intervention: Optimize Psychosocial Wellbeing  Description: Facilitate patient's self-control over pain by providing pain information and allowing choices in treatment.Consider and address emotional response to pain; express compassion and reassurance.Explore and promote use of coping strategies; address barriers to successful coping.Evaluate and assist with psychosocial, cultural and spiritual factors impacting pain.Assess for risk factors for developing chronic pain, such as depression, fear, pain avoidance and pain catastrophizing.Modify pain perception using techniques, such as distraction, mindfulness, guided imagery, meditation or music.Consider referral for ongoing coping support, such as education, relaxation training and role of thoughts.  Recent Flowsheet Documentation  Taken 7/28/2025 1400 by Jenni Chambers, RN  Diversional Activities:   smartphone   television   tablet  Taken 7/28/2025 0800 by Jenni Chambers RN  Diversional Activities:   smartphone   television   tablet     Problem: Comorbidity Management  Goal: Blood Glucose Level Within Target Range  Outcome: Progressing  Goal: Blood Pressure in Desired Range  Outcome: Progressing  Goal: Maintenance of Osteoarthritis Symptom Control  Outcome: Progressing  Intervention: Maintain Osteoarthritis Symptom Control  Description: Evaluate adherence to self-management plan, such as medication, exercise and weight management.Advocate for  continuation of home regimen, such as medication and physical activity; monitor response.Encourage participation in functional activities, such as mobility and ADLs (activities of daily living) to minimize decline associated with inactivity.Facilitate use of patient-specific assistive devices, equipment or orthoses.Evaluate effectiveness of coping skills; encourage expression of feelings, expectations and concerns related to disease management and quality of life; reinforce education to enhance management plan and wellbeing.  Recent Flowsheet Documentation  Taken 7/28/2025 1400 by Jenni Chambers, RN  Activity Management: up ad venice  Taken 7/28/2025 0800 by Jenni Chambers, RN  Activity Management: up ad venice   Goal Outcome Evaluation:      Pt is having a cardioversion tomorrow AM. She will be NPO at midnight. She will follow up with Cardiology outpatient for an ablation. Likely d/c tomorrow following the procedure.

## 2025-07-28 NOTE — NURSING NOTE
Patient had multiple changes in her rhythm today, usually brought on by activity. She went from Afib to A flutter to NSR. 12-lead ECG obtained. Provider aware.

## 2025-07-29 ENCOUNTER — ANESTHESIA (OUTPATIENT)
Dept: CARDIOLOGY | Facility: HOSPITAL | Age: 75
End: 2025-07-29
Payer: MEDICARE

## 2025-07-29 ENCOUNTER — ANESTHESIA EVENT (OUTPATIENT)
Dept: CARDIOLOGY | Facility: HOSPITAL | Age: 75
End: 2025-07-29
Payer: MEDICARE

## 2025-07-29 ENCOUNTER — APPOINTMENT (OUTPATIENT)
Dept: CARDIOLOGY | Facility: HOSPITAL | Age: 75
End: 2025-07-29
Payer: MEDICARE

## 2025-07-29 VITALS
HEIGHT: 62 IN | RESPIRATION RATE: 16 BRPM | SYSTOLIC BLOOD PRESSURE: 147 MMHG | DIASTOLIC BLOOD PRESSURE: 63 MMHG | TEMPERATURE: 97.5 F | OXYGEN SATURATION: 98 % | WEIGHT: 183.2 LBS | BODY MASS INDEX: 33.71 KG/M2 | HEART RATE: 72 BPM

## 2025-07-29 LAB
QT INTERVAL: 493 MS
QT INTERVAL: 660 MS
QTC INTERVAL: 514 MS
QTC INTERVAL: 713 MS

## 2025-07-29 PROCEDURE — G0378 HOSPITAL OBSERVATION PER HR: HCPCS

## 2025-07-29 PROCEDURE — 93005 ELECTROCARDIOGRAM TRACING: CPT | Performed by: INTERNAL MEDICINE

## 2025-07-29 PROCEDURE — 25010000002 LIDOCAINE 2% SOLUTION: Performed by: ANESTHESIOLOGY

## 2025-07-29 PROCEDURE — 99214 OFFICE O/P EST MOD 30 MIN: CPT | Performed by: INTERNAL MEDICINE

## 2025-07-29 PROCEDURE — 25010000002 PROPOFOL 1000 MG/100ML EMULSION: Performed by: ANESTHESIOLOGY

## 2025-07-29 PROCEDURE — 93010 ELECTROCARDIOGRAM REPORT: CPT | Performed by: INTERNAL MEDICINE

## 2025-07-29 PROCEDURE — 92960 CARDIOVERSION ELECTRIC EXT: CPT

## 2025-07-29 PROCEDURE — 25010000002 FAMOTIDINE 10 MG/ML SOLUTION: Performed by: ANESTHESIOLOGY

## 2025-07-29 PROCEDURE — 92960 CARDIOVERSION ELECTRIC EXT: CPT | Performed by: INTERNAL MEDICINE

## 2025-07-29 RX ORDER — SODIUM CHLORIDE 0.9 % (FLUSH) 0.9 %
3 SYRINGE (ML) INJECTION EVERY 12 HOURS SCHEDULED
Status: DISCONTINUED | OUTPATIENT
Start: 2025-07-29 | End: 2025-07-29 | Stop reason: HOSPADM

## 2025-07-29 RX ORDER — MIDAZOLAM HYDROCHLORIDE 1 MG/ML
0.5 INJECTION, SOLUTION INTRAMUSCULAR; INTRAVENOUS
Status: DISCONTINUED | OUTPATIENT
Start: 2025-07-29 | End: 2025-07-29 | Stop reason: HOSPADM

## 2025-07-29 RX ORDER — FENTANYL CITRATE 50 UG/ML
50 INJECTION, SOLUTION INTRAMUSCULAR; INTRAVENOUS ONCE AS NEEDED
Status: DISCONTINUED | OUTPATIENT
Start: 2025-07-29 | End: 2025-07-29 | Stop reason: HOSPADM

## 2025-07-29 RX ORDER — LIDOCAINE HYDROCHLORIDE 20 MG/ML
INJECTION, SOLUTION INFILTRATION; PERINEURAL AS NEEDED
Status: DISCONTINUED | OUTPATIENT
Start: 2025-07-29 | End: 2025-07-29 | Stop reason: SURG

## 2025-07-29 RX ORDER — SODIUM CHLORIDE 0.9 % (FLUSH) 0.9 %
3-10 SYRINGE (ML) INJECTION AS NEEDED
Status: DISCONTINUED | OUTPATIENT
Start: 2025-07-29 | End: 2025-07-29 | Stop reason: HOSPADM

## 2025-07-29 RX ORDER — FAMOTIDINE 10 MG/ML
20 INJECTION, SOLUTION INTRAVENOUS ONCE
Status: COMPLETED | OUTPATIENT
Start: 2025-07-29 | End: 2025-07-29

## 2025-07-29 RX ORDER — SODIUM CHLORIDE, SODIUM LACTATE, POTASSIUM CHLORIDE, CALCIUM CHLORIDE 600; 310; 30; 20 MG/100ML; MG/100ML; MG/100ML; MG/100ML
9 INJECTION, SOLUTION INTRAVENOUS CONTINUOUS
Status: DISCONTINUED | OUTPATIENT
Start: 2025-07-29 | End: 2025-07-29 | Stop reason: HOSPADM

## 2025-07-29 RX ORDER — LIDOCAINE HYDROCHLORIDE 10 MG/ML
0.5 INJECTION, SOLUTION INFILTRATION; PERINEURAL ONCE AS NEEDED
Status: DISCONTINUED | OUTPATIENT
Start: 2025-07-29 | End: 2025-07-29 | Stop reason: HOSPADM

## 2025-07-29 RX ORDER — PROPOFOL 10 MG/ML
INJECTION, EMULSION INTRAVENOUS AS NEEDED
Status: DISCONTINUED | OUTPATIENT
Start: 2025-07-29 | End: 2025-07-29 | Stop reason: SURG

## 2025-07-29 RX ADMIN — ROSUVASTATIN CALCIUM 10 MG: 10 TABLET, FILM COATED ORAL at 08:29

## 2025-07-29 RX ADMIN — FAMOTIDINE 20 MG: 10 INJECTION INTRAVENOUS at 08:29

## 2025-07-29 RX ADMIN — HYDRALAZINE HYDROCHLORIDE 100 MG: 50 TABLET ORAL at 06:14

## 2025-07-29 RX ADMIN — FUROSEMIDE 40 MG: 40 TABLET ORAL at 08:28

## 2025-07-29 RX ADMIN — CLONIDINE HYDROCHLORIDE 0.1 MG: 0.1 TABLET ORAL at 08:28

## 2025-07-29 RX ADMIN — EPLERENONE 50 MG: 25 TABLET, FILM COATED ORAL at 08:28

## 2025-07-29 RX ADMIN — LIDOCAINE HYDROCHLORIDE 20 MG: 20 INJECTION, SOLUTION INFILTRATION; PERINEURAL at 07:52

## 2025-07-29 RX ADMIN — ALLOPURINOL 100 MG: 100 TABLET ORAL at 08:28

## 2025-07-29 RX ADMIN — APIXABAN 5 MG: 5 TABLET, FILM COATED ORAL at 08:29

## 2025-07-29 RX ADMIN — PROPOFOL INJECTABLE EMULSION 100 MG: 10 INJECTION, EMULSION INTRAVENOUS at 07:52

## 2025-07-29 RX ADMIN — NIFEDIPINE 60 MG: 60 TABLET, EXTENDED RELEASE ORAL at 08:29

## 2025-07-29 RX ADMIN — CARVEDILOL 12.5 MG: 12.5 TABLET, FILM COATED ORAL at 08:28

## 2025-07-29 RX ADMIN — Medication 3 ML: at 08:28

## 2025-07-29 RX ADMIN — PANTOPRAZOLE SODIUM 40 MG: 40 TABLET, DELAYED RELEASE ORAL at 08:39

## 2025-07-29 RX ADMIN — FERROUS SULFATE TAB 325 MG (65 MG ELEMENTAL FE) 325 MG: 325 (65 FE) TAB at 08:28

## 2025-07-29 NOTE — PROGRESS NOTES
Discharge Planning Assessment  [facility]     Patient Name: Valentine Arora  MRN: [id number]  Today's Date: 7/29/2025    Admit Date: 7/27/2025    Plan: Home   Discharge Needs Assessment    No documentation.                  Discharge Plan       Row Name 07/29/25 1638       Plan    Plan Home    Final Discharge Disposition Code 01 - home or self-care    Final Note Home                  Continued Care and Services - Discharged on 7/29/2025 Admission date: 7/27/2025 - Discharge disposition: Home or Self Care   No active coordination exists.       Expected Discharge Date and Time       Expected Discharge Date Expected Discharge Time    Jul 29, 2025            Demographic Summary    No documentation.                  Functional Status    No documentation.                  Psychosocial    No documentation.                  Abuse/Neglect    No documentation.                  Legal    No documentation.                  Substance Abuse    No documentation.                  Patient Forms    No documentation.                     Kait Kat RN

## 2025-07-29 NOTE — ANESTHESIA POSTPROCEDURE EVALUATION
Patient: Valentine Arora    Procedure Summary       Date: 07/29/25 Room / Location: [facility] CARDIOVASCULAR OUTPATIENT    Anesthesia Start: 0751 Anesthesia Stop: 0802    Procedure: CARDIOVERSION EXTERNAL IN CARDIOLOGY DEPARTMENT Diagnosis: (aflutter)    Scheduled Providers: Dai Suazo MD Provider: Samia Trevizo MD    Anesthesia Type: general ASA Status: 3            Anesthesia Type: general    Vitals  No vitals data found for the desired time range.          Post Anesthesia Care and Evaluation    Anesthetic complications: No anesthetic complications

## 2025-07-29 NOTE — PROGRESS NOTES
"    Patient Name: Valentine Arora  :1950  75 y.o.      Patient Care Team:  Aliya Alejandro MD as PCP - General (Family Medicine)    Chief Complaint:   Atrial flutter  Interval History:    Successful CV today.     Objective   Vital Signs  Temp:  [97.5 °F (36.4 °C)-98.7 °F (37.1 °C)] 97.5 °F (36.4 °C)  Heart Rate:  [54-73] 72  Resp:  [14-18] 16  BP: (105-158)/(55-85) 147/63    Intake/Output Summary (Last 24 hours) at 2025 0914  Last data filed at 2025  Gross per 24 hour   Intake 480 ml   Output --   Net 480 ml     Flowsheet Rows      Flowsheet Row First Filed Value   Admission Height 157.5 cm (62\") Documented at 2025   Admission Weight 80.7 kg (177 lb 14.6 oz) Documented at 2025 180            Physical Exam:   General Appearance:    Alert, cooperative, in no acute distress   Lungs:     Clear to auscultation.  Normal respiratory effort and rate.      Heart:    Regular rhythm and normal rate, normal S1 and S2, no murmurs, gallops or rubs.     Chest Wall:    No abnormalities observed   Abdomen:     Soft, nontender, positive bowel sounds.     Extremities:   no cyanosis, clubbing or edema.  No marked joint deformities.  Adequate musculoskeletal strength.       Results Review:    Results from last 7 days   Lab Units 25  1700 25  0515   SODIUM mmol/L  --  136   POTASSIUM mmol/L 4.2 3.4*   CHLORIDE mmol/L  --  100   CO2 mmol/L  --  25.1   BUN mg/dL  --  23.0   CREATININE mg/dL  --  1.56*   GLUCOSE mg/dL  --  102*   CALCIUM mg/dL  --  8.9     Results from last 7 days   Lab Units 25  1622 25  1510 25  1210   HSTROP T ng/L 15* 15* 17*     Results from last 7 days   Lab Units 25  0515   WBC 10*3/mm3 6.19   HEMOGLOBIN g/dL 10.9*   HEMATOCRIT % 33.0*   PLATELETS 10*3/mm3 331     Results from last 7 days   Lab Units 25  1016   INR  1.31*   APTT seconds 38.5*     Results from last 7 days   Lab Units 25  0515   MAGNESIUM mg/dL 2.3       "             Medication Review:   allopurinol, 100 mg, Oral, BID  amiodarone, 200 mg, Oral, Nightly  apixaban, 5 mg, Oral, BID  carvedilol, 12.5 mg, Oral, BID With Meals  cloNIDine, 0.1 mg, Oral, BID  clopidogrel, 75 mg, Oral, Nightly  eplerenone, 50 mg, Oral, Q24H  escitalopram, 5 mg, Oral, Nightly  ferrous sulfate, 325 mg, Oral, Daily With Breakfast  furosemide, 40 mg, Oral, Daily  hydrALAZINE, 100 mg, Oral, Q8H  melatonin, 2.5 mg, Oral, Nightly  NIFEdipine XL, 60 mg, Oral, BID  pantoprazole, 40 mg, Oral, BID AC  rosuvastatin, 10 mg, Oral, Daily  sodium chloride, 3 mL, Intravenous, Q12H  terazosin, 1 mg, Oral, Nightly         lactated ringers, 9 mL/hr        Assessment & Plan   AF/Flutter. S/p CV today, successful. Plan for outpatient ablation. Continue ablation  Sinus bradycardia improved  HTN improved    OK for dc today, fu with EP as planned, me in October unless BP not well controlled  - amio  - eliquis  - coreg, clonidine, nifedipine, inspra, hydralazaine, lasix on dc at current doses.     Dai Suazo MD  [facility]  07/29/25  09:14 EDT

## 2025-07-29 NOTE — ANESTHESIA PREPROCEDURE EVALUATION
Anesthesia Evaluation     history of anesthetic complications:  PONV               Airway   Mallampati: II  TM distance: >3 FB  Neck ROM: full  Dental      Comment: bridge upper R front    Pulmonary    (+) a smoker Former,  (-) shortness of breath, recent URI, sleep apnea  Cardiovascular     (+) hypertension, CAD, CABG, dysrhythmias Atrial Fib, angina, PVD, hyperlipidemia    ROS comment: Atrial flutter, inf MI 65%    Neuro/Psych  (-) dizziness/light headedness, syncope  GI/Hepatic/Renal/Endo    (+) obesity, GERD, renal disease- CRI, diabetes mellitus    Musculoskeletal     Abdominal    Substance History      OB/GYN          Other                      Anesthesia Plan    ASA 3     general     intravenous induction     Anesthetic plan, risks, benefits, and alternatives have been provided, discussed and informed consent has been obtained with: patient.    CODE STATUS:    Code Status (Patient has no pulse and is not breathing): CPR (Attempt to Resuscitate)  Medical Interventions (Patient has pulse or is breathing): Full

## 2025-07-29 NOTE — DISCHARGE SUMMARY
Patient Name: Valentine Arora  : 1950  MRN: [id number]    Date of Admission: 2025  Date of Discharge:  2025  Primary Care Physician: Aliya Alejandro MD      Chief Complaint:   Chest Pain      Discharge Diagnoses     Active Hospital Problems    Diagnosis  POA    **Arrhythmia [I49.9]  Yes    Atrial fibrillation [I48.91]  Yes    Chronic kidney disease, stage 3a [N18.31]  Yes    Atrial flutter, unspecified type [I48.92]  Yes    Gastroesophageal reflux disease without esophagitis [K21.9]  Yes    PAD (peripheral artery disease) [I73.9]  Yes    Coronary artery disease involving coronary bypass graft of native heart [I25.810]  Yes    Hyperlipidemia [E78.5]  Yes      Resolved Hospital Problems   No resolved problems to display.        Hospital Course     Ms. Arora is a 75 y.o. female with a history of atrial relation, atrial flutter, hyperlipidemia, type 2 diabetes mellitus, CAD, PAD, GERD, CKD who presented to [facility] initially complaining of chest discomfort, left arm pain, and overall feeling unwell, and feeling as if she was in atrial fibrillation.  Please see the admitting history and physical for further details.  She was found to have atrial flutter/arrhythmia with elevated but flat troponin levels and was admitted to the hospital for further evaluation and treatment.  Cardiology has been following during her hospitalization, she has known atrial fibrillation, with prior cardioversion x 2. In 2023 when she was placed on amiodarone. Cardiology feels she she is not symptomatic with her atrial fibrillation as she has also had hypotension, bradycardia.  Her most recent cardiac catheterization was 2024, was found to have severe multivessel disease, known occluded RCA, occluded SVG to PDA, left-to-right collaterals to the mid RCA.  Proximal severe LAD disease with patent SVG to diagonal and jump to LAD.  Distal to the anastomosis this the vessels were normal,  borderline mid circumflex disease noted.  She was treated medically at that point.  April 2025 she was found to be hypertensive, her beta-blocker was increased, unfortunately she developed bradycardia on Holter monitor, so her beta-blocker and clonidine were tapered down, she had recurrence of traumatic atrial fibrillation, last week and seen cardiology and was started on nifedipine, and was found to be in atrial flutter.  She was discharged stable condition but returned back to the emergency room this past weekend again with symptomatic atrial fibrillation, reports of burning in her chest, fatigue, left arm pain and again as stated above feeling overall unwell.  At 1 point during this hospitalization she was noted to be in normal rhythm, hypertensive, episodes of atrial fibrillation/atrial flutter, controlled rate.  EP services were consulted, for discussion of cardiac ablation and possible cardiac pacemaker.  She underwent cardioversion this morning, with plans for outpatient ablation.  She has known type 2 diabetes mellitus, her metformin has been held during her hospitalization secondary to increased risk of medication interactions.  Her blood glucose has been monitored and covered with sliding scale insulin as needed.  She has been advised to resume her metformin at discharge.  She has been continued on her Eliquis during her hospitalization.  She has been advised to continue this at discharge until otherwise directed by cardiology.  She is feeling much better today after cardioversion and is medically stable for discharge home with follow-up with EP services who will schedule and coordinate with patient for outpatient ablation, she has been advised to monitor her blood pressure, and to follow-up with Dr. Suazo at her already scheduled appointment in October unless her blood pressures are uncontrolled. She has been advised to monitor her blood pressure and record them to take with her to her outpatient  follow-up appointments.  She has been advised to take all of her medications as they have been prescribed to her.   She has been advised to call her cardiologist, or return to the emergency room for new or worsening symptoms.    Day of Discharge     Subjective:  She is resting in bed, family is at bedside.  She reports she feels back to her normal self and is very eager to discharge back home this morning. She denies any new complaints at this time.    Physical Exam:  Temp:  [97.5 °F (36.4 °C)-98.7 °F (37.1 °C)] 97.5 °F (36.4 °C)  Heart Rate:  [54-73] 72  Resp:  [14-18] 16  BP: (105-158)/(55-85) 147/63  Body mass index is 33.51 kg/m².  Physical Exam  Vitals and nursing note reviewed.   Constitutional:       Appearance: She is obese.   HENT:      Head: Atraumatic.      Nose: Nose normal.      Mouth/Throat:      Mouth: Mucous membranes are dry.   Eyes:      Conjunctiva/sclera: Conjunctivae normal.   Cardiovascular:      Rate and Rhythm: Normal rate and regular rhythm.      Pulses: Normal pulses.      Heart sounds: Normal heart sounds.   Pulmonary:      Effort: Pulmonary effort is normal.      Breath sounds: Normal breath sounds.   Abdominal:      General: Bowel sounds are normal.      Palpations: Abdomen is soft.   Musculoskeletal:         General: Normal range of motion.   Skin:     General: Skin is warm and dry.      Capillary Refill: Capillary refill takes less than 2 seconds.      Coloration: Skin is pale.   Neurological:      General: No focal deficit present.      Mental Status: She is alert and oriented to person, place, and time.   Psychiatric:         Mood and Affect: Mood normal.         Consultants     Consult Orders (all) (From admission, onward)       Start     Ordered    07/27/25 1846  Inpatient Cardiology Consult  Once        Specialty:  Cardiology  Provider:  Dai Suazo MD    07/27/25 1845                  Procedures     * Surgery not found *    Imaging Results (All)       Procedure Component Value  Units Date/Time    XR Chest 1 View [[id number]] Collected: 07/27/25 1551     Updated: 07/27/25 1558    Narrative:      Emergency portable view of the chest on 7/27/2025     CLINICAL HISTORY: This is a 75-year-old female patient with chest pain     This is correlated to prior portable chest x-ray on 7/24/2025.     FINDINGS: There are multiple sternal wires from previous cardiac  surgery. The cardiomediastinal silhouette is mildly enlarged and the  pulmonary vasculature is within normal limits. The lungs are clear. The  costophrenic angles are sharp.       Impression:      1. No definite active disease is seen in the chest with no change when  compared to prior portable chest x-rays on 7/24/2025 and 7/22/2025.     2. There are multiple sternal wires from previous cardiac surgery and  there is mild enlargement of the cardiomediastinal silhouette.     This report was finalized on 7/27/2025 3:55 PM by Dr. Sathish Garcia M.D  on Workstation: [id number]             Results for orders placed during the hospital encounter of 06/10/25    Duplex Renal Artery - Bilateral Complete CAR 06/10/2025  9:32 AM    Interpretation Summary    Mild-moderate right renal artery stenosis.    Normal left renal artery.    Results for orders placed during the hospital encounter of 01/15/24    Adult Transthoracic Echo Complete W/ Cont if Necessary Per Protocol 01/15/2024  8:51 AM    Interpretation Summary    Left ventricular systolic function is normal. Calculated left ventricular EF = 65.1%    Left ventricular diastolic function was normal.    Left atrial volume is mildly increased.    The right atrial cavity is mildly  dilated.    Moderate tricuspid valve regurgitation is present.    Estimated right ventricular systolic pressure from tricuspid regurgitation is normal (<35 mmHg). Calculated right ventricular systolic pressure from tricuspid regurgitation is 34 mmHg.    Pertinent Labs     Results from last 7 days   Lab Units 07/28/25  0599  "07/27/25  1510 07/24/25  1034 07/23/25  0357   WBC 10*3/mm3 6.19 7.44 8.99 7.33   HEMOGLOBIN g/dL 10.9* 12.2 12.7 10.1*   PLATELETS 10*3/mm3 331 398 451* 306     Results from last 7 days   Lab Units 07/28/25  1700 07/28/25  0515 07/27/25  1510 07/24/25  1034 07/23/25  0357   SODIUM mmol/L  --  136 133* 135* 140   POTASSIUM mmol/L 4.2 3.4* 3.8 4.1 4.0   CHLORIDE mmol/L  --  100 98 96* 102   CO2 mmol/L  --  25.1 22.0 21.0* 27.9   BUN mg/dL  --  23.0 22.0 23.0 20.0   CREATININE mg/dL  --  1.56* 1.48* 1.64* 1.31*   GLUCOSE mg/dL  --  102* 114* 113* 111*   EGFR mL/min/1.73  --  34.7* 37.0* 32.7* 42.8*     Results from last 7 days   Lab Units 07/27/25  1510 07/24/25  1034 07/22/25  1016   ALBUMIN g/dL 4.4 4.7 4.6   BILIRUBIN mg/dL 0.3 0.4 0.3   ALK PHOS U/L 69 73 71   AST (SGOT) U/L 19 21 19   ALT (SGPT) U/L 16 17 20     Results from last 7 days   Lab Units 07/28/25  0515 07/27/25  1510 07/24/25  1034 07/23/25  0357 07/22/25  1016   CALCIUM mg/dL 8.9 9.4 10.0 9.2 9.8   ALBUMIN g/dL  --  4.4 4.7  --  4.6   MAGNESIUM mg/dL 2.3  --  2.2  --  2.3   PHOSPHORUS mg/dL  --   --  3.3  --   --      Results from last 7 days   Lab Units 07/22/25  1016   LIPASE U/L 85*     Results from last 7 days   Lab Units 07/27/25  1622 07/27/25  1510 07/24/25  1210 07/24/25  1034 07/22/25  1127 07/22/25  1016   HSTROP T ng/L 15* 15* 17* 18*   < > 16*   PROBNP pg/mL  --   --   --  1,245.0*  --  2,176.0*    < > = values in this interval not displayed.           Invalid input(s): \"LDLCALC\"          Test Results Pending at Discharge     Pending Results       Procedure [Order ID] Specimen - Date/Time    Cardioversion External in Cardiology Department - In process [[id number]] Resulted: 07/29/25 0805     Updated: 07/29/25 0805    This result has not been signed. Information might be incomplete.      Narrative:        Post cardioversion the patient displayed a sinus rhythm.    The cardioversion was successful.      Impression:            "         Discharge Details        Discharge Medications        Continue These Medications        Instructions Start Date   allopurinol 100 MG tablet  Commonly known as: ZYLOPRIM   Take 1 tablet by mouth 2 (Two) Times a Day.      ALPRAZolam 0.25 MG tablet  Commonly known as: XANAX   Take 1 tablet by mouth As Needed for Anxiety.      amiodarone 200 MG tablet  Commonly known as: PACERONE   200 mg, Oral, Daily      apixaban 5 MG tablet tablet  Commonly known as: ELIQUIS   5 mg, Oral, 2 Times Daily      carvedilol 12.5 MG tablet  Commonly known as: COREG   12.5 mg, Oral, 2 Times Daily With Meals      cloNIDine 0.1 MG tablet  Commonly known as: CATAPRES   0.1 mg, Oral, 2 Times Daily      clopidogrel 75 MG tablet  Commonly known as: PLAVIX   75 mg, Oral, Nightly      doxazosin 1 MG tablet  Commonly known as: Cardura   1 mg, Oral, Nightly      eplerenone 50 MG tablet  Commonly known as: INSPRA   50 mg, Oral, Every 24 Hours Scheduled      escitalopram 5 MG tablet  Commonly known as: LEXAPRO   5 mg, Nightly      ferrous gluconate 324 MG tablet  Commonly known as: FERGON   324 mg, Daily With Breakfast      furosemide 40 MG tablet  Commonly known as: LASIX   40 mg, Daily      hydrALAZINE 100 MG tablet  Commonly known as: APRESOLINE   100 mg, Oral, 3 Times Daily      metFORMIN  MG 24 hr tablet  Commonly known as: GLUCOPHAGE-XR   500 mg, 2 Times Daily Before Meals      NIFEdipine CC 60 MG 24 hr tablet  Commonly known as: ADALAT CC   60 mg, Oral, 2 Times Daily      ondansetron ODT 4 MG disintegrating tablet  Commonly known as: ZOFRAN-ODT   4 mg, As Needed      pantoprazole 40 MG EC tablet  Commonly known as: PROTONIX   40 mg, Oral, 2 Times Daily      rosuvastatin 10 MG tablet  Commonly known as: CRESTOR   10 mg, Daily               Allergies   Allergen Reactions    Codeine Itching    Other Unknown (See Comments)     Vicryl: doesn't heal       Discharge Disposition:  Home or Self Care      Discharge Diet:  Diet Order    Procedures    Diet: Cardiac; Healthy Heart (2-3 Na+); Fluid Consistency: Thin (IDDSI 0)       Discharge Activity: As tolerated      CODE STATUS:    Code Status and Medical Interventions: CPR (Attempt to Resuscitate); Full   Ordered at: 07/27/25 5243     Code Status (Patient has no pulse and is not breathing):    CPR (Attempt to Resuscitate)     Medical Interventions (Patient has pulse or is breathing):    Full       Future Appointments   Date Time Provider Department Center   10/21/2025  3:15 PM Dai Suazo MD [id number]      Follow-up Information       Aliya Alejandro MD Follow up in 1 week(s).    Specialty: Family Medicine  Why: Posthospitalization follow-up  Contact information:  [address]  [address]  [facility] [address] [phone]               Dai Suazo MD. Go to.    Specialty: Cardiology  Why: Your previously scheduled appointment in October unless BP is uncontrolled, then call for earlier appoinment  Contact information:  [address]  [address]  [place] [id number] [phone]               Elmer España APRN Follow up.    Specialties: Nurse Practitioner, Cardiology  Why: EP services will call arrange and coordinate outpatient appointment for ablation  Contact information:  [address]  [address]  [place] [id number] [phone]                             Time Spent on Discharge:  Greater than 30 minutes      TERRI Hernandez  [facility]  07/29/25  09:40 EDT

## 2025-07-29 NOTE — PLAN OF CARE
Problem: Adult Inpatient Plan of Care  Goal: Absence of Hospital-Acquired Illness or Injury  Intervention: Identify and Manage Fall Risk  Recent Flowsheet Documentation  Taken 7/28/2025 2121 by Bijal Aviles RN  Safety Promotion/Fall Prevention: safety round/check completed  Intervention: Prevent Skin Injury  Recent Flowsheet Documentation  Taken 7/28/2025 2121 by Bijal Aviles RN  Body Position: position changed independently  Skin Protection: transparent dressing maintained  Intervention: Prevent and Manage VTE (Venous Thromboembolism) Risk  Recent Flowsheet Documentation  Taken 7/28/2025 2121 by Bijal Aviles RN  VTE Prevention/Management:   SCDs (sequential compression devices) off   bilateral  Intervention: Prevent Infection  Recent Flowsheet Documentation  Taken 7/28/2025 2121 by Bijal Aviles RN  Infection Prevention:   personal protective equipment utilized   hand hygiene promoted  Goal: Optimal Comfort and Wellbeing  Intervention: Provide Person-Centered Care  Recent Flowsheet Documentation  Taken 7/28/2025 2121 by Bijal Aviles RN  Trust Relationship/Rapport:   care explained   emotional support provided   choices provided   questions answered   reassurance provided   thoughts/feelings acknowledged   questions encouraged   empathic listening provided     Problem: Fall Injury Risk  Goal: Absence of Fall and Fall-Related Injury  Intervention: Identify and Manage Contributors  Recent Flowsheet Documentation  Taken 7/28/2025 2121 by Bijal Aviles RN  Medication Review/Management: medications reviewed  Intervention: Promote Injury-Free Environment  Recent Flowsheet Documentation  Taken 7/28/2025 2121 by Bijal Aviles RN  Safety Promotion/Fall Prevention: safety round/check completed     Problem: Pain Acute  Goal: Optimal Pain Control and Function  Intervention: Optimize Psychosocial Wellbeing  Recent Flowsheet Documentation  Taken 7/28/2025 2121 by Bijal Aviles RN  Supportive  Measures: active listening utilized  Intervention: Prevent or Manage Pain  Recent Flowsheet Documentation  Taken 7/28/2025 2121 by Bijal Aviles RN  Sensory Stimulation Regulation: quiet environment promoted  Sleep/Rest Enhancement: awakenings minimized  Medication Review/Management: medications reviewed     Problem: Comorbidity Management  Goal: Blood Glucose Level Within Target Range  Intervention: Monitor and Manage Glycemia  Recent Flowsheet Documentation  Taken 7/28/2025 2121 by Bijal Aviles RN  Medication Review/Management: medications reviewed  Goal: Blood Pressure in Desired Range  Intervention: Maintain Blood Pressure Management  Recent Flowsheet Documentation  Taken 7/28/2025 2121 by Bijal Aviles RN  Medication Review/Management: medications reviewed  Goal: Maintenance of Osteoarthritis Symptom Control  Intervention: Maintain Osteoarthritis Symptom Control  Recent Flowsheet Documentation  Taken 7/28/2025 2121 by Bijal Aviles RN  Activity Management: up ad venice  Medication Review/Management: medications reviewed   Goal Outcome Evaluation:

## 2025-07-30 ENCOUNTER — TELEPHONE (OUTPATIENT)
Age: 75
End: 2025-07-30
Payer: MEDICARE

## 2025-07-30 ENCOUNTER — READMISSION MANAGEMENT (OUTPATIENT)
Dept: CALL CENTER | Facility: HOSPITAL | Age: 75
End: 2025-07-30
Payer: MEDICARE

## 2025-07-30 DIAGNOSIS — I48.0 PAROXYSMAL ATRIAL FIBRILLATION: Primary | ICD-10-CM

## 2025-07-30 NOTE — OUTREACH NOTE
Prep Survey      Flowsheet Row Responses   [facility] facility patient discharged from? [facility]   Is LACE score < 7 ? No   Eligibility Readm Mgmt   Discharge diagnosis Arrhythmia   Does the patient have one of the following disease processes/diagnoses(primary or secondary)? Other   Does the patient have Home health ordered? No   Is there a DME ordered? No   Prep survey completed? Yes            JENNIFER TOLLIVER - Registered Nurse

## 2025-07-30 NOTE — TELEPHONE ENCOUNTER
Your procedure is scheduled for 9/19/25 @ 7AM     Your Procedure will take place at [facility]   [address]    Entrance C  2nd Floor Main Surgery, this is located on the second floor of the parking garage  YOU NEED TO PRESENT TO THE MAIN SURGERY CENTER ENTRANCE C located on the second floor of the parking garage at your designated arrival time.       Overview of Atrial Fibrillation/Flutter Ablation  Atrial fibrillation/flutter ablation is a way to help fix a heart that beats in a strange or fast way. Doctors use electrical energy to make tiny scars inside the heart. These scars help block the faulty signals that cause the heart to beat irregularly.  Ablation is used when medicines or other treatments don't work well. Sometimes, it can be used as the first choice for some patients.    What Happens in Ablation?  A doctor puts thin tubes called catheters into a blood vessel in your groin (the area where your leg meets your body).  The doctor then uses these catheters to reach your heart.  Electrical energy is then delivered to the heart with the catheters to block the irregular signals  Ablation is done to reset/restore the heart's rhythm. Doctors suggest it when you have symptoms like a fast heartbeat or trouble breathing.    Why is ablation recommended?  The main reason your doctor wants to do ablation is to help with symptoms like feeling out of breath or having fast heartbeats.   It helps control these symptoms but doesn't necessarily mean you can stop taking medicines to prevent strokes like blood thinners.  Ablation might work better for people whose atrial fibrillation has lasted only a week or less. If it lasted a long time, it might not work as well.    Risks of Ablation  There are some risks with ablation, including:  Bleeding or infection where the tubes were put in  Damage to blood vessels  New or worse, irregular heartbeats  A slow heartbeat that might need a pacemaker  Blood clots  in the legs or lungs  Stroke or heart attack    What Happens During Ablation?  The procedure usually takes about 1 to 2 hours but plan on being at the hospital most of the day (6-8 hours). Here's what happens:  You'll get medicine to make you sleep during the surgery.  The doctor makes small openings in a blood vessel in your groin and puts in thin tubes called sheaths.  The doctor puts flexible tubes (catheters) through these sheaths to get to your heart.  The doctor finds the spots causing problems by sending small electrical signals through the catheters.  Afterward, the doctor takes out the tubes, closes the openings, and puts on a bandage.    After the Procedure  You will rest in a recovery area. Plan on being at the hospital most of the day for the procedure.   You need to stay still to prevent bleeding from where the tubes were inserted. Nurses will check your heart and blood pressure.  You might go home the same day, but someone will need to drive you.  Some patients will need to stay the night so also prepare for this.  You may feel a little sore, especially in your chest, but this won't last more than a week. Most people can return to their usual activities within a week.  Results  Most people feel better after having an ablation. However, sometimes the irregular heartbeat might come back. If that happens, the procedure can be done again, or other treatments might be suggested. Even after ablation, you may still need to take medicine to lower the risk of a stroke.    Medication Specific information  Do not eat or drink anything after midnight before your procedure.   We recommend that you do not miss any doses of your blood thinner (Eliquis, warfarin, Xarelto, Pradaxa) for at least 3 weeks leading up to the procedure, doing so could result in cancellation of your procedure. You will need to take it even the morning of the procedure.   You will need to hold all other medications (including but not limited  to diabetic medications, diuretics, and BP medications). The only medication you should take the morning of the procedure is your blood thinner.   If you are on any of the following medications you will need to stop at least 7 days prior to your procedure: semaglutide, tirzepitide, degludec, wegovy, Ozempic, moujaro, zepbound, rybelsus, Trulicity.  If you are on any of the following medications you will need to hold for at least 48 hours prior to the procedure: Viagra, Cialis, Stendra, Levitra.   If you are not sure if you are on any of these medications, then please contact the scheduling office.     Procedure Lab Information  You will need to have labs drawn one week (7 days) prior to the procedure. No Appointment is required. Please go to any [facility] [place].   Main [facility] outpatient services: -, 7:00am-4:30pm    [place]: -F, 7:30am-4pm   [facility] ([address] [facility] through ED): -F, 7:00am-4:30pm    [place] ([address] [facility]): M-F, 7:30am-4pm  You can also get labs at any [facility] [place]   Please call [phone] with any questions or concerns regarding your procedure. This is the direct line to the scheduling department.   If no answer leave a detailed message including your name, the procedure you are scheduled for, and the performing provider.

## 2025-08-04 ENCOUNTER — READMISSION MANAGEMENT (OUTPATIENT)
Dept: CALL CENTER | Facility: HOSPITAL | Age: 75
End: 2025-08-04
Payer: MEDICARE

## 2025-08-15 RX ORDER — EPLERENONE 50 MG/1
50 TABLET ORAL
Qty: 30 TABLET | Refills: 1 | Status: SHIPPED | OUTPATIENT
Start: 2025-08-15 | End: 2025-09-14

## 2025-08-20 ENCOUNTER — TELEPHONE (OUTPATIENT)
Age: 75
End: 2025-08-20
Payer: MEDICARE

## 2025-08-28 ENCOUNTER — OFFICE VISIT (OUTPATIENT)
Dept: GASTROENTEROLOGY | Facility: CLINIC | Age: 75
End: 2025-08-28
Payer: MEDICARE

## 2025-08-28 VITALS
HEIGHT: 62 IN | DIASTOLIC BLOOD PRESSURE: 60 MMHG | SYSTOLIC BLOOD PRESSURE: 160 MMHG | BODY MASS INDEX: 34.37 KG/M2 | WEIGHT: 186.8 LBS

## 2025-08-28 DIAGNOSIS — K21.9 GASTROESOPHAGEAL REFLUX DISEASE WITHOUT ESOPHAGITIS: ICD-10-CM

## 2025-08-28 DIAGNOSIS — Z86.0100 HISTORY OF COLONIC POLYPS: ICD-10-CM

## 2025-08-28 DIAGNOSIS — K22.719 BARRETT'S ESOPHAGUS WITH DYSPLASIA: ICD-10-CM

## 2025-08-28 DIAGNOSIS — K58.1 IRRITABLE BOWEL SYNDROME WITH CONSTIPATION: Primary | ICD-10-CM

## 2025-08-28 RX ORDER — PANTOPRAZOLE SODIUM 40 MG/1
40 TABLET, DELAYED RELEASE ORAL 2 TIMES DAILY
Qty: 180 TABLET | Refills: 3 | Status: SHIPPED | OUTPATIENT
Start: 2025-08-28

## (undated) DEVICE — JACKT LAB F/R KNIT CUFF/COLR XLG BLU

## (undated) DEVICE — SUCTION CANISTER, 3000CC,SAFELINER: Brand: DEROYAL

## (undated) DEVICE — DGW .035 FC J3MM 260CM TEF: Brand: EMERALD

## (undated) DEVICE — KT ORCA ORCAPOD DISP STRL

## (undated) DEVICE — PK CATH CARD 40

## (undated) DEVICE — BW-412T DISP COMBO CLEANING BRUSH: Brand: SINGLE USE COMBINATION CLEANING BRUSH

## (undated) DEVICE — Device

## (undated) DEVICE — THE BITE BLOCK MAXI, LATEX FREE STRAP IS USED TO PROTECT THE ENDOSCOPE INSERTION TUBE FROM BEING BITTEN BY THE PATIENT.

## (undated) DEVICE — VIAL FORMALIN CAP 10P 40ML

## (undated) DEVICE — FRCP BX RADJAW4 NDL 2.8 240CM LG OG BX40

## (undated) DEVICE — KT MANIFLD CARDIAC

## (undated) DEVICE — SPNG GZ WOVN 4X4IN 12PLY 10/BX STRL

## (undated) DEVICE — CATH DIAG IMPULSE FL4 5F 100CM

## (undated) DEVICE — SYR LL 3CC

## (undated) DEVICE — TR BAND RADIAL ARTERY COMPRESSION DEVICE: Brand: TR BAND

## (undated) DEVICE — GLV SURG SENSICARE PI MIC PF SZ8 LF STRL

## (undated) DEVICE — ADAPT CLN BIOGUARD AIR/H2O DISP

## (undated) DEVICE — CATH DIAG IMPULSE FR4 5F 100CM

## (undated) DEVICE — ESOPHAGEAL/PYLORIC/COLONIC WIREGUIDED BALLOON DILATATION CATHETER: Brand: CRE WIREGUIDED

## (undated) DEVICE — GLIDESHEATH SLENDER STAINLESS STEEL KIT: Brand: GLIDESHEATH SLENDER

## (undated) DEVICE — CATH VENT MIV RADL PIG ST TIP 5F 110CM

## (undated) DEVICE — CATH DIAG IMPULSE AR2 5F 100CM

## (undated) DEVICE — DEV INFL BALN BIG60 W/GAUGE 60ML

## (undated) DEVICE — LINER SURG CANSTR SXN S/RIGD 1500CC

## (undated) DEVICE — CATH DIAG IMPULSE FL3.5 5F 100CM

## (undated) DEVICE — GLV SURG SENSICARE PI MIC PF SZ7.5 LF STRL

## (undated) DEVICE — RADIFOCUS OPTITORQUE ANGIOGRAPHIC CATHETER: Brand: OPTITORQUE